# Patient Record
Sex: FEMALE | Race: WHITE | NOT HISPANIC OR LATINO | Employment: OTHER | URBAN - METROPOLITAN AREA
[De-identification: names, ages, dates, MRNs, and addresses within clinical notes are randomized per-mention and may not be internally consistent; named-entity substitution may affect disease eponyms.]

---

## 2017-03-01 ENCOUNTER — OFFICE VISIT (OUTPATIENT)
Dept: INTERNAL MEDICINE | Facility: CLINIC | Age: 82
End: 2017-03-01

## 2017-03-01 VITALS
WEIGHT: 149.6 LBS | TEMPERATURE: 98.1 F | RESPIRATION RATE: 16 BRPM | SYSTOLIC BLOOD PRESSURE: 134 MMHG | HEIGHT: 62 IN | HEART RATE: 64 BPM | OXYGEN SATURATION: 98 % | DIASTOLIC BLOOD PRESSURE: 64 MMHG | BODY MASS INDEX: 27.53 KG/M2

## 2017-03-01 DIAGNOSIS — E78.5 HYPERLIPIDEMIA, UNSPECIFIED HYPERLIPIDEMIA TYPE: ICD-10-CM

## 2017-03-01 DIAGNOSIS — Z23 NEED FOR VACCINATION: ICD-10-CM

## 2017-03-01 DIAGNOSIS — I35.0 NONRHEUMATIC AORTIC VALVE STENOSIS: ICD-10-CM

## 2017-03-01 DIAGNOSIS — I10 ESSENTIAL HYPERTENSION: Primary | ICD-10-CM

## 2017-03-01 DIAGNOSIS — E55.9 VITAMIN D DEFICIENCY: ICD-10-CM

## 2017-03-01 DIAGNOSIS — R73.02 IGT (IMPAIRED GLUCOSE TOLERANCE): ICD-10-CM

## 2017-03-01 LAB — 25(OH)D3+25(OH)D2 SERPL-MCNC: 16.7 NG/ML (ref 30–100)

## 2017-03-01 PROCEDURE — 90715 TDAP VACCINE 7 YRS/> IM: CPT | Performed by: INTERNAL MEDICINE

## 2017-03-01 PROCEDURE — 90471 IMMUNIZATION ADMIN: CPT | Performed by: INTERNAL MEDICINE

## 2017-03-01 PROCEDURE — 99214 OFFICE O/P EST MOD 30 MIN: CPT | Performed by: INTERNAL MEDICINE

## 2017-03-01 RX ORDER — ACETAMINOPHEN 160 MG
2000 TABLET,DISINTEGRATING ORAL DAILY
Qty: 30 CAPSULE | Refills: 11
Start: 2017-03-01 | End: 2018-03-01

## 2017-03-01 RX ORDER — ASPIRIN 325 MG
325 TABLET ORAL AS NEEDED
COMMUNITY
End: 2018-03-22 | Stop reason: DRUGHIGH

## 2017-03-01 RX ORDER — LANOLIN ALCOHOL/MO/W.PET/CERES
1000 CREAM (GRAM) TOPICAL AS NEEDED
COMMUNITY
End: 2017-06-14

## 2017-03-01 NOTE — PROGRESS NOTES
Subjective   Yuliana Gonzalez is a 87 y.o. female.     Chief Complaint   Patient presents with   • Hyperlipidemia     follow up AS   • Hypertension         HPI Comments: In for recheck of chronic IGT.  His been stable.  Also aortic stenosis.  It remains asymptomatic.    Hyperlipidemia   This is a chronic problem. The current episode started more than 1 year ago. The problem is controlled. Recent lipid tests were reviewed and are normal. Factors aggravating her hyperlipidemia include beta blockers. Pertinent negatives include no chest pain or shortness of breath. She is currently on no antihyperlipidemic treatment. The current treatment provides significant improvement of lipids. There are no compliance problems.  Risk factors for coronary artery disease include dyslipidemia, hypertension and post-menopausal.   Hypertension   This is a chronic problem. The current episode started more than 1 year ago. The problem is unchanged. The problem is controlled. Pertinent negatives include no chest pain, headaches, orthopnea, palpitations, peripheral edema, PND or shortness of breath. There are no associated agents to hypertension. Risk factors for coronary artery disease include dyslipidemia and post-menopausal state. Past treatments include beta blockers and angiotensin blockers. The current treatment provides significant improvement. There are no compliance problems.  There is no history of angina, kidney disease, CAD/MI, CVA or heart failure.        The following portions of the patient's history were reviewed and updated as appropriate: allergies, current medications, past social history and problem list.    Outpatient Prescriptions Marked as Taking for the 3/1/17 encounter (Office Visit) with Trent Davis MD   Medication Sig Dispense Refill   • aspirin 325 MG tablet Take 325 mg by mouth As Needed for mild pain (1-3).     • atenolol (TENORMIN) 25 MG tablet TAKE 1 TABLET TWICE A  tablet 1   • Multiple  Vitamins-Minerals (VITEYES AREDS ADVANCED PO) Take 1 capsule by mouth 2 (two) times a day.     • valsartan (DIOVAN) 40 MG tablet Take 1 tablet by mouth daily. 90 tablet 1   • vitamin B-12 (CYANOCOBALAMIN) 1000 MCG tablet Take 1,000 mcg by mouth As Needed.         Review of Systems   Constitutional: Negative for chills, fatigue and fever.   Respiratory: Negative for cough, shortness of breath and wheezing.    Cardiovascular: Negative for chest pain, palpitations, orthopnea, leg swelling and PND.   Gastrointestinal: Negative for abdominal pain, constipation, diarrhea, nausea and vomiting.   Neurological: Negative for headaches.       Objective   Vitals:    03/01/17 1044   BP: 134/64   Pulse: 64   Resp: 16   Temp: 98.1 °F (36.7 °C)   SpO2: 98%      Last Weight    03/01/17  1044   Weight: 149 lb 9.6 oz (67.9 kg)    [unfilled]  Body mass index is 27.81 kg/(m^2).      Physical Exam   Constitutional: She appears well-developed and well-nourished. No distress.   HENT:   Head: Normocephalic and atraumatic.   Neck: Carotid bruit is not present. No thyromegaly present.   Cardiovascular: Normal rate, regular rhythm and intact distal pulses.  Exam reveals no gallop.    Murmur heard.   Crescendo decrescendo systolic murmur is present with a grade of 2/6   Pulmonary/Chest: Effort normal and breath sounds normal. No respiratory distress. She has no wheezes. She has no rales.   Abdominal: Soft. Bowel sounds are normal. She exhibits no mass. There is no tenderness. There is no guarding.         Problem List Items Addressed This Visit        Cardiovascular and Mediastinum    Aortic stenosis    Hyperlipidemia    Hypertension - Primary       Digestive    Vitamin D deficiency       Endocrine    IGT (impaired glucose tolerance)        Assessment/Plan   In for recheck of hypertension, hyperlipidemia, and aortic stenosis.  Gets annual lab work 8/2016 including CBC CMP and lipids and EKG.  AS remains asymptomatic.  Last echocardiogram  was June 2016.  Moderate to severe AS.  We'll recheck now March 2017.  Due for TD AP today.  She is on vitamin D3 2000 units daily now.  The decrease in her dose.  She thinks it makes her feel bad.  Recheck a level today and see if she still needs to stay on it.  Caused headaches and nausea and constipation.         Dragon disclaimer:   Much of this encounter note is an electronic transcription/translation of spoken language to printed text. The electronic translation of spoken language may permit erroneous, or at times, nonsensical words or phrases to be inadvertently transcribed; Although I have reviewed the note for such errors, some may still exist.

## 2017-03-15 ENCOUNTER — HOSPITAL ENCOUNTER (OUTPATIENT)
Dept: CARDIOLOGY | Facility: HOSPITAL | Age: 82
Discharge: HOME | End: 2017-03-15
Attending: INTERNAL MEDICINE | Admitting: INTERNAL MEDICINE

## 2017-03-15 VITALS
SYSTOLIC BLOOD PRESSURE: 142 MMHG | HEART RATE: 66 BPM | BODY MASS INDEX: 28.13 KG/M2 | WEIGHT: 149 LBS | DIASTOLIC BLOOD PRESSURE: 80 MMHG | HEIGHT: 61 IN

## 2017-03-15 DIAGNOSIS — I35.0 NONRHEUMATIC AORTIC VALVE STENOSIS: ICD-10-CM

## 2017-03-15 LAB
ASCENDING AORTA: 2.8 CM
BH CV ECHO MEAS - ACS: 1.1 CM
BH CV ECHO MEAS - AO MAX PG (FULL): 40.3 MMHG
BH CV ECHO MEAS - AO MAX PG: 46 MMHG
BH CV ECHO MEAS - AO MEAN PG (FULL): 25.1 MMHG
BH CV ECHO MEAS - AO MEAN PG: 29.2 MMHG
BH CV ECHO MEAS - AO ROOT AREA (BSA CORRECTED): 1.6
BH CV ECHO MEAS - AO ROOT AREA: 5.5 CM^2
BH CV ECHO MEAS - AO ROOT DIAM: 2.6 CM
BH CV ECHO MEAS - AO V2 MAX: 339.1 CM/SEC
BH CV ECHO MEAS - AO V2 MEAN: 258.8 CM/SEC
BH CV ECHO MEAS - AO V2 VTI: 87.5 CM
BH CV ECHO MEAS - AVA(I,A): 0.99 CM^2
BH CV ECHO MEAS - AVA(I,D): 0.99 CM^2
BH CV ECHO MEAS - AVA(V,A): 0.94 CM^2
BH CV ECHO MEAS - AVA(V,D): 0.94 CM^2
BH CV ECHO MEAS - BSA(HAYCOCK): 1.7 M^2
BH CV ECHO MEAS - BSA: 1.7 M^2
BH CV ECHO MEAS - BZI_BMI: 28.2 KILOGRAMS/M^2
BH CV ECHO MEAS - BZI_METRIC_HEIGHT: 154.9 CM
BH CV ECHO MEAS - BZI_METRIC_WEIGHT: 67.6 KG
BH CV ECHO MEAS - CONTRAST EF (2CH): 67.4 ML/M^2
BH CV ECHO MEAS - CONTRAST EF 4CH: 70.9 ML/M^2
BH CV ECHO MEAS - EDV(CUBED): 83.3 ML
BH CV ECHO MEAS - EDV(MOD-SP2): 86 ML
BH CV ECHO MEAS - EDV(MOD-SP4): 79 ML
BH CV ECHO MEAS - EDV(TEICH): 86.1 ML
BH CV ECHO MEAS - EF(CUBED): 82.7 %
BH CV ECHO MEAS - EF(MOD-SP2): 67.4 %
BH CV ECHO MEAS - EF(MOD-SP4): 70.9 %
BH CV ECHO MEAS - EF(TEICH): 75.8 %
BH CV ECHO MEAS - ESV(CUBED): 14.4 ML
BH CV ECHO MEAS - ESV(MOD-SP2): 28 ML
BH CV ECHO MEAS - ESV(MOD-SP4): 23 ML
BH CV ECHO MEAS - ESV(TEICH): 20.9 ML
BH CV ECHO MEAS - FS: 44.3 %
BH CV ECHO MEAS - IVS/LVPW: 0.93
BH CV ECHO MEAS - IVSD: 1 CM
BH CV ECHO MEAS - LAT PEAK E' VEL: 16 CM/SEC
BH CV ECHO MEAS - LV DIASTOLIC VOL/BSA (35-75): 47.4 ML/M^2
BH CV ECHO MEAS - LV MASS(C)D: 153.8 GRAMS
BH CV ECHO MEAS - LV MASS(C)DI: 92.3 GRAMS/M^2
BH CV ECHO MEAS - LV MAX PG: 5.7 MMHG
BH CV ECHO MEAS - LV MEAN PG: 4.1 MMHG
BH CV ECHO MEAS - LV SYSTOLIC VOL/BSA (12-30): 13.8 ML/M^2
BH CV ECHO MEAS - LV V1 MAX: 119.2 CM/SEC
BH CV ECHO MEAS - LV V1 MEAN: 98.4 CM/SEC
BH CV ECHO MEAS - LV V1 VTI: 32.6 CM
BH CV ECHO MEAS - LVIDD: 4.4 CM
BH CV ECHO MEAS - LVIDS: 2.4 CM
BH CV ECHO MEAS - LVLD AP2: 6.9 CM
BH CV ECHO MEAS - LVLD AP4: 7 CM
BH CV ECHO MEAS - LVLS AP2: 6.1 CM
BH CV ECHO MEAS - LVLS AP4: 5.5 CM
BH CV ECHO MEAS - LVOT AREA (M): 2.5 CM^2
BH CV ECHO MEAS - LVOT AREA: 2.7 CM^2
BH CV ECHO MEAS - LVOT DIAM: 1.8 CM
BH CV ECHO MEAS - LVPWD: 1.1 CM
BH CV ECHO MEAS - MED PEAK E' VEL: 22 CM/SEC
BH CV ECHO MEAS - MV A DUR: 0.13 SEC
BH CV ECHO MEAS - MV A MAX VEL: 120.8 CM/SEC
BH CV ECHO MEAS - MV DEC SLOPE: 291.3 CM/SEC^2
BH CV ECHO MEAS - MV DEC TIME: 0.31 SEC
BH CV ECHO MEAS - MV E MAX VEL: 88.8 CM/SEC
BH CV ECHO MEAS - MV E/A: 0.73
BH CV ECHO MEAS - MV MAX PG: 5.8 MMHG
BH CV ECHO MEAS - MV MEAN PG: 2.3 MMHG
BH CV ECHO MEAS - MV P1/2T MAX VEL: 89.2 CM/SEC
BH CV ECHO MEAS - MV P1/2T: 89.7 MSEC
BH CV ECHO MEAS - MV V2 MAX: 120.8 CM/SEC
BH CV ECHO MEAS - MV V2 MEAN: 70.9 CM/SEC
BH CV ECHO MEAS - MV V2 VTI: 44.2 CM
BH CV ECHO MEAS - MVA P1/2T LCG: 2.5 CM^2
BH CV ECHO MEAS - MVA(P1/2T): 2.5 CM^2
BH CV ECHO MEAS - MVA(VTI): 2 CM^2
BH CV ECHO MEAS - PA ACC TIME: 0.13 SEC
BH CV ECHO MEAS - PA MAX PG (FULL): 0.61 MMHG
BH CV ECHO MEAS - PA MAX PG: 4.4 MMHG
BH CV ECHO MEAS - PA PR(ACCEL): 20.4 MMHG
BH CV ECHO MEAS - PA V2 MAX: 104.5 CM/SEC
BH CV ECHO MEAS - PULM A REVS DUR: 0.11 SEC
BH CV ECHO MEAS - PULM A REVS VEL: 19.4 CM/SEC
BH CV ECHO MEAS - PULM DIAS VEL: 24.3 CM/SEC
BH CV ECHO MEAS - PULM S/D: 1.2
BH CV ECHO MEAS - PULM SYS VEL: 28.1 CM/SEC
BH CV ECHO MEAS - PVA(V,A): 1.5 CM^2
BH CV ECHO MEAS - PVA(V,D): 1.5 CM^2
BH CV ECHO MEAS - QP/QS: 0.43
BH CV ECHO MEAS - RAP SYSTOLE: 3 MMHG
BH CV ECHO MEAS - RV MAX PG: 3.8 MMHG
BH CV ECHO MEAS - RV MEAN PG: 2.3 MMHG
BH CV ECHO MEAS - RV V1 MAX: 97 CM/SEC
BH CV ECHO MEAS - RV V1 MEAN: 72.7 CM/SEC
BH CV ECHO MEAS - RV V1 VTI: 22.8 CM
BH CV ECHO MEAS - RVOT AREA: 1.6 CM^2
BH CV ECHO MEAS - RVOT DIAM: 1.4 CM
BH CV ECHO MEAS - RVSP: 23.3 MMHG
BH CV ECHO MEAS - SI(AO): 287.8 ML/M^2
BH CV ECHO MEAS - SI(CUBED): 41.3 ML/M^2
BH CV ECHO MEAS - SI(LVOT): 52 ML/M^2
BH CV ECHO MEAS - SI(MOD-SP2): 34.8 ML/M^2
BH CV ECHO MEAS - SI(MOD-SP4): 33.6 ML/M^2
BH CV ECHO MEAS - SI(TEICH): 39.2 ML/M^2
BH CV ECHO MEAS - SUP REN AO DIAM: 1.5 CM
BH CV ECHO MEAS - SV(AO): 479.7 ML
BH CV ECHO MEAS - SV(CUBED): 68.9 ML
BH CV ECHO MEAS - SV(LVOT): 86.6 ML
BH CV ECHO MEAS - SV(MOD-SP2): 58 ML
BH CV ECHO MEAS - SV(MOD-SP4): 56 ML
BH CV ECHO MEAS - SV(RVOT): 37 ML
BH CV ECHO MEAS - SV(TEICH): 65.3 ML
BH CV ECHO MEAS - TAPSE (>1.6): 1.5 CM2
BH CV ECHO MEAS - TR MAX VEL: 225.3 CM/SEC
BH CV XLRA - RV BASE: 2.5 CM
BH CV XLRA - TDI S': 10 CM/SEC
E/E' RATIO: 19
LEFT ATRIUM VOLUME INDEX: 32 ML/M2
LV EF 2D ECHO EST: 65 %
SINUS: 2.3 CM
STJ: 2.3 CM

## 2017-03-15 PROCEDURE — 93306 TTE W/DOPPLER COMPLETE: CPT

## 2017-03-15 PROCEDURE — 93306 TTE W/DOPPLER COMPLETE: CPT | Performed by: INTERNAL MEDICINE

## 2017-04-04 RX ORDER — VALSARTAN 40 MG/1
TABLET ORAL
Qty: 90 TABLET | Refills: 1 | Status: SHIPPED | OUTPATIENT
Start: 2017-04-04 | End: 2017-09-26 | Stop reason: SDUPTHER

## 2017-06-14 ENCOUNTER — OFFICE VISIT (OUTPATIENT)
Dept: INTERNAL MEDICINE | Facility: CLINIC | Age: 82
End: 2017-06-14

## 2017-06-14 VITALS
DIASTOLIC BLOOD PRESSURE: 70 MMHG | SYSTOLIC BLOOD PRESSURE: 142 MMHG | HEIGHT: 61 IN | WEIGHT: 148 LBS | TEMPERATURE: 98.7 F | OXYGEN SATURATION: 95 % | RESPIRATION RATE: 16 BRPM | BODY MASS INDEX: 27.94 KG/M2 | HEART RATE: 66 BPM

## 2017-06-14 DIAGNOSIS — E78.5 HYPERLIPIDEMIA, UNSPECIFIED HYPERLIPIDEMIA TYPE: ICD-10-CM

## 2017-06-14 DIAGNOSIS — I35.0 NONRHEUMATIC AORTIC VALVE STENOSIS: ICD-10-CM

## 2017-06-14 DIAGNOSIS — R73.02 IGT (IMPAIRED GLUCOSE TOLERANCE): ICD-10-CM

## 2017-06-14 DIAGNOSIS — I10 ESSENTIAL HYPERTENSION: Primary | ICD-10-CM

## 2017-06-14 PROCEDURE — 99214 OFFICE O/P EST MOD 30 MIN: CPT | Performed by: INTERNAL MEDICINE

## 2017-06-14 PROCEDURE — G0439 PPPS, SUBSEQ VISIT: HCPCS | Performed by: INTERNAL MEDICINE

## 2017-06-14 NOTE — PROGRESS NOTES
Subjective   Yuliana Gonzalez is a 87 y.o. female.     Chief Complaint   Patient presents with   • Hyperlipidemia     AS   • Hypertension         HPI Comments: In for recheck of chronic IGT.  His been stable.  Also aortic stenosis.  It remains asymptomatic.    Hyperlipidemia   This is a chronic problem. The current episode started more than 1 year ago. The problem is controlled. Recent lipid tests were reviewed and are normal. Factors aggravating her hyperlipidemia include beta blockers. Pertinent negatives include no chest pain or shortness of breath. She is currently on no antihyperlipidemic treatment. The current treatment provides significant improvement of lipids. There are no compliance problems.  Risk factors for coronary artery disease include dyslipidemia, hypertension and post-menopausal.   Hypertension   This is a chronic problem. The current episode started more than 1 year ago. The problem is unchanged. The problem is controlled. Pertinent negatives include no chest pain, headaches, orthopnea, palpitations, peripheral edema, PND or shortness of breath. There are no associated agents to hypertension. Risk factors for coronary artery disease include dyslipidemia and post-menopausal state. Past treatments include beta blockers and angiotensin blockers. The current treatment provides significant improvement. There are no compliance problems.  There is no history of angina, kidney disease, CAD/MI, CVA or heart failure.        The following portions of the patient's history were reviewed and updated as appropriate: allergies, current medications, past social history and problem list.    Outpatient Prescriptions Marked as Taking for the 6/14/17 encounter (Office Visit) with Trent Davis MD   Medication Sig Dispense Refill   • aspirin 325 MG tablet Take 325 mg by mouth As Needed for mild pain (1-3).     • atenolol (TENORMIN) 25 MG tablet TAKE 1 TABLET TWICE A  tablet 1   • Cholecalciferol (VITAMIN D3)  2000 UNITS capsule Take 1 capsule by mouth Daily. 30 capsule 11   • Multiple Vitamins-Minerals (VITEYES AREDS ADVANCED PO) Take 1 capsule by mouth 2 (two) times a day.     • valsartan (DIOVAN) 40 MG tablet TAKE 1 TABLET BY MOUTH DAILY. 90 tablet 1       Review of Systems   Constitutional: Negative for chills, fatigue and fever.   Respiratory: Negative for cough, shortness of breath and wheezing.    Cardiovascular: Negative for chest pain, palpitations, orthopnea, leg swelling and PND.   Gastrointestinal: Negative for abdominal pain, constipation, diarrhea, nausea and vomiting.   Neurological: Negative for headaches.       Objective   Vitals:    06/14/17 0957   BP: 142/70   Pulse: 66   Resp: 16   Temp: 98.7 °F (37.1 °C)   SpO2: 95%      Last Weight    06/14/17 0957   Weight: 148 lb (67.1 kg)    [unfilled]  Body mass index is 27.96 kg/(m^2).      Physical Exam   Constitutional: She appears well-developed and well-nourished. No distress.   HENT:   Head: Normocephalic and atraumatic.   Neck: Carotid bruit is not present. No thyromegaly present.   Cardiovascular: Normal rate, regular rhythm and intact distal pulses.  Exam reveals no gallop.    Murmur heard.   Crescendo decrescendo systolic murmur is present with a grade of 2/6   Pulmonary/Chest: Effort normal and breath sounds normal. No respiratory distress. She has no wheezes. She has no rales.   Abdominal: Soft. Bowel sounds are normal. She exhibits no mass. There is no tenderness. There is no guarding.         Problem List Items Addressed This Visit        Cardiovascular and Mediastinum    Aortic stenosis    Hyperlipidemia    Hypertension - Primary       Endocrine    IGT (impaired glucose tolerance)        Assessment/Plan   In for recheck of hypertension, hyperlipidemia, and aortic stenosis.  Gets annual lab work 8/2016 including CBC CMP and lipids and EKG.  AS remains asymptomatic.  Last echocardiogram was June 2016.  Moderate to severe AS.  We rechecked in  March 2017.  Annual wellness exam today.         Dragon disclaimer:   Much of this encounter note is an electronic transcription/translation of spoken language to printed text. The electronic translation of spoken language may permit erroneous, or at times, nonsensical words or phrases to be inadvertently transcribed; Although I have reviewed the note for such errors, some may still exist.

## 2017-06-14 NOTE — PATIENT INSTRUCTIONS
Steps to Quit Smoking   Smoking tobacco can be harmful to your health and can affect almost every organ in your body. Smoking puts you, and those around you, at risk for developing many serious chronic diseases. Quitting smoking is difficult, but it is one of the best things that you can do for your health. It is never too late to quit.  WHAT ARE THE BENEFITS OF QUITTING SMOKING?  When you quit smoking, you lower your risk of developing serious diseases and conditions, such as:  · Lung cancer or lung disease, such as COPD.  · Heart disease.  · Stroke.  · Heart attack.  · Infertility.  · Osteoporosis and bone fractures.  Additionally, symptoms such as coughing, wheezing, and shortness of breath may get better when you quit. You may also find that you get sick less often because your body is stronger at fighting off colds and infections. If you are pregnant, quitting smoking can help to reduce your chances of having a baby of low birth weight.  HOW DO I GET READY TO QUIT?  When you decide to quit smoking, create a plan to make sure that you are successful. Before you quit:  · Pick a date to quit. Set a date within the next two weeks to give you time to prepare.  · Write down the reasons why you are quitting. Keep this list in places where you will see it often, such as on your bathroom mirror or in your car or wallet.  · Identify the people, places, things, and activities that make you want to smoke (triggers) and avoid them. Make sure to take these actions:    Throw away all cigarettes at home, at work, and in your car.    Throw away smoking accessories, such as ashtrays and lighters.    Clean your car and make sure to empty the ashtray.    Clean your home, including curtains and carpets.  · Tell your family, friends, and coworkers that you are quitting. Support from your loved ones can make quitting easier.  · Talk with your health care provider about your options for quitting smoking.  · Find out what treatment  "options are covered by your health insurance.  WHAT STRATEGIES CAN I USE TO QUIT SMOKING?   Talk with your healthcare provider about different strategies to quit smoking. Some strategies include:  · Quitting smoking altogether instead of gradually lessening how much you smoke over a period of time. Research shows that quitting \"cold turkey\" is more successful than gradually quitting.  · Attending in-person counseling to help you build problem-solving skills. You are more likely to have success in quitting if you attend several counseling sessions. Even short sessions of 10 minutes can be effective.  · Finding resources and support systems that can help you to quit smoking and remain smoke-free after you quit. These resources are most helpful when you use them often. They can include:    Online chats with a counselor.    Telephone quitlines.    Printed self-help materials.    Support groups or group counseling.    Text messaging programs.    Mobile phone applications.  · Taking medicines to help you quit smoking. (If you are pregnant or breastfeeding, talk with your health care provider first.) Some medicines contain nicotine and some do not. Both types of medicines help with cravings, but the medicines that include nicotine help to relieve withdrawal symptoms. Your health care provider may recommend:    Nicotine patches, gum, or lozenges.    Nicotine inhalers or sprays.    Non-nicotine medicine that is taken by mouth.  Talk with your health care provider about combining strategies, such as taking medicines while you are also receiving in-person counseling. Using these two strategies together makes you more likely to succeed in quitting than if you used either strategy on its own.  If you are pregnant or breastfeeding, talk with your health care provider about finding counseling or other support strategies to quit smoking. Do not take medicine to help you quit smoking unless told to do so by your health care " provider.  WHAT THINGS CAN I DO TO MAKE IT EASIER TO QUIT?  Quitting smoking might feel overwhelming at first, but there is a lot that you can do to make it easier. Take these important actions:  · Reach out to your family and friends and ask that they support and encourage you during this time. Call telephone quitlines, reach out to support groups, or work with a counselor for support.  · Ask people who smoke to avoid smoking around you.  · Avoid places that trigger you to smoke, such as bars, parties, or smoke-break areas at work.  · Spend time around people who do not smoke.  · Lessen stress in your life, because stress can be a smoking trigger for some people. To lessen stress, try:    Exercising regularly.    Deep-breathing exercises.    Yoga.    Meditating.    Performing a body scan. This involves closing your eyes, scanning your body from head to toe, and noticing which parts of your body are particularly tense. Purposefully relax the muscles in those areas.  · Download or purchase mobile phone or tablet apps (applications) that can help you stick to your quit plan by providing reminders, tips, and encouragement. There are many free apps, such as QuitGuide from the CDC (Centers for Disease Control and Prevention). You can find other support for quitting smoking (smoking cessation) through smokefree.gov and other websites.  HOW WILL I FEEL WHEN I QUIT SMOKING?  Within the first 24 hours of quitting smoking, you may start to feel some withdrawal symptoms. These symptoms are usually most noticeable 2-3 days after quitting, but they usually do not last beyond 2-3 weeks. Changes or symptoms that you might experience include:  · Mood swings.  · Restlessness, anxiety, or irritation.  · Difficulty concentrating.  · Dizziness.  · Strong cravings for sugary foods in addition to nicotine.  · Mild weight gain.  · Constipation.  · Nausea.  · Coughing or a sore throat.  · Changes in how your medicines work in your  body.  · A depressed mood.  · Difficulty sleeping (insomnia).  After the first 2-3 weeks of quitting, you may start to notice more positive results, such as:  · Improved sense of smell and taste.  · Decreased coughing and sore throat.  · Slower heart rate.  · Lower blood pressure.  · Clearer skin.  · The ability to breathe more easily.  · Fewer sick days.  Quitting smoking is very challenging for most people. Do not get discouraged if you are not successful the first time. Some people need to make many attempts to quit before they achieve long-term success. Do your best to stick to your quit plan, and talk with your health care provider if you have any questions or concerns.     This information is not intended to replace advice given to you by your health care provider. Make sure you discuss any questions you have with your health care provider.     Document Released: 12/12/2002 Document Revised: 05/03/2016 Document Reviewed: 05/03/2016  [employer] Interactive Patient Education ©2017 [employer]  Hypertension  Hypertension, commonly called high blood pressure, is when the force of blood pumping through your arteries is too strong. Your arteries are the blood vessels that carry blood from your heart throughout your body. A blood pressure reading consists of a higher number over a lower number, such as 110/72. The higher number (systolic) is the pressure inside your arteries when your heart pumps. The lower number (diastolic) is the pressure inside your arteries when your heart relaxes. Ideally you want your blood pressure below 120/80.  Hypertension forces your heart to work harder to pump blood. Your arteries may become narrow or stiff. Having untreated or uncontrolled hypertension can cause heart attack, stroke, kidney disease, and other problems.  RISK FACTORS  Some risk factors for high blood pressure are controllable. Others are not.   Risk factors you cannot control include:   · Race. You may be at higher risk  if you are .  · Age. Risk increases with age.  · Gender. Men are at higher risk than women before age 45 years. After age 65, women are at higher risk than men.  Risk factors you can control include:  · Not getting enough exercise or physical activity.  · Being overweight.  · Getting too much fat, sugar, calories, or salt in your diet.  · Drinking too much alcohol.  SIGNS AND SYMPTOMS  Hypertension does not usually cause signs or symptoms. Extremely high blood pressure (hypertensive crisis) may cause headache, anxiety, shortness of breath, and nosebleed.  DIAGNOSIS  To check if you have hypertension, your health care provider will measure your blood pressure while you are seated, with your arm held at the level of your heart. It should be measured at least twice using the same arm. Certain conditions can cause a difference in blood pressure between your right and left arms. A blood pressure reading that is higher than normal on one occasion does not mean that you need treatment. If it is not clear whether you have high blood pressure, you may be asked to return on a different day to have your blood pressure checked again. Or, you may be asked to monitor your blood pressure at home for 1 or more weeks.  TREATMENT  Treating high blood pressure includes making lifestyle changes and possibly taking medicine. Living a healthy lifestyle can help lower high blood pressure. You may need to change some of your habits.  Lifestyle changes may include:  · Following the DASH diet. This diet is high in fruits, vegetables, and whole grains. It is low in salt, red meat, and added sugars.  · Keep your sodium intake below 2,300 mg per day.  · Getting at least 30-45 minutes of aerobic exercise at least 4 times per week.  · Losing weight if necessary.  · Not smoking.  · Limiting alcoholic beverages.  · Learning ways to reduce stress.  Your health care provider may prescribe medicine if lifestyle changes are not enough  to get your blood pressure under control, and if one of the following is true:  · You are 18-59 years of age and your systolic blood pressure is above 140.  · You are 60 years of age or older, and your systolic blood pressure is above 150.  · Your diastolic blood pressure is above 90.  · You have diabetes, and your systolic blood pressure is over 140 or your diastolic blood pressure is over 90.  · You have kidney disease and your blood pressure is above 140/90.  · You have heart disease and your blood pressure is above 140/90.  Your personal target blood pressure may vary depending on your medical conditions, your age, and other factors.  HOME CARE INSTRUCTIONS  · Have your blood pressure rechecked as directed by your health care provider.    · Take medicines only as directed by your health care provider. Follow the directions carefully. Blood pressure medicines must be taken as prescribed. The medicine does not work as well when you skip doses. Skipping doses also puts you at risk for problems.  · Do not smoke.    · Monitor your blood pressure at home as directed by your health care provider.   SEEK MEDICAL CARE IF:   · You think you are having a reaction to medicines taken.  · You have recurrent headaches or feel dizzy.  · You have swelling in your ankles.  · You have trouble with your vision.  SEEK IMMEDIATE MEDICAL CARE IF:  · You develop a severe headache or confusion.  · You have unusual weakness, numbness, or feel faint.  · You have severe chest or abdominal pain.  · You vomit repeatedly.  · You have trouble breathing.  MAKE SURE YOU:   · Understand these instructions.  · Will watch your condition.  · Will get help right away if you are not doing well or get worse.     This information is not intended to replace advice given to you by your health care provider. Make sure you discuss any questions you have with your health care provider.     Document Released: 12/18/2006 Document Revised: 05/03/2016 Document  Reviewed: 10/10/2014  [employer] Interactive Patient Education ©2017 [employer]  Exercising to Stay Healthy  Exercising regularly is important. It has many health benefits, such as:  · Improving your overall fitness, flexibility, and endurance.  · Increasing your bone density.  · Helping with weight control.  · Decreasing your body fat.  · Increasing your muscle strength.  · Reducing stress and tension.  · Improving your overall health.  In order to become healthy and stay healthy, it is recommended that you do moderate-intensity and vigorous-intensity exercise. You can tell that you are exercising at a moderate intensity if you have a higher heart rate and faster breathing, but you are still able to hold a conversation. You can tell that you are exercising at a vigorous intensity if you are breathing much harder and faster and cannot hold a conversation while exercising.  HOW OFTEN SHOULD I EXERCISE?  Choose an activity that you enjoy and set realistic goals. Your health care provider can help you to make an activity plan that works for you. Exercise regularly as directed by your health care provider. This may include:   · Doing resistance training twice each week, such as:    Push-ups.    Sit-ups.    Lifting weights.    Using resistance bands.  · Doing a given intensity of exercise for a given amount of time. Choose from these options:    150 minutes of moderate-intensity exercise every week.    75 minutes of vigorous-intensity exercise every week.    A mix of moderate-intensity and vigorous-intensity exercise every week.  Children, pregnant women, people who are out of shape, people who are overweight, and older adults may need to consult a health care provider for individual recommendations. If you have any sort of medical condition, be sure to consult your health care provider before starting a new exercise program.   WHAT ARE SOME EXERCISE IDEAS?  Some moderate-intensity exercise ideas include:   · Walking at  a rate of 1 mile in 15 minutes.  · Biking.  · Hiking.  · Golfing.  · Dancing.  Some vigorous-intensity exercise ideas include:   · Walking at a rate of at least 4.5 miles per hour.  · Jogging or running at a rate of 5 miles per hour.  · Biking at a rate of at least 10 miles per hour.  · Lap swimming.  · Roller-skating or in-line skating.  · Cross-country skiing.  · Vigorous competitive sports, such as football, basketball, and soccer.  · Jumping rope.  · Aerobic dancing.  WHAT ARE SOME EVERYDAY ACTIVITIES THAT CAN HELP ME TO GET EXERCISE?  · Yard work, such as:    Pushing a .    Raking and bagging leaves.  · Washing and waxing your car.  · Pushing a stroller.  · Shoveling snow.  · Gardening.  · Washing windows or floors.  HOW CAN I BE MORE ACTIVE IN MY DAY-TO-DAY ACTIVITIES?  · Use the stairs instead of the elevator.  · Take a walk during your lunch break.  · If you drive, park your car farther away from work or school.  · If you take public transportation, get off one stop early and walk the rest of the way.  · Make all of your phone calls while standing up and walking around.  · Get up, stretch, and walk around every 30 minutes throughout the day.  WHAT GUIDELINES SHOULD I FOLLOW WHILE EXERCISING?  · Do not exercise so much that you hurt yourself, feel dizzy, or get very short of breath.  · Consult your health care provider before starting a new exercise program.  · Wear comfortable clothes and shoes with good support.  · Drink plenty of water while you exercise to prevent dehydration or heat stroke. Body water is lost during exercise and must be replaced.  · Work out until you breathe faster and your heart beats faster.     This information is not intended to replace advice given to you by your health care provider. Make sure you discuss any questions you have with your health care provider.     Document Released: 01/20/2012 Document Revised: 01/08/2016 Document Reviewed: 05/21/2015  [employer]  Interactive Patient Education ©2017 [employer]  Aspirin and Your Heart   Aspirin is a medicine that affects the way blood clots. Aspirin can be used to help reduce the risk of blood clots, heart attacks, and other heart-related problems.   SHOULD I TAKE ASPIRIN?  Your health care provider will help you determine whether it is safe and beneficial for you to take aspirin daily. Taking aspirin daily may be beneficial if you:  · Have had a heart attack or chest pain.  · Have undergone open heart surgery such as coronary artery bypass surgery (CABG).  · Have had coronary angioplasty.  · Have experienced a stroke or transient ischemic attack (TIA).  · Have peripheral vascular disease (PVD).  · Have chronic heart rhythm problems such as atrial fibrillation.  ARE THERE ANY RISKS OF TAKING ASPIRIN DAILY?  Daily use of aspirin can increase your risk of side effects. Some of these include:  · Bleeding. Bleeding problems can be minor or serious. An example of a minor problem is a cut that does not stop bleeding. An example of a more serious problem is stomach bleeding or bleeding into the brain. Your risk of bleeding is increased if you are also taking non-steroidal anti-inflammatory medicine (NSAIDs).  · Increased bruising.  · Upset stomach.  · An allergic reaction. People who have nasal polyps have an increased risk of developing an aspirin allergy.  WHAT ARE SOME GUIDELINES I SHOULD FOLLOW WHEN TAKING ASPIRIN?   · Take aspirin only as directed by your health care provider. Make sure you understand how much you should take and what form you should take. The two forms of aspirin are:    Non-enteric-coated. This type of aspirin does not have a coating and is absorbed quickly. Non-enteric-coated aspirin is usually recommended for people with chest pain. This type of aspirin also comes in a chewable form.    Enteric-coated. This type of aspirin has a special coating that releases the medicine very slowly. Enteric-coated  aspirin causes less stomach upset than non-enteric-coated aspirin. This type of aspirin should not be chewed or crushed.  · Drink alcohol in moderation. Drinking alcohol increases your risk of bleeding.  WHEN SHOULD I SEEK MEDICAL CARE?   · You have unusual bleeding or bruising.  · You have stomach pain.  · You have an allergic reaction. Symptoms of an allergic reaction include:    Hives.    Itchy skin.    Swelling of the lips, tongue, or face.  · You have ringing in your ears.  WHEN SHOULD I SEEK IMMEDIATE MEDICAL CARE?   · Your bowel movements are bloody, dark red, or black in color.  · You vomit or cough up blood.  · You have blood in your urine.  · You cough, wheeze, or feel short of breath.  If you have any of the following symptoms, this is an emergency. Do not wait to see if the pain will go away. Get medical help at once. Call your local emergency services (911 in the [place] Do not drive yourself to the hospital.  · You have severe chest pain, especially if the pain is crushing or pressure-like and spreads to the arms, back, neck, or jaw.   · You have stroke-like symptoms, such as:      Loss of vision.      Difficulty talking.      Numbness or weakness on one side of your body.      Numbness or weakness in your arm or leg.      Not thinking clearly or feeling confused.       This information is not intended to replace advice given to you by your health care provider. Make sure you discuss any questions you have with your health care provider.     Document Released: 11/30/2009 Document Revised: 01/08/2016 Document Reviewed: 03/25/2015  [employer] Interactive Patient Education ©2017 [employer]  Advance Directive  Advance directives are the legal documents that allow you to make choices about your health care and medical treatment if you cannot speak for yourself. Advance directives are a way for you to communicate your wishes to family, friends, and health care providers. The specified people can then convey  your decisions about end-of-life care to avoid confusion if you should become unable to communicate.  Ideally, the process of discussing and writing advance directives should happen over time rather than making decisions all at once. Advance directives can be modified as your situation changes, and you can change your mind at any time, even after you have signed the advance directives. Each state has its own laws regarding advance directives.  You may want to check with your health care provider, , or state representative about the law in your state. Below are some examples of advance directives.  HEALTH CARE PROXY AND DURABLE POWER OF  FOR HEALTH CARE  A health care proxy is a person (agent) appointed to make medical decisions for you if you cannot. Generally, people choose someone they know well and trust to represent their preferences when they can no longer do so. You should be sure to ask this person for agreement to act as your agent. An agent may have to exercise judgment in the event of a medical decision for which your wishes are not known.  A durable power of  for health care is a legal document that names your health care proxy. Depending on the laws in your state, after the document is written, it may also need to be:  · Signed.  · Notarized.  · Dated.  · Copied.  · Witnessed.  · Incorporated into your medical record.  You may also want to appoint someone to manage your financial affairs if you cannot. This is called a durable power of  for finances. It is a separate legal document from the durable power of  for health care. You may choose the same person or someone different from your health care proxy to act as your agent in financial matters.  LIVING WILL  A living will is a set of instructions documenting your wishes about medical care when you cannot care for yourself. It is used if you become:  · Terminally ill.  · Incapacitated.  · Unable to  communicate.  · Unable to make decisions.  Items to consider in your living will include:  · The use or non-use of life-sustaining equipment, such as dialysis machines and breathing machines (ventilators).  · A do not resuscitate (DNR) order, which is the instruction not to use cardiopulmonary resuscitation (CPR) if breathing or heartbeat stops.  · Tube feeding.  · Withholding of food and fluids.  · Comfort (palliative) care when the goal becomes comfort rather than a cure.  · Organ and tissue donation.  A living will does not give instructions about distribution of your money and property if you should pass away. It is advisable to seek the expert advice of a  in drawing up a will regarding your possessions. Decisions about taxes, beneficiaries, and asset distribution will be legally binding. This process can relieve your family and friends of any burdens surrounding disputes or questions that may come up about the allocation of your assets.  DO NOT RESUSCITATE (DNR)  A do not resuscitate (DNR) order is a request to not have CPR in the event that your heart stops beating or you stop breathing. Unless given other instructions, a health care provider will try to help any patient whose heart has stopped or who has stopped breathing.      This information is not intended to replace advice given to you by your health care provider. Make sure you discuss any questions you have with your health care provider.     Document Released: 03/26/2009 Document Revised: 04/10/2017 Document Reviewed: 05/07/2014  [employer] Interactive Patient Education ©2017 [employer]

## 2017-06-14 NOTE — PROGRESS NOTES
QUICK REFERENCE INFORMATION:  The ABCs of the Annual Wellness Visit    Subsequent Medicare Wellness Visit    HEALTH RISK ASSESSMENT    1/27/1930    Recent Hospitalizations:  No hospitalization(s) within the last year..        Current Medical Providers:  Patient Care Team:  Trent Davis MD as PCP - General (Internal Medicine)        Smoking Status:  History   Smoking Status   • Never Smoker   Smokeless Tobacco   • Not on file       Alcohol Consumption:  History   Alcohol Use No       Depression Screen:   PHQ-9 Depression Screening 6/14/2017   Little interest or pleasure in doing things 0   Feeling down, depressed, or hopeless 0   Trouble falling or staying asleep, or sleeping too much 0   Feeling tired or having little energy 1   Poor appetite or overeating 0   Feeling bad about yourself - or that you are a failure or have let yourself or your family down 0   Trouble concentrating on things, such as reading the newspaper or watching television 0   Moving or speaking so slowly that other people could have noticed. Or the opposite - being so fidgety or restless that you have been moving around a lot more than usual 0   Thoughts that you would be better off dead, or of hurting yourself in some way 0   PHQ-9 Total Score 1   If you checked off any problems, how difficult have these problems made it for you to do your work, take care of things at home, or get along with other people? Not difficult at all       Health Habits and Functional and Cognitive Screening:  Functional & Cognitive Status 6/14/2017   Do you have difficulty preparing food and eating? No   Do you have difficulty bathing yourself? No   Do you have difficulty getting dressed? No   Do you have difficulty using the toilet? No   Do you have difficulty moving around from place to place? No   In the past year have you fallen or experienced a near fall? No   Do you need help using the phone?  No   Are you deaf or do you have serious difficulty hearing?  No    Do you need help with transportation? No   Do you need help shopping? No   Do you need help preparing meals?  No   Do you need help with housework?  No   Do you need help with laundry? No   Do you need help taking your medications? No   Do you need help managing money? No   Do you have difficulty concentrating, remembering or making decisions? No       Health Habits  Current Diet: Unhealthy Diet  Dental Exam: Up to date  Eye Exam: Up to date  Exercise (times per week): 7 times per week  Current Exercise Activities Include: Walking      Does the patient have evidence of cognitive impairment? No    Aspirin use counseling: Taking ASA appropriately as indicated      Recent Lab Results:  CMP:  Lab Results   Component Value Date    GLU 95 08/17/2016    BUN 12 08/17/2016    CREATININE 0.56 (L) 08/17/2016    EGFRIFNONA 85 08/17/2016    EGFRIFAFRI 98 08/17/2016    BCR 21 08/17/2016     08/17/2016    K 4.3 08/17/2016    CO2 22 08/17/2016    CALCIUM 8.8 08/17/2016    PROTENTOTREF 6.5 08/17/2016    ALBUMIN 4.2 08/17/2016    LABGLOBREF 2.3 08/17/2016    LABIL2 1.8 08/17/2016    BILITOT 0.5 08/17/2016    ALKPHOS 73 08/17/2016    AST 20 08/17/2016    ALT 14 08/17/2016     Lipid Panel:  Lab Results   Component Value Date    TRIG 213 (H) 08/17/2016    HDL 61 08/17/2016    VLDL 43 (H) 08/17/2016    LDLHDL 2.1 08/17/2016     HbA1c:  Lab Results   Component Value Date    HGBA1C 5.7 (H) 08/17/2016       Visual Acuity:  No exam data present    Age-appropriate Screening Schedule:  Refer to the list below for future screening recommendations based on patient's age, sex and/or medical conditions. Orders for these recommended tests are listed in the plan section. The patient has been provided with a written plan.    Health Maintenance   Topic Date Due   • INFLUENZA VACCINE  08/01/2017   • LIPID PANEL  08/17/2017   • COLONOSCOPY  11/01/2017   • PNEUMOCOCCAL VACCINES (65+ LOW/MEDIUM RISK) (2 of 2 - PPSV23) 11/30/2017   • TDAP/TD  VACCINES (2 - Td) 03/01/2027   • ZOSTER VACCINE  Completed   • MAMMOGRAM  Excluded        Subjective   History of Present Illness    Yuliana Gonzalez is a 87 y.o. female who presents for an Subsequent Wellness Visit.    The following portions of the patient's history were reviewed and updated as appropriate: allergies, current medications, past family history, past medical history, past social history, past surgical history and problem list.    Outpatient Medications Prior to Visit   Medication Sig Dispense Refill   • aspirin 325 MG tablet Take 325 mg by mouth As Needed for mild pain (1-3).     • atenolol (TENORMIN) 25 MG tablet TAKE 1 TABLET TWICE A  tablet 1   • Cholecalciferol (VITAMIN D3) 2000 UNITS capsule Take 1 capsule by mouth Daily. 30 capsule 11   • Multiple Vitamins-Minerals (VITEYES AREDS ADVANCED PO) Take 1 capsule by mouth 2 (two) times a day.     • valsartan (DIOVAN) 40 MG tablet TAKE 1 TABLET BY MOUTH DAILY. 90 tablet 1   • vitamin B-12 (CYANOCOBALAMIN) 1000 MCG tablet Take 1,000 mcg by mouth As Needed.       No facility-administered medications prior to visit.        Patient Active Problem List   Diagnosis   • Aortic stenosis   • IGT (impaired glucose tolerance)   • Hyperlipidemia   • Hypertension   • Cervical arthritis   • Sciatica   • Vitamin D deficiency   • De Quervain's disease (tenosynovitis)       Advance Care Planning:  has an advance directive - a copy HAS NOT been provided. Have asked the patient to send this to us to add to record.    Identification of Risk Factors:  Risk factors include: weight  and inactivity.    Review of Systems    Compared to one year ago, the patient feels her physical health is better.  Compared to one year ago, the patient feels her mental health is the same.    Objective     Physical Exam    Vitals:    06/14/17 0957   BP: 142/70   BP Location: Left arm   Patient Position: Sitting   Cuff Size: Large Adult   Pulse: 66   Resp: 16   Temp: 98.7 °F (37.1 °C)  "  TempSrc: Oral   SpO2: 95%   Weight: 148 lb (67.1 kg)   Height: 61\" (154.9 cm)   PainSc: 0-No pain       Body mass index is 27.96 kg/(m^2).  Discussed the patient's BMI with her. The BMI is in the acceptable range.    Assessment/Plan   Patient Self-Management and Personalized Health Advice  The patient has been provided with information about: diet, exercise, fall prevention and designing advance directives and preventive services including:   · Advance directive, Exercise counseling provided, Nutrition counseling provided, Smoking cessation counseling completed.    Visit Diagnoses:    ICD-10-CM ICD-9-CM   1. Essential hypertension I10 401.9   2. Hyperlipidemia, unspecified hyperlipidemia type E78.5 272.4   3. Nonrheumatic aortic valve stenosis I35.0 424.1   4. IGT (impaired glucose tolerance) R73.02 790.22       No orders of the defined types were placed in this encounter.      Outpatient Encounter Prescriptions as of 6/14/2017   Medication Sig Dispense Refill   • aspirin 325 MG tablet Take 325 mg by mouth As Needed for mild pain (1-3).     • atenolol (TENORMIN) 25 MG tablet TAKE 1 TABLET TWICE A  tablet 1   • Cholecalciferol (VITAMIN D3) 2000 UNITS capsule Take 1 capsule by mouth Daily. 30 capsule 11   • Multiple Vitamins-Minerals (VITEYES AREDS ADVANCED PO) Take 1 capsule by mouth 2 (two) times a day.     • valsartan (DIOVAN) 40 MG tablet TAKE 1 TABLET BY MOUTH DAILY. 90 tablet 1   • [DISCONTINUED] vitamin B-12 (CYANOCOBALAMIN) 1000 MCG tablet Take 1,000 mcg by mouth As Needed.       No facility-administered encounter medications on file as of 6/14/2017.        Reviewed use of high risk medication in the elderly: yes  Reviewed for potential of harmful drug interactions in the elderly: yes    Follow Up:  Return in about 3 months (around 9/14/2017) for Recheck htn, lipids, AS.     An After Visit Summary and PPPS with all of these plans were given to the patient.        "

## 2017-07-29 ENCOUNTER — HOSPITAL ENCOUNTER (EMERGENCY)
Facility: HOSPITAL | Age: 82
Discharge: HOME | End: 2017-07-29
Attending: EMERGENCY MEDICINE | Admitting: EMERGENCY MEDICINE

## 2017-07-29 ENCOUNTER — APPOINTMENT (OUTPATIENT)
Dept: GENERAL RADIOLOGY | Facility: HOSPITAL | Age: 82
End: 2017-07-29

## 2017-07-29 VITALS
TEMPERATURE: 98.1 F | HEIGHT: 64 IN | RESPIRATION RATE: 15 BRPM | WEIGHT: 152 LBS | DIASTOLIC BLOOD PRESSURE: 74 MMHG | SYSTOLIC BLOOD PRESSURE: 160 MMHG | HEART RATE: 69 BPM | BODY MASS INDEX: 25.95 KG/M2 | OXYGEN SATURATION: 96 %

## 2017-07-29 DIAGNOSIS — S62.524A CLOSED NONDISPLACED FRACTURE OF DISTAL PHALANX OF RIGHT THUMB, INITIAL ENCOUNTER: Primary | ICD-10-CM

## 2017-07-29 DIAGNOSIS — R04.0 EPISTAXIS: ICD-10-CM

## 2017-07-29 DIAGNOSIS — T14.8XXA SKIN ABRASION: ICD-10-CM

## 2017-07-29 PROCEDURE — 73130 X-RAY EXAM OF HAND: CPT

## 2017-07-29 PROCEDURE — 73590 X-RAY EXAM OF LOWER LEG: CPT

## 2017-07-29 PROCEDURE — 99283 EMERGENCY DEPT VISIT LOW MDM: CPT

## 2017-07-29 PROCEDURE — 70160 X-RAY EXAM OF NASAL BONES: CPT

## 2017-07-29 RX ORDER — NAPROXEN SODIUM 220 MG
220 TABLET ORAL 2 TIMES DAILY PRN
COMMUNITY
End: 2018-09-07

## 2017-08-02 ENCOUNTER — TELEPHONE (OUTPATIENT)
Dept: SOCIAL WORK | Facility: HOSPITAL | Age: 82
End: 2017-08-02

## 2017-08-02 NOTE — TELEPHONE ENCOUNTER
ED f/u phone call: states that she is following d/c instructions, is feeling a bit better and has f/u manuel't w/ orthopedist on 8/4. No questions/concerns

## 2017-08-10 ENCOUNTER — OFFICE VISIT (OUTPATIENT)
Dept: INTERNAL MEDICINE | Facility: CLINIC | Age: 82
End: 2017-08-10

## 2017-08-10 VITALS
DIASTOLIC BLOOD PRESSURE: 64 MMHG | RESPIRATION RATE: 16 BRPM | OXYGEN SATURATION: 96 % | HEIGHT: 64 IN | SYSTOLIC BLOOD PRESSURE: 152 MMHG | BODY MASS INDEX: 25.03 KG/M2 | WEIGHT: 146.6 LBS | TEMPERATURE: 98.8 F | HEART RATE: 66 BPM

## 2017-08-10 DIAGNOSIS — G44.309 HEADACHES DUE TO OLD HEAD INJURY: ICD-10-CM

## 2017-08-10 DIAGNOSIS — S09.90XS HEADACHES DUE TO OLD HEAD INJURY: ICD-10-CM

## 2017-08-10 DIAGNOSIS — R42 LIGHT-HEADEDNESS: Primary | ICD-10-CM

## 2017-08-10 PROCEDURE — 99213 OFFICE O/P EST LOW 20 MIN: CPT | Performed by: INTERNAL MEDICINE

## 2017-08-10 RX ORDER — ATENOLOL 25 MG/1
TABLET ORAL
Qty: 180 TABLET | Refills: 1 | Status: SHIPPED | OUTPATIENT
Start: 2017-08-10 | End: 2018-05-30 | Stop reason: SDUPTHER

## 2017-08-10 NOTE — PROGRESS NOTES
Subjective   Yuliana Gonzalez is a 87 y.o. female.     Chief Complaint   Patient presents with   • Follow-up     Hospital f/u         Weakness - Generalized   This is a new problem. The current episode started 1 to 4 weeks ago. The problem occurs intermittently. The problem has been waxing and waning. Associated symptoms include headaches, myalgias and vertigo. Pertinent negatives include no anorexia, chest pain, chills, coughing, fever, neck pain or visual change. Nothing aggravates the symptoms. She has tried acetaminophen and NSAIDs for the symptoms.        The following portions of the patient's history were reviewed and updated as appropriate: allergies, current medications, past social history and problem list.    Outpatient Prescriptions Marked as Taking for the 8/10/17 encounter (Office Visit) with Trent Davis MD   Medication Sig Dispense Refill   • aspirin 325 MG tablet Take 325 mg by mouth As Needed for mild pain (1-3).     • atenolol (TENORMIN) 25 MG tablet TAKE 1 TABLET TWICE A  tablet 1   • Cholecalciferol (VITAMIN D3) 2000 UNITS capsule Take 1 capsule by mouth Daily. 30 capsule 11   • Multiple Vitamins-Minerals (VITEYES AREDS ADVANCED PO) Take 1 capsule by mouth 2 (two) times a day.     • naproxen sodium (ALEVE) 220 MG tablet Take 220 mg by mouth 2 (Two) Times a Day As Needed for Mild Pain (1-3) (took one this morning at 1030.).     • valsartan (DIOVAN) 40 MG tablet TAKE 1 TABLET BY MOUTH DAILY. 90 tablet 1       Review of Systems   Constitutional: Negative for chills and fever.   Respiratory: Negative for cough and wheezing.    Cardiovascular: Negative for chest pain and palpitations.   Gastrointestinal: Negative for anorexia.   Musculoskeletal: Positive for myalgias. Negative for back pain, gait problem and neck pain.   Neurological: Positive for vertigo and headaches.       Objective   Vitals:    08/10/17 1100   BP: 152/64   Pulse: 66   Resp: 16   Temp: 98.8 °F (37.1 °C)   SpO2: 96%     There were no vitals filed for this visit. [unfilled]  There is no height or weight on file to calculate BMI.      Physical Exam   Constitutional: She is oriented to person, place, and time. She appears well-developed and well-nourished.   Cardiovascular: Normal rate and regular rhythm.    Murmur heard.   Crescendo decrescendo systolic murmur is present with a grade of 3/6   Aortic area   Pulmonary/Chest: Effort normal and breath sounds normal. No respiratory distress. She has no wheezes. She has no rales.   Neurological: She is alert and oriented to person, place, and time. She has normal reflexes. She displays normal reflexes. No cranial nerve deficit. Coordination normal.         Problem List Items Addressed This Visit     None      Visit Diagnoses     Light-headedness    -  Primary    Headaches due to old head injury            Assessment/Plan   She's been complaining of left wrist pain for some time now.  She's tried a splint early modest success.  He was [facility] where they told her to take it at night rather than during the day.  They must of thought this was carpal tunnel syndrome.  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  Ears no evidence of de Quervain's disease on exam.  She stumbled and tripped and had a fall 12 days ago.  Fractured the tip of her right thumb.  Hit her forehead.  She's had some vague right chest pain since then and it is probably from rib contusion.  She's had these lightheaded spells where she feels terrible and somewhat near syncopal since then.  Some headaches.  I suspect this might be postconcussive symptoms.  She was worried it was related to Tylenol or Aleve and I suggested to her that is not the case.  For now she'll leave off the Advil and Aleve and just go with Tylenol.  We will observe carefully given the head trauma.  His symptoms continued she is going to need a CT of the brain, unenhanced to rule out any subdural.  Right now the trauma seems to have  been awfully mild.         Dragon disclaimer:   Much of this encounter note is an electronic transcription/translation of spoken language to printed text. The electronic translation of spoken language may permit erroneous, or at times, nonsensical words or phrases to be inadvertently transcribed; Although I have reviewed the note for such errors, some may still exist.

## 2017-08-17 ENCOUNTER — TELEPHONE (OUTPATIENT)
Dept: INTERNAL MEDICINE | Facility: CLINIC | Age: 82
End: 2017-08-17

## 2017-09-15 DIAGNOSIS — Z79.899 MEDICATION MANAGEMENT: Primary | ICD-10-CM

## 2017-09-15 DIAGNOSIS — R73.02 IGT (IMPAIRED GLUCOSE TOLERANCE): ICD-10-CM

## 2017-09-15 DIAGNOSIS — E78.5 HYPERLIPIDEMIA, UNSPECIFIED HYPERLIPIDEMIA TYPE: ICD-10-CM

## 2017-09-19 LAB
ALBUMIN SERPL-MCNC: 4.1 G/DL (ref 3.5–4.7)
ALBUMIN/GLOB SERPL: 1.8 {RATIO} (ref 1.2–2.2)
ALP SERPL-CCNC: 77 IU/L (ref 39–117)
ALT SERPL-CCNC: 12 IU/L (ref 0–32)
AST SERPL-CCNC: 15 IU/L (ref 0–40)
BASOPHILS # BLD AUTO: 0 X10E3/UL (ref 0–0.2)
BASOPHILS NFR BLD AUTO: 1 %
BILIRUB SERPL-MCNC: 0.5 MG/DL (ref 0–1.2)
BUN SERPL-MCNC: 14 MG/DL (ref 8–27)
BUN/CREAT SERPL: 27 (ref 12–28)
CALCIUM SERPL-MCNC: 8.9 MG/DL (ref 8.7–10.3)
CHLORIDE SERPL-SCNC: 103 MMOL/L (ref 96–106)
CHOLEST SERPL-MCNC: 237 MG/DL (ref 100–199)
CO2 SERPL-SCNC: 20 MMOL/L (ref 18–29)
CREAT SERPL-MCNC: 0.51 MG/DL (ref 0.57–1)
EOSINOPHIL # BLD AUTO: 0.2 X10E3/UL (ref 0–0.4)
EOSINOPHIL NFR BLD AUTO: 3 %
ERYTHROCYTE [DISTWIDTH] IN BLOOD BY AUTOMATED COUNT: 13.8 % (ref 12.3–15.4)
GLOBULIN SER CALC-MCNC: 2.3 G/DL (ref 1.5–4.5)
GLUCOSE SERPL-MCNC: 84 MG/DL (ref 65–99)
HBA1C MFR BLD: 5.6 % (ref 4.8–5.6)
HCT VFR BLD AUTO: 42.3 % (ref 34–46.6)
HDLC SERPL-MCNC: 59 MG/DL
HGB BLD-MCNC: 14.4 G/DL (ref 11.1–15.9)
IMM GRANULOCYTES # BLD: 0 X10E3/UL (ref 0–0.1)
IMM GRANULOCYTES NFR BLD: 0 %
LDLC SERPL CALC-MCNC: 125 MG/DL (ref 0–99)
LDLC/HDLC SERPL: 2.1 RATIO UNITS (ref 0–3.2)
LYMPHOCYTES # BLD AUTO: 3 X10E3/UL (ref 0.7–3.1)
LYMPHOCYTES NFR BLD AUTO: 38 %
MCH RBC QN AUTO: 28.5 PG (ref 26.6–33)
MCHC RBC AUTO-ENTMCNC: 34 G/DL (ref 31.5–35.7)
MCV RBC AUTO: 84 FL (ref 79–97)
MONOCYTES # BLD AUTO: 0.4 X10E3/UL (ref 0.1–0.9)
MONOCYTES NFR BLD AUTO: 5 %
NEUTROPHILS # BLD AUTO: 4.3 X10E3/UL (ref 1.4–7)
NEUTROPHILS NFR BLD AUTO: 53 %
PLATELET # BLD AUTO: 206 X10E3/UL (ref 150–379)
POTASSIUM SERPL-SCNC: 4.4 MMOL/L (ref 3.5–5.2)
PROT SERPL-MCNC: 6.4 G/DL (ref 6–8.5)
RBC # BLD AUTO: 5.05 X10E6/UL (ref 3.77–5.28)
SODIUM SERPL-SCNC: 141 MMOL/L (ref 134–144)
TRIGL SERPL-MCNC: 267 MG/DL (ref 0–149)
VLDLC SERPL CALC-MCNC: 53 MG/DL (ref 5–40)
WBC # BLD AUTO: 8 X10E3/UL (ref 3.4–10.8)

## 2017-09-20 ENCOUNTER — OFFICE VISIT (OUTPATIENT)
Dept: INTERNAL MEDICINE | Facility: CLINIC | Age: 82
End: 2017-09-20

## 2017-09-20 VITALS
HEART RATE: 60 BPM | TEMPERATURE: 98.4 F | WEIGHT: 147 LBS | HEIGHT: 64 IN | DIASTOLIC BLOOD PRESSURE: 82 MMHG | SYSTOLIC BLOOD PRESSURE: 138 MMHG | RESPIRATION RATE: 16 BRPM | BODY MASS INDEX: 25.1 KG/M2

## 2017-09-20 DIAGNOSIS — I35.0 NONRHEUMATIC AORTIC VALVE STENOSIS: ICD-10-CM

## 2017-09-20 DIAGNOSIS — E78.5 HYPERLIPIDEMIA, UNSPECIFIED HYPERLIPIDEMIA TYPE: ICD-10-CM

## 2017-09-20 DIAGNOSIS — R73.02 IGT (IMPAIRED GLUCOSE TOLERANCE): ICD-10-CM

## 2017-09-20 DIAGNOSIS — I10 ESSENTIAL HYPERTENSION: Primary | ICD-10-CM

## 2017-09-20 PROCEDURE — 99214 OFFICE O/P EST MOD 30 MIN: CPT | Performed by: INTERNAL MEDICINE

## 2017-09-20 NOTE — PROGRESS NOTES
Subjective   Yuliana Gonzalez is a 87 y.o. female.     Chief Complaint   Patient presents with   • Hyperlipidemia     AS   • Hypertension         HPI Comments: In for recheck of chronic IGT.  His been stable.  Also aortic stenosis.  It remains asymptomatic.    Hyperlipidemia   This is a chronic problem. The current episode started more than 1 year ago. The problem is controlled. Recent lipid tests were reviewed and are normal. Factors aggravating her hyperlipidemia include beta blockers. Pertinent negatives include no chest pain or shortness of breath. She is currently on no antihyperlipidemic treatment. The current treatment provides significant improvement of lipids. There are no compliance problems.  Risk factors for coronary artery disease include dyslipidemia, hypertension and post-menopausal.   Hypertension   This is a chronic problem. The current episode started more than 1 year ago. The problem is unchanged. The problem is controlled. Pertinent negatives include no chest pain, headaches, orthopnea, palpitations, peripheral edema, PND or shortness of breath. There are no associated agents to hypertension. Risk factors for coronary artery disease include dyslipidemia and post-menopausal state. Past treatments include beta blockers and angiotensin blockers. The current treatment provides significant improvement. There are no compliance problems.  There is no history of angina, kidney disease, CAD/MI, CVA or heart failure.        The following portions of the patient's history were reviewed and updated as appropriate: allergies, current medications, past social history and problem list.    Outpatient Prescriptions Marked as Taking for the 9/20/17 encounter (Office Visit) with Trent Davis MD   Medication Sig Dispense Refill   • aspirin 325 MG tablet Take 325 mg by mouth As Needed for mild pain (1-3).     • atenolol (TENORMIN) 25 MG tablet TAKE 1 TABLET TWICE A  tablet 1   • Cholecalciferol (VITAMIN D3)  2000 UNITS capsule Take 1 capsule by mouth Daily. 30 capsule 11   • Multiple Vitamins-Minerals (VITEYES AREDS ADVANCED PO) Take 1 capsule by mouth 2 (two) times a day.     • naproxen sodium (ALEVE) 220 MG tablet Take 220 mg by mouth 2 (Two) Times a Day As Needed for Mild Pain (1-3) (took one this morning at 1030.).     • valsartan (DIOVAN) 40 MG tablet TAKE 1 TABLET BY MOUTH DAILY. 90 tablet 1       Review of Systems   Constitutional: Negative for chills, fatigue and fever.   Respiratory: Negative for cough, shortness of breath and wheezing.    Cardiovascular: Negative for chest pain, palpitations, orthopnea, leg swelling and PND.   Gastrointestinal: Negative for abdominal pain, constipation, diarrhea, nausea and vomiting.   Neurological: Negative for headaches.       Objective   Vitals:    09/20/17 1030   BP: 138/82   Pulse: 60   Resp: 16   Temp: 98.4 °F (36.9 °C)      Last Weight    09/20/17  1030   Weight: 147 lb (66.7 kg)    [unfilled]  Body mass index is 25.23 kg/(m^2).      Physical Exam   Constitutional: She appears well-developed and well-nourished. No distress.   HENT:   Head: Normocephalic and atraumatic.   Neck: Carotid bruit is not present. No thyromegaly present.   Cardiovascular: Normal rate, regular rhythm and intact distal pulses.  Exam reveals no gallop.    Murmur heard.   Crescendo decrescendo systolic murmur is present with a grade of 2/6   Pulmonary/Chest: Effort normal and breath sounds normal. No respiratory distress. She has no wheezes. She has no rales.   Abdominal: Soft. Bowel sounds are normal. She exhibits no mass. There is no tenderness. There is no guarding.         Problem List Items Addressed This Visit        Cardiovascular and Mediastinum    Aortic stenosis    Hyperlipidemia    Hypertension - Primary       Endocrine    IGT (impaired glucose tolerance)        Assessment/Plan   In for recheck of hypertension, hyperlipidemia, and aortic stenosis.  Gets annual lab work 8/2016  including CBC CMP and lipids and EKG.  AS remains asymptomatic.  Last echocardiogram was March 2017.  Moderate to severe AS.  [facility]          Dragon disclaimer:   Much of this encounter note is an electronic transcription/translation of spoken language to printed text. The electronic translation of spoken language may permit erroneous, or at times, nonsensical words or phrases to be inadvertently transcribed; Although I have reviewed the note for such errors, some may still exist.

## 2017-09-27 RX ORDER — VALSARTAN 40 MG/1
TABLET ORAL
Qty: 90 TABLET | Refills: 1 | Status: SHIPPED | OUTPATIENT
Start: 2017-09-27 | End: 2018-03-22 | Stop reason: SDUPTHER

## 2017-12-15 ENCOUNTER — OFFICE VISIT (OUTPATIENT)
Dept: INTERNAL MEDICINE | Facility: CLINIC | Age: 82
End: 2017-12-15

## 2017-12-15 VITALS
WEIGHT: 146 LBS | TEMPERATURE: 98.8 F | BODY MASS INDEX: 24.92 KG/M2 | RESPIRATION RATE: 16 BRPM | DIASTOLIC BLOOD PRESSURE: 70 MMHG | SYSTOLIC BLOOD PRESSURE: 120 MMHG | HEART RATE: 58 BPM | HEIGHT: 64 IN

## 2017-12-15 DIAGNOSIS — I35.0 NONRHEUMATIC AORTIC VALVE STENOSIS: ICD-10-CM

## 2017-12-15 DIAGNOSIS — I10 ESSENTIAL HYPERTENSION: Primary | ICD-10-CM

## 2017-12-15 DIAGNOSIS — E78.5 HYPERLIPIDEMIA, UNSPECIFIED HYPERLIPIDEMIA TYPE: ICD-10-CM

## 2017-12-15 DIAGNOSIS — R73.02 IGT (IMPAIRED GLUCOSE TOLERANCE): ICD-10-CM

## 2017-12-15 PROCEDURE — 99214 OFFICE O/P EST MOD 30 MIN: CPT | Performed by: INTERNAL MEDICINE

## 2017-12-15 NOTE — PROGRESS NOTES
Subjective   Yuliana Gonzalez is a 87 y.o. female.     Chief Complaint   Patient presents with   • Hypertension   • Hyperlipidemia         HPI Comments: In for recheck of chronic IGT.  His been stable.  Also aortic stenosis.  It remains asymptomatic.    Hypertension   This is a chronic problem. The current episode started more than 1 year ago. The problem is unchanged. The problem is controlled. Pertinent negatives include no chest pain, headaches, orthopnea, palpitations, peripheral edema, PND or shortness of breath. There are no associated agents to hypertension. Risk factors for coronary artery disease include dyslipidemia and post-menopausal state. Past treatments include beta blockers and angiotensin blockers. The current treatment provides significant improvement. There are no compliance problems.  There is no history of angina, kidney disease, CAD/MI, CVA or heart failure.   Hyperlipidemia   This is a chronic problem. The current episode started more than 1 year ago. The problem is controlled. Recent lipid tests were reviewed and are normal. Factors aggravating her hyperlipidemia include beta blockers. Pertinent negatives include no chest pain or shortness of breath. She is currently on no antihyperlipidemic treatment. The current treatment provides significant improvement of lipids. There are no compliance problems.  Risk factors for coronary artery disease include dyslipidemia, hypertension and post-menopausal.        The following portions of the patient's history were reviewed and updated as appropriate: allergies, current medications, past social history and problem list.    Outpatient Prescriptions Marked as Taking for the 12/15/17 encounter (Office Visit) with Trent Davis MD   Medication Sig Dispense Refill   • aspirin 325 MG tablet Take 325 mg by mouth As Needed for mild pain (1-3).     • atenolol (TENORMIN) 25 MG tablet TAKE 1 TABLET TWICE A  tablet 1   • Cholecalciferol (VITAMIN D3) 2000  UNITS capsule Take 1 capsule by mouth Daily. 30 capsule 11   • Multiple Vitamins-Minerals (VITEYES AREDS ADVANCED PO) Take 1 capsule by mouth 2 (two) times a day.     • naproxen sodium (ALEVE) 220 MG tablet Take 220 mg by mouth 2 (Two) Times a Day As Needed for Mild Pain (1-3) (took one this morning at 1030.).     • valsartan (DIOVAN) 40 MG tablet TAKE 1 TABLET BY MOUTH DAILY. 90 tablet 1       Review of Systems   Constitutional: Negative for chills, fatigue and fever.   Respiratory: Negative for cough, shortness of breath and wheezing.    Cardiovascular: Negative for chest pain, palpitations, orthopnea, leg swelling and PND.   Gastrointestinal: Negative for abdominal pain, constipation, diarrhea, nausea and vomiting.   Neurological: Negative for headaches.       Objective   Vitals:    12/15/17 1016   BP: 120/70   Pulse: 58   Resp: 16   Temp: 98.8 °F (37.1 °C)      Last Weight    12/15/17  1016   Weight: 66.2 kg (146 lb)    [unfilled]  Body mass index is 25.06 kg/(m^2).      Physical Exam   Constitutional: She appears well-developed and well-nourished. No distress.   HENT:   Head: Normocephalic and atraumatic.   Neck: Carotid bruit is not present. No thyromegaly present.   Cardiovascular: Normal rate, regular rhythm and intact distal pulses.  Exam reveals no gallop.    Murmur heard.   Crescendo decrescendo systolic murmur is present with a grade of 3/6   Murmur is in the aortic area   Pulmonary/Chest: Effort normal and breath sounds normal. No respiratory distress. She has no wheezes. She has no rales.   Abdominal: Soft. Bowel sounds are normal. She exhibits no mass. There is no tenderness. There is no guarding.         Problem List Items Addressed This Visit        Cardiovascular and Mediastinum    Aortic stenosis    Hyperlipidemia    Hypertension - Primary       Endocrine    IGT (impaired glucose tolerance)        Assessment/Plan   In for recheck of hypertension, hyperlipidemia, and aortic stenosis.  Blood  pressure control is excellent.  Glycemic control is been good.  She is no longer treating lipids so we are not checking lipids at this point.  Got annual lab work 8/2017 including CBC CMP and lipids and EKG.  AS remains asymptomatic.  No chest pain.  No syncope.  No CHF.  Last echocardiogram was March 2017.  Moderate to severe AS.           Jono disclaimer:   Much of this encounter note is an electronic transcription/translation of spoken language to printed text. The electronic translation of spoken language may permit erroneous, or at times, nonsensical words or phrases to be inadvertently transcribed; Although I have reviewed the note for such errors, some may still exist.

## 2018-01-23 ENCOUNTER — OFFICE VISIT (OUTPATIENT)
Dept: INTERNAL MEDICINE | Facility: CLINIC | Age: 83
End: 2018-01-23

## 2018-01-23 VITALS
OXYGEN SATURATION: 91 % | BODY MASS INDEX: 25.1 KG/M2 | SYSTOLIC BLOOD PRESSURE: 146 MMHG | HEART RATE: 79 BPM | DIASTOLIC BLOOD PRESSURE: 70 MMHG | HEIGHT: 64 IN | WEIGHT: 147 LBS | TEMPERATURE: 98.4 F | RESPIRATION RATE: 16 BRPM

## 2018-01-23 DIAGNOSIS — R00.2 PALPITATIONS: Primary | ICD-10-CM

## 2018-01-23 PROCEDURE — 93000 ELECTROCARDIOGRAM COMPLETE: CPT | Performed by: INTERNAL MEDICINE

## 2018-01-23 PROCEDURE — 99213 OFFICE O/P EST LOW 20 MIN: CPT | Performed by: INTERNAL MEDICINE

## 2018-01-23 NOTE — PROGRESS NOTES
Subjective   Yuliana Gonzalez is a 87 y.o. female.     Chief Complaint   Patient presents with   • Palpitations         Palpitations    This is a recurrent problem. The current episode started today. The problem occurs intermittently. The problem has been waxing and waning. Nothing aggravates the symptoms. Associated symptoms include an irregular heartbeat. Pertinent negatives include no chest pain, dizziness, near-syncope, shortness of breath, syncope or vomiting. She has tried beta blockers for the symptoms. The treatment provided significant relief.        The following portions of the patient's history were reviewed and updated as appropriate: allergies, current medications, past social history and problem list.    Outpatient Prescriptions Marked as Taking for the 1/23/18 encounter (Office Visit) with Trent Davis MD   Medication Sig Dispense Refill   • aspirin 325 MG tablet Take 325 mg by mouth As Needed for mild pain (1-3).     • atenolol (TENORMIN) 25 MG tablet TAKE 1 TABLET TWICE A  tablet 1   • Cholecalciferol (VITAMIN D3) 2000 UNITS capsule Take 1 capsule by mouth Daily. 30 capsule 11   • Multiple Vitamins-Minerals (VITEYES AREDS ADVANCED PO) Take 1 capsule by mouth 2 (two) times a day.     • naproxen sodium (ALEVE) 220 MG tablet Take 220 mg by mouth 2 (Two) Times a Day As Needed for Mild Pain (1-3) (took one this morning at 1030.).     • valsartan (DIOVAN) 40 MG tablet TAKE 1 TABLET BY MOUTH DAILY. 90 tablet 1       Review of Systems   Constitutional: Positive for unexpected weight change.   Respiratory: Negative for shortness of breath and wheezing.    Cardiovascular: Positive for palpitations. Negative for chest pain, leg swelling, syncope and near-syncope.   Gastrointestinal: Negative for vomiting.   Neurological: Negative for dizziness.       Objective   Vitals:    01/23/18 1302   BP: 146/70   Pulse: 79   Resp: 16   Temp: 98.4 °F (36.9 °C)   SpO2: 91%      Last Weight    01/23/18  1302    Weight: 66.7 kg (147 lb)    [unfilled]  Body mass index is 25.22 kg/(m^2).      Physical Exam   Constitutional: She appears well-developed and well-nourished. No distress.   HENT:   Head: Normocephalic and atraumatic.   Neck: Carotid bruit is not present. No thyromegaly present.   Cardiovascular: Normal rate, regular rhythm and intact distal pulses.  Exam reveals no gallop.    Murmur heard.   Crescendo decrescendo systolic murmur is present with a grade of 3/6   Murmur is in the aortic area   Pulmonary/Chest: Effort normal and breath sounds normal. No respiratory distress. She has no wheezes. She has no rales.   Abdominal: Soft. Bowel sounds are normal. She exhibits no mass. There is no tenderness. There is no guarding.         Problem List Items Addressed This Visit     None      Visit Diagnoses     Palpitations    -  Primary        Assessment/Plan   In today with palpitations.  She woke from sleep about 4:15 AM felt like her heart was just not right.  Seems to be going faster than usual.  She is usually in the 60s and today she seemed to be in the 80s.  Seem to stay fast for an hour or 2 and then gradually resolved after she took an extra Tenormin and aspirin.  She's done this before.  Sometimes a few times a month she'll have a brief spell like this.  She just feels weak all over.  She is to exercise on treadmill and a fainting she felt better following exercise.  She has moderate aortic stenosis.  Last valve area was 1.8 cm.  Gradient was in the 46 mm max range and 29 average.  Hard to know what to make of these symptoms.  His only about the second significant spell that she's had.  Last one was about 2 years ago.  Since the heart rate was slow enough I wonder if this was perhaps a spell of atrial fibrillation.  She's not sure that it was irregular and she certainly noticed the slight elevation in heart rate.  Some symptoms that are quite consistent with symptomatic PVCs and that may be all were dealing  with.  We'll boost the atenolol to 25 mg twice a day.  Ekg today unchanged with RBBB.        ECG 12 Lead  Date/Time: 1/23/2018 1:34 PM  Performed by: LUIS SALEH  Authorized by: LUIS SALEH   Comparison: compared with previous ECG from 8/18/2016  Similar to previous ECG  Rhythm: sinus rhythm  Rate: normal  Conduction: right bundle branch block  ST Segments: ST segments normal  T Waves: T waves normal  QRS axis: indeterminate  Other: no other findings  Clinical impression: abnormal ECG  Comments: Right bundle branch block.  This is unchanged from 8/18/16.  Abnormal EKG.                     Dragon disclaimer:   Much of this encounter note is an electronic transcription/translation of spoken language to printed text. The electronic translation of spoken language may permit erroneous, or at times, nonsensical words or phrases to be inadvertently transcribed; Although I have reviewed the note for such errors, some may still exist.

## 2018-03-22 ENCOUNTER — OFFICE VISIT (OUTPATIENT)
Dept: INTERNAL MEDICINE | Facility: CLINIC | Age: 83
End: 2018-03-22

## 2018-03-22 VITALS
OXYGEN SATURATION: 90 % | DIASTOLIC BLOOD PRESSURE: 82 MMHG | RESPIRATION RATE: 16 BRPM | WEIGHT: 145 LBS | HEIGHT: 64 IN | HEART RATE: 66 BPM | TEMPERATURE: 98.4 F | SYSTOLIC BLOOD PRESSURE: 184 MMHG | BODY MASS INDEX: 24.75 KG/M2

## 2018-03-22 DIAGNOSIS — I35.0 NONRHEUMATIC AORTIC VALVE STENOSIS: ICD-10-CM

## 2018-03-22 DIAGNOSIS — E78.5 HYPERLIPIDEMIA, UNSPECIFIED HYPERLIPIDEMIA TYPE: ICD-10-CM

## 2018-03-22 DIAGNOSIS — I10 ESSENTIAL HYPERTENSION: Primary | ICD-10-CM

## 2018-03-22 DIAGNOSIS — J40 BRONCHITIS: ICD-10-CM

## 2018-03-22 DIAGNOSIS — R73.02 IGT (IMPAIRED GLUCOSE TOLERANCE): ICD-10-CM

## 2018-03-22 PROCEDURE — 99214 OFFICE O/P EST MOD 30 MIN: CPT | Performed by: INTERNAL MEDICINE

## 2018-03-22 RX ORDER — MELATONIN
2000 DAILY
Status: ON HOLD | COMMUNITY
End: 2022-03-17 | Stop reason: SDUPTHER

## 2018-03-22 RX ORDER — VITAMIN B COMPLEX
1 TABLET ORAL DAILY
COMMUNITY
End: 2018-06-21

## 2018-03-22 RX ORDER — VALSARTAN 80 MG/1
80 TABLET ORAL DAILY
Qty: 90 TABLET | Refills: 1 | Status: SHIPPED | OUTPATIENT
Start: 2018-03-22 | End: 2018-09-07 | Stop reason: SINTOL

## 2018-03-22 RX ORDER — BENZONATATE 200 MG/1
200 CAPSULE ORAL 3 TIMES DAILY PRN
Qty: 30 CAPSULE | Refills: 0 | Status: SHIPPED | OUTPATIENT
Start: 2018-03-22 | End: 2018-05-17

## 2018-03-22 NOTE — PROGRESS NOTES
Subjective   Yuliana Gonzalez is a 88 y.o. female.     Chief Complaint   Patient presents with   • Hypertension   • Hyperlipidemia   • Hyperglycemia   • Aortic Stenosis         In for recheck of chronic IGT.  His been stable.  Also aortic stenosis.  It remains asymptomatic.      Hypertension   This is a chronic problem. The current episode started more than 1 year ago. The problem is unchanged. The problem is controlled. Pertinent negatives include no chest pain, headaches, orthopnea, palpitations, peripheral edema, PND or shortness of breath. There are no associated agents to hypertension. Risk factors for coronary artery disease include dyslipidemia and post-menopausal state. Past treatments include beta blockers and angiotensin blockers. The current treatment provides significant improvement. There are no compliance problems.  There is no history of angina, kidney disease, CAD/MI, CVA or heart failure. Current antihypertension treatment includes beta blockers and angiotensin blockers.   Hyperlipidemia   This is a chronic problem. The current episode started more than 1 year ago. The problem is controlled. Recent lipid tests were reviewed and are normal. Factors aggravating her hyperlipidemia include beta blockers. Pertinent negatives include no chest pain or shortness of breath. She is currently on no antihyperlipidemic treatment. The current treatment provides significant improvement of lipids. There are no compliance problems.  Risk factors for coronary artery disease include dyslipidemia, hypertension and post-menopausal.   Hyperglycemia   This is a chronic problem. The current episode started more than 1 year ago. The problem occurs constantly. The problem has been unchanged. Pertinent negatives include no abdominal pain, chest pain, chills, coughing, fatigue, fever, headaches, nausea, sore throat or vomiting.   Cough   This is a new problem. The current episode started 1 to 4 weeks ago. The problem has been  unchanged. The problem occurs constantly. The cough is productive of sputum. Associated symptoms include nasal congestion and postnasal drip. Pertinent negatives include no chest pain, chills, fever, headaches, sore throat, shortness of breath or wheezing. Nothing aggravates the symptoms. She has tried OTC cough suppressant for the symptoms. The treatment provided no relief. Her past medical history is significant for bronchitis. There is no history of asthma, COPD or emphysema.        The following portions of the patient's history were reviewed and updated as appropriate: allergies, current medications, past social history and problem list.    Outpatient Prescriptions Marked as Taking for the 3/22/18 encounter (Office Visit) with Trent Davis MD   Medication Sig Dispense Refill   • aspirin 325 MG tablet Take 325 mg by mouth Daily.     • atenolol (TENORMIN) 25 MG tablet TAKE 1 TABLET TWICE A  tablet 1   • B Complex Vitamins (VITAMIN-B COMPLEX) tablet Take 1 tablet by mouth Daily.     • cholecalciferol (VITAMIN D3) 1000 units tablet Take 2,000 Units by mouth Daily.     • Multiple Vitamins-Minerals (VITEYES AREDS ADVANCED PO) Take 1 capsule by mouth 2 (two) times a day.     • valsartan (DIOVAN) 40 MG tablet TAKE 1 TABLET BY MOUTH DAILY. 90 tablet 1   • [DISCONTINUED] aspirin 325 MG tablet Take 325 mg by mouth As Needed for mild pain (1-3).         Review of Systems   Constitutional: Negative for chills, fatigue and fever.   HENT: Positive for postnasal drip. Negative for sore throat.    Respiratory: Negative for cough, shortness of breath and wheezing.    Cardiovascular: Negative for chest pain, palpitations, orthopnea, leg swelling and PND.   Gastrointestinal: Negative for abdominal pain, constipation, diarrhea, nausea and vomiting.   Neurological: Negative for headaches.       Objective   Vitals:    03/22/18 1021   BP: (!) 184/82   Pulse: 66   Resp: 16   Temp: 98.4 °F (36.9 °C)   SpO2: 90%      1     03/22/18  1021   Weight: 65.8 kg (145 lb)    [unfilled]  Body mass index is 24.88 kg/m².      Physical Exam   Constitutional: She appears well-developed and well-nourished. No distress.   HENT:   Head: Normocephalic and atraumatic.   Neck: Carotid bruit is not present. No thyromegaly present.   Cardiovascular: Normal rate, regular rhythm and intact distal pulses.  Exam reveals no gallop.    Murmur heard.   Crescendo decrescendo systolic murmur is present with a grade of 3/6   Murmur is in the aortic area   Pulmonary/Chest: Effort normal and breath sounds normal. No respiratory distress. She has no wheezes. She has no rales.   Abdominal: Soft. Bowel sounds are normal. She exhibits no mass. There is no tenderness. There is no guarding.         Problem List Items Addressed This Visit        Cardiovascular and Mediastinum    Aortic stenosis    Hyperlipidemia    Hypertension - Primary       Endocrine    IGT (impaired glucose tolerance)      Other Visit Diagnoses     Bronchitis            Assessment/Plan   In for recheck of hypertension, hyperlipidemia, and aortic stenosis.  Blood pressure control is excellent.  Glycemic control is been good.  She is no longer treating lipids so we are not checking lipids at this point.  Got annual lab work 8/2017 including CBC CMP and lipids and EKG.  AS remains asymptomatic.  No chest pain.  No syncope.  No CHF.  Last echocardiogram was March 2017.  Moderate to severe AS.  Due for another TTE.  Had a cough for 10 days.  A little bit of sputum production.  Discolored.  Runny nose and head congestion.  Blood pressure is up today.  We'll boost valsartan from 40 mg daily to 80 mg daily.         Dragon disclaimer:   Much of this encounter note is an electronic transcription/translation of spoken language to printed text. The electronic translation of spoken language may permit erroneous, or at times, nonsensical words or phrases to be inadvertently transcribed; Although I have reviewed  the note for such errors, some may still exist.

## 2018-03-26 RX ORDER — VALSARTAN 40 MG/1
TABLET ORAL
Qty: 90 TABLET | Refills: 1 | OUTPATIENT
Start: 2018-03-26

## 2018-04-02 ENCOUNTER — APPOINTMENT (OUTPATIENT)
Dept: CARDIOLOGY | Facility: HOSPITAL | Age: 83
End: 2018-04-02
Attending: INTERNAL MEDICINE

## 2018-04-12 ENCOUNTER — HOSPITAL ENCOUNTER (OUTPATIENT)
Dept: CARDIOLOGY | Facility: HOSPITAL | Age: 83
Discharge: HOME | End: 2018-04-12
Attending: INTERNAL MEDICINE | Admitting: INTERNAL MEDICINE

## 2018-04-12 VITALS
HEART RATE: 76 BPM | BODY MASS INDEX: 24.75 KG/M2 | DIASTOLIC BLOOD PRESSURE: 74 MMHG | WEIGHT: 145 LBS | HEIGHT: 64 IN | SYSTOLIC BLOOD PRESSURE: 144 MMHG

## 2018-04-12 DIAGNOSIS — I35.0 NONRHEUMATIC AORTIC VALVE STENOSIS: ICD-10-CM

## 2018-04-12 PROCEDURE — 93306 TTE W/DOPPLER COMPLETE: CPT

## 2018-04-12 PROCEDURE — 93306 TTE W/DOPPLER COMPLETE: CPT | Performed by: INTERNAL MEDICINE

## 2018-04-17 LAB
AORTIC DIMENSIONLESS INDEX: 0.4 (DI)
ASCENDING AORTA: 3.1 CM
BH CV ECHO MEAS - ACS: 1.6 CM
BH CV ECHO MEAS - AO MAX PG (FULL): 46.8 MMHG
BH CV ECHO MEAS - AO MAX PG: 53.1 MMHG
BH CV ECHO MEAS - AO MEAN PG (FULL): 22.8 MMHG
BH CV ECHO MEAS - AO MEAN PG: 26.6 MMHG
BH CV ECHO MEAS - AO ROOT AREA (BSA CORRECTED): 1.6
BH CV ECHO MEAS - AO ROOT AREA: 6 CM^2
BH CV ECHO MEAS - AO ROOT DIAM: 2.8 CM
BH CV ECHO MEAS - AO V2 MAX: 364.2 CM/SEC
BH CV ECHO MEAS - AO V2 MEAN: 236 CM/SEC
BH CV ECHO MEAS - AO V2 VTI: 85.6 CM
BH CV ECHO MEAS - ASC AORTA: 3.1 CM
BH CV ECHO MEAS - AVA(I,A): 0.87 CM^2
BH CV ECHO MEAS - AVA(I,D): 0.87 CM^2
BH CV ECHO MEAS - AVA(V,A): 0.8 CM^2
BH CV ECHO MEAS - AVA(V,D): 0.8 CM^2
BH CV ECHO MEAS - BSA(HAYCOCK): 1.7 M^2
BH CV ECHO MEAS - BSA: 1.7 M^2
BH CV ECHO MEAS - BZI_BMI: 24.9 KILOGRAMS/M^2
BH CV ECHO MEAS - BZI_METRIC_HEIGHT: 162.6 CM
BH CV ECHO MEAS - BZI_METRIC_WEIGHT: 65.8 KG
BH CV ECHO MEAS - CONTRAST EF (2CH): 71.7 ML/M^2
BH CV ECHO MEAS - CONTRAST EF 4CH: 71.4 ML/M^2
BH CV ECHO MEAS - EDV(MOD-SP2): 53 ML
BH CV ECHO MEAS - EDV(MOD-SP4): 63 ML
BH CV ECHO MEAS - EDV(TEICH): 94.7 ML
BH CV ECHO MEAS - EF(CUBED): 74.1 %
BH CV ECHO MEAS - EF(MOD-SP2): 71.7 %
BH CV ECHO MEAS - EF(MOD-SP4): 71.4 %
BH CV ECHO MEAS - EF(TEICH): 66 %
BH CV ECHO MEAS - ESV(MOD-SP2): 15 ML
BH CV ECHO MEAS - ESV(MOD-SP4): 18 ML
BH CV ECHO MEAS - ESV(TEICH): 32.2 ML
BH CV ECHO MEAS - FS: 36.2 %
BH CV ECHO MEAS - IVS/LVPW: 0.89
BH CV ECHO MEAS - IVSD: 0.86 CM
BH CV ECHO MEAS - LAT PEAK E' VEL: 6 CM/SEC
BH CV ECHO MEAS - LV DIASTOLIC VOL/BSA (35-75): 36.9 ML/M^2
BH CV ECHO MEAS - LV MASS(C)D: 137.6 GRAMS
BH CV ECHO MEAS - LV MASS(C)DI: 80.7 GRAMS/M^2
BH CV ECHO MEAS - LV MAX PG: 6.2 MMHG
BH CV ECHO MEAS - LV MEAN PG: 3.8 MMHG
BH CV ECHO MEAS - LV SYSTOLIC VOL/BSA (12-30): 10.6 ML/M^2
BH CV ECHO MEAS - LV V1 MAX: 124.9 CM/SEC
BH CV ECHO MEAS - LV V1 MEAN: 92.3 CM/SEC
BH CV ECHO MEAS - LV V1 VTI: 31.8 CM
BH CV ECHO MEAS - LVIDD: 4.5 CM
BH CV ECHO MEAS - LVIDS: 2.9 CM
BH CV ECHO MEAS - LVLD AP2: 6.7 CM
BH CV ECHO MEAS - LVLD AP4: 7 CM
BH CV ECHO MEAS - LVLS AP2: 5.9 CM
BH CV ECHO MEAS - LVLS AP4: 6.1 CM
BH CV ECHO MEAS - LVOT AREA (M): 2.3 CM^2
BH CV ECHO MEAS - LVOT AREA: 2.3 CM^2
BH CV ECHO MEAS - LVOT DIAM: 1.7 CM
BH CV ECHO MEAS - LVPWD: 0.97 CM
BH CV ECHO MEAS - MED PEAK E' VEL: 6 CM/SEC
BH CV ECHO MEAS - MV A DUR: 0.13 SEC
BH CV ECHO MEAS - MV A MAX VEL: 132.8 CM/SEC
BH CV ECHO MEAS - MV DEC SLOPE: 282.5 CM/SEC^2
BH CV ECHO MEAS - MV DEC TIME: 0.27 SEC
BH CV ECHO MEAS - MV E MAX VEL: 82 CM/SEC
BH CV ECHO MEAS - MV E/A: 0.62
BH CV ECHO MEAS - MV MAX PG: 7.4 MMHG
BH CV ECHO MEAS - MV MEAN PG: 3.4 MMHG
BH CV ECHO MEAS - MV P1/2T MAX VEL: 77.7 CM/SEC
BH CV ECHO MEAS - MV P1/2T: 80.6 MSEC
BH CV ECHO MEAS - MV V2 MAX: 135.7 CM/SEC
BH CV ECHO MEAS - MV V2 MEAN: 87.6 CM/SEC
BH CV ECHO MEAS - MV V2 VTI: 38.3 CM
BH CV ECHO MEAS - MVA P1/2T LCG: 2.8 CM^2
BH CV ECHO MEAS - MVA(P1/2T): 2.7 CM^2
BH CV ECHO MEAS - MVA(VTI): 1.9 CM^2
BH CV ECHO MEAS - PA ACC TIME: 0.13 SEC
BH CV ECHO MEAS - PA MAX PG (FULL): 0.66 MMHG
BH CV ECHO MEAS - PA MAX PG: 5.2 MMHG
BH CV ECHO MEAS - PA PR(ACCEL): 22 MMHG
BH CV ECHO MEAS - PA V2 MAX: 114 CM/SEC
BH CV ECHO MEAS - PULM A REVS DUR: 0.13 SEC
BH CV ECHO MEAS - PULM A REVS VEL: 26.8 CM/SEC
BH CV ECHO MEAS - PULM DIAS VEL: 28.9 CM/SEC
BH CV ECHO MEAS - PULM S/D: 1.3
BH CV ECHO MEAS - PULM SYS VEL: 38.8 CM/SEC
BH CV ECHO MEAS - PVA(V,A): 2.3 CM^2
BH CV ECHO MEAS - PVA(V,D): 2.3 CM^2
BH CV ECHO MEAS - QP/QS: 0.92
BH CV ECHO MEAS - RAP SYSTOLE: 3 MMHG
BH CV ECHO MEAS - RV MAX PG: 4.5 MMHG
BH CV ECHO MEAS - RV MEAN PG: 2.5 MMHG
BH CV ECHO MEAS - RV V1 MAX: 106.5 CM/SEC
BH CV ECHO MEAS - RV V1 MEAN: 72.8 CM/SEC
BH CV ECHO MEAS - RV V1 VTI: 27.7 CM
BH CV ECHO MEAS - RVOT AREA: 2.5 CM^2
BH CV ECHO MEAS - RVOT DIAM: 1.8 CM
BH CV ECHO MEAS - RVSP: 21 MMHG
BH CV ECHO MEAS - SI(AO): 299.5 ML/M^2
BH CV ECHO MEAS - SI(CUBED): 40.8 ML/M^2
BH CV ECHO MEAS - SI(LVOT): 43.6 ML/M^2
BH CV ECHO MEAS - SI(MOD-SP2): 22.3 ML/M^2
BH CV ECHO MEAS - SI(MOD-SP4): 26.4 ML/M^2
BH CV ECHO MEAS - SI(TEICH): 36.6 ML/M^2
BH CV ECHO MEAS - SUP REN AO DIAM: 1.7 CM
BH CV ECHO MEAS - SV(AO): 511 ML
BH CV ECHO MEAS - SV(CUBED): 69.6 ML
BH CV ECHO MEAS - SV(LVOT): 74.4 ML
BH CV ECHO MEAS - SV(MOD-SP2): 38 ML
BH CV ECHO MEAS - SV(MOD-SP4): 45 ML
BH CV ECHO MEAS - SV(RVOT): 68.5 ML
BH CV ECHO MEAS - SV(TEICH): 62.5 ML
BH CV ECHO MEAS - TAPSE (>1.6): 2.5 CM2
BH CV ECHO MEAS - TR MAX VEL: 210.5 CM/SEC
BH CV XLRA - RV BASE: 2.5 CM
BH CV XLRA - TDI S': 10 CM/SEC
E/E' RATIO: 13
LEFT ATRIUM VOLUME INDEX: 26 ML/M2
MAXIMAL PREDICTED HEART RATE: 132 BPM
SINUS: 3.2 CM
STJ: 2.9 CM
STRESS TARGET HR: 112 BPM

## 2018-05-30 RX ORDER — ATENOLOL 25 MG/1
TABLET ORAL
Qty: 180 TABLET | Refills: 1 | Status: SHIPPED | OUTPATIENT
Start: 2018-05-30 | End: 2018-11-26 | Stop reason: SDUPTHER

## 2018-06-21 ENCOUNTER — OFFICE VISIT (OUTPATIENT)
Dept: INTERNAL MEDICINE | Facility: CLINIC | Age: 83
End: 2018-06-21

## 2018-06-21 VITALS
DIASTOLIC BLOOD PRESSURE: 72 MMHG | HEIGHT: 61 IN | OXYGEN SATURATION: 93 % | HEART RATE: 62 BPM | SYSTOLIC BLOOD PRESSURE: 122 MMHG | TEMPERATURE: 99 F | WEIGHT: 148 LBS | RESPIRATION RATE: 16 BRPM | BODY MASS INDEX: 27.94 KG/M2

## 2018-06-21 DIAGNOSIS — I10 ESSENTIAL HYPERTENSION: Primary | ICD-10-CM

## 2018-06-21 DIAGNOSIS — I35.0 NONRHEUMATIC AORTIC VALVE STENOSIS: ICD-10-CM

## 2018-06-21 DIAGNOSIS — R73.02 IGT (IMPAIRED GLUCOSE TOLERANCE): ICD-10-CM

## 2018-06-21 DIAGNOSIS — E78.5 HYPERLIPIDEMIA, UNSPECIFIED HYPERLIPIDEMIA TYPE: ICD-10-CM

## 2018-06-21 DIAGNOSIS — Z79.899 MEDICATION MANAGEMENT: ICD-10-CM

## 2018-06-21 PROCEDURE — 99214 OFFICE O/P EST MOD 30 MIN: CPT | Performed by: INTERNAL MEDICINE

## 2018-06-21 PROCEDURE — G0439 PPPS, SUBSEQ VISIT: HCPCS | Performed by: INTERNAL MEDICINE

## 2018-06-21 NOTE — PROGRESS NOTES
Subjective   Yuliana Gonzalez is a 88 y.o. female.     Chief Complaint   Patient presents with   • Hyperlipidemia   • Hyperglycemia   • Hypertension   • Aortic Stenosis         In for recheck of chronic IGT.  His been stable.  Also aortic stenosis.  It remains asymptomatic.      Hyperlipidemia   This is a chronic problem. The current episode started more than 1 year ago. The problem is controlled. Recent lipid tests were reviewed and are normal. Factors aggravating her hyperlipidemia include beta blockers. She is currently on no antihyperlipidemic treatment. The current treatment provides significant improvement of lipids. There are no compliance problems.  Risk factors for coronary artery disease include dyslipidemia, hypertension and post-menopausal.   Hyperglycemia   This is a chronic problem. The current episode started more than 1 year ago. The problem occurs constantly. The problem has been unchanged. Pertinent negatives include no abdominal pain, fatigue, nausea or vomiting.   Hypertension   This is a chronic problem. The current episode started more than 1 year ago. The problem is unchanged. The problem is controlled. Pertinent negatives include no orthopnea, palpitations, peripheral edema or PND. There are no associated agents to hypertension. Risk factors for coronary artery disease include dyslipidemia and post-menopausal state. Current antihypertension treatment includes beta blockers and angiotensin blockers. The current treatment provides significant improvement. There are no compliance problems.  There is no history of angina, kidney disease, CAD/MI, CVA or heart failure.        The following portions of the patient's history were reviewed and updated as appropriate: allergies, current medications, past social history and problem list.    Outpatient Prescriptions Marked as Taking for the 6/21/18 encounter (Office Visit) with Trent Davis MD   Medication Sig Dispense Refill   • aspirin 325 MG tablet  Take 325 mg by mouth Daily.     • atenolol (TENORMIN) 25 MG tablet TAKE 1 TABLET TWICE A  tablet 1   • cholecalciferol (VITAMIN D3) 1000 units tablet Take 2,000 Units by mouth Daily.     • Multiple Vitamins-Minerals (VITEYES AREDS ADVANCED PO) Take 1 capsule by mouth 2 (two) times a day.     • naproxen sodium (ALEVE) 220 MG tablet Take 220 mg by mouth 2 (Two) Times a Day As Needed for Mild Pain (1-3) (took one this morning at 1030.).     • valsartan (DIOVAN) 80 MG tablet Take 1 tablet by mouth Daily. 90 tablet 1   • [DISCONTINUED] B Complex Vitamins (VITAMIN-B COMPLEX) tablet Take 1 tablet by mouth Daily.         Review of Systems   Constitutional: Negative for fatigue.   Cardiovascular: Negative for palpitations, orthopnea, leg swelling and PND.   Gastrointestinal: Negative for abdominal pain, constipation, diarrhea, nausea and vomiting.       Objective   Vitals:    06/21/18 1050   BP: 122/72   Pulse: 62   Resp: 16   Temp: 99 °F (37.2 °C)   SpO2: 93%      1    06/21/18  1050   Weight: 67.1 kg (148 lb)    [unfilled]  Body mass index is 27.98 kg/m².      Physical Exam   Constitutional: She appears well-developed and well-nourished. No distress.   HENT:   Head: Normocephalic and atraumatic.   Neck: Carotid bruit is not present. No thyromegaly present.   Cardiovascular: Normal rate, regular rhythm and intact distal pulses.  Exam reveals no gallop.    Murmur heard.   Crescendo decrescendo systolic murmur is present with a grade of 3/6   Murmur is in the aortic area   Pulmonary/Chest: Effort normal and breath sounds normal. No respiratory distress. She has no wheezes. She has no rales.   Abdominal: Soft. Bowel sounds are normal. She exhibits no mass. There is no tenderness. There is no guarding.         Problem List Items Addressed This Visit        Cardiovascular and Mediastinum    Aortic stenosis    Hyperlipidemia    Hypertension - Primary       Endocrine    IGT (impaired glucose tolerance)         Assessment/Plan   In for recheck of hypertension, hyperlipidemia, and aortic stenosis.  Blood pressure control is excellent.  Glycemic control is been good.  She is no longer treating lipids so we are not checking lipids at this point.  Got annual lab work 8/2017 including CBC CMP and lipids and EKG.  AS remains asymptomatic.  No chest pain.  No syncope.  No CHF.  Last echocardiogram was March 2017.  Moderate to severe AS.  Due for another TTE 3/2019.  AWV today.  Due shingrix.             Jono disclaimer:   Much of this encounter note is an electronic transcription/translation of spoken language to printed text. The electronic translation of spoken language may permit erroneous, or at times, nonsensical words or phrases to be inadvertently transcribed; Although I have reviewed the note for such errors, some may still exist.

## 2018-06-21 NOTE — PATIENT INSTRUCTIONS
Exercising to Lose Weight  Exercising can help you to lose weight. In order to lose weight through exercise, you need to do vigorous-intensity exercise. You can tell that you are exercising with vigorous intensity if you are breathing very hard and fast and cannot hold a conversation while exercising.  Moderate-intensity exercise helps to maintain your current weight. You can tell that you are exercising at a moderate level if you have a higher heart rate and faster breathing, but you are still able to hold a conversation.  How often should I exercise?  Choose an activity that you enjoy and set realistic goals. Your health care provider can help you to make an activity plan that works for you. Exercise regularly as directed by your health care provider. This may include:  · Doing resistance training twice each week, such as:  ? Push-ups.  ? Sit-ups.  ? Lifting weights.  ? Using resistance bands.  · Doing a given intensity of exercise for a given amount of time. Choose from these options:  ? 150 minutes of moderate-intensity exercise every week.  ? 75 minutes of vigorous-intensity exercise every week.  ? A mix of moderate-intensity and vigorous-intensity exercise every week.    Children, pregnant women, people who are out of shape, people who are overweight, and older adults may need to consult a health care provider for individual recommendations. If you have any sort of medical condition, be sure to consult your health care provider before starting a new exercise program.  What are some activities that can help me to lose weight?  · Walking at a rate of at least 4.5 miles an hour.  · Jogging or running at a rate of 5 miles per hour.  · Biking at a rate of at least 10 miles per hour.  · Lap swimming.  · Roller-skating or in-line skating.  · Cross-country skiing.  · Vigorous competitive sports, such as football, basketball, and soccer.  · Jumping rope.  · Aerobic dancing.  How can I be more active in my day-to-day  activities?  · Use the stairs instead of the elevator.  · Take a walk during your lunch break.  · If you drive, park your car farther away from work or school.  · If you take public transportation, get off one stop early and walk the rest of the way.  · Make all of your phone calls while standing up and walking around.  · Get up, stretch, and walk around every 30 minutes throughout the day.  What guidelines should I follow while exercising?  · Do not exercise so much that you hurt yourself, feel dizzy, or get very short of breath.  · Consult your health care provider prior to starting a new exercise program.  · Wear comfortable clothes and shoes with good support.  · Drink plenty of water while you exercise to prevent dehydration or heat stroke. Body water is lost during exercise and must be replaced.  · Work out until you breathe faster and your heart beats faster.  This information is not intended to replace advice given to you by your health care provider. Make sure you discuss any questions you have with your health care provider.  Document Released: 01/20/2012 Document Revised: 05/25/2017 Document Reviewed: 05/21/2015  [employer] Patient Education © 2018 [employer]  Calorie Counting for Weight Loss  Calories are units of energy. Your body needs a certain amount of calories from food to keep you going throughout the day. When you eat more calories than your body needs, your body stores the extra calories as fat. When you eat fewer calories than your body needs, your body burns fat to get the energy it needs.  Calorie counting means keeping track of how many calories you eat and drink each day. Calorie counting can be helpful if you need to lose weight. If you make sure to eat fewer calories than your body needs, you should lose weight. Ask your health care provider what a healthy weight is for you.  For calorie counting to work, you will need to eat the right number of calories in a day in order to  lose a healthy amount of weight per week. A dietitian can help you determine how many calories you need in a day and will give you suggestions on how to reach your calorie goal.  · A healthy amount of weight to lose per week is usually 1-2 lb (0.5-0.9 kg). This usually means that your daily calorie intake should be reduced by 500-750 calories.  · Eating 1,200 - 1,500 calories per day can help most women lose weight.  · Eating 1,500 - 1,800 calories per day can help most men lose weight.    What is my plan?  My goal is to have __________ calories per day.  If I have this many calories per day, I should lose around __________ pounds per week.  What do I need to know about calorie counting?  In order to meet your daily calorie goal, you will need to:  · Find out how many calories are in each food you would like to eat. Try to do this before you eat.  · Decide how much of the food you plan to eat.  · Write down what you ate and how many calories it had. Doing this is called keeping a food log.    To successfully lose weight, it is important to balance calorie counting with a healthy lifestyle that includes regular activity. Aim for 150 minutes of moderate exercise (such as walking) or 75 minutes of vigorous exercise (such as running) each week.  Where do I find calorie information?    The number of calories in a food can be found on a Nutrition Facts label. If a food does not have a Nutrition Facts label, try to look up the calories online or ask your dietitian for help.  Remember that calories are listed per serving. If you choose to have more than one serving of a food, you will have to multiply the calories per serving by the amount of servings you plan to eat. For example, the label on a package of bread might say that a serving size is 1 slice and that there are 90 calories in a serving. If you eat 1 slice, you will have eaten 90 calories. If you eat 2 slices, you will have eaten 180 calories.  How do I keep a food  "log?  Immediately after each meal, record the following information in your food log:  · What you ate. Don't forget to include toppings, sauces, and other extras on the food.  · How much you ate. This can be measured in cups, ounces, or number of items.  · How many calories each food and drink had.  · The total number of calories in the meal.    Keep your food log near you, such as in a small notebook in your pocket, or use a mobile manuel or website. Some programs will calculate calories for you and show you how many calories you have left for the day to meet your goal.  What are some calorie counting tips?  · Use your calories on foods and drinks that will fill you up and not leave you hungry:  ? Some examples of foods that fill you up are nuts and nut butters, vegetables, lean proteins, and high-fiber foods like whole grains. High-fiber foods are foods with more than 5 g fiber per serving.  ? Drinks such as sodas, specialty coffee drinks, alcohol, and juices have a lot of calories, yet do not fill you up.  · Eat nutritious foods and avoid empty calories. Empty calories are calories you get from foods or beverages that do not have many vitamins or protein, such as candy, sweets, and soda. It is better to have a nutritious high-calorie food (such as an avocado) than a food with few nutrients (such as a bag of chips).  · Know how many calories are in the foods you eat most often. This will help you calculate calorie counts faster.  · Pay attention to calories in drinks. Low-calorie drinks include water and unsweetened drinks.  · Pay attention to nutrition labels for \"low fat\" or \"fat free\" foods. These foods sometimes have the same amount of calories or more calories than the full fat versions. They also often have added sugar, starch, or salt, to make up for flavor that was removed with the fat.  · Find a way of tracking calories that works for you. Get creative. Try different apps or programs if writing down calories " does not work for you.  What are some portion control tips?  · Know how many calories are in a serving. This will help you know how many servings of a certain food you can have.  · Use a measuring cup to measure serving sizes. You could also try weighing out portions on a kitchen scale. With time, you will be able to estimate serving sizes for some foods.  · Take some time to put servings of different foods on your favorite plates, bowls, and cups so you know what a serving looks like.  · Try not to eat straight from a bag or box. Doing this can lead to overeating. Put the amount you would like to eat in a cup or on a plate to make sure you are eating the right portion.  · Use smaller plates, glasses, and bowls to prevent overeating.  · Try not to multitask (for example, watch TV or use your computer) while eating. If it is time to eat, sit down at a table and enjoy your food. This will help you to know when you are full. It will also help you to be aware of what you are eating and how much you are eating.  What are tips for following this plan?  Reading food labels  · Check the calorie count compared to the serving size. The serving size may be smaller than what you are used to eating.  · Check the source of the calories. Make sure the food you are eating is high in vitamins and protein and low in saturated and trans fats.  Shopping  · Read nutrition labels while you shop. This will help you make healthy decisions before you decide to purchase your food.  · Make a grocery list and stick to it.  Cooking  · Try to cook your favorite foods in a healthier way. For example, try baking instead of frying.  · Use low-fat dairy products.  Meal planning  · Use more fruits and vegetables. Half of your plate should be fruits and vegetables.  · Include lean proteins like poultry and fish.  How do I count calories when eating out?  · Ask for smaller portion sizes.  · Consider sharing an entree and sides instead of getting your  own entree.  · If you get your own entree, eat only half. Ask for a box at the beginning of your meal and put the rest of your entree in it so you are not tempted to eat it.  · If calories are listed on the menu, choose the lower calorie options.  · Choose dishes that include vegetables, fruits, whole grains, low-fat dairy products, and lean protein.  · Choose items that are boiled, broiled, grilled, or steamed. Stay away from items that are buttered, battered, fried, or served with cream sauce. Items labeled “crispy” are usually fried, unless stated otherwise.  · Choose water, low-fat milk, unsweetened iced tea, or other drinks without added sugar. If you want an alcoholic beverage, choose a lower calorie option such as a glass of wine or light beer.  · Ask for dressings, sauces, and syrups on the side. These are usually high in calories, so you should limit the amount you eat.  · If you want a salad, choose a garden salad and ask for grilled meats. Avoid extra toppings like gomez, cheese, or fried items. Ask for the dressing on the side, or ask for olive oil and vinegar or lemon to use as dressing.  · Estimate how many servings of a food you are given. For example, a serving of cooked rice is ½ cup or about the size of half a baseball. Knowing serving sizes will help you be aware of how much food you are eating at restaurants. The list below tells you how big or small some common portion sizes are based on everyday objects:  ? 1 oz--4 stacked dice.  ? 3 oz--1 deck of cards.  ? 1 tsp--1 die.  ? 1 Tbsp--½ a ping-pong ball.  ? 2 Tbsp--1 ping-pong ball.  ? ½ cup--½ baseball.  ? 1 cup--1 baseball.  Summary  · Calorie counting means keeping track of how many calories you eat and drink each day. If you eat fewer calories than your body needs, you should lose weight.  · A healthy amount of weight to lose per week is usually 1-2 lb (0.5-0.9 kg). This usually means reducing your daily calorie intake by 500-750  calories.  · The number of calories in a food can be found on a Nutrition Facts label. If a food does not have a Nutrition Facts label, try to look up the calories online or ask your dietitian for help.  · Use your calories on foods and drinks that will fill you up, and not on foods and drinks that will leave you hungry.  · Use smaller plates, glasses, and bowls to prevent overeating.  This information is not intended to replace advice given to you by your health care provider. Make sure you discuss any questions you have with your health care provider.  Document Released: 12/18/2006 Document Revised: 11/17/2017 Document Reviewed: 11/17/2017  [employer] Interactive Patient Education © 2018 [employer]  BMI for Adults  Body mass index (BMI) is a number that is calculated from a person's weight and height. In most adults, the number is used to find how much of an adult's weight is made up of fat. BMI is not as accurate as a direct measure of body fat.  How is BMI calculated?  BMI is calculated by dividing weight in kilograms by height in meters squared. It can also be calculated by dividing weight in pounds by height in inches squared, then multiplying the resulting number by 703. Charts are available to help you find your BMI quickly and easily without doing this calculation.  How is BMI interpreted?  Health care professionals use BMI charts to identify whether an adult is underweight, at a normal weight, or overweight based on the following guidelines:  · Underweight: BMI less than 18.5.  · Normal weight: BMI between 18.5 and 24.9.  · Overweight: BMI between 25 and 29.9.  · Obese: BMI of 30 and above.    BMI is usually interpreted the same for males and females.  Weight includes both fat and muscle, so someone with a muscular build, such as an athlete, may have a BMI that is higher than 24.9. In cases like these, BMI may not accurately depict body fat. To determine if excess body fat is the cause of a BMI of 25 or  higher, further assessments may need to be done by a health care provider.  Why is BMI a useful tool?  BMI is used to identify a possible weight problem that may be related to a medical problem or may increase the risk for medical problems. BMI can also be used to promote changes to reach a healthy weight.  This information is not intended to replace advice given to you by your health care provider. Make sure you discuss any questions you have with your health care provider.  Document Released: 08/29/2005 Document Revised: 04/27/2017 Document Reviewed: 05/15/2015  [employer] Interactive Patient Education © 2018 [employer]

## 2018-06-21 NOTE — PROGRESS NOTES
QUICK REFERENCE INFORMATION:  The ABCs of the Annual Wellness Visit    Subsequent Medicare Wellness Visit    HEALTH RISK ASSESSMENT    1/27/1930    Recent Hospitalizations:  No hospitalization(s) within the last year..        Current Medical Providers:  Patient Care Team:  Trent Davis MD as PCP - General (Internal Medicine)  Trent Davis MD as PCP - Internal Medicine (Internal Medicine)  Trent Davis MD as PCP - Claims Attributed        Smoking Status:  History   Smoking Status   • Never Smoker   Smokeless Tobacco   • Never Used       Alcohol Consumption:  History   Alcohol Use No       Depression Screen:   PHQ-2/PHQ-9 Depression Screening 6/21/2018   Little interest or pleasure in doing things 0   Feeling down, depressed, or hopeless 1   Trouble falling or staying asleep, or sleeping too much 0   Feeling tired or having little energy 2   Poor appetite or overeating 0   Feeling bad about yourself - or that you are a failure or have let yourself or your family down 0   Trouble concentrating on things, such as reading the newspaper or watching television 0   Moving or speaking so slowly that other people could have noticed. Or the opposite - being so fidgety or restless that you have been moving around a lot more than usual 0   Thoughts that you would be better off dead, or of hurting yourself in some way 0   Total Score 3   If you checked off any problems, how difficult have these problems made it for you to do your work, take care of things at home, or get along with other people? Not difficult at all       Health Habits and Functional and Cognitive Screening:  Functional & Cognitive Status 6/21/2018   Do you have difficulty preparing food and eating? No   Do you have difficulty bathing yourself, getting dressed or grooming yourself? No   Do you have difficulty using the toilet? No   Do you have difficulty moving around from place to place? No   Do you have trouble with steps or getting out of a bed or a  chair? No   In the past year have you fallen or experienced a near fall? Yes   Current Diet -   Dental Exam -   Eye Exam -   Exercise (times per week) -   Current Exercise Activities Include -   Do you need help using the phone?  No   Are you deaf or do you have serious difficulty hearing?  No   Do you need help with transportation? Yes   Do you need help shopping? No   Do you need help preparing meals?  No   Do you need help with housework?  No   Do you need help with laundry? No   Do you need help taking your medications? No   Do you need help managing money? No   Do you ever drive or ride in a car without wearing a seat belt? No   Have you felt unusual stress, anger or loneliness in the last month? No   Who do you live with? Spouse   If you need help, do you have trouble finding someone available to you? No   Have you been bothered in the last four weeks by sexual problems? No   Do you have difficulty concentrating, remembering or making decisions? No           Does the patient have evidence of cognitive impairment? No    Aspirin use counseling: Taking ASA appropriately as indicated      Recent Lab Results:  CMP:  Lab Results   Component Value Date    GLU 84 09/18/2017    BUN 14 09/18/2017    CREATININE 0.51 (L) 09/18/2017    EGFRIFNONA 87 09/18/2017    EGFRIFAFRI 100 09/18/2017    BCR 27 09/18/2017     09/18/2017    K 4.4 09/18/2017    CO2 20 09/18/2017    CALCIUM 8.9 09/18/2017    PROTENTOTREF 6.4 09/18/2017    ALBUMIN 4.1 09/18/2017    LABGLOBREF 2.3 09/18/2017    LABIL2 1.8 09/18/2017    BILITOT 0.5 09/18/2017    ALKPHOS 77 09/18/2017    AST 15 09/18/2017    ALT 12 09/18/2017     Lipid Panel:  Lab Results   Component Value Date    TRIG 267 (H) 09/18/2017    HDL 59 09/18/2017    VLDL 53 (H) 09/18/2017    LDLHDL 2.1 09/18/2017     HbA1c:  Lab Results   Component Value Date    HGBA1C 5.6 09/18/2017       Visual Acuity:  No exam data present    Age-appropriate Screening Schedule:  Refer to the list below  for future screening recommendations based on patient's age, sex and/or medical conditions. Orders for these recommended tests are listed in the plan section. The patient has been provided with a written plan.    Health Maintenance   Topic Date Due   • ZOSTER VACCINE (2 of 3) 01/26/2008   • INFLUENZA VACCINE  08/01/2018   • LIPID PANEL  09/18/2018   • TDAP/TD VACCINES (2 - Td) 03/01/2027   • PNEUMOCOCCAL VACCINES (65+ LOW/MEDIUM RISK)  Completed   • MAMMOGRAM  Excluded   • COLONOSCOPY  Excluded        Subjective   History of Present Illness    Yuliana Gonzalez is a 88 y.o. female who presents for an Subsequent Wellness Visit.    The following portions of the patient's history were reviewed and updated as appropriate: allergies, current medications, past family history, past medical history, past social history, past surgical history and problem list.    Outpatient Medications Prior to Visit   Medication Sig Dispense Refill   • aspirin 325 MG tablet Take 325 mg by mouth Daily.     • atenolol (TENORMIN) 25 MG tablet TAKE 1 TABLET TWICE A  tablet 1   • cholecalciferol (VITAMIN D3) 1000 units tablet Take 2,000 Units by mouth Daily.     • Multiple Vitamins-Minerals (VITEYES AREDS ADVANCED PO) Take 1 capsule by mouth 2 (two) times a day.     • naproxen sodium (ALEVE) 220 MG tablet Take 220 mg by mouth 2 (Two) Times a Day As Needed for Mild Pain (1-3) (took one this morning at 1030.).     • valsartan (DIOVAN) 80 MG tablet Take 1 tablet by mouth Daily. 90 tablet 1   • B Complex Vitamins (VITAMIN-B COMPLEX) tablet Take 1 tablet by mouth Daily.       No facility-administered medications prior to visit.        Patient Active Problem List   Diagnosis   • Aortic stenosis   • IGT (impaired glucose tolerance)   • Hyperlipidemia   • Hypertension   • Cervical arthritis   • Sciatica   • Vitamin D deficiency   • De Quervain's disease (tenosynovitis)       Advance Care Planning:  has an advance directive - a copy HAS NOT been  "provided. Have asked the patient to send this to us to add to record.    Identification of Risk Factors:  Risk factors include: weight , unhealthy diet and inactivity.    Review of Systems    Compared to one year ago, the patient feels her physical health is worse.  Compared to one year ago, the patient feels her mental health is the same.    Objective     Physical Exam    Vitals:    06/21/18 1050   BP: 122/72   Pulse: 62   Resp: 16   Temp: 99 °F (37.2 °C)   TempSrc: Oral   SpO2: 93%   Weight: 67.1 kg (148 lb)   Height: 154.9 cm (60.98\")   PainSc: 0-No pain       Patient's Body mass index is 27.98 kg/m². BMI is within normal parameters. No follow-up required.      Assessment/Plan   Patient Self-Management and Personalized Health Advice  The patient has been provided with information about: diet, exercise and weight management and preventive services including:   · Exercise counseling provided, Nutrition counseling provided.    Visit Diagnoses:    ICD-10-CM ICD-9-CM   1. Essential hypertension I10 401.9   2. Hyperlipidemia, unspecified hyperlipidemia type E78.5 272.4   3. Nonrheumatic aortic valve stenosis I35.0 424.1   4. IGT (impaired glucose tolerance) R73.02 790.22       No orders of the defined types were placed in this encounter.      Outpatient Encounter Prescriptions as of 6/21/2018   Medication Sig Dispense Refill   • aspirin 325 MG tablet Take 325 mg by mouth Daily.     • atenolol (TENORMIN) 25 MG tablet TAKE 1 TABLET TWICE A  tablet 1   • cholecalciferol (VITAMIN D3) 1000 units tablet Take 2,000 Units by mouth Daily.     • Multiple Vitamins-Minerals (VITEYES AREDS ADVANCED PO) Take 1 capsule by mouth 2 (two) times a day.     • naproxen sodium (ALEVE) 220 MG tablet Take 220 mg by mouth 2 (Two) Times a Day As Needed for Mild Pain (1-3) (took one this morning at 1030.).     • valsartan (DIOVAN) 80 MG tablet Take 1 tablet by mouth Daily. 90 tablet 1   • [DISCONTINUED] B Complex Vitamins (VITAMIN-B " COMPLEX) tablet Take 1 tablet by mouth Daily.       No facility-administered encounter medications on file as of 6/21/2018.        Reviewed use of high risk medication in the elderly: yes  Reviewed for potential of harmful drug interactions in the elderly: yes    Follow Up:  Return in about 3 months (around 9/21/2018) for Recheck htn, lipids, as, .     An After Visit Summary and PPPS with all of these plans were given to the patient.

## 2018-09-07 ENCOUNTER — RESULTS ENCOUNTER (OUTPATIENT)
Dept: INTERNAL MEDICINE | Facility: CLINIC | Age: 83
End: 2018-09-07

## 2018-09-07 ENCOUNTER — OFFICE VISIT (OUTPATIENT)
Dept: INTERNAL MEDICINE | Facility: CLINIC | Age: 83
End: 2018-09-07

## 2018-09-07 VITALS
RESPIRATION RATE: 16 BRPM | TEMPERATURE: 98.9 F | HEART RATE: 67 BPM | DIASTOLIC BLOOD PRESSURE: 78 MMHG | WEIGHT: 146.8 LBS | HEIGHT: 61 IN | SYSTOLIC BLOOD PRESSURE: 166 MMHG | BODY MASS INDEX: 27.72 KG/M2 | OXYGEN SATURATION: 93 %

## 2018-09-07 DIAGNOSIS — Z79.899 MEDICATION MANAGEMENT: ICD-10-CM

## 2018-09-07 DIAGNOSIS — E78.5 HYPERLIPIDEMIA, UNSPECIFIED HYPERLIPIDEMIA TYPE: ICD-10-CM

## 2018-09-07 DIAGNOSIS — R73.02 IGT (IMPAIRED GLUCOSE TOLERANCE): ICD-10-CM

## 2018-09-07 DIAGNOSIS — I35.0 NONRHEUMATIC AORTIC VALVE STENOSIS: ICD-10-CM

## 2018-09-07 DIAGNOSIS — I10 ESSENTIAL HYPERTENSION: Primary | ICD-10-CM

## 2018-09-07 LAB
ALBUMIN SERPL-MCNC: 4.3 G/DL (ref 3.5–5.2)
ALBUMIN/GLOB SERPL: 2.2 G/DL
ALP SERPL-CCNC: 84 U/L (ref 39–117)
ALT SERPL-CCNC: 12 U/L (ref 1–33)
AST SERPL-CCNC: 17 U/L (ref 1–32)
BASOPHILS # BLD AUTO: 0.04 10*3/MM3 (ref 0–0.2)
BASOPHILS NFR BLD AUTO: 0.5 % (ref 0–1.5)
BILIRUB SERPL-MCNC: 0.3 MG/DL (ref 0.1–1.2)
BUN SERPL-MCNC: 15 MG/DL (ref 8–23)
BUN/CREAT SERPL: 23.4 (ref 7–25)
CALCIUM SERPL-MCNC: 9.2 MG/DL (ref 8.6–10.5)
CHLORIDE SERPL-SCNC: 102 MMOL/L (ref 98–107)
CHOLEST SERPL-MCNC: 223 MG/DL (ref 0–200)
CO2 SERPL-SCNC: 24.9 MMOL/L (ref 22–29)
CREAT SERPL-MCNC: 0.64 MG/DL (ref 0.57–1)
EOSINOPHIL # BLD AUTO: 0.21 10*3/MM3 (ref 0–0.7)
EOSINOPHIL NFR BLD AUTO: 2.4 % (ref 0.3–6.2)
ERYTHROCYTE [DISTWIDTH] IN BLOOD BY AUTOMATED COUNT: 13.2 % (ref 11.7–13)
GLOBULIN SER CALC-MCNC: 2 GM/DL
GLUCOSE SERPL-MCNC: 129 MG/DL (ref 65–99)
HBA1C MFR BLD: 5.2 % (ref 4.8–5.6)
HCT VFR BLD AUTO: 44.4 % (ref 35.6–45.5)
HDLC SERPL-MCNC: 60 MG/DL (ref 40–60)
HGB BLD-MCNC: 13.8 G/DL (ref 11.9–15.5)
IMM GRANULOCYTES # BLD: 0 10*3/MM3 (ref 0–0.03)
IMM GRANULOCYTES NFR BLD: 0 % (ref 0–0.5)
LDLC SERPL CALC-MCNC: 90 MG/DL (ref 0–100)
LYMPHOCYTES # BLD AUTO: 2.61 10*3/MM3 (ref 0.9–4.8)
LYMPHOCYTES NFR BLD AUTO: 30.3 % (ref 19.6–45.3)
MCH RBC QN AUTO: 28.4 PG (ref 26.9–32)
MCHC RBC AUTO-ENTMCNC: 31.1 G/DL (ref 32.4–36.3)
MCV RBC AUTO: 91.4 FL (ref 80.5–98.2)
MONOCYTES # BLD AUTO: 0.52 10*3/MM3 (ref 0.2–1.2)
MONOCYTES NFR BLD AUTO: 6 % (ref 5–12)
NEUTROPHILS # BLD AUTO: 5.22 10*3/MM3 (ref 1.9–8.1)
NEUTROPHILS NFR BLD AUTO: 60.8 % (ref 42.7–76)
PLATELET # BLD AUTO: 216 10*3/MM3 (ref 140–500)
POTASSIUM SERPL-SCNC: 4.5 MMOL/L (ref 3.5–5.2)
PROT SERPL-MCNC: 6.3 G/DL (ref 6–8.5)
RBC # BLD AUTO: 4.86 10*6/MM3 (ref 3.9–5.2)
SODIUM SERPL-SCNC: 139 MMOL/L (ref 136–145)
TRIGL SERPL-MCNC: 363 MG/DL (ref 0–150)
VLDLC SERPL CALC-MCNC: 72.6 MG/DL (ref 5–40)
WBC # BLD AUTO: 8.6 10*3/MM3 (ref 4.5–10.7)

## 2018-09-07 PROCEDURE — 99214 OFFICE O/P EST MOD 30 MIN: CPT | Performed by: INTERNAL MEDICINE

## 2018-09-07 RX ORDER — HYDROCHLOROTHIAZIDE 12.5 MG/1
12.5 CAPSULE, GELATIN COATED ORAL DAILY
Qty: 90 CAPSULE | Refills: 1 | Status: SHIPPED | OUTPATIENT
Start: 2018-09-07 | End: 2019-02-23 | Stop reason: SDUPTHER

## 2018-09-07 NOTE — PROGRESS NOTES
Subjective   Yuliana Gonzalez is a 88 y.o. female.     No chief complaint on file.        In for recheck of chronic IGT.  Has been stable.  Also aortic stenosis.  It remains asymptomatic.      Hyperlipidemia   This is a chronic problem. The current episode started more than 1 year ago. The problem is controlled. Recent lipid tests were reviewed and are normal. Factors aggravating her hyperlipidemia include beta blockers. She is currently on no antihyperlipidemic treatment. The current treatment provides significant improvement of lipids. There are no compliance problems.  Risk factors for coronary artery disease include dyslipidemia, hypertension and post-menopausal.   Hyperglycemia   This is a chronic problem. The current episode started more than 1 year ago. The problem occurs constantly. The problem has been unchanged. Pertinent negatives include no abdominal pain, fatigue, nausea or vomiting.   Hypertension   This is a chronic problem. The current episode started more than 1 year ago. The problem is unchanged. The problem is controlled. Pertinent negatives include no orthopnea, palpitations, peripheral edema or PND. There are no associated agents to hypertension. Risk factors for coronary artery disease include dyslipidemia and post-menopausal state. Current antihypertension treatment includes beta blockers and angiotensin blockers. The current treatment provides significant improvement. There are no compliance problems.  There is no history of angina, kidney disease, CAD/MI, CVA or heart failure.        The following portions of the patient's history were reviewed and updated as appropriate: allergies, current medications, past social history and problem list.    Outpatient Prescriptions Marked as Taking for the 9/7/18 encounter (Office Visit) with Trent Davis MD   Medication Sig Dispense Refill   • aspirin 81 MG tablet Take 81 mg by mouth Daily.     • atenolol (TENORMIN) 25 MG tablet TAKE 1 TABLET TWICE A   tablet 1   • cholecalciferol (VITAMIN D3) 1000 units tablet Take 2,000 Units by mouth Daily.     • Multiple Vitamins-Minerals (VITEYES AREDS ADVANCED PO) Take 1 capsule by mouth 2 (two) times a day.     • [DISCONTINUED] valsartan (DIOVAN) 80 MG tablet Take 1 tablet by mouth Daily. 90 tablet 1       Review of Systems   Constitutional: Negative for fatigue.   Cardiovascular: Negative for palpitations, orthopnea, leg swelling and PND.   Gastrointestinal: Negative for abdominal pain, constipation, diarrhea, nausea and vomiting.       Objective   Vitals:    09/07/18 1329   BP: 166/78   Pulse: 67   Resp: 16   Temp: 98.9 °F (37.2 °C)   SpO2: 93%      1    09/07/18  1329   Weight: 66.6 kg (146 lb 12.8 oz)    [unfilled]  Body mass index is 27.75 kg/m².      Physical Exam   Constitutional: She appears well-developed and well-nourished. No distress.   HENT:   Head: Normocephalic and atraumatic.   Neck: Carotid bruit is not present. No thyromegaly present.   Cardiovascular: Normal rate, regular rhythm and intact distal pulses.  Exam reveals no gallop.    Murmur heard.   Crescendo decrescendo systolic murmur is present with a grade of 3/6   Murmur is in the aortic area   Pulmonary/Chest: Effort normal and breath sounds normal. No respiratory distress. She has no wheezes. She has no rales.   Abdominal: Soft. Bowel sounds are normal. She exhibits no mass. There is no tenderness. There is no guarding.         Problem List Items Addressed This Visit        Cardiovascular and Mediastinum    Aortic stenosis    Hyperlipidemia    Hypertension - Primary    Relevant Medications    hydrochlorothiazide (MICROZIDE) 12.5 MG capsule       Endocrine    IGT (impaired glucose tolerance)        Assessment/Plan   In for recheck of hypertension, hyperlipidemia, and aortic stenosis.  Blood pressure control is excellent.  Glycemic control is been good.  She is no longer treating lipids so we are not checking lipids at this point.  Got  annual lab work 8/2017 including CBC CMP and lipids.  AS remains asymptomatic.  No chest pain.  No syncope.  No CHF.  Last echocardiogram was March 2018.  Moderate to severe AS.  Due for another TTE 3/2019.  Due shingrix.  Been having trouble with dizziness and fatigue.  Runny nose.  She thinks Diovan makes her feel bad.  Had trouble with lisinopril in the past.  We'll stop the Diovan and add HCTZ 12.5 mg daily for blood pressure.  Lightheaded spells if she gets up too fast.  Fingers tingle.  This is usually nocturnal.  Due for flu shot and Shingrix.  She's had no chest pain.  Does get dyspnea on exertion.  No history of failure.  No history of syncope.  Gets lightheaded she stands up too quickly.         Jono disclaimer:   Much of this encounter note is an electronic transcription/translation of spoken language to printed text. The electronic translation of spoken language may permit erroneous, or at times, nonsensical words or phrases to be inadvertently transcribed; Although I have reviewed the note for such errors, some may still exist.

## 2018-09-25 ENCOUNTER — OFFICE VISIT (OUTPATIENT)
Dept: INTERNAL MEDICINE | Facility: CLINIC | Age: 83
End: 2018-09-25

## 2018-09-25 VITALS
HEIGHT: 61 IN | WEIGHT: 147.7 LBS | BODY MASS INDEX: 27.89 KG/M2 | RESPIRATION RATE: 16 BRPM | OXYGEN SATURATION: 98 % | SYSTOLIC BLOOD PRESSURE: 136 MMHG | HEART RATE: 73 BPM | TEMPERATURE: 98.8 F | DIASTOLIC BLOOD PRESSURE: 70 MMHG

## 2018-09-25 DIAGNOSIS — R73.02 IGT (IMPAIRED GLUCOSE TOLERANCE): ICD-10-CM

## 2018-09-25 DIAGNOSIS — I10 ESSENTIAL HYPERTENSION: Primary | ICD-10-CM

## 2018-09-25 DIAGNOSIS — I35.0 NONRHEUMATIC AORTIC VALVE STENOSIS: ICD-10-CM

## 2018-09-25 DIAGNOSIS — E55.9 VITAMIN D DEFICIENCY: ICD-10-CM

## 2018-09-25 DIAGNOSIS — E78.5 HYPERLIPIDEMIA, UNSPECIFIED HYPERLIPIDEMIA TYPE: ICD-10-CM

## 2018-09-25 PROCEDURE — 99214 OFFICE O/P EST MOD 30 MIN: CPT | Performed by: INTERNAL MEDICINE

## 2018-09-25 RX ORDER — ASPIRIN 325 MG
325 TABLET ORAL DAILY
COMMUNITY
End: 2019-04-11

## 2018-09-25 NOTE — PROGRESS NOTES
Subjective   Yuliana Gonzalez is a 88 y.o. female.     Chief Complaint   Patient presents with   • Hypertension   • Hyperlipidemia         In for recheck of chronic IGT.  Has been stable.  Also aortic stenosis.  It remains asymptomatic.      Hypertension   This is a chronic problem. The current episode started more than 1 year ago. The problem is unchanged. The problem is controlled. Pertinent negatives include no orthopnea, palpitations, peripheral edema or PND. There are no associated agents to hypertension. Risk factors for coronary artery disease include dyslipidemia and post-menopausal state. Current antihypertension treatment includes beta blockers and angiotensin blockers. The current treatment provides significant improvement. There are no compliance problems.  There is no history of angina, kidney disease, CAD/MI, CVA or heart failure.   Hyperlipidemia   This is a chronic problem. The current episode started more than 1 year ago. The problem is controlled. Recent lipid tests were reviewed and are normal. Factors aggravating her hyperlipidemia include beta blockers. She is currently on no antihyperlipidemic treatment. The current treatment provides significant improvement of lipids. There are no compliance problems.  Risk factors for coronary artery disease include dyslipidemia, hypertension and post-menopausal.   Hyperglycemia   This is a chronic problem. The current episode started more than 1 year ago. The problem occurs constantly. The problem has been unchanged. Pertinent negatives include no abdominal pain, fatigue, nausea or vomiting.        The following portions of the patient's history were reviewed and updated as appropriate: allergies, current medications, past social history and problem list.    Outpatient Prescriptions Marked as Taking for the 9/25/18 encounter (Office Visit) with Trent Davis MD   Medication Sig Dispense Refill   • aspirin 325 MG tablet Take 325 mg by mouth Daily.     •  aspirin 81 MG tablet Take 81 mg by mouth Daily.     • atenolol (TENORMIN) 25 MG tablet TAKE 1 TABLET TWICE A  tablet 1   • cholecalciferol (VITAMIN D3) 1000 units tablet Take 2,000 Units by mouth Daily.     • hydrochlorothiazide (MICROZIDE) 12.5 MG capsule Take 1 capsule by mouth Daily. 90 capsule 1   • Multiple Vitamins-Minerals (VITEYES AREDS ADVANCED PO) Take 1 capsule by mouth 2 (two) times a day.         Review of Systems   Constitutional: Negative for fatigue.   Cardiovascular: Negative for palpitations, orthopnea, leg swelling and PND.   Gastrointestinal: Negative for abdominal pain, constipation, diarrhea, nausea and vomiting.       Objective   Vitals:    09/25/18 1046   BP: 136/70   Pulse: 73   Resp: 16   Temp: 98.8 °F (37.1 °C)   SpO2: 98%      1    09/25/18  1046   Weight: 67 kg (147 lb 11.2 oz)    [unfilled]  Body mass index is 27.92 kg/m².      Physical Exam   Constitutional: She appears well-developed and well-nourished. No distress.   HENT:   Head: Normocephalic and atraumatic.   Neck: Carotid bruit is not present. No thyromegaly present.   Cardiovascular: Normal rate, regular rhythm and intact distal pulses.  Exam reveals no gallop.    Murmur heard.   Crescendo decrescendo systolic murmur is present with a grade of 3/6   Murmur is in the aortic area   Pulmonary/Chest: Effort normal and breath sounds normal. No respiratory distress. She has no wheezes. She has no rales.   Abdominal: Soft. Bowel sounds are normal. She exhibits no mass. There is no tenderness. There is no guarding.         Problem List Items Addressed This Visit        Cardiovascular and Mediastinum    Aortic stenosis    Hyperlipidemia    Hypertension - Primary       Digestive    Vitamin D deficiency       Endocrine    IGT (impaired glucose tolerance)        Assessment/Plan   In for recheck of hypertension, hyperlipidemia, and aortic stenosis.  Blood pressure control is excellent.  Glycemic control is been good.  She is no  longer treating lipids so we are not checking lipids at this point.  Got annual lab work 9/2018 including CBC CMP and lipids.  AS remains asymptomatic.  No chest pain.  No syncope.  No CHF.  Last echocardiogram was March 2018.  Moderate to severe AS.  Due for another TTE 3/2019.  Due shingrix.  Been having trouble with dizziness and fatigue.  Runny nose.  She thinks Diovan makes her feel bad.  Had trouble with lisinopril in the past.  We stopped the Diovan and added HCTZ 12.5 mg daily for blood pressure.  That solved the problem.  The lightheadedness has resolved.  Due for flu shot and Shingrix.           Dragon disclaimer:   Much of this encounter note is an electronic transcription/translation of spoken language to printed text. The electronic translation of spoken language may permit erroneous, or at times, nonsensical words or phrases to be inadvertently transcribed; Although I have reviewed the note for such errors, some may still exist.

## 2018-11-26 RX ORDER — ATENOLOL 25 MG/1
TABLET ORAL
Qty: 180 TABLET | Refills: 1 | Status: SHIPPED | OUTPATIENT
Start: 2018-11-26 | End: 2019-07-07 | Stop reason: SDUPTHER

## 2019-01-02 ENCOUNTER — TELEPHONE (OUTPATIENT)
Dept: INTERNAL MEDICINE | Facility: CLINIC | Age: 84
End: 2019-01-02

## 2019-01-02 RX ORDER — AMOXICILLIN 500 MG/1
1000 CAPSULE ORAL 2 TIMES DAILY
Qty: 14 CAPSULE | Refills: 0 | Status: SHIPPED | OUTPATIENT
Start: 2019-01-02 | End: 2019-01-10

## 2019-01-02 NOTE — TELEPHONE ENCOUNTER
The patient called c/o deep cough, yellow drainage that has been going on for several days. She had a fever of 101 yesterday, but is keeping it under control with aspirin. She has been drinking fluids and taking benadryl with no relief.  She is unable to come in to the office. Patient asked about starting an antibiotic. Is this ok? Please advise.    Okay to begin Amoxil 500 mg twice a day for 7 days.

## 2019-01-07 ENCOUNTER — TELEPHONE (OUTPATIENT)
Dept: INTERNAL MEDICINE | Facility: CLINIC | Age: 84
End: 2019-01-07

## 2019-01-07 NOTE — TELEPHONE ENCOUNTER
Patient c/o deep cough, yellow drainage and fever 1/2/19. You prescribed Amoxil 500 mg twice a day for 7 days. Patient is still having a runny nose and still not feeling well. She does have an appointment this Thursday. Patient has two different cough syrups, Robitussin DM and Robitussin w/ Acetaminophen. She wanted your opinion on which one she should take. Please advise.     Robitussin-DM.

## 2019-01-10 ENCOUNTER — HOSPITAL ENCOUNTER (OUTPATIENT)
Dept: GENERAL RADIOLOGY | Facility: HOSPITAL | Age: 84
Discharge: HOME | End: 2019-01-10
Attending: INTERNAL MEDICINE | Admitting: INTERNAL MEDICINE

## 2019-01-10 ENCOUNTER — OFFICE VISIT (OUTPATIENT)
Dept: INTERNAL MEDICINE | Facility: CLINIC | Age: 84
End: 2019-01-10

## 2019-01-10 VITALS
WEIGHT: 147.6 LBS | SYSTOLIC BLOOD PRESSURE: 122 MMHG | HEIGHT: 61 IN | DIASTOLIC BLOOD PRESSURE: 70 MMHG | HEART RATE: 66 BPM | OXYGEN SATURATION: 99 % | RESPIRATION RATE: 16 BRPM | BODY MASS INDEX: 27.87 KG/M2 | TEMPERATURE: 99.2 F

## 2019-01-10 DIAGNOSIS — R05.9 COUGH: ICD-10-CM

## 2019-01-10 DIAGNOSIS — I35.0 NONRHEUMATIC AORTIC VALVE STENOSIS: ICD-10-CM

## 2019-01-10 DIAGNOSIS — I10 ESSENTIAL HYPERTENSION: Primary | ICD-10-CM

## 2019-01-10 DIAGNOSIS — R73.02 IGT (IMPAIRED GLUCOSE TOLERANCE): ICD-10-CM

## 2019-01-10 DIAGNOSIS — E78.5 HYPERLIPIDEMIA, UNSPECIFIED HYPERLIPIDEMIA TYPE: ICD-10-CM

## 2019-01-10 LAB
BASOPHILS # BLD AUTO: 0.04 10*3/MM3 (ref 0–0.2)
BASOPHILS NFR BLD AUTO: 0.3 % (ref 0–1.5)
EOSINOPHIL # BLD AUTO: 0.09 10*3/MM3 (ref 0–0.7)
EOSINOPHIL NFR BLD AUTO: 0.6 % (ref 0.3–6.2)
ERYTHROCYTE [DISTWIDTH] IN BLOOD BY AUTOMATED COUNT: 13.1 % (ref 11.7–13)
GLUCOSE P FAST SERPL-MCNC: 101 MG/DL (ref 74–106)
HBA1C MFR BLD: 5.82 % (ref 4.8–5.6)
HCT VFR BLD AUTO: 45.3 % (ref 35.6–45.5)
HGB BLD-MCNC: 14.6 G/DL (ref 11.9–15.5)
IMM GRANULOCYTES # BLD AUTO: 0.11 10*3/MM3 (ref 0–0.03)
IMM GRANULOCYTES NFR BLD AUTO: 0.7 % (ref 0–0.5)
LYMPHOCYTES # BLD AUTO: 3.49 10*3/MM3 (ref 0.9–4.8)
LYMPHOCYTES NFR BLD AUTO: 22.9 % (ref 19.6–45.3)
MCH RBC QN AUTO: 28.7 PG (ref 26.9–32)
MCHC RBC AUTO-ENTMCNC: 32.2 G/DL (ref 32.4–36.3)
MCV RBC AUTO: 89.2 FL (ref 80.5–98.2)
MONOCYTES # BLD AUTO: 0.95 10*3/MM3 (ref 0.2–1.2)
MONOCYTES NFR BLD AUTO: 6.2 % (ref 5–12)
NEUTROPHILS # BLD AUTO: 10.65 10*3/MM3 (ref 1.9–8.1)
NEUTROPHILS NFR BLD AUTO: 70 % (ref 42.7–76)
PLATELET # BLD AUTO: 258 10*3/MM3 (ref 140–500)
RBC # BLD AUTO: 5.08 10*6/MM3 (ref 3.9–5.2)
WBC # BLD AUTO: 15.22 10*3/MM3 (ref 4.5–10.7)

## 2019-01-10 PROCEDURE — 71046 X-RAY EXAM CHEST 2 VIEWS: CPT

## 2019-01-10 PROCEDURE — 99214 OFFICE O/P EST MOD 30 MIN: CPT | Performed by: INTERNAL MEDICINE

## 2019-01-10 NOTE — PROGRESS NOTES
Subjective   Yuliana Gonzalez is a 88 y.o. female.     Chief Complaint   Patient presents with   • Hypertension   • Hyperlipidemia   • Hyperglycemia   • Aortic Stenosis         In for recheck of chronic IGT.  Has been stable.  Also aortic stenosis.  It remains asymptomatic.      Hypertension   This is a chronic problem. The current episode started more than 1 year ago. The problem is unchanged. The problem is controlled. Pertinent negatives include no orthopnea, palpitations, peripheral edema or PND. There are no associated agents to hypertension. Risk factors for coronary artery disease include dyslipidemia and post-menopausal state. Current antihypertension treatment includes beta blockers and angiotensin blockers. The current treatment provides significant improvement. There are no compliance problems.  There is no history of angina, kidney disease, CAD/MI, CVA or heart failure.   Hyperlipidemia   This is a chronic problem. The current episode started more than 1 year ago. The problem is controlled. Recent lipid tests were reviewed and are normal. Factors aggravating her hyperlipidemia include beta blockers. She is currently on no antihyperlipidemic treatment. The current treatment provides significant improvement of lipids. There are no compliance problems.  Risk factors for coronary artery disease include dyslipidemia, hypertension and post-menopausal.   Hyperglycemia   This is a chronic problem. The current episode started more than 1 year ago. The problem occurs constantly. The problem has been unchanged. Pertinent negatives include no abdominal pain, fatigue, nausea or vomiting.        The following portions of the patient's history were reviewed and updated as appropriate: allergies, current medications, past social history and problem list.    Outpatient Medications Marked as Taking for the 1/10/19 encounter (Office Visit) with Trent Davis MD   Medication Sig Dispense Refill   • aspirin 325 MG tablet  Take 325 mg by mouth Daily.     • aspirin 81 MG tablet Take 81 mg by mouth Daily.     • atenolol (TENORMIN) 25 MG tablet TAKE 1 TABLET TWICE A  tablet 1   • cholecalciferol (VITAMIN D3) 1000 units tablet Take 2,000 Units by mouth Daily.     • hydrochlorothiazide (MICROZIDE) 12.5 MG capsule Take 1 capsule by mouth Daily. 90 capsule 1   • Multiple Vitamins-Minerals (VITEYES AREDS ADVANCED PO) Take 1 capsule by mouth 2 (two) times a day.         Review of Systems   Constitutional: Negative for fatigue.   Cardiovascular: Negative for palpitations, orthopnea, leg swelling and PND.   Gastrointestinal: Negative for abdominal pain, constipation, diarrhea, nausea and vomiting.       Objective   Vitals:    01/10/19 1003   BP: 122/70   Pulse: 66   Resp: 16   Temp: 99.2 °F (37.3 °C)   SpO2: 99%          01/10/19  1003   Weight: 67 kg (147 lb 9.6 oz)    [unfilled]  Body mass index is 27.9 kg/m².      Physical Exam   Constitutional: She appears well-developed and well-nourished. No distress.   HENT:   Head: Normocephalic and atraumatic.   Neck: Carotid bruit is not present. No thyromegaly present.   Cardiovascular: Normal rate, regular rhythm and intact distal pulses. Exam reveals no gallop.   Murmur heard.   Crescendo decrescendo systolic murmur is present with a grade of 3/6.  Murmur is in the aortic area   Pulmonary/Chest: Effort normal and breath sounds normal. No respiratory distress. She has no wheezes. She has no rales.   Abdominal: Soft. Bowel sounds are normal. She exhibits no mass. There is no tenderness. There is no guarding.         Problem List Items Addressed This Visit        Cardiovascular and Mediastinum    Aortic stenosis    Hyperlipidemia    Hypertension - Primary       Endocrine    IGT (impaired glucose tolerance)        Assessment/Plan   In for recheck of hypertension, hyperlipidemia, and aortic stenosis.  Blood pressure control is excellent.  Glycemic control is been good.  She is no longer  treating lipids so we are not checking lipids at this point.  Got annual lab work 9/2018 including CBC CMP and lipids.  AS remains asymptomatic.  No chest pain.  No syncope.  No CHF.  Last echocardiogram was March 2018.  Moderate to severe AS.  Due for another TTE 3/2019.  Due shingrix.  She has an 11 day illness now.  Cough.  Night sweats.  Took a round of amoxicillin.  She is better but still having sweats.  We'll check a chest x-ray and CBC today.   has the flu and this may be influenza.  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         Dragon disclaimer:   Much of this encounter note is an electronic transcription/translation of spoken language to printed text. The electronic translation of spoken language may permit erroneous, or at times, nonsensical words or phrases to be inadvertently transcribed; Although I have reviewed the note for such errors, some may still exist.

## 2019-01-12 RX ORDER — AMOXICILLIN AND CLAVULANATE POTASSIUM 875; 125 MG/1; MG/1
1 TABLET, FILM COATED ORAL EVERY 12 HOURS SCHEDULED
Qty: 20 TABLET | Refills: 0 | Status: SHIPPED | OUTPATIENT
Start: 2019-01-12 | End: 2019-04-11

## 2019-02-25 RX ORDER — HYDROCHLOROTHIAZIDE 12.5 MG/1
CAPSULE, GELATIN COATED ORAL
Qty: 90 CAPSULE | Refills: 1 | Status: SHIPPED | OUTPATIENT
Start: 2019-02-25 | End: 2019-03-13 | Stop reason: SDUPTHER

## 2019-03-13 RX ORDER — HYDROCHLOROTHIAZIDE 12.5 MG/1
CAPSULE, GELATIN COATED ORAL
Qty: 90 CAPSULE | Refills: 1 | Status: SHIPPED | OUTPATIENT
Start: 2019-03-13 | End: 2019-09-14 | Stop reason: SDUPTHER

## 2019-04-11 ENCOUNTER — OFFICE VISIT (OUTPATIENT)
Dept: INTERNAL MEDICINE | Facility: CLINIC | Age: 84
End: 2019-04-11

## 2019-04-11 ENCOUNTER — TELEPHONE (OUTPATIENT)
Dept: INTERNAL MEDICINE | Facility: CLINIC | Age: 84
End: 2019-04-11

## 2019-04-11 VITALS
DIASTOLIC BLOOD PRESSURE: 72 MMHG | HEIGHT: 61 IN | OXYGEN SATURATION: 96 % | WEIGHT: 151 LBS | HEART RATE: 61 BPM | RESPIRATION RATE: 16 BRPM | BODY MASS INDEX: 28.51 KG/M2 | TEMPERATURE: 98.8 F | SYSTOLIC BLOOD PRESSURE: 142 MMHG

## 2019-04-11 DIAGNOSIS — R73.02 IGT (IMPAIRED GLUCOSE TOLERANCE): ICD-10-CM

## 2019-04-11 DIAGNOSIS — I35.0 NONRHEUMATIC AORTIC VALVE STENOSIS: ICD-10-CM

## 2019-04-11 DIAGNOSIS — I10 ESSENTIAL HYPERTENSION: Primary | ICD-10-CM

## 2019-04-11 DIAGNOSIS — E78.5 HYPERLIPIDEMIA, UNSPECIFIED HYPERLIPIDEMIA TYPE: ICD-10-CM

## 2019-04-11 PROCEDURE — 99214 OFFICE O/P EST MOD 30 MIN: CPT | Performed by: INTERNAL MEDICINE

## 2019-04-11 NOTE — PROGRESS NOTES
Subjective   Yuliana Gonzalez is a 89 y.o. female.     Chief Complaint   Patient presents with   • Depression   • Hyperlipidemia   • Hyperglycemia   • Aortic Stenosis   • Cough         In for recheck of chronic IGT.  Has been stable.  Also aortic stenosis.  It remains asymptomatic.      Hyperlipidemia   This is a chronic problem. The current episode started more than 1 year ago. The problem is controlled. Recent lipid tests were reviewed and are normal. Factors aggravating her hyperlipidemia include beta blockers. She is currently on no antihyperlipidemic treatment. The current treatment provides significant improvement of lipids. There are no compliance problems.  Risk factors for coronary artery disease include dyslipidemia, hypertension and post-menopausal.   Hyperglycemia   This is a chronic problem. The current episode started more than 1 year ago. The problem occurs constantly. The problem has been unchanged. Associated symptoms include coughing. Pertinent negatives include no abdominal pain, fatigue, nausea or vomiting.   Cough     Hypertension   This is a chronic problem. The current episode started more than 1 year ago. The problem is unchanged. The problem is controlled. Pertinent negatives include no orthopnea, peripheral edema or PND. There are no associated agents to hypertension. Risk factors for coronary artery disease include dyslipidemia and post-menopausal state. Current antihypertension treatment includes beta blockers and angiotensin blockers. The current treatment provides significant improvement. There are no compliance problems.  There is no history of angina, kidney disease, CAD/MI, CVA or heart failure.        The following portions of the patient's history were reviewed and updated as appropriate: allergies, current medications, past social history and problem list.    Outpatient Medications Marked as Taking for the 4/11/19 encounter (Office Visit) with Trent Davis MD   Medication Sig  Dispense Refill   • aspirin 81 MG tablet Take 81 mg by mouth Daily.     • atenolol (TENORMIN) 25 MG tablet TAKE 1 TABLET TWICE A  tablet 1   • cholecalciferol (VITAMIN D3) 1000 units tablet Take 2,000 Units by mouth Daily.     • hydrochlorothiazide (MICROZIDE) 12.5 MG capsule TAKE 1 CAPSULE BY MOUTH EVERY DAY 90 capsule 1   • Multiple Vitamins-Minerals (VITEYES AREDS ADVANCED PO) Take 1 capsule by mouth 2 (two) times a day.         Review of Systems   Constitutional: Negative for fatigue.   Respiratory: Positive for cough.    Cardiovascular: Negative for orthopnea, leg swelling and PND.   Gastrointestinal: Negative for abdominal pain, constipation, diarrhea, nausea and vomiting.       Objective   Vitals:    04/11/19 1003   BP: 142/72   Pulse: 61   Resp: 16   Temp: 98.8 °F (37.1 °C)   SpO2: 96%          04/11/19  1003   Weight: 68.5 kg (151 lb)    [unfilled]  Body mass index is 28.55 kg/m².      Physical Exam   Constitutional: She appears well-developed and well-nourished. No distress.   HENT:   Head: Normocephalic and atraumatic.   Neck: Carotid bruit is not present. No thyromegaly present.   Cardiovascular: Normal rate, regular rhythm and intact distal pulses. Exam reveals no gallop.   Murmur heard.   Crescendo decrescendo systolic murmur is present with a grade of 3/6.  Murmur is in the aortic area   Pulmonary/Chest: Effort normal and breath sounds normal. No respiratory distress. She has no wheezes. She has no rales.   Abdominal: Soft. Bowel sounds are normal. She exhibits no mass. There is no tenderness. There is no guarding.         Problem List Items Addressed This Visit        Cardiovascular and Mediastinum    Aortic stenosis    Hyperlipidemia    Hypertension - Primary       Endocrine    IGT (impaired glucose tolerance)        Assessment/Plan   In for recheck of hypertension, hyperlipidemia, and aortic stenosis.  Blood pressure control is okay.  Glycemic control is been good.  She is no longer  treating lipids so we are not checking lipids at this point.  Got annual lab work 9/2018 including CBC CMP and lipids.  AS remains asymptomatic.  No chest pain.  No syncope.  No CHF.  Last echocardiogram was March 2018.  Moderate to severe AS.  Due for another TTE 3/2019.  Due shingrix.           Dragon disclaimer:   Much of this encounter note is an electronic transcription/translation of spoken language to printed text. The electronic translation of spoken language may permit erroneous, or at times, nonsensical words or phrases to be inadvertently transcribed; Although I have reviewed the note for such errors, some may still exist.

## 2019-04-11 NOTE — TELEPHONE ENCOUNTER
NICOL: The patient called to let you know that her sister recently passed away from aortic aneurysm. Also, her mom and two aunts had aortic stenosis. She wanted to make sure you were updated on her family medical hx since she has aortic stenosis.

## 2019-04-24 ENCOUNTER — HOSPITAL ENCOUNTER (OUTPATIENT)
Dept: CARDIOLOGY | Facility: HOSPITAL | Age: 84
Discharge: HOME | End: 2019-04-24
Admitting: INTERNAL MEDICINE

## 2019-04-24 VITALS
OXYGEN SATURATION: 97 % | HEIGHT: 60 IN | HEART RATE: 68 BPM | SYSTOLIC BLOOD PRESSURE: 142 MMHG | DIASTOLIC BLOOD PRESSURE: 71 MMHG | BODY MASS INDEX: 29.64 KG/M2 | WEIGHT: 151 LBS

## 2019-04-24 DIAGNOSIS — I35.0 NONRHEUMATIC AORTIC VALVE STENOSIS: ICD-10-CM

## 2019-04-24 LAB
AORTIC DIMENSIONLESS INDEX: 0.4 (DI)
ASCENDING AORTA: 2.8 CM
BH CV ECHO MEAS - ACS: 1 CM
BH CV ECHO MEAS - AO MAX PG (FULL): 59.2 MMHG
BH CV ECHO MEAS - AO MAX PG: 64.6 MMHG
BH CV ECHO MEAS - AO MEAN PG (FULL): 39.8 MMHG
BH CV ECHO MEAS - AO MEAN PG: 43.9 MMHG
BH CV ECHO MEAS - AO ROOT AREA (BSA CORRECTED): 2
BH CV ECHO MEAS - AO ROOT AREA: 8.6 CM^2
BH CV ECHO MEAS - AO ROOT DIAM: 3.3 CM
BH CV ECHO MEAS - AO V2 MAX: 402 CM/SEC
BH CV ECHO MEAS - AO V2 MEAN: 308.7 CM/SEC
BH CV ECHO MEAS - AO V2 VTI: 92.4 CM
BH CV ECHO MEAS - ASC AORTA: 2.8 CM
BH CV ECHO MEAS - AVA(I,A): 1.1 CM^2
BH CV ECHO MEAS - AVA(I,D): 1.1 CM^2
BH CV ECHO MEAS - AVA(V,A): 0.89 CM^2
BH CV ECHO MEAS - AVA(V,D): 0.89 CM^2
BH CV ECHO MEAS - BSA(HAYCOCK): 1.7 M^2
BH CV ECHO MEAS - BSA: 1.7 M^2
BH CV ECHO MEAS - BZI_BMI: 29.5 KILOGRAMS/M^2
BH CV ECHO MEAS - BZI_METRIC_HEIGHT: 152.4 CM
BH CV ECHO MEAS - BZI_METRIC_WEIGHT: 68.5 KG
BH CV ECHO MEAS - EDV(CUBED): 120.6 ML
BH CV ECHO MEAS - EDV(MOD-SP2): 53 ML
BH CV ECHO MEAS - EDV(MOD-SP4): 59 ML
BH CV ECHO MEAS - EDV(TEICH): 115 ML
BH CV ECHO MEAS - EF(CUBED): 67.4 %
BH CV ECHO MEAS - EF(MOD-BP): 69 %
BH CV ECHO MEAS - EF(MOD-SP2): 77.4 %
BH CV ECHO MEAS - EF(MOD-SP4): 66.1 %
BH CV ECHO MEAS - EF(TEICH): 58.7 %
BH CV ECHO MEAS - ESV(CUBED): 39.3 ML
BH CV ECHO MEAS - ESV(MOD-SP2): 12 ML
BH CV ECHO MEAS - ESV(MOD-SP4): 20 ML
BH CV ECHO MEAS - ESV(TEICH): 47.5 ML
BH CV ECHO MEAS - FS: 31.2 %
BH CV ECHO MEAS - IVS/LVPW: 0.97
BH CV ECHO MEAS - IVSD: 1 CM
BH CV ECHO MEAS - LAT PEAK E' VEL: 6 CM/SEC
BH CV ECHO MEAS - LV DIASTOLIC VOL/BSA (35-75): 35.6 ML/M^2
BH CV ECHO MEAS - LV MASS(C)D: 183.3 GRAMS
BH CV ECHO MEAS - LV MASS(C)DI: 110.6 GRAMS/M^2
BH CV ECHO MEAS - LV MAX PG: 5.5 MMHG
BH CV ECHO MEAS - LV MEAN PG: 4 MMHG
BH CV ECHO MEAS - LV SYSTOLIC VOL/BSA (12-30): 12.1 ML/M^2
BH CV ECHO MEAS - LV V1 MAX: 117 CM/SEC
BH CV ECHO MEAS - LV V1 MEAN: 95 CM/SEC
BH CV ECHO MEAS - LV V1 VTI: 33.8 CM
BH CV ECHO MEAS - LVIDD: 4.9 CM
BH CV ECHO MEAS - LVIDS: 3.4 CM
BH CV ECHO MEAS - LVLD AP2: 7.3 CM
BH CV ECHO MEAS - LVLD AP4: 6.8 CM
BH CV ECHO MEAS - LVLS AP2: 5.5 CM
BH CV ECHO MEAS - LVLS AP4: 5.7 CM
BH CV ECHO MEAS - LVOT AREA (M): 3.1 CM^2
BH CV ECHO MEAS - LVOT AREA: 3.1 CM^2
BH CV ECHO MEAS - LVOT DIAM: 2 CM
BH CV ECHO MEAS - LVPWD: 1 CM
BH CV ECHO MEAS - MED PEAK E' VEL: 5 CM/SEC
BH CV ECHO MEAS - MV A DUR: 0.14 SEC
BH CV ECHO MEAS - MV A MAX VEL: 137.3 CM/SEC
BH CV ECHO MEAS - MV DEC SLOPE: 221.1 CM/SEC^2
BH CV ECHO MEAS - MV DEC TIME: 0.37 SEC
BH CV ECHO MEAS - MV E MAX VEL: 81.9 CM/SEC
BH CV ECHO MEAS - MV E/A: 0.6
BH CV ECHO MEAS - MV MAX PG: 7 MMHG
BH CV ECHO MEAS - MV MEAN PG: 2.7 MMHG
BH CV ECHO MEAS - MV P1/2T MAX VEL: 88 CM/SEC
BH CV ECHO MEAS - MV P1/2T: 116.5 MSEC
BH CV ECHO MEAS - MV V2 MAX: 132.5 CM/SEC
BH CV ECHO MEAS - MV V2 MEAN: 76.3 CM/SEC
BH CV ECHO MEAS - MV V2 VTI: 42.4 CM
BH CV ECHO MEAS - MVA P1/2T LCG: 2.5 CM^2
BH CV ECHO MEAS - MVA(P1/2T): 1.9 CM^2
BH CV ECHO MEAS - MVA(VTI): 2.4 CM^2
BH CV ECHO MEAS - PA ACC TIME: 0.09 SEC
BH CV ECHO MEAS - PA MAX PG (FULL): 4.3 MMHG
BH CV ECHO MEAS - PA MAX PG: 10.3 MMHG
BH CV ECHO MEAS - PA PR(ACCEL): 37.8 MMHG
BH CV ECHO MEAS - PA V2 MAX: 160.4 CM/SEC
BH CV ECHO MEAS - PULM A REVS DUR: 0.1 SEC
BH CV ECHO MEAS - PULM A REVS VEL: 32 CM/SEC
BH CV ECHO MEAS - PULM DIAS VEL: 25.1 CM/SEC
BH CV ECHO MEAS - PULM S/D: 1.9
BH CV ECHO MEAS - PULM SYS VEL: 47.4 CM/SEC
BH CV ECHO MEAS - PVA(V,A): 2.7 CM^2
BH CV ECHO MEAS - PVA(V,D): 2.7 CM^2
BH CV ECHO MEAS - QP/QS: 0.92
BH CV ECHO MEAS - RAP SYSTOLE: 3 MMHG
BH CV ECHO MEAS - RV MAX PG: 6 MMHG
BH CV ECHO MEAS - RV MEAN PG: 3.6 MMHG
BH CV ECHO MEAS - RV V1 MAX: 122.5 CM/SEC
BH CV ECHO MEAS - RV V1 MEAN: 87.8 CM/SEC
BH CV ECHO MEAS - RV V1 VTI: 26.6 CM
BH CV ECHO MEAS - RVOT AREA: 3.6 CM^2
BH CV ECHO MEAS - RVOT DIAM: 2.1 CM
BH CV ECHO MEAS - RVSP: 29 MMHG
BH CV ECHO MEAS - SI(AO): 477.3 ML/M^2
BH CV ECHO MEAS - SI(CUBED): 49 ML/M^2
BH CV ECHO MEAS - SI(LVOT): 62.7 ML/M^2
BH CV ECHO MEAS - SI(MOD-SP2): 24.8 ML/M^2
BH CV ECHO MEAS - SI(MOD-SP4): 23.5 ML/M^2
BH CV ECHO MEAS - SI(TEICH): 40.8 ML/M^2
BH CV ECHO MEAS - SUP REN AO DIAM: 2.3 CM
BH CV ECHO MEAS - SV(AO): 790.6 ML
BH CV ECHO MEAS - SV(CUBED): 81.2 ML
BH CV ECHO MEAS - SV(LVOT): 103.8 ML
BH CV ECHO MEAS - SV(MOD-SP2): 41 ML
BH CV ECHO MEAS - SV(MOD-SP4): 39 ML
BH CV ECHO MEAS - SV(RVOT): 95.4 ML
BH CV ECHO MEAS - SV(TEICH): 67.5 ML
BH CV ECHO MEAS - TAPSE (>1.6): 2 CM2
BH CV ECHO MEAS - TR MAX VEL: 257.2 CM/SEC
BH CV ECHO MEASUREMENTS AVERAGE E/E' RATIO: 14.89
BH CV VAS BP RIGHT ARM: NORMAL MMHG
BH CV XLRA - RV BASE: 2.56 CM
BH CV XLRA - TDI S': 10 CM/SEC
LEFT ATRIUM VOLUME INDEX: 25 ML/M2
SINUS: 2.6 CM
STJ: 2.8 CM

## 2019-04-24 PROCEDURE — 93306 TTE W/DOPPLER COMPLETE: CPT

## 2019-04-24 PROCEDURE — 93306 TTE W/DOPPLER COMPLETE: CPT | Performed by: INTERNAL MEDICINE

## 2019-07-08 RX ORDER — ATENOLOL 25 MG/1
TABLET ORAL
Qty: 180 TABLET | Refills: 1 | Status: SHIPPED | OUTPATIENT
Start: 2019-07-08 | End: 2020-01-13

## 2019-07-17 ENCOUNTER — OFFICE VISIT (OUTPATIENT)
Dept: INTERNAL MEDICINE | Facility: CLINIC | Age: 84
End: 2019-07-17

## 2019-07-17 VITALS
HEART RATE: 65 BPM | DIASTOLIC BLOOD PRESSURE: 72 MMHG | BODY MASS INDEX: 30.23 KG/M2 | OXYGEN SATURATION: 98 % | RESPIRATION RATE: 16 BRPM | SYSTOLIC BLOOD PRESSURE: 138 MMHG | WEIGHT: 154 LBS | TEMPERATURE: 98.9 F | HEIGHT: 60 IN

## 2019-07-17 DIAGNOSIS — R35.0 FREQUENCY OF URINATION: Primary | ICD-10-CM

## 2019-07-17 DIAGNOSIS — I10 ESSENTIAL HYPERTENSION: ICD-10-CM

## 2019-07-17 DIAGNOSIS — E78.5 HYPERLIPIDEMIA, UNSPECIFIED HYPERLIPIDEMIA TYPE: ICD-10-CM

## 2019-07-17 DIAGNOSIS — R73.02 IGT (IMPAIRED GLUCOSE TOLERANCE): ICD-10-CM

## 2019-07-17 DIAGNOSIS — I35.0 NONRHEUMATIC AORTIC VALVE STENOSIS: ICD-10-CM

## 2019-07-17 LAB
BILIRUB BLD-MCNC: NEGATIVE MG/DL
CLARITY, POC: CLEAR
COLOR UR: YELLOW
GLUCOSE UR STRIP-MCNC: NEGATIVE MG/DL
KETONES UR QL: NEGATIVE
LEUKOCYTE EST, POC: NEGATIVE
NITRITE UR-MCNC: NEGATIVE MG/ML
PH UR: 5.5 [PH] (ref 5–8)
PROT UR STRIP-MCNC: NEGATIVE MG/DL
RBC # UR STRIP: NEGATIVE /UL
SP GR UR: 1.02 (ref 1–1.03)
UROBILINOGEN UR QL: NORMAL

## 2019-07-17 PROCEDURE — G0439 PPPS, SUBSEQ VISIT: HCPCS | Performed by: INTERNAL MEDICINE

## 2019-07-17 PROCEDURE — 99214 OFFICE O/P EST MOD 30 MIN: CPT | Performed by: INTERNAL MEDICINE

## 2019-07-17 PROCEDURE — 81003 URINALYSIS AUTO W/O SCOPE: CPT | Performed by: INTERNAL MEDICINE

## 2019-07-17 NOTE — PROGRESS NOTES
The ABCs of the Annual Wellness Visit  Subsequent Medicare Wellness Visit    Chief Complaint   Patient presents with   • Hypertension   • Hyperlipidemia   • Hyperglycemia   • Aortic Stenosis       Subjective   History of Present Illness:  Yuliana Gonzalez is a 89 y.o. female who presents for a Subsequent Medicare Wellness Visit.    HEALTH RISK ASSESSMENT    Recent Hospitalizations:  No hospitalization(s) within the last year.    Current Medical Providers:  Patient Care Team:  Trent Davis MD as PCP - General (Internal Medicine)  Trent Davis MD as PCP - Internal Medicine (Internal Medicine)  Trent Davis MD as PCP - Claims Attributed    Smoking Status:  Social History     Tobacco Use   Smoking Status Never Smoker   Smokeless Tobacco Never Used       Alcohol Consumption:  Social History     Substance and Sexual Activity   Alcohol Use No       Depression Screen:   PHQ-2/PHQ-9 Depression Screening 7/17/2019   Little interest or pleasure in doing things 0   Feeling down, depressed, or hopeless 1   Trouble falling or staying asleep, or sleeping too much 0   Feeling tired or having little energy 0   Poor appetite or overeating 0   Feeling bad about yourself - or that you are a failure or have let yourself or your family down 0   Trouble concentrating on things, such as reading the newspaper or watching television 0   Moving or speaking so slowly that other people could have noticed. Or the opposite - being so fidgety or restless that you have been moving around a lot more than usual 0   Thoughts that you would be better off dead, or of hurting yourself in some way 0   Total Score 1   If you checked off any problems, how difficult have these problems made it for you to do your work, take care of things at home, or get along with other people? Not difficult at all       Fall Risk Screen:  STEADI Fall Risk Assessment was completed, and patient is at LOW risk for falls.Assessment completed on:7/17/2019    Health  Habits and Functional and Cognitive Screening:  Functional & Cognitive Status 7/17/2019   Do you have difficulty preparing food and eating? No   Do you have difficulty bathing yourself, getting dressed or grooming yourself? No   Do you have difficulty using the toilet? No   Do you have difficulty moving around from place to place? No   Do you have trouble with steps or getting out of a bed or a chair? No   Current Diet Unhealthy Diet   Dental Exam Up to date   Eye Exam Up to date   Exercise (times per week) 2 times per week   Current Exercise Activities Include Walking   Do you need help using the phone?  No   Are you deaf or do you have serious difficulty hearing?  Yes   Do you need help with transportation? Yes   Do you need help shopping? No   Do you need help preparing meals?  No   Do you need help with housework?  No   Do you need help with laundry? No   Do you need help taking your medications? No   Do you need help managing money? No   Do you ever drive or ride in a car without wearing a seat belt? No   Have you felt unusual stress, anger or loneliness in the last month? Yes   Who do you live with? Community   If you need help, do you have trouble finding someone available to you? No   Have you been bothered in the last four weeks by sexual problems? No   Do you have difficulty concentrating, remembering or making decisions? No         Does the patient have evidence of cognitive impairment? No    Asprin use counseling:Taking ASA appropriately as indicated    Age-appropriate Screening Schedule:  Refer to the list below for future screening recommendations based on patient's age, sex and/or medical conditions. Orders for these recommended tests are listed in the plan section. The patient has been provided with a written plan.    Health Maintenance   Topic Date Due   • ZOSTER VACCINE (2 of 3) 01/26/2008   • INFLUENZA VACCINE  08/01/2019   • LIPID PANEL  09/07/2019   • TDAP/TD VACCINES (2 - Td) 03/01/2027   •  "PNEUMOCOCCAL VACCINES (65+ LOW/MEDIUM RISK)  Completed   • MAMMOGRAM  Discontinued   • COLONOSCOPY  Discontinued          The following portions of the patient's history were reviewed and updated as appropriate: allergies, current medications, past family history, past medical history, past social history, past surgical history and problem list.    Outpatient Medications Prior to Visit   Medication Sig Dispense Refill   • aspirin 81 MG tablet Take 81 mg by mouth Daily.     • atenolol (TENORMIN) 25 MG tablet TAKE 1 TABLET TWICE A  tablet 1   • cholecalciferol (VITAMIN D3) 1000 units tablet Take 2,000 Units by mouth Daily.     • hydrochlorothiazide (MICROZIDE) 12.5 MG capsule TAKE 1 CAPSULE BY MOUTH EVERY DAY 90 capsule 1   • Multiple Vitamins-Minerals (VITEYES AREDS ADVANCED PO) Take 1 capsule by mouth 2 (two) times a day.       No facility-administered medications prior to visit.        Patient Active Problem List   Diagnosis   • Aortic stenosis   • IGT (impaired glucose tolerance)   • Hyperlipidemia   • Hypertension   • Cervical arthritis   • Sciatica   • Vitamin D deficiency   • De Quervain's disease (tenosynovitis)   • Cough       Advanced Care Planning:  Patient has an advance directive - a copy has been provided and is visible in patient header    Review of Systems    Compared to one year ago, the patient feels her physical health is the same.  Compared to one year ago, the patient feels her mental health is the same.    Reviewed chart for potential of high risk medication in the elderly: yes  Reviewed chart for potential of harmful drug interactions in the elderly:yes    Objective         Vitals:    07/17/19 1008   BP: 138/72   Pulse: 65   Resp: 16   Temp: 98.9 °F (37.2 °C)   TempSrc: Oral   SpO2: 98%   Weight: 69.9 kg (154 lb)   Height: 152.4 cm (60\")   PainSc:   2   PainLoc: Abdomen       Body mass index is 30.08 kg/m².  Discussed the patient's BMI with her. The BMI is above average; BMI management " plan is completed.    Physical Exam          Assessment/Plan   Medicare Risks and Personalized Health Plan  CMS Preventative Services Quick Reference  Fall Risk  Inactivity/Sedentary    The above risks/problems have been discussed with the patient.  Pertinent information has been shared with the patient in the After Visit Summary.  Follow up plans and orders are seen below in the Assessment/Plan Section.    There are no diagnoses linked to this encounter.  Follow Up:  No Follow-up on file.     An After Visit Summary and PPPS were given to the patient.

## 2019-07-17 NOTE — PROGRESS NOTES
Subjective   Yuliana Gonzalez is a 89 y.o. female.     Chief Complaint   Patient presents with   • Hypertension   • Hyperlipidemia   • Hyperglycemia   • Aortic Stenosis         In for recheck of chronic IGT.  Has been stable.  Also aortic stenosis.  It remains asymptomatic.      Hypertension   This is a chronic problem. The current episode started more than 1 year ago. The problem is unchanged. The problem is controlled. Pertinent negatives include no orthopnea, peripheral edema or PND. There are no associated agents to hypertension. Risk factors for coronary artery disease include dyslipidemia and post-menopausal state. Current antihypertension treatment includes beta blockers and angiotensin blockers. The current treatment provides significant improvement. There are no compliance problems.  There is no history of angina, kidney disease, CAD/MI, CVA or heart failure.   Hyperlipidemia   This is a chronic problem. The current episode started more than 1 year ago. The problem is controlled. Recent lipid tests were reviewed and are normal. Factors aggravating her hyperlipidemia include beta blockers. She is currently on no antihyperlipidemic treatment. The current treatment provides significant improvement of lipids. There are no compliance problems.  Risk factors for coronary artery disease include dyslipidemia, hypertension and post-menopausal.   Hyperglycemia   This is a chronic problem. The current episode started more than 1 year ago. The problem occurs constantly. The problem has been unchanged. Associated symptoms include abdominal pain and coughing. Pertinent negatives include no chills, fatigue, fever, nausea or vomiting.   Cough   This is a chronic problem. The current episode started more than 1 month ago. The problem has been waxing and waning. The cough is non-productive. Associated symptoms include postnasal drip. Pertinent negatives include no chills or fever.        The following portions of the  patient's history were reviewed and updated as appropriate: allergies, current medications, past social history and problem list.    Outpatient Medications Marked as Taking for the 7/17/19 encounter (Office Visit) with Trent Davis MD   Medication Sig Dispense Refill   • aspirin 81 MG tablet Take 81 mg by mouth Daily.     • atenolol (TENORMIN) 25 MG tablet TAKE 1 TABLET TWICE A  tablet 1   • cholecalciferol (VITAMIN D3) 1000 units tablet Take 2,000 Units by mouth Daily.     • hydrochlorothiazide (MICROZIDE) 12.5 MG capsule TAKE 1 CAPSULE BY MOUTH EVERY DAY 90 capsule 1   • Multiple Vitamins-Minerals (VITEYES AREDS ADVANCED PO) Take 1 capsule by mouth 2 (two) times a day.         Review of Systems   Constitutional: Negative for chills, fatigue and fever.   HENT: Positive for postnasal drip.    Respiratory: Positive for cough.    Cardiovascular: Negative for orthopnea, leg swelling and PND.   Gastrointestinal: Positive for abdominal pain. Negative for constipation, diarrhea, nausea and vomiting.       Objective   Vitals:    07/17/19 1008   BP: 138/72   Pulse: 65   Resp: 16   Temp: 98.9 °F (37.2 °C)   SpO2: 98%          07/17/19  1008   Weight: 69.9 kg (154 lb)    [unfilled]  Body mass index is 30.08 kg/m².      Physical Exam   Constitutional: She appears well-developed and well-nourished. No distress.   HENT:   Head: Normocephalic and atraumatic.   Neck: Carotid bruit is not present. No thyromegaly present.   Cardiovascular: Normal rate, regular rhythm and intact distal pulses. Exam reveals no gallop.   Murmur heard.   Crescendo decrescendo systolic murmur is present with a grade of 3/6.  Murmur is in the aortic area   Pulmonary/Chest: Effort normal and breath sounds normal. No respiratory distress. She has no wheezes. She has no rales.   Abdominal: Soft. Bowel sounds are normal. She exhibits no mass. There is no tenderness. There is no guarding.         Problem List Items Addressed This Visit         Cardiovascular and Mediastinum    Aortic stenosis    Hyperlipidemia    Hypertension       Endocrine    IGT (impaired glucose tolerance)      Other Visit Diagnoses     Frequency of urination    -  Primary    Relevant Orders    POCT urinalysis dipstick, automated (Completed)        Assessment/Plan   In for recheck of hypertension, hyperlipidemia, and aortic stenosis.  Blood pressure control is okay.  Glycemic control is been good.  She is no longer treating lipids so we are not checking lipids at this point.  Got annual lab work 9/2018 including CBC CMP and lipids.  AS remains asymptomatic.  No chest pain.  No syncope.  No CHF.  Last echocardiogram was April 2019.  Moderate to severe AS.  Due for another TTE 4/2020.  Due shingrix.  Glucose and A1c every 3 months.  She has had some left sided abdominal pain mostly when she moves and gets up and down.  Perhaps related to her cough.  Sounds muscular.  Urinalysis today is unremarkable.  Annual wellness visit today.  Cough sounds like it is related to allergies.  Begin zyrtec 10 mg daily.  She is 2 HPP today.         Dragon disclaimer:   Much of this encounter note is an electronic transcription/translation of spoken language to printed text. The electronic translation of spoken language may permit erroneous, or at times, nonsensical words or phrases to be inadvertently transcribed; Although I have reviewed the note for such errors, some may still exist.

## 2019-07-18 LAB
GLUCOSE P FAST SERPL-MCNC: 87 MG/DL (ref 74–106)
HBA1C MFR BLD: 5.7 % (ref 4.8–5.6)

## 2019-09-16 RX ORDER — HYDROCHLOROTHIAZIDE 12.5 MG/1
CAPSULE, GELATIN COATED ORAL
Qty: 90 CAPSULE | Refills: 1 | Status: SHIPPED | OUTPATIENT
Start: 2019-09-16 | End: 2020-04-20

## 2019-10-23 ENCOUNTER — OFFICE VISIT (OUTPATIENT)
Dept: INTERNAL MEDICINE | Facility: CLINIC | Age: 84
End: 2019-10-23

## 2019-10-23 VITALS
DIASTOLIC BLOOD PRESSURE: 62 MMHG | WEIGHT: 157 LBS | TEMPERATURE: 98.7 F | SYSTOLIC BLOOD PRESSURE: 148 MMHG | RESPIRATION RATE: 16 BRPM | OXYGEN SATURATION: 99 % | BODY MASS INDEX: 30.82 KG/M2 | HEIGHT: 60 IN | HEART RATE: 72 BPM

## 2019-10-23 DIAGNOSIS — I35.0 NONRHEUMATIC AORTIC VALVE STENOSIS: ICD-10-CM

## 2019-10-23 DIAGNOSIS — R73.02 IGT (IMPAIRED GLUCOSE TOLERANCE): ICD-10-CM

## 2019-10-23 DIAGNOSIS — I10 ESSENTIAL HYPERTENSION: Primary | ICD-10-CM

## 2019-10-23 DIAGNOSIS — E78.5 HYPERLIPIDEMIA, UNSPECIFIED HYPERLIPIDEMIA TYPE: ICD-10-CM

## 2019-10-23 DIAGNOSIS — Z79.899 MEDICATION MANAGEMENT: ICD-10-CM

## 2019-10-23 PROCEDURE — 99214 OFFICE O/P EST MOD 30 MIN: CPT | Performed by: INTERNAL MEDICINE

## 2019-10-23 RX ORDER — MELATONIN
1000 DAILY
COMMUNITY
End: 2020-07-29 | Stop reason: SDUPTHER

## 2019-10-23 RX ORDER — ASPIRIN 325 MG
325 TABLET ORAL AS NEEDED
COMMUNITY
End: 2020-01-22

## 2019-10-23 NOTE — PATIENT INSTRUCTIONS
Exercising to Stay Healthy  To become healthy and stay healthy, it is recommended that you do moderate-intensity and vigorous-intensity exercise. You can tell that you are exercising at a moderate intensity if your heart starts beating faster and you start breathing faster but can still hold a conversation. You can tell that you are exercising at a vigorous intensity if you are breathing much harder and faster and cannot hold a conversation while exercising.  Exercising regularly is important. It has many health benefits, such as:  · Improving overall fitness, flexibility, and endurance.  · Increasing bone density.  · Helping with weight control.  · Decreasing body fat.  · Increasing muscle strength.  · Reducing stress and tension.  · Improving overall health.  How often should I exercise?  Choose an activity that you enjoy, and set realistic goals. Your health care provider can help you make an activity plan that works for you.  Exercise regularly as told by your health care provider. This may include:  · Doing strength training two times a week, such as:  ? Lifting weights.  ? Using resistance bands.  ? Push-ups.  ? Sit-ups.  ? Yoga.  · Doing a certain intensity of exercise for a given amount of time. Choose from these options:  ? A total of 150 minutes of moderate-intensity exercise every week.  ? A total of 75 minutes of vigorous-intensity exercise every week.  ? A mix of moderate-intensity and vigorous-intensity exercise every week.  Children, pregnant women, people who have not exercised regularly, people who are overweight, and older adults may need to talk with a health care provider about what activities are safe to do. If you have a medical condition, be sure to talk with your health care provider before you start a new exercise program.  What are some exercise ideas?  Moderate-intensity exercise ideas include:  · Walking 1 mile (1.6 km) in about 15  minutes.  · Biking.  · Hiking.  · Golfing.  · Dancing.  · Water aerobics.  Vigorous-intensity exercise ideas include:  · Walking 4.5 miles (7.2 km) or more in about 1 hour.  · Jogging or running 5 miles (8 km) in about 1 hour.  · Biking 10 miles (16.1 km) or more in about 1 hour.  · Lap swimming.  · Roller-skating or in-line skating.  · Cross-country skiing.  · Vigorous competitive sports, such as football, basketball, and soccer.  · Jumping rope.  · Aerobic dancing.  What are some everyday activities that can help me to get exercise?  · Yard work, such as:  ? Pushing a .  ? Raking and bagging leaves.  · Washing your car.  · Pushing a stroller.  · Shoveling snow.  · Gardening.  · Washing windows or floors.  How can I be more active in my day-to-day activities?  · Use stairs instead of an elevator.  · Take a walk during your lunch break.  · If you drive, park your car farther away from your work or school.  · If you take public transportation, get off one stop early and walk the rest of the way.  · Stand up or walk around during all of your indoor phone calls.  · Get up, stretch, and walk around every 30 minutes throughout the day.  · Enjoy exercise with a friend. Support to continue exercising will help you keep a regular routine of activity.  What guidelines can I follow while exercising?  · Before you start a new exercise program, talk with your health care provider.  · Do not exercise so much that you hurt yourself, feel dizzy, or get very short of breath.  · Wear comfortable clothes and wear shoes with good support.  · Drink plenty of water while you exercise to prevent dehydration or heat stroke.  · Work out until your breathing and your heartbeat get faster.  Where to find more information  · [place] Department of Health and Human Services: www.hhs.gov  · Centers for Disease Control and Prevention (CDC): www.cdc.gov  Summary  · Exercising regularly is important. It will improve your overall fitness,  flexibility, and endurance.  · Regular exercise also will improve your overall health. It can help you control your weight, reduce stress, and improve your bone density.  · Do not exercise so much that you hurt yourself, feel dizzy, or get very short of breath.  · Before you start a new exercise program, talk with your health care provider.  This information is not intended to replace advice given to you by your health care provider. Make sure you discuss any questions you have with your health care provider.  Document Released: 01/20/2012 Document Revised: 11/08/2018 Document Reviewed: 11/08/2018  [employer] Interactive Patient Education © 2019 [employer]  Calorie Counting for Weight Loss  Calories are units of energy. Your body needs a certain amount of calories from food to keep you going throughout the day. When you eat more calories than your body needs, your body stores the extra calories as fat. When you eat fewer calories than your body needs, your body burns fat to get the energy it needs.  Calorie counting means keeping track of how many calories you eat and drink each day. Calorie counting can be helpful if you need to lose weight. If you make sure to eat fewer calories than your body needs, you should lose weight. Ask your health care provider what a healthy weight is for you.  For calorie counting to work, you will need to eat the right number of calories in a day in order to lose a healthy amount of weight per week. A dietitian can help you determine how many calories you need in a day and will give you suggestions on how to reach your calorie goal.  · A healthy amount of weight to lose per week is usually 1-2 lb (0.5-0.9 kg). This usually means that your daily calorie intake should be reduced by 500-750 calories.  · Eating 1,200 - 1,500 calories per day can help most women lose weight.  · Eating 1,500 - 1,800 calories per day can help most men lose weight.  What is my plan?  My goal is to have  __________ calories per day.  If I have this many calories per day, I should lose around __________ pounds per week.  What do I need to know about calorie counting?  In order to meet your daily calorie goal, you will need to:  · Find out how many calories are in each food you would like to eat. Try to do this before you eat.  · Decide how much of the food you plan to eat.  · Write down what you ate and how many calories it had. Doing this is called keeping a food log.  To successfully lose weight, it is important to balance calorie counting with a healthy lifestyle that includes regular activity. Aim for 150 minutes of moderate exercise (such as walking) or 75 minutes of vigorous exercise (such as running) each week.  Where do I find calorie information?    The number of calories in a food can be found on a Nutrition Facts label. If a food does not have a Nutrition Facts label, try to look up the calories online or ask your dietitian for help.  Remember that calories are listed per serving. If you choose to have more than one serving of a food, you will have to multiply the calories per serving by the amount of servings you plan to eat. For example, the label on a package of bread might say that a serving size is 1 slice and that there are 90 calories in a serving. If you eat 1 slice, you will have eaten 90 calories. If you eat 2 slices, you will have eaten 180 calories.  How do I keep a food log?  Immediately after each meal, record the following information in your food log:  · What you ate. Don't forget to include toppings, sauces, and other extras on the food.  · How much you ate. This can be measured in cups, ounces, or number of items.  · How many calories each food and drink had.  · The total number of calories in the meal.  Keep your food log near you, such as in a small notebook in your pocket, or use a mobile manuel or website. Some programs will calculate calories for you and show you how many calories you  "have left for the day to meet your goal.  What are some calorie counting tips?    · Use your calories on foods and drinks that will fill you up and not leave you hungry:  ? Some examples of foods that fill you up are nuts and nut butters, vegetables, lean proteins, and high-fiber foods like whole grains. High-fiber foods are foods with more than 5 g fiber per serving.  ? Drinks such as sodas, specialty coffee drinks, alcohol, and juices have a lot of calories, yet do not fill you up.  · Eat nutritious foods and avoid empty calories. Empty calories are calories you get from foods or beverages that do not have many vitamins or protein, such as candy, sweets, and soda. It is better to have a nutritious high-calorie food (such as an avocado) than a food with few nutrients (such as a bag of chips).  · Know how many calories are in the foods you eat most often. This will help you calculate calorie counts faster.  · Pay attention to calories in drinks. Low-calorie drinks include water and unsweetened drinks.  · Pay attention to nutrition labels for \"low fat\" or \"fat free\" foods. These foods sometimes have the same amount of calories or more calories than the full fat versions. They also often have added sugar, starch, or salt, to make up for flavor that was removed with the fat.  · Find a way of tracking calories that works for you. Get creative. Try different apps or programs if writing down calories does not work for you.  What are some portion control tips?  · Know how many calories are in a serving. This will help you know how many servings of a certain food you can have.  · Use a measuring cup to measure serving sizes. You could also try weighing out portions on a kitchen scale. With time, you will be able to estimate serving sizes for some foods.  · Take some time to put servings of different foods on your favorite plates, bowls, and cups so you know what a serving looks like.  · Try not to eat straight from a bag or " box. Doing this can lead to overeating. Put the amount you would like to eat in a cup or on a plate to make sure you are eating the right portion.  · Use smaller plates, glasses, and bowls to prevent overeating.  · Try not to multitask (for example, watch TV or use your computer) while eating. If it is time to eat, sit down at a table and enjoy your food. This will help you to know when you are full. It will also help you to be aware of what you are eating and how much you are eating.  What are tips for following this plan?  Reading food labels  · Check the calorie count compared to the serving size. The serving size may be smaller than what you are used to eating.  · Check the source of the calories. Make sure the food you are eating is high in vitamins and protein and low in saturated and trans fats.  Shopping  · Read nutrition labels while you shop. This will help you make healthy decisions before you decide to purchase your food.  · Make a grocery list and stick to it.  Cooking  · Try to cook your favorite foods in a healthier way. For example, try baking instead of frying.  · Use low-fat dairy products.  Meal planning  · Use more fruits and vegetables. Half of your plate should be fruits and vegetables.  · Include lean proteins like poultry and fish.  How do I count calories when eating out?  · Ask for smaller portion sizes.  · Consider sharing an entree and sides instead of getting your own entree.  · If you get your own entree, eat only half. Ask for a box at the beginning of your meal and put the rest of your entree in it so you are not tempted to eat it.  · If calories are listed on the menu, choose the lower calorie options.  · Choose dishes that include vegetables, fruits, whole grains, low-fat dairy products, and lean protein.  · Choose items that are boiled, broiled, grilled, or steamed. Stay away from items that are buttered, battered, fried, or served with cream sauce. Items labeled “crispy” are  usually fried, unless stated otherwise.  · Choose water, low-fat milk, unsweetened iced tea, or other drinks without added sugar. If you want an alcoholic beverage, choose a lower calorie option such as a glass of wine or light beer.  · Ask for dressings, sauces, and syrups on the side. These are usually high in calories, so you should limit the amount you eat.  · If you want a salad, choose a garden salad and ask for grilled meats. Avoid extra toppings like gomez, cheese, or fried items. Ask for the dressing on the side, or ask for olive oil and vinegar or lemon to use as dressing.  · Estimate how many servings of a food you are given. For example, a serving of cooked rice is ½ cup or about the size of half a baseball. Knowing serving sizes will help you be aware of how much food you are eating at restaurants. The list below tells you how big or small some common portion sizes are based on everyday objects:  ? 1 oz--4 stacked dice.  ? 3 oz--1 deck of cards.  ? 1 tsp--1 die.  ? 1 Tbsp--½ a ping-pong ball.  ? 2 Tbsp--1 ping-pong ball.  ? ½ cup--½ baseball.  ? 1 cup--1 baseball.  Summary  · Calorie counting means keeping track of how many calories you eat and drink each day. If you eat fewer calories than your body needs, you should lose weight.  · A healthy amount of weight to lose per week is usually 1-2 lb (0.5-0.9 kg). This usually means reducing your daily calorie intake by 500-750 calories.  · The number of calories in a food can be found on a Nutrition Facts label. If a food does not have a Nutrition Facts label, try to look up the calories online or ask your dietitian for help.  · Use your calories on foods and drinks that will fill you up, and not on foods and drinks that will leave you hungry.  · Use smaller plates, glasses, and bowls to prevent overeating.  This information is not intended to replace advice given to you by your health care provider. Make sure you discuss any questions you have with your  "health care provider.  Document Released: 12/18/2006 Document Revised: 11/17/2017 Document Reviewed: 11/17/2017  [employer] Interactive Patient Education © 2019 [employer]  BMI for Adults    Body mass index (BMI) is a number that is calculated from a person's weight and height. BMI may help to estimate how much of a person's weight is composed of fat. BMI can help identify those who may be at higher risk for certain medical problems.  How is BMI used with adults?  BMI is used as a screening tool to identify possible weight problems. It is used to check whether a person is obese, overweight, healthy weight, or underweight.  How is BMI calculated?  BMI measures your weight and compares it to your height. This can be done either in English ([place] or metric measurements. Note that charts are available to help you find your BMI quickly and easily without having to do these calculations yourself.  To calculate your BMI in English ([place] measurements, your health care provider will:  1. Measure your weight in pounds (lb).  2. Multiply the number of pounds by 703.  ? For example, for a person who weighs 180 lb, multiply that number by 703, which equals 126,540.  3. Measure your height in inches (in). Then multiply that number by itself to get a measurement called \"inches squared.\"  ? For example, for a person who is 70 in tall, the \"inches squared\" measurement is 70 in x 70 in, which equals 4900 inches squared.  4. Divide the total from Step 2 (number of lb x 703) by the total from Step 3 (inches squared): 126,540 ÷ 4900 = 25.8. This is your BMI.  To calculate your BMI in metric measurements, your health care provider will:  1. Measure your weight in kilograms (kg).  2. Measure your height in meters (m). Then multiply that number by itself to get a measurement called \"meters squared.\"  ? For example, for a person who is 1.75 m tall, the \"meters squared\" measurement is 1.75 m x 1.75 m, which is equal to 3.1 meters " squared.  3. Divide the number of kilograms (your weight) by the meters squared number. In this example: 70 ÷ 3.1 = 22.6. This is your BMI.  How is BMI interpreted?  To interpret your results, your health care provider will use BMI charts to identify whether you are underweight, normal weight, overweight, or obese. The following guidelines will be used:  · Underweight: BMI less than 18.5.  · Normal weight: BMI between 18.5 and 24.9.  · Overweight: BMI between 25 and 29.9.  · Obese: BMI of 30 and above.  Please note:  · Weight includes both fat and muscle, so someone with a muscular build, such as an athlete, may have a BMI that is higher than 24.9. In cases like these, BMI is not an accurate measure of body fat.  · To determine if excess body fat is the cause of a BMI of 25 or higher, further assessments may need to be done by a health care provider.  · BMI is usually interpreted in the same way for men and women.  Why is BMI a useful tool?  BMI is useful in two ways:  · Identifying a weight problem that may be related to a medical condition, or that may increase the risk for medical problems.  · Promoting lifestyle and diet changes in order to reach a healthy weight.  Summary  · Body mass index (BMI) is a number that is calculated from a person's weight and height.  · BMI may help to estimate how much of a person's weight is composed of fat. BMI can help identify those who may be at higher risk for certain medical problems.  · BMI can be measured using English measurements or metric measurements.  · To interpret your results, your health care provider will use BMI charts to identify whether you are underweight, normal weight, overweight, or obese.  This information is not intended to replace advice given to you by your health care provider. Make sure you discuss any questions you have with your health care provider.  Document Released: 08/29/2005 Document Revised: 10/31/2018 Document Reviewed: 10/31/2018  [employer]  Interactive Patient Education © 2019 [employer]

## 2019-10-23 NOTE — PROGRESS NOTES
Subjective   Yuliana Gonzalez is a 89 y.o. female.     Chief Complaint   Patient presents with   • Hypertension   • Hyperlipidemia   • Hyperglycemia   • Aortic Stenosis         In for recheck of chronic IGT.  Has been stable.  Also aortic stenosis.  It remains asymptomatic.      Hypertension   This is a chronic problem. The current episode started more than 1 year ago. The problem is unchanged. The problem is controlled. Pertinent negatives include no orthopnea, peripheral edema or PND. There are no associated agents to hypertension. Risk factors for coronary artery disease include dyslipidemia and post-menopausal state. Current antihypertension treatment includes beta blockers and angiotensin blockers. The current treatment provides significant improvement. There are no compliance problems.  There is no history of angina, kidney disease, CAD/MI, CVA or heart failure.   Hyperlipidemia   This is a chronic problem. The current episode started more than 1 year ago. The problem is controlled. Recent lipid tests were reviewed and are normal. Factors aggravating her hyperlipidemia include beta blockers. She is currently on no antihyperlipidemic treatment. The current treatment provides significant improvement of lipids. There are no compliance problems.  Risk factors for coronary artery disease include dyslipidemia, hypertension and post-menopausal.   Hyperglycemia   This is a chronic problem. The current episode started more than 1 year ago. The problem occurs constantly. The problem has been unchanged. Associated symptoms include abdominal pain. Pertinent negatives include no fatigue, nausea or vomiting.        The following portions of the patient's history were reviewed and updated as appropriate: allergies, current medications, past social history and problem list.    Outpatient Medications Marked as Taking for the 10/23/19 encounter (Office Visit) with Trent Davis MD   Medication Sig Dispense Refill   • aspirin  325 MG tablet Take 325 mg by mouth As Needed for Mild Pain .     • atenolol (TENORMIN) 25 MG tablet TAKE 1 TABLET TWICE A  tablet 1   • cholecalciferol (VITAMIN D3) 1000 units tablet Take 2,000 Units by mouth Daily.     • cholecalciferol (VITAMIN D3) 25 MCG (1000 UT) tablet Take 1,000 Units by mouth Daily.     • hydrochlorothiazide (MICROZIDE) 12.5 MG capsule TAKE 1 CAPSULE BY MOUTH EVERY DAY 90 capsule 1   • Multiple Vitamins-Minerals (VITEYES AREDS ADVANCED PO) Take 1 capsule by mouth 2 (two) times a day.         Review of Systems   Constitutional: Negative for fatigue.   Cardiovascular: Negative for orthopnea, leg swelling and PND.   Gastrointestinal: Positive for abdominal pain. Negative for constipation, diarrhea, nausea and vomiting.       Objective   Vitals:    10/23/19 1057   BP: 148/62   Pulse: 72   Resp: 16   Temp: 98.7 °F (37.1 °C)   SpO2: 99%          10/23/19  1057   Weight: 71.2 kg (157 lb)    [unfilled]  Body mass index is 30.66 kg/m².      Physical Exam   Constitutional: She appears well-developed and well-nourished. No distress.   HENT:   Head: Normocephalic and atraumatic.   Neck: Carotid bruit is not present. No thyromegaly present.   Cardiovascular: Normal rate, regular rhythm and intact distal pulses. Exam reveals no gallop.   Murmur heard.   Crescendo decrescendo systolic murmur is present with a grade of 3/6.  Murmur is in the aortic area   Pulmonary/Chest: Effort normal and breath sounds normal. No respiratory distress. She has no wheezes. She has no rales.   Abdominal: Soft. Bowel sounds are normal. She exhibits no mass. There is no tenderness. There is no guarding.         Problem List Items Addressed This Visit        Cardiovascular and Mediastinum    Aortic stenosis    Hyperlipidemia    Hypertension - Primary       Endocrine    IGT (impaired glucose tolerance)        Assessment/Plan   In for recheck of hypertension, hyperlipidemia, and aortic stenosis.  Blood pressure control  is okay.  Glycemic control is been good.  She is no longer treating lipids so we are not checking lipids at this point.  Gets annual lab work today October 2019 including CBC, CMP and A1c.  AS remains asymptomatic.  No chest pain.  No syncope.  No CHF.  Some shortness of breath with walking up an incline but that is actually improving.  No edema.  No chest pain.  Last echocardiogram was April 2019.  Moderate to severe AS.  Due for another TTE 4/2020.  Due shingrix.  Glucose and A1c every 3 months.  Cough much improved after last visit.  Probably allergies.  She does get intermittent head congestion.         Dragon disclaimer:   Much of this encounter note is an electronic transcription/translation of spoken language to printed text. The electronic translation of spoken language may permit erroneous, or at times, nonsensical words or phrases to be inadvertently transcribed; Although I have reviewed the note for such errors, some may still exist.

## 2019-10-24 LAB
ALBUMIN SERPL-MCNC: 4.5 G/DL (ref 3.5–5.2)
ALBUMIN/GLOB SERPL: 2 G/DL
ALP SERPL-CCNC: 86 U/L (ref 39–117)
ALT SERPL-CCNC: 12 U/L (ref 1–33)
AST SERPL-CCNC: 19 U/L (ref 1–32)
BASOPHILS # BLD AUTO: 0.11 10*3/MM3 (ref 0–0.2)
BASOPHILS NFR BLD AUTO: 1.1 % (ref 0–1.5)
BILIRUB SERPL-MCNC: 0.3 MG/DL (ref 0.2–1.2)
BUN SERPL-MCNC: 14 MG/DL (ref 8–23)
BUN/CREAT SERPL: 25 (ref 7–25)
CALCIUM SERPL-MCNC: 9.2 MG/DL (ref 8.6–10.5)
CHLORIDE SERPL-SCNC: 100 MMOL/L (ref 98–107)
CO2 SERPL-SCNC: 25.5 MMOL/L (ref 22–29)
CREAT SERPL-MCNC: 0.56 MG/DL (ref 0.57–1)
EOSINOPHIL # BLD AUTO: 0.21 10*3/MM3 (ref 0–0.4)
EOSINOPHIL NFR BLD AUTO: 2.2 % (ref 0.3–6.2)
ERYTHROCYTE [DISTWIDTH] IN BLOOD BY AUTOMATED COUNT: 12.7 % (ref 12.3–15.4)
GLOBULIN SER CALC-MCNC: 2.2 GM/DL
GLUCOSE SERPL-MCNC: 85 MG/DL (ref 65–99)
HBA1C MFR BLD: 5.9 % (ref 4.8–5.6)
HCT VFR BLD AUTO: 43.9 % (ref 34–46.6)
HGB BLD-MCNC: 14.4 G/DL (ref 12–15.9)
IMM GRANULOCYTES # BLD AUTO: 0.03 10*3/MM3 (ref 0–0.05)
IMM GRANULOCYTES NFR BLD AUTO: 0.3 % (ref 0–0.5)
LYMPHOCYTES # BLD AUTO: 2.86 10*3/MM3 (ref 0.7–3.1)
LYMPHOCYTES NFR BLD AUTO: 29.6 % (ref 19.6–45.3)
MCH RBC QN AUTO: 28.3 PG (ref 26.6–33)
MCHC RBC AUTO-ENTMCNC: 32.8 G/DL (ref 31.5–35.7)
MCV RBC AUTO: 86.2 FL (ref 79–97)
MONOCYTES # BLD AUTO: 0.69 10*3/MM3 (ref 0.1–0.9)
MONOCYTES NFR BLD AUTO: 7.1 % (ref 5–12)
NEUTROPHILS # BLD AUTO: 5.76 10*3/MM3 (ref 1.7–7)
NEUTROPHILS NFR BLD AUTO: 59.7 % (ref 42.7–76)
NRBC BLD AUTO-RTO: 0 /100 WBC (ref 0–0.2)
PLATELET # BLD AUTO: 222 10*3/MM3 (ref 140–450)
POTASSIUM SERPL-SCNC: 4.4 MMOL/L (ref 3.5–5.2)
PROT SERPL-MCNC: 6.7 G/DL (ref 6–8.5)
RBC # BLD AUTO: 5.09 10*6/MM3 (ref 3.77–5.28)
SODIUM SERPL-SCNC: 139 MMOL/L (ref 136–145)
WBC # BLD AUTO: 9.66 10*3/MM3 (ref 3.4–10.8)

## 2020-01-06 ENCOUNTER — TELEPHONE (OUTPATIENT)
Dept: INTERNAL MEDICINE | Facility: CLINIC | Age: 85
End: 2020-01-06

## 2020-01-06 DIAGNOSIS — L60.0 INGROWN TOENAIL: ICD-10-CM

## 2020-01-06 DIAGNOSIS — R73.02 IGT (IMPAIRED GLUCOSE TOLERANCE): Primary | ICD-10-CM

## 2020-01-06 NOTE — TELEPHONE ENCOUNTER
Patient asked for a recommendation for a podiatrist for an ingrown toenail. Please advise.    Refer to Dr. Laboy.

## 2020-01-13 RX ORDER — ATENOLOL 25 MG/1
TABLET ORAL
Qty: 180 TABLET | Refills: 1 | Status: SHIPPED | OUTPATIENT
Start: 2020-01-13 | End: 2020-07-23

## 2020-01-16 ENCOUNTER — APPOINTMENT (OUTPATIENT)
Dept: LAB | Facility: HOSPITAL | Age: 85
End: 2020-01-16

## 2020-01-16 LAB — HBA1C MFR BLD: 5.65 % (ref 4.8–5.6)

## 2020-01-16 PROCEDURE — 83036 HEMOGLOBIN GLYCOSYLATED A1C: CPT | Performed by: INTERNAL MEDICINE

## 2020-01-16 PROCEDURE — 82947 ASSAY GLUCOSE BLOOD QUANT: CPT | Performed by: INTERNAL MEDICINE

## 2020-01-16 PROCEDURE — 36415 COLL VENOUS BLD VENIPUNCTURE: CPT | Performed by: INTERNAL MEDICINE

## 2020-01-17 LAB — GLUCOSE P FAST SERPL-MCNC: 89 MG/DL (ref 65–99)

## 2020-01-22 ENCOUNTER — OFFICE VISIT (OUTPATIENT)
Dept: INTERNAL MEDICINE | Facility: CLINIC | Age: 85
End: 2020-01-22

## 2020-01-22 VITALS
RESPIRATION RATE: 16 BRPM | SYSTOLIC BLOOD PRESSURE: 152 MMHG | OXYGEN SATURATION: 94 % | TEMPERATURE: 99.2 F | HEIGHT: 60 IN | WEIGHT: 161 LBS | DIASTOLIC BLOOD PRESSURE: 70 MMHG | HEART RATE: 72 BPM | BODY MASS INDEX: 31.61 KG/M2

## 2020-01-22 DIAGNOSIS — I35.0 NONRHEUMATIC AORTIC VALVE STENOSIS: ICD-10-CM

## 2020-01-22 DIAGNOSIS — R73.02 IGT (IMPAIRED GLUCOSE TOLERANCE): ICD-10-CM

## 2020-01-22 DIAGNOSIS — E55.9 VITAMIN D DEFICIENCY: ICD-10-CM

## 2020-01-22 DIAGNOSIS — I10 ESSENTIAL HYPERTENSION: Primary | ICD-10-CM

## 2020-01-22 DIAGNOSIS — E78.5 HYPERLIPIDEMIA, UNSPECIFIED HYPERLIPIDEMIA TYPE: ICD-10-CM

## 2020-01-22 PROCEDURE — 99214 OFFICE O/P EST MOD 30 MIN: CPT | Performed by: INTERNAL MEDICINE

## 2020-01-22 RX ORDER — SULFAMETHOXAZOLE AND TRIMETHOPRIM 800; 160 MG/1; MG/1
1 TABLET ORAL 2 TIMES DAILY
COMMUNITY
Start: 2020-01-10 | End: 2020-01-22

## 2020-01-22 NOTE — PROGRESS NOTES
Subjective   Yuliana Gonzalez is a 89 y.o. female.     Chief Complaint   Patient presents with   • Hyperlipidemia   • Hypertension   • Hyperglycemia         In for recheck of chronic IGT.  Has been stable.  Also aortic stenosis.  It remains asymptomatic.    Hyperlipidemia   This is a chronic problem. The current episode started more than 1 year ago. The problem is controlled. Recent lipid tests were reviewed and are normal. Factors aggravating her hyperlipidemia include beta blockers. She is currently on no antihyperlipidemic treatment. The current treatment provides significant improvement of lipids. There are no compliance problems.  Risk factors for coronary artery disease include dyslipidemia, hypertension and post-menopausal.   Hypertension   This is a chronic problem. The current episode started more than 1 year ago. The problem is unchanged. The problem is controlled. Pertinent negatives include no orthopnea, peripheral edema or PND. There are no associated agents to hypertension. Risk factors for coronary artery disease include dyslipidemia and post-menopausal state. Current antihypertension treatment includes beta blockers and angiotensin blockers. The current treatment provides significant improvement. There are no compliance problems.  There is no history of angina, kidney disease, CAD/MI, CVA or heart failure.   Hyperglycemia   This is a chronic problem. The current episode started more than 1 year ago. The problem occurs constantly. The problem has been unchanged. Associated symptoms include abdominal pain. Pertinent negatives include no fatigue, nausea or vomiting.        The following portions of the patient's history were reviewed and updated as appropriate: allergies, current medications, past social history and problem list.    Outpatient Medications Marked as Taking for the 1/22/20 encounter (Office Visit) with Trent Davis MD   Medication Sig Dispense Refill   • aspirin 81 MG tablet Take 81 mg  by mouth Daily.     • atenolol (TENORMIN) 25 MG tablet TAKE 1 TABLET TWICE A  tablet 1   • cholecalciferol (VITAMIN D3) 1000 units tablet Take 2,000 Units by mouth Daily.     • cholecalciferol (VITAMIN D3) 25 MCG (1000 UT) tablet Take 1,000 Units by mouth Daily.     • hydrochlorothiazide (MICROZIDE) 12.5 MG capsule TAKE 1 CAPSULE BY MOUTH EVERY DAY 90 capsule 1   • Multiple Vitamins-Minerals (VITEYES AREDS ADVANCED PO) Take 1 capsule by mouth 2 (two) times a day.     • [DISCONTINUED] aspirin 325 MG tablet Take 325 mg by mouth As Needed for Mild Pain .     • [DISCONTINUED] sulfamethoxazole-trimethoprim (BACTRIM DS,SEPTRA DS) 800-160 MG per tablet Take 1 tablet by mouth 2 (Two) Times a Day.         Review of Systems   Constitutional: Negative for fatigue.   Cardiovascular: Negative for orthopnea, leg swelling and PND.   Gastrointestinal: Positive for abdominal pain. Negative for constipation, diarrhea, nausea and vomiting.       Objective   Vitals:    01/22/20 1112   BP: 152/70   Pulse: 72   Resp: 16   Temp: 99.2 °F (37.3 °C)   SpO2: 94%          01/22/20  1112   Weight: 73 kg (161 lb)    [unfilled]  Body mass index is 31.44 kg/m².      Physical Exam   Constitutional: She appears well-developed and well-nourished. No distress.   HENT:   Head: Normocephalic and atraumatic.   Neck: Carotid bruit is not present. No thyromegaly present.   Cardiovascular: Normal rate, regular rhythm and intact distal pulses. Exam reveals no gallop.   Murmur heard.   Crescendo decrescendo systolic murmur is present with a grade of 3/6.  Murmur is in the aortic area   Pulmonary/Chest: Effort normal and breath sounds normal. No respiratory distress. She has no wheezes. She has no rales.   Abdominal: Soft. Bowel sounds are normal. She exhibits no mass. There is no tenderness. There is no guarding.         Problem List Items Addressed This Visit        Cardiovascular and Mediastinum    Aortic stenosis    Hyperlipidemia     Hypertension - Primary       Digestive    Vitamin D deficiency       Endocrine    IGT (impaired glucose tolerance)        Assessment/Plan   In for recheck of hypertension, hyperlipidemia, and aortic stenosis.  Blood pressure control is okay.  Glycemic control is been good.  She is no longer treating lipids so we are not checking lipids at this point.  Gets annual lab work today October 2019 including CBC, CMP and A1c.  AS remains asymptomatic.  No chest pain.  No syncope.  No CHF.  Some shortness of breath with walking up an incline but that is actually improving.  No edema.  No chest pain.  Last echocardiogram was April 2019.  Moderate to severe AS.  Due for another TTE 4/2020.  Due shingrix.  Glucose and A1c every 3 months.           Jono disclaimer:   Much of this encounter note is an electronic transcription/translation of spoken language to printed text. The electronic translation of spoken language may permit erroneous, or at times, nonsensical words or phrases to be inadvertently transcribed; Although I have reviewed the note for such errors, some may still exist.

## 2020-04-20 RX ORDER — HYDROCHLOROTHIAZIDE 12.5 MG/1
CAPSULE, GELATIN COATED ORAL
Qty: 90 CAPSULE | Refills: 1 | Status: SHIPPED | OUTPATIENT
Start: 2020-04-20 | End: 2020-10-23

## 2020-04-29 ENCOUNTER — OFFICE VISIT (OUTPATIENT)
Dept: INTERNAL MEDICINE | Facility: CLINIC | Age: 85
End: 2020-04-29

## 2020-04-29 VITALS — DIASTOLIC BLOOD PRESSURE: 74 MMHG | WEIGHT: 154 LBS | BODY MASS INDEX: 30.08 KG/M2 | SYSTOLIC BLOOD PRESSURE: 164 MMHG

## 2020-04-29 DIAGNOSIS — I10 ESSENTIAL HYPERTENSION: Primary | ICD-10-CM

## 2020-04-29 DIAGNOSIS — I35.0 NONRHEUMATIC AORTIC VALVE STENOSIS: ICD-10-CM

## 2020-04-29 DIAGNOSIS — R73.02 IGT (IMPAIRED GLUCOSE TOLERANCE): ICD-10-CM

## 2020-04-29 DIAGNOSIS — E78.5 HYPERLIPIDEMIA, UNSPECIFIED HYPERLIPIDEMIA TYPE: ICD-10-CM

## 2020-04-29 PROBLEM — R05.9 COUGH: Status: RESOLVED | Noted: 2019-01-10 | Resolved: 2020-04-29

## 2020-04-29 PROCEDURE — 99214 OFFICE O/P EST MOD 30 MIN: CPT | Performed by: INTERNAL MEDICINE

## 2020-04-29 NOTE — PROGRESS NOTES
Subjective   Yuliana Gonzalez is a 89 y.o. female.     Chief Complaint   Patient presents with   • Hypertension   • Hyperlipidemia   • Hyperglycemia         In for recheck of chronic IGT.  Has been stable.  Also aortic stenosis.  It remains asymptomatic.    Hypertension   This is a chronic problem. The current episode started more than 1 year ago. The problem is unchanged. The problem is controlled. Pertinent negatives include no orthopnea, peripheral edema or PND. There are no associated agents to hypertension. Risk factors for coronary artery disease include dyslipidemia and post-menopausal state. Current antihypertension treatment includes beta blockers and angiotensin blockers. The current treatment provides significant improvement. There are no compliance problems.  There is no history of angina, kidney disease, CAD/MI, CVA or heart failure.   Hyperlipidemia   This is a chronic problem. The current episode started more than 1 year ago. The problem is controlled. Recent lipid tests were reviewed and are normal. Factors aggravating her hyperlipidemia include beta blockers. She is currently on no antihyperlipidemic treatment. The current treatment provides significant improvement of lipids. There are no compliance problems.  Risk factors for coronary artery disease include dyslipidemia, hypertension and post-menopausal.   Hyperglycemia   This is a chronic problem. The current episode started more than 1 year ago. The problem occurs constantly. The problem has been unchanged. Associated symptoms include abdominal pain. Pertinent negatives include no fatigue, nausea or vomiting.        The following portions of the patient's history were reviewed and updated as appropriate: allergies, current medications, past social history and problem list.    No outpatient medications have been marked as taking for the 4/29/20 encounter (Office Visit) with Trent Davis MD.       Review of Systems   Constitutional: Negative for  fatigue.   Cardiovascular: Negative for orthopnea, leg swelling and PND.   Gastrointestinal: Positive for abdominal pain. Negative for constipation, diarrhea, nausea and vomiting.       Objective   Vitals:    04/29/20 0929   BP: 164/74          04/29/20 0929   Weight: 69.9 kg (154 lb)    [unfilled]  Body mass index is 30.08 kg/m².      Physical Exam   Psychiatric: She has a normal mood and affect. Her behavior is normal. Judgment and thought content normal.         Problem List Items Addressed This Visit        Cardiovascular and Mediastinum    Aortic stenosis    Hyperlipidemia    Hypertension - Primary       Endocrine    IGT (impaired glucose tolerance)        Assessment/Plan   Audio/telephone visit today due to the COVID pandemic.  In for recheck of hypertension, hyperlipidemia, and aortic stenosis.  Blood pressure control is okay.  Glycemic control is been good.  She is no longer treating lipids so we are not checking lipids at this point.  Gets annual lab work October 2019 including CBC, CMP and A1c.  AS remains asymptomatic.  No chest pain.  No syncope.  No CHF.  Some shortness of breath with walking up an incline but that is stable.  No edema.  No chest pain.  Last echocardiogram was April 2019.  Moderate to severe AS.  Due for another TTE 4/2020.  Due shingrix.  Glucose and A1c every 3 months.  Spent 22 minutes with patient today on the visit.  We will delay the TTE given the pandemic.  Skip glucose today since she has been running so well.       Dragon disclaimer:   Much of this encounter note is an electronic transcription/translation of spoken language to printed text. The electronic translation of spoken language may permit erroneous, or at times, nonsensical words or phrases to be inadvertently transcribed; Although I have reviewed the note for such errors, some may still exist.

## 2020-04-29 NOTE — PROGRESS NOTES
You have chosen to receive care through a telephone visit. Do you consent to use a telephone visit for your medical care today? Yes    Advance Care Planning   ACP discussion was held with the patient during this visit. Patient has an advance directive (not in EMR), copy requested.

## 2020-07-21 DIAGNOSIS — R73.02 IGT (IMPAIRED GLUCOSE TOLERANCE): Primary | ICD-10-CM

## 2020-07-23 RX ORDER — ATENOLOL 25 MG/1
25 TABLET ORAL 2 TIMES DAILY
Qty: 180 TABLET | Refills: 1 | Status: SHIPPED | OUTPATIENT
Start: 2020-07-23 | End: 2020-10-29 | Stop reason: SDUPTHER

## 2020-07-23 RX ORDER — ATENOLOL 25 MG/1
TABLET ORAL
Qty: 180 TABLET | Refills: 1 | Status: SHIPPED | OUTPATIENT
Start: 2020-07-23 | End: 2020-07-23 | Stop reason: SDUPTHER

## 2020-07-24 ENCOUNTER — LAB (OUTPATIENT)
Dept: LAB | Facility: HOSPITAL | Age: 85
End: 2020-07-24

## 2020-07-24 LAB
GLUCOSE SERPL-MCNC: 100 MG/DL (ref 65–99)
HBA1C MFR BLD: 5.7 % (ref 4.8–5.6)

## 2020-07-24 PROCEDURE — 36415 COLL VENOUS BLD VENIPUNCTURE: CPT | Performed by: INTERNAL MEDICINE

## 2020-07-24 PROCEDURE — 83036 HEMOGLOBIN GLYCOSYLATED A1C: CPT | Performed by: INTERNAL MEDICINE

## 2020-07-24 PROCEDURE — 82947 ASSAY GLUCOSE BLOOD QUANT: CPT | Performed by: INTERNAL MEDICINE

## 2020-07-29 ENCOUNTER — OFFICE VISIT (OUTPATIENT)
Dept: INTERNAL MEDICINE | Facility: CLINIC | Age: 85
End: 2020-07-29

## 2020-07-29 VITALS
WEIGHT: 160.8 LBS | RESPIRATION RATE: 16 BRPM | HEART RATE: 69 BPM | OXYGEN SATURATION: 98 % | HEIGHT: 60 IN | DIASTOLIC BLOOD PRESSURE: 78 MMHG | TEMPERATURE: 97.1 F | SYSTOLIC BLOOD PRESSURE: 122 MMHG | BODY MASS INDEX: 31.57 KG/M2

## 2020-07-29 DIAGNOSIS — E78.5 HYPERLIPIDEMIA, UNSPECIFIED HYPERLIPIDEMIA TYPE: ICD-10-CM

## 2020-07-29 DIAGNOSIS — I10 ESSENTIAL HYPERTENSION: Primary | ICD-10-CM

## 2020-07-29 DIAGNOSIS — I35.0 NONRHEUMATIC AORTIC VALVE STENOSIS: ICD-10-CM

## 2020-07-29 DIAGNOSIS — Z79.899 MEDICATION MANAGEMENT: ICD-10-CM

## 2020-07-29 DIAGNOSIS — R73.02 IGT (IMPAIRED GLUCOSE TOLERANCE): ICD-10-CM

## 2020-07-29 PROCEDURE — G0439 PPPS, SUBSEQ VISIT: HCPCS | Performed by: INTERNAL MEDICINE

## 2020-07-29 PROCEDURE — 99214 OFFICE O/P EST MOD 30 MIN: CPT | Performed by: INTERNAL MEDICINE

## 2020-07-29 NOTE — PROGRESS NOTES
Subjective   Yuliana Gonzalez is a 89 y.o. female.     Chief Complaint   Patient presents with   • Hypertension   • Hyperlipidemia   • Hyperglycemia   • Aortic Stenosis         In for recheck of chronic IGT.  Has been stable.  Also aortic stenosis.  It remains asymptomatic.    Hypertension   This is a chronic problem. The current episode started more than 1 year ago. The problem is unchanged. The problem is controlled. Pertinent negatives include no orthopnea, peripheral edema or PND. There are no associated agents to hypertension. Risk factors for coronary artery disease include dyslipidemia and post-menopausal state. Current antihypertension treatment includes beta blockers and angiotensin blockers. The current treatment provides significant improvement. There are no compliance problems.  There is no history of angina, kidney disease, CAD/MI, CVA or heart failure.   Hyperlipidemia   This is a chronic problem. The current episode started more than 1 year ago. The problem is controlled. Recent lipid tests were reviewed and are normal. Factors aggravating her hyperlipidemia include beta blockers. She is currently on no antihyperlipidemic treatment. The current treatment provides significant improvement of lipids. There are no compliance problems.  Risk factors for coronary artery disease include dyslipidemia, hypertension and post-menopausal.   Hyperglycemia   This is a chronic problem. The current episode started more than 1 year ago. The problem occurs constantly. The problem has been unchanged. Associated symptoms include abdominal pain. Pertinent negatives include no fatigue, nausea or vomiting.        The following portions of the patient's history were reviewed and updated as appropriate: allergies, current medications, past social history and problem list.    Outpatient Medications Marked as Taking for the 7/29/20 encounter (Office Visit) with Trent Davis MD   Medication Sig Dispense Refill   • atenolol  (TENORMIN) 25 MG tablet Take 1 tablet by mouth 2 (Two) Times a Day. (Patient taking differently: Take 25 mg by mouth Daily.) 180 tablet 1   • cholecalciferol (VITAMIN D3) 1000 units tablet Take 2,000 Units by mouth Daily.     • hydroCHLOROthiazide (MICROZIDE) 12.5 MG capsule TAKE 1 CAPSULE BY MOUTH EVERY DAY 90 capsule 1   • Multiple Vitamins-Minerals (VITEYES AREDS ADVANCED PO) Take 1 capsule by mouth 2 (two) times a day.         Review of Systems   Constitutional: Negative for fatigue.   Cardiovascular: Negative for orthopnea, leg swelling and PND.   Gastrointestinal: Positive for abdominal pain. Negative for constipation, diarrhea, nausea and vomiting.       Objective   Vitals:    07/29/20 1004   BP: 122/78   Pulse: 69   Resp: 16   Temp: 97.1 °F (36.2 °C)   SpO2: 98%          07/29/20  1004   Weight: 72.9 kg (160 lb 12.8 oz)    [unfilled]  Body mass index is 31.4 kg/m².      Physical Exam   Constitutional: She appears well-developed and well-nourished. No distress.   HENT:   Head: Normocephalic and atraumatic.   Neck: Carotid bruit is not present. No thyromegaly present.   Cardiovascular: Normal rate, regular rhythm and intact distal pulses. Exam reveals no gallop.   Murmur heard.   Crescendo decrescendo systolic murmur is present with a grade of 3/6.  Murmur is in the aortic area   Pulmonary/Chest: Effort normal and breath sounds normal. No respiratory distress. She has no wheezes. She has no rales.   Abdominal: Soft. Bowel sounds are normal. She exhibits no mass. There is no tenderness. There is no guarding.         Problem List Items Addressed This Visit        Cardiovascular and Mediastinum    Aortic stenosis    Hyperlipidemia    Hypertension - Primary       Endocrine    IGT (impaired glucose tolerance)        Assessment/Plan   In for recheck of hypertension, hyperlipidemia, and aortic stenosis.  Blood pressure control is okay.  Glycemic control is been good.  She is no longer treating lipids so we are  not checking lipids at this point.  Gets annual lab work October 2019 including CBC, CMP and A1c.  AS remains asymptomatic.  No chest pain.  No syncope.  No CHF.  Some shortness of breath with walking up an incline but that is stable.  No edema.  No chest pain.  Last echocardiogram was April 2019.  Moderate to severe AS.  Due for another TTE 4/2020.  Due shingrix.  Glucose and A1c every 3 months.  We will delay the TTE given the pandemic.           Dragon disclaimer:   Much of this encounter note is an electronic transcription/translation of spoken language to printed text. The electronic translation of spoken language may permit erroneous, or at times, nonsensical words or phrases to be inadvertently transcribed; Although I have reviewed the note for such errors, some may still exist.

## 2020-07-29 NOTE — PATIENT INSTRUCTIONS
Fall Prevention in the Home, Adult  Falls can cause injuries and can affect people from all age groups. There are many simple things that you can do to make your home safe and to help prevent falls. Ask for help when making these changes, if needed.  What actions can I take to prevent falls?  General instructions  · Use good lighting in all rooms. Replace any light bulbs that burn out.  · Turn on lights if it is dark. Use night-lights.  · Place frequently used items in easy-to-reach places. Lower the shelves around your home if necessary.  · Set up furniture so that there are clear paths around it. Avoid moving your furniture around.  · Remove throw rugs and other tripping hazards from the floor.  · Avoid walking on wet floors.  · Fix any uneven floor surfaces.  · Add color or contrast paint or tape to grab bars and handrails in your home. Place contrasting color strips on the first and last steps of stairways.  · When you use a stepladder, make sure that it is completely opened and that the sides are firmly locked. Have someone hold the ladder while you are using it. Do not climb a closed stepladder.  · Be aware of any and all pets.  What can I do in the bathroom?         · Keep the floor dry. Immediately clean up any water that spills onto the floor.  · Remove soap buildup in the tub or shower on a regular basis.  · Use non-skid mats or decals on the floor of the tub or shower.  · Attach bath mats securely with double-sided, non-slip rug tape.  · If you need to sit down while you are in the shower, use a plastic, non-slip stool.  · Install grab bars by the toilet and in the tub and shower. Do not use towel bars as grab bars.  What can I do in the bedroom?  · Make sure that a bedside light is easy to reach.  · Do not use oversized bedding that drapes onto the floor.  · Have a firm chair that has side arms to use for getting dressed.  What can I do in the kitchen?  · Clean up any spills right away.  · If you need to  reach for something above you, use a sturdy step stool that has a grab bar.  · Keep electrical cables out of the way.  · Do not use floor polish or wax that makes floors slippery. If you must use wax, make sure that it is non-skid floor wax.  What can I do in the stairways?  · Do not leave any items on the stairs.  · Make sure that you have a light switch at the top of the stairs and the bottom of the stairs. Have them installed if you do not have them.  · Make sure that there are handrails on both sides of the stairs. Fix handrails that are broken or loose. Make sure that handrails are as long as the stairways.  · Install non-slip stair treads on all stairs in your home.  · Avoid having throw rugs at the top or bottom of stairways, or secure the rugs with carpet tape to prevent them from moving.  · Choose a carpet design that does not hide the edge of steps on the stairway.  · Check any carpeting to make sure that it is firmly attached to the stairs. Fix any carpet that is loose or worn.  What can I do on the outside of my home?  · Use bright outdoor lighting.  · Regularly repair the edges of walkways and driveways and fix any cracks.  · Remove high doorway thresholds.  · Trim any shrubbery on the main path into your home.  · Regularly check that handrails are securely fastened and in good repair. Both sides of any steps should have handrails.  · Install guardrails along the edges of any raised decks or porches.  · Clear walkways of debris and clutter, including tools and rocks.  · Have leaves, snow, and ice cleared regularly.  · Use sand or salt on walkways during winter months.  · In the garage, clean up any spills right away, including grease or oil spills.  What other actions can I take?  · Wear closed-toe shoes that fit well and support your feet. Wear shoes that have rubber soles or low heels.  · Use mobility aids as needed, such as canes, walkers, scooters, and crutches.  · Review your medicines with your  health care provider. Some medicines can cause dizziness or changes in blood pressure, which increase your risk of falling.  Talk with your health care provider about other ways that you can decrease your risk of falls. This may include working with a physical therapist or  to improve your strength, balance, and endurance.  Where to find more information  · Centers for Disease Control and Prevention, [place]: https://www.cdc.gov  · National [place] on Aging: https://ay2aeik.ellie.nih.gov  Contact a health care provider if:  · You are afraid of falling at home.  · You feel weak, drowsy, or dizzy at home.  · You fall at home.  Summary  · There are many simple things that you can do to make your home safe and to help prevent falls.  · Ways to make your home safe include removing tripping hazards and installing grab bars in the bathroom.  · Ask for help when making these changes in your home.  This information is not intended to replace advice given to you by your health care provider. Make sure you discuss any questions you have with your health care provider.  Document Released: 12/08/2003 Document Revised: 11/30/2018 Document Reviewed: 08/02/2018  [employer] Patient Education © 2020 [employer]  Exercising to Stay Healthy  To become healthy and stay healthy, it is recommended that you do moderate-intensity and vigorous-intensity exercise. You can tell that you are exercising at a moderate intensity if your heart starts beating faster and you start breathing faster but can still hold a conversation. You can tell that you are exercising at a vigorous intensity if you are breathing much harder and faster and cannot hold a conversation while exercising.  Exercising regularly is important. It has many health benefits, such as:  · Improving overall fitness, flexibility, and endurance.  · Increasing bone density.  · Helping with weight control.  · Decreasing body fat.  · Increasing muscle strength.  · Reducing stress  and tension.  · Improving overall health.  How often should I exercise?  Choose an activity that you enjoy, and set realistic goals. Your health care provider can help you make an activity plan that works for you.  Exercise regularly as told by your health care provider. This may include:  · Doing strength training two times a week, such as:  ? Lifting weights.  ? Using resistance bands.  ? Push-ups.  ? Sit-ups.  ? Yoga.  · Doing a certain intensity of exercise for a given amount of time. Choose from these options:  ? A total of 150 minutes of moderate-intensity exercise every week.  ? A total of 75 minutes of vigorous-intensity exercise every week.  ? A mix of moderate-intensity and vigorous-intensity exercise every week.  Children, pregnant women, people who have not exercised regularly, people who are overweight, and older adults may need to talk with a health care provider about what activities are safe to do. If you have a medical condition, be sure to talk with your health care provider before you start a new exercise program.  What are some exercise ideas?  Moderate-intensity exercise ideas include:  · Walking 1 mile (1.6 km) in about 15 minutes.  · Biking.  · Hiking.  · Golfing.  · Dancing.  · Water aerobics.  Vigorous-intensity exercise ideas include:  · Walking 4.5 miles (7.2 km) or more in about 1 hour.  · Jogging or running 5 miles (8 km) in about 1 hour.  · Biking 10 miles (16.1 km) or more in about 1 hour.  · Lap swimming.  · Roller-skating or in-line skating.  · Cross-country skiing.  · Vigorous competitive sports, such as football, basketball, and soccer.  · Jumping rope.  · Aerobic dancing.  What are some everyday activities that can help me to get exercise?  · Yard work, such as:  ? Pushing a .  ? Raking and bagging leaves.  · Washing your car.  · Pushing a stroller.  · Shoveling snow.  · Gardening.  · Washing windows or floors.  How can I be more active in my day-to-day  activities?  · Use stairs instead of an elevator.  · Take a walk during your lunch break.  · If you drive, park your car farther away from your work or school.  · If you take public transportation, get off one stop early and walk the rest of the way.  · Stand up or walk around during all of your indoor phone calls.  · Get up, stretch, and walk around every 30 minutes throughout the day.  · Enjoy exercise with a friend. Support to continue exercising will help you keep a regular routine of activity.  What guidelines can I follow while exercising?  · Before you start a new exercise program, talk with your health care provider.  · Do not exercise so much that you hurt yourself, feel dizzy, or get very short of breath.  · Wear comfortable clothes and wear shoes with good support.  · Drink plenty of water while you exercise to prevent dehydration or heat stroke.  · Work out until your breathing and your heartbeat get faster.  Where to find more information  · [place] Department of Health and Human Services: www.hhs.gov  · Centers for Disease Control and Prevention (CDC): www.cdc.gov  Summary  · Exercising regularly is important. It will improve your overall fitness, flexibility, and endurance.  · Regular exercise also will improve your overall health. It can help you control your weight, reduce stress, and improve your bone density.  · Do not exercise so much that you hurt yourself, feel dizzy, or get very short of breath.  · Before you start a new exercise program, talk with your health care provider.  This information is not intended to replace advice given to you by your health care provider. Make sure you discuss any questions you have with your health care provider.  Document Released: 01/20/2012 Document Revised: 11/30/2018 Document Reviewed: 11/08/2018  [employer] Patient Education © 2020 [employer]  Calorie Counting for Weight Loss  Calories are units of energy. Your body needs a certain amount of calories from  food to keep you going throughout the day. When you eat more calories than your body needs, your body stores the extra calories as fat. When you eat fewer calories than your body needs, your body burns fat to get the energy it needs.  Calorie counting means keeping track of how many calories you eat and drink each day. Calorie counting can be helpful if you need to lose weight. If you make sure to eat fewer calories than your body needs, you should lose weight. Ask your health care provider what a healthy weight is for you.  For calorie counting to work, you will need to eat the right number of calories in a day in order to lose a healthy amount of weight per week. A dietitian can help you determine how many calories you need in a day and will give you suggestions on how to reach your calorie goal.  · A healthy amount of weight to lose per week is usually 1-2 lb (0.5-0.9 kg). This usually means that your daily calorie intake should be reduced by 500-750 calories.  · Eating 1,200 - 1,500 calories per day can help most women lose weight.  · Eating 1,500 - 1,800 calories per day can help most men lose weight.  What is my plan?  My goal is to have __________ calories per day.  If I have this many calories per day, I should lose around __________ pounds per week.  What do I need to know about calorie counting?  In order to meet your daily calorie goal, you will need to:  · Find out how many calories are in each food you would like to eat. Try to do this before you eat.  · Decide how much of the food you plan to eat.  · Write down what you ate and how many calories it had. Doing this is called keeping a food log.  To successfully lose weight, it is important to balance calorie counting with a healthy lifestyle that includes regular activity. Aim for 150 minutes of moderate exercise (such as walking) or 75 minutes of vigorous exercise (such as running) each week.  Where do I find calorie information?    The number of  calories in a food can be found on a Nutrition Facts label. If a food does not have a Nutrition Facts label, try to look up the calories online or ask your dietitian for help.  Remember that calories are listed per serving. If you choose to have more than one serving of a food, you will have to multiply the calories per serving by the amount of servings you plan to eat. For example, the label on a package of bread might say that a serving size is 1 slice and that there are 90 calories in a serving. If you eat 1 slice, you will have eaten 90 calories. If you eat 2 slices, you will have eaten 180 calories.  How do I keep a food log?  Immediately after each meal, record the following information in your food log:  · What you ate. Don't forget to include toppings, sauces, and other extras on the food.  · How much you ate. This can be measured in cups, ounces, or number of items.  · How many calories each food and drink had.  · The total number of calories in the meal.  Keep your food log near you, such as in a small notebook in your pocket, or use a mobile manuel or website. Some programs will calculate calories for you and show you how many calories you have left for the day to meet your goal.  What are some calorie counting tips?    · Use your calories on foods and drinks that will fill you up and not leave you hungry:  ? Some examples of foods that fill you up are nuts and nut butters, vegetables, lean proteins, and high-fiber foods like whole grains. High-fiber foods are foods with more than 5 g fiber per serving.  ? Drinks such as sodas, specialty coffee drinks, alcohol, and juices have a lot of calories, yet do not fill you up.  · Eat nutritious foods and avoid empty calories. Empty calories are calories you get from foods or beverages that do not have many vitamins or protein, such as candy, sweets, and soda. It is better to have a nutritious high-calorie food (such as an avocado) than a food with few nutrients  "(such as a bag of chips).  · Know how many calories are in the foods you eat most often. This will help you calculate calorie counts faster.  · Pay attention to calories in drinks. Low-calorie drinks include water and unsweetened drinks.  · Pay attention to nutrition labels for \"low fat\" or \"fat free\" foods. These foods sometimes have the same amount of calories or more calories than the full fat versions. They also often have added sugar, starch, or salt, to make up for flavor that was removed with the fat.  · Find a way of tracking calories that works for you. Get creative. Try different apps or programs if writing down calories does not work for you.  What are some portion control tips?  · Know how many calories are in a serving. This will help you know how many servings of a certain food you can have.  · Use a measuring cup to measure serving sizes. You could also try weighing out portions on a kitchen scale. With time, you will be able to estimate serving sizes for some foods.  · Take some time to put servings of different foods on your favorite plates, bowls, and cups so you know what a serving looks like.  · Try not to eat straight from a bag or box. Doing this can lead to overeating. Put the amount you would like to eat in a cup or on a plate to make sure you are eating the right portion.  · Use smaller plates, glasses, and bowls to prevent overeating.  · Try not to multitask (for example, watch TV or use your computer) while eating. If it is time to eat, sit down at a table and enjoy your food. This will help you to know when you are full. It will also help you to be aware of what you are eating and how much you are eating.  What are tips for following this plan?  Reading food labels  · Check the calorie count compared to the serving size. The serving size may be smaller than what you are used to eating.  · Check the source of the calories. Make sure the food you are eating is high in vitamins and protein " "and low in saturated and trans fats.  Shopping  · Read nutrition labels while you shop. This will help you make healthy decisions before you decide to purchase your food.  · Make a grocery list and stick to it.  Cooking  · Try to cook your favorite foods in a healthier way. For example, try baking instead of frying.  · Use low-fat dairy products.  Meal planning  · Use more fruits and vegetables. Half of your plate should be fruits and vegetables.  · Include lean proteins like poultry and fish.  How do I count calories when eating out?  · Ask for smaller portion sizes.  · Consider sharing an entree and sides instead of getting your own entree.  · If you get your own entree, eat only half. Ask for a box at the beginning of your meal and put the rest of your entree in it so you are not tempted to eat it.  · If calories are listed on the menu, choose the lower calorie options.  · Choose dishes that include vegetables, fruits, whole grains, low-fat dairy products, and lean protein.  · Choose items that are boiled, broiled, grilled, or steamed. Stay away from items that are buttered, battered, fried, or served with cream sauce. Items labeled \"crispy\" are usually fried, unless stated otherwise.  · Choose water, low-fat milk, unsweetened iced tea, or other drinks without added sugar. If you want an alcoholic beverage, choose a lower calorie option such as a glass of wine or light beer.  · Ask for dressings, sauces, and syrups on the side. These are usually high in calories, so you should limit the amount you eat.  · If you want a salad, choose a garden salad and ask for grilled meats. Avoid extra toppings like gomez, cheese, or fried items. Ask for the dressing on the side, or ask for olive oil and vinegar or lemon to use as dressing.  · Estimate how many servings of a food you are given. For example, a serving of cooked rice is ½ cup or about the size of half a baseball. Knowing serving sizes will help you be aware of how " much food you are eating at restaurants. The list below tells you how big or small some common portion sizes are based on everyday objects:  ? 1 oz--4 stacked dice.  ? 3 oz--1 deck of cards.  ? 1 tsp--1 die.  ? 1 Tbsp--½ a ping-pong ball.  ? 2 Tbsp--1 ping-pong ball.  ? ½ cup--½ baseball.  ? 1 cup--1 baseball.  Summary  · Calorie counting means keeping track of how many calories you eat and drink each day. If you eat fewer calories than your body needs, you should lose weight.  · A healthy amount of weight to lose per week is usually 1-2 lb (0.5-0.9 kg). This usually means reducing your daily calorie intake by 500-750 calories.  · The number of calories in a food can be found on a Nutrition Facts label. If a food does not have a Nutrition Facts label, try to look up the calories online or ask your dietitian for help.  · Use your calories on foods and drinks that will fill you up, and not on foods and drinks that will leave you hungry.  · Use smaller plates, glasses, and bowls to prevent overeating.  This information is not intended to replace advice given to you by your health care provider. Make sure you discuss any questions you have with your health care provider.  Document Released: 12/18/2006 Document Revised: 09/06/2019 Document Reviewed: 11/17/2017  [employer] Patient Education © 2020 [employer]  BMI for Adults    Body mass index (BMI) is a number that is calculated from a person's weight and height. BMI may help to estimate how much of a person's weight is composed of fat. BMI can help identify those who may be at higher risk for certain medical problems.  How is BMI used with adults?  BMI is used as a screening tool to identify possible weight problems. It is used to check whether a person is obese, overweight, healthy weight, or underweight.  How is BMI calculated?  BMI measures your weight and compares it to your height. This can be done either in English ([place] or metric measurements. Note that charts  "are available to help you find your BMI quickly and easily without having to do these calculations yourself.  To calculate your BMI in English ([place] measurements, your health care provider will:  1. Measure your weight in pounds (lb).  2. Multiply the number of pounds by 703.  ? For example, for a person who weighs 180 lb, multiply that number by 703, which equals 126,540.  3. Measure your height in inches (in). Then multiply that number by itself to get a measurement called \"inches squared.\"  ? For example, for a person who is 70 in tall, the \"inches squared\" measurement is 70 in x 70 in, which equals 4900 inches squared.  4. Divide the total from Step 2 (number of lb x 703) by the total from Step 3 (inches squared): 126,540 ÷ 4900 = 25.8. This is your BMI.  To calculate your BMI in metric measurements, your health care provider will:  1. Measure your weight in kilograms (kg).  2. Measure your height in meters (m). Then multiply that number by itself to get a measurement called \"meters squared.\"  ? For example, for a person who is 1.75 m tall, the \"meters squared\" measurement is 1.75 m x 1.75 m, which is equal to 3.1 meters squared.  3. Divide the number of kilograms (your weight) by the meters squared number. In this example: 70 ÷ 3.1 = 22.6. This is your BMI.  How is BMI interpreted?  To interpret your results, your health care provider will use BMI charts to identify whether you are underweight, normal weight, overweight, or obese. The following guidelines will be used:  · Underweight: BMI less than 18.5.  · Normal weight: BMI between 18.5 and 24.9.  · Overweight: BMI between 25 and 29.9.  · Obese: BMI of 30 and above.  Please note:  · Weight includes both fat and muscle, so someone with a muscular build, such as an athlete, may have a BMI that is higher than 24.9. In cases like these, BMI is not an accurate measure of body fat.  · To determine if excess body fat is the cause of a BMI of 25 or higher, further " assessments may need to be done by a health care provider.  · BMI is usually interpreted in the same way for men and women.  Why is BMI a useful tool?  BMI is useful in two ways:  · Identifying a weight problem that may be related to a medical condition, or that may increase the risk for medical problems.  · Promoting lifestyle and diet changes in order to reach a healthy weight.  Summary  · Body mass index (BMI) is a number that is calculated from a person's weight and height.  · BMI may help to estimate how much of a person's weight is composed of fat. BMI can help identify those who may be at higher risk for certain medical problems.  · BMI can be measured using English measurements or metric measurements.  · To interpret your results, your health care provider will use BMI charts to identify whether you are underweight, normal weight, overweight, or obese.  This information is not intended to replace advice given to you by your health care provider. Make sure you discuss any questions you have with your health care provider.  Document Released: 08/29/2005 Document Revised: 11/30/2018 Document Reviewed: 10/31/2018  [employer] Patient Education © 2020 [employer]  Advance Directive    Advance directives are legal documents that let you make choices ahead of time about your health care and medical treatment in case you become unable to communicate for yourself. Advance directives are a way for you to communicate your wishes to family, friends, and health care providers. This can help convey your decisions about end-of-life care if you become unable to communicate.  Discussing and writing advance directives should happen over time rather than all at once. Advance directives can be changed depending on your situation and what you want, even after you have signed the advance directives.  If you do not have an advance directive, some states assign family decision makers to act on your behalf based on how closely you  are related to them. Each state has its own laws regarding advance directives. You may want to check with your health care provider, , or state representative about the laws in your state. There are different types of advance directives, such as:  · Medical power of .  · Living will.  · Do not resuscitate (DNR) or do not attempt resuscitation (DNAR) order.  Health care proxy and medical power of   A health care proxy, also called a health care agent, is a person who is appointed to make medical decisions for you in cases in which you are unable to make the decisions yourself. Generally, people choose someone they know well and trust to represent their preferences. Make sure to ask this person for an agreement to act as your proxy. A proxy may have to exercise judgment in the event of a medical decision for which your wishes are not known.  A medical power of  is a legal document that names your health care proxy. Depending on the laws in your state, after the document is written, it may also need to be:  · Signed.  · Notarized.  · Dated.  · Copied.  · Witnessed.  · Incorporated into your medical record.  You may also want to appoint someone to manage your financial affairs in a situation in which you are unable to do so. This is called a durable power of  for finances. It is a separate legal document from the durable power of  for health care. You may choose the same person or someone different from your health care proxy to act as your agent in financial matters.  If you do not appoint a proxy, or if there is a concern that the proxy is not acting in your best interests, a court-appointed guardian may be designated to act on your behalf.  Living will  A living will is a set of instructions documenting your wishes about medical care when you cannot express them yourself. Health care providers should keep a copy of your living will in your medical record. You may want  to give a copy to family members or friends. To alert caregivers in case of an emergency, you can place a card in your wallet to let them know that you have a living will and where they can find it. A living will is used if you become:  · Terminally ill.  · Incapacitated.  · Unable to communicate or make decisions.  Items to consider in your living will include:  · The use or non-use of life-sustaining equipment, such as dialysis machines and breathing machines (ventilators).  · A DNR or DNAR order, which is the instruction not to use cardiopulmonary resuscitation (CPR) if breathing or heartbeat stops.  · The use or non-use of tube feeding.  · Withholding of food and fluids.  · Comfort (palliative) care when the goal becomes comfort rather than a cure.  · Organ and tissue donation.  A living will does not give instructions for distributing your money and property if you should pass away. It is recommended that you seek the advice of a  when writing a will. Decisions about taxes, beneficiaries, and asset distribution will be legally binding. This process can relieve your family and friends of any concerns surrounding disputes or questions that may come up about the distribution of your assets.  DNR or DNAR  A DNR or DNAR order is a request not to have CPR in the event that your heart stops beating or you stop breathing. If a DNR or DNAR order has not been made and shared, a health care provider will try to help any patient whose heart has stopped or who has stopped breathing. If you plan to have surgery, talk with your health care provider about how your DNR or DNAR order will be followed if problems occur.  Summary  · Advance directives are the legal documents that allow you to make choices ahead of time about your health care and medical treatment in case you become unable to communicate for yourself.  · The process of discussing and writing advance directives should happen over time. You can change the  advance directives, even after you have signed them.  · Advance directives include DNR or DNAR orders, living bhatt, and designating an agent as your medical power of .  This information is not intended to replace advice given to you by your health care provider. Make sure you discuss any questions you have with your health care provider.  Document Released: 03/26/2009 Document Revised: 01/22/2020 Document Reviewed: 11/06/2017  [employer] Patient Education © 2020 [employer]

## 2020-07-29 NOTE — PROGRESS NOTES
The ABCs of the Annual Wellness Visit  Subsequent Medicare Wellness Visit    Chief Complaint   Patient presents with   • Hypertension   • Hyperlipidemia   • Hyperglycemia   • Aortic Stenosis       Subjective   History of Present Illness:  Yuliana Gonzalez is a 89 y.o. female who presents for a Subsequent Medicare Wellness Visit.    HEALTH RISK ASSESSMENT    Recent Hospitalizations:  No hospitalization(s) within the last year.    Current Medical Providers:  Patient Care Team:  Trent Davis MD as PCP - General (Internal Medicine)  Trent Davis MD as PCP - Internal Medicine (Internal Medicine)  Trent Davis MD as PCP - Claims Attributed    Smoking Status:  Social History     Tobacco Use   Smoking Status Never Smoker   Smokeless Tobacco Never Used       Alcohol Consumption:  Social History     Substance and Sexual Activity   Alcohol Use No       Depression Screen:   PHQ-2/PHQ-9 Depression Screening 7/29/2020   Little interest or pleasure in doing things 0   Feeling down, depressed, or hopeless 1   Trouble falling or staying asleep, or sleeping too much 0   Feeling tired or having little energy 1   Poor appetite or overeating 0   Feeling bad about yourself - or that you are a failure or have let yourself or your family down 0   Trouble concentrating on things, such as reading the newspaper or watching television 0   Moving or speaking so slowly that other people could have noticed. Or the opposite - being so fidgety or restless that you have been moving around a lot more than usual 0   Thoughts that you would be better off dead, or of hurting yourself in some way 0   Total Score 2   If you checked off any problems, how difficult have these problems made it for you to do your work, take care of things at home, or get along with other people? Not difficult at all       Fall Risk Screen:  STEADI Fall Risk Assessment was completed, and patient is at MODERATE risk for falls. Assessment completed  on:7/29/2020    Health Habits and Functional and Cognitive Screening:  Functional & Cognitive Status 7/29/2020   Do you have difficulty preparing food and eating? No   Do you have difficulty bathing yourself, getting dressed or grooming yourself? No   Do you have difficulty using the toilet? No   Do you have difficulty moving around from place to place? No   Do you have trouble with steps or getting out of a bed or a chair? No   Current Diet Well Balanced Diet   Dental Exam Up to date   Eye Exam Up to date   Exercise (times per week) 7 times per week   Current Exercise Activities Include Walking   Do you need help using the phone?  No   Are you deaf or do you have serious difficulty hearing?  No   Do you need help with transportation? Yes   Do you need help shopping? No   Do you need help preparing meals?  No   Do you need help with housework?  No   Do you need help with laundry? No   Do you need help taking your medications? No   Do you need help managing money? No   Do you ever drive or ride in a car without wearing a seat belt? No   Have you felt unusual stress, anger or loneliness in the last month? No   Who do you live with? Alone   If you need help, do you have trouble finding someone available to you? No   Have you been bothered in the last four weeks by sexual problems? No   Do you have difficulty concentrating, remembering or making decisions? No         Does the patient have evidence of cognitive impairment? No    Asprin use counseling:Does not need ASA (and currently is not on it)    Age-appropriate Screening Schedule:  Refer to the list below for future screening recommendations based on patient's age, sex and/or medical conditions. Orders for these recommended tests are listed in the plan section. The patient has been provided with a written plan.    Health Maintenance   Topic Date Due   • ZOSTER VACCINE (2 of 3) 01/26/2008   • INFLUENZA VACCINE  08/01/2020   • TDAP/TD VACCINES (2 - Td) 03/01/2027   •  "MAMMOGRAM  Discontinued   • LIPID PANEL  Discontinued   • COLONOSCOPY  Discontinued          The following portions of the patient's history were reviewed and updated as appropriate: allergies, current medications, past family history, past medical history, past social history, past surgical history and problem list.    Outpatient Medications Prior to Visit   Medication Sig Dispense Refill   • atenolol (TENORMIN) 25 MG tablet Take 1 tablet by mouth 2 (Two) Times a Day. (Patient taking differently: Take 25 mg by mouth Daily.) 180 tablet 1   • cholecalciferol (VITAMIN D3) 1000 units tablet Take 2,000 Units by mouth Daily.     • hydroCHLOROthiazide (MICROZIDE) 12.5 MG capsule TAKE 1 CAPSULE BY MOUTH EVERY DAY 90 capsule 1   • Multiple Vitamins-Minerals (VITEYES AREDS ADVANCED PO) Take 1 capsule by mouth 2 (two) times a day.     • aspirin 81 MG tablet Take 81 mg by mouth Daily.     • cholecalciferol (VITAMIN D3) 25 MCG (1000 UT) tablet Take 1,000 Units by mouth Daily.       No facility-administered medications prior to visit.        Patient Active Problem List   Diagnosis   • Aortic stenosis   • IGT (impaired glucose tolerance)   • Hyperlipidemia   • Hypertension   • Cervical arthritis   • Sciatica   • Vitamin D deficiency       Advanced Care Planning:  ACP discussion was held with the patient during this visit. Patient has an advance directive (not in EMR), copy requested.    Review of Systems    Compared to one year ago, the patient feels her physical health is better.  Compared to one year ago, the patient feels her mental health is better.    Reviewed chart for potential of high risk medication in the elderly: yes  Reviewed chart for potential of harmful drug interactions in the elderly:yes    Objective         Vitals:    07/29/20 1004   BP: 122/78   Pulse: 69   Resp: 16   Temp: 97.1 °F (36.2 °C)   TempSrc: Infrared   SpO2: 98%   Weight: 72.9 kg (160 lb 12.8 oz)   Height: 152.4 cm (60\")   PainSc: 0-No pain "       Body mass index is 31.4 kg/m².  Discussed the patient's BMI with her. The BMI is above average; BMI management plan is completed.    Physical Exam    Lab Results   Component Value Date    HGBA1C 5.70 (H) 07/24/2020        Assessment/Plan   Medicare Risks and Personalized Health Plan  CMS Preventative Services Quick Reference  Advance Directive Discussion  Fall Risk  Obesity/Overweight     The above risks/problems have been discussed with the patient.  Pertinent information has been shared with the patient in the After Visit Summary.  Follow up plans and orders are seen below in the Assessment/Plan Section.    There are no diagnoses linked to this encounter.  Follow Up:  No follow-ups on file.     An After Visit Summary and PPPS were given to the patient.

## 2020-10-23 ENCOUNTER — OFFICE VISIT (OUTPATIENT)
Dept: INTERNAL MEDICINE | Facility: CLINIC | Age: 85
End: 2020-10-23

## 2020-10-23 DIAGNOSIS — R73.02 IGT (IMPAIRED GLUCOSE TOLERANCE): Primary | ICD-10-CM

## 2020-10-23 LAB
ALBUMIN SERPL-MCNC: 4 G/DL (ref 3.5–5.2)
ALBUMIN/GLOB SERPL: 2 G/DL
ALP SERPL-CCNC: 90 U/L (ref 39–117)
ALT SERPL-CCNC: 12 U/L (ref 1–33)
AST SERPL-CCNC: 20 U/L (ref 1–32)
BASOPHILS # BLD AUTO: 0.09 10*3/MM3 (ref 0–0.2)
BASOPHILS NFR BLD AUTO: 1 % (ref 0–1.5)
BILIRUB SERPL-MCNC: 0.4 MG/DL (ref 0–1.2)
BUN SERPL-MCNC: 16 MG/DL (ref 8–23)
BUN/CREAT SERPL: 27.6 (ref 7–25)
CALCIUM SERPL-MCNC: 8.7 MG/DL (ref 8.2–9.6)
CHLORIDE SERPL-SCNC: 106 MMOL/L (ref 98–107)
CHOLEST SERPL-MCNC: 219 MG/DL (ref 0–200)
CHOLEST/HDLC SERPL: 3.32 {RATIO}
CO2 SERPL-SCNC: 24.8 MMOL/L (ref 22–29)
CREAT SERPL-MCNC: 0.58 MG/DL (ref 0.57–1)
EOSINOPHIL # BLD AUTO: 0.19 10*3/MM3 (ref 0–0.4)
EOSINOPHIL NFR BLD AUTO: 2.1 % (ref 0.3–6.2)
ERYTHROCYTE [DISTWIDTH] IN BLOOD BY AUTOMATED COUNT: 12.8 % (ref 12.3–15.4)
GLOBULIN SER CALC-MCNC: 2 GM/DL
GLUCOSE SERPL-MCNC: 87 MG/DL (ref 65–99)
HBA1C MFR BLD: 5.6 % (ref 4.8–5.6)
HCT VFR BLD AUTO: 41.3 % (ref 34–46.6)
HDLC SERPL-MCNC: 66 MG/DL (ref 40–60)
HGB BLD-MCNC: 13.7 G/DL (ref 12–15.9)
IMM GRANULOCYTES # BLD AUTO: 0.04 10*3/MM3 (ref 0–0.05)
IMM GRANULOCYTES NFR BLD AUTO: 0.4 % (ref 0–0.5)
LDLC SERPL CALC-MCNC: 122 MG/DL (ref 0–100)
LYMPHOCYTES # BLD AUTO: 3.18 10*3/MM3 (ref 0.7–3.1)
LYMPHOCYTES NFR BLD AUTO: 34.6 % (ref 19.6–45.3)
MCH RBC QN AUTO: 28.1 PG (ref 26.6–33)
MCHC RBC AUTO-ENTMCNC: 33.2 G/DL (ref 31.5–35.7)
MCV RBC AUTO: 84.8 FL (ref 79–97)
MONOCYTES # BLD AUTO: 0.7 10*3/MM3 (ref 0.1–0.9)
MONOCYTES NFR BLD AUTO: 7.6 % (ref 5–12)
NEUTROPHILS # BLD AUTO: 4.98 10*3/MM3 (ref 1.7–7)
NEUTROPHILS NFR BLD AUTO: 54.3 % (ref 42.7–76)
NRBC BLD AUTO-RTO: 0 /100 WBC (ref 0–0.2)
PLATELET # BLD AUTO: 198 10*3/MM3 (ref 140–450)
POTASSIUM SERPL-SCNC: 4.5 MMOL/L (ref 3.5–5.2)
PROT SERPL-MCNC: 6 G/DL (ref 6–8.5)
RBC # BLD AUTO: 4.87 10*6/MM3 (ref 3.77–5.28)
SODIUM SERPL-SCNC: 140 MMOL/L (ref 136–145)
TRIGL SERPL-MCNC: 180 MG/DL (ref 0–150)
VLDLC SERPL CALC-MCNC: 31 MG/DL (ref 5–40)
WBC # BLD AUTO: 9.18 10*3/MM3 (ref 3.4–10.8)

## 2020-10-23 RX ORDER — HYDROCHLOROTHIAZIDE 12.5 MG/1
CAPSULE, GELATIN COATED ORAL
Qty: 90 CAPSULE | Refills: 1 | Status: SHIPPED | OUTPATIENT
Start: 2020-10-23 | End: 2021-04-21

## 2020-10-29 ENCOUNTER — OFFICE VISIT (OUTPATIENT)
Dept: INTERNAL MEDICINE | Facility: CLINIC | Age: 85
End: 2020-10-29

## 2020-10-29 VITALS
BODY MASS INDEX: 31.8 KG/M2 | OXYGEN SATURATION: 99 % | RESPIRATION RATE: 16 BRPM | WEIGHT: 162 LBS | HEIGHT: 60 IN | HEART RATE: 97 BPM | DIASTOLIC BLOOD PRESSURE: 68 MMHG | SYSTOLIC BLOOD PRESSURE: 140 MMHG | TEMPERATURE: 96.8 F

## 2020-10-29 DIAGNOSIS — I10 ESSENTIAL HYPERTENSION: Primary | ICD-10-CM

## 2020-10-29 DIAGNOSIS — R73.02 IGT (IMPAIRED GLUCOSE TOLERANCE): ICD-10-CM

## 2020-10-29 DIAGNOSIS — E78.5 HYPERLIPIDEMIA, UNSPECIFIED HYPERLIPIDEMIA TYPE: ICD-10-CM

## 2020-10-29 DIAGNOSIS — I35.0 NONRHEUMATIC AORTIC VALVE STENOSIS: ICD-10-CM

## 2020-10-29 PROCEDURE — 99214 OFFICE O/P EST MOD 30 MIN: CPT | Performed by: INTERNAL MEDICINE

## 2020-10-29 RX ORDER — ATENOLOL 25 MG/1
25 TABLET ORAL NIGHTLY
Qty: 90 TABLET | Refills: 1 | Status: SHIPPED | OUTPATIENT
Start: 2020-10-29 | End: 2021-06-29

## 2020-10-29 NOTE — PROGRESS NOTES
Subjective   Yuliana Gonzalez is a 89 y.o. female.     Chief Complaint   Patient presents with   • Hypertension   • Hyperlipidemia   • Aortic Stenosis         In for recheck of chronic IGT.  Has been stable.  Also aortic stenosis.  It remains asymptomatic.    Hypertension  This is a chronic problem. The current episode started more than 1 year ago. The problem is unchanged. The problem is controlled. Pertinent negatives include no orthopnea, peripheral edema or PND. There are no associated agents to hypertension. Risk factors for coronary artery disease include dyslipidemia and post-menopausal state. Current antihypertension treatment includes beta blockers and angiotensin blockers. The current treatment provides significant improvement. There are no compliance problems.  There is no history of angina, kidney disease, CAD/MI, CVA or heart failure.   Hyperlipidemia  This is a chronic problem. The current episode started more than 1 year ago. The problem is controlled. Recent lipid tests were reviewed and are normal. Factors aggravating her hyperlipidemia include beta blockers. She is currently on no antihyperlipidemic treatment. The current treatment provides significant improvement of lipids. There are no compliance problems.  Risk factors for coronary artery disease include dyslipidemia, hypertension and post-menopausal.   Hyperglycemia  This is a chronic problem. The current episode started more than 1 year ago. The problem occurs constantly. The problem has been unchanged. Associated symptoms include abdominal pain. Pertinent negatives include no fatigue, nausea or vomiting.        The following portions of the patient's history were reviewed and updated as appropriate: allergies, current medications, past social history and problem list.    Outpatient Medications Marked as Taking for the 10/29/20 encounter (Office Visit) with Trent Davis MD   Medication Sig Dispense Refill   • atenolol (TENORMIN) 25 MG  tablet Take 1 tablet by mouth Every Night. 90 tablet 1   • cholecalciferol (VITAMIN D3) 1000 units tablet Take 2,000 Units by mouth Daily.     • hydroCHLOROthiazide (MICROZIDE) 12.5 MG capsule TAKE 1 CAPSULE BY MOUTH EVERY DAY 90 capsule 1   • Multiple Vitamins-Minerals (VITEYES AREDS ADVANCED PO) Take 2 capsules by mouth Daily.     • [DISCONTINUED] atenolol (TENORMIN) 25 MG tablet Take 1 tablet by mouth 2 (Two) Times a Day. (Patient taking differently: Take 25 mg by mouth Daily.) 180 tablet 1       Review of Systems   Constitutional: Negative for fatigue.   Cardiovascular: Negative for orthopnea, leg swelling and PND.   Gastrointestinal: Positive for abdominal pain. Negative for constipation, diarrhea, nausea and vomiting.       Objective   Vitals:    10/29/20 0922   BP: 140/68   Pulse: 97   Resp: 16   Temp: 96.8 °F (36 °C)   SpO2: 99%          10/29/20  0922   Weight: 73.5 kg (162 lb)    [unfilled]  Body mass index is 31.64 kg/m².      Physical Exam   Constitutional: She appears well-developed. No distress.   HENT:   Head: Normocephalic and atraumatic.   Neck: Carotid bruit is not present. No thyromegaly present.   Cardiovascular: Normal rate and regular rhythm. Exam reveals no gallop.   Murmur heard.   Crescendo decrescendo systolic murmur is present with a grade of 3/6.  Murmur is in the aortic area   Pulmonary/Chest: Effort normal and breath sounds normal. No respiratory distress. She has no wheezes. She has no rales.   Abdominal: Soft. Bowel sounds are normal. She exhibits no mass. There is no abdominal tenderness. There is no guarding.         Problems Addressed this Visit        Cardiovascular and Mediastinum    Aortic stenosis    Relevant Medications    atenolol (TENORMIN) 25 MG tablet    Hyperlipidemia    Hypertension - Primary    Relevant Medications    atenolol (TENORMIN) 25 MG tablet       Endocrine    IGT (impaired glucose tolerance)      Diagnoses       Codes Comments    Essential hypertension     -  Primary ICD-10-CM: I10  ICD-9-CM: 401.9     Hyperlipidemia, unspecified hyperlipidemia type     ICD-10-CM: E78.5  ICD-9-CM: 272.4     Nonrheumatic aortic valve stenosis     ICD-10-CM: I35.0  ICD-9-CM: 424.1     IGT (impaired glucose tolerance)     ICD-10-CM: R73.02  ICD-9-CM: 790.22         Assessment/Plan   In for recheck of hypertension, hyperlipidemia, and aortic stenosis.  Blood pressure control is okay.  Glycemic control is been good.  She is no longer treating lipids so we are not checking lipids at this point.  Gets annual lab work today October 2020 including CBC, CMP and A1c.  AS remains asymptomatic.  No chest pain.  No syncope.  No CHF.  Some shortness of breath with walking up an incline but that is stable.  No edema.  No chest pain.  Last echocardiogram was April 2019.  Moderate to severe AS.  Dyspnea on exertion walking up a hill but if anything that is improving over time with more exercise.  Due for another TTE 4/2020.  Due shingrix.  Glucose and A1c every 3 months.  Annual wellness visit July 2020.  We will delay the TTE given the pandemic.           Dragon disclaimer:   Much of this encounter note is an electronic transcription/translation of spoken language to printed text. The electronic translation of spoken language may permit erroneous, or at times, nonsensical words or phrases to be inadvertently transcribed; Although I have reviewed the note for such errors, some may still exist.

## 2021-01-28 ENCOUNTER — OFFICE VISIT (OUTPATIENT)
Dept: INTERNAL MEDICINE | Facility: CLINIC | Age: 86
End: 2021-01-28

## 2021-01-28 VITALS
TEMPERATURE: 96.9 F | OXYGEN SATURATION: 97 % | DIASTOLIC BLOOD PRESSURE: 86 MMHG | SYSTOLIC BLOOD PRESSURE: 132 MMHG | HEART RATE: 69 BPM | HEIGHT: 60 IN | BODY MASS INDEX: 31.22 KG/M2 | WEIGHT: 159 LBS | RESPIRATION RATE: 16 BRPM

## 2021-01-28 DIAGNOSIS — I10 ESSENTIAL HYPERTENSION: Primary | ICD-10-CM

## 2021-01-28 DIAGNOSIS — R73.02 IGT (IMPAIRED GLUCOSE TOLERANCE): ICD-10-CM

## 2021-01-28 DIAGNOSIS — I35.0 NONRHEUMATIC AORTIC VALVE STENOSIS: ICD-10-CM

## 2021-01-28 DIAGNOSIS — E78.5 HYPERLIPIDEMIA, UNSPECIFIED HYPERLIPIDEMIA TYPE: ICD-10-CM

## 2021-01-28 PROCEDURE — 99214 OFFICE O/P EST MOD 30 MIN: CPT | Performed by: INTERNAL MEDICINE

## 2021-01-28 RX ORDER — ASPIRIN 325 MG
325 TABLET ORAL AS NEEDED
COMMUNITY
End: 2022-01-21 | Stop reason: HOSPADM

## 2021-01-28 NOTE — PROGRESS NOTES
Subjective   Yuliana Gonzalez is a 89 y.o. female.     Chief Complaint   Patient presents with   • Hypertension   • Hyperlipidemia   • Aortic Stenosis         In for recheck of chronic IGT.  Has been stable.  Also aortic stenosis.  It remains asymptomatic.    Hypertension  This is a chronic problem. The current episode started more than 1 year ago. The problem is unchanged. The problem is controlled. Associated symptoms include shortness of breath. Pertinent negatives include no chest pain, headaches, orthopnea, palpitations, peripheral edema or PND. There are no associated agents to hypertension. Risk factors for coronary artery disease include dyslipidemia and post-menopausal state. Current antihypertension treatment includes beta blockers and angiotensin blockers. The current treatment provides significant improvement. There are no compliance problems.  There is no history of angina, kidney disease, CAD/MI, CVA or heart failure.   Hyperlipidemia  This is a chronic problem. The current episode started more than 1 year ago. The problem is controlled. Recent lipid tests were reviewed and are normal. Factors aggravating her hyperlipidemia include beta blockers. Associated symptoms include shortness of breath. Pertinent negatives include no chest pain. She is currently on no antihyperlipidemic treatment. The current treatment provides significant improvement of lipids. There are no compliance problems.  Risk factors for coronary artery disease include dyslipidemia, hypertension and post-menopausal.   Hyperglycemia  This is a chronic problem. The current episode started more than 1 year ago. The problem occurs constantly. The problem has been unchanged. Associated symptoms include abdominal pain. Pertinent negatives include no chest pain, fatigue, headaches, nausea or vomiting.        The following portions of the patient's history were reviewed and updated as appropriate: allergies, current medications, past social  history and problem list.    Outpatient Medications Marked as Taking for the 1/28/21 encounter (Office Visit) with Trent Davis MD   Medication Sig Dispense Refill   • aspirin 325 MG tablet Take 325 mg by mouth 2 (Two) Times a Day As Needed for Mild Pain .     • atenolol (TENORMIN) 25 MG tablet Take 1 tablet by mouth Every Night. 90 tablet 1   • cholecalciferol (VITAMIN D3) 1000 units tablet Take 2,000 Units by mouth Daily.     • hydroCHLOROthiazide (MICROZIDE) 12.5 MG capsule TAKE 1 CAPSULE BY MOUTH EVERY DAY 90 capsule 1   • Multiple Vitamins-Minerals (VITEYES AREDS ADVANCED PO) Take 2 capsules by mouth Daily.         Review of Systems   Constitutional: Negative for fatigue.   Respiratory: Positive for shortness of breath and wheezing.    Cardiovascular: Negative for chest pain, palpitations, orthopnea, leg swelling and PND.   Gastrointestinal: Positive for abdominal pain. Negative for constipation, diarrhea, nausea and vomiting.   Neurological: Negative for headaches.       Objective   Vitals:    01/28/21 1050   BP: 132/86   Pulse: 69   Resp: 16   Temp: 96.9 °F (36.1 °C)   SpO2: 97%          01/28/21  1050   Weight: 72.1 kg (159 lb)    [unfilled]  Body mass index is 31.05 kg/m².      Physical Exam   Constitutional: She appears well-developed. No distress.   Neck: No thyromegaly present.   Cardiovascular: Normal rate and regular rhythm. Exam reveals no gallop.   Murmur heard.   Crescendo decrescendo systolic murmur is present with a grade of 3/6.  Murmur is in the aortic area   Pulmonary/Chest: Effort normal and breath sounds normal. No respiratory distress. She has no wheezes. She has no rales.   Abdominal: Soft. Normal appearance and bowel sounds are normal. She exhibits no mass. There is no abdominal tenderness. There is no guarding.   Neurological: She is alert.     Lipid Panel With / Chol / HDL Ratio (10/23/2020 08:55)  Hemoglobin A1c (10/23/2020 08:55)  Comprehensive Metabolic Panel (10/23/2020  08:55)  CBC & Differential (10/23/2020 08:55)      Problems Addressed this Visit        Cardiac and Vasculature    Aortic stenosis    Hyperlipidemia    Hypertension - Primary       Endocrine and Metabolic    IGT (impaired glucose tolerance)      Diagnoses       Codes Comments    Essential hypertension    -  Primary ICD-10-CM: I10  ICD-9-CM: 401.9     Hyperlipidemia, unspecified hyperlipidemia type     ICD-10-CM: E78.5  ICD-9-CM: 272.4     Nonrheumatic aortic valve stenosis     ICD-10-CM: I35.0  ICD-9-CM: 424.1     IGT (impaired glucose tolerance)     ICD-10-CM: R73.02  ICD-9-CM: 790.22         Assessment/Plan   In for recheck of hypertension, hyperlipidemia, and aortic stenosis.  Blood pressure control is good.  Glycemic control is been good.  She is no longer treating lipids so we are not checking lipids at this point.  Got annual lab work October 2020 including CBC, CMP and A1c.  AS remains asymptomatic.  No chest pain.  No syncope.  No CHF.  Some shortness of breath with walking up an incline but that is stable.  No edema.  No chest pain.  Last echocardiogram was April 2019.  Moderate to severe AS.  Dyspnea on exertion walking up a hill but if anything that is improving over time with more exercise.  Due for another TTE 4/2020.  Glucose and A1c every 3 months.  Annual wellness visit July 2020.  We will delay the TTE given the pandemic.  Reviewed medications with patient and no changes were made.    PPE today includes face mask and eye shield.         Dragon disclaimer:   Much of this encounter note is an electronic transcription/translation of spoken language to printed text. The electronic translation of spoken language may permit erroneous, or at times, nonsensical words or phrases to be inadvertently transcribed; Although I have reviewed the note for such errors, some may still exist.

## 2021-04-21 RX ORDER — HYDROCHLOROTHIAZIDE 12.5 MG/1
CAPSULE, GELATIN COATED ORAL
Qty: 90 CAPSULE | Refills: 1 | Status: SHIPPED | OUTPATIENT
Start: 2021-04-21 | End: 2021-10-12

## 2021-04-30 ENCOUNTER — OFFICE VISIT (OUTPATIENT)
Dept: INTERNAL MEDICINE | Facility: CLINIC | Age: 86
End: 2021-04-30

## 2021-04-30 VITALS
WEIGHT: 159 LBS | HEART RATE: 60 BPM | HEIGHT: 61 IN | SYSTOLIC BLOOD PRESSURE: 118 MMHG | OXYGEN SATURATION: 98 % | DIASTOLIC BLOOD PRESSURE: 70 MMHG | TEMPERATURE: 97.3 F | RESPIRATION RATE: 16 BRPM | BODY MASS INDEX: 30.02 KG/M2

## 2021-04-30 DIAGNOSIS — H35.3232 EXUDATIVE AGE-RELATED MACULAR DEGENERATION, BILATERAL, WITH INACTIVE CHOROIDAL NEOVASCULARIZATION (HCC): ICD-10-CM

## 2021-04-30 DIAGNOSIS — I10 ESSENTIAL HYPERTENSION: Primary | ICD-10-CM

## 2021-04-30 DIAGNOSIS — E78.5 HYPERLIPIDEMIA, UNSPECIFIED HYPERLIPIDEMIA TYPE: ICD-10-CM

## 2021-04-30 DIAGNOSIS — R73.02 IGT (IMPAIRED GLUCOSE TOLERANCE): ICD-10-CM

## 2021-04-30 DIAGNOSIS — I35.0 NONRHEUMATIC AORTIC VALVE STENOSIS: ICD-10-CM

## 2021-04-30 LAB
GLUCOSE SERPL-MCNC: 95 MG/DL (ref 65–99)
HBA1C MFR BLD: 5.8 % (ref 4.8–5.6)

## 2021-04-30 PROCEDURE — 99214 OFFICE O/P EST MOD 30 MIN: CPT | Performed by: INTERNAL MEDICINE

## 2021-04-30 NOTE — PROGRESS NOTES
Subjective   Yuliana Gonzaelz is a 89 y.o. female.     Chief Complaint   Patient presents with   • Hypertension   • Hyperlipidemia         In for recheck of chronic IGT.  Has been stable.  Also aortic stenosis.  It remains asymptomatic.  History of macular degeneration.  That is stable.    Hypertension  This is a chronic problem. The current episode started more than 1 year ago. The problem is unchanged. The problem is controlled. Associated symptoms include shortness of breath. Pertinent negatives include no chest pain, headaches, orthopnea, palpitations, peripheral edema or PND. There are no associated agents to hypertension. Risk factors for coronary artery disease include dyslipidemia and post-menopausal state. Current antihypertension treatment includes beta blockers and angiotensin blockers. The current treatment provides significant improvement. There are no compliance problems.  There is no history of angina, kidney disease, CAD/MI, CVA or heart failure.   Hyperlipidemia  This is a chronic problem. The current episode started more than 1 year ago. The problem is controlled. Recent lipid tests were reviewed and are normal. Factors aggravating her hyperlipidemia include beta blockers. Associated symptoms include shortness of breath. Pertinent negatives include no chest pain. She is currently on no antihyperlipidemic treatment. The current treatment provides significant improvement of lipids. There are no compliance problems.  Risk factors for coronary artery disease include dyslipidemia, hypertension and post-menopausal.   Hyperglycemia  This is a chronic problem. The current episode started more than 1 year ago. The problem occurs constantly. The problem has been unchanged. Pertinent negatives include no abdominal pain, chest pain or headaches.        The following portions of the patient's history were reviewed and updated as appropriate: allergies, current medications, past social history and problem  list.    Outpatient Medications Marked as Taking for the 4/30/21 encounter (Office Visit) with Trent Davis MD   Medication Sig Dispense Refill   • aspirin 325 MG tablet Take 325 mg by mouth 2 (Two) Times a Day As Needed for Mild Pain .     • atenolol (TENORMIN) 25 MG tablet Take 1 tablet by mouth Every Night. 90 tablet 1   • cholecalciferol (VITAMIN D3) 1000 units tablet Take 2,000 Units by mouth Daily.     • hydroCHLOROthiazide (MICROZIDE) 12.5 MG capsule TAKE 1 CAPSULE BY MOUTH EVERY DAY 90 capsule 1   • Multiple Vitamins-Minerals (VITEYES AREDS ADVANCED PO) Take 2 capsules by mouth Daily.         Review of Systems   Respiratory: Positive for shortness of breath and wheezing.    Cardiovascular: Negative for chest pain, palpitations, orthopnea, leg swelling and PND.   Gastrointestinal: Negative for abdominal pain, constipation and diarrhea.   Neurological: Negative for light-headedness and headaches.       Objective   Vitals:    04/30/21 0954   BP: 118/70   Pulse: 60   Resp: 16   Temp: 97.3 °F (36.3 °C)   SpO2: 98%          04/30/21  0954   Weight: 72.1 kg (159 lb)    [unfilled]  Body mass index is 30.04 kg/m².      Physical Exam   Constitutional: She appears well-developed.   Neck: No thyromegaly present.   Cardiovascular: Normal rate and regular rhythm. Exam reveals no gallop.   Murmur heard.   Crescendo decrescendo systolic murmur is present with a grade of 3/6.  Murmur is in the aortic area   Pulmonary/Chest: Effort normal and breath sounds normal. No respiratory distress. She has no wheezes. She has no rales.   Abdominal: Soft. Normal appearance and bowel sounds are normal. She exhibits no mass. There is no abdominal tenderness. There is no guarding.   Neurological: She is alert.         Problems Addressed this Visit        Cardiac and Vasculature    Aortic stenosis (Chronic)    Hyperlipidemia (Chronic)    Hypertension - Primary (Chronic)       Endocrine and Metabolic    IGT (impaired glucose  tolerance) (Chronic)       Eye    Exudative age-related macular degeneration, bilateral, with inactive choroidal neovascularization (CMS/HCC)      Diagnoses       Codes Comments    Essential hypertension    -  Primary ICD-10-CM: I10  ICD-9-CM: 401.9     Hyperlipidemia, unspecified hyperlipidemia type     ICD-10-CM: E78.5  ICD-9-CM: 272.4     IGT (impaired glucose tolerance)     ICD-10-CM: R73.02  ICD-9-CM: 790.22     Nonrheumatic aortic valve stenosis     ICD-10-CM: I35.0  ICD-9-CM: 424.1     Exudative age-related macular degeneration, bilateral, with inactive choroidal neovascularization (CMS/HCC)     ICD-10-CM: H35.3232  ICD-9-CM: 362.52, 362.16         Assessment/Plan   In for recheck of hypertension, hyperlipidemia, and aortic stenosis.  Blood pressure control is good.  Glycemic control is been good.  She is no longer treating lipids so we are not checking lipids at this point.  Got annual lab work October 2020 including CBC, CMP and A1c.  AS remains asymptomatic.  No chest pain.  No syncope.  No CHF.  Some shortness of breath with walking up an incline but that is stable.  No edema.  No chest pain.  Last echocardiogram was April 2019.  Moderate to severe AS.  Dyspnea on exertion walking up a hill but if anything that is improving over time with more exercise.  Due for another TTE 4/2020.  Glucose and A1c every 3 months.  Annual wellness visit July 2020.  We will delay the TTE given the pandemic.  History of macular degeneration being treated with eye vitamins.  She is 3 HPP today.    The above information was reviewed again today 04/30/21.  It continues to be accurate as reflected above and is unchanged.  History, physical and review of systems all reviewed and are unchanged.  Medications were reviewed today and continue the current dosing.    PPE today includes face mask and eye shield.           Dragon disclaimer:   Much of this encounter note is an electronic transcription/translation of spoken language to  printed text. The electronic translation of spoken language may permit erroneous, or at times, nonsensical words or phrases to be inadvertently transcribed; Although I have reviewed the note for such errors, some may still exist.

## 2021-05-14 ENCOUNTER — HOSPITAL ENCOUNTER (OUTPATIENT)
Dept: CARDIOLOGY | Facility: HOSPITAL | Age: 86
Discharge: HOME | End: 2021-05-14
Admitting: INTERNAL MEDICINE

## 2021-05-14 VITALS
BODY MASS INDEX: 30.02 KG/M2 | HEIGHT: 61 IN | WEIGHT: 159 LBS | DIASTOLIC BLOOD PRESSURE: 70 MMHG | HEART RATE: 72 BPM | SYSTOLIC BLOOD PRESSURE: 118 MMHG

## 2021-05-14 DIAGNOSIS — I35.0 NONRHEUMATIC AORTIC VALVE STENOSIS: ICD-10-CM

## 2021-05-14 PROCEDURE — 93306 TTE W/DOPPLER COMPLETE: CPT | Performed by: INTERNAL MEDICINE

## 2021-05-14 PROCEDURE — 93306 TTE W/DOPPLER COMPLETE: CPT

## 2021-06-29 RX ORDER — ATENOLOL 25 MG/1
TABLET ORAL
Qty: 90 TABLET | Refills: 1 | Status: SHIPPED | OUTPATIENT
Start: 2021-06-29 | End: 2021-07-08

## 2021-07-08 RX ORDER — ATENOLOL 25 MG/1
TABLET ORAL
Qty: 180 TABLET | Refills: 1 | Status: SHIPPED | OUTPATIENT
Start: 2021-07-08 | End: 2022-02-09

## 2021-08-03 ENCOUNTER — OFFICE VISIT (OUTPATIENT)
Dept: INTERNAL MEDICINE | Facility: CLINIC | Age: 86
End: 2021-08-03

## 2021-08-03 VITALS
SYSTOLIC BLOOD PRESSURE: 110 MMHG | BODY MASS INDEX: 30.4 KG/M2 | OXYGEN SATURATION: 98 % | DIASTOLIC BLOOD PRESSURE: 70 MMHG | WEIGHT: 161 LBS | RESPIRATION RATE: 18 BRPM | TEMPERATURE: 96.6 F | HEART RATE: 61 BPM | HEIGHT: 61 IN

## 2021-08-03 DIAGNOSIS — E78.5 HYPERLIPIDEMIA, UNSPECIFIED HYPERLIPIDEMIA TYPE: Chronic | ICD-10-CM

## 2021-08-03 DIAGNOSIS — I10 ESSENTIAL HYPERTENSION: Primary | Chronic | ICD-10-CM

## 2021-08-03 DIAGNOSIS — R73.02 IGT (IMPAIRED GLUCOSE TOLERANCE): Chronic | ICD-10-CM

## 2021-08-03 DIAGNOSIS — I35.0 NONRHEUMATIC AORTIC VALVE STENOSIS: Chronic | ICD-10-CM

## 2021-08-03 PROCEDURE — G0439 PPPS, SUBSEQ VISIT: HCPCS | Performed by: INTERNAL MEDICINE

## 2021-08-03 PROCEDURE — 99214 OFFICE O/P EST MOD 30 MIN: CPT | Performed by: INTERNAL MEDICINE

## 2021-08-03 NOTE — PROGRESS NOTES
Subjective   Yuliana Gonzalez is a 89 y.o. female.     Chief Complaint   Patient presents with   • Annual Exam     AWV    • Hypertension   • Hyperlipidemia         In for recheck of chronic IGT.  Has been stable.  Also aortic stenosis.  It remains asymptomatic.  History of macular degeneration.  That is stable.    Hypertension  This is a chronic problem. The current episode started more than 1 year ago. The problem is unchanged. The problem is controlled. Pertinent negatives include no chest pain, headaches, orthopnea, palpitations, peripheral edema, PND or shortness of breath. There are no associated agents to hypertension. Risk factors for coronary artery disease include dyslipidemia and post-menopausal state. Current antihypertension treatment includes beta blockers and angiotensin blockers. The current treatment provides significant improvement. There are no compliance problems.  There is no history of angina, kidney disease, CAD/MI, CVA or heart failure.   Hyperlipidemia  This is a chronic problem. The current episode started more than 1 year ago. The problem is controlled. Recent lipid tests were reviewed and are normal. Factors aggravating her hyperlipidemia include beta blockers. Pertinent negatives include no chest pain or shortness of breath. She is currently on no antihyperlipidemic treatment. The current treatment provides significant improvement of lipids. There are no compliance problems.  Risk factors for coronary artery disease include dyslipidemia, hypertension and post-menopausal.   Hyperglycemia  This is a chronic problem. The current episode started more than 1 year ago. The problem occurs constantly. The problem has been unchanged. Pertinent negatives include no abdominal pain, chest pain or headaches.        The following portions of the patient's history were reviewed and updated as appropriate: allergies, current medications, past social history and problem list.    Outpatient Medications  Marked as Taking for the 8/3/21 encounter (Office Visit) with Trent Davis MD   Medication Sig Dispense Refill   • aspirin 325 MG tablet Take 325 mg by mouth 2 (Two) Times a Day As Needed for Mild Pain .     • atenolol (TENORMIN) 25 MG tablet TAKE 1 TABLET TWICE A  tablet 1   • cholecalciferol (VITAMIN D3) 1000 units tablet Take 2,000 Units by mouth Daily.     • hydroCHLOROthiazide (MICROZIDE) 12.5 MG capsule TAKE 1 CAPSULE BY MOUTH EVERY DAY 90 capsule 1   • Multiple Vitamins-Minerals (VITEYES AREDS ADVANCED PO) Take 2 capsules by mouth Daily.         Review of Systems   Respiratory: Negative for shortness of breath and wheezing.    Cardiovascular: Negative for chest pain, palpitations, orthopnea, leg swelling and PND.   Gastrointestinal: Negative for abdominal pain, constipation and diarrhea.   Neurological: Positive for light-headedness. Negative for headaches.       Objective   Vitals:    08/03/21 0948   BP: 110/70   Pulse: 61   Resp: 18   Temp: 96.6 °F (35.9 °C)   SpO2: 98%          08/03/21  0948   Weight: 73 kg (161 lb)    [unfilled]  Body mass index is 30.42 kg/m².      Physical Exam   Constitutional: She appears well-developed.   Neck: No thyromegaly present.   Cardiovascular: Normal rate and regular rhythm. Exam reveals no gallop.   Murmur heard.   Crescendo decrescendo systolic murmur is present with a grade of 3/6.  Murmur is in the aortic area   Pulmonary/Chest: Effort normal and breath sounds normal. No respiratory distress. She has no wheezes. She has no rales.   Abdominal: Soft. Normal appearance and bowel sounds are normal. She exhibits no mass. There is no abdominal tenderness. There is no guarding.   Neurological: She is alert.         Problems Addressed this Visit        Cardiac and Vasculature    Aortic stenosis (Chronic)    Hyperlipidemia (Chronic)    Hypertension - Primary (Chronic)       Endocrine and Metabolic    IGT (impaired glucose tolerance) (Chronic)      Diagnoses        Codes Comments    Essential hypertension    -  Primary ICD-10-CM: I10  ICD-9-CM: 401.9     Hyperlipidemia, unspecified hyperlipidemia type     ICD-10-CM: E78.5  ICD-9-CM: 272.4     Nonrheumatic aortic valve stenosis     ICD-10-CM: I35.0  ICD-9-CM: 424.1     IGT (impaired glucose tolerance)     ICD-10-CM: R73.02  ICD-9-CM: 790.22         Assessment/Plan   In for recheck of hypertension, hyperlipidemia, and aortic stenosis.  Blood pressure control is good.  Glycemic control is been good.  She is no longer treating lipids so we are not checking lipids at this point.  She has had some lightheadedness that resolved when she held her HCTZ per day.  We will try going to 12.5 mg every other day and see how she does.  Got annual lab work October 2020 including CBC, CMP and A1c.  AS remains asymptomatic.  No chest pain.  No syncope.  No CHF.  No edema.  No chest pain.  Last echocardiogram was April 2019.  Moderate to severe AS.  Dyspnea on exertion walking up a hill but if anything that is improving over time with more exercise.  Due for another TTE 5/2022.  Glucose and A1c every 3 months.  Annual wellness visit today July 2021.  History of macular degeneration being treated with eye vitamins.  She is 3 HPP today.    The above information was reviewed again today 08/03/21.  It continues to be accurate as reflected above and is unchanged.  History, physical and review of systems all reviewed and are unchanged.  Medications were reviewed today and continue the current dosing.    PPE today includes face mask and eye shield.         Dragon disclaimer:   Much of this encounter note is an electronic transcription/translation of spoken language to printed text. The electronic translation of spoken language may permit erroneous, or at times, nonsensical words or phrases to be inadvertently transcribed; Although I have reviewed the note for such errors, some may still exist.           Physical Exam  Constitutional:       Appearance:  Normal appearance. She is well-developed.   Neck:      Thyroid: No thyromegaly.   Cardiovascular:      Rate and Rhythm: Normal rate and regular rhythm.      Heart sounds: Murmur heard.   Crescendo decrescendo systolic murmur is present with a grade of 3/6.   No gallop.       Comments: Murmur is in the aortic area  Pulmonary:      Effort: Pulmonary effort is normal. No respiratory distress.      Breath sounds: Normal breath sounds. No wheezing or rales.   Abdominal:      General: Bowel sounds are normal.      Palpations: Abdomen is soft. There is no mass.      Tenderness: There is no abdominal tenderness. There is no guarding.   Neurological:      Mental Status: She is alert.

## 2021-08-03 NOTE — PROGRESS NOTES
The ABCs of the Annual Wellness Visit  Subsequent Medicare Wellness Visit    Chief Complaint   Patient presents with   • Annual Exam     AWV    • Hypertension   • Hyperlipidemia       Subjective   History of Present Illness:  Yuliana Gonzalez is a 89 y.o. female who presents for a Subsequent Medicare Wellness Visit.    HEALTH RISK ASSESSMENT    Recent Hospitalizations:  No hospitalization(s) within the last year.    Current Medical Providers:  Patient Care Team:  Trent Davis MD as PCP - General (Internal Medicine)  Trent Davis MD as PCP - Internal Medicine (Internal Medicine)  Thang Bruce MD as Consulting Physician (Ophthalmology)    Smoking Status:  Social History     Tobacco Use   Smoking Status Former Smoker   • Packs/day: 0.00   • Years: 0.00   • Pack years: 0.00   • Start date:    • Quit date:    • Years since quittin.6   Smokeless Tobacco Never Used   Tobacco Comment    1 pack every 2 weeks       Alcohol Consumption:  Social History     Substance and Sexual Activity   Alcohol Use No       Depression Screen:   PHQ-2/PHQ-9 Depression Screening 2021   Little interest or pleasure in doing things 0   Feeling down, depressed, or hopeless 0   Trouble falling or staying asleep, or sleeping too much -   Feeling tired or having little energy -   Poor appetite or overeating -   Feeling bad about yourself - or that you are a failure or have let yourself or your family down -   Trouble concentrating on things, such as reading the newspaper or watching television -   Moving or speaking so slowly that other people could have noticed. Or the opposite - being so fidgety or restless that you have been moving around a lot more than usual -   Thoughts that you would be better off dead, or of hurting yourself in some way -   Total Score 0   If you checked off any problems, how difficult have these problems made it for you to do your work, take care of things at home, or get along with other  people? -       Fall Risk Screen:  ANGELICA Fall Risk Assessment was completed, and patient is at MODERATE risk for falls. Assessment completed on:1/28/2021    Health Habits and Functional and Cognitive Screening:  Functional & Cognitive Status 7/29/2020   Do you have difficulty preparing food and eating? No   Do you have difficulty bathing yourself, getting dressed or grooming yourself? No   Do you have difficulty using the toilet? No   Do you have difficulty moving around from place to place? No   Do you have trouble with steps or getting out of a bed or a chair? No   Current Diet Well Balanced Diet   Dental Exam Up to date   Eye Exam Up to date   Exercise (times per week) 7 times per week   Current Exercise Activities Include Walking   Do you need help using the phone?  No   Are you deaf or do you have serious difficulty hearing?  No   Do you need help with transportation? Yes   Do you need help shopping? No   Do you need help preparing meals?  No   Do you need help with housework?  No   Do you need help with laundry? No   Do you need help taking your medications? No   Do you need help managing money? No   Do you ever drive or ride in a car without wearing a seat belt? No   Have you felt unusual stress, anger or loneliness in the last month? No   Who do you live with? Alone   If you need help, do you have trouble finding someone available to you? No   Have you been bothered in the last four weeks by sexual problems? No   Do you have difficulty concentrating, remembering or making decisions? No         Does the patient have evidence of cognitive impairment? No    Asprin use counseling:Taking ASA appropriately as indicated    Age-appropriate Screening Schedule:  Refer to the list below for future screening recommendations based on patient's age, sex and/or medical conditions. Orders for these recommended tests are listed in the plan section. The patient has been provided with a written plan.    Health Maintenance    Topic Date Due   • DXA SCAN  Never done   • ZOSTER VACCINE (2 of 3) 01/01/2035 (Originally 1/26/2008)   • INFLUENZA VACCINE  10/01/2021   • TDAP/TD VACCINES (3 - Td or Tdap) 03/01/2027   • MAMMOGRAM  Discontinued   • LIPID PANEL  Discontinued          The following portions of the patient's history were reviewed and updated as appropriate: allergies, current medications, past family history, past medical history, past social history, past surgical history and problem list.    Outpatient Medications Prior to Visit   Medication Sig Dispense Refill   • aspirin 325 MG tablet Take 325 mg by mouth 2 (Two) Times a Day As Needed for Mild Pain .     • atenolol (TENORMIN) 25 MG tablet TAKE 1 TABLET TWICE A  tablet 1   • cholecalciferol (VITAMIN D3) 1000 units tablet Take 2,000 Units by mouth Daily.     • hydroCHLOROthiazide (MICROZIDE) 12.5 MG capsule TAKE 1 CAPSULE BY MOUTH EVERY DAY 90 capsule 1   • Multiple Vitamins-Minerals (VITEYES AREDS ADVANCED PO) Take 2 capsules by mouth Daily.       No facility-administered medications prior to visit.       Patient Active Problem List   Diagnosis   • Aortic stenosis   • IGT (impaired glucose tolerance)   • Hyperlipidemia   • Hypertension   • Cervical arthritis   • Sciatica   • Vitamin D deficiency   • Exudative age-related macular degeneration, bilateral, with inactive choroidal neovascularization (CMS/HCC)       Advanced Care Planning:  ACP discussion was held with the patient during this visit. Patient has an advance directive (not in EMR), copy requested.    Review of Systems    Compared to one year ago, the patient feels her physical health is the same.  Compared to one year ago, the patient feels her mental health is better.    Reviewed chart for potential of high risk medication in the elderly: yes  Reviewed chart for potential of harmful drug interactions in the elderly:yes    Objective         Vitals:    08/03/21 0948   Weight: 73 kg (161 lb)   Height: 154.9 cm  "(61\")       Body mass index is 30.42 kg/m².  Discussed the patient's BMI with her. The BMI is in the acceptable range.    Physical Exam          Assessment/Plan   Medicare Risks and Personalized Health Plan  CMS Preventative Services Quick Reference  Advance Directive Discussion    The above risks/problems have been discussed with the patient.  Pertinent information has been shared with the patient in the After Visit Summary.  Follow up plans and orders are seen below in the Assessment/Plan Section.    There are no diagnoses linked to this encounter.  Follow Up:  No follow-ups on file.     An After Visit Summary and PPPS were given to the patient.               "

## 2021-08-04 LAB
GLUCOSE SERPL-MCNC: 95 MG/DL (ref 65–99)
HBA1C MFR BLD: 5.6 % (ref 4.8–5.6)

## 2021-10-12 RX ORDER — HYDROCHLOROTHIAZIDE 12.5 MG/1
CAPSULE, GELATIN COATED ORAL
Qty: 90 CAPSULE | Refills: 1 | Status: SHIPPED | OUTPATIENT
Start: 2021-10-12 | End: 2022-02-09 | Stop reason: SDUPTHER

## 2021-10-25 ENCOUNTER — TELEPHONE (OUTPATIENT)
Dept: INTERNAL MEDICINE | Facility: CLINIC | Age: 86
End: 2021-10-25

## 2021-10-25 NOTE — TELEPHONE ENCOUNTER
PATIENT STATED THAT SHE HAD AN EPISODE OF VERTIGO AND HER BLOOD PRESSURE WAS REALLY HIGH AND SHE TOOK ANOTHER ATENOLOL AND IT WENT BACK TO NORMAL. SHE IS SUPPOSED TO TAKE HER HYDROCHLOROTHIAZIDE EVERY OTHER DAY BUT WILL NOW BE TAKING IT EVERY DAY.

## 2021-11-10 ENCOUNTER — OFFICE VISIT (OUTPATIENT)
Dept: INTERNAL MEDICINE | Facility: CLINIC | Age: 86
End: 2021-11-10

## 2021-11-10 VITALS
SYSTOLIC BLOOD PRESSURE: 112 MMHG | BODY MASS INDEX: 30.21 KG/M2 | TEMPERATURE: 97.6 F | HEART RATE: 69 BPM | HEIGHT: 61 IN | OXYGEN SATURATION: 96 % | RESPIRATION RATE: 18 BRPM | WEIGHT: 160 LBS | DIASTOLIC BLOOD PRESSURE: 68 MMHG

## 2021-11-10 DIAGNOSIS — I35.0 NONRHEUMATIC AORTIC VALVE STENOSIS: Chronic | ICD-10-CM

## 2021-11-10 DIAGNOSIS — Z79.899 MEDICATION MANAGEMENT: ICD-10-CM

## 2021-11-10 DIAGNOSIS — E55.9 VITAMIN D DEFICIENCY: ICD-10-CM

## 2021-11-10 DIAGNOSIS — E78.5 HYPERLIPIDEMIA, UNSPECIFIED HYPERLIPIDEMIA TYPE: Chronic | ICD-10-CM

## 2021-11-10 DIAGNOSIS — R73.02 IGT (IMPAIRED GLUCOSE TOLERANCE): Chronic | ICD-10-CM

## 2021-11-10 DIAGNOSIS — I10 PRIMARY HYPERTENSION: Primary | Chronic | ICD-10-CM

## 2021-11-10 PROCEDURE — 99214 OFFICE O/P EST MOD 30 MIN: CPT | Performed by: INTERNAL MEDICINE

## 2021-11-10 NOTE — PROGRESS NOTES
Subjective   Yuliana Gonzalez is a 89 y.o. female.     Chief Complaint   Patient presents with   • Hyperlipidemia   • Hypertension   • IGT   • Numbness         In for recheck of chronic IGT.  Has been stable.  Also aortic stenosis.  It remains asymptomatic.  History of macular degeneration.  That is stable.    Hyperlipidemia  This is a chronic problem. The current episode started more than 1 year ago. The problem is controlled. Recent lipid tests were reviewed and are normal. Factors aggravating her hyperlipidemia include beta blockers. Associated symptoms include shortness of breath. Pertinent negatives include no chest pain. She is currently on no antihyperlipidemic treatment. The current treatment provides significant improvement of lipids. There are no compliance problems.  Risk factors for coronary artery disease include dyslipidemia, hypertension and post-menopausal.   Hypertension  This is a chronic problem. The current episode started more than 1 year ago. The problem is unchanged. The problem is controlled. Associated symptoms include shortness of breath. Pertinent negatives include no chest pain, headaches, orthopnea, palpitations, peripheral edema or PND. There are no associated agents to hypertension. Risk factors for coronary artery disease include dyslipidemia and post-menopausal state. Current antihypertension treatment includes beta blockers and angiotensin blockers. The current treatment provides significant improvement. There are no compliance problems.  There is no history of angina, kidney disease, CAD/MI, CVA or heart failure.   Hyperglycemia  This is a chronic problem. The current episode started more than 1 year ago. The problem occurs constantly. The problem has been unchanged. Pertinent negatives include no abdominal pain, chest pain or headaches.        The following portions of the patient's history were reviewed and updated as appropriate: allergies, current medications, past social  history and problem list.    Outpatient Medications Marked as Taking for the 11/10/21 encounter (Office Visit) with Trent Davis MD   Medication Sig Dispense Refill   • aspirin 325 MG tablet Take 325 mg by mouth 2 (Two) Times a Day As Needed for Mild Pain .     • atenolol (TENORMIN) 25 MG tablet TAKE 1 TABLET TWICE A  tablet 1   • cholecalciferol (VITAMIN D3) 1000 units tablet Take 2,000 Units by mouth Daily.     • hydroCHLOROthiazide (MICROZIDE) 12.5 MG capsule TAKE 1 CAPSULE BY MOUTH EVERY DAY 90 capsule 1   • Multiple Vitamins-Minerals (VITEYES AREDS ADVANCED PO) Take 2 capsules by mouth Daily.         Review of Systems   Respiratory: Positive for shortness of breath and wheezing.    Cardiovascular: Negative for chest pain, palpitations, orthopnea, leg swelling and PND.   Gastrointestinal: Negative for abdominal pain, constipation and diarrhea.   Neurological: Positive for light-headedness. Negative for headaches.       Objective   Vitals:    11/10/21 1001   BP: 112/68   Pulse: 69   Resp: 18   Temp: 97.6 °F (36.4 °C)   SpO2: 96%          11/10/21  1001   Weight: 72.6 kg (160 lb)    [unfilled]  Body mass index is 30.23 kg/m².      Physical Exam   Constitutional: She appears well-developed.   Neck: No thyromegaly present.   Cardiovascular: Normal rate and regular rhythm. Exam reveals no gallop.   Murmur heard.   Crescendo decrescendo systolic murmur is present with a grade of 3/6.  Murmur is in the aortic area   Pulmonary/Chest: Effort normal and breath sounds normal. No respiratory distress. She has no wheezes. She has no rales.   Abdominal: Soft. Normal appearance and bowel sounds are normal. She exhibits no mass. There is no abdominal tenderness. There is no guarding.   Neurological: She is alert.         Problems Addressed this Visit        Cardiac and Vasculature    Aortic stenosis (Chronic)    Hyperlipidemia (Chronic)    Hypertension - Primary (Chronic)       Endocrine and Metabolic    IGT  (impaired glucose tolerance) (Chronic)      Diagnoses       Codes Comments    Primary hypertension    -  Primary ICD-10-CM: I10  ICD-9-CM: 401.9     Hyperlipidemia, unspecified hyperlipidemia type     ICD-10-CM: E78.5  ICD-9-CM: 272.4     Nonrheumatic aortic valve stenosis     ICD-10-CM: I35.0  ICD-9-CM: 424.1     IGT (impaired glucose tolerance)     ICD-10-CM: R73.02  ICD-9-CM: 790.22         Assessment/Plan   In for recheck of hypertension, hyperlipidemia, and aortic stenosis.  Blood pressure control is good.  Glycemic control is been good.  She is no longer treating lipids so we are not checking lipids at this point.  Gets annual lab work today November 2021 including CBC, CMP, A1c and vitamin D level.  AS remains asymptomatic.  No chest pain.  No syncope.  No CHF.  Some shortness of breath with walking up an incline but that is stable.  Also pretty much gets exhausted making her bed.  No edema.  No chest pain.  Last echocardiogram was April 2021.  Severe AS.  Dyspnea on exertion walking up a hill but if anything that is improving over time with more exercise.  Due for another TTE 4/2022.  I think she is a pretty good candidate for TAVR and suggested she get a cardiology opinion.  She wants to run this by her son first who is her medical power of .  Glucose and A1c every 3 months.  Annual wellness visit due August 2022.  History of macular degeneration being treated with eye vitamins.  She is 2.5 HPP today.    The above information was reviewed again today 11/10/21.  It continues to be accurate as reflected above and is unchanged.  History, physical and review of systems all reviewed and are unchanged.  Medications were reviewed today and continue the current dosing.    PPE today includes face mask and eye shield.         Dragon disclaimer:   Much of this encounter note is an electronic transcription/translation of spoken language to printed text. The electronic translation of spoken language may permit  erroneous, or at times, nonsensical words or phrases to be inadvertently transcribed; Although I have reviewed the note for such errors, some may still exist.

## 2021-11-11 ENCOUNTER — TELEPHONE (OUTPATIENT)
Dept: INTERNAL MEDICINE | Facility: CLINIC | Age: 86
End: 2021-11-11

## 2021-11-11 DIAGNOSIS — I35.0 AORTIC VALVE STENOSIS, ETIOLOGY OF CARDIAC VALVE DISEASE UNSPECIFIED: Primary | ICD-10-CM

## 2021-11-11 LAB
25(OH)D3+25(OH)D2 SERPL-MCNC: 31.1 NG/ML (ref 30–100)
ALBUMIN SERPL-MCNC: 4.3 G/DL (ref 3.5–4.6)
ALBUMIN/GLOB SERPL: 2 {RATIO} (ref 1.2–2.2)
ALP SERPL-CCNC: 83 IU/L (ref 44–121)
ALT SERPL-CCNC: 11 IU/L (ref 0–32)
AST SERPL-CCNC: 18 IU/L (ref 0–40)
BASOPHILS # BLD AUTO: 0.1 X10E3/UL (ref 0–0.2)
BASOPHILS NFR BLD AUTO: 1 %
BILIRUB SERPL-MCNC: 0.4 MG/DL (ref 0–1.2)
BUN SERPL-MCNC: 14 MG/DL (ref 10–36)
BUN/CREAT SERPL: 22 (ref 12–28)
CALCIUM SERPL-MCNC: 9.6 MG/DL (ref 8.7–10.3)
CHLORIDE SERPL-SCNC: 101 MMOL/L (ref 96–106)
CO2 SERPL-SCNC: 24 MMOL/L (ref 20–29)
CREAT SERPL-MCNC: 0.64 MG/DL (ref 0.57–1)
EOSINOPHIL # BLD AUTO: 0.2 X10E3/UL (ref 0–0.4)
EOSINOPHIL NFR BLD AUTO: 2 %
ERYTHROCYTE [DISTWIDTH] IN BLOOD BY AUTOMATED COUNT: 12.7 % (ref 11.7–15.4)
GLOBULIN SER CALC-MCNC: 2.2 G/DL (ref 1.5–4.5)
GLUCOSE SERPL-MCNC: 89 MG/DL (ref 65–99)
HBA1C MFR BLD: 5.8 % (ref 4.8–5.6)
HCT VFR BLD AUTO: 42.7 % (ref 34–46.6)
HGB BLD-MCNC: 14.1 G/DL (ref 11.1–15.9)
IMM GRANULOCYTES # BLD AUTO: 0 X10E3/UL (ref 0–0.1)
IMM GRANULOCYTES NFR BLD AUTO: 0 %
LYMPHOCYTES # BLD AUTO: 2.7 X10E3/UL (ref 0.7–3.1)
LYMPHOCYTES NFR BLD AUTO: 32 %
MCH RBC QN AUTO: 28.8 PG (ref 26.6–33)
MCHC RBC AUTO-ENTMCNC: 33 G/DL (ref 31.5–35.7)
MCV RBC AUTO: 87 FL (ref 79–97)
MONOCYTES # BLD AUTO: 0.7 X10E3/UL (ref 0.1–0.9)
MONOCYTES NFR BLD AUTO: 8 %
NEUTROPHILS # BLD AUTO: 4.8 X10E3/UL (ref 1.4–7)
NEUTROPHILS NFR BLD AUTO: 57 %
PLATELET # BLD AUTO: 216 X10E3/UL (ref 150–450)
POTASSIUM SERPL-SCNC: 4.6 MMOL/L (ref 3.5–5.2)
PROT SERPL-MCNC: 6.5 G/DL (ref 6–8.5)
RBC # BLD AUTO: 4.9 X10E6/UL (ref 3.77–5.28)
SODIUM SERPL-SCNC: 138 MMOL/L (ref 134–144)
WBC # BLD AUTO: 8.4 X10E3/UL (ref 3.4–10.8)

## 2021-11-11 NOTE — TELEPHONE ENCOUNTER
Caller: Yuliana Gonzalez    Relationship: Self    Best call back number: [phone]     What is the best time to reach you: THIS AFTERNOON , BEFORE 4:00 PM    Who are you requesting to speak with (clinical staff, provider,  specific staff member): DR. SALEH    What was the call regarding: PATIENT WOULD LIKE TO DISCUSS THE CARDIOLOGIST REFERRAL WITH DR. SALEH.  SHE WOULD LIKE TO GO A HEAD WITH THIS REFERRAL.

## 2021-12-21 ENCOUNTER — OFFICE VISIT (OUTPATIENT)
Dept: CARDIOLOGY | Facility: CLINIC | Age: 86
End: 2021-12-21

## 2021-12-21 VITALS
WEIGHT: 158.2 LBS | HEART RATE: 84 BPM | SYSTOLIC BLOOD PRESSURE: 142 MMHG | HEIGHT: 61 IN | OXYGEN SATURATION: 97 % | BODY MASS INDEX: 29.87 KG/M2 | DIASTOLIC BLOOD PRESSURE: 82 MMHG

## 2021-12-21 DIAGNOSIS — I35.0 NONRHEUMATIC AORTIC VALVE STENOSIS: Primary | ICD-10-CM

## 2021-12-21 DIAGNOSIS — I45.10 RBBB (RIGHT BUNDLE BRANCH BLOCK): ICD-10-CM

## 2021-12-21 DIAGNOSIS — R07.89 CHEST TIGHTNESS: ICD-10-CM

## 2021-12-21 DIAGNOSIS — R06.02 SHORTNESS OF BREATH: ICD-10-CM

## 2021-12-21 PROCEDURE — 93000 ELECTROCARDIOGRAM COMPLETE: CPT | Performed by: INTERNAL MEDICINE

## 2021-12-21 PROCEDURE — 99204 OFFICE O/P NEW MOD 45 MIN: CPT | Performed by: INTERNAL MEDICINE

## 2022-01-03 ENCOUNTER — OFFICE VISIT (OUTPATIENT)
Dept: CARDIOLOGY | Facility: CLINIC | Age: 87
End: 2022-01-03

## 2022-01-03 VITALS
BODY MASS INDEX: 30.21 KG/M2 | WEIGHT: 160 LBS | HEART RATE: 63 BPM | SYSTOLIC BLOOD PRESSURE: 140 MMHG | HEIGHT: 61 IN | DIASTOLIC BLOOD PRESSURE: 80 MMHG

## 2022-01-03 DIAGNOSIS — E78.5 HYPERLIPIDEMIA, UNSPECIFIED HYPERLIPIDEMIA TYPE: Chronic | ICD-10-CM

## 2022-01-03 DIAGNOSIS — I35.0 NONRHEUMATIC AORTIC VALVE STENOSIS: Primary | Chronic | ICD-10-CM

## 2022-01-03 DIAGNOSIS — I10 PRIMARY HYPERTENSION: Chronic | ICD-10-CM

## 2022-01-03 PROCEDURE — 93000 ELECTROCARDIOGRAM COMPLETE: CPT | Performed by: INTERNAL MEDICINE

## 2022-01-03 PROCEDURE — 99204 OFFICE O/P NEW MOD 45 MIN: CPT | Performed by: INTERNAL MEDICINE

## 2022-01-03 NOTE — H&P (VIEW-ONLY)
Yuliana Gonzalez  1/27/1930  Date of Office Visit: 01/03/22  Encounter Provider: Clint Locke MD  Place of Service: [facility] CARDIOLOGY      CHIEF COMPLAINT:  Severe degenerative aortic valve stenosis  Chest pain    HISTORY OF PRESENT ILLNESS:   Tawnya,    I the pleasure of seeing your patient Yuliana Gonzalez in consultation secondary to her severe degenerative aortic valve stenosis.  She has medical history of hypertension and aortic valve stenosis that has been followed.  She does have set up with Dr. Tuan Marshall secondary to worsening of her gradients across the aortic valve.  Per her daughter she has noticed more tightness with exertion and dyspnea on exertion over the past 1 to 2 years.  She denies any orthopnea or PND.  She has not had any issues with syncope.  She underwent transthoracic echocardiogram in May 2021 which I have reviewed clearly showing severe degenerative aortic valve stenosis with mean gradient of about 40 mmHg and a peak velocity of 4.5 m/s.  She has a preserved ejection fraction.  She is here today for evaluation.    Review of Systems   Constitutional: Negative for fever and malaise/fatigue.   HENT: Negative for nosebleeds and sore throat.    Eyes: Negative for blurred vision and double vision.   Cardiovascular: Positive for chest pain and dyspnea on exertion. Negative for claudication, palpitations and syncope.   Respiratory: Negative for cough, shortness of breath and snoring.    Endocrine: Negative for cold intolerance, heat intolerance and polydipsia.   Skin: Negative for itching, poor wound healing and rash.   Musculoskeletal: Negative for joint pain, joint swelling, muscle weakness and myalgias.   Gastrointestinal: Negative for abdominal pain, melena, nausea and vomiting.   Neurological: Negative for light-headedness, loss of balance, seizures, vertigo and weakness.   Psychiatric/Behavioral: Negative for altered mental status and  "depression.          Past Medical History:   Diagnosis Date   • Aortic stenosis 07/2009    diagnosed, 6/14 moderate with 0.9cm area, 42mm max gradient. 0.91/34 6/15.   • Cervical arthritis    • Hyperlipidemia    • Hypertension    • IGT (impaired glucose tolerance)    • Medication management    • Sciatica    • Vitamin D deficiency        The following portions of the patient's history were reviewed and updated as appropriate: Social history , Family history and Surgical history     Current Outpatient Medications on File Prior to Visit   Medication Sig Dispense Refill   • aspirin 325 MG tablet Take 325 mg by mouth 2 (Two) Times a Day As Needed for Mild Pain .     • atenolol (TENORMIN) 25 MG tablet TAKE 1 TABLET TWICE A  tablet 1   • cholecalciferol (VITAMIN D3) 1000 units tablet Take 2,000 Units by mouth Daily.     • hydroCHLOROthiazide (MICROZIDE) 12.5 MG capsule TAKE 1 CAPSULE BY MOUTH EVERY DAY 90 capsule 1   • Multiple Vitamins-Minerals (VITEYES AREDS ADVANCED PO) Take 2 capsules by mouth Daily.       No current facility-administered medications on file prior to visit.       Allergies   Allergen Reactions   • Erythromycin Diarrhea   • Lisinopril Cough       Vitals:    01/03/22 1313 01/03/22 1314   BP: 140/80 140/80   BP Location: Right arm Left arm   Pulse: 63    Weight: 72.6 kg (160 lb)    Height: 154.9 cm (61\")      Body mass index is 30.23 kg/m².   Constitutional:       Appearance: Well-developed.   Eyes:      General: No scleral icterus.     Conjunctiva/sclera: Conjunctivae normal.   HENT:      Head: Normocephalic and atraumatic.   Neck:      Thyroid: No thyromegaly.      Vascular: Normal carotid pulses. No carotid bruit, hepatojugular reflux or JVD.      Trachea: No tracheal deviation.   Pulmonary:      Effort: No respiratory distress.      Breath sounds: Normal breath sounds. No decreased breath sounds. No wheezing. No rhonchi. No rales.   Chest:      Chest wall: Not tender to palpatation. "   Cardiovascular:      Normal rate. Regular rhythm.      Murmurs: There is a grade 3/6 mid to late systolic murmur.      No gallop.   Pulses:     Carotid: 2+ bilaterally.     Radial: 2+ bilaterally.     Femoral: 2+ bilaterally.     Dorsalis pedis: 2+ bilaterally.     Posterior tibial: 2+ bilaterally.  Edema:     Peripheral edema absent.   Abdominal:      General: Bowel sounds are normal. There is no distension.      Palpations: Abdomen is soft.      Tenderness: There is no abdominal tenderness.   Musculoskeletal:         General: No deformity.      Cervical back: Normal range of motion and neck supple. Skin:     Findings: No erythema or rash.   Neurological:      Mental Status: Alert and oriented to person, place, and time.      Sensory: No sensory deficit.   Psychiatric:         Behavior: Behavior normal.            Lab Results   Component Value Date    WBC 8.4 11/10/2021    HGB 14.1 11/10/2021    HCT 42.7 11/10/2021    MCV 87 11/10/2021     11/10/2021       Lab Results   Component Value Date    GLUCOSE 89 11/10/2021    BUN 14 11/10/2021    CREATININE 0.64 11/10/2021    EGFRIFNONA 78 11/10/2021    EGFRIFAFRI 90 11/10/2021    BCR 22 11/10/2021    K 4.6 11/10/2021    CO2 24 11/10/2021    CALCIUM 9.6 11/10/2021    PROTENTOTREF 6.5 11/10/2021    ALBUMIN 4.3 11/10/2021    LABIL2 2.0 11/10/2021    AST 18 11/10/2021    ALT 11 11/10/2021       Lab Results   Component Value Date    GLUCOSE 89 11/10/2021    CALCIUM 9.6 11/10/2021     11/10/2021    K 4.6 11/10/2021    CO2 24 11/10/2021     11/10/2021    BUN 14 11/10/2021    CREATININE 0.64 11/10/2021    EGFRIFAFRI 90 11/10/2021    EGFRIFNONA 78 11/10/2021    BCR 22 11/10/2021       Lab Results   Component Value Date    CHLPL 219 (H) 10/23/2020    TRIG 180 (H) 10/23/2020    HDL 66 (H) 10/23/2020     (H) 10/23/2020         ECG 12 Lead    Date/Time: 1/3/2022 1:18 PM  Performed by: Clint Locke MD  Authorized by: Clint Locke MD    Comparison: compared with previous ECG from 1/23/2018  Similar to previous ECG  Rhythm: sinus rhythm  Rate: normal  Conduction: right bundle branch block and left anterior fascicular block  QRS axis: left                 Results for orders placed during the hospital encounter of 05/14/21    Adult Transthoracic Echo Complete W/ Cont if Necessary Per Protocol    Interpretation Summary  · Estimated left ventricular EF = 64% Left ventricular systolic function is normal.  · Left ventricular diastolic function is consistent with (grade I) impaired relaxation.  · Severe aortic valve stenosis is present. Aortic valve area is 0.81 cm2.  · Peak velocity of the flow distal to the aortic valve is 453.4 cm/s. Aortic valve mean pressure gradient is 39.1 mmHg.      DISCUSSION/SUMMARY  Very pleasant 89-year-old female with a medical history of severe degenerative aortic valve stenosis, dyspnea on exertion along with chest discomfort and New York Heart Association class III symptoms who presents to me for evaluation.  She denies any syncope or heart failure admissions.  Her echocardiogram has been reviewed and she clearly has severe degenerative aortic valve stenosis.  I do think this is symptomatic.  Secondary to her advanced age have recommended that we undergo a CTA transcatheter aortic valve replacement protocol and if she has no proximal LAD or left main calcification we will avoid doing catheterization on her preoperatively.    1.  Severe degenerative aortic valve stenosis: Symptomatic.  New York Heart Association class III symptoms.  -I do think she is a high risk secondary to her advanced age and frailty and I would recommend transcatheter aortic valve replacement.  -Surgical consultation will be placed as part of our structural heart team evaluation.  -Transcatheter aortic valve replacement CTA has been ordered to evaluate access.    2.  Essential hypertension: Reasonably controlled.    3.  Right bundle branch block:  Patient and I have discussed that her pacemaker risk is increased secondary to her underlying electrical disease and she is aware that this is a potential outcome after transcatheter aortic valve replacement or surgical aortic valve replacement either 1.

## 2022-01-03 NOTE — PROGRESS NOTES
Yuliana Gonzalez  1/27/1930  Date of Office Visit: 01/03/22  Encounter Provider: Clint Locke MD  Place of Service: [facility] CARDIOLOGY      CHIEF COMPLAINT:  Severe degenerative aortic valve stenosis  Chest pain    HISTORY OF PRESENT ILLNESS:   Tawnya,    I the pleasure of seeing your patient Yuliana Gonzalez in consultation secondary to her severe degenerative aortic valve stenosis.  She has medical history of hypertension and aortic valve stenosis that has been followed.  She does have set up with Dr. Tuan Marshall secondary to worsening of her gradients across the aortic valve.  Per her daughter she has noticed more tightness with exertion and dyspnea on exertion over the past 1 to 2 years.  She denies any orthopnea or PND.  She has not had any issues with syncope.  She underwent transthoracic echocardiogram in May 2021 which I have reviewed clearly showing severe degenerative aortic valve stenosis with mean gradient of about 40 mmHg and a peak velocity of 4.5 m/s.  She has a preserved ejection fraction.  She is here today for evaluation.    Review of Systems   Constitutional: Negative for fever and malaise/fatigue.   HENT: Negative for nosebleeds and sore throat.    Eyes: Negative for blurred vision and double vision.   Cardiovascular: Positive for chest pain and dyspnea on exertion. Negative for claudication, palpitations and syncope.   Respiratory: Negative for cough, shortness of breath and snoring.    Endocrine: Negative for cold intolerance, heat intolerance and polydipsia.   Skin: Negative for itching, poor wound healing and rash.   Musculoskeletal: Negative for joint pain, joint swelling, muscle weakness and myalgias.   Gastrointestinal: Negative for abdominal pain, melena, nausea and vomiting.   Neurological: Negative for light-headedness, loss of balance, seizures, vertigo and weakness.   Psychiatric/Behavioral: Negative for altered mental status and  "depression.          Past Medical History:   Diagnosis Date   • Aortic stenosis 07/2009    diagnosed, 6/14 moderate with 0.9cm area, 42mm max gradient. 0.91/34 6/15.   • Cervical arthritis    • Hyperlipidemia    • Hypertension    • IGT (impaired glucose tolerance)    • Medication management    • Sciatica    • Vitamin D deficiency        The following portions of the patient's history were reviewed and updated as appropriate: Social history , Family history and Surgical history     Current Outpatient Medications on File Prior to Visit   Medication Sig Dispense Refill   • aspirin 325 MG tablet Take 325 mg by mouth 2 (Two) Times a Day As Needed for Mild Pain .     • atenolol (TENORMIN) 25 MG tablet TAKE 1 TABLET TWICE A  tablet 1   • cholecalciferol (VITAMIN D3) 1000 units tablet Take 2,000 Units by mouth Daily.     • hydroCHLOROthiazide (MICROZIDE) 12.5 MG capsule TAKE 1 CAPSULE BY MOUTH EVERY DAY 90 capsule 1   • Multiple Vitamins-Minerals (VITEYES AREDS ADVANCED PO) Take 2 capsules by mouth Daily.       No current facility-administered medications on file prior to visit.       Allergies   Allergen Reactions   • Erythromycin Diarrhea   • Lisinopril Cough       Vitals:    01/03/22 1313 01/03/22 1314   BP: 140/80 140/80   BP Location: Right arm Left arm   Pulse: 63    Weight: 72.6 kg (160 lb)    Height: 154.9 cm (61\")      Body mass index is 30.23 kg/m².   Constitutional:       Appearance: Well-developed.   Eyes:      General: No scleral icterus.     Conjunctiva/sclera: Conjunctivae normal.   HENT:      Head: Normocephalic and atraumatic.   Neck:      Thyroid: No thyromegaly.      Vascular: Normal carotid pulses. No carotid bruit, hepatojugular reflux or JVD.      Trachea: No tracheal deviation.   Pulmonary:      Effort: No respiratory distress.      Breath sounds: Normal breath sounds. No decreased breath sounds. No wheezing. No rhonchi. No rales.   Chest:      Chest wall: Not tender to palpatation. "   Cardiovascular:      Normal rate. Regular rhythm.      Murmurs: There is a grade 3/6 mid to late systolic murmur.      No gallop.   Pulses:     Carotid: 2+ bilaterally.     Radial: 2+ bilaterally.     Femoral: 2+ bilaterally.     Dorsalis pedis: 2+ bilaterally.     Posterior tibial: 2+ bilaterally.  Edema:     Peripheral edema absent.   Abdominal:      General: Bowel sounds are normal. There is no distension.      Palpations: Abdomen is soft.      Tenderness: There is no abdominal tenderness.   Musculoskeletal:         General: No deformity.      Cervical back: Normal range of motion and neck supple. Skin:     Findings: No erythema or rash.   Neurological:      Mental Status: Alert and oriented to person, place, and time.      Sensory: No sensory deficit.   Psychiatric:         Behavior: Behavior normal.            Lab Results   Component Value Date    WBC 8.4 11/10/2021    HGB 14.1 11/10/2021    HCT 42.7 11/10/2021    MCV 87 11/10/2021     11/10/2021       Lab Results   Component Value Date    GLUCOSE 89 11/10/2021    BUN 14 11/10/2021    CREATININE 0.64 11/10/2021    EGFRIFNONA 78 11/10/2021    EGFRIFAFRI 90 11/10/2021    BCR 22 11/10/2021    K 4.6 11/10/2021    CO2 24 11/10/2021    CALCIUM 9.6 11/10/2021    PROTENTOTREF 6.5 11/10/2021    ALBUMIN 4.3 11/10/2021    LABIL2 2.0 11/10/2021    AST 18 11/10/2021    ALT 11 11/10/2021       Lab Results   Component Value Date    GLUCOSE 89 11/10/2021    CALCIUM 9.6 11/10/2021     11/10/2021    K 4.6 11/10/2021    CO2 24 11/10/2021     11/10/2021    BUN 14 11/10/2021    CREATININE 0.64 11/10/2021    EGFRIFAFRI 90 11/10/2021    EGFRIFNONA 78 11/10/2021    BCR 22 11/10/2021       Lab Results   Component Value Date    CHLPL 219 (H) 10/23/2020    TRIG 180 (H) 10/23/2020    HDL 66 (H) 10/23/2020     (H) 10/23/2020         ECG 12 Lead    Date/Time: 1/3/2022 1:18 PM  Performed by: Clint Locke MD  Authorized by: Clint Locke MD    Comparison: compared with previous ECG from 1/23/2018  Similar to previous ECG  Rhythm: sinus rhythm  Rate: normal  Conduction: right bundle branch block and left anterior fascicular block  QRS axis: left                 Results for orders placed during the hospital encounter of 05/14/21    Adult Transthoracic Echo Complete W/ Cont if Necessary Per Protocol    Interpretation Summary  · Estimated left ventricular EF = 64% Left ventricular systolic function is normal.  · Left ventricular diastolic function is consistent with (grade I) impaired relaxation.  · Severe aortic valve stenosis is present. Aortic valve area is 0.81 cm2.  · Peak velocity of the flow distal to the aortic valve is 453.4 cm/s. Aortic valve mean pressure gradient is 39.1 mmHg.      DISCUSSION/SUMMARY  Very pleasant 89-year-old female with a medical history of severe degenerative aortic valve stenosis, dyspnea on exertion along with chest discomfort and New York Heart Association class III symptoms who presents to me for evaluation.  She denies any syncope or heart failure admissions.  Her echocardiogram has been reviewed and she clearly has severe degenerative aortic valve stenosis.  I do think this is symptomatic.  Secondary to her advanced age have recommended that we undergo a CTA transcatheter aortic valve replacement protocol and if she has no proximal LAD or left main calcification we will avoid doing catheterization on her preoperatively.    1.  Severe degenerative aortic valve stenosis: Symptomatic.  New York Heart Association class III symptoms.  -I do think she is a high risk secondary to her advanced age and frailty and I would recommend transcatheter aortic valve replacement.  -Surgical consultation will be placed as part of our structural heart team evaluation.  -Transcatheter aortic valve replacement CTA has been ordered to evaluate access.    2.  Essential hypertension: Reasonably controlled.    3.  Right bundle branch block:  Patient and I have discussed that her pacemaker risk is increased secondary to her underlying electrical disease and she is aware that this is a potential outcome after transcatheter aortic valve replacement or surgical aortic valve replacement either 1.

## 2022-01-04 ENCOUNTER — TELEPHONE (OUTPATIENT)
Dept: CARDIAC SURGERY | Facility: CLINIC | Age: 87
End: 2022-01-04

## 2022-01-04 NOTE — TELEPHONE ENCOUNTER
I S/W PT TO SCHEDULE NEW PT APPOINTMENT WITH DR COX AT THE [facility] OFFICE FOR 1/25/22. PT DECLINED APPOINTMENTS ON 1/18/22 AND 1/19/22 STATING HER DAUGHTER HAS TO BRING HER AND SHE CAN'T DO THOSE DATES.

## 2022-01-11 NOTE — PROGRESS NOTES
"Date of Office Visit: 2021  Encounter Provider: Curtis Marshall MD  Place of Service: [facility] CARDIOLOGY  Patient Name: Yuliana Gonzalez  :1930    Chief complaint: Aortic stenosis, shortness of breath, chest tightness, RBBB.    History of Present Illness:    I had the pleasure of seeing the patient in cardiology office on 2021.  She is a very pleasant 89 year-old functional female with a history of hypertension and aortic stenosis who presents for evaluation.    The patient is very functional for her age.  However, since 2019, she has had gradually worsening chest tightness and shortness of breath with exertion.  She states that activities such as stripping of bed, walking briskly, or any light to moderate exertion will cause her both symptoms.  She states that she \"wears out\" easily.  She does have a history of vertigo, although she has not had any lightheaded symptoms or true syncope.  Her mother and 2 maternal aunts had symptomatic aortic stenosis.  She had an echocardiogram on 2021 which showed an ejection fraction of 64%, grade 1 diastolic dysfunction, and severe aortic stenosis (peak gradient 82 mmHg, mean gradient 39 mmHg, aortic valve area 0.81 cm²).  Her EKG shows a right bundle branch block and first-degree AV block.  This is unchanged from her prior EKG in 2018.    Past Medical History:   Diagnosis Date   • Aortic stenosis 2009    diagnosed,  moderate with 0.9cm area, 42mm max gradient. 0.91/34 6/15.   • Cervical arthritis    • Hyperlipidemia    • Hypertension    • IGT (impaired glucose tolerance)    • Medication management    • Sciatica    • Vitamin D deficiency        Past Surgical History:   Procedure Laterality Date   • EYE SURGERY     • SKIN CANCER EXCISION         Current Outpatient Medications on File Prior to Visit   Medication Sig Dispense Refill   • atenolol (TENORMIN) 25 MG tablet TAKE 1 TABLET TWICE A  tablet 1   • " "cholecalciferol (VITAMIN D3) 1000 units tablet Take 2,000 Units by mouth Daily.     • hydroCHLOROthiazide (MICROZIDE) 12.5 MG capsule TAKE 1 CAPSULE BY MOUTH EVERY DAY 90 capsule 1   • Multiple Vitamins-Minerals (VITEYES AREDS ADVANCED PO) Take 2 capsules by mouth Daily.     • aspirin 325 MG tablet Take 325 mg by mouth 2 (Two) Times a Day As Needed for Mild Pain .       No current facility-administered medications on file prior to visit.     Allergies as of 2021 - Reviewed 2021   Allergen Reaction Noted   • Erythromycin Diarrhea 2016   • Lisinopril Cough 2016     Social History     Socioeconomic History   • Marital status:      Spouse name: Stephan   • Number of children: 4   Tobacco Use   • Smoking status: Former Smoker     Packs/day: 0.00     Years: 0.00     Pack years: 0.00     Start date:      Quit date:      Years since quittin.0   • Smokeless tobacco: Never Used   • Tobacco comment: 1 pack every 2 weeks   Substance and Sexual Activity   • Alcohol use: Yes     Comment: wine- rarely/  2 cups caffeine daily   • Drug use: No     Family History   Problem Relation Age of Onset   • Aortic stenosis Mother    • Hypertension Mother    • Heart failure Father    • Diabetes Father    • Hypertension Father    • No Known Problems Sister    • No Known Problems Daughter    • No Known Problems Son    • Aneurysm Sister    • Other Sister         Polio in her 20's       Review of Systems   Constitutional: Positive for malaise/fatigue.   Cardiovascular: Positive for chest pain and dyspnea on exertion.   Respiratory: Positive for shortness of breath.    All other systems reviewed and are negative.     Objective:     Vitals:    21 0930 21 0934   BP: 140/82 142/82   BP Location: Right arm Left arm   Patient Position: Sitting Sitting   Pulse: 84    SpO2: 97%    Weight: 71.8 kg (158 lb 3.2 oz)    Height: 154.9 cm (61\")      Body mass index is 29.89 kg/m².    Constitutional:       " Appearance: Healthy appearance. Well-developed.   Eyes:      Conjunctiva/sclera: Conjunctivae normal.   HENT:      Head: Normocephalic and atraumatic.   Pulmonary:      Effort: Pulmonary effort is normal.      Breath sounds: Normal breath sounds.   Cardiovascular:      Normal rate. Regular rhythm.      Murmurs: There is a grade 3/6 systolic murmur at the URSB and ULSB.      No gallop.   Edema:     Peripheral edema absent.   Abdominal:      Palpations: Abdomen is soft.      Tenderness: There is no abdominal tenderness.   Musculoskeletal:      Cervical back: Neck supple. Skin:     General: Skin is warm.   Neurological:      Mental Status: Alert and oriented to person, place, and time.   Psychiatric:         Behavior: Behavior normal.       Lab Review:     ECG 12 Lead    Date/Time: 12/21/2021 9:26 AM  Performed by: Curtis Marshall MD  Authorized by: Curtis Marshall MD   Comparison: compared with previous ECG from 1/23/2018  Similar to previous ECG  Rhythm: sinus rhythm  Rate: normal  BPM: 60  Conduction: right bundle branch block and 1st degree AV block    Clinical impression: abnormal EKG            Cardiac Procedures:  1.  Echocardiogram on 5/14/2021: The ejection fraction was 64%.  There was grade 1 diastolic dysfunction.  There was severe aortic stenosis with a peak gradient of 82 mmHg, mean gradient 39 mmHg, and calculated aortic valve area 0.81 cm².  There was moderate mitral annular calcification.    Assessment:       Diagnosis Plan   1. Nonrheumatic aortic valve stenosis     2. RBBB (right bundle branch block)     3. Chest tightness     4. Shortness of breath       Plan:       The patient very likely has symptomatic severe aortic stenosis.  She is 89 years old, although she is highly functional for her age.  I feel that she would be an ideal candidate for a TAVR evaluation.  I discussed this in detail with the patient and her daughter, and described the procedure in detail.  I also explained the  work-up involved.  I will refer her to the TAVR team for further evaluation and consideration.  The patient and her daughter were in agreement with the plan.

## 2022-01-13 ENCOUNTER — TRANSCRIBE ORDERS (OUTPATIENT)
Dept: CARDIOLOGY | Facility: HOSPITAL | Age: 87
End: 2022-01-13

## 2022-01-13 ENCOUNTER — HOSPITAL ENCOUNTER (OUTPATIENT)
Dept: CARDIOLOGY | Facility: HOSPITAL | Age: 87
Discharge: HOME | End: 2022-01-13

## 2022-01-13 ENCOUNTER — HOSPITAL ENCOUNTER (OUTPATIENT)
Dept: CT IMAGING | Facility: HOSPITAL | Age: 87
Discharge: HOME | End: 2022-01-13

## 2022-01-13 DIAGNOSIS — E78.5 HYPERLIPIDEMIA, UNSPECIFIED HYPERLIPIDEMIA TYPE: Chronic | ICD-10-CM

## 2022-01-13 DIAGNOSIS — I10 PRIMARY HYPERTENSION: Chronic | ICD-10-CM

## 2022-01-13 DIAGNOSIS — I35.0 NONRHEUMATIC AORTIC VALVE STENOSIS: Chronic | ICD-10-CM

## 2022-01-13 DIAGNOSIS — Z01.811 PRE-OP CHEST EXAM: Primary | ICD-10-CM

## 2022-01-13 LAB
CREAT BLDA-MCNC: 0.7 MG/DL (ref 0.6–1.3)
QT INTERVAL: 447 MS

## 2022-01-13 PROCEDURE — 82565 ASSAY OF CREATININE: CPT

## 2022-01-13 PROCEDURE — 71275 CT ANGIOGRAPHY CHEST: CPT

## 2022-01-13 PROCEDURE — 93010 ELECTROCARDIOGRAM REPORT: CPT | Performed by: INTERNAL MEDICINE

## 2022-01-13 PROCEDURE — 93005 ELECTROCARDIOGRAM TRACING: CPT

## 2022-01-13 PROCEDURE — 74174 CTA ABD&PLVS W/CONTRAST: CPT

## 2022-01-13 PROCEDURE — 0 IOPAMIDOL PER 1 ML: Performed by: INTERNAL MEDICINE

## 2022-01-13 RX ADMIN — IOPAMIDOL 85 ML: 755 INJECTION, SOLUTION INTRAVENOUS at 11:22

## 2022-01-17 DIAGNOSIS — I35.0 AORTIC STENOSIS, SEVERE: Primary | ICD-10-CM

## 2022-01-18 ENCOUNTER — TRANSCRIBE ORDERS (OUTPATIENT)
Dept: CARDIOLOGY | Facility: CLINIC | Age: 87
End: 2022-01-18

## 2022-01-18 DIAGNOSIS — Z13.6 SCREENING FOR ISCHEMIC HEART DISEASE: ICD-10-CM

## 2022-01-18 DIAGNOSIS — Z01.818 OTHER SPECIFIED PRE-OPERATIVE EXAMINATION: Primary | ICD-10-CM

## 2022-01-19 ENCOUNTER — LAB (OUTPATIENT)
Dept: LAB | Facility: HOSPITAL | Age: 87
End: 2022-01-19

## 2022-01-19 DIAGNOSIS — Z01.818 OTHER SPECIFIED PRE-OPERATIVE EXAMINATION: ICD-10-CM

## 2022-01-19 DIAGNOSIS — Z13.6 SCREENING FOR ISCHEMIC HEART DISEASE: ICD-10-CM

## 2022-01-19 LAB
ANION GAP SERPL CALCULATED.3IONS-SCNC: 10.2 MMOL/L (ref 5–15)
BASOPHILS # BLD AUTO: 0.09 10*3/MM3 (ref 0–0.2)
BASOPHILS NFR BLD AUTO: 0.9 % (ref 0–1.5)
BUN SERPL-MCNC: 15 MG/DL (ref 8–23)
BUN/CREAT SERPL: 23.1 (ref 7–25)
CALCIUM SPEC-SCNC: 9.3 MG/DL (ref 8.2–9.6)
CHLORIDE SERPL-SCNC: 101 MMOL/L (ref 98–107)
CO2 SERPL-SCNC: 26.8 MMOL/L (ref 22–29)
CREAT SERPL-MCNC: 0.65 MG/DL (ref 0.57–1)
DEPRECATED RDW RBC AUTO: 40.4 FL (ref 37–54)
EOSINOPHIL # BLD AUTO: 0.15 10*3/MM3 (ref 0–0.4)
EOSINOPHIL NFR BLD AUTO: 1.5 % (ref 0.3–6.2)
ERYTHROCYTE [DISTWIDTH] IN BLOOD BY AUTOMATED COUNT: 12.8 % (ref 12.3–15.4)
GFR SERPL CREATININE-BSD FRML MDRD: 85 ML/MIN/1.73
GLUCOSE SERPL-MCNC: 86 MG/DL (ref 65–99)
HCT VFR BLD AUTO: 44.5 % (ref 34–46.6)
HGB BLD-MCNC: 14.4 G/DL (ref 12–15.9)
IMM GRANULOCYTES # BLD AUTO: 0.05 10*3/MM3 (ref 0–0.05)
IMM GRANULOCYTES NFR BLD AUTO: 0.5 % (ref 0–0.5)
LYMPHOCYTES # BLD AUTO: 2.9 10*3/MM3 (ref 0.7–3.1)
LYMPHOCYTES NFR BLD AUTO: 28.5 % (ref 19.6–45.3)
MCH RBC QN AUTO: 28.3 PG (ref 26.6–33)
MCHC RBC AUTO-ENTMCNC: 32.4 G/DL (ref 31.5–35.7)
MCV RBC AUTO: 87.4 FL (ref 79–97)
MONOCYTES # BLD AUTO: 0.71 10*3/MM3 (ref 0.1–0.9)
MONOCYTES NFR BLD AUTO: 7 % (ref 5–12)
NEUTROPHILS NFR BLD AUTO: 6.27 10*3/MM3 (ref 1.7–7)
NEUTROPHILS NFR BLD AUTO: 61.6 % (ref 42.7–76)
NRBC BLD AUTO-RTO: 0 /100 WBC (ref 0–0.2)
PLATELET # BLD AUTO: 226 10*3/MM3 (ref 140–450)
PMV BLD AUTO: 11.3 FL (ref 6–12)
POTASSIUM SERPL-SCNC: 4.8 MMOL/L (ref 3.5–5.2)
RBC # BLD AUTO: 5.09 10*6/MM3 (ref 3.77–5.28)
SARS-COV-2 ORF1AB RESP QL NAA+PROBE: NOT DETECTED
SODIUM SERPL-SCNC: 138 MMOL/L (ref 136–145)
WBC NRBC COR # BLD: 10.17 10*3/MM3 (ref 3.4–10.8)

## 2022-01-19 PROCEDURE — 85025 COMPLETE CBC W/AUTO DIFF WBC: CPT

## 2022-01-19 PROCEDURE — U0005 INFEC AGEN DETEC AMPLI PROBE: HCPCS

## 2022-01-19 PROCEDURE — U0004 COV-19 TEST NON-CDC HGH THRU: HCPCS

## 2022-01-19 PROCEDURE — 80048 BASIC METABOLIC PNL TOTAL CA: CPT

## 2022-01-19 PROCEDURE — 36415 COLL VENOUS BLD VENIPUNCTURE: CPT

## 2022-01-19 PROCEDURE — C9803 HOPD COVID-19 SPEC COLLECT: HCPCS

## 2022-01-21 ENCOUNTER — HOSPITAL ENCOUNTER (OUTPATIENT)
Facility: HOSPITAL | Age: 87
Setting detail: HOSPITAL OUTPATIENT SURGERY
Discharge: HOME | End: 2022-01-21
Attending: INTERNAL MEDICINE | Admitting: INTERNAL MEDICINE

## 2022-01-21 VITALS
SYSTOLIC BLOOD PRESSURE: 140 MMHG | HEART RATE: 68 BPM | OXYGEN SATURATION: 96 % | HEIGHT: 62 IN | BODY MASS INDEX: 27.97 KG/M2 | WEIGHT: 152 LBS | RESPIRATION RATE: 18 BRPM | DIASTOLIC BLOOD PRESSURE: 72 MMHG | TEMPERATURE: 97.6 F

## 2022-01-21 DIAGNOSIS — I35.0 AORTIC STENOSIS, SEVERE: ICD-10-CM

## 2022-01-21 PROBLEM — I25.10 CORONARY ARTERY DISEASE INVOLVING NATIVE CORONARY ARTERY OF NATIVE HEART: Status: ACTIVE | Noted: 2022-01-21

## 2022-01-21 LAB
ACT BLD: 499 SECONDS (ref 82–152)
QT INTERVAL: 487 MS

## 2022-01-21 PROCEDURE — C1769 GUIDE WIRE: HCPCS | Performed by: INTERNAL MEDICINE

## 2022-01-21 PROCEDURE — 93454 CORONARY ARTERY ANGIO S&I: CPT | Performed by: INTERNAL MEDICINE

## 2022-01-21 PROCEDURE — 25010000002 MIDAZOLAM PER 1 MG: Performed by: INTERNAL MEDICINE

## 2022-01-21 PROCEDURE — C1894 INTRO/SHEATH, NON-LASER: HCPCS | Performed by: INTERNAL MEDICINE

## 2022-01-21 PROCEDURE — C1725 CATH, TRANSLUMIN NON-LASER: HCPCS | Performed by: INTERNAL MEDICINE

## 2022-01-21 PROCEDURE — 85347 COAGULATION TIME ACTIVATED: CPT

## 2022-01-21 PROCEDURE — 93005 ELECTROCARDIOGRAM TRACING: CPT | Performed by: INTERNAL MEDICINE

## 2022-01-21 PROCEDURE — 25010000002 FENTANYL CITRATE (PF) 50 MCG/ML SOLUTION: Performed by: INTERNAL MEDICINE

## 2022-01-21 PROCEDURE — 25010000002 HEPARIN (PORCINE) PER 1000 UNITS: Performed by: INTERNAL MEDICINE

## 2022-01-21 PROCEDURE — 0 IOPAMIDOL PER 1 ML: Performed by: INTERNAL MEDICINE

## 2022-01-21 PROCEDURE — C1874 STENT, COATED/COV W/DEL SYS: HCPCS | Performed by: INTERNAL MEDICINE

## 2022-01-21 PROCEDURE — 92928 PRQ TCAT PLMT NTRAC ST 1 LES: CPT | Performed by: INTERNAL MEDICINE

## 2022-01-21 PROCEDURE — C1887 CATHETER, GUIDING: HCPCS | Performed by: INTERNAL MEDICINE

## 2022-01-21 PROCEDURE — C9600 PERC DRUG-EL COR STENT SING: HCPCS | Performed by: INTERNAL MEDICINE

## 2022-01-21 DEVICE — XIENCE SKYPOINT™ EVEROLIMUS ELUTING CORONARY STENT SYSTEM 2.75 MM X 28 MM / RAPID-EXCHANGE
Type: IMPLANTABLE DEVICE | Status: FUNCTIONAL
Brand: XIENCE SKYPOINT™

## 2022-01-21 RX ORDER — ACETAMINOPHEN 325 MG/1
650 TABLET ORAL EVERY 4 HOURS PRN
Status: DISCONTINUED | OUTPATIENT
Start: 2022-01-21 | End: 2022-01-21 | Stop reason: HOSPADM

## 2022-01-21 RX ORDER — ONDANSETRON 4 MG/1
4 TABLET, FILM COATED ORAL EVERY 6 HOURS PRN
Status: DISCONTINUED | OUTPATIENT
Start: 2022-01-21 | End: 2022-01-21 | Stop reason: HOSPADM

## 2022-01-21 RX ORDER — FENTANYL CITRATE 50 UG/ML
INJECTION, SOLUTION INTRAMUSCULAR; INTRAVENOUS AS NEEDED
Status: DISCONTINUED | OUTPATIENT
Start: 2022-01-21 | End: 2022-01-21 | Stop reason: HOSPADM

## 2022-01-21 RX ORDER — HEPARIN SODIUM 1000 [USP'U]/ML
INJECTION, SOLUTION INTRAVENOUS; SUBCUTANEOUS AS NEEDED
Status: DISCONTINUED | OUTPATIENT
Start: 2022-01-21 | End: 2022-01-21 | Stop reason: HOSPADM

## 2022-01-21 RX ORDER — ASPIRIN 81 MG/1
81 TABLET ORAL DAILY
Qty: 30 TABLET | Refills: 6 | Status: SHIPPED | OUTPATIENT
Start: 2022-01-21

## 2022-01-21 RX ORDER — SODIUM CHLORIDE 9 MG/ML
75 INJECTION, SOLUTION INTRAVENOUS CONTINUOUS
Status: DISCONTINUED | OUTPATIENT
Start: 2022-01-21 | End: 2022-01-21 | Stop reason: HOSPADM

## 2022-01-21 RX ORDER — SODIUM CHLORIDE 0.9 % (FLUSH) 0.9 %
3 SYRINGE (ML) INJECTION EVERY 12 HOURS SCHEDULED
Status: DISCONTINUED | OUTPATIENT
Start: 2022-01-21 | End: 2022-01-21 | Stop reason: HOSPADM

## 2022-01-21 RX ORDER — ONDANSETRON 2 MG/ML
4 INJECTION INTRAMUSCULAR; INTRAVENOUS EVERY 6 HOURS PRN
Status: DISCONTINUED | OUTPATIENT
Start: 2022-01-21 | End: 2022-01-21 | Stop reason: HOSPADM

## 2022-01-21 RX ORDER — LIDOCAINE HYDROCHLORIDE 20 MG/ML
INJECTION, SOLUTION INFILTRATION; PERINEURAL AS NEEDED
Status: DISCONTINUED | OUTPATIENT
Start: 2022-01-21 | End: 2022-01-21 | Stop reason: HOSPADM

## 2022-01-21 RX ORDER — HYDROCODONE BITARTRATE AND ACETAMINOPHEN 5; 325 MG/1; MG/1
1 TABLET ORAL EVERY 4 HOURS PRN
Status: DISCONTINUED | OUTPATIENT
Start: 2022-01-21 | End: 2022-01-21 | Stop reason: HOSPADM

## 2022-01-21 RX ORDER — MIDAZOLAM HYDROCHLORIDE 1 MG/ML
INJECTION INTRAMUSCULAR; INTRAVENOUS AS NEEDED
Status: DISCONTINUED | OUTPATIENT
Start: 2022-01-21 | End: 2022-01-21 | Stop reason: HOSPADM

## 2022-01-21 RX ORDER — CLOPIDOGREL BISULFATE 75 MG/1
75 TABLET ORAL DAILY
Qty: 30 TABLET | Refills: 11 | Status: SHIPPED | OUTPATIENT
Start: 2022-01-21 | End: 2022-09-09 | Stop reason: SDUPTHER

## 2022-01-21 RX ORDER — ASPIRIN 325 MG
TABLET ORAL AS NEEDED
Status: DISCONTINUED | OUTPATIENT
Start: 2022-01-21 | End: 2022-01-21 | Stop reason: HOSPADM

## 2022-01-21 RX ORDER — SODIUM CHLORIDE 0.9 % (FLUSH) 0.9 %
10 SYRINGE (ML) INJECTION AS NEEDED
Status: DISCONTINUED | OUTPATIENT
Start: 2022-01-21 | End: 2022-01-21 | Stop reason: HOSPADM

## 2022-01-21 RX ORDER — SODIUM CHLORIDE 9 MG/ML
100 INJECTION, SOLUTION INTRAVENOUS CONTINUOUS
Status: DISCONTINUED | OUTPATIENT
Start: 2022-01-21 | End: 2022-01-21 | Stop reason: HOSPADM

## 2022-01-21 RX ORDER — CLOPIDOGREL BISULFATE 75 MG/1
TABLET ORAL AS NEEDED
Status: DISCONTINUED | OUTPATIENT
Start: 2022-01-21 | End: 2022-01-21 | Stop reason: HOSPADM

## 2022-01-21 RX ADMIN — SODIUM CHLORIDE 75 ML/HR: 9 INJECTION, SOLUTION INTRAVENOUS at 09:32

## 2022-01-21 NOTE — DISCHARGE INSTRUCTIONS
[facility]  [address] [place] [address]    Coronary Angioplasty with or without  Stent (Radial Approach) After Care    Refer to this sheet in the next few weeks. These instructions provide you with information on caring for yourself after your procedure. Your health care provider may also give you more specific instructions. Your treatment has been planned according to current medical practices, but problems sometimes occur. Call your health care provider if you have any problems or questions after your procedure.       Home Care Instructions:  · You may shower the day after the procedure. Remove the bandage (dressing) and gently wash the site with plain soap and water. Gently pat the site dry. You may apply a band aid daily for 2 days if desired.    · Do not apply powder or lotion to the site.  · Do not submerge the affected site in water for 3 to 5 days or until the site is completely healed.   · Do not flex or bend at the wrist with affected arm for 24 hours.  · Do not lift, push or pull anything over 5 pounds for 5 days after your procedure or as directed by your physician.  For a reference, a gallon of milk weighs 8 pounds.    · Do not operate machinery or power tools for 24 hours.  · Inspect the site at least twice daily. You may notice some bruising at the site and it may be tender for 1 to 2 weeks.     · Increase your fluid intake for the next 2 days.    · Keep arm elevated for 24 hours.  For the remainder of the day, keep your arm in the “Pledge of Allegiance” position when up and about.    · Limit your activity for the next 48 hours and avoid strenuous activity as directed by your physician.   · Cardiac Rehab may or may not be ordered.  Please consult with your physician  · You may drive 24 hours after the procedure unless otherwise instructed by your caregiver.  · A responsible adult should be with you for the first 24 hours after you arrive home.   · Do not make any important  legal decisions or sign legal papers for 24 hours. Do not drink alcohol for 24 hours.    · Take medicines only as directed by your health care provider.  Blood thinners may be prescribed after your procedure to improve blood flow through the stent.    · Metformin or any medications containing Metformin should not be taken for 48 hours after your procedure.    · Eat a heart-healthy diet. This should include plenty of fresh fruits and vegetables. Meat should be lean cuts. Avoid the following types of food:    ¨ Food that is high in salt.    ¨ Canned or highly processed food.    ¨ Food that is high in saturated fat or sugar.    ¨ Fried food.    · Make any other lifestyle changes recommended by your health care provider. This may include:    ¨ Not using any tobacco products including cigarettes, chewing tobacco, or electronic cigarettes.   ¨ Managing your weight.    ¨ Getting regular exercise.    ¨ Managing your blood pressure.    ¨ Limiting your alcohol intake.    ¨ Managing other health problems, such as diabetes.    · If you need an MRI after your heart stent was placed, be sure to tell the health care provider who orders the MRI that you have a heart stent.    · Keep all follow-up visits as directed by your health care provider.    ·   Call Your Doctor If:    · You have unusual pain at the radial/ulnar (wrist) site.  · You have redness, warmth, swelling, or pain at the radial/ulnar (wrist) site.  · You have drainage (other than a small amount of blood on the dressing).  · `You have chills or a fever > 101.  · Your arm becomes pale or dark, cool, tingly, or numb.  · You develop chest pain, shortness of breath, feel faint or pass out.    · You have heavy bleeding from the site.  If you do, hold pressure on the site for 20 minutes.  If the bleeding stops, apply a fresh bandage and call your cardiologist.  However, if you continue to have bleeding, maintain pressure and call 911.    · You have any symptoms of a stroke.   Remember BE FAST  · B-balance. Sudden trouble walking or loss of balance.  · E-eyes.  Sudden changes in how you see or a sudden onset of a very bad headache.   · F-face. Sudden weakness or loss of feeling of the face or facial droop on one side.   · A-arms Sudden weakness or numbness in one arm. One arm drifts down if they are both held out in front of you. This happens suddenly and usually on one side of the body.   · S-speech.  Sudden trouble speaking, slurred speech or trouble understanding what people are saying.   · T-time  Time to call emergency services.  Write down the symptoms and the time they started.

## 2022-01-21 NOTE — CONSULTS
Met with patient and daughter,discussed benefits of cardiac rehab. Patient is scheduled for a possible TAVR procedure, meeting with Dr Locke next week. Will went to start [facility] rehab after TAVR. Provided phase II information along with the contact information for cardiac rehab here at [facility].

## 2022-01-25 ENCOUNTER — OFFICE VISIT (OUTPATIENT)
Dept: CARDIAC SURGERY | Facility: CLINIC | Age: 87
End: 2022-01-25

## 2022-01-25 VITALS
OXYGEN SATURATION: 97 % | RESPIRATION RATE: 20 BRPM | HEIGHT: 62 IN | TEMPERATURE: 97.4 F | HEART RATE: 66 BPM | WEIGHT: 152 LBS | BODY MASS INDEX: 27.97 KG/M2 | DIASTOLIC BLOOD PRESSURE: 72 MMHG | SYSTOLIC BLOOD PRESSURE: 147 MMHG

## 2022-01-25 DIAGNOSIS — E78.5 HYPERLIPIDEMIA, UNSPECIFIED HYPERLIPIDEMIA TYPE: Chronic | ICD-10-CM

## 2022-01-25 DIAGNOSIS — I35.0 NONRHEUMATIC AORTIC VALVE STENOSIS: Chronic | ICD-10-CM

## 2022-01-25 DIAGNOSIS — I10 PRIMARY HYPERTENSION: Primary | Chronic | ICD-10-CM

## 2022-01-25 DIAGNOSIS — I25.118 CORONARY ARTERY DISEASE OF NATIVE ARTERY OF NATIVE HEART WITH STABLE ANGINA PECTORIS: ICD-10-CM

## 2022-01-25 DIAGNOSIS — I50.32 CHRONIC DIASTOLIC CONGESTIVE HEART FAILURE: ICD-10-CM

## 2022-01-25 PROCEDURE — 99204 OFFICE O/P NEW MOD 45 MIN: CPT | Performed by: THORACIC SURGERY (CARDIOTHORACIC VASCULAR SURGERY)

## 2022-01-28 ENCOUNTER — PATIENT ROUNDING (BHMG ONLY) (OUTPATIENT)
Dept: CARDIAC SURGERY | Facility: CLINIC | Age: 87
End: 2022-01-28

## 2022-01-28 DIAGNOSIS — I35.0 AORTIC STENOSIS, SEVERE: Primary | ICD-10-CM

## 2022-01-28 DIAGNOSIS — I50.32 CHRONIC DIASTOLIC CONGESTIVE HEART FAILURE: ICD-10-CM

## 2022-01-28 NOTE — PROGRESS NOTES
1/28/2022 1/25/2022    Chief Complaint     Dyspnea of exertion, evaluation of aortic stenosis    History of Present Illness:       Dear Colleagues,  It was nice to see Yuliana Crowleykin in consultation at your request. She is a 89 y.o. female with a history of hypertension, hyperlipidemia, macular degeneration, glucose intolerance and frailty who has developed progressive symptoms of dyspnea and fatigue and she was diagnosed with severe aortic stenosis. She denies syncope, TIA, orthopnea, PND or lower extremity edema.  She had a 2D echocardiogram showed evidence of severe aortic stenosis with an aortic valve area of 4.5m/s, mean pressure gradient of 39.1 mmHg and aortic valve area 0.81 cm².  She has no significant mitral tricuspid regurgitation and ejection fraction was 64%.  She had a cardiac cath as part of the work-up that showed significant mid LAD lesion that was successfully stented.  She had a TAVR CTA which I have reviewed in detail and shows adequate transfemoral access and good parameters for percutaneous valve implantability.  She is very frail and she has advanced age.  She has no family history of aneurysms, dissections or connective tissue disorders.  There is no bicuspid aortopathy identified.      Patient Active Problem List   Diagnosis   • Aortic stenosis   • IGT (impaired glucose tolerance)   • Hyperlipidemia   • Hypertension   • Cervical arthritis   • Sciatica   • Vitamin D deficiency   • Exudative age-related macular degeneration, bilateral, with inactive choroidal neovascularization (HCC)   • Coronary artery disease involving native coronary artery of native heart   • Chronic diastolic congestive heart failure (HCC)       Past Medical History:   Diagnosis Date   • Aortic stenosis 07/2009    diagnosed, 6/14 moderate with 0.9cm area, 42mm max gradient. 0.91/34 6/15.   • Cervical arthritis    • Colitis    • Hyperlipidemia    • Hypertension    • IGT (impaired glucose tolerance)    • Medication  management    • Sciatica    • Vitamin D deficiency        Past Surgical History:   Procedure Laterality Date   • CARDIAC CATHETERIZATION N/A 1/21/2022    Procedure: Coronary angiography;  Surgeon: Clint Locke MD;  Location:  [facility] CATH INVASIVE LOCATION;  Service: Cardiovascular;  Laterality: N/A;   • CARDIAC CATHETERIZATION N/A 1/21/2022    Procedure: Percutaneous Coronary Intervention;  Surgeon: Clint Locke MD;  Location:  [facility] CATH INVASIVE LOCATION;  Service: Cardiovascular;  Laterality: N/A;   • CARDIAC CATHETERIZATION N/A 1/21/2022    Procedure: Stent ISMAEL coronary;  Surgeon: Clint Locke MD;  Location:  [facility] CATH INVASIVE LOCATION;  Service: Cardiovascular;  Laterality: N/A;   • EYE SURGERY     • SKIN CANCER EXCISION         Allergies   Allergen Reactions   • Erythromycin Diarrhea   • Lisinopril Cough         Current Outpatient Medications:   •  Acetaminophen (TYLENOL PO), Take  by mouth As Needed (for pain or fever)., Disp: , Rfl:   •  aspirin (aspirin) 81 MG EC tablet, Take 1 tablet by mouth Daily., Disp: 30 tablet, Rfl: 6  •  atenolol (TENORMIN) 25 MG tablet, TAKE 1 TABLET TWICE A DAY, Disp: 180 tablet, Rfl: 1  •  cholecalciferol (VITAMIN D3) 1000 units tablet, Take 2,000 Units by mouth Daily., Disp: , Rfl:   •  clopidogrel (PLAVIX) 75 MG tablet, Take 1 tablet by mouth Daily., Disp: 30 tablet, Rfl: 11  •  diphenhydrAMINE HCl (BENADRYL PO), Take  by mouth As Needed (allergies)., Disp: , Rfl:   •  hydroCHLOROthiazide (MICROZIDE) 12.5 MG capsule, TAKE 1 CAPSULE BY MOUTH EVERY DAY, Disp: 90 capsule, Rfl: 1  •  Magnesium Hydroxide (DULCOLAX PO), Take  by mouth As Needed (constipation)., Disp: , Rfl:   •  metroNIDAZOLE (METROCREAM) 0.75 % cream, Apply 1 application topically to the appropriate area as directed 2 (Two) Times a Day., Disp: , Rfl:   •  Multiple Vitamins-Minerals (VITEYES AREDS ADVANCED PO), Take 2 capsules by mouth Daily., Disp: , Rfl:     Social History      Socioeconomic History   • Marital status:      Spouse name: Stephan   • Number of children: 4   Tobacco Use   • Smoking status: Former Smoker     Packs/day: 0.00     Years: 0.00     Pack years: 0.00     Start date:      Quit date:      Years since quittin.1   • Smokeless tobacco: Never Used   • Tobacco comment: 1 pack every 2 weeks   Substance and Sexual Activity   • Alcohol use: Yes     Comment: wine- rarely/  2 cups caffeine daily   • Drug use: No       Family History   Problem Relation Age of Onset   • Aortic stenosis Mother    • Hypertension Mother    • Heart failure Father    • Diabetes Father    • Hypertension Father    • No Known Problems Sister    • No Known Problems Daughter    • No Known Problems Son    • Aneurysm Sister    • Other Sister         Polio in her 20's     Review of Systems   Constitutional: Positive for fatigue.   Respiratory: Positive for shortness of breath.    Neurological: Positive for weakness.   All other systems reviewed and are negative.      Physical Exam:    Vital Signs:  Weight: 68.9 kg (152 lb)   Body mass index is 27.8 kg/m².  Temp: 97.4 °F (36.3 °C)   Heart Rate: 66   BP: 147/72     Constitutional:       Appearance: Well-developed.   Eyes:      Conjunctiva/sclera: Conjunctivae normal.      Pupils: Pupils are equal, round, and reactive to light.   HENT:      Head: Normocephalic and atraumatic.      Nose: Nose normal.   Neck:      Thyroid: No thyromegaly.      Vascular: No JVD.      Lymphadenopathy: No cervical adenopathy.   Pulmonary:      Effort: Pulmonary effort is normal.      Breath sounds: Normal breath sounds. No rales.   Cardiovascular:      PMI at left midclavicular line. Normal rate. Regular rhythm.      Murmurs: There is a harsh midsystolic murmur at the URSB, radiating to the neck.      No gallop. No click. No rub.   Pulses:     Intact distal pulses. No decreased pulses.   Edema:     Ankle: trace edema of the left ankle.     Feet: trace edema of  the right foot.  Abdominal:      General: Bowel sounds are normal. There is no distension.      Palpations: Abdomen is soft. There is no abdominal mass.      Tenderness: There is no abdominal tenderness.   Musculoskeletal: Normal range of motion.         General: No tenderness or deformity.      Cervical back: Normal range of motion and neck supple. Skin:     General: Skin is warm and dry.      Findings: No erythema or rash.   Neurological:      Mental Status: Alert and oriented to person, place, and time.      Deep Tendon Reflexes: Reflexes are normal and symmetric.   Psychiatric:         Behavior: Behavior normal.          Assessment:     Problems Addressed this Visit        Cardiac and Vasculature    Aortic stenosis (Chronic)    Hyperlipidemia (Chronic)    Hypertension - Primary (Chronic)    Coronary artery disease involving native coronary artery of native heart    Chronic diastolic congestive heart failure (HCC)      Diagnoses       Codes Comments    Primary hypertension    -  Primary ICD-10-CM: I10  ICD-9-CM: 401.9     Coronary artery disease of native artery of native heart with stable angina pectoris (HCC)     ICD-10-CM: I25.118  ICD-9-CM: 414.01, 413.9     Chronic diastolic congestive heart failure (HCC)     ICD-10-CM: I50.32  ICD-9-CM: 428.32, 428.0     Nonrheumatic aortic valve stenosis     ICD-10-CM: I35.0  ICD-9-CM: 424.1     Hyperlipidemia, unspecified hyperlipidemia type     ICD-10-CM: E78.5  ICD-9-CM: 272.4           Assessment/recommendation:     1. Severe nonrheumatic degenerative aortic valve stenosis with objective parameters by echocardiogram and progression of symptoms.  I recommend TAVR over SAVR given his advanced age, low to moderate risk and frailty.  I discussed the risks (STS calculated), benefits and terms of surgery and she wishes to proceed with the TAVR procedure.  She had a TAVR CTA that showed feasibility and good access.  Plan is to complete the preoperative work-up and hopefully  the percutaneous procedure soon.  I discussed with patient and if need be she would be a rescue despite her age.  She verbalized agreement  2. Single-vessel coronary artery disease, status post PCI.  There is no chest pain.  She is on Plavix and she will continue on that  3 days before the procedure    Thank you for allowing me to participate in her care.    Regards,    Patricio Lynne M.D.  I spent over 45 minutes in examining the patient, reviewing the records, reviewing and interpreting myself the echocardiogram, the cardiac cath and TAVR CTA myself and discussing the findings and options with the patient, discussing the risk of TAVR and the risk of surgery and the rationale of electing a percutaneous procedure, and also documenting in the electronic record

## 2022-01-31 ENCOUNTER — PREP FOR SURGERY (OUTPATIENT)
Dept: OTHER | Facility: HOSPITAL | Age: 87
End: 2022-01-31

## 2022-01-31 DIAGNOSIS — R79.1 ABNORMAL COAGULATION PROFILE: ICD-10-CM

## 2022-01-31 DIAGNOSIS — I50.32 CHRONIC DIASTOLIC (CONGESTIVE) HEART FAILURE: ICD-10-CM

## 2022-01-31 DIAGNOSIS — R79.9 ABNORMAL FINDING OF BLOOD CHEMISTRY, UNSPECIFIED: ICD-10-CM

## 2022-01-31 DIAGNOSIS — I35.0 AORTIC STENOSIS, SEVERE: Primary | ICD-10-CM

## 2022-01-31 RX ORDER — CHLORHEXIDINE GLUCONATE 0.12 MG/ML
15 RINSE ORAL EVERY 12 HOURS
Status: CANCELLED | OUTPATIENT
Start: 2022-01-31 | End: 2022-02-01

## 2022-01-31 RX ORDER — CHLORHEXIDINE GLUCONATE 0.12 MG/ML
15 RINSE ORAL ONCE
Status: CANCELLED | OUTPATIENT
Start: 2022-03-01 | End: 2022-01-31

## 2022-01-31 RX ORDER — CEFAZOLIN SODIUM 2 G/100ML
2 INJECTION, SOLUTION INTRAVENOUS
Status: CANCELLED | OUTPATIENT
Start: 2022-03-01 | End: 2022-03-02

## 2022-02-09 ENCOUNTER — OFFICE VISIT (OUTPATIENT)
Dept: INTERNAL MEDICINE | Facility: CLINIC | Age: 87
End: 2022-02-09

## 2022-02-09 VITALS
OXYGEN SATURATION: 99 % | HEIGHT: 62 IN | HEART RATE: 72 BPM | WEIGHT: 159 LBS | DIASTOLIC BLOOD PRESSURE: 74 MMHG | SYSTOLIC BLOOD PRESSURE: 118 MMHG | TEMPERATURE: 96.2 F | RESPIRATION RATE: 16 BRPM | BODY MASS INDEX: 29.26 KG/M2

## 2022-02-09 DIAGNOSIS — I10 PRIMARY HYPERTENSION: Primary | Chronic | ICD-10-CM

## 2022-02-09 DIAGNOSIS — R73.02 IGT (IMPAIRED GLUCOSE TOLERANCE): Chronic | ICD-10-CM

## 2022-02-09 DIAGNOSIS — E78.5 HYPERLIPIDEMIA, UNSPECIFIED HYPERLIPIDEMIA TYPE: Chronic | ICD-10-CM

## 2022-02-09 DIAGNOSIS — I35.0 NONRHEUMATIC AORTIC VALVE STENOSIS: Chronic | ICD-10-CM

## 2022-02-09 PROCEDURE — 99214 OFFICE O/P EST MOD 30 MIN: CPT | Performed by: INTERNAL MEDICINE

## 2022-02-09 RX ORDER — ATORVASTATIN CALCIUM 20 MG/1
20 TABLET, FILM COATED ORAL DAILY
Qty: 90 TABLET | Refills: 3 | Status: SHIPPED | OUTPATIENT
Start: 2022-02-09 | End: 2022-02-25 | Stop reason: SINTOL

## 2022-02-09 RX ORDER — ATENOLOL 25 MG/1
25 TABLET ORAL DAILY
Qty: 90 TABLET | Refills: 1 | Status: SHIPPED | OUTPATIENT
Start: 2022-02-09 | End: 2022-08-06 | Stop reason: HOSPADM

## 2022-02-09 RX ORDER — HYDROCHLOROTHIAZIDE 12.5 MG/1
12.5 CAPSULE, GELATIN COATED ORAL DAILY
Qty: 90 CAPSULE | Refills: 1 | Status: SHIPPED | OUTPATIENT
Start: 2022-02-09 | End: 2022-08-06 | Stop reason: HOSPADM

## 2022-02-09 NOTE — PROGRESS NOTES
Subjective   Yuliana Gonzalez is a 89 y.o. female.     Chief Complaint   Patient presents with   • Hyperlipidemia   • Hypertension   • igt   • Asthma   • Dizziness     Patient states that taking her meds at times causes dizziness         In for recheck of chronic IGT.  Has been stable.  Also aortic stenosis.  Has dyspnea on exertion which is significant.  Now set up for an AVR.  History of macular degeneration.  That is stable.    Hyperlipidemia  This is a chronic problem. The current episode started more than 1 year ago. The problem is controlled. Recent lipid tests were reviewed and are normal. Factors aggravating her hyperlipidemia include beta blockers. Associated symptoms include shortness of breath. Pertinent negatives include no chest pain.   Hypertension  This is a chronic problem. The current episode started more than 1 year ago. The problem is unchanged. The problem is controlled. Associated symptoms include shortness of breath. Pertinent negatives include no chest pain or palpitations.   Asthma  She complains of shortness of breath and wheezing. Pertinent negatives include no chest pain.   Hyperglycemia  This is a chronic problem. The current episode started more than 1 year ago. The problem occurs constantly. Pertinent negatives include no chest pain.        The following portions of the patient's history were reviewed and updated as appropriate: allergies, current medications, past social history and problem list.    Outpatient Medications Marked as Taking for the 2/9/22 encounter (Office Visit) with Trent Davis MD   Medication Sig Dispense Refill   • Acetaminophen (TYLENOL PO) Take  by mouth As Needed (for pain or fever).     • aspirin (aspirin) 81 MG EC tablet Take 1 tablet by mouth Daily. 30 tablet 6   • atenolol (TENORMIN) 25 MG tablet TAKE 1 TABLET TWICE A  tablet 1   • cholecalciferol (VITAMIN D3) 1000 units tablet Take 2,000 Units by mouth Daily.     • clopidogrel (PLAVIX) 75 MG tablet  Take 1 tablet by mouth Daily. 30 tablet 11   • diphenhydrAMINE HCl (BENADRYL PO) Take  by mouth As Needed (allergies).     • hydroCHLOROthiazide (MICROZIDE) 12.5 MG capsule TAKE 1 CAPSULE BY MOUTH EVERY DAY 90 capsule 1   • Magnesium Hydroxide (DULCOLAX PO) Take  by mouth As Needed (constipation).     • metroNIDAZOLE (METROCREAM) 0.75 % cream Apply 1 application topically to the appropriate area as directed 2 (Two) Times a Day.     • Multiple Vitamins-Minerals (VITEYES AREDS ADVANCED PO) Take 2 capsules by mouth Daily.         Review of Systems   Respiratory: Positive for shortness of breath and wheezing.    Cardiovascular: Negative for chest pain, palpitations and leg swelling.   Gastrointestinal: Negative for constipation and diarrhea.       Objective   Vitals:    02/09/22 0948   BP: 118/74   Pulse: 72   Resp: 16   Temp: 96.2 °F (35.7 °C)   SpO2: 99%          02/09/22 0948   Weight: 72.1 kg (159 lb)    [unfilled]  Body mass index is 29.07 kg/m².      Physical Exam   Constitutional: She appears well-developed.   Neck: No thyromegaly present.   Cardiovascular: Normal rate and regular rhythm. Exam reveals no gallop.   Murmur heard.   Crescendo decrescendo systolic murmur is present with a grade of 3/6.  Murmur is in the aortic area   Pulmonary/Chest: Effort normal and breath sounds normal. No respiratory distress. She has no wheezes. She has no rales.   Abdominal: Soft. Normal appearance and bowel sounds are normal. She exhibits no mass. There is no abdominal tenderness. There is no guarding.   Neurological: She is alert.         Problems Addressed this Visit        Cardiac and Vasculature    Aortic stenosis (Chronic)    Hyperlipidemia (Chronic)    Hypertension - Primary (Chronic)       Endocrine and Metabolic    IGT (impaired glucose tolerance) (Chronic)      Diagnoses       Codes Comments    Primary hypertension    -  Primary ICD-10-CM: I10  ICD-9-CM: 401.9     Hyperlipidemia, unspecified hyperlipidemia type      ICD-10-CM: E78.5  ICD-9-CM: 272.4     Nonrheumatic aortic valve stenosis     ICD-10-CM: I35.0  ICD-9-CM: 424.1     IGT (impaired glucose tolerance)     ICD-10-CM: R73.02  ICD-9-CM: 790.22         Assessment/Plan   In for recheck of hypertension, hyperlipidemia, and aortic stenosis.  AS is severe and symptomatic.  However her dyspnea on exertion has improved a little bit with the recent coronary stent placement.  Blood pressure control is good.  Glycemic control is been good.  Gets annual lab work November 2021 including CBC, CMP, A1c and vitamin D level.  AS remains asymptomatic.  No chest pain.  No syncope.  No CHF.  Some shortness of breath with walking up an incline but that is stable.  Also pretty much gets exhausted making her bed.  No edema.  No chest pain.  Last echocardiogram was April 2021.  Severe AS.  Dyspnea on exertion walking up a hill but if anything that is improving over time with more exercise.  She had a stent in her heart placed with a cath on 1/21/2021.  She now has an upcoming TAVR scheduled on 3/1/2022.  Needs to check with Dr. Locke regarding whether to stop her Plavix at some point prior to the TAVR or continue it right up through surgery.  They warned her that she could need a pacemaker if her conduction worsens with the procedure.  Gets glucose and A1c today and every 3 months.  Annual wellness visit due August 2022.  History of macular degeneration being treated with eye vitamins.  We will begin Lipitor 20 mg daily for cardiac protection given her new diagnosis of CAD.  She is 2.5 HPP today.    The above information was reviewed again today 02/09/22.  It continues to be accurate as reflected above and is unchanged.  History, physical and review of systems all reviewed and are unchanged.  Medications were reviewed today and continue the current dosing.    PPE today includes face mask and eye shield.         Dragon disclaimer:   Much of this encounter note is an electronic  transcription/translation of spoken language to printed text. The electronic translation of spoken language may permit erroneous, or at times, nonsensical words or phrases to be inadvertently transcribed; Although I have reviewed the note for such errors, some may still exist.

## 2022-02-10 LAB
CHOLEST SERPL-MCNC: 231 MG/DL (ref 100–199)
CHOLEST/HDLC SERPL: 3.6 RATIO (ref 0–4.4)
GLUCOSE SERPL-MCNC: 91 MG/DL (ref 65–99)
HBA1C MFR BLD: 5.7 % (ref 4.8–5.6)
HDLC SERPL-MCNC: 64 MG/DL
LDLC SERPL CALC-MCNC: 128 MG/DL (ref 0–99)
TRIGL SERPL-MCNC: 225 MG/DL (ref 0–149)
VLDLC SERPL CALC-MCNC: 39 MG/DL (ref 5–40)

## 2022-02-15 ENCOUNTER — TELEPHONE (OUTPATIENT)
Dept: INTERNAL MEDICINE | Facility: CLINIC | Age: 87
End: 2022-02-15

## 2022-02-15 NOTE — TELEPHONE ENCOUNTER
It is okay with me to hold the statin. Nonetheless she should report this to Dr. Locke as the symptoms are more likely related to her aortic valve than the statin.

## 2022-02-15 NOTE — TELEPHONE ENCOUNTER
Pt c/o rapid heart beat, weakness & insomnia within 24-36 hours of taking statin. Pt has stopped taking Lipitor after 4 doses & improved symptoms. Pt is wanting to stop this until after surgery with Dr. Locke. Please advise

## 2022-02-25 ENCOUNTER — PRE-ADMISSION TESTING (OUTPATIENT)
Dept: PREADMISSION TESTING | Facility: HOSPITAL | Age: 87
End: 2022-02-25

## 2022-02-25 ENCOUNTER — DOCUMENTATION (OUTPATIENT)
Dept: CARDIOLOGY | Facility: HOSPITAL | Age: 87
End: 2022-02-25

## 2022-02-25 ENCOUNTER — HOSPITAL ENCOUNTER (OUTPATIENT)
Dept: GENERAL RADIOLOGY | Facility: HOSPITAL | Age: 87
Discharge: HOME | End: 2022-02-25

## 2022-02-25 VITALS
HEART RATE: 77 BPM | RESPIRATION RATE: 16 BRPM | WEIGHT: 158.4 LBS | TEMPERATURE: 97.7 F | DIASTOLIC BLOOD PRESSURE: 71 MMHG | HEIGHT: 61 IN | SYSTOLIC BLOOD PRESSURE: 156 MMHG | OXYGEN SATURATION: 97 % | BODY MASS INDEX: 29.91 KG/M2

## 2022-02-25 DIAGNOSIS — R79.1 ABNORMAL COAGULATION PROFILE: ICD-10-CM

## 2022-02-25 DIAGNOSIS — I35.0 AORTIC STENOSIS, SEVERE: ICD-10-CM

## 2022-02-25 DIAGNOSIS — R79.9 ABNORMAL FINDING OF BLOOD CHEMISTRY, UNSPECIFIED: ICD-10-CM

## 2022-02-25 DIAGNOSIS — I50.32 CHRONIC DIASTOLIC (CONGESTIVE) HEART FAILURE: ICD-10-CM

## 2022-02-25 LAB
ABO GROUP BLD: NORMAL
ALBUMIN SERPL-MCNC: 4.5 G/DL (ref 3.5–5.2)
ALBUMIN/GLOB SERPL: 1.9 G/DL
ALP SERPL-CCNC: 87 U/L (ref 39–117)
ALT SERPL W P-5'-P-CCNC: 15 U/L (ref 1–33)
ANION GAP SERPL CALCULATED.3IONS-SCNC: 9 MMOL/L (ref 5–15)
APTT PPP: 29.4 SECONDS (ref 22.7–35.4)
AST SERPL-CCNC: 19 U/L (ref 1–32)
BASOPHILS # BLD AUTO: 0.08 10*3/MM3 (ref 0–0.2)
BASOPHILS NFR BLD AUTO: 1 % (ref 0–1.5)
BILIRUB SERPL-MCNC: 0.4 MG/DL (ref 0–1.2)
BILIRUB UR QL STRIP: NEGATIVE
BLD GP AB SCN SERPL QL: NEGATIVE
BUN SERPL-MCNC: 15 MG/DL (ref 8–23)
BUN/CREAT SERPL: 23.4 (ref 7–25)
CALCIUM SPEC-SCNC: 9.4 MG/DL (ref 8.2–9.6)
CHLORIDE SERPL-SCNC: 100 MMOL/L (ref 98–107)
CLARITY UR: CLEAR
CLOSE TME COLL+ADP + EPINEP PNL BLD: 52 % (ref 86–100)
CO2 SERPL-SCNC: 28 MMOL/L (ref 22–29)
COLOR UR: YELLOW
CREAT SERPL-MCNC: 0.64 MG/DL (ref 0.57–1)
DEPRECATED RDW RBC AUTO: 39.8 FL (ref 37–54)
EOSINOPHIL # BLD AUTO: 0.13 10*3/MM3 (ref 0–0.4)
EOSINOPHIL NFR BLD AUTO: 1.6 % (ref 0.3–6.2)
ERYTHROCYTE [DISTWIDTH] IN BLOOD BY AUTOMATED COUNT: 12.8 % (ref 12.3–15.4)
GFR SERPL CREATININE-BSD FRML MDRD: 87 ML/MIN/1.73
GLOBULIN UR ELPH-MCNC: 2.4 GM/DL
GLUCOSE SERPL-MCNC: 92 MG/DL (ref 65–99)
GLUCOSE UR STRIP-MCNC: NEGATIVE MG/DL
HBA1C MFR BLD: 5.3 % (ref 4.8–5.6)
HCT VFR BLD AUTO: 44.7 % (ref 34–46.6)
HGB BLD-MCNC: 15 G/DL (ref 12–15.9)
HGB UR QL STRIP.AUTO: NEGATIVE
IMM GRANULOCYTES # BLD AUTO: 0.03 10*3/MM3 (ref 0–0.05)
IMM GRANULOCYTES NFR BLD AUTO: 0.4 % (ref 0–0.5)
INR PPP: 1.02 (ref 0.9–1.1)
KETONES UR QL STRIP: NEGATIVE
LEUKOCYTE ESTERASE UR QL STRIP.AUTO: NEGATIVE
LYMPHOCYTES # BLD AUTO: 2.35 10*3/MM3 (ref 0.7–3.1)
LYMPHOCYTES NFR BLD AUTO: 29 % (ref 19.6–45.3)
MCH RBC QN AUTO: 29.2 PG (ref 26.6–33)
MCHC RBC AUTO-ENTMCNC: 33.6 G/DL (ref 31.5–35.7)
MCV RBC AUTO: 87 FL (ref 79–97)
MONOCYTES # BLD AUTO: 0.59 10*3/MM3 (ref 0.1–0.9)
MONOCYTES NFR BLD AUTO: 7.3 % (ref 5–12)
NEUTROPHILS NFR BLD AUTO: 4.91 10*3/MM3 (ref 1.7–7)
NEUTROPHILS NFR BLD AUTO: 60.7 % (ref 42.7–76)
NITRITE UR QL STRIP: NEGATIVE
NRBC BLD AUTO-RTO: 0 /100 WBC (ref 0–0.2)
NT-PROBNP SERPL-MCNC: 1402 PG/ML (ref 0–1800)
PH UR STRIP.AUTO: 7.5 [PH] (ref 5–8)
PLATELET # BLD AUTO: 202 10*3/MM3 (ref 140–450)
PMV BLD AUTO: 10.8 FL (ref 6–12)
POTASSIUM SERPL-SCNC: 4.8 MMOL/L (ref 3.5–5.2)
PROT SERPL-MCNC: 6.9 G/DL (ref 6–8.5)
PROT UR QL STRIP: NEGATIVE
PROTHROMBIN TIME: 13.3 SECONDS (ref 11.7–14.2)
RBC # BLD AUTO: 5.14 10*6/MM3 (ref 3.77–5.28)
RH BLD: NEGATIVE
SODIUM SERPL-SCNC: 137 MMOL/L (ref 136–145)
SP GR UR STRIP: 1.01 (ref 1–1.03)
T&S EXPIRATION DATE: NORMAL
UROBILINOGEN UR QL STRIP: NORMAL
WBC NRBC COR # BLD: 8.09 10*3/MM3 (ref 3.4–10.8)

## 2022-02-25 PROCEDURE — 36415 COLL VENOUS BLD VENIPUNCTURE: CPT

## 2022-02-25 PROCEDURE — 93010 ELECTROCARDIOGRAM REPORT: CPT | Performed by: INTERNAL MEDICINE

## 2022-02-25 PROCEDURE — 71046 X-RAY EXAM CHEST 2 VIEWS: CPT

## 2022-02-25 PROCEDURE — 80053 COMPREHEN METABOLIC PANEL: CPT

## 2022-02-25 PROCEDURE — 83036 HEMOGLOBIN GLYCOSYLATED A1C: CPT

## 2022-02-25 PROCEDURE — 81003 URINALYSIS AUTO W/O SCOPE: CPT

## 2022-02-25 PROCEDURE — 93005 ELECTROCARDIOGRAM TRACING: CPT

## 2022-02-25 PROCEDURE — 86850 RBC ANTIBODY SCREEN: CPT

## 2022-02-25 PROCEDURE — 83880 ASSAY OF NATRIURETIC PEPTIDE: CPT

## 2022-02-25 PROCEDURE — 85576 BLOOD PLATELET AGGREGATION: CPT

## 2022-02-25 PROCEDURE — 86901 BLOOD TYPING SEROLOGIC RH(D): CPT

## 2022-02-25 PROCEDURE — 85610 PROTHROMBIN TIME: CPT

## 2022-02-25 PROCEDURE — 86900 BLOOD TYPING SEROLOGIC ABO: CPT

## 2022-02-25 PROCEDURE — 85025 COMPLETE CBC W/AUTO DIFF WBC: CPT

## 2022-02-25 PROCEDURE — 85730 THROMBOPLASTIN TIME PARTIAL: CPT

## 2022-02-25 RX ORDER — CHLORHEXIDINE GLUCONATE 0.12 MG/ML
15 RINSE ORAL EVERY 12 HOURS
Status: DISPENSED | OUTPATIENT
Start: 2022-02-25 | End: 2022-02-26

## 2022-02-25 RX ORDER — CHLORHEXIDINE GLUCONATE 0.12 MG/ML
15 RINSE ORAL
COMMUNITY
End: 2022-03-17 | Stop reason: HOSPADM

## 2022-02-25 NOTE — NURSING NOTE
I met with patient & her daughter during PAT appt for scheduled TAVR 3/1/22. She has been provided pre-op instructions as well as nasal & oral meds for TAVR prep.   She lives at an Independent Living at [facility]. She is independent of ADLs and does not have skilled nursing care in her home. She occasionally uses a cane to assist with ambulation. She denies any supplemental oxygen. She denies any skin rashes, irritation, or infections in the groin area. No heart failure hospitalizations within the last year.   Pt's questions answered and she has contact information should any questions/concerns arise. RTRN

## 2022-02-28 ENCOUNTER — TELEPHONE (OUTPATIENT)
Dept: CARDIOLOGY | Facility: HOSPITAL | Age: 87
End: 2022-02-28

## 2022-02-28 DIAGNOSIS — I35.0 AORTIC STENOSIS, SEVERE: Primary | ICD-10-CM

## 2022-02-28 NOTE — TELEPHONE ENCOUNTER
Called and spoke with patient letting her know the surgeon on the day of her TAVR will now be Dr Chris rather than Dr Lynne She discussed this with her family and is agreeable Her TAVR date will remain 3/15/22

## 2022-03-01 LAB — QT INTERVAL: 460 MS

## 2022-03-11 ENCOUNTER — TELEPHONE (OUTPATIENT)
Dept: CARDIOLOGY | Facility: HOSPITAL | Age: 87
End: 2022-03-11

## 2022-03-11 NOTE — TELEPHONE ENCOUNTER
I spoke with Mrs Gonzalez and she will hold her Atenelol the evening before surgery She is aware her covid test has been scheduled for 3/12/22 at 10am I aslo advised her of the new two visitor policy She will call back with any further questions

## 2022-03-12 ENCOUNTER — LAB (OUTPATIENT)
Dept: LAB | Facility: HOSPITAL | Age: 87
End: 2022-03-12

## 2022-03-12 DIAGNOSIS — I35.0 AORTIC STENOSIS, SEVERE: ICD-10-CM

## 2022-03-12 LAB — SARS-COV-2 ORF1AB RESP QL NAA+PROBE: NOT DETECTED

## 2022-03-12 PROCEDURE — U0004 COV-19 TEST NON-CDC HGH THRU: HCPCS

## 2022-03-12 PROCEDURE — U0005 INFEC AGEN DETEC AMPLI PROBE: HCPCS

## 2022-03-12 PROCEDURE — C9803 HOPD COVID-19 SPEC COLLECT: HCPCS

## 2022-03-15 ENCOUNTER — ANESTHESIA EVENT (OUTPATIENT)
Dept: PERIOP | Facility: HOSPITAL | Age: 87
End: 2022-03-15

## 2022-03-15 ENCOUNTER — APPOINTMENT (OUTPATIENT)
Dept: GENERAL RADIOLOGY | Facility: HOSPITAL | Age: 87
End: 2022-03-15

## 2022-03-15 ENCOUNTER — HOSPITAL ENCOUNTER (INPATIENT)
Facility: HOSPITAL | Age: 87
LOS: 2 days | Discharge: HOME | End: 2022-03-17
Attending: THORACIC SURGERY (CARDIOTHORACIC VASCULAR SURGERY) | Admitting: THORACIC SURGERY (CARDIOTHORACIC VASCULAR SURGERY)

## 2022-03-15 ENCOUNTER — ANESTHESIA (OUTPATIENT)
Dept: PERIOP | Facility: HOSPITAL | Age: 87
End: 2022-03-15

## 2022-03-15 DIAGNOSIS — Z95.2 S/P TAVR (TRANSCATHETER AORTIC VALVE REPLACEMENT): ICD-10-CM

## 2022-03-15 DIAGNOSIS — I35.0 NONRHEUMATIC AORTIC VALVE STENOSIS: ICD-10-CM

## 2022-03-15 DIAGNOSIS — I50.32 CHRONIC DIASTOLIC CONGESTIVE HEART FAILURE: ICD-10-CM

## 2022-03-15 DIAGNOSIS — I35.0 AORTIC STENOSIS, SEVERE: Primary | ICD-10-CM

## 2022-03-15 DIAGNOSIS — I35.0 AORTIC STENOSIS, SEVERE: ICD-10-CM

## 2022-03-15 DIAGNOSIS — I44.2 AV BLOCK, COMPLETE: Primary | ICD-10-CM

## 2022-03-15 DIAGNOSIS — I25.118 CORONARY ARTERY DISEASE OF NATIVE ARTERY OF NATIVE HEART WITH STABLE ANGINA PECTORIS: ICD-10-CM

## 2022-03-15 LAB
ABO GROUP BLD: NORMAL
ACT BLD: 136 SECONDS (ref 82–152)
ACT BLD: 142 SECONDS (ref 82–152)
ACT BLD: 345 SECONDS (ref 82–152)
BLD GP AB SCN SERPL QL: NEGATIVE
RH BLD: NEGATIVE
T&S EXPIRATION DATE: NORMAL

## 2022-03-15 PROCEDURE — 0 IOPAMIDOL PER 1 ML

## 2022-03-15 PROCEDURE — 25010000002 HYDROMORPHONE 1 MG/ML SOLUTION

## 2022-03-15 PROCEDURE — 33361 REPLACE AORTIC VALVE PERQ: CPT | Performed by: THORACIC SURGERY (CARDIOTHORACIC VASCULAR SURGERY)

## 2022-03-15 PROCEDURE — 02RF38Z REPLACEMENT OF AORTIC VALVE WITH ZOOPLASTIC TISSUE, PERCUTANEOUS APPROACH: ICD-10-PCS | Performed by: THORACIC SURGERY (CARDIOTHORACIC VASCULAR SURGERY)

## 2022-03-15 PROCEDURE — C1769 GUIDE WIRE: HCPCS | Performed by: THORACIC SURGERY (CARDIOTHORACIC VASCULAR SURGERY)

## 2022-03-15 PROCEDURE — 02HK3JZ INSERTION OF PACEMAKER LEAD INTO RIGHT VENTRICLE, PERCUTANEOUS APPROACH: ICD-10-PCS | Performed by: THORACIC SURGERY (CARDIOTHORACIC VASCULAR SURGERY)

## 2022-03-15 PROCEDURE — 82947 ASSAY GLUCOSE BLOOD QUANT: CPT

## 2022-03-15 PROCEDURE — 25010000002 MIDAZOLAM PER 1 MG: Performed by: ANESTHESIOLOGY

## 2022-03-15 PROCEDURE — 25010000002 PHENYLEPHRINE 10 MG/ML SOLUTION 5 ML VIAL: Performed by: ANESTHESIOLOGY

## 2022-03-15 PROCEDURE — 03HY32Z INSERTION OF MONITORING DEVICE INTO UPPER ARTERY, PERCUTANEOUS APPROACH: ICD-10-PCS | Performed by: THORACIC SURGERY (CARDIOTHORACIC VASCULAR SURGERY)

## 2022-03-15 PROCEDURE — C1751 CATH, INF, PER/CENT/MIDLINE: HCPCS | Performed by: ANESTHESIOLOGY

## 2022-03-15 PROCEDURE — 02HV33Z INSERTION OF INFUSION DEVICE INTO SUPERIOR VENA CAVA, PERCUTANEOUS APPROACH: ICD-10-PCS | Performed by: THORACIC SURGERY (CARDIOTHORACIC VASCULAR SURGERY)

## 2022-03-15 PROCEDURE — C1894 INTRO/SHEATH, NON-LASER: HCPCS | Performed by: THORACIC SURGERY (CARDIOTHORACIC VASCULAR SURGERY)

## 2022-03-15 PROCEDURE — 86900 BLOOD TYPING SEROLOGIC ABO: CPT | Performed by: THORACIC SURGERY (CARDIOTHORACIC VASCULAR SURGERY)

## 2022-03-15 PROCEDURE — 25010000002 PROPOFOL 10 MG/ML EMULSION: Performed by: ANESTHESIOLOGY

## 2022-03-15 PROCEDURE — 0 IOPAMIDOL PER 1 ML: Performed by: THORACIC SURGERY (CARDIOTHORACIC VASCULAR SURGERY)

## 2022-03-15 PROCEDURE — 82803 BLOOD GASES ANY COMBINATION: CPT

## 2022-03-15 PROCEDURE — C1760 CLOSURE DEV, VASC: HCPCS | Performed by: THORACIC SURGERY (CARDIOTHORACIC VASCULAR SURGERY)

## 2022-03-15 PROCEDURE — 25010000002 CEFAZOLIN IN DEXTROSE 2-4 GM/100ML-% SOLUTION: Performed by: NURSE PRACTITIONER

## 2022-03-15 PROCEDURE — 5A1223Z PERFORMANCE OF CARDIAC PACING, CONTINUOUS: ICD-10-PCS | Performed by: THORACIC SURGERY (CARDIOTHORACIC VASCULAR SURGERY)

## 2022-03-15 PROCEDURE — B41D1ZZ FLUOROSCOPY OF AORTA AND BILATERAL LOWER EXTREMITY ARTERIES USING LOW OSMOLAR CONTRAST: ICD-10-PCS | Performed by: THORACIC SURGERY (CARDIOTHORACIC VASCULAR SURGERY)

## 2022-03-15 PROCEDURE — 85014 HEMATOCRIT: CPT

## 2022-03-15 PROCEDURE — 25010000002 HYDROMORPHONE PER 4 MG: Performed by: ANESTHESIOLOGY

## 2022-03-15 PROCEDURE — 25010000002 FENTANYL CITRATE (PF) 50 MCG/ML SOLUTION: Performed by: ANESTHESIOLOGY

## 2022-03-15 PROCEDURE — C1889 IMPLANT/INSERT DEVICE, NOC: HCPCS | Performed by: THORACIC SURGERY (CARDIOTHORACIC VASCULAR SURGERY)

## 2022-03-15 PROCEDURE — 25010000002 HEPARIN (PORCINE) 1000-0.9 UT/500ML-% SOLUTION

## 2022-03-15 PROCEDURE — 86850 RBC ANTIBODY SCREEN: CPT | Performed by: THORACIC SURGERY (CARDIOTHORACIC VASCULAR SURGERY)

## 2022-03-15 PROCEDURE — 85018 HEMOGLOBIN: CPT

## 2022-03-15 PROCEDURE — 85347 COAGULATION TIME ACTIVATED: CPT

## 2022-03-15 PROCEDURE — C1725 CATH, TRANSLUMIN NON-LASER: HCPCS | Performed by: THORACIC SURGERY (CARDIOTHORACIC VASCULAR SURGERY)

## 2022-03-15 PROCEDURE — 86923 COMPATIBILITY TEST ELECTRIC: CPT

## 2022-03-15 PROCEDURE — 86901 BLOOD TYPING SEROLOGIC RH(D): CPT | Performed by: THORACIC SURGERY (CARDIOTHORACIC VASCULAR SURGERY)

## 2022-03-15 PROCEDURE — 33361 REPLACE AORTIC VALVE PERQ: CPT | Performed by: INTERNAL MEDICINE

## 2022-03-15 PROCEDURE — 25010000002 PROTAMINE SULFATE PER 10 MG: Performed by: ANESTHESIOLOGY

## 2022-03-15 DEVICE — VLV EVPROPLUS-26 COMM US
Type: IMPLANTABLE DEVICE | Site: HEART | Status: FUNCTIONAL
Brand: EVOLUT™ PRO+

## 2022-03-15 RX ORDER — CLOPIDOGREL BISULFATE 75 MG/1
75 TABLET ORAL EVERY MORNING
Status: DISCONTINUED | OUTPATIENT
Start: 2022-03-15 | End: 2022-03-17 | Stop reason: HOSPADM

## 2022-03-15 RX ORDER — PROPOFOL 10 MG/ML
VIAL (ML) INTRAVENOUS AS NEEDED
Status: DISCONTINUED | OUTPATIENT
Start: 2022-03-15 | End: 2022-03-15 | Stop reason: SURG

## 2022-03-15 RX ORDER — SODIUM CHLORIDE 0.9 % (FLUSH) 0.9 %
3-10 SYRINGE (ML) INJECTION AS NEEDED
Status: DISCONTINUED | OUTPATIENT
Start: 2022-03-15 | End: 2022-03-15 | Stop reason: HOSPADM

## 2022-03-15 RX ORDER — SODIUM CHLORIDE, SODIUM LACTATE, POTASSIUM CHLORIDE, CALCIUM CHLORIDE 600; 310; 30; 20 MG/100ML; MG/100ML; MG/100ML; MG/100ML
9 INJECTION, SOLUTION INTRAVENOUS CONTINUOUS
Status: DISCONTINUED | OUTPATIENT
Start: 2022-03-15 | End: 2022-03-16

## 2022-03-15 RX ORDER — FENTANYL CITRATE 50 UG/ML
50 INJECTION, SOLUTION INTRAMUSCULAR; INTRAVENOUS
Status: DISCONTINUED | OUTPATIENT
Start: 2022-03-15 | End: 2022-03-15 | Stop reason: HOSPADM

## 2022-03-15 RX ORDER — ONDANSETRON 4 MG/1
4 TABLET, FILM COATED ORAL EVERY 6 HOURS PRN
Status: DISCONTINUED | OUTPATIENT
Start: 2022-03-15 | End: 2022-03-17 | Stop reason: HOSPADM

## 2022-03-15 RX ORDER — MIDAZOLAM HYDROCHLORIDE 1 MG/ML
INJECTION INTRAMUSCULAR; INTRAVENOUS AS NEEDED
Status: DISCONTINUED | OUTPATIENT
Start: 2022-03-15 | End: 2022-03-15 | Stop reason: SURG

## 2022-03-15 RX ORDER — CEFAZOLIN SODIUM 2 G/100ML
2 INJECTION, SOLUTION INTRAVENOUS
Status: COMPLETED | OUTPATIENT
Start: 2022-03-15 | End: 2022-03-15

## 2022-03-15 RX ORDER — CHLORHEXIDINE GLUCONATE 0.12 MG/ML
15 RINSE ORAL ONCE
Status: COMPLETED | OUTPATIENT
Start: 2022-03-15 | End: 2022-03-15

## 2022-03-15 RX ORDER — ONDANSETRON 2 MG/ML
4 INJECTION INTRAMUSCULAR; INTRAVENOUS EVERY 6 HOURS PRN
Status: DISCONTINUED | OUTPATIENT
Start: 2022-03-15 | End: 2022-03-17 | Stop reason: HOSPADM

## 2022-03-15 RX ORDER — PHENYLEPHRINE HCL IN 0.9% NACL 0.5 MG/5ML
.5-3 SYRINGE (ML) INTRAVENOUS
Status: DISCONTINUED | OUTPATIENT
Start: 2022-03-15 | End: 2022-03-17 | Stop reason: HOSPADM

## 2022-03-15 RX ORDER — LIDOCAINE HYDROCHLORIDE 10 MG/ML
0.5 INJECTION, SOLUTION EPIDURAL; INFILTRATION; INTRACAUDAL; PERINEURAL ONCE AS NEEDED
Status: DISCONTINUED | OUTPATIENT
Start: 2022-03-15 | End: 2022-03-15 | Stop reason: HOSPADM

## 2022-03-15 RX ORDER — SODIUM CHLORIDE 0.9 % (FLUSH) 0.9 %
3 SYRINGE (ML) INJECTION EVERY 12 HOURS SCHEDULED
Status: DISCONTINUED | OUTPATIENT
Start: 2022-03-15 | End: 2022-03-15 | Stop reason: HOSPADM

## 2022-03-15 RX ORDER — HYDROMORPHONE HCL 110MG/55ML
PATIENT CONTROLLED ANALGESIA SYRINGE INTRAVENOUS AS NEEDED
Status: DISCONTINUED | OUTPATIENT
Start: 2022-03-15 | End: 2022-03-15 | Stop reason: SURG

## 2022-03-15 RX ORDER — FAMOTIDINE 10 MG/ML
20 INJECTION, SOLUTION INTRAVENOUS ONCE
Status: COMPLETED | OUTPATIENT
Start: 2022-03-15 | End: 2022-03-15

## 2022-03-15 RX ORDER — PROTAMINE SULFATE 10 MG/ML
INJECTION, SOLUTION INTRAVENOUS AS NEEDED
Status: DISCONTINUED | OUTPATIENT
Start: 2022-03-15 | End: 2022-03-15 | Stop reason: SURG

## 2022-03-15 RX ORDER — MIDAZOLAM HYDROCHLORIDE 1 MG/ML
0.5 INJECTION INTRAMUSCULAR; INTRAVENOUS
Status: DISCONTINUED | OUTPATIENT
Start: 2022-03-15 | End: 2022-03-15 | Stop reason: HOSPADM

## 2022-03-15 RX ORDER — DEXMEDETOMIDINE HYDROCHLORIDE 4 UG/ML
INJECTION, SOLUTION INTRAVENOUS CONTINUOUS PRN
Status: DISCONTINUED | OUTPATIENT
Start: 2022-03-15 | End: 2022-03-15 | Stop reason: SURG

## 2022-03-15 RX ORDER — PROPOFOL 10 MG/ML
VIAL (ML) INTRAVENOUS CONTINUOUS PRN
Status: DISCONTINUED | OUTPATIENT
Start: 2022-03-15 | End: 2022-03-15 | Stop reason: SURG

## 2022-03-15 RX ORDER — ASPIRIN 81 MG/1
81 TABLET ORAL DAILY
Status: DISCONTINUED | OUTPATIENT
Start: 2022-03-15 | End: 2022-03-17 | Stop reason: HOSPADM

## 2022-03-15 RX ORDER — SODIUM CHLORIDE 9 MG/ML
9 INJECTION, SOLUTION INTRAVENOUS CONTINUOUS PRN
Status: DISCONTINUED | OUTPATIENT
Start: 2022-03-15 | End: 2022-03-16

## 2022-03-15 RX ORDER — SODIUM CHLORIDE 9 MG/ML
50 INJECTION, SOLUTION INTRAVENOUS CONTINUOUS
Status: ACTIVE | OUTPATIENT
Start: 2022-03-15 | End: 2022-03-15

## 2022-03-15 RX ORDER — MELATONIN
2000 DAILY
Status: DISCONTINUED | OUTPATIENT
Start: 2022-03-15 | End: 2022-03-17 | Stop reason: HOSPADM

## 2022-03-15 RX ORDER — LIDOCAINE HYDROCHLORIDE 20 MG/ML
INJECTION, SOLUTION INFILTRATION; PERINEURAL AS NEEDED
Status: DISCONTINUED | OUTPATIENT
Start: 2022-03-15 | End: 2022-03-15 | Stop reason: HOSPADM

## 2022-03-15 RX ORDER — ACETAMINOPHEN 325 MG/1
650 TABLET ORAL EVERY 4 HOURS PRN
Status: DISCONTINUED | OUTPATIENT
Start: 2022-03-15 | End: 2022-03-17 | Stop reason: HOSPADM

## 2022-03-15 RX ORDER — SODIUM CHLORIDE 9 MG/ML
INJECTION, SOLUTION INTRAVENOUS CONTINUOUS PRN
Status: DISCONTINUED | OUTPATIENT
Start: 2022-03-15 | End: 2022-03-15 | Stop reason: SURG

## 2022-03-15 RX ORDER — FENTANYL CITRATE 50 UG/ML
INJECTION, SOLUTION INTRAMUSCULAR; INTRAVENOUS
Status: COMPLETED | OUTPATIENT
Start: 2022-03-15 | End: 2022-03-15

## 2022-03-15 RX ADMIN — FENTANYL CITRATE 25 MCG: 0.05 INJECTION, SOLUTION INTRAMUSCULAR; INTRAVENOUS at 08:54

## 2022-03-15 RX ADMIN — CLOPIDOGREL 75 MG: 75 TABLET, FILM COATED ORAL at 17:25

## 2022-03-15 RX ADMIN — SODIUM CHLORIDE: 9 INJECTION, SOLUTION INTRAVENOUS at 10:00

## 2022-03-15 RX ADMIN — Medication 30 MG: at 10:07

## 2022-03-15 RX ADMIN — PROTAMINE SULFATE 120 MG: 10 INJECTION, SOLUTION INTRAVENOUS at 11:25

## 2022-03-15 RX ADMIN — CHLORHEXIDINE GLUCONATE 15 ML: 1.2 RINSE ORAL at 07:59

## 2022-03-15 RX ADMIN — FENTANYL CITRATE 25 MCG: 0.05 INJECTION, SOLUTION INTRAMUSCULAR; INTRAVENOUS at 08:45

## 2022-03-15 RX ADMIN — ACETAMINOPHEN 650 MG: 325 TABLET, FILM COATED ORAL at 19:49

## 2022-03-15 RX ADMIN — DEXMEDETOMIDINE HYDROCHLORIDE 1 MCG/KG/HR: 4 INJECTION, SOLUTION INTRAVENOUS at 10:00

## 2022-03-15 RX ADMIN — MIDAZOLAM 2 MG: 1 INJECTION INTRAMUSCULAR; INTRAVENOUS at 09:55

## 2022-03-15 RX ADMIN — CEFAZOLIN SODIUM 2 G: 2 INJECTION, SOLUTION INTRAVENOUS at 10:23

## 2022-03-15 RX ADMIN — Medication 2000 UNITS: at 17:24

## 2022-03-15 RX ADMIN — FAMOTIDINE 20 MG: 10 INJECTION INTRAVENOUS at 07:58

## 2022-03-15 RX ADMIN — PHENYLEPHRINE HYDROCHLORIDE 0.5 MCG/KG/MIN: 10 INJECTION INTRAVENOUS at 10:15

## 2022-03-15 RX ADMIN — ASPIRIN 81 MG: 81 TABLET, COATED ORAL at 17:25

## 2022-03-15 RX ADMIN — PROPOFOL 50 MCG/KG/MIN: 10 INJECTION, EMULSION INTRAVENOUS at 10:00

## 2022-03-15 RX ADMIN — SODIUM CHLORIDE 9 ML/HR: 9 INJECTION, SOLUTION INTRAVENOUS at 08:15

## 2022-03-15 RX ADMIN — HYDROMORPHONE HYDROCHLORIDE 0.5 MG: 2 INJECTION, SOLUTION INTRAMUSCULAR; INTRAVENOUS; SUBCUTANEOUS at 10:04

## 2022-03-15 NOTE — ANESTHESIA POSTPROCEDURE EVALUATION
"Patient: Yuliana Gonzalez    Procedure Summary     Date: 03/15/22 Room / Location: [address] CARDIOVASCULAR OPERATING ROOM    Anesthesia Start: 0949 Anesthesia Stop: 1149    Procedures:       TTE TRANSFEMORAL TRANSCATHETER AORTIC VALVE REPLACEMENT PERCUTANEOUS APPROACH (Left Chest)      Transfemoral Transcatheter Aortic Valve Replacement with intra operative TTE and possible open surgical rescue (Left ) Diagnosis:       Aortic stenosis, severe      (Aortic stenosis, severe [I35.0])    Surgeons: Vesna Chris MD; Clint Locke MD Provider: Darrell Villasenor MD    Anesthesia Type: MAC ASA Status: 4          Anesthesia Type: MAC    Vitals  Vitals Value Taken Time   BP 93/59 03/15/22 1308   Temp 35 °C (95 °F) 03/15/22 1230   Pulse 70 03/15/22 1322   Resp     SpO2 99 % 03/15/22 1322   Vitals shown include unvalidated device data.        Post Anesthesia Care and Evaluation    Patient location during evaluation: bedside  Patient participation: complete - patient participated  Level of consciousness: sleepy but conscious and responsive to physical stimuli  Pain management: adequate  Airway patency: patent  Anesthetic complications: No anesthetic complications    Cardiovascular status: acceptable  Respiratory status: acceptable  Hydration status: acceptable    Comments: /65   Pulse 69   Temp 35 °C (95 °F) (Axillary)   Resp 18   Ht 154.9 cm (61\")   Wt 71 kg (156 lb 8 oz)   SpO2 99%   BMI 29.57 kg/m²     Patient is stable postoperatively and has adequately recovered from anesthesia as described above unless otherwise noted      "

## 2022-03-15 NOTE — PERIOPERATIVE NURSING NOTE
Beta blocker not given in preop per pt and note from PAT, as well as Liliana Jerome. OK that pt too plavix this AM as well, per Liliana Jerome.

## 2022-03-15 NOTE — ANESTHESIA PROCEDURE NOTES
Central Line      Patient reassessed immediately prior to procedure    Patient location during procedure: holding area  Start time: 3/15/2022 8:54 AM  Stop Time:3/15/2022 9:12 AM  Indications: vascular access  Staff  Anesthesiologist: Amador Steiner MD  Preanesthetic Checklist  Completed: patient identified, IV checked, site marked, risks and benefits discussed, surgical consent, monitors and equipment checked, pre-op evaluation and timeout performed  Central Line Prep  Sterile Tech:cap, gloves, gown, mask and sterile barriers  Prep: chloraprep  Patient monitoring: continuous pulse oximetry, blood pressure monitoring and EKG  Central Line Procedure  Laterality:right  Location:internal jugular  Catheter Type:Cordis and single lumen  Catheter Size:6 Fr  Guidance:ultrasound guided  PROCEDURE NOTE/ULTRASOUND INTERPRETATION.  Using ultrasound guidance the potential vascular sites for insertion of the catheter were visualized to determine the patency of the vessel to be used for vascular access.  After selecting the appropriate site for insertion, the needle was visualized under ultrasound being inserted into the internal jugular vein, followed by ultrasound confirmation of wire and catheter placement. There were no abnormalities seen on ultrasound; an image was taken; and the patient tolerated the procedure with no complications. Images: still images obtained, printed/placed on chart  Assessment  Post procedure:biopatch applied, line sutured and occlusive dressing applied  Assessement:blood return through all ports, free fluid flow, placement verified by x-ray, Rick Test, no pneumothorax on x-ray and chest x-ray ordered  Complications:no  Patient Tolerance:patient tolerated the procedure well with no apparent complications  Additional Notes  Ultrasound guidance was used for needle placement.

## 2022-03-15 NOTE — OP NOTE
[facility] OPERATIVE NOTE    Date of procedure:3/15/2022    Pre-op Diagnosis: Severe aortic valve stenosis, chronic diastolic congestive heart failure New York Heart Association grade 3, right bundle branch block, left anterior fascicular block, macular degeneration, hyperlipidemia, hypertension, overall frailty.    Post-op Diagnosis: Same.    Procedure: Percutaneous access transfemoral TAVR using 26 mm evolute pro ([employer]), supravalvar aortogram, post deployment balloon dilatation and repositioning of prosthesis, temporary transvenous pacemaker lead placement, intraoperative transthoracic echocardiogram.    Co-surgeon: Vesna Chris MD    Co-surgeon: Clint Locke MD    Anesthesia: MAC    Findings: Following deployment there was incomplete expansion of the prosthesis as well as a slightly higher position than anticipated; this was corrected with balloon dilatation and pushing the prosthesis downward onto the left ventricular outflow tract/aortic annulus.  Following repositioning there was no significant paravalvular compromise and good function of the prosthesis.  Aortic valve area 1.7 cm², mean aortic valve gradient 4 mmHg.  Total fluoroscopy time 67 minutes, air kerma 412 mGy, DAP 60.8.    EBL: 10 ml    Specimen: None.    History of present illness: This is a very pleasant 89-year-old  female with multiple severe comorbidities and overall frailty.  She recently underwent stenting of her LAD.  She has severe aortic valve stenosis and chronic diastolic congestive heart failure, New York Heart Association grade 3.  Following evaluation she was felt to be a high risk for traditional aortic valve replacement surgery and therefore underwent evaluation for possible TAVR.  Imaging studies suggested she was assessed with a transfemoral route.      Details: The patient was identified in the prep and holding area.  She was taken to the OR.  Following sedation she received an IJ catheter and radial arterial line.   The right IJ catheter was used to insert a temporary transvenous pacemaker lead into the right ventricular apex.  Pacing thresholds were adequate.  She was prepped and draped.  Her bilateral groins were anesthetized with lidocaine infiltration.    Access was gained to the bilateral femoral arteries.  The left femoral artery was used insert pigtail catheter around aortic arch into the non-coronary sinus.  An angiogram was performed in the intended deployment angle; this was adequate.    The right femoral artery received 2 Perclose sutures.  This was used to guide an AL-1 cath around the aortic arch.  This in turn was used to guide a soft-tipped guidewire through the aortic valve orifice into the left ventricular cavity.  The patient was heparinized.  Using a pigtail catheter this guidewire was exchanged for for an extra-stiff guidewire.  Proper placement of this guidewire through the aortic valve into the left ventricle was confirmed by transthoracic echocardiogram.    A 26 mm prosthesis was prepared.  Preparation was checked by fluoroscopy; there were no significant falls.    The prosthesis was loaded onto the exit of guidewire and passed into the right femoral artery.  This was passed around aortic arch into the aortic valve orifice.  Ventricular pacing was employed to stabilize the delivery platform.  The prosthesis was carefully unfurled into the intended position and released.  Unfortunately, at the last moment upon release the procedure itself jumped distally for approximately 2 to 3 mm; this is more distal than we had intended to deploy the device and there was still some significant aortic valve regurgitation.    The delivery system was removed and the access site was controlled with a 20 [place] sheath.  A 20 true balloon was inserted and passed over the guidewire and positioned within the prosthesis.  Ventricular pacing was then employed.  The balloon was dilated and used to push the prosthesis proximally  into the aortic annulus and left ventricular outflow tract.  After deflation of the balloon ventricular pacing was discontinued.  There was good recovery of rhythm and hemodynamics.    Post repositioning transthoracic echocardiogram showed good function of the prosthesis without any significant paravalvular compromise or intravalvular regurgitation.  An angiogram was also performed showing the amps of any significant aortic valve regurgitation.    The right femoral sheath was removed and Perclose sutures were tightened.  The patient received a dose of protamine.  An angiogram showed the absence of any trauma to the access site.  The left femoral access was controlled with another another Perclose suture.  The patient's temporary pacemaker lead was left in place because of bradycardia and in fact the patient remained paced at 60 bpm upon transfer to the [facility].    Vesna Chris MD  3/15/2022  14:09 EDT

## 2022-03-15 NOTE — ANESTHESIA PREPROCEDURE EVALUATION
Anesthesia Evaluation     Patient summary reviewed and Nursing notes reviewed   no history of anesthetic complications:  NPO Solid Status: > 8 hours  NPO Liquid Status: > 8 hours           Airway   Mallampati: II  TM distance: >3 FB  Neck ROM: full  No difficulty expected  Dental - normal exam     Pulmonary    (-) rhonchi, decreased breath sounds, wheezes, not a smoker  Cardiovascular   Exercise tolerance: good (4-7 METS)    Rhythm: regular  Rate: normal    (+) hypertension, valvular problems/murmurs AS, CAD, CHF , hyperlipidemia,   (-) angina, TENA, murmur      Neuro/Psych  (-) CVA  GI/Hepatic/Renal/Endo    (-) liver disease, no renal disease, diabetes, no thyroid disorder    Musculoskeletal     Abdominal     Abdomen: soft.   Substance History      OB/GYN          Other   arthritis,                      Anesthesia Plan    ASA 4     MAC   total IV anesthesia(Art line   Central line )  intravenous induction     Anesthetic plan, all risks, benefits, and alternatives have been provided, discussed and informed consent has been obtained with: patient.        CODE STATUS:

## 2022-03-16 ENCOUNTER — APPOINTMENT (OUTPATIENT)
Dept: GENERAL RADIOLOGY | Facility: HOSPITAL | Age: 87
End: 2022-03-16

## 2022-03-16 ENCOUNTER — APPOINTMENT (OUTPATIENT)
Dept: CARDIOLOGY | Facility: HOSPITAL | Age: 87
End: 2022-03-16

## 2022-03-16 DIAGNOSIS — Z95.2 S/P TAVR (TRANSCATHETER AORTIC VALVE REPLACEMENT): Primary | ICD-10-CM

## 2022-03-16 PROBLEM — I44.2 AV BLOCK, COMPLETE: Status: ACTIVE | Noted: 2022-03-16

## 2022-03-16 LAB
ANION GAP SERPL CALCULATED.3IONS-SCNC: 8 MMOL/L (ref 5–15)
AORTIC DIMENSIONLESS INDEX: 0.7 (DI)
BASE EXCESS BLDA CALC-SCNC: -3 MMOL/L (ref -5–5)
BASE EXCESS BLDA CALC-SCNC: -5 MMOL/L (ref -5–5)
BASE EXCESS BLDA CALC-SCNC: -5 MMOL/L (ref -5–5)
BH CV ECHO MEAS - AO MAX PG: 17 MMHG
BH CV ECHO MEAS - AO MEAN PG: 8.9 MMHG
BH CV ECHO MEAS - AO V2 MAX: 206 CM/SEC
BH CV ECHO MEAS - AO V2 VTI: 41.2 CM
BH CV ECHO MEAS - AVA(I,D): 1.82 CM2
BH CV ECHO MEAS - CONTRAST EF 4CH: 66 CM2
BH CV ECHO MEAS - EDV(CUBED): 71.1 ML
BH CV ECHO MEAS - EDV(MOD-SP2): 103 ML
BH CV ECHO MEAS - EDV(MOD-SP4): 93 ML
BH CV ECHO MEAS - EF(MOD-BP): 66 %
BH CV ECHO MEAS - EF(MOD-SP2): 65 %
BH CV ECHO MEAS - EF(MOD-SP4): 68.8 %
BH CV ECHO MEAS - ESV(CUBED): 20.7 ML
BH CV ECHO MEAS - ESV(MOD-SP2): 36 ML
BH CV ECHO MEAS - ESV(MOD-SP4): 29 ML
BH CV ECHO MEAS - FS: 33.7 %
BH CV ECHO MEAS - IVS/LVPW: 1.74 CM
BH CV ECHO MEAS - IVSD: 1.65 CM
BH CV ECHO MEAS - LV MASS(C)D: 197.1 GRAMS
BH CV ECHO MEAS - LV MAX PG: 10.3 MMHG
BH CV ECHO MEAS - LV MEAN PG: 5.8 MMHG
BH CV ECHO MEAS - LV V1 MAX: 160.1 CM/SEC
BH CV ECHO MEAS - LV V1 VTI: 33.1 CM
BH CV ECHO MEAS - LVIDD: 4.1 CM
BH CV ECHO MEAS - LVIDS: 2.7 CM
BH CV ECHO MEAS - LVOT AREA: 2.27 CM2
BH CV ECHO MEAS - LVOT DIAM: 1.7 CM
BH CV ECHO MEAS - LVPWD: 0.95 CM
BH CV ECHO MEAS - MED PEAK E' VEL: 3.9 CM/SEC
BH CV ECHO MEAS - MV A DUR: 0.19 SEC
BH CV ECHO MEAS - MV A MAX VEL: 151.2 CM/SEC
BH CV ECHO MEAS - MV DEC SLOPE: 377.2 CM/SEC2
BH CV ECHO MEAS - MV DEC TIME: 0.25 MSEC
BH CV ECHO MEAS - MV E MAX VEL: 110 CM/SEC
BH CV ECHO MEAS - MV E/A: 0.73
BH CV ECHO MEAS - MV MAX PG: 11.8 MMHG
BH CV ECHO MEAS - MV MEAN PG: 3.5 MMHG
BH CV ECHO MEAS - MV V2 VTI: 38.9 CM
BH CV ECHO MEAS - MVA(VTI): 1.93 CM2
BH CV ECHO MEAS - PA ACC TIME: 0.07 SEC
BH CV ECHO MEAS - PA PR(ACCEL): 49.3 MMHG
BH CV ECHO MEAS - PA V2 MAX: 122.6 CM/SEC
BH CV ECHO MEAS - RAP SYSTOLE: 3 MMHG
BH CV ECHO MEAS - RV MAX PG: 5.5 MMHG
BH CV ECHO MEAS - RV V1 MAX: 117.6 CM/SEC
BH CV ECHO MEAS - RV V1 VTI: 26.6 CM
BH CV ECHO MEAS - RVSP: 30.9 MMHG
BH CV ECHO MEAS - SV(LVOT): 75.1 ML
BH CV ECHO MEAS - SV(MOD-SP2): 67 ML
BH CV ECHO MEAS - SV(MOD-SP4): 64 ML
BH CV ECHO MEAS - TAPSE (>1.6): 2 CM
BH CV ECHO MEAS - TR MAX PG: 27.9 MMHG
BH CV ECHO MEAS - TR MAX VEL: 264.2 CM/SEC
BH CV VAS BP RIGHT ARM: NORMAL MMHG
BH CV XLRA - RV BASE: 2.5 CM
BH CV XLRA - RV LENGTH: 5.2 CM
BH CV XLRA - RV MID: 1.45 CM
BH CV XLRA - TDI S': 14.7 CM/SEC
BUN SERPL-MCNC: 12 MG/DL (ref 8–23)
BUN/CREAT SERPL: 32.4 (ref 7–25)
CA-I BLDA-SCNC: ABNORMAL MMOL/L
CALCIUM SPEC-SCNC: 7.4 MG/DL (ref 8.2–9.6)
CHLORIDE SERPL-SCNC: 105 MMOL/L (ref 98–107)
CO2 BLDA-SCNC: 23 MMOL/L (ref 24–29)
CO2 BLDA-SCNC: 24 MMOL/L (ref 24–29)
CO2 BLDA-SCNC: 25 MMOL/L (ref 24–29)
CO2 SERPL-SCNC: 21 MMOL/L (ref 22–29)
CREAT SERPL-MCNC: 0.37 MG/DL (ref 0.57–1)
DEPRECATED RDW RBC AUTO: 39.3 FL (ref 37–54)
EGFRCR SERPLBLD CKD-EPI 2021: 94.8 ML/MIN/1.73
ERYTHROCYTE [DISTWIDTH] IN BLOOD BY AUTOMATED COUNT: 12.7 % (ref 12.3–15.4)
GLUCOSE BLDC GLUCOMTR-MCNC: 101 MG/DL (ref 70–130)
GLUCOSE BLDC GLUCOMTR-MCNC: 104 MG/DL (ref 70–130)
GLUCOSE BLDC GLUCOMTR-MCNC: 92 MG/DL (ref 70–130)
GLUCOSE SERPL-MCNC: 102 MG/DL (ref 65–99)
HCO3 BLDA-SCNC: 21.6 MMOL/L (ref 22–26)
HCO3 BLDA-SCNC: 22.3 MMOL/L (ref 22–26)
HCO3 BLDA-SCNC: 23.8 MMOL/L (ref 22–26)
HCT VFR BLD AUTO: 36.6 % (ref 34–46.6)
HCT VFR BLDA CALC: 27 % (ref 38–51)
HCT VFR BLDA CALC: 27 % (ref 38–51)
HCT VFR BLDA CALC: 30 % (ref 38–51)
HGB BLD-MCNC: 12.2 G/DL (ref 12–15.9)
HGB BLDA-MCNC: 10.2 G/DL (ref 12–17)
HGB BLDA-MCNC: 9.2 G/DL (ref 12–17)
HGB BLDA-MCNC: 9.2 G/DL (ref 12–17)
LEFT ATRIUM VOLUME INDEX: 33 ML/M2
LV EF 2D ECHO EST: 66 %
MAXIMAL PREDICTED HEART RATE: 128 BPM
MCH RBC QN AUTO: 28.7 PG (ref 26.6–33)
MCHC RBC AUTO-ENTMCNC: 33.3 G/DL (ref 31.5–35.7)
MCV RBC AUTO: 86.1 FL (ref 79–97)
PCO2 BLDA: 45.9 MM HG (ref 35–45)
PCO2 BLDA: 51.1 MM HG (ref 35–45)
PCO2 BLDA: 53.5 MM HG (ref 35–45)
PH BLDA: 7.26 PH UNITS (ref 7.35–7.6)
PH BLDA: 7.27 PH UNITS (ref 7.35–7.6)
PH BLDA: 7.29 PH UNITS (ref 7.35–7.6)
PLATELET # BLD AUTO: 143 10*3/MM3 (ref 140–450)
PMV BLD AUTO: 11.3 FL (ref 6–12)
PO2 BLDA: 274 MMHG (ref 80–105)
PO2 BLDA: 279 MMHG (ref 80–105)
PO2 BLDA: 292 MMHG (ref 80–105)
POTASSIUM BLDA-SCNC: 3 MMOL/L (ref 3.5–4.9)
POTASSIUM BLDA-SCNC: 3.2 MMOL/L (ref 3.5–4.9)
POTASSIUM BLDA-SCNC: 3.2 MMOL/L (ref 3.5–4.9)
POTASSIUM SERPL-SCNC: 3.6 MMOL/L (ref 3.5–5.2)
QT INTERVAL: 447 MS
RBC # BLD AUTO: 4.25 10*6/MM3 (ref 3.77–5.28)
SAO2 % BLDA: 100 % (ref 95–98)
SODIUM SERPL-SCNC: 134 MMOL/L (ref 136–145)
STRESS TARGET HR: 109 BPM
WBC NRBC COR # BLD: 8.86 10*3/MM3 (ref 3.4–10.8)

## 2022-03-16 PROCEDURE — C1785 PMKR, DUAL, RATE-RESP: HCPCS | Performed by: INTERNAL MEDICINE

## 2022-03-16 PROCEDURE — C1898 LEAD, PMKR, OTHER THAN TRANS: HCPCS | Performed by: INTERNAL MEDICINE

## 2022-03-16 PROCEDURE — 99232 SBSQ HOSP IP/OBS MODERATE 35: CPT | Performed by: INTERNAL MEDICINE

## 2022-03-16 PROCEDURE — 71045 X-RAY EXAM CHEST 1 VIEW: CPT

## 2022-03-16 PROCEDURE — 25010000002 FENTANYL CITRATE (PF) 50 MCG/ML SOLUTION: Performed by: INTERNAL MEDICINE

## 2022-03-16 PROCEDURE — 93306 TTE W/DOPPLER COMPLETE: CPT

## 2022-03-16 PROCEDURE — 99231 SBSQ HOSP IP/OBS SF/LOW 25: CPT | Performed by: PHYSICIAN ASSISTANT

## 2022-03-16 PROCEDURE — 99222 1ST HOSP IP/OBS MODERATE 55: CPT

## 2022-03-16 PROCEDURE — C1894 INTRO/SHEATH, NON-LASER: HCPCS | Performed by: INTERNAL MEDICINE

## 2022-03-16 PROCEDURE — 99152 MOD SED SAME PHYS/QHP 5/>YRS: CPT | Performed by: INTERNAL MEDICINE

## 2022-03-16 PROCEDURE — 99153 MOD SED SAME PHYS/QHP EA: CPT | Performed by: INTERNAL MEDICINE

## 2022-03-16 PROCEDURE — 85027 COMPLETE CBC AUTOMATED: CPT | Performed by: INTERNAL MEDICINE

## 2022-03-16 PROCEDURE — 0JH606Z INSERTION OF PACEMAKER, DUAL CHAMBER INTO CHEST SUBCUTANEOUS TISSUE AND FASCIA, OPEN APPROACH: ICD-10-PCS | Performed by: INTERNAL MEDICINE

## 2022-03-16 PROCEDURE — C1769 GUIDE WIRE: HCPCS | Performed by: INTERNAL MEDICINE

## 2022-03-16 PROCEDURE — 0 IOPAMIDOL PER 1 ML: Performed by: INTERNAL MEDICINE

## 2022-03-16 PROCEDURE — 25010000002 CEFAZOLIN IN DEXTROSE 2-4 GM/100ML-% SOLUTION: Performed by: INTERNAL MEDICINE

## 2022-03-16 PROCEDURE — 33208 INSRT HEART PM ATRIAL & VENT: CPT | Performed by: INTERNAL MEDICINE

## 2022-03-16 PROCEDURE — 25010000002 PERFLUTREN (DEFINITY) 8.476 MG IN SODIUM CHLORIDE (PF) 0.9 % 10 ML INJECTION: Performed by: INTERNAL MEDICINE

## 2022-03-16 PROCEDURE — 93010 ELECTROCARDIOGRAM REPORT: CPT | Performed by: INTERNAL MEDICINE

## 2022-03-16 PROCEDURE — 0 LIDOCAINE 1 % SOLUTION: Performed by: INTERNAL MEDICINE

## 2022-03-16 PROCEDURE — 93306 TTE W/DOPPLER COMPLETE: CPT | Performed by: INTERNAL MEDICINE

## 2022-03-16 PROCEDURE — 93005 ELECTROCARDIOGRAM TRACING: CPT

## 2022-03-16 PROCEDURE — 25010000002 MIDAZOLAM PER 1 MG: Performed by: INTERNAL MEDICINE

## 2022-03-16 PROCEDURE — 02H63JZ INSERTION OF PACEMAKER LEAD INTO RIGHT ATRIUM, PERCUTANEOUS APPROACH: ICD-10-PCS | Performed by: INTERNAL MEDICINE

## 2022-03-16 PROCEDURE — 80048 BASIC METABOLIC PNL TOTAL CA: CPT | Performed by: INTERNAL MEDICINE

## 2022-03-16 PROCEDURE — 02HK3JZ INSERTION OF PACEMAKER LEAD INTO RIGHT VENTRICLE, PERCUTANEOUS APPROACH: ICD-10-PCS | Performed by: INTERNAL MEDICINE

## 2022-03-16 DEVICE — PACEMAKER
Type: IMPLANTABLE DEVICE | Status: FUNCTIONAL
Brand: ACCOLADE™ MRI DR

## 2022-03-16 DEVICE — PACE/SENSE LEAD
Type: IMPLANTABLE DEVICE | Status: FUNCTIONAL
Brand: INGEVITY™+

## 2022-03-16 RX ORDER — LIDOCAINE HYDROCHLORIDE 10 MG/ML
INJECTION, SOLUTION INFILTRATION; PERINEURAL AS NEEDED
Status: DISCONTINUED | OUTPATIENT
Start: 2022-03-16 | End: 2022-03-16 | Stop reason: HOSPADM

## 2022-03-16 RX ORDER — HYDROCHLOROTHIAZIDE 12.5 MG/1
12.5 CAPSULE, GELATIN COATED ORAL DAILY
Status: DISCONTINUED | OUTPATIENT
Start: 2022-03-16 | End: 2022-03-17 | Stop reason: HOSPADM

## 2022-03-16 RX ORDER — CEFAZOLIN SODIUM 2 G/100ML
2 INJECTION, SOLUTION INTRAVENOUS
Status: DISCONTINUED | OUTPATIENT
Start: 2022-03-16 | End: 2022-03-16

## 2022-03-16 RX ORDER — MIDAZOLAM HYDROCHLORIDE 1 MG/ML
INJECTION INTRAMUSCULAR; INTRAVENOUS AS NEEDED
Status: DISCONTINUED | OUTPATIENT
Start: 2022-03-16 | End: 2022-03-16 | Stop reason: HOSPADM

## 2022-03-16 RX ORDER — CEFAZOLIN SODIUM 2 G/100ML
INJECTION, SOLUTION INTRAVENOUS CONTINUOUS PRN
Status: COMPLETED | OUTPATIENT
Start: 2022-03-16 | End: 2022-03-16

## 2022-03-16 RX ORDER — FENTANYL CITRATE 50 UG/ML
INJECTION, SOLUTION INTRAMUSCULAR; INTRAVENOUS AS NEEDED
Status: DISCONTINUED | OUTPATIENT
Start: 2022-03-16 | End: 2022-03-16 | Stop reason: HOSPADM

## 2022-03-16 RX ADMIN — ASPIRIN 81 MG: 81 TABLET, COATED ORAL at 09:41

## 2022-03-16 RX ADMIN — Medication 2000 UNITS: at 09:40

## 2022-03-16 RX ADMIN — HYDROCHLOROTHIAZIDE 12.5 MG: 12.5 CAPSULE ORAL at 09:41

## 2022-03-16 RX ADMIN — PERFLUTREN 1.5 ML: 6.52 INJECTION, SUSPENSION INTRAVENOUS at 11:23

## 2022-03-16 NOTE — PLAN OF CARE
Goal Outcome Evaluation:  Plan of Care Reviewed With: patient        Progress: improving  Outcome Evaluation: Permanent pacemaker today. Site is soft, dry and intact. No complaints.

## 2022-03-16 NOTE — PROGRESS NOTES
" LOS: 1 day   Patient Care Team:  Trent Davis MD as PCP - General (Internal Medicine)  Trent Davis MD as PCP - Internal Medicine (Internal Medicine)  Thang Bruce MD as Consulting Physician (Ophthalmology)  Curtis Marshall MD as Consulting Physician (Cardiology)    Chief Complaint:   Post-op follow-up, s/p TAVR    Subjective:  Symptoms:  Stable.  No shortness of breath, chest pain or chest pressure.    Diet:  No nausea or vomiting.    Activity level: Impaired due to weakness.    Pain:  She reports no pain.        Vital Signs  Temp:  [95 °F (35 °C)-98 °F (36.7 °C)] 98 °F (36.7 °C)  Heart Rate:  [60-89] 60  BP: ()/(47-75) 149/55  Arterial Line BP: ()/() 150/57      03/15/22  0710 03/16/22  0600   Weight: 71 kg (156 lb 8 oz) 72.5 kg (159 lb 13.3 oz)     Body mass index is 30.2 kg/m².    Intake/Output Summary (Last 24 hours) at 3/16/2022 0754  Last data filed at 3/16/2022 0600  Gross per 24 hour   Intake 1278 ml   Output 600 ml   Net 678 ml     No intake/output data recorded.    Objective:  General Appearance:  In no acute distress and comfortable.    Vital signs: (most recent): Blood pressure 149/55, pulse 60, temperature 98 °F (36.7 °C), temperature source Oral, resp. rate 18, height 154.9 cm (61\"), weight 72.5 kg (159 lb 13.3 oz), SpO2 98 %, not currently breastfeeding.  Vital signs are normal.    Output: Producing urine.    Lungs:  Normal effort and normal respiratory rate.    Heart: Normal rate.  Irregular rhythm.  No murmur. (Paced at 60)  Neurological: Patient is alert and oriented to person, place and time.    Skin:  Warm and dry.  (Bilateral groin incisions intact; Right groin incision has minimal drainage)          Results Review:        WBC WBC   Date Value Ref Range Status   03/16/2022 8.86 3.40 - 10.80 10*3/mm3 Final      HGB Hemoglobin   Date Value Ref Range Status   03/16/2022 12.2 12.0 - 15.9 g/dL Final      HCT Hematocrit   Date Value Ref Range Status   03/16/2022 " 36.6 34.0 - 46.6 % Final      Platelets Platelets   Date Value Ref Range Status   03/16/2022 143 140 - 450 10*3/mm3 Final        PT/INR:  No results found for: PROTIME/No results found for: INR    Sodium Sodium   Date Value Ref Range Status   03/16/2022 134 (L) 136 - 145 mmol/L Final      Potassium Potassium   Date Value Ref Range Status   03/16/2022 3.6 3.5 - 5.2 mmol/L Final      Chloride Chloride   Date Value Ref Range Status   03/16/2022 105 98 - 107 mmol/L Final      Bicarbonate CO2   Date Value Ref Range Status   03/16/2022 21.0 (L) 22.0 - 29.0 mmol/L Final      BUN BUN   Date Value Ref Range Status   03/16/2022 12 8 - 23 mg/dL Final      Creatinine Creatinine   Date Value Ref Range Status   03/16/2022 0.37 (L) 0.57 - 1.00 mg/dL Final      Calcium Calcium   Date Value Ref Range Status   03/16/2022 7.4 (L) 8.2 - 9.6 mg/dL Final      Magnesium No results found for: MG       aspirin, 81 mg, Oral, Daily  cholecalciferol, 2,000 Units, Oral, Daily  clopidogrel, 75 mg, Oral, QAM      lactated ringers, 9 mL/hr, Last Rate: Stopped (03/15/22 1618)  niCARdipine, 5-15 mg/hr, Last Rate: Stopped (03/15/22 2157)  phenylephrine, 0.5-3 mcg/kg/min, Last Rate: Stopped (03/15/22 1528)  sodium chloride, 9 mL/hr, Last Rate: 9 mL/hr (03/15/22 0815)            Patient Active Problem List   Diagnosis Code   • Aortic stenosis I35.0   • IGT (impaired glucose tolerance) R73.02   • Hyperlipidemia E78.5   • Hypertension I10   • Cervical arthritis M47.812   • Sciatica M54.30   • Vitamin D deficiency E55.9   • Exudative age-related macular degeneration, bilateral, with inactive choroidal neovascularization (HCC) H35.3232   • Coronary artery disease involving native coronary artery of native heart I25.10   • Chronic diastolic congestive heart failure (HCC) I50.32   • Aortic stenosis, severe I35.0       Assessment & Plan    - Severe aortic stenosis -- s/p TAVR (26mm evolute pro)  - Congestive heart failure stage III  - RBBB  - Left anterior  fascicular block   - HTN  - HLD  - Macular degeneration     Looks good this am; no complaints   Plan for pacemaker today   Hold plavix this morning for pacemaker  HCTZ started and atenolol held per cardiology    Kayla Pierre PA-C  03/16/22  07:54 EDT

## 2022-03-16 NOTE — CONSULTS
Electrophysiology Hospital Consult            Patient Name: Yuliana Gonzalez  Age/Sex: 89 y.o. female  : 1930  MRN: [id number]    Date of Admission: 3/15/2022  Date of Encounter Visit: 22  Encounter Provider: CLEMENTINE Fan  Referring Provider: Patricio Lynne MD  Place of Service: [facility] CARDIOLOGY  Patient Care Team:  Trent Davis MD as PCP - General (Internal Medicine)  Trent Davis MD as PCP - Internal Medicine (Internal Medicine)  Thang Bruce MD as Consulting Physician (Ophthalmology)  Curtis Marshall MD as Consulting Physician (Cardiology)      Subjective:   EP Consultation for: High degree AV block, complete     Chief Complaint: AV block, complete    History of Present Illness:  Yuliana Gonzalez is a 89 y.o. female who is a patient of Dr. Yeh and Dr. Locke.  She has a history of CHF, HTN, and severe aortic valve stenosis-s/p TAVR (3/15/2021).    She presented to [facility] on 3/15/2022 for scheduled TAVR with Dr. Yeh and Dr. Locke.  She tolerated the procedure well.   Postoperatively she was found to have high degree AV block, requiring temporary pacing.  She was monitored over night and required continuous pacing throughout the night.     She is currently being paced at a rate of 60.  We were asked to see her for evaluation for implantation of a permanent pacemaker.     Past Medical History:  Past Medical History:   Diagnosis Date   • Aortic stenosis 2009    diagnosed,  moderate with 0.9cm area, 42mm max gradient. 0.91/34 6/15.   • Central loss of vision     RIGHT EYE   • Cervical arthritis    • Colitis     HX   • Coronary artery disease    • History of constipation    • History of esophageal reflux    • History of skin cancer    • Hyperlipidemia    • Hypertension    • IGT (impaired glucose tolerance)    • Irregular heart beat     HX   • Macular degeneration    • Medication management    • On continuous oral anticoagulation   "  • Redness of skin     NOSE/DERMATOLOGY TREATING WITH FLAGYL CREAM/ADVISED PT TO NOTIFY CARDIOLOGY   • Sciatica     HX   • Short-term memory loss     \"MILD\"   • SOB (shortness of breath) on exertion    • Vitamin D deficiency        Past Surgical History:   Procedure Laterality Date   • AORTIC VALVE REPAIR/REPLACEMENT Left 3/15/2022    Procedure: TTE TRANSFEMORAL TRANSCATHETER AORTIC VALVE REPLACEMENT PERCUTANEOUS APPROACH;  Surgeon: Vesna Chris MD;  Location: [facility];  Service: Cardiothoracic;  Laterality: Left;   • AORTIC VALVE REPAIR/REPLACEMENT Left 3/15/2022    Procedure: Transfemoral Transcatheter Aortic Valve Replacement with intra operative TTE and possible open surgical rescue;  Surgeon: Clint Locke MD;  Location: [facility];  Service: Cardiovascular;  Laterality: Left;   • CARDIAC CATHETERIZATION N/A 1/21/2022    Procedure: Coronary angiography;  Surgeon: Clint Locke MD;  [facility];  Service: Cardiovascular;  Laterality: N/A;   • CARDIAC CATHETERIZATION N/A 1/21/2022    Procedure: Percutaneous Coronary Intervention;  Surgeon: Clint Locke MD;  [facility];  Service: Cardiovascular;  Laterality: N/A;   • CARDIAC CATHETERIZATION N/A 1/21/2022    Procedure: Stent ISMAEL coronary;  Surgeon: Clint Locke MD;  Location: [facility] CATH INVASIVE LOCATION;  Service: Cardiovascular;  Laterality: N/A;   • CATARACT EXTRACTION, BILATERAL     • COLONOSCOPY     • EYE SURGERY     • SKIN CANCER EXCISION         Home Medications:   Medications Prior to Admission   Medication Sig Dispense Refill Last Dose   • Acetaminophen (TYLENOL PO) Take 325 mg by mouth As Needed (for pain or fever).   Past Week at Unknown time   • aspirin (aspirin) 81 MG EC tablet Take 1 tablet by mouth Daily. (Patient taking differently: Take 81 mg by mouth Every Morning. PT TO DISCUSS WITH CARDIOLOGY REGARDING HOLDING PRIOR TO SURGERY) 30 tablet 6 " "3/15/2022 at 0500   • cholecalciferol (VITAMIN D3) 1000 units tablet Take 2,000 Units by mouth Daily.   3/14/2022 at 0830   • clopidogrel (PLAVIX) 75 MG tablet Take 1 tablet by mouth Daily. (Patient taking differently: Take 75 mg by mouth Every Morning. PT TO DISCUSS WITH CARDIOLOGY REGARDING HOLDING PRIOR TO SURGERY) 30 tablet 11 3/15/2022 at 0500   • diphenhydrAMINE HCl (BENADRYL PO) Take 25 mg by mouth As Needed (allergies).   Past Month at Unknown time   • hydroCHLOROthiazide (MICROZIDE) 12.5 MG capsule Take 1 capsule by mouth Daily. (Patient taking differently: Take 12.5 mg by mouth Every Morning.) 90 capsule 1 3/14/2022 at 0830   • Magnesium Hydroxide (DULCOLAX PO) Take 1 tablet by mouth As Needed (constipation).   Past Month at Unknown time   • metroNIDAZOLE (METROCREAM) 0.75 % cream Apply 1 application topically to the appropriate area as directed 2 (Two) Times a Day. \"NOSE\" FOR \"PRECANCEROUS\" AREA   3/14/2022 at 0830   • Multiple Vitamins-Minerals (VITEYES AREDS ADVANCED PO) Take 2 capsules by mouth Daily. PT HOLDING FOR SURGERY   3/14/2022 at 0830   • mupirocin (BACTROBAN) 2 % nasal ointment into the nostril(s) as directed by provider. As directed pre op   3/15/2022 at 0500   • atenolol (TENORMIN) 25 MG tablet Take 1 tablet by mouth Daily. (Patient taking differently: Take 25 mg by mouth Every Evening.) 90 tablet 1 3/13/2022   • chlorhexidine (PERIDEX) 0.12 % solution Apply 15 mL to the mouth or throat. As directed pre op          Allergies:  Allergies   Allergen Reactions   • Lipitor [Atorvastatin] Nausea Only     \"FELT AWFUL\"   • Erythromycin Diarrhea   • Lisinopril Cough       Past Social History:  Social History     Socioeconomic History   • Marital status:      Spouse name: Stephan   • Number of children: 4   Tobacco Use   • Smoking status: Former Smoker     Packs/day: 0.00     Years: 0.00     Pack years: 0.00     Start date:      Quit date:      Years since quittin.2   • Smokeless " tobacco: Never Used   • Tobacco comment: 1 pack every 2 weeks   Substance and Sexual Activity   • Alcohol use: Yes     Comment: wine- rarely/  2 cups caffeine daily   • Drug use: No       Past Family History:  Family History   Problem Relation Age of Onset   • Aortic stenosis Mother    • Hypertension Mother    • Heart failure Father    • Diabetes Father    • Hypertension Father    • No Known Problems Sister    • No Known Problems Daughter    • No Known Problems Son    • Aneurysm Sister    • Other Sister         Polio in her 20's   • Malig Hyperthermia Neg Hx        Review of Systems: All systems reviewed. Pertinent positives identified in HPI. All other systems are negative.     14 point ROS was performed and is negative except as outlined in HPI.     Objective:     Objective:  Vital Signs (last 24 hours)       03/15 0700  03/16 0659 03/16 0700  03/16 1054   Most Recent      Temp (°F) 95 -  98.4      99.4     99.4 (37.4) 03/16 0800    Heart Rate 60 -  89      60     60 03/16 1000    Resp   18      14     14 03/16 0800    BP 88/53 -  155/77    130/87 -  150/63     147/54 03/16 1000    SpO2 (%) 95 -  100    95 -  98     97 03/16 1000        Temp:  [95 °F (35 °C)-99.4 °F (37.4 °C)] 99.4 °F (37.4 °C)  Heart Rate:  [60-76] 60  Resp:  [14] 14  BP: ()/(47-87) 147/54  Arterial Line BP: ()/(29-62) 143/41  Body mass index is 30.2 kg/m².        Physical Exam:     General Appearance: No acute distress, well developed and well nourished.   Eyes: Conjunctiva and lids: No erythema, swelling, or discharge. Sclera non-icteric.   HENT: Atraumatic, normocephalic. External eyes, ears, and nose normal.   Respiratory: No signs of respiratory distress. Respiration rhythm and depth normal.   Clear to auscultation. No rales, crackles, rhonchi, or wheezing auscultated.   Cardiovascular:  Heart Rate and Rhythm: Paced, Heart Sounds: Normal S1 and S2. No S3 or S4 noted.  Gastrointestinal:  Abdomen soft, non-distended,  non-tender.  Musculoskeletal: Normal movement of extremities  Skin: Warm and dry.   Psychiatric: Patient alert and oriented to person, place, and time. Speech and behavior appropriate. Normal mood and affect.    Labs:   Lab Review:     Results from last 7 days   Lab Units 03/16/22  0335   SODIUM mmol/L 134*   POTASSIUM mmol/L 3.6   CHLORIDE mmol/L 105   CO2 mmol/L 21.0*   BUN mg/dL 12   CREATININE mg/dL 0.37*   GLUCOSE mg/dL 102*   CALCIUM mg/dL 7.4*         Results from last 7 days   Lab Units 03/16/22  0335   WBC 10*3/mm3 8.86   HEMOGLOBIN g/dL 12.2   HEMATOCRIT % 36.6   PLATELETS 10*3/mm3 143                                       Imaging:   XR Chest  IMPRESSION:  . Apparent right side central venous catheter tip overlying  the SVC near the junction with the right innominate vein. No  pneumothorax is seen.          EKG:         I personally viewed and interpreted the patient's EKG/Telemetry tracings.    Assessment:       Aortic stenosis    Chronic diastolic congestive heart failure (HCC)    Aortic stenosis, severe    AV block, complete (HCC)        Plan:   Dr. Palomo and I saw this patient.      She underwent TAVR procedure on 3/15/2022.  Post-op she was found to have complete heart block, requiring continuous temporary pacing.  We explained to her the need for a permanent pacemaker, as well as risks associated with the procedure.  She acknowledges understanding and is agreeable to proceed.      We will schedule the procedure for this afternoon.     Thank you for allowing me to participate in the care of Yuliana GR Carlos. Feel free to contact me directly with any further questions or concerns.    CLEMENTINE Fan  [facility]  03/16/22  10:54 EDT

## 2022-03-16 NOTE — PROGRESS NOTES
"Patient Care Team:  Trent Davis MD as PCP - General (Internal Medicine)  Trent Davis MD as PCP - Internal Medicine (Internal Medicine)  Thang Bruce MD as Consulting Physician (Ophthalmology)  Curtis Marshall MD as Consulting Physician (Cardiology)    Chief Complaint: Follow-up transcatheter aortic valve    Interval History: No new complaints.  Still with complete heart block      Objective   Vital Signs  Temp:  [95 °F (35 °C)-98.4 °F (36.9 °C)] 98 °F (36.7 °C)  Heart Rate:  [60-89] 60  Resp:  [18] 18  BP: ()/(47-77) 132/56  Arterial Line BP: ()/() 130/42    Intake/Output Summary (Last 24 hours) at 3/16/2022 0709  Last data filed at 3/16/2022 0419  Gross per 24 hour   Intake 1010 ml   Output 600 ml   Net 410 ml     Flowsheet Rows    Flowsheet Row First Filed Value   Admission Height 154.9 cm (61\") Documented at 03/15/2022 0710   Admission Weight 71 kg (156 lb 8 oz) Documented at 03/15/2022 0710          Temp:  [95 °F (35 °C)-98.4 °F (36.9 °C)] 98 °F (36.7 °C)  Heart Rate:  [60-89] 60  Resp:  [18] 18  BP: ()/(47-77) 132/56  Arterial Line BP: ()/() 130/42    Intake/Output Summary (Last 24 hours) at 3/16/2022 0709  Last data filed at 3/16/2022 0419  Gross per 24 hour   Intake 1010 ml   Output 600 ml   Net 410 ml     Flowsheet Rows    Flowsheet Row First Filed Value   Admission Height 154.9 cm (61\") Documented at 03/15/2022 0710   Admission Weight 71 kg (156 lb 8 oz) Documented at 03/15/2022 0710          General Appearance:    Alert, cooperative, in no acute distress   Head:    Normocephalic, without obvious abnormality, atraumatic       Neck/Lymph   No adenopathy, supple, no thyromegaly, no carotid bruit, no    JVD   Lungs:     Clear to auscultation bilaterally, no wheezes, rales, or     rhonchi    Cardiac:    Normal rate, regular rhythm, no murmur, no rub, no gallop   Chest Wall:    No abnormalities observed   GI:     Normal bowel sounds, soft, nontender, " nondistended,            no rebound tenderness   Extremities:   No cyanosis, clubbing, or edema   Circulatory/Peripheral Vascular :   Pulses palpable and equal bilaterally   Integumentary:   No bleeding or rash. Normal temperature                aspirin, 81 mg, Oral, Daily  cholecalciferol, 2,000 Units, Oral, Daily  clopidogrel, 75 mg, Oral, QAM        lactated ringers, 9 mL/hr, Last Rate: Stopped (03/15/22 1618)  niCARdipine, 5-15 mg/hr, Last Rate: Stopped (03/15/22 2157)  phenylephrine, 0.5-3 mcg/kg/min, Last Rate: Stopped (03/15/22 1528)  sodium chloride, 9 mL/hr, Last Rate: 9 mL/hr (03/15/22 0815)        Results Review:    Results from last 7 days   Lab Units 03/16/22  0335   SODIUM mmol/L 134*   POTASSIUM mmol/L 3.6   CHLORIDE mmol/L 105   CO2 mmol/L 21.0*   BUN mg/dL 12   CREATININE mg/dL 0.37*   GLUCOSE mg/dL 102*   CALCIUM mg/dL 7.4*         Results from last 7 days   Lab Units 03/16/22  0335   WBC 10*3/mm3 8.86   HEMOGLOBIN g/dL 12.2   HEMATOCRIT % 36.6   PLATELETS 10*3/mm3 143                     @LABRCNT(bnp)@  I reviewed the patient's new clinical results.  I personally viewed and interpreted the patient's EKG/Telemetry data            Assessment/Plan   1.  Severe aortic valve stenosis: Degenerative.  Status post transcatheter aortic valve replacement.  Postoperative day 1.  2.  Chronic heart failure with preserved ejection fraction: NYHA class III  3.  Complete heart block: post-procedure. Resolved.   4.  Baseline RBBB and LAFB  5.  Essential hypertension   6.  Hyperlipidemia    -Patient is doing well today.   -BP mildly elevated today. Leave off Atenolol with heart block history.   -I will restart HCTZ today, however, we will need to watch sodium level.   -She feels great.  She unfortunately still has complete heart block underneath and will need a permanent pacemaker

## 2022-03-17 ENCOUNTER — READMISSION MANAGEMENT (OUTPATIENT)
Dept: CALL CENTER | Facility: HOSPITAL | Age: 87
End: 2022-03-17

## 2022-03-17 VITALS
OXYGEN SATURATION: 95 % | SYSTOLIC BLOOD PRESSURE: 174 MMHG | BODY MASS INDEX: 29.98 KG/M2 | WEIGHT: 158.8 LBS | HEIGHT: 61 IN | RESPIRATION RATE: 18 BRPM | TEMPERATURE: 97.9 F | HEART RATE: 89 BPM | DIASTOLIC BLOOD PRESSURE: 73 MMHG

## 2022-03-17 PROCEDURE — 99232 SBSQ HOSP IP/OBS MODERATE 35: CPT | Performed by: INTERNAL MEDICINE

## 2022-03-17 PROCEDURE — 99239 HOSP IP/OBS DSCHRG MGMT >30: CPT | Performed by: REGISTERED NURSE

## 2022-03-17 PROCEDURE — S0260 H&P FOR SURGERY: HCPCS | Performed by: THORACIC SURGERY (CARDIOTHORACIC VASCULAR SURGERY)

## 2022-03-17 RX ORDER — NITROGLYCERIN 0.4 MG/1
0.4 TABLET SUBLINGUAL
Status: DISCONTINUED | OUTPATIENT
Start: 2022-03-17 | End: 2022-03-17 | Stop reason: HOSPADM

## 2022-03-17 RX ORDER — MELATONIN
2000 DAILY
Start: 2022-03-17

## 2022-03-17 RX ADMIN — ASPIRIN 81 MG: 81 TABLET, COATED ORAL at 08:38

## 2022-03-17 RX ADMIN — CLOPIDOGREL 75 MG: 75 TABLET, FILM COATED ORAL at 06:00

## 2022-03-17 RX ADMIN — Medication 2000 UNITS: at 08:38

## 2022-03-17 RX ADMIN — HYDROCHLOROTHIAZIDE 12.5 MG: 12.5 CAPSULE ORAL at 08:38

## 2022-03-17 NOTE — DISCHARGE SUMMARY
Date of Admission: 3/15/2022  Date of Discharge:  3/17/2022    Discharge Diagnosis:   - Severe aortic stenosis -- s/p TAVR (26mm evolute pro)  - Congestive heart failure stage III  - RBBB  - Left anterior fascicular block   - HTN  - HLD  - Macular degeneration     Presenting Problem/History of Present Illness:  Severe aortic stenosis  Chronic congestive heart failure       Hospital Course:  Ms. Gonzalez is a 89 year-old female who presented for previously scheduled cardiac intervention.  On 3/15/22 underwent a TAVR with Dr. Dr. Chris.   Post-op course was complicated by complete heart block. She had a pacemaker placed on 3/16. She has progressed well.  Post-op echocardiogram showed well seated TAVR valve, mean and peak gradients WDL -- no evidence of elizabeth-valvular leak.  Tolerating medications. To be continued on Plavix and Aspirin at discharge.   Mobility at baseline.  Follow-up with Cardiology APRN in one week, and Cardiologist in one month.    Procedures Performed  Procedure(s):  Pacemaker DC new [place]       Consults:   Consults     No orders found from 2/14/2022 to 3/16/2022.          Pertinent Test Results:    Lab Results   Component Value Date    WBC 8.86 03/16/2022    HGB 12.2 03/16/2022    HCT 36.6 03/16/2022    MCV 86.1 03/16/2022     03/16/2022      Lab Results   Component Value Date    GLUCOSE 102 (H) 03/16/2022    CALCIUM 7.4 (L) 03/16/2022     (L) 03/16/2022    K 3.6 03/16/2022    CO2 21.0 (L) 03/16/2022     03/16/2022    BUN 12 03/16/2022    CREATININE 0.37 (L) 03/16/2022    EGFRIFAFRI 90 11/10/2021    EGFRIFNONA 87 02/25/2022    BCR 32.4 (H) 03/16/2022    ANIONGAP 8.0 03/16/2022     Lab Results   Component Value Date    INR 1.02 02/25/2022    PROTIME 13.3 02/25/2022       Condition on Discharge:  Stable    Vital Signs  Temp:  [97.9 °F (36.6 °C)-98.9 °F (37.2 °C)] 97.9 °F (36.6 °C)  Heart Rate:  [60-90] 89  Resp:  [9-20] 18  BP: (130-180)/(53-76) 174/73      Discharge  "Disposition:  Home    Discharge Medications     Discharge Medications      Changes to Medications      Instructions Start Date   aspirin 81 MG EC tablet  What changed:   · when to take this  · additional instructions   81 mg, Oral, Daily      atenolol 25 MG tablet  Commonly known as: TENORMIN  What changed: when to take this   25 mg, Oral, Daily      clopidogrel 75 MG tablet  Commonly known as: PLAVIX  What changed:   · when to take this  · additional instructions   75 mg, Oral, Daily      hydroCHLOROthiazide 12.5 MG capsule  Commonly known as: MICROZIDE  What changed: when to take this   12.5 mg, Oral, Daily         Continue These Medications      Instructions Start Date   cholecalciferol 25 MCG (1000 UT) tablet  Commonly known as: VITAMIN D3   2,000 Units, Oral, Daily      DULCOLAX PO   1 tablet, Oral, As Needed      metroNIDAZOLE 0.75 % cream  Commonly known as: METROCREAM   1 application, Topical, 2 Times Daily, \"NOSE\" FOR \"PRECANCEROUS\" AREA      TYLENOL PO   325 mg, Oral, As Needed      VITEYES AREDS ADVANCED PO   2 capsules, Oral, Daily, PT HOLDING FOR SURGERY         Stop These Medications    BENADRYL PO     chlorhexidine 0.12 % solution  Commonly known as: PERIDEX            Discharge Diet:  Heart Healthy      Future Appointments   Date Time Provider Department Center   3/24/2022  3:00 PM Tamar Lind APRN [facility]   4/25/2022  2:00 PM [facility] ECHO/VAS RM 1 BH LCG ECHO [facility]   4/25/2022  3:00 PM Clint Locke MD [facility]   5/6/2022 10:00 AM Trent Davis MD [facility] PC [id number] [facility]   2/6/2023  1:15 PM [facility] L[facility] ECHO/VAS RM 1 BH LCG ECHO [facility]   2/6/2023  2:00 PM Clint Locke MD [facility]     Additional Instructions for the Follow-ups that You Need to Schedule     Ambulatory Referral to Cardiac Rehab   As directed      Ambulatory Referral to Cardiac Rehab   As directed              Thank you for allowing us to participate in your plan of care.      Polly Perkins, " CLEMENTINE  03/17/22  13:48 EDT   ..Electronically signed by CLEMENTINE Curry, 03/17/22, 12:45 PM EDT.

## 2022-03-17 NOTE — CONSULTS
Met with patient today to discuss cardiac rehab, had met with her previously during her last hosptial admission. Patient is very interested in attending, would like to be called after discharge.

## 2022-03-17 NOTE — PROGRESS NOTES
[facility] Progress Note       Encounter Date:22  Patient:Yuliana Gonzalez  :1930  MRN:[id number]      Chief Complaint: Follow-up TAVR      Subjective:        Patient doing well.  She did get a little dizzy and lightheaded when she got up to use restroom this morning but she did do this fairly quickly.  Otherwise she is feels great eating breakfast.  Has little pain at her pacemaker site    Review of Systems:  Review of Systems   Constitutional: Positive for malaise/fatigue.   Musculoskeletal: Positive for joint pain.   Neurological: Positive for dizziness.       Medications:  Scheduled Meds:  aspirin, 81 mg, Oral, Daily  cholecalciferol, 2,000 Units, Oral, Daily  clopidogrel, 75 mg, Oral, QAM  hydroCHLOROthiazide Oral, 12.5 mg, Oral, Daily    Continuous Infusions:  niCARdipine, 5-15 mg/hr, Last Rate: Stopped (03/15/22 2157)  phenylephrine, 0.5-3 mcg/kg/min, Last Rate: Stopped (03/15/22 152)    PRN Meds:  •  acetaminophen  •  ondansetron **OR** ondansetron         Objective:       Vitals:    22 0400 22 0500 22 0600 22 0700   BP: 146/59 138/55 180/67 160/54   BP Location:   Right arm Right arm   Patient Position:   Lying Sitting   Pulse: 68 72 72 67   Resp:   18 18   Temp:   98.9 °F (37.2 °C) 97.9 °F (36.6 °C)   TempSrc:   Oral Oral   SpO2:   95%    Weight:   72 kg (158 lb 12.8 oz)    Height:               Physical Exam:  Constitutional: Well appearing, well developed, no acute distress   HENT: Oropharynx clear and membrane moist  Eyes: Normal conjunctiva, no sclera icterus.  Neck: Supple, no carotid bruit bilaterally.  Cardiovascular: Regular rate and rhythm, Early peaking systolic murmur over the right upper sternal border, No bilateral lower extremity edema.  Pulmonary: Normal respiratory effort, normal lung sounds, no wheezing.  Abdominal: Soft, nontender, no hepatosplenomegaly, liver is non-pulsatile.  Neurological: Alert and orient x 3.   Skin: Warm,  dry, no ecchymosis, no rash.  Psych: Appropriate mood and affect. Normal judgment and insight.           Lab Review:   Results from last 7 days   Lab Units 03/16/22  0335   SODIUM mmol/L 134*   POTASSIUM mmol/L 3.6   CHLORIDE mmol/L 105   CO2 mmol/L 21.0*   BUN mg/dL 12   CREATININE mg/dL 0.37*   GLUCOSE mg/dL 102*   CALCIUM mg/dL 7.4*         Results from last 7 days   Lab Units 03/16/22  0335 03/15/22  1132 03/15/22  1058 03/15/22  1021   WBC 10*3/mm3 8.86  --   --   --    HEMOGLOBIN g/dL 12.2  --   --   --    HEMOGLOBIN, POC g/dL  --  9.2* 9.2* 10.2*   HEMATOCRIT % 36.6  --   --   --    HEMATOCRIT POC %  --  27* 27* 30*   PLATELETS 10*3/mm3 143  --   --   --                    Invalid input(s): LDLCALC                 Assessment:          Diagnosis Plan   1. AV block, complete (HCC)  Case Request EP Lab: Pacemaker DC new  [place]    Case Request EP Lab: Pacemaker DC new  [place]    EP/CRM Study    EP/CRM Study   2. Aortic stenosis, severe  Cardiac Catheterization/Vascular Study    Cardiac Catheterization/Vascular Study    Cardiac Catheterization/Vascular Study    Cardiac Catheterization/Vascular Study    chlorhexidine (PERIDEX) 0.12 % solution 15 mL    ceFAZolin in dextrose (ANCEF) IVPB solution 2 g   3. Chronic diastolic congestive heart failure (HCC)  Cardiac Catheterization/Vascular Study    Cardiac Catheterization/Vascular Study   4. Coronary artery disease of native artery of native heart with stable angina pectoris (HCC)  Ambulatory Referral to Cardiac Rehab   5. Nonrheumatic aortic valve stenosis  Ambulatory Referral to Cardiac Rehab   6. S/P TAVR (transcatheter aortic valve replacement)  Ambulatory Referral to Cardiac Rehab          Plan:       Ms. Gonzalez is a 89 y.o. woman with past medical history notable for nonrheumatic aortic stenosis who underwent successful TAVR on 3/15/2022.  Her post procedure course was complicated by complete heart block which was not surprising given her underlying conduction  disease pre-TAVR.  Overall she is doing well she had her pacemaker placed on 3/16/2022.  She was a little dizzy when she got up quickly to use the restroom but I think we can restart some of her home blood pressure medicines and see how she does walking around today.  Hopefully with getting up a little slower and letting some of the anesthesia wears off she will do well and potentially can go home later today.  Her echocardiogram 3/16/2022 looks good.    Nonrheumatic aortic stenosis:  · Status post 26 mm Medtronic evolute pro plus valve via transfemoral approach  · Continue antiplatelet therapy  · Follow-up echocardiogram in 30 days    Complete heart block:  · Status post permanent pacemaker               Robbie Gallegos MD  [facility]  03/17/22  10:03 EDT

## 2022-03-18 ENCOUNTER — TRANSITIONAL CARE MANAGEMENT TELEPHONE ENCOUNTER (OUTPATIENT)
Dept: CALL CENTER | Facility: HOSPITAL | Age: 87
End: 2022-03-18

## 2022-03-18 LAB
BH BB BLOOD EXPIRATION DATE: NORMAL
BH BB BLOOD EXPIRATION DATE: NORMAL
BH BB BLOOD TYPE BARCODE: 9500
BH BB BLOOD TYPE BARCODE: 9500
BH BB DISPENSE STATUS: NORMAL
BH BB DISPENSE STATUS: NORMAL
BH BB PRODUCT CODE: NORMAL
BH BB PRODUCT CODE: NORMAL
BH BB UNIT NUMBER: NORMAL
BH BB UNIT NUMBER: NORMAL
CROSSMATCH INTERPRETATION: NORMAL
CROSSMATCH INTERPRETATION: NORMAL
UNIT  ABO: NORMAL
UNIT  ABO: NORMAL
UNIT  RH: NORMAL
UNIT  RH: NORMAL

## 2022-03-18 NOTE — OUTREACH NOTE
Prep Survey    Flowsheet Row Responses   [facility] facility patient discharged from? [place]   Is LACE score < 7 ? No   Emergency Room discharge w/ pulse ox? No   Eligibility [facility]   Date of Admission 03/15/22   Date of Discharge 03/17/22   Discharge Disposition Home or Self Care   Discharge diagnosis TTE-aortic valve replacement   Does the patient have one of the following disease processes/diagnoses(primary or secondary)? Other   Does the patient have Home health ordered? No   Is there a DME ordered? No   Prep survey completed? Yes          JENA OTOOLE - Registered Nurse

## 2022-03-18 NOTE — OUTREACH NOTE
Call Center TCM Note    Flowsheet Row Responses   [facility] patient discharged from? [place]   Does the patient have one of the following disease processes/diagnoses(primary or secondary)? Other   TCM attempt successful? Yes   Call start time 1224   Call end time 1229   Discharge diagnosis TTE-aortic valve replacement   Person spoke with today (if not patient) and relationship Faith(Daughter)   Meds reviewed with patient/caregiver? Yes   Is the patient having any side effects they believe may be caused by any medication additions or changes? No   Does the patient have all medications ordered at discharge? N/A   Is the patient taking all medications as directed (includes completed medication regime)? Yes   Comments regarding appointments Hospital f/u appt. with PCP Dr. Davis. 3/23/22 @ 8:45am.   Does the patient have a primary care provider?  Yes   Does the patient have an appointment with their PCP within 7 days of discharge? Yes   Has the patient kept scheduled appointments due by today? N/A   Has home health visited the patient within 72 hours of discharge? N/A   Psychosocial issues? No   Did the patient receive a copy of their discharge instructions? Yes   Nursing interventions Reviewed instructions with patient   What is the patient's perception of their health status since discharge? Improving   Is the patient/caregiver able to teach back signs and symptoms related to disease process for when to call PCP? Yes   Is the patient/caregiver able to teach back signs and symptoms related to disease process for when to call 911? Yes   Is the patient/caregiver able to teach back the hierarchy of who to call/visit for symptoms/problems? PCP, Specialist, Home health nurse, Urgent Care, ED, 911 Yes   If the patient is a current smoker, are they able to teach back resources for cessation? Not a smoker   TCM call completed? Yes          Eusebia Adrian RN    3/18/2022, 12:30 EDT

## 2022-03-23 ENCOUNTER — OFFICE VISIT (OUTPATIENT)
Dept: INTERNAL MEDICINE | Facility: CLINIC | Age: 87
End: 2022-03-23

## 2022-03-23 VITALS
SYSTOLIC BLOOD PRESSURE: 118 MMHG | DIASTOLIC BLOOD PRESSURE: 70 MMHG | OXYGEN SATURATION: 97 % | BODY MASS INDEX: 30.02 KG/M2 | HEIGHT: 61 IN | WEIGHT: 159 LBS | RESPIRATION RATE: 16 BRPM | TEMPERATURE: 97.5 F | HEART RATE: 71 BPM

## 2022-03-23 DIAGNOSIS — Z95.2 HISTORY OF TRANSCATHETER AORTIC VALVE REPLACEMENT (TAVR): ICD-10-CM

## 2022-03-23 DIAGNOSIS — I35.0 NONRHEUMATIC AORTIC VALVE STENOSIS: Primary | Chronic | ICD-10-CM

## 2022-03-23 PROCEDURE — 1111F DSCHRG MED/CURRENT MED MERGE: CPT | Performed by: INTERNAL MEDICINE

## 2022-03-23 PROCEDURE — 99495 TRANSJ CARE MGMT MOD F2F 14D: CPT | Performed by: INTERNAL MEDICINE

## 2022-03-23 NOTE — PROGRESS NOTES
Transitional Care Follow Up Visit  Subjective     Yuliana Gonzalez is a 89 y.o. female who presents for a transitional care management visit.    Within 48 business hours after discharge our office contacted her via telephone to coordinate her care and needs.      I reviewed and discussed the details of that call along with the discharge summary, hospital problems, inpatient lab results, inpatient diagnostic studies, and consultation reports with Yuliana.     Current outpatient and discharge medications have been reconciled for the patient.  Reviewed by: Trent Davis MD      Date of TCM Phone Call 3/17/2022   [facility]   Date of Admission 3/15/2022   Date of Discharge 3/17/2022   Discharge Disposition Home or Self Care     Risk for Readmission (LACE) Score: 7 (3/17/2022  6:00 AM)      History of Present Illness   Course During Hospital Stay:  Ms. Gonzalez is a 89 year-old female who presented for previously scheduled cardiac intervention.  On 3/15/22 underwent a TAVR with Dr. Dr. Chris.   Post-op course was complicated by complete heart block. She had a pacemaker placed on 3/16. She has progressed well.  Post-op echocardiogram showed well seated TAVR valve, mean and peak gradients WDL -- no evidence of elizabeth-valvular leak.  Tolerating medications. To be continued on Plavix and Aspirin at discharge.   Mobility at baseline.  Follow-up with Cardiology APRN in one week, and Cardiologist in one month.     The following portions of the patient's history were reviewed and updated as appropriate: allergies, current medications, past medical history and problem list.    Review of Systems   Respiratory: Negative for shortness of breath.    Cardiovascular: Negative for chest pain, palpitations and leg swelling.       Objective   Physical Exam  Constitutional:       Appearance: Normal appearance.   Cardiovascular:      Rate and Rhythm: Normal rate and regular rhythm.      Heart sounds: Murmur ( grade 1 BENIGNO aortic area)  heard.     No gallop.   Pulmonary:      Effort: Pulmonary effort is normal. No respiratory distress.      Breath sounds: No wheezing or rales.   Neurological:      Mental Status: She is alert.         Assessment/Plan    Stenosis  Status post TAVR      In today for follow-up from recent TAVR.  Had a recent single stent placed preoperatively.  She had a permanent pacemaker placed for complete heart block following the procedure.  Since then she is done great.  She is 6 days out from the hospitalization.  Dyspnea on exertion resolved immediately with the procedure.  She is back and forth to the mailbox now with no symptoms.  Advised she can resume routine activities at the forearm.  She is previously advised to not raise her left arm over her shoulder level for 6 weeks following pacemaker implant.  She is looking into cardiac rehab now.  Due back in about 6 weeks.  She will continue with Plavix for at least 1 year post stent.  Currently on dual anticoagulation with aspirin 81 mg daily as well.    The above information was reviewed again today 03/23/22.  It continues to be accurate as reflected above and is unchanged.  History, physical and review of systems all reviewed and are unchanged.  Medications were reviewed today and continue the current dosing.    PPE today includes face mask and eye shield.

## 2022-03-24 ENCOUNTER — OFFICE VISIT (OUTPATIENT)
Dept: CARDIOLOGY | Facility: CLINIC | Age: 87
End: 2022-03-24

## 2022-03-24 ENCOUNTER — CLINICAL SUPPORT NO REQUIREMENTS (OUTPATIENT)
Dept: CARDIOLOGY | Facility: CLINIC | Age: 87
End: 2022-03-24

## 2022-03-24 VITALS
HEART RATE: 65 BPM | SYSTOLIC BLOOD PRESSURE: 120 MMHG | WEIGHT: 160.2 LBS | HEIGHT: 60 IN | BODY MASS INDEX: 31.45 KG/M2 | DIASTOLIC BLOOD PRESSURE: 78 MMHG

## 2022-03-24 DIAGNOSIS — I25.10 CORONARY ARTERY DISEASE INVOLVING NATIVE CORONARY ARTERY OF NATIVE HEART WITHOUT ANGINA PECTORIS: ICD-10-CM

## 2022-03-24 DIAGNOSIS — I35.0 NONRHEUMATIC AORTIC VALVE STENOSIS: Primary | Chronic | ICD-10-CM

## 2022-03-24 DIAGNOSIS — Z95.2 S/P TAVR (TRANSCATHETER AORTIC VALVE REPLACEMENT): ICD-10-CM

## 2022-03-24 DIAGNOSIS — I44.2 AV BLOCK, COMPLETE: Primary | ICD-10-CM

## 2022-03-24 DIAGNOSIS — I44.2 AV BLOCK, COMPLETE: ICD-10-CM

## 2022-03-24 DIAGNOSIS — I10 PRIMARY HYPERTENSION: Chronic | ICD-10-CM

## 2022-03-24 DIAGNOSIS — Z95.0 PRESENCE OF CARDIAC PACEMAKER: ICD-10-CM

## 2022-03-24 PROCEDURE — 93280 PM DEVICE PROGR EVAL DUAL: CPT | Performed by: INTERNAL MEDICINE

## 2022-03-24 PROCEDURE — 99214 OFFICE O/P EST MOD 30 MIN: CPT | Performed by: NURSE PRACTITIONER

## 2022-03-24 PROCEDURE — 93000 ELECTROCARDIOGRAM COMPLETE: CPT | Performed by: NURSE PRACTITIONER

## 2022-03-24 NOTE — PROGRESS NOTES
Date of Office Visit: 2022  Encounter Provider: CLEMENTINE Gonzalez  Place of Service: [facility] CARDIOLOGY  Patient Name: Yuliana Gonzalez  :1930    Chief Complaint   Patient presents with   • Aortic Stenosis   :     HPI: Yuliana Gonzalez is a 89 y.o. female.  She is a patient who was seen in the office by Dr. Locke in January for evaluation of severe aortic valve stenosis.  Secondary to her advanced age and frailty, a transcatheter aortic valve replacement was recommended.  On , cardiac catheterization demonstrated a 90% mid LAD lesion for which she underwent drug-eluting stent placement.  On 3/16, she underwent a TAVR utilizing a 26 mm Medtronic evolute.  Postoperative echocardiogram demonstrated normal LV function and a well-seated TAVR valve with thickening gradients within defined limits.  She did develop high degree AV block requiring temporary pacing.  Ultimately, she underwent dual-chamber pacemaker placement.  On 3/17, she was discharged.  She is here today for follow-up.   She has been feeling well.  She reports a significant improvement in her breathing.  She really denies any shortness of breath now.  She denies any chest pain, edema, dizziness, syncope, bleeding difficulties or melena.  [place] [facility] [place] [employer] [place] [employer] and is looking forward [place] [employer].    Past Medical History:   Diagnosis Date   • Aortic stenosis 2009    diagnosed,  moderate with 0.9cm area, 42mm max gradient. 0.91/34 6/15.   • Central loss of vision     RIGHT EYE   • Cervical arthritis    • Colitis     HX   • Coronary artery disease    • History of constipation    • History of esophageal reflux    • History of skin cancer    • Hyperlipidemia    • Hypertension    • IGT (impaired glucose tolerance)    • Irregular heart beat     HX   • Macular degeneration    • Medication management    • On continuous oral anticoagulation    • Redness of skin      "NOSE/DERMATOLOGY TREATING WITH FLAGYL CREAM/ADVISED PT TO NOTIFY CARDIOLOGY   • Sciatica     HX   • Short-term memory loss     \"MILD\"   • SOB (shortness of breath) on exertion    • Vitamin D deficiency        Past Surgical History:   Procedure Laterality Date   • AORTIC VALVE REPAIR/REPLACEMENT Left 3/15/2022    Procedure: TTE TRANSFEMORAL TRANSCATHETER AORTIC VALVE REPLACEMENT PERCUTANEOUS APPROACH;  Surgeon: Vesna Chris MD;  Location: [facility] CVOR;  Service: Cardiothoracic;  Laterality: Left;   • AORTIC VALVE REPAIR/REPLACEMENT Left 3/15/2022    Procedure: Transfemoral Transcatheter Aortic Valve Replacement with intra operative TTE and possible open surgical rescue;  Surgeon: Clint Locke MD;  Location: [facility] CVOR;  Service: Cardiovascular;  Laterality: Left;   • CARDIAC CATHETERIZATION N/A 1/21/2022    Procedure: Coronary angiography;  Surgeon: Clint Locke MD;  Location:  [facility] CATH INVASIVE LOCATION;  Service: Cardiovascular;  Laterality: N/A;   • CARDIAC CATHETERIZATION N/A 1/21/2022    Procedure: Percutaneous Coronary Intervention;  Surgeon: Clint Locke MD;  Location:  [facility] CATH INVASIVE LOCATION;  Service: Cardiovascular;  Laterality: N/A;   • CARDIAC CATHETERIZATION N/A 1/21/2022    Procedure: Stent ISMALE coronary;  Surgeon: Clint Locke MD;  [facility];  Service: Cardiovascular;  Laterality: N/A;   • CARDIAC ELECTROPHYSIOLOGY PROCEDURE N/A 3/16/2022    Procedure: Pacemaker DC new Maceo;  Surgeon: Yann Ceballos MD;  Location: [facility] CATH INVASIVE LOCATION;  Service: Cardiology;  Laterality: N/A;   • CATARACT EXTRACTION, BILATERAL     • COLONOSCOPY     • EYE SURGERY     • SKIN CANCER EXCISION         Social History     Socioeconomic History   • Marital status:      Spouse name: Stephan   • Number of children: 4   Tobacco Use   • Smoking status: Former Smoker     Packs/day: 0.00     Years: 0.00     Pack years: 0.00     " "Start date:      Quit date:      Years since quittin.2   • Smokeless tobacco: Never Used   • Tobacco comment: 1 pack every 2 weeks   Substance and Sexual Activity   • Alcohol use: Yes     Comment: wine- rarely/  2 cups caffeine daily   • Drug use: No       Family History   Problem Relation Age of Onset   • Aortic stenosis Mother    • Hypertension Mother    • Heart failure Father    • Diabetes Father    • Hypertension Father    • No Known Problems Sister    • No Known Problems Daughter    • No Known Problems Son    • Aneurysm Sister    • Other Sister         Polio in her 20's   • Malig Hyperthermia Neg Hx        Review of Systems   Constitutional: Negative.   Cardiovascular: Negative.  Negative for chest pain, dyspnea on exertion, leg swelling, orthopnea, paroxysmal nocturnal dyspnea and syncope.   Respiratory: Negative.    Hematologic/Lymphatic: Negative for bleeding problem.   Musculoskeletal: Negative for falls.   Gastrointestinal: Negative for melena.   Neurological: Negative for dizziness and light-headedness.       Allergies   Allergen Reactions   • Lipitor [Atorvastatin] Nausea Only     \"FELT AWFUL\"   • Erythromycin Diarrhea   • Lisinopril Cough         Current Outpatient Medications:   •  Acetaminophen (TYLENOL PO), Take 325 mg by mouth As Needed (for pain or fever)., Disp: , Rfl:   •  aspirin (aspirin) 81 MG EC tablet, Take 1 tablet by mouth Daily. (Patient taking differently: Take 81 mg by mouth Every Morning. PT TO DISCUSS WITH CARDIOLOGY REGARDING HOLDING PRIOR TO SURGERY), Disp: 30 tablet, Rfl: 6  •  atenolol (TENORMIN) 25 MG tablet, Take 1 tablet by mouth Daily. (Patient taking differently: Take 25 mg by mouth Every Evening.), Disp: 90 tablet, Rfl: 1  •  cholecalciferol (VITAMIN D3) 25 MCG (1000 UT) tablet, Take 2 tablets by mouth Daily., Disp: , Rfl:   •  clopidogrel (PLAVIX) 75 MG tablet, Take 1 tablet by mouth Daily. (Patient taking differently: Take 75 mg by mouth Every Morning. PT TO " "DISCUSS WITH CARDIOLOGY REGARDING HOLDING PRIOR TO SURGERY), Disp: 30 tablet, Rfl: 11  •  hydroCHLOROthiazide (MICROZIDE) 12.5 MG capsule, Take 1 capsule by mouth Daily. (Patient taking differently: Take 12.5 mg by mouth Every Morning.), Disp: 90 capsule, Rfl: 1  •  Magnesium Hydroxide (DULCOLAX PO), Take 1 tablet by mouth As Needed (constipation)., Disp: , Rfl:   •  metroNIDAZOLE (METROCREAM) 0.75 % cream, Apply 1 application topically to the appropriate area as directed 2 (Two) Times a Day. \"NOSE\" FOR \"PRECANCEROUS\" AREA, Disp: , Rfl:   •  Multiple Vitamins-Minerals (VITEYES AREDS ADVANCED PO), Take 2 capsules by mouth Daily. PT HOLDING FOR SURGERY, Disp: , Rfl:       Objective:     Vitals:    03/24/22 1524   BP: 120/78   Pulse: 65   Weight: 72.7 kg (160 lb 3.2 oz)   Height: 152.4 cm (60\")     Body mass index is 31.29 kg/m².    PHYSICAL EXAM:    Neck:      Vascular: No JVD.   Pulmonary:      Effort: Pulmonary effort is normal.      Breath sounds: Normal breath sounds.   Cardiovascular:      Normal rate. Regular rhythm.      Murmurs: There is a harsh midsystolic murmur at the URSB, radiating to the neck.      No gallop. No click. No rub.   Pulses:     Intact distal pulses.   Skin:               ECG 12 Lead    Date/Time: 3/24/2022 3:35 PM  Performed by: Tamar Lind APRN  Authorized by: Tamar Lind APRN   Comparison: compared with previous ECG from 3/16/2022  Similar to previous ECG  Rhythm: paced  Rate: normal  BPM: 65  Pacing: atrial sensed rhythmComments: Indication: Aortic stenosis              Assessment:       Diagnosis Plan   1. Nonrheumatic aortic valve stenosis  ECG 12 Lead   2. S/P TAVR (transcatheter aortic valve replacement)     3. Coronary artery disease involving native coronary artery of native heart without angina pectoris     4. AV block, complete (HCC)     5. Presence of cardiac pacemaker     6. Primary hypertension       Orders Placed This Encounter   Procedures   • ECG 12 Lead    "  This order was created via procedure documentation     Order Specific Question:   Release to patient     Answer:   Immediate          Plan:       1.  Aortic stenosis.  Status post TAVR.  Postoperative echocardiogram demonstrated a well-seated TAVR valve with peak and mean gradients within defined limits.      2.  Coronary artery disease.  Status post PCI of the mid LAD in January.  She is on dual antiplatelet therapy with aspirin and clopidogrel.      3.  Third-degree heart block.  Status post permanent pacemaker.  Device check today demonstrated normal function with no events.      4.  Hypertension.  Blood pressure stable.  Continue current regimen including atenolol and HCTZ.      I think she is doing well.  She denies any symptoms of angina or heart failure.  I am not making any changes today, and she will follow-up with Dr. Locke on 4/25.      As always, it has been a pleasure to participate in your patient's care.      Sincerely,         CLEMENTINE Vaughn

## 2022-03-28 ENCOUNTER — READMISSION MANAGEMENT (OUTPATIENT)
Dept: CALL CENTER | Facility: HOSPITAL | Age: 87
End: 2022-03-28

## 2022-03-28 NOTE — OUTREACH NOTE
Medical Week 2 Survey    Flowsheet Row Responses   [facility] patient discharged from? [place]   Does the patient have one of the following disease processes/diagnoses(primary or secondary)? Other   Week 2 attempt successful? Yes   Call start time 1245   Call end time 1247   Meds reviewed with patient/caregiver? Yes   Is the patient taking all medications as directed (includes completed medication regime)? N/A   Medication comments no  medication added   Has the patient kept scheduled appointments due by today? Yes   Comments  has kept appointment as scheduled   What is the patient's perception of their health status since discharge? Improving   Week 2 Call Completed? Yes   Wrap up additional comments patient doing well, pacemaker site  is well healed          CHRISTINA SPEAR - Registered Nurse

## 2022-03-31 PROCEDURE — 93294 REM INTERROG EVL PM/LDLS PM: CPT | Performed by: INTERNAL MEDICINE

## 2022-03-31 PROCEDURE — 93296 REM INTERROG EVL PM/IDS: CPT | Performed by: INTERNAL MEDICINE

## 2022-04-14 ENCOUNTER — OFFICE VISIT (OUTPATIENT)
Dept: CARDIAC REHAB | Facility: HOSPITAL | Age: 87
End: 2022-04-14

## 2022-04-14 DIAGNOSIS — Z95.0 S/P PLACEMENT OF CARDIAC PACEMAKER: ICD-10-CM

## 2022-04-14 DIAGNOSIS — Z95.5 S/P DRUG ELUTING CORONARY STENT PLACEMENT: ICD-10-CM

## 2022-04-14 DIAGNOSIS — Z95.2 S/P TAVR (TRANSCATHETER AORTIC VALVE REPLACEMENT): Primary | ICD-10-CM

## 2022-04-14 PROCEDURE — 93797 PHYS/QHP OP CAR RHAB WO ECG: CPT

## 2022-04-18 ENCOUNTER — TREATMENT (OUTPATIENT)
Dept: CARDIAC REHAB | Facility: HOSPITAL | Age: 87
End: 2022-04-18

## 2022-04-18 DIAGNOSIS — Z95.2 S/P TAVR (TRANSCATHETER AORTIC VALVE REPLACEMENT): Primary | ICD-10-CM

## 2022-04-18 PROCEDURE — 93798 PHYS/QHP OP CAR RHAB W/ECG: CPT

## 2022-04-20 ENCOUNTER — TREATMENT (OUTPATIENT)
Dept: CARDIAC REHAB | Facility: HOSPITAL | Age: 87
End: 2022-04-20

## 2022-04-20 DIAGNOSIS — Z95.2 S/P TAVR (TRANSCATHETER AORTIC VALVE REPLACEMENT): Primary | ICD-10-CM

## 2022-04-20 PROCEDURE — 93798 PHYS/QHP OP CAR RHAB W/ECG: CPT

## 2022-04-22 ENCOUNTER — TREATMENT (OUTPATIENT)
Dept: CARDIAC REHAB | Facility: HOSPITAL | Age: 87
End: 2022-04-22

## 2022-04-22 ENCOUNTER — CLINICAL SUPPORT NO REQUIREMENTS (OUTPATIENT)
Dept: CARDIOLOGY | Facility: CLINIC | Age: 87
End: 2022-04-22

## 2022-04-22 ENCOUNTER — OFFICE VISIT (OUTPATIENT)
Dept: CARDIOLOGY | Facility: CLINIC | Age: 87
End: 2022-04-22

## 2022-04-22 VITALS
HEART RATE: 69 BPM | HEIGHT: 60 IN | SYSTOLIC BLOOD PRESSURE: 128 MMHG | BODY MASS INDEX: 30.43 KG/M2 | WEIGHT: 155 LBS | DIASTOLIC BLOOD PRESSURE: 72 MMHG

## 2022-04-22 DIAGNOSIS — I10 PRIMARY HYPERTENSION: Chronic | ICD-10-CM

## 2022-04-22 DIAGNOSIS — I44.2 AV BLOCK, COMPLETE: Primary | ICD-10-CM

## 2022-04-22 DIAGNOSIS — Z95.2 S/P TAVR (TRANSCATHETER AORTIC VALVE REPLACEMENT): Primary | ICD-10-CM

## 2022-04-22 DIAGNOSIS — Z95.0 PRESENCE OF CARDIAC PACEMAKER: ICD-10-CM

## 2022-04-22 DIAGNOSIS — Z95.2 S/P TAVR (TRANSCATHETER AORTIC VALVE REPLACEMENT): ICD-10-CM

## 2022-04-22 PROCEDURE — 93280 PM DEVICE PROGR EVAL DUAL: CPT | Performed by: INTERNAL MEDICINE

## 2022-04-22 PROCEDURE — 93798 PHYS/QHP OP CAR RHAB W/ECG: CPT

## 2022-04-22 PROCEDURE — 99213 OFFICE O/P EST LOW 20 MIN: CPT

## 2022-04-22 PROCEDURE — 93000 ELECTROCARDIOGRAM COMPLETE: CPT

## 2022-04-22 NOTE — PROGRESS NOTES
Date of Office Visit: 2022  Encounter Provider: CLEMENTINE Fan  Place of Service: [facility] CARDIOLOGY  Patient Name: Yuliana Gonzalez  :1930    Chief Complaint   Patient presents with   • AV block, complete     1 month BHE f/u    • Pacemaker Check   :     HPI: Yuliana Gonzalez is a 89 y.o. female who is a patient of Dr. Locke and Dr. Ceballos.  She has a history of CAD, HTN, CHB-s/p PPM, TAVR.    On , cardiac cath revealed 90% mid LAD lesion- she underwent ISMAEL at this time.  On 3/16, she underwent TAVR procedure with Dr. Locke- post op echo showed normal LVEF.  Approx post op day two she went into high degree AV block requiring temporary pacing.  Subsequently when normal conduction did not return, patient underwent implantation of DC PPM with Dr. Ceballos on 3/16/2022.      She presents today for follow up appt. Patient is here with her daughter.     Patient states that she is doing extremely well since her procedures last month.  She denies any chest pain, dyspnea, fatigue, or syncope.      She lives at the [facility] and has started exercising regularly without any complaints.  She is able to walk at least 15 minutes almost every day on the elliptical.      She did have one episode of HTN and headache on or the day after .  Patient says that she was very emotional about going back to [employer] and her BP was noted to be 200s systolic.  She was able to calm down and take a dose of atenolol and quickly decreased to 140.      She is on DAPT for recent stent     Normal device testing and function in office today- no new events         Past Medical History:   Diagnosis Date   • Aortic stenosis 2009    diagnosed,  moderate with 0.9cm area, 42mm max gradient. 0.91/34 6/15.   • Central loss of vision     RIGHT EYE   • Cervical arthritis    • Colitis     HX   • Coronary artery disease    • History of constipation    • History of esophageal reflux    • History of skin  "cancer    • Hyperlipidemia    • Hypertension    • IGT (impaired glucose tolerance)    • Irregular heart beat     HX   • Macular degeneration    • Medication management    • On continuous oral anticoagulation    • Redness of skin     NOSE/DERMATOLOGY TREATING WITH FLAGYL CREAM/ADVISED PT TO NOTIFY CARDIOLOGY   • Sciatica     HX   • Short-term memory loss     \"MILD\"   • SOB (shortness of breath) on exertion    • Vitamin D deficiency        Past Surgical History:   Procedure Laterality Date   • AORTIC VALVE REPAIR/REPLACEMENT Left 3/15/2022    Procedure: TTE TRANSFEMORAL TRANSCATHETER AORTIC VALVE REPLACEMENT PERCUTANEOUS APPROACH;  Surgeon: Vesna Chris MD;  Location: [facility];  Service: Cardiothoracic;  Laterality: Left;   • AORTIC VALVE REPAIR/REPLACEMENT Left 3/15/2022    Procedure: Transfemoral Transcatheter Aortic Valve Replacement with intra operative TTE and possible open surgical rescue;  Surgeon: Clint Locke MD;  Location: [facility];  Service: Cardiovascular;  Laterality: Left;   • CARDIAC CATHETERIZATION N/A 1/21/2022    Procedure: Coronary angiography;  Surgeon: Clint Locke MD;  Location: [facility] CATH INVASIVE LOCATION;  Service: Cardiovascular;  Laterality: N/A;   • CARDIAC CATHETERIZATION N/A 1/21/2022    Procedure: Percutaneous Coronary Intervention;  Surgeon: Clint oLcke MD;  Location: [facility] CATH INVASIVE LOCATION;  Service: Cardiovascular;  Laterality: N/A;   • CARDIAC CATHETERIZATION N/A 1/21/2022    Procedure: Stent ISMAEL coronary;  Surgeon: Clint Locke MD;  Location: [facility] CATH INVASIVE LOCATION;  Service: Cardiovascular;  Laterality: N/A;   • CARDIAC ELECTROPHYSIOLOGY PROCEDURE N/A 3/16/2022    Procedure: Pacemaker DC new [place];  Surgeon: Yann Ceballos MD;  Location: [facility] CATH INVASIVE LOCATION;  Service: Cardiology;  Laterality: N/A;   • CATARACT EXTRACTION, BILATERAL     • COLONOSCOPY     • EYE SURGERY     • SKIN CANCER EXCISION   " "      Social History     Socioeconomic History   • Marital status:      Spouse name: Stephan   • Number of children: 4   Tobacco Use   • Smoking status: Former Smoker     Packs/day: 0.00     Years: 0.00     Pack years: 0.00     Start date:      Quit date:      Years since quittin.3   • Smokeless tobacco: Never Used   • Tobacco comment: 1 pack every 2 weeks   Vaping Use   • Vaping Use: Never used   Substance and Sexual Activity   • Alcohol use: Yes     Comment: wine- rarely/  2 cups caffeine daily   • Drug use: No       Family History   Problem Relation Age of Onset   • Aortic stenosis Mother    • Hypertension Mother    • Heart failure Father    • Diabetes Father    • Hypertension Father    • No Known Problems Sister    • No Known Problems Daughter    • No Known Problems Son    • Aneurysm Sister    • Other Sister         Polio in her 20's   • Malig Hyperthermia Neg Hx        Review of Systems   Constitutional: Negative for chills, fever and malaise/fatigue.   Cardiovascular: Negative for chest pain, dyspnea on exertion, leg swelling, near-syncope, orthopnea, palpitations, paroxysmal nocturnal dyspnea and syncope.   Respiratory: Negative for cough and shortness of breath.    Hematologic/Lymphatic: Negative.    Musculoskeletal: Negative for joint pain, joint swelling and myalgias.   Gastrointestinal: Negative for abdominal pain, diarrhea, melena, nausea and vomiting.   Genitourinary: Negative for frequency and hematuria.   Neurological: Negative for light-headedness, numbness, paresthesias and seizures.   Allergic/Immunologic: Negative.    All other systems reviewed and are negative.      Allergies   Allergen Reactions   • Lipitor [Atorvastatin] Nausea Only     \"FELT AWFUL\"   • Erythromycin Diarrhea   • Lisinopril Cough         Current Outpatient Medications:   •  Acetaminophen (TYLENOL PO), Take 325 mg by mouth As Needed (for pain or fever)., Disp: , Rfl:   •  aspirin (aspirin) 81 MG EC tablet, Take " "1 tablet by mouth Daily. (Patient taking differently: Take 81 mg by mouth Every Morning.), Disp: 30 tablet, Rfl: 6  •  atenolol (TENORMIN) 25 MG tablet, Take 1 tablet by mouth Daily. (Patient taking differently: Take 25 mg by mouth Every Evening.), Disp: 90 tablet, Rfl: 1  •  cholecalciferol (VITAMIN D3) 25 MCG (1000 UT) tablet, Take 2 tablets by mouth Daily., Disp: , Rfl:   •  clopidogrel (PLAVIX) 75 MG tablet, Take 1 tablet by mouth Daily. (Patient taking differently: Take 75 mg by mouth Every Morning.), Disp: 30 tablet, Rfl: 11  •  hydroCHLOROthiazide (MICROZIDE) 12.5 MG capsule, Take 1 capsule by mouth Daily. (Patient taking differently: Take 12.5 mg by mouth Every Morning.), Disp: 90 capsule, Rfl: 1  •  Magnesium Hydroxide (DULCOLAX PO), Take 1 tablet by mouth As Needed (constipation)., Disp: , Rfl:   •  metroNIDAZOLE (METROCREAM) 0.75 % cream, Apply 1 application topically to the appropriate area as directed 2 (Two) Times a Day. \"NOSE\" FOR \"PRECANCEROUS\" AREA, Disp: , Rfl:   •  Multiple Vitamins-Minerals (VITEYES AREDS ADVANCED PO), Take 2 capsules by mouth Daily. PT HOLDING FOR SURGERY, Disp: , Rfl:       Objective:     Vitals:    04/22/22 1055   BP: 128/72   Pulse: 69   Weight: 70.3 kg (155 lb)   Height: 152.4 cm (60\")     Body mass index is 30.27 kg/m².    PHYSICAL EXAM:    Vitals Reviewed.   General Appearance: No acute distress, well developed and well nourished.   Eyes: Conjunctiva and lids: No erythema, swelling, or discharge. Sclera non-icteric.   HENT: Atraumatic, normocephalic. External eyes, ears, and nose normal.   Respiratory: No signs of respiratory distress. Respiration rhythm and depth normal.   Clear to auscultation. No rales, crackles, rhonchi, or wheezing auscultated.   Cardiovascular:  Heart Rate and Rhythm: Paced, Heart Sounds: Normal S1 and S2. No S3 or S4 noted.  Gastrointestinal:  Abdomen soft, non-distended, non-tender.   Musculoskeletal: Normal movement of extremities  Skin: Warm and " dry.   Psychiatric: Patient alert and oriented to person, place, and time. Speech and behavior appropriate. Normal mood and affect.       ECG 12 Lead    Date/Time: 4/22/2022 11:01 AM  Performed by: Eric Flores APRN  Authorized by: Eric Flores APRN   Comparison: compared with previous ECG   Rhythm: paced  Rhythm comments: AS-  BPM: 69                Assessment:       Diagnosis Plan   1. AV block, complete (HCC)     2. Presence of cardiac pacemaker     3. S/P TAVR (transcatheter aortic valve replacement)     4. Primary hypertension            Plan:   1-2. Complete AV block-s/p DC PPM (Dr. Ceballos 3/16)- normal device testing and function in office today- no new events.  100% RV pacing.     3. TAVR- She saw RS last month for follow up and was doing well.  Echo post showed normal LVEF and well-seated TAVR    4. HTN-she had one episode of significant hypertension but was emotional and quickly decreased after medication.  Her BP is normal today.  I advised her to check it regularly and keep a log.     She is doing very well after TAVR and PPM last month.  She is progressing with activity and very happy with her progress.     She will follow up with Dr. Ceballos in 2 months or sooner if she has issues.     As always, it has been a pleasure to participate in your patient's care.      Sincerely,         CLEMENTINE Fan

## 2022-04-25 ENCOUNTER — OFFICE VISIT (OUTPATIENT)
Dept: CARDIOLOGY | Facility: CLINIC | Age: 87
End: 2022-04-25

## 2022-04-25 ENCOUNTER — TREATMENT (OUTPATIENT)
Dept: CARDIAC REHAB | Facility: HOSPITAL | Age: 87
End: 2022-04-25

## 2022-04-25 ENCOUNTER — LAB (OUTPATIENT)
Dept: LAB | Facility: HOSPITAL | Age: 87
End: 2022-04-25

## 2022-04-25 ENCOUNTER — HOSPITAL ENCOUNTER (OUTPATIENT)
Dept: CARDIOLOGY | Facility: HOSPITAL | Age: 87
Discharge: HOME | End: 2022-04-25

## 2022-04-25 VITALS
HEART RATE: 78 BPM | WEIGHT: 154.32 LBS | BODY MASS INDEX: 30.3 KG/M2 | SYSTOLIC BLOOD PRESSURE: 110 MMHG | DIASTOLIC BLOOD PRESSURE: 76 MMHG | HEIGHT: 60 IN

## 2022-04-25 VITALS
WEIGHT: 154 LBS | BODY MASS INDEX: 30.23 KG/M2 | SYSTOLIC BLOOD PRESSURE: 110 MMHG | HEIGHT: 60 IN | HEART RATE: 70 BPM | DIASTOLIC BLOOD PRESSURE: 76 MMHG

## 2022-04-25 DIAGNOSIS — I35.0 AORTIC STENOSIS, SEVERE: ICD-10-CM

## 2022-04-25 DIAGNOSIS — I35.0 AORTIC STENOSIS, SEVERE: Primary | ICD-10-CM

## 2022-04-25 DIAGNOSIS — Z95.2 S/P TAVR (TRANSCATHETER AORTIC VALVE REPLACEMENT): ICD-10-CM

## 2022-04-25 DIAGNOSIS — I44.2 AV BLOCK, COMPLETE: ICD-10-CM

## 2022-04-25 DIAGNOSIS — Z95.0 PRESENCE OF CARDIAC PACEMAKER: ICD-10-CM

## 2022-04-25 DIAGNOSIS — Z95.2 S/P TAVR (TRANSCATHETER AORTIC VALVE REPLACEMENT): Primary | ICD-10-CM

## 2022-04-25 LAB
ANION GAP SERPL CALCULATED.3IONS-SCNC: 12.3 MMOL/L (ref 5–15)
AORTIC ARCH: 2.5 CM
AORTIC DIMENSIONLESS INDEX: 0.6 (DI)
ASCENDING AORTA: 2.3 CM
BH CV ECHO AV AORTIC VALVE AT ACCEL TIME CALCULATED: NORMAL MSEC
BH CV ECHO LEFT VENTRICLE GLOBAL LONGITUDINAL STRAIN: -21.8 %
BH CV ECHO MEAS - AO MAX PG: 17.8 MMHG
BH CV ECHO MEAS - AO MEAN PG: 9.8 MMHG
BH CV ECHO MEAS - AO ROOT DIAM: 3.2 CM
BH CV ECHO MEAS - AO V2 MAX: 211 CM/SEC
BH CV ECHO MEAS - AO V2 VTI: 42 CM
BH CV ECHO MEAS - AT: 77 SEC
BH CV ECHO MEAS - AVA(I,D): 1.25 CM2
BH CV ECHO MEAS - EDV(CUBED): 83.7 ML
BH CV ECHO MEAS - EDV(MOD-SP2): 73 ML
BH CV ECHO MEAS - EDV(MOD-SP4): 70 ML
BH CV ECHO MEAS - EF(MOD-BP): 65 %
BH CV ECHO MEAS - EF(MOD-SP2): 67.1 %
BH CV ECHO MEAS - EF(MOD-SP4): 65.7 %
BH CV ECHO MEAS - ESV(CUBED): 13.5 ML
BH CV ECHO MEAS - ESV(MOD-SP2): 24 ML
BH CV ECHO MEAS - ESV(MOD-SP4): 24 ML
BH CV ECHO MEAS - FS: 45.5 %
BH CV ECHO MEAS - IVS/LVPW: 1.02 CM
BH CV ECHO MEAS - IVSD: 1.34 CM
BH CV ECHO MEAS - LAT PEAK E' VEL: 7.9 CM/SEC
BH CV ECHO MEAS - LV DIASTOLIC VOL/BSA (35-75): 41.8 CM2
BH CV ECHO MEAS - LV MASS(C)D: 218.5 GRAMS
BH CV ECHO MEAS - LV MAX PG: 7.2 MMHG
BH CV ECHO MEAS - LV MEAN PG: 4.4 MMHG
BH CV ECHO MEAS - LV SYSTOLIC VOL/BSA (12-30): 14.3 CM2
BH CV ECHO MEAS - LV V1 MAX: 134.6 CM/SEC
BH CV ECHO MEAS - LV V1 VTI: 23.4 CM
BH CV ECHO MEAS - LVIDD: 4.4 CM
BH CV ECHO MEAS - LVIDS: 2.38 CM
BH CV ECHO MEAS - LVOT AREA: 2.24 CM2
BH CV ECHO MEAS - LVOT DIAM: 1.69 CM
BH CV ECHO MEAS - LVPWD: 1.31 CM
BH CV ECHO MEAS - MED PEAK E' VEL: 4.2 CM/SEC
BH CV ECHO MEAS - MV A DUR: 0.15 SEC
BH CV ECHO MEAS - MV A MAX VEL: 143.4 CM/SEC
BH CV ECHO MEAS - MV DEC SLOPE: 204.8 CM/SEC2
BH CV ECHO MEAS - MV DEC TIME: 0.32 MSEC
BH CV ECHO MEAS - MV E MAX VEL: 66.3 CM/SEC
BH CV ECHO MEAS - MV E/A: 0.46
BH CV ECHO MEAS - MV MAX PG: 9.4 MMHG
BH CV ECHO MEAS - MV MEAN PG: 2.8 MMHG
BH CV ECHO MEAS - MV V2 VTI: 38 CM
BH CV ECHO MEAS - MVA(VTI): 1.38 CM2
BH CV ECHO MEAS - PA ACC TIME: 0.1 SEC
BH CV ECHO MEAS - PA PR(ACCEL): 32.7 MMHG
BH CV ECHO MEAS - PA V2 MAX: 133 CM/SEC
BH CV ECHO MEAS - PULM A REVS DUR: 0.15 SEC
BH CV ECHO MEAS - PULM A REVS VEL: 23.1 CM/SEC
BH CV ECHO MEAS - PULM DIAS VEL: 44.6 CM/SEC
BH CV ECHO MEAS - PULM SYS VEL: 51.9 CM/SEC
BH CV ECHO MEAS - RAP SYSTOLE: 3 MMHG
BH CV ECHO MEAS - RV MAX PG: 7.7 MMHG
BH CV ECHO MEAS - RV V1 MAX: 138.7 CM/SEC
BH CV ECHO MEAS - RV V1 VTI: 26.2 CM
BH CV ECHO MEAS - RVOT DIAM: 1.69 CM
BH CV ECHO MEAS - RVSP: 28 MMHG
BH CV ECHO MEAS - SI(MOD-SP2): 29.3 ML/M2
BH CV ECHO MEAS - SI(MOD-SP4): 27.5 ML/M2
BH CV ECHO MEAS - SUP REN AO DIAM: 1.8 CM
BH CV ECHO MEAS - SV(LVOT): 52.5 ML
BH CV ECHO MEAS - SV(MOD-SP2): 49 ML
BH CV ECHO MEAS - SV(MOD-SP4): 46 ML
BH CV ECHO MEAS - SV(RVOT): 59 ML
BH CV ECHO MEAS - TAPSE (>1.6): 2.15 CM
BH CV ECHO MEAS - TR MAX PG: 25 MMHG
BH CV ECHO MEAS - TR MAX VEL: 249.8 CM/SEC
BH CV ECHO MEASUREMENTS AVERAGE E/E' RATIO: 10.96
BH CV XLRA - RV BASE: 2.12 CM
BH CV XLRA - RV LENGTH: 6.4 CM
BH CV XLRA - RV MID: 1.67 CM
BH CV XLRA - TDI S': 11.4 CM/SEC
BUN SERPL-MCNC: 15 MG/DL (ref 8–23)
BUN/CREAT SERPL: 25 (ref 7–25)
CALCIUM SPEC-SCNC: 9.4 MG/DL (ref 8.2–9.6)
CHLORIDE SERPL-SCNC: 99 MMOL/L (ref 98–107)
CO2 SERPL-SCNC: 25.7 MMOL/L (ref 22–29)
CREAT SERPL-MCNC: 0.6 MG/DL (ref 0.57–1)
DEPRECATED RDW RBC AUTO: 40.2 FL (ref 37–54)
EGFRCR SERPLBLD CKD-EPI 2021: 84.3 ML/MIN/1.73
ERYTHROCYTE [DISTWIDTH] IN BLOOD BY AUTOMATED COUNT: 12.5 % (ref 12.3–15.4)
GLUCOSE SERPL-MCNC: 159 MG/DL (ref 65–99)
HCT VFR BLD AUTO: 42.5 % (ref 34–46.6)
HGB BLD-MCNC: 13.8 G/DL (ref 12–15.9)
MAXIMAL PREDICTED HEART RATE: 128 BPM
MCH RBC QN AUTO: 28.3 PG (ref 26.6–33)
MCHC RBC AUTO-ENTMCNC: 32.5 G/DL (ref 31.5–35.7)
MCV RBC AUTO: 87.3 FL (ref 79–97)
PLATELET # BLD AUTO: 211 10*3/MM3 (ref 140–450)
PMV BLD AUTO: 10.6 FL (ref 6–12)
POTASSIUM SERPL-SCNC: 4.3 MMOL/L (ref 3.5–5.2)
RBC # BLD AUTO: 4.87 10*6/MM3 (ref 3.77–5.28)
SINUS: 2.01 CM
SODIUM SERPL-SCNC: 137 MMOL/L (ref 136–145)
STJ: 2.12 CM
STRESS TARGET HR: 109 BPM
WBC NRBC COR # BLD: 9.63 10*3/MM3 (ref 3.4–10.8)

## 2022-04-25 PROCEDURE — 93306 TTE W/DOPPLER COMPLETE: CPT | Performed by: INTERNAL MEDICINE

## 2022-04-25 PROCEDURE — 85027 COMPLETE CBC AUTOMATED: CPT

## 2022-04-25 PROCEDURE — 80048 BASIC METABOLIC PNL TOTAL CA: CPT

## 2022-04-25 PROCEDURE — 93356 MYOCRD STRAIN IMG SPCKL TRCK: CPT

## 2022-04-25 PROCEDURE — 93798 PHYS/QHP OP CAR RHAB W/ECG: CPT

## 2022-04-25 PROCEDURE — 93356 MYOCRD STRAIN IMG SPCKL TRCK: CPT | Performed by: INTERNAL MEDICINE

## 2022-04-25 PROCEDURE — 36415 COLL VENOUS BLD VENIPUNCTURE: CPT

## 2022-04-25 PROCEDURE — 93306 TTE W/DOPPLER COMPLETE: CPT

## 2022-04-25 PROCEDURE — 99214 OFFICE O/P EST MOD 30 MIN: CPT | Performed by: INTERNAL MEDICINE

## 2022-04-25 NOTE — PROGRESS NOTES
"  Date of Office Visit: 22    Encounter Provider: Clint Locke MD  Place of Service: [facility] CARDIOLOGY  Patient Name: Yuliana Gonzalez  :1930    Chief Complaint   Patient presents with   • 30 Day Post TAVR   :     HPI:  89 y.o. female who I initially saw in 2022 for evaluation of severe aortic valve stenosis.  Secondary to her advanced age and frailty, a transcatheter aortic valve replacement was recommended.  On , cardiac catheterization demonstrated a 90% mid LAD lesion for which she underwent drug-eluting stent placement.  On 3/16, she underwent a TAVR utilizing a 26 mm Medtronic evolute Pro valve.  Postoperative echocardiogram demonstrated normal LV function and a well-seated TAVR valve with thickening gradients within defined limits.  She did develop high degree AV block requiring temporary pacing.  Ultimately, she underwent dual-chamber pacemaker placement.  On 3/17, she was discharged.  She is here today for follow-up.    She has been feeling well.  She reports a significant improvement in her breathing.  She feels like she is doing well.  She denies any chest pain or dyspnea on exertion.  Her blood pressure and heart rate are well controlled.    Past Medical History:   Diagnosis Date   • Aortic stenosis 2009    diagnosed,  moderate with 0.9cm area, 42mm max gradient. 0.91/34 6/15.   • Central loss of vision     RIGHT EYE   • Cervical arthritis    • Colitis     HX   • Coronary artery disease    • History of constipation    • History of esophageal reflux    • History of skin cancer    • Hyperlipidemia    • Hypertension    • IGT (impaired glucose tolerance)    • Irregular heart beat     HX   • Macular degeneration    • Medication management    • On continuous oral anticoagulation    • Redness of skin     NOSE/DERMATOLOGY TREATING WITH FLAGYL CREAM/ADVISED PT TO NOTIFY CARDIOLOGY   • Sciatica     HX   • Short-term memory loss     \"MILD\" "   • SOB (shortness of breath) on exertion    • Vitamin D deficiency        Past Surgical History:   Procedure Laterality Date   • AORTIC VALVE REPAIR/REPLACEMENT Left 3/15/2022    Procedure: TTE TRANSFEMORAL TRANSCATHETER AORTIC VALVE REPLACEMENT PERCUTANEOUS APPROACH;  Surgeon: Vesna Chris MD;  Location: [facility] CVOR;  Service: Cardiothoracic;  Laterality: Left;   • AORTIC VALVE REPAIR/REPLACEMENT Left 3/15/2022    Procedure: Transfemoral Transcatheter Aortic Valve Replacement with intra operative TTE and possible open surgical rescue;  Surgeon: Clint Locke MD;  Location: [facility] CVOR;  Service: Cardiovascular;  Laterality: Left;   • CARDIAC CATHETERIZATION N/A 2022    Procedure: Coronary angiography;  Surgeon: Clint Locke MD;  Location: [facility] CATH INVASIVE LOCATION;  Service: Cardiovascular;  Laterality: N/A;   • CARDIAC CATHETERIZATION N/A 2022    Procedure: Percutaneous Coronary Intervention;  Surgeon: Clint Locke MD;  Location: [facility] CATH INVASIVE LOCATION;  Service: Cardiovascular;  Laterality: N/A;   • CARDIAC CATHETERIZATION N/A 2022    Procedure: Stent ISMAEL coronary;  Surgeon: Clint Locke MD;  Location: [facility] CATH INVASIVE LOC[facility];  Service: Cardiovascular;  Laterality: N/A;   • CARDIAC ELECTROPHYSIOLOGY PROCEDURE N/A 3/16/2022    Procedure: Pacemaker DC new [place];  Surgeon: Yann Ceballos MD;  Location: [facility] CATH INVASIVE LOCATION;  Service: Cardiology;  Laterality: N/A;   • CATARACT EXTRACTION, BILATERAL     • COLONOSCOPY     • EYE SURGERY     • SKIN CANCER EXCISION         Social History     Socioeconomic History   • Marital status:      Spouse name: Stephan   • Number of children: 4   Tobacco Use   • Smoking status: Former Smoker     Packs/day: 0.00     Years: 0.00     Pack years: 0.00     Start date:      Quit date:      Years since quittin.3   • Smokeless tobacco: Never Used   • Tobacco comment: 1 pack  "every 2 weeks   Vaping Use   • Vaping Use: Never used   Substance and Sexual Activity   • Alcohol use: Yes     Comment: wine- rarely/  2 cups caffeine daily   • Drug use: No       Family History   Problem Relation Age of Onset   • Aortic stenosis Mother    • Hypertension Mother    • Heart failure Father    • Diabetes Father    • Hypertension Father    • No Known Problems Sister    • No Known Problems Daughter    • No Known Problems Son    • Aneurysm Sister    • Other Sister         Polio in her 20's   • Malig Hyperthermia Neg Hx        Review of Systems   Constitutional: Negative.   Cardiovascular: Negative.  Negative for chest pain, dyspnea on exertion, leg swelling, orthopnea, paroxysmal nocturnal dyspnea and syncope.   Respiratory: Negative.    Hematologic/Lymphatic: Negative for bleeding problem.   Musculoskeletal: Negative for falls.   Gastrointestinal: Negative for melena.   Neurological: Negative for dizziness and light-headedness.       Allergies   Allergen Reactions   • Lipitor [Atorvastatin] Nausea Only     \"FELT AWFUL\"   • Erythromycin Diarrhea   • Lisinopril Cough         Current Outpatient Medications:   •  Acetaminophen (TYLENOL PO), Take 325 mg by mouth As Needed (for pain or fever)., Disp: , Rfl:   •  aspirin (aspirin) 81 MG EC tablet, Take 1 tablet by mouth Daily. (Patient taking differently: Take 81 mg by mouth Every Morning.), Disp: 30 tablet, Rfl: 6  •  atenolol (TENORMIN) 25 MG tablet, Take 1 tablet by mouth Daily. (Patient taking differently: Take 25 mg by mouth Every Evening.), Disp: 90 tablet, Rfl: 1  •  cholecalciferol (VITAMIN D3) 25 MCG (1000 UT) tablet, Take 2 tablets by mouth Daily., Disp: , Rfl:   •  clopidogrel (PLAVIX) 75 MG tablet, Take 1 tablet by mouth Daily. (Patient taking differently: Take 75 mg by mouth Every Morning.), Disp: 30 tablet, Rfl: 11  •  hydroCHLOROthiazide (MICROZIDE) 12.5 MG capsule, Take 1 capsule by mouth Daily. (Patient taking differently: Take 12.5 mg by mouth " "Every Morning.), Disp: 90 capsule, Rfl: 1  •  Magnesium Hydroxide (DULCOLAX PO), Take 1 tablet by mouth As Needed (constipation)., Disp: , Rfl:   •  metroNIDAZOLE (METROCREAM) 0.75 % cream, Apply 1 application topically to the appropriate area as directed 2 (Two) Times a Day. \"NOSE\" FOR \"PRECANCEROUS\" AREA, Disp: , Rfl:   •  Multiple Vitamins-Minerals (VITEYES AREDS ADVANCED PO), Take 2 capsules by mouth Daily. PT HOLDING FOR SURGERY, Disp: , Rfl:       Objective:     Vitals:    04/25/22 1521   BP: 110/76   Pulse: 70   Weight: 69.9 kg (154 lb)   Height: 152.4 cm (60\")     Body mass index is 30.08 kg/m².    PHYSICAL EXAM:  Heart Rate:  [70-78] 70  BP: (110)/(76) 110/76  Flowsheet Rows    Flowsheet Row First Filed Value   Admission Height 152.4 cm (60\") Documented at 04/25/2022 1521   Admission Weight 69.9 kg (154 lb) Documented at 04/25/2022 1521      Constitutional:       Appearance: Well-developed.      Comments: Elderly.   Eyes:      General: No scleral icterus.     Conjunctiva/sclera: Conjunctivae normal.   HENT:      Head: Normocephalic and atraumatic.   Neck:      Thyroid: No thyromegaly.      Vascular: Normal carotid pulses. No carotid bruit, hepatojugular reflux or JVD.      Trachea: No tracheal deviation.   Pulmonary:      Effort: No respiratory distress.      Breath sounds: Normal breath sounds.   Chest:      Chest wall: Not tender to palpatation.   Cardiovascular:      Normal rate. Regular rhythm.      No gallop.   Pulses:     Carotid: 2+ bilaterally.     Radial: 2+ bilaterally.     Femoral: 2+ bilaterally.     Dorsalis pedis: 2+ bilaterally.     Posterior tibial: 2+ bilaterally.  Abdominal:      General: Bowel sounds are normal. There is no distension.      Palpations: Abdomen is soft.      Tenderness: There is no abdominal tenderness.   Musculoskeletal:         General: No deformity.      Cervical back: Normal range of motion and neck supple. Skin:     Findings: No erythema or rash.      Comments: " Left-sided pacemaker pocket intact.  No hematoma.   Neurological:      Mental Status: Alert and oriented to person, place, and time.      Sensory: No sensory deficit.   Psychiatric:         Behavior: Behavior normal.           Procedures      Assessment:       Diagnosis Plan   1. S/P TAVR (transcatheter aortic valve replacement)  Adult Transthoracic Echo Complete w/ Color, Spectral and Contrast if Necessary Per Protocol     No orders of the defined types were placed in this encounter.    Plan:       1.  Aortic stenosis.  Degenerative.  Status post TAVR.  Postoperative echocardiogram demonstrated a well-seated TAVR valve with peak and mean gradients within defined limits.  -Repeat echocardiogram today looks great.  Ejection fraction is preserved.  Valve has no paravalvular leak and a mean gradient 6 mmHg across the valve.  Patient states that she feels better.  New York Heart Association class I currently.    2.  Coronary artery disease.  Status post PCI of the mid LAD in January.  She is on dual antiplatelet therapy with aspirin and clopidogrel.  Recommend continuing the Plavix until July 1, at which time it can be discontinued.  - Continue aspirin lifelong.  -Not on statin secondary to advanced age    3.  Third-degree heart block.  Status post permanent pacemaker.  Device check today demonstrated normal function with no events.    4.  Hypertension.  Blood pressure stable.  Continue current regimen including atenolol and HCTZ.

## 2022-04-27 ENCOUNTER — TREATMENT (OUTPATIENT)
Dept: CARDIAC REHAB | Facility: HOSPITAL | Age: 87
End: 2022-04-27

## 2022-04-27 DIAGNOSIS — Z95.2 S/P TAVR (TRANSCATHETER AORTIC VALVE REPLACEMENT): Primary | ICD-10-CM

## 2022-04-27 PROCEDURE — 93798 PHYS/QHP OP CAR RHAB W/ECG: CPT

## 2022-04-29 ENCOUNTER — TREATMENT (OUTPATIENT)
Dept: CARDIAC REHAB | Facility: HOSPITAL | Age: 87
End: 2022-04-29

## 2022-04-29 DIAGNOSIS — Z95.2 S/P TAVR (TRANSCATHETER AORTIC VALVE REPLACEMENT): Primary | ICD-10-CM

## 2022-04-29 PROCEDURE — 93798 PHYS/QHP OP CAR RHAB W/ECG: CPT

## 2022-05-02 ENCOUNTER — TREATMENT (OUTPATIENT)
Dept: CARDIAC REHAB | Facility: HOSPITAL | Age: 87
End: 2022-05-02

## 2022-05-02 DIAGNOSIS — Z95.2 S/P TAVR (TRANSCATHETER AORTIC VALVE REPLACEMENT): Primary | ICD-10-CM

## 2022-05-02 PROCEDURE — 93798 PHYS/QHP OP CAR RHAB W/ECG: CPT

## 2022-05-04 ENCOUNTER — TREATMENT (OUTPATIENT)
Dept: CARDIAC REHAB | Facility: HOSPITAL | Age: 87
End: 2022-05-04

## 2022-05-04 DIAGNOSIS — Z95.2 S/P TAVR (TRANSCATHETER AORTIC VALVE REPLACEMENT): Primary | ICD-10-CM

## 2022-05-04 PROCEDURE — 93798 PHYS/QHP OP CAR RHAB W/ECG: CPT

## 2022-05-06 ENCOUNTER — APPOINTMENT (OUTPATIENT)
Dept: CARDIAC REHAB | Facility: HOSPITAL | Age: 87
End: 2022-05-06

## 2022-05-09 ENCOUNTER — TREATMENT (OUTPATIENT)
Dept: CARDIAC REHAB | Facility: HOSPITAL | Age: 87
End: 2022-05-09

## 2022-05-09 DIAGNOSIS — Z95.2 S/P TAVR (TRANSCATHETER AORTIC VALVE REPLACEMENT): Primary | ICD-10-CM

## 2022-05-09 PROCEDURE — 93798 PHYS/QHP OP CAR RHAB W/ECG: CPT

## 2022-05-10 ENCOUNTER — OFFICE VISIT (OUTPATIENT)
Dept: INTERNAL MEDICINE | Facility: CLINIC | Age: 87
End: 2022-05-10

## 2022-05-10 VITALS
SYSTOLIC BLOOD PRESSURE: 130 MMHG | WEIGHT: 154 LBS | DIASTOLIC BLOOD PRESSURE: 72 MMHG | TEMPERATURE: 97.3 F | BODY MASS INDEX: 30.23 KG/M2 | HEIGHT: 60 IN | OXYGEN SATURATION: 97 % | HEART RATE: 81 BPM | RESPIRATION RATE: 16 BRPM

## 2022-05-10 DIAGNOSIS — I25.10 CORONARY ARTERY DISEASE INVOLVING NATIVE CORONARY ARTERY OF NATIVE HEART WITHOUT ANGINA PECTORIS: ICD-10-CM

## 2022-05-10 DIAGNOSIS — H35.3232 EXUDATIVE AGE-RELATED MACULAR DEGENERATION, BILATERAL, WITH INACTIVE CHOROIDAL NEOVASCULARIZATION: ICD-10-CM

## 2022-05-10 DIAGNOSIS — E78.5 HYPERLIPIDEMIA, UNSPECIFIED HYPERLIPIDEMIA TYPE: Chronic | ICD-10-CM

## 2022-05-10 DIAGNOSIS — R73.02 IGT (IMPAIRED GLUCOSE TOLERANCE): Chronic | ICD-10-CM

## 2022-05-10 DIAGNOSIS — I10 PRIMARY HYPERTENSION: Primary | Chronic | ICD-10-CM

## 2022-05-10 DIAGNOSIS — I25.118 CORONARY ARTERY DISEASE OF NATIVE ARTERY OF NATIVE HEART WITH STABLE ANGINA PECTORIS: ICD-10-CM

## 2022-05-10 PROBLEM — I50.32 CHRONIC DIASTOLIC CONGESTIVE HEART FAILURE (HCC): Status: RESOLVED | Noted: 2022-01-28 | Resolved: 2022-05-10

## 2022-05-10 PROCEDURE — 99214 OFFICE O/P EST MOD 30 MIN: CPT | Performed by: INTERNAL MEDICINE

## 2022-05-10 NOTE — PROGRESS NOTES
Subjective   Yuliana Gonzalez is a 89 y.o. female.     Chief Complaint   Patient presents with   • Hyperlipidemia   • IGT         In for recheck of chronic IGT.  Has been stable.  Also aortic stenosis.  Has dyspnea on exertion which is significant.  Now set up for an AVR.  History of macular degeneration.  That is stable.    Hyperlipidemia  This is a chronic problem. The current episode started more than 1 year ago. The problem is controlled. Recent lipid tests were reviewed and are normal. Factors aggravating her hyperlipidemia include beta blockers. Pertinent negatives include no chest pain or shortness of breath.   Hypertension  This is a chronic problem. The current episode started more than 1 year ago. The problem is unchanged. The problem is controlled. Pertinent negatives include no chest pain, palpitations or shortness of breath.   Asthma  There is no shortness of breath. Pertinent negatives include no chest pain.   Hyperglycemia  This is a chronic problem. The current episode started more than 1 year ago. The problem occurs constantly. Pertinent negatives include no chest pain.        The following portions of the patient's history were reviewed and updated as appropriate: allergies, current medications, past social history and problem list.    Outpatient Medications Marked as Taking for the 5/10/22 encounter (Office Visit) with Trent Davis MD   Medication Sig Dispense Refill   • Acetaminophen (TYLENOL PO) Take 325 mg by mouth As Needed (for pain or fever).     • aspirin (aspirin) 81 MG EC tablet Take 1 tablet by mouth Daily. (Patient taking differently: Take 81 mg by mouth Every Morning.) 30 tablet 6   • atenolol (TENORMIN) 25 MG tablet Take 1 tablet by mouth Daily. (Patient taking differently: Take 25 mg by mouth Every Evening.) 90 tablet 1   • cholecalciferol (VITAMIN D3) 25 MCG (1000 UT) tablet Take 2 tablets by mouth Daily.     • clopidogrel (PLAVIX) 75 MG tablet Take 1 tablet by mouth Daily.  "(Patient taking differently: Take 75 mg by mouth Every Morning.) 30 tablet 11   • hydroCHLOROthiazide (MICROZIDE) 12.5 MG capsule Take 1 capsule by mouth Daily. (Patient taking differently: Take 12.5 mg by mouth Every Morning.) 90 capsule 1   • Magnesium Hydroxide (DULCOLAX PO) Take 1 tablet by mouth As Needed (constipation).     • metroNIDAZOLE (METROCREAM) 0.75 % cream Apply 1 application topically to the appropriate area as directed 2 (Two) Times a Day. \"NOSE\" FOR \"PRECANCEROUS\" AREA     • Multiple Vitamins-Minerals (VITEYES AREDS ADVANCED PO) Take 2 capsules by mouth Daily. PT HOLDING FOR SURGERY         Review of Systems   Respiratory: Negative for shortness of breath.    Cardiovascular: Negative for chest pain, palpitations and leg swelling.   Gastrointestinal: Negative for constipation and diarrhea.       Objective   Vitals:    05/10/22 1005   BP: 130/72   Pulse: 81   Resp: 16   Temp: 97.3 °F (36.3 °C)   SpO2: 97%          05/10/22  1005   Weight: 69.9 kg (154 lb)    [unfilled]  Body mass index is 30.08 kg/m².      Physical Exam   Constitutional: She appears well-developed.   Neck: No thyromegaly present.   Cardiovascular: Normal rate and regular rhythm. Exam reveals no gallop.   Murmur heard.   Crescendo decrescendo systolic murmur is present with a grade of 1/6.  Soft grade 1-2 systolic murmur along the left sternal border   Pulmonary/Chest: Effort normal and breath sounds normal. No respiratory distress. She has no wheezes. She has no rales.   Abdominal: Soft. Normal appearance and bowel sounds are normal. She exhibits no mass. There is no abdominal tenderness. There is no guarding.   Neurological: She is alert.         Problems Addressed this Visit        Cardiac and Vasculature    Hyperlipidemia (Chronic)    Hypertension - Primary (Chronic)    Coronary artery disease involving native coronary artery of native heart       Endocrine and Metabolic    IGT (impaired glucose tolerance) (Chronic)       Eye "    Exudative age-related macular degeneration, bilateral, with inactive choroidal neovascularization (HCC)      Diagnoses       Codes Comments    Primary hypertension    -  Primary ICD-10-CM: I10  ICD-9-CM: 401.9     Hyperlipidemia, unspecified hyperlipidemia type     ICD-10-CM: E78.5  ICD-9-CM: 272.4     Coronary artery disease involving native coronary artery of native heart without angina pectoris     ICD-10-CM: I25.10  ICD-9-CM: 414.01     IGT (impaired glucose tolerance)     ICD-10-CM: R73.02  ICD-9-CM: 790.22     Exudative age-related macular degeneration, bilateral, with inactive choroidal neovascularization (HCC)     ICD-10-CM: H35.3232  ICD-9-CM: 362.52, 362.16     Coronary artery disease of native artery of native heart with stable angina pectoris (HCC)     ICD-10-CM: I25.118  ICD-9-CM: 414.01, 413.9         Assessment/Plan   In for recheck of hypertension, hyperlipidemia and CAD.  Since last visit she has had a TAVR which was very successful.  Virtually all of her cardiac symptoms have now resolved.  Her energy is getting back to normal.  Blood pressure control is good.  Glycemic control is been good.  Gets annual lab work November 2021 including CBC, CMP, A1c and vitamin D level.  No chest pain.  No syncope.  No CHF.  Gets glucose and A1c today and every 3 months.  Annual wellness visit due August 2022.  She failed Lipitor and Dr. Locke felt that was somewhat irrelevant at her age and is good is her lipids run.  I agree.  History of macular degeneration being treated with eye vitamins.  She is 3.5 HPP today.    The above information was reviewed again today 05/10/22.  It continues to be accurate as reflected above and is unchanged.  History, physical and review of systems all reviewed and are unchanged.  Medications were reviewed today and continue the current dosing.    PPE today includes face mask and eye shield.           Dragon disclaimer:   Much of this encounter note is an electronic  transcription/translation of spoken language to printed text. The electronic translation of spoken language may permit erroneous, or at times, nonsensical words or phrases to be inadvertently transcribed; Although I have reviewed the note for such errors, some may still exist.

## 2022-05-11 ENCOUNTER — TREATMENT (OUTPATIENT)
Dept: CARDIAC REHAB | Facility: HOSPITAL | Age: 87
End: 2022-05-11

## 2022-05-11 DIAGNOSIS — Z95.2 S/P TAVR (TRANSCATHETER AORTIC VALVE REPLACEMENT): Primary | ICD-10-CM

## 2022-05-11 LAB
GLUCOSE SERPL-MCNC: 86 MG/DL (ref 65–99)
HBA1C MFR BLD: 5.7 % (ref 4.8–5.6)

## 2022-05-11 PROCEDURE — 93798 PHYS/QHP OP CAR RHAB W/ECG: CPT

## 2022-05-13 ENCOUNTER — TREATMENT (OUTPATIENT)
Dept: CARDIAC REHAB | Facility: HOSPITAL | Age: 87
End: 2022-05-13

## 2022-05-13 DIAGNOSIS — Z95.2 S/P TAVR (TRANSCATHETER AORTIC VALVE REPLACEMENT): Primary | ICD-10-CM

## 2022-05-13 PROCEDURE — 93798 PHYS/QHP OP CAR RHAB W/ECG: CPT

## 2022-05-16 ENCOUNTER — TREATMENT (OUTPATIENT)
Dept: CARDIAC REHAB | Facility: HOSPITAL | Age: 87
End: 2022-05-16

## 2022-05-16 DIAGNOSIS — Z95.2 S/P TAVR (TRANSCATHETER AORTIC VALVE REPLACEMENT): Primary | ICD-10-CM

## 2022-05-16 PROCEDURE — 93798 PHYS/QHP OP CAR RHAB W/ECG: CPT

## 2022-05-18 ENCOUNTER — TREATMENT (OUTPATIENT)
Dept: CARDIAC REHAB | Facility: HOSPITAL | Age: 87
End: 2022-05-18

## 2022-05-18 DIAGNOSIS — Z95.2 S/P TAVR (TRANSCATHETER AORTIC VALVE REPLACEMENT): Primary | ICD-10-CM

## 2022-05-18 PROCEDURE — 93798 PHYS/QHP OP CAR RHAB W/ECG: CPT

## 2022-05-20 ENCOUNTER — APPOINTMENT (OUTPATIENT)
Dept: CARDIAC REHAB | Facility: HOSPITAL | Age: 87
End: 2022-05-20

## 2022-05-23 ENCOUNTER — TREATMENT (OUTPATIENT)
Dept: CARDIAC REHAB | Facility: HOSPITAL | Age: 87
End: 2022-05-23

## 2022-05-23 DIAGNOSIS — Z95.2 S/P TAVR (TRANSCATHETER AORTIC VALVE REPLACEMENT): Primary | ICD-10-CM

## 2022-05-23 PROCEDURE — 93798 PHYS/QHP OP CAR RHAB W/ECG: CPT

## 2022-05-25 ENCOUNTER — TREATMENT (OUTPATIENT)
Dept: CARDIAC REHAB | Facility: HOSPITAL | Age: 87
End: 2022-05-25

## 2022-05-25 DIAGNOSIS — Z95.2 S/P TAVR (TRANSCATHETER AORTIC VALVE REPLACEMENT): Primary | ICD-10-CM

## 2022-05-25 PROCEDURE — 93798 PHYS/QHP OP CAR RHAB W/ECG: CPT

## 2022-05-27 ENCOUNTER — TREATMENT (OUTPATIENT)
Dept: CARDIAC REHAB | Facility: HOSPITAL | Age: 87
End: 2022-05-27

## 2022-05-27 DIAGNOSIS — Z95.2 S/P TAVR (TRANSCATHETER AORTIC VALVE REPLACEMENT): Primary | ICD-10-CM

## 2022-05-27 PROCEDURE — 93798 PHYS/QHP OP CAR RHAB W/ECG: CPT

## 2022-06-01 ENCOUNTER — TREATMENT (OUTPATIENT)
Dept: CARDIAC REHAB | Facility: HOSPITAL | Age: 87
End: 2022-06-01

## 2022-06-01 DIAGNOSIS — Z95.2 S/P TAVR (TRANSCATHETER AORTIC VALVE REPLACEMENT): Primary | ICD-10-CM

## 2022-06-01 PROCEDURE — 93798 PHYS/QHP OP CAR RHAB W/ECG: CPT

## 2022-06-03 ENCOUNTER — TREATMENT (OUTPATIENT)
Dept: CARDIAC REHAB | Facility: HOSPITAL | Age: 87
End: 2022-06-03

## 2022-06-03 DIAGNOSIS — Z95.2 S/P TAVR (TRANSCATHETER AORTIC VALVE REPLACEMENT): Primary | ICD-10-CM

## 2022-06-03 PROCEDURE — 93798 PHYS/QHP OP CAR RHAB W/ECG: CPT

## 2022-06-06 ENCOUNTER — APPOINTMENT (OUTPATIENT)
Dept: CARDIAC REHAB | Facility: HOSPITAL | Age: 87
End: 2022-06-06

## 2022-06-07 ENCOUNTER — TELEPHONE (OUTPATIENT)
Dept: CARDIOLOGY | Facility: CLINIC | Age: 87
End: 2022-06-07

## 2022-06-07 NOTE — TELEPHONE ENCOUNTER
Patient called today for dental clearance. The patient is currently taking Plavix 75mg, how long will she need to be off of it prior to procedure.

## 2022-06-08 ENCOUNTER — TREATMENT (OUTPATIENT)
Dept: CARDIAC REHAB | Facility: HOSPITAL | Age: 87
End: 2022-06-08

## 2022-06-08 DIAGNOSIS — Z95.2 S/P TAVR (TRANSCATHETER AORTIC VALVE REPLACEMENT): Primary | ICD-10-CM

## 2022-06-08 PROCEDURE — 93798 PHYS/QHP OP CAR RHAB W/ECG: CPT

## 2022-06-09 NOTE — TELEPHONE ENCOUNTER
She underwent PCI in January and had a TAVR in March.  If we could wait another few months I think it would be more appropriate.  I will also ask Dr. Locke to review when he returns.

## 2022-06-10 ENCOUNTER — TREATMENT (OUTPATIENT)
Dept: CARDIAC REHAB | Facility: HOSPITAL | Age: 87
End: 2022-06-10

## 2022-06-10 DIAGNOSIS — Z95.2 S/P TAVR (TRANSCATHETER AORTIC VALVE REPLACEMENT): Primary | ICD-10-CM

## 2022-06-10 PROCEDURE — 93798 PHYS/QHP OP CAR RHAB W/ECG: CPT

## 2022-06-13 ENCOUNTER — TREATMENT (OUTPATIENT)
Dept: CARDIAC REHAB | Facility: HOSPITAL | Age: 87
End: 2022-06-13

## 2022-06-13 DIAGNOSIS — Z95.2 S/P TAVR (TRANSCATHETER AORTIC VALVE REPLACEMENT): Primary | ICD-10-CM

## 2022-06-13 PROCEDURE — 93798 PHYS/QHP OP CAR RHAB W/ECG: CPT

## 2022-06-15 ENCOUNTER — TREATMENT (OUTPATIENT)
Dept: CARDIAC REHAB | Facility: HOSPITAL | Age: 87
End: 2022-06-15

## 2022-06-15 DIAGNOSIS — Z95.2 S/P TAVR (TRANSCATHETER AORTIC VALVE REPLACEMENT): Primary | ICD-10-CM

## 2022-06-15 PROCEDURE — 93798 PHYS/QHP OP CAR RHAB W/ECG: CPT

## 2022-06-17 ENCOUNTER — TREATMENT (OUTPATIENT)
Dept: CARDIAC REHAB | Facility: HOSPITAL | Age: 87
End: 2022-06-17

## 2022-06-17 DIAGNOSIS — Z95.2 S/P TAVR (TRANSCATHETER AORTIC VALVE REPLACEMENT): Primary | ICD-10-CM

## 2022-06-17 PROCEDURE — 93798 PHYS/QHP OP CAR RHAB W/ECG: CPT

## 2022-06-20 ENCOUNTER — APPOINTMENT (OUTPATIENT)
Dept: CARDIAC REHAB | Facility: HOSPITAL | Age: 87
End: 2022-06-20

## 2022-06-22 ENCOUNTER — APPOINTMENT (OUTPATIENT)
Dept: CARDIAC REHAB | Facility: HOSPITAL | Age: 87
End: 2022-06-22

## 2022-06-24 ENCOUNTER — APPOINTMENT (OUTPATIENT)
Dept: CARDIAC REHAB | Facility: HOSPITAL | Age: 87
End: 2022-06-24

## 2022-06-27 ENCOUNTER — APPOINTMENT (OUTPATIENT)
Dept: CARDIAC REHAB | Facility: HOSPITAL | Age: 87
End: 2022-06-27

## 2022-06-28 ENCOUNTER — CLINICAL SUPPORT NO REQUIREMENTS (OUTPATIENT)
Dept: CARDIOLOGY | Facility: CLINIC | Age: 87
End: 2022-06-28

## 2022-06-28 ENCOUNTER — OFFICE VISIT (OUTPATIENT)
Dept: CARDIOLOGY | Facility: CLINIC | Age: 87
End: 2022-06-28

## 2022-06-28 VITALS
HEART RATE: 69 BPM | DIASTOLIC BLOOD PRESSURE: 64 MMHG | BODY MASS INDEX: 30.63 KG/M2 | WEIGHT: 156 LBS | HEIGHT: 60 IN | SYSTOLIC BLOOD PRESSURE: 138 MMHG

## 2022-06-28 DIAGNOSIS — I44.2 AV BLOCK, COMPLETE: Primary | ICD-10-CM

## 2022-06-28 DIAGNOSIS — Z95.2 S/P TAVR (TRANSCATHETER AORTIC VALVE REPLACEMENT): ICD-10-CM

## 2022-06-28 PROCEDURE — 93280 PM DEVICE PROGR EVAL DUAL: CPT | Performed by: INTERNAL MEDICINE

## 2022-06-28 PROCEDURE — 99213 OFFICE O/P EST LOW 20 MIN: CPT | Performed by: INTERNAL MEDICINE

## 2022-06-28 PROCEDURE — 93000 ELECTROCARDIOGRAM COMPLETE: CPT | Performed by: INTERNAL MEDICINE

## 2022-06-28 NOTE — PROGRESS NOTES
"Date of Office Visit: 2022  Encounter Provider: Yann Ceballos MD  Place of Service: [facility] CARDIOLOGY  Patient Name: Yuliana Gonzalez  :1930    Chief Complaint   Patient presents with   • AV block, complete     3 month f/u    • severe aortic stenosis   • s/p TAVR   • Hypertension   • Pacemaker Check   :     HPI: Yuliana Gonzalez is a 89 y.o. female who presents today for follow-up of dual-chamber pacemaker, left bundle lead    She is doing well, really has no complaints.    Device testing looks great.  No arrhythmias detected.  Thresholds look good.  She has recovered some conduction, we lengthened AV delays to try to allow more intrinsic conduction    The device site is well-healed.          Past Medical History:   Diagnosis Date   • Aortic stenosis 2009    diagnosed,  moderate with 0.9cm area, 42mm max gradient. 0.91/34 6/15.   • Central loss of vision     RIGHT EYE   • Cervical arthritis    • Colitis     HX   • Coronary artery disease    • History of constipation    • History of esophageal reflux    • History of skin cancer    • Hyperlipidemia    • Hypertension    • IGT (impaired glucose tolerance)    • Irregular heart beat     HX   • Macular degeneration    • Medication management    • On continuous oral anticoagulation    • Redness of skin     NOSE/DERMATOLOGY TREATING WITH FLAGYL CREAM/ADVISED PT TO NOTIFY CARDIOLOGY   • Sciatica     HX   • Short-term memory loss     \"MILD\"   • SOB (shortness of breath) on exertion    • Vitamin D deficiency        Past Surgical History:   Procedure Laterality Date   • AORTIC VALVE REPAIR/REPLACEMENT Left 3/15/2022    Procedure: TTE TRANSFEMORAL TRANSCATHETER AORTIC VALVE REPLACEMENT PERCUTANEOUS APPROACH;  Surgeon: Vesna Chris MD;  Location: [facility];  Service: Cardiothoracic;  Laterality: Left;   • AORTIC VALVE REPAIR/REPLACEMENT Left 3/15/2022    Procedure: Transfemoral Transcatheter Aortic Valve Replacement " with intra operative TTE and possible open surgical rescue;  Surgeon: Clint Locke MD;  [facility];  Service: Cardiovascular;  Laterality: Left;   • CARDIAC CATHETERIZATION N/A 2022    Procedure: Coronary angiography;  Surgeon: Clint Locke MD;  Location:  [facility] CATH INVASIVE LOCATION;  Service: Cardiovascular;  Laterality: N/A;   • CARDIAC CATHETERIZATION N/A 2022    Procedure: Percutaneous Coronary Intervention;  Surgeon: Clint Locke MD;  Location:  [facility] CATH INVASIVE LOCATION;  Service: Cardiovascular;  Laterality: N/A;   • CARDIAC CATHETERIZATION N/A 2022    Procedure: Stent ISMAEL coronary;  Surgeon: Clint Locke MD;  Location:  [facility] CATH INVASIVE LOCATION;  Service: Cardiovascular;  Laterality: N/A;   • CARDIAC ELECTROPHYSIOLOGY PROCEDURE N/A 3/16/2022    Procedure: Pacemaker DC new [place];  Surgeon: Yann Ceballos MD;  Location:  [facility] CATH INVASIVE LOCATION;  Service: Cardiology;  Laterality: N/A;   • CATARACT EXTRACTION, BILATERAL     • COLONOSCOPY     • EYE SURGERY     • SKIN CANCER EXCISION         Social History     Socioeconomic History   • Marital status:      Spouse name: Stephan   • Number of children: 4   Tobacco Use   • Smoking status: Former Smoker     Packs/day: 0.00     Years: 0.00     Pack years: 0.00     Start date:      Quit date:      Years since quittin.5   • Smokeless tobacco: Never Used   • Tobacco comment: 1 pack every 2 weeks   Vaping Use   • Vaping Use: Never used   Substance and Sexual Activity   • Alcohol use: Yes     Comment: wine - rare / no caffeine   • Drug use: No   • Sexual activity: Defer       Family History   Problem Relation Age of Onset   • Aortic stenosis Mother    • Hypertension Mother    • Heart failure Father    • Diabetes Father    • Hypertension Father    • No Known Problems Sister    • No Known Problems Daughter    • No Known Problems Son    • Aneurysm Sister    • Other  "Sister         Polio in her 20's   • Malig Hyperthermia Neg Hx        Review of Systems   Constitutional: Negative.   Cardiovascular: Negative.    Respiratory: Negative.    Gastrointestinal: Negative.        Allergies   Allergen Reactions   • Lipitor [Atorvastatin] Nausea Only     \"FELT AWFUL\"   • Erythromycin Diarrhea   • Lisinopril Cough         Current Outpatient Medications:   •  Acetaminophen (TYLENOL PO), Take 325 mg by mouth As Needed (for pain or fever)., Disp: , Rfl:   •  aspirin (aspirin) 81 MG EC tablet, Take 1 tablet by mouth Daily. (Patient taking differently: Take 81 mg by mouth Every Morning.), Disp: 30 tablet, Rfl: 6  •  atenolol (TENORMIN) 25 MG tablet, Take 1 tablet by mouth Daily. (Patient taking differently: Take 25 mg by mouth Every Evening.), Disp: 90 tablet, Rfl: 1  •  cholecalciferol (VITAMIN D3) 25 MCG (1000 UT) tablet, Take 2 tablets by mouth Daily., Disp: , Rfl:   •  clopidogrel (PLAVIX) 75 MG tablet, Take 1 tablet by mouth Daily. (Patient taking differently: Take 75 mg by mouth Every Morning.), Disp: 30 tablet, Rfl: 11  •  hydroCHLOROthiazide (MICROZIDE) 12.5 MG capsule, Take 1 capsule by mouth Daily. (Patient taking differently: Take 12.5 mg by mouth Every Morning.), Disp: 90 capsule, Rfl: 1  •  Magnesium Hydroxide (DULCOLAX PO), Take 1 tablet by mouth As Needed (constipation)., Disp: , Rfl:   •  metroNIDAZOLE (METROCREAM) 0.75 % cream, Apply 1 application topically to the appropriate area as directed 2 (Two) Times a Day. \"NOSE\" FOR \"PRECANCEROUS\" AREA, Disp: , Rfl:   •  Multiple Vitamins-Minerals (VITEYES AREDS ADVANCED PO), Take 2 capsules by mouth Daily. PT HOLDING FOR SURGERY, Disp: , Rfl:       Objective:     Vitals:    06/28/22 0930   BP: 138/64   Pulse: 69   Weight: 70.8 kg (156 lb)   Height: 152.4 cm (60\")     Body mass index is 30.47 kg/m².    PHYSICAL EXAM:    Vitals and nursing note reviewed.   Constitutional:       Appearance: Healthy appearance.   HENT:    Mouth/Throat:      " Pharynx: Oropharynx is clear.   Cardiovascular:      Normal rate. Regular rhythm.   Edema:     Peripheral edema absent.   Abdominal:      General: Bowel sounds are normal.   Skin:     General: Skin is warm.   Neurological:      General: No focal deficit present.      Mental Status: Alert, oriented to person, place, and time and oriented to person, place and time.   Psychiatric:         Attention and Perception: Attention and perception normal.         Mood and Affect: Mood and affect normal.             ECG 12 Lead    Date/Time: 6/28/2022 10:05 AM  Performed by: Yann Ceballos MD  Authorized by: Yann Ceballos MD   Comparison: compared with previous ECG from 4/22/2022  Rhythm: sinus rhythm and paced              Assessment:       Diagnosis Plan   1. AV block, complete (HCC)     2. S/P TAVR (transcatheter aortic valve replacement)            Plan:       Her device function looks excellent.  She has very nice left bundle pacing.  No evidence of infection.  Follow-up in 6 months.    As always, it has been a pleasure to participate in your patient's care.      Sincerely,         Yann Ceballos MD

## 2022-06-29 ENCOUNTER — APPOINTMENT (OUTPATIENT)
Dept: CARDIAC REHAB | Facility: HOSPITAL | Age: 87
End: 2022-06-29

## 2022-07-01 ENCOUNTER — APPOINTMENT (OUTPATIENT)
Dept: CARDIAC REHAB | Facility: HOSPITAL | Age: 87
End: 2022-07-01

## 2022-07-06 ENCOUNTER — APPOINTMENT (OUTPATIENT)
Dept: CARDIAC REHAB | Facility: HOSPITAL | Age: 87
End: 2022-07-06

## 2022-07-08 ENCOUNTER — APPOINTMENT (OUTPATIENT)
Dept: CARDIAC REHAB | Facility: HOSPITAL | Age: 87
End: 2022-07-08

## 2022-07-11 ENCOUNTER — APPOINTMENT (OUTPATIENT)
Dept: CARDIAC REHAB | Facility: HOSPITAL | Age: 87
End: 2022-07-11

## 2022-07-13 ENCOUNTER — APPOINTMENT (OUTPATIENT)
Dept: CARDIAC REHAB | Facility: HOSPITAL | Age: 87
End: 2022-07-13

## 2022-07-15 ENCOUNTER — APPOINTMENT (OUTPATIENT)
Dept: CARDIAC REHAB | Facility: HOSPITAL | Age: 87
End: 2022-07-15

## 2022-07-26 ENCOUNTER — HOSPITAL ENCOUNTER (EMERGENCY)
Facility: HOSPITAL | Age: 87
Discharge: HOME | End: 2022-07-27
Attending: EMERGENCY MEDICINE | Admitting: EMERGENCY MEDICINE

## 2022-07-26 ENCOUNTER — APPOINTMENT (OUTPATIENT)
Dept: CT IMAGING | Facility: HOSPITAL | Age: 87
End: 2022-07-26

## 2022-07-26 DIAGNOSIS — N23 RENAL COLIC ON RIGHT SIDE: ICD-10-CM

## 2022-07-26 DIAGNOSIS — N20.0 KIDNEY STONE: Primary | ICD-10-CM

## 2022-07-26 LAB
BACTERIA UR QL AUTO: ABNORMAL /HPF
BILIRUB UR QL STRIP: NEGATIVE
CLARITY UR: ABNORMAL
COLOR UR: YELLOW
GLUCOSE UR STRIP-MCNC: NEGATIVE MG/DL
HGB UR QL STRIP.AUTO: ABNORMAL
HYALINE CASTS UR QL AUTO: ABNORMAL /LPF
KETONES UR QL STRIP: NEGATIVE
LEUKOCYTE ESTERASE UR QL STRIP.AUTO: ABNORMAL
NITRITE UR QL STRIP: NEGATIVE
PH UR STRIP.AUTO: 7 [PH] (ref 5–8)
PROT UR QL STRIP: ABNORMAL
RBC # UR STRIP: ABNORMAL /HPF
REF LAB TEST METHOD: ABNORMAL
SP GR UR STRIP: 1.02 (ref 1–1.03)
SQUAMOUS #/AREA URNS HPF: ABNORMAL /HPF
UROBILINOGEN UR QL STRIP: ABNORMAL
WBC # UR STRIP: ABNORMAL /HPF

## 2022-07-26 PROCEDURE — 96374 THER/PROPH/DIAG INJ IV PUSH: CPT

## 2022-07-26 PROCEDURE — 25010000002 ONDANSETRON PER 1 MG: Performed by: EMERGENCY MEDICINE

## 2022-07-26 PROCEDURE — 36415 COLL VENOUS BLD VENIPUNCTURE: CPT

## 2022-07-26 PROCEDURE — 96375 TX/PRO/DX INJ NEW DRUG ADDON: CPT

## 2022-07-26 PROCEDURE — 25010000002 KETOROLAC TROMETHAMINE PER 15 MG: Performed by: EMERGENCY MEDICINE

## 2022-07-26 PROCEDURE — 74176 CT ABD & PELVIS W/O CONTRAST: CPT

## 2022-07-26 PROCEDURE — 81001 URINALYSIS AUTO W/SCOPE: CPT | Performed by: EMERGENCY MEDICINE

## 2022-07-26 PROCEDURE — 99283 EMERGENCY DEPT VISIT LOW MDM: CPT

## 2022-07-26 PROCEDURE — 87086 URINE CULTURE/COLONY COUNT: CPT | Performed by: EMERGENCY MEDICINE

## 2022-07-26 RX ORDER — HYDROCODONE BITARTRATE AND ACETAMINOPHEN 7.5; 325 MG/1; MG/1
1 TABLET ORAL ONCE
Status: COMPLETED | OUTPATIENT
Start: 2022-07-26 | End: 2022-07-27

## 2022-07-26 RX ORDER — KETOROLAC TROMETHAMINE 15 MG/ML
15 INJECTION, SOLUTION INTRAMUSCULAR; INTRAVENOUS ONCE
Status: COMPLETED | OUTPATIENT
Start: 2022-07-26 | End: 2022-07-26

## 2022-07-26 RX ORDER — ONDANSETRON 2 MG/ML
4 INJECTION INTRAMUSCULAR; INTRAVENOUS ONCE
Status: COMPLETED | OUTPATIENT
Start: 2022-07-26 | End: 2022-07-26

## 2022-07-26 RX ORDER — SODIUM CHLORIDE 9 MG/ML
125 INJECTION, SOLUTION INTRAVENOUS CONTINUOUS
Status: DISCONTINUED | OUTPATIENT
Start: 2022-07-26 | End: 2022-07-27 | Stop reason: HOSPADM

## 2022-07-26 RX ADMIN — SODIUM CHLORIDE 500 ML: 9 INJECTION, SOLUTION INTRAVENOUS at 23:24

## 2022-07-26 RX ADMIN — ONDANSETRON 4 MG: 2 INJECTION INTRAMUSCULAR; INTRAVENOUS at 23:20

## 2022-07-26 RX ADMIN — KETOROLAC TROMETHAMINE 15 MG: 15 INJECTION, SOLUTION INTRAMUSCULAR; INTRAVENOUS at 23:20

## 2022-07-27 VITALS
DIASTOLIC BLOOD PRESSURE: 60 MMHG | TEMPERATURE: 97.3 F | OXYGEN SATURATION: 94 % | SYSTOLIC BLOOD PRESSURE: 138 MMHG | HEART RATE: 70 BPM | RESPIRATION RATE: 18 BRPM

## 2022-07-27 LAB
ALBUMIN SERPL-MCNC: 3.6 G/DL (ref 3.5–5.2)
ALBUMIN/GLOB SERPL: 1.7 G/DL
ALP SERPL-CCNC: 83 U/L (ref 39–117)
ALT SERPL W P-5'-P-CCNC: 12 U/L (ref 1–33)
ANION GAP SERPL CALCULATED.3IONS-SCNC: 12.2 MMOL/L (ref 5–15)
AST SERPL-CCNC: 17 U/L (ref 1–32)
BASOPHILS # BLD AUTO: 0.06 10*3/MM3 (ref 0–0.2)
BASOPHILS NFR BLD AUTO: 0.5 % (ref 0–1.5)
BILIRUB SERPL-MCNC: 0.3 MG/DL (ref 0–1.2)
BUN SERPL-MCNC: 13 MG/DL (ref 8–23)
BUN/CREAT SERPL: 26.5 (ref 7–25)
CALCIUM SPEC-SCNC: 8.5 MG/DL (ref 8.2–9.6)
CHLORIDE SERPL-SCNC: 100 MMOL/L (ref 98–107)
CO2 SERPL-SCNC: 22.8 MMOL/L (ref 22–29)
CREAT SERPL-MCNC: 0.49 MG/DL (ref 0.57–1)
DEPRECATED RDW RBC AUTO: 40.2 FL (ref 37–54)
EGFRCR SERPLBLD CKD-EPI 2021: 88.6 ML/MIN/1.73
EOSINOPHIL # BLD AUTO: 0.01 10*3/MM3 (ref 0–0.4)
EOSINOPHIL NFR BLD AUTO: 0.1 % (ref 0.3–6.2)
ERYTHROCYTE [DISTWIDTH] IN BLOOD BY AUTOMATED COUNT: 13.2 % (ref 12.3–15.4)
GLOBULIN UR ELPH-MCNC: 2.1 GM/DL
GLUCOSE SERPL-MCNC: 142 MG/DL (ref 65–99)
HCT VFR BLD AUTO: 38.6 % (ref 34–46.6)
HGB BLD-MCNC: 12.7 G/DL (ref 12–15.9)
IMM GRANULOCYTES # BLD AUTO: 0.05 10*3/MM3 (ref 0–0.05)
IMM GRANULOCYTES NFR BLD AUTO: 0.4 % (ref 0–0.5)
LYMPHOCYTES # BLD AUTO: 0.92 10*3/MM3 (ref 0.7–3.1)
LYMPHOCYTES NFR BLD AUTO: 7.9 % (ref 19.6–45.3)
MCH RBC QN AUTO: 27.6 PG (ref 26.6–33)
MCHC RBC AUTO-ENTMCNC: 32.9 G/DL (ref 31.5–35.7)
MCV RBC AUTO: 83.9 FL (ref 79–97)
MONOCYTES # BLD AUTO: 0.35 10*3/MM3 (ref 0.1–0.9)
MONOCYTES NFR BLD AUTO: 3 % (ref 5–12)
NEUTROPHILS NFR BLD AUTO: 10.29 10*3/MM3 (ref 1.7–7)
NEUTROPHILS NFR BLD AUTO: 88.1 % (ref 42.7–76)
NRBC BLD AUTO-RTO: 0 /100 WBC (ref 0–0.2)
PLATELET # BLD AUTO: 197 10*3/MM3 (ref 140–450)
PMV BLD AUTO: 11.1 FL (ref 6–12)
POTASSIUM SERPL-SCNC: 4 MMOL/L (ref 3.5–5.2)
PROT SERPL-MCNC: 5.7 G/DL (ref 6–8.5)
RBC # BLD AUTO: 4.6 10*6/MM3 (ref 3.77–5.28)
SODIUM SERPL-SCNC: 135 MMOL/L (ref 136–145)
WBC NRBC COR # BLD: 11.68 10*3/MM3 (ref 3.4–10.8)

## 2022-07-27 PROCEDURE — 96361 HYDRATE IV INFUSION ADD-ON: CPT

## 2022-07-27 PROCEDURE — 85025 COMPLETE CBC W/AUTO DIFF WBC: CPT | Performed by: EMERGENCY MEDICINE

## 2022-07-27 PROCEDURE — 80053 COMPREHEN METABOLIC PANEL: CPT | Performed by: EMERGENCY MEDICINE

## 2022-07-27 PROCEDURE — 36415 COLL VENOUS BLD VENIPUNCTURE: CPT

## 2022-07-27 RX ORDER — ONDANSETRON 8 MG/1
8 TABLET, ORALLY DISINTEGRATING ORAL EVERY 8 HOURS PRN
Qty: 20 TABLET | Refills: 0 | Status: SHIPPED | OUTPATIENT
Start: 2022-07-27 | End: 2022-08-29 | Stop reason: ALTCHOICE

## 2022-07-27 RX ORDER — HYDROCODONE BITARTRATE AND ACETAMINOPHEN 5; 325 MG/1; MG/1
1 TABLET ORAL EVERY 6 HOURS PRN
Qty: 12 TABLET | Refills: 0 | Status: SHIPPED | OUTPATIENT
Start: 2022-07-27 | End: 2022-08-06 | Stop reason: HOSPADM

## 2022-07-27 RX ADMIN — HYDROCODONE BITARTRATE AND ACETAMINOPHEN 1 TABLET: 7.5; 325 TABLET ORAL at 00:02

## 2022-07-27 RX ADMIN — SODIUM CHLORIDE 125 ML/HR: 9 INJECTION, SOLUTION INTRAVENOUS at 00:44

## 2022-07-28 LAB — BACTERIA SPEC AEROBE CULT: NORMAL

## 2022-07-31 ENCOUNTER — HOSPITAL ENCOUNTER (INPATIENT)
Facility: HOSPITAL | Age: 87
LOS: 6 days | Discharge: NURSING, REHAB OR LONG-TERM CARE | End: 2022-08-06
Attending: EMERGENCY MEDICINE

## 2022-07-31 ENCOUNTER — APPOINTMENT (OUTPATIENT)
Dept: GENERAL RADIOLOGY | Facility: HOSPITAL | Age: 87
End: 2022-07-31

## 2022-07-31 DIAGNOSIS — N20.0 KIDNEY STONE: ICD-10-CM

## 2022-07-31 DIAGNOSIS — I21.3 ST ELEVATION MYOCARDIAL INFARCTION (STEMI), UNSPECIFIED ARTERY: Primary | ICD-10-CM

## 2022-07-31 DIAGNOSIS — N23 RENAL COLIC ON RIGHT SIDE: ICD-10-CM

## 2022-07-31 LAB
ALBUMIN SERPL-MCNC: 3.4 G/DL (ref 3.5–5.2)
ALBUMIN/GLOB SERPL: 1.4 G/DL
ALP SERPL-CCNC: 160 U/L (ref 39–117)
ALT SERPL W P-5'-P-CCNC: 62 U/L (ref 1–33)
ANION GAP SERPL CALCULATED.3IONS-SCNC: 12.6 MMOL/L (ref 5–15)
APTT PPP: 27.8 SECONDS (ref 22.7–35.4)
AST SERPL-CCNC: 78 U/L (ref 1–32)
BASOPHILS # BLD AUTO: 0.05 10*3/MM3 (ref 0–0.2)
BASOPHILS NFR BLD AUTO: 0.2 % (ref 0–1.5)
BILIRUB SERPL-MCNC: 0.7 MG/DL (ref 0–1.2)
BUN SERPL-MCNC: 26 MG/DL (ref 8–23)
BUN/CREAT SERPL: 36.1 (ref 7–25)
CALCIUM SPEC-SCNC: 7.9 MG/DL (ref 8.2–9.6)
CHLORIDE SERPL-SCNC: 85 MMOL/L (ref 98–107)
CO2 SERPL-SCNC: 22.4 MMOL/L (ref 22–29)
CREAT SERPL-MCNC: 0.72 MG/DL (ref 0.57–1)
DEPRECATED RDW RBC AUTO: 39.8 FL (ref 37–54)
EGFRCR SERPLBLD CKD-EPI 2021: 78.6 ML/MIN/1.73
EOSINOPHIL # BLD AUTO: 0.02 10*3/MM3 (ref 0–0.4)
EOSINOPHIL NFR BLD AUTO: 0.1 % (ref 0.3–6.2)
ERYTHROCYTE [DISTWIDTH] IN BLOOD BY AUTOMATED COUNT: 13.4 % (ref 12.3–15.4)
GLOBULIN UR ELPH-MCNC: 2.4 GM/DL
GLUCOSE SERPL-MCNC: 189 MG/DL (ref 65–99)
HCT VFR BLD AUTO: 35.6 % (ref 34–46.6)
HGB BLD-MCNC: 12.1 G/DL (ref 12–15.9)
HOLD SPECIMEN: NORMAL
HOLD SPECIMEN: NORMAL
IMM GRANULOCYTES # BLD AUTO: 0.16 10*3/MM3 (ref 0–0.05)
IMM GRANULOCYTES NFR BLD AUTO: 0.7 % (ref 0–0.5)
INR PPP: 1.31 (ref 0.9–1.1)
LYMPHOCYTES # BLD AUTO: 2.11 10*3/MM3 (ref 0.7–3.1)
LYMPHOCYTES NFR BLD AUTO: 9.8 % (ref 19.6–45.3)
MAGNESIUM SERPL-MCNC: 2.2 MG/DL (ref 1.7–2.3)
MCH RBC QN AUTO: 27.9 PG (ref 26.6–33)
MCHC RBC AUTO-ENTMCNC: 34 G/DL (ref 31.5–35.7)
MCV RBC AUTO: 82 FL (ref 79–97)
MONOCYTES # BLD AUTO: 1.31 10*3/MM3 (ref 0.1–0.9)
MONOCYTES NFR BLD AUTO: 6.1 % (ref 5–12)
NEUTROPHILS NFR BLD AUTO: 17.97 10*3/MM3 (ref 1.7–7)
NEUTROPHILS NFR BLD AUTO: 83.1 % (ref 42.7–76)
NRBC BLD AUTO-RTO: 0 /100 WBC (ref 0–0.2)
NT-PROBNP SERPL-MCNC: 3253 PG/ML (ref 0–1800)
PLATELET # BLD AUTO: 214 10*3/MM3 (ref 140–450)
PMV BLD AUTO: 11.5 FL (ref 6–12)
POTASSIUM SERPL-SCNC: 3.6 MMOL/L (ref 3.5–5.2)
PROT SERPL-MCNC: 5.8 G/DL (ref 6–8.5)
PROTHROMBIN TIME: 16.1 SECONDS (ref 11.7–14.2)
QT INTERVAL: 463 MS
RBC # BLD AUTO: 4.34 10*6/MM3 (ref 3.77–5.28)
SARS-COV-2 RNA PNL SPEC NAA+PROBE: NOT DETECTED
SODIUM SERPL-SCNC: 120 MMOL/L (ref 136–145)
TROPONIN T SERPL-MCNC: 1.14 NG/ML (ref 0–0.03)
TROPONIN T SERPL-MCNC: 1.15 NG/ML (ref 0–0.03)
WBC NRBC COR # BLD: 21.62 10*3/MM3 (ref 3.4–10.8)
WHOLE BLOOD HOLD COAG: NORMAL
WHOLE BLOOD HOLD SPECIMEN: NORMAL

## 2022-07-31 PROCEDURE — 93458 L HRT ARTERY/VENTRICLE ANGIO: CPT | Performed by: INTERNAL MEDICINE

## 2022-07-31 PROCEDURE — 36415 COLL VENOUS BLD VENIPUNCTURE: CPT

## 2022-07-31 PROCEDURE — C1769 GUIDE WIRE: HCPCS | Performed by: INTERNAL MEDICINE

## 2022-07-31 PROCEDURE — C1894 INTRO/SHEATH, NON-LASER: HCPCS | Performed by: INTERNAL MEDICINE

## 2022-07-31 PROCEDURE — 36415 COLL VENOUS BLD VENIPUNCTURE: CPT | Performed by: EMERGENCY MEDICINE

## 2022-07-31 PROCEDURE — 0 IOPAMIDOL PER 1 ML: Performed by: INTERNAL MEDICINE

## 2022-07-31 PROCEDURE — 84484 ASSAY OF TROPONIN QUANT: CPT | Performed by: EMERGENCY MEDICINE

## 2022-07-31 PROCEDURE — 87635 SARS-COV-2 COVID-19 AMP PRB: CPT | Performed by: EMERGENCY MEDICINE

## 2022-07-31 PROCEDURE — 99153 MOD SED SAME PHYS/QHP EA: CPT | Performed by: INTERNAL MEDICINE

## 2022-07-31 PROCEDURE — B2111ZZ FLUOROSCOPY OF MULTIPLE CORONARY ARTERIES USING LOW OSMOLAR CONTRAST: ICD-10-PCS | Performed by: INTERNAL MEDICINE

## 2022-07-31 PROCEDURE — 93005 ELECTROCARDIOGRAM TRACING: CPT | Performed by: EMERGENCY MEDICINE

## 2022-07-31 PROCEDURE — 83880 ASSAY OF NATRIURETIC PEPTIDE: CPT | Performed by: EMERGENCY MEDICINE

## 2022-07-31 PROCEDURE — 99223 1ST HOSP IP/OBS HIGH 75: CPT | Performed by: INTERNAL MEDICINE

## 2022-07-31 PROCEDURE — 85610 PROTHROMBIN TIME: CPT | Performed by: EMERGENCY MEDICINE

## 2022-07-31 PROCEDURE — 85025 COMPLETE CBC W/AUTO DIFF WBC: CPT | Performed by: EMERGENCY MEDICINE

## 2022-07-31 PROCEDURE — 4A023N7 MEASUREMENT OF CARDIAC SAMPLING AND PRESSURE, LEFT HEART, PERCUTANEOUS APPROACH: ICD-10-PCS | Performed by: INTERNAL MEDICINE

## 2022-07-31 PROCEDURE — 99152 MOD SED SAME PHYS/QHP 5/>YRS: CPT | Performed by: INTERNAL MEDICINE

## 2022-07-31 PROCEDURE — 83735 ASSAY OF MAGNESIUM: CPT | Performed by: EMERGENCY MEDICINE

## 2022-07-31 PROCEDURE — 25010000002 HEPARIN (PORCINE) PER 1000 UNITS: Performed by: INTERNAL MEDICINE

## 2022-07-31 PROCEDURE — 71045 X-RAY EXAM CHEST 1 VIEW: CPT

## 2022-07-31 PROCEDURE — 99285 EMERGENCY DEPT VISIT HI MDM: CPT

## 2022-07-31 PROCEDURE — 93005 ELECTROCARDIOGRAM TRACING: CPT

## 2022-07-31 PROCEDURE — 25010000002 MIDAZOLAM PER 1 MG: Performed by: INTERNAL MEDICINE

## 2022-07-31 PROCEDURE — 80053 COMPREHEN METABOLIC PANEL: CPT | Performed by: EMERGENCY MEDICINE

## 2022-07-31 PROCEDURE — 25010000002 FENTANYL CITRATE (PF) 50 MCG/ML SOLUTION: Performed by: INTERNAL MEDICINE

## 2022-07-31 PROCEDURE — 85730 THROMBOPLASTIN TIME PARTIAL: CPT | Performed by: EMERGENCY MEDICINE

## 2022-07-31 PROCEDURE — 93010 ELECTROCARDIOGRAM REPORT: CPT | Performed by: INTERNAL MEDICINE

## 2022-07-31 PROCEDURE — B2151ZZ FLUOROSCOPY OF LEFT HEART USING LOW OSMOLAR CONTRAST: ICD-10-PCS | Performed by: INTERNAL MEDICINE

## 2022-07-31 RX ORDER — ASPIRIN 81 MG/1
81 TABLET ORAL DAILY
Status: DISCONTINUED | OUTPATIENT
Start: 2022-07-31 | End: 2022-08-06 | Stop reason: HOSPADM

## 2022-07-31 RX ORDER — LIDOCAINE HYDROCHLORIDE 20 MG/ML
INJECTION, SOLUTION INFILTRATION; PERINEURAL AS NEEDED
Status: DISCONTINUED | OUTPATIENT
Start: 2022-07-31 | End: 2022-07-31 | Stop reason: HOSPADM

## 2022-07-31 RX ORDER — SODIUM CHLORIDE 0.9 % (FLUSH) 0.9 %
10 SYRINGE (ML) INJECTION AS NEEDED
Status: DISCONTINUED | OUTPATIENT
Start: 2022-07-31 | End: 2022-08-06 | Stop reason: HOSPADM

## 2022-07-31 RX ORDER — HYDROCODONE BITARTRATE AND ACETAMINOPHEN 5; 325 MG/1; MG/1
1 TABLET ORAL EVERY 6 HOURS PRN
Status: DISCONTINUED | OUTPATIENT
Start: 2022-07-31 | End: 2022-08-01

## 2022-07-31 RX ORDER — MIDAZOLAM HYDROCHLORIDE 1 MG/ML
INJECTION INTRAMUSCULAR; INTRAVENOUS AS NEEDED
Status: DISCONTINUED | OUTPATIENT
Start: 2022-07-31 | End: 2022-07-31 | Stop reason: HOSPADM

## 2022-07-31 RX ORDER — ATENOLOL 25 MG/1
25 TABLET ORAL DAILY
Status: DISCONTINUED | OUTPATIENT
Start: 2022-07-31 | End: 2022-08-02

## 2022-07-31 RX ORDER — ACETAMINOPHEN 325 MG/1
650 TABLET ORAL EVERY 4 HOURS PRN
Status: DISCONTINUED | OUTPATIENT
Start: 2022-07-31 | End: 2022-08-06 | Stop reason: HOSPADM

## 2022-07-31 RX ORDER — CLOPIDOGREL BISULFATE 75 MG/1
600 TABLET ORAL DAILY
Status: DISCONTINUED | OUTPATIENT
Start: 2022-07-31 | End: 2022-07-31

## 2022-07-31 RX ORDER — SODIUM CHLORIDE 9 MG/ML
250 INJECTION, SOLUTION INTRAVENOUS ONCE AS NEEDED
Status: DISCONTINUED | OUTPATIENT
Start: 2022-07-31 | End: 2022-08-04

## 2022-07-31 RX ORDER — CLOPIDOGREL BISULFATE 75 MG/1
75 TABLET ORAL DAILY
Status: DISCONTINUED | OUTPATIENT
Start: 2022-07-31 | End: 2022-08-06 | Stop reason: HOSPADM

## 2022-07-31 RX ORDER — SODIUM CHLORIDE 9 MG/ML
INJECTION, SOLUTION INTRAVENOUS CONTINUOUS PRN
Status: COMPLETED | OUTPATIENT
Start: 2022-07-31 | End: 2022-07-31

## 2022-07-31 RX ORDER — CLOPIDOGREL BISULFATE 75 MG/1
600 TABLET ORAL ONCE
Status: COMPLETED | OUTPATIENT
Start: 2022-07-31 | End: 2022-07-31

## 2022-07-31 RX ORDER — ASPIRIN 81 MG/1
324 TABLET, CHEWABLE ORAL ONCE
Status: COMPLETED | OUTPATIENT
Start: 2022-07-31 | End: 2022-07-31

## 2022-07-31 RX ORDER — FENTANYL CITRATE 50 UG/ML
INJECTION, SOLUTION INTRAMUSCULAR; INTRAVENOUS AS NEEDED
Status: DISCONTINUED | OUTPATIENT
Start: 2022-07-31 | End: 2022-07-31 | Stop reason: HOSPADM

## 2022-07-31 RX ADMIN — CLOPIDOGREL 600 MG: 75 TABLET, FILM COATED ORAL at 20:53

## 2022-07-31 RX ADMIN — CLOPIDOGREL 75 MG: 75 TABLET, FILM COATED ORAL at 20:53

## 2022-07-31 RX ADMIN — ASPIRIN 243 MG: 81 TABLET, CHEWABLE ORAL at 14:12

## 2022-07-31 RX ADMIN — ATENOLOL 25 MG: 25 TABLET ORAL at 20:50

## 2022-08-01 ENCOUNTER — APPOINTMENT (OUTPATIENT)
Dept: CARDIOLOGY | Facility: HOSPITAL | Age: 87
End: 2022-08-01

## 2022-08-01 LAB
ANION GAP SERPL CALCULATED.3IONS-SCNC: 12.3 MMOL/L (ref 5–15)
ANION GAP SERPL CALCULATED.3IONS-SCNC: 17.6 MMOL/L (ref 5–15)
AORTIC DIMENSIONLESS INDEX: 0.7 (DI)
BH CV ECHO AV AORTIC VALVE AT ACCEL TIME CALCULATED: 66 MSEC
BH CV ECHO MEAS - AI P1/2T: 529.2 MSEC
BH CV ECHO MEAS - AO MAX PG: 8 MMHG
BH CV ECHO MEAS - AO MEAN PG: 3.8 MMHG
BH CV ECHO MEAS - AO V2 MAX: 141.2 CM/SEC
BH CV ECHO MEAS - AO V2 VTI: 24.3 CM
BH CV ECHO MEAS - AT: 0.07 SEC
BH CV ECHO MEAS - AVA(I,D): 1.58 CM2
BH CV ECHO MEAS - CONTRAST EF 4CH: 61 CM2
BH CV ECHO MEAS - EDV(CUBED): 55.1 ML
BH CV ECHO MEAS - EDV(MOD-SP2): 87 ML
BH CV ECHO MEAS - EDV(MOD-SP4): 84 ML
BH CV ECHO MEAS - EF(MOD-BP): 61 %
BH CV ECHO MEAS - EF(MOD-SP2): 59.8 %
BH CV ECHO MEAS - EF(MOD-SP4): 61.9 %
BH CV ECHO MEAS - ESV(CUBED): 19.3 ML
BH CV ECHO MEAS - ESV(MOD-SP2): 35 ML
BH CV ECHO MEAS - ESV(MOD-SP4): 32 ML
BH CV ECHO MEAS - FS: 29.5 %
BH CV ECHO MEAS - IVS/LVPW: 1.2 CM
BH CV ECHO MEAS - IVSD: 1.25 CM
BH CV ECHO MEAS - LAT PEAK E' VEL: 3.9 CM/SEC
BH CV ECHO MEAS - LV DIASTOLIC VOL/BSA (35-75): 50.2 CM2
BH CV ECHO MEAS - LV MASS(C)D: 144.2 GRAMS
BH CV ECHO MEAS - LV MAX PG: 4.1 MMHG
BH CV ECHO MEAS - LV MEAN PG: 1.94 MMHG
BH CV ECHO MEAS - LV SYSTOLIC VOL/BSA (12-30): 19.1 CM2
BH CV ECHO MEAS - LV V1 MAX: 100.8 CM/SEC
BH CV ECHO MEAS - LV V1 VTI: 17 CM
BH CV ECHO MEAS - LVIDD: 3.8 CM
BH CV ECHO MEAS - LVIDS: 2.7 CM
BH CV ECHO MEAS - LVOT AREA: 2.25 CM2
BH CV ECHO MEAS - LVOT DIAM: 1.69 CM
BH CV ECHO MEAS - LVPWD: 1.05 CM
BH CV ECHO MEAS - MED PEAK E' VEL: 3.5 CM/SEC
BH CV ECHO MEAS - MV A DUR: 0.12 SEC
BH CV ECHO MEAS - MV A MAX VEL: 123.1 CM/SEC
BH CV ECHO MEAS - MV DEC SLOPE: 315.2 CM/SEC2
BH CV ECHO MEAS - MV DEC TIME: 340 MSEC
BH CV ECHO MEAS - MV E MAX VEL: 60.9 CM/SEC
BH CV ECHO MEAS - MV E/A: 0.49
BH CV ECHO MEAS - MV MAX PG: 6.9 MMHG
BH CV ECHO MEAS - MV MEAN PG: 2 MMHG
BH CV ECHO MEAS - MV P1/2T: 83.2 MSEC
BH CV ECHO MEAS - MV V2 VTI: 35.6 CM
BH CV ECHO MEAS - MVA(P1/2T): 2.6 CM2
BH CV ECHO MEAS - MVA(VTI): 1.08 CM2
BH CV ECHO MEAS - RAP SYSTOLE: 8 MMHG
BH CV ECHO MEAS - RVSP: 15.7 MMHG
BH CV ECHO MEAS - SI(MOD-SP2): 31.1 ML/M2
BH CV ECHO MEAS - SI(MOD-SP4): 31.1 ML/M2
BH CV ECHO MEAS - SV(LVOT): 38.4 ML
BH CV ECHO MEAS - SV(MOD-SP2): 52 ML
BH CV ECHO MEAS - SV(MOD-SP4): 52 ML
BH CV ECHO MEAS - TAPSE (>1.6): 0.91 CM
BH CV ECHO MEAS - TR MAX PG: 7.7 MMHG
BH CV ECHO MEAS - TR MAX VEL: 138.5 CM/SEC
BH CV ECHO MEASUREMENTS AVERAGE E/E' RATIO: 16.46
BH CV XLRA - RV BASE: 3.9 CM
BH CV XLRA - RV LENGTH: 6.6 CM
BH CV XLRA - RV MID: 2.4 CM
BH CV XLRA - TDI S': 6.1 CM/SEC
BILIRUB UR QL STRIP: NEGATIVE
BUN SERPL-MCNC: 25 MG/DL (ref 8–23)
BUN SERPL-MCNC: 29 MG/DL (ref 8–23)
BUN/CREAT SERPL: 40.3 (ref 7–25)
BUN/CREAT SERPL: 41 (ref 7–25)
CALCIUM SPEC-SCNC: 8 MG/DL (ref 8.2–9.6)
CALCIUM SPEC-SCNC: 8.3 MG/DL (ref 8.2–9.6)
CHLORIDE SERPL-SCNC: 87 MMOL/L (ref 98–107)
CHLORIDE SERPL-SCNC: 87 MMOL/L (ref 98–107)
CHOLEST SERPL-MCNC: 139 MG/DL (ref 0–200)
CLARITY UR: CLEAR
CO2 SERPL-SCNC: 17.4 MMOL/L (ref 22–29)
CO2 SERPL-SCNC: 20.7 MMOL/L (ref 22–29)
COLOR UR: ABNORMAL
CREAT SERPL-MCNC: 0.61 MG/DL (ref 0.57–1)
CREAT SERPL-MCNC: 0.72 MG/DL (ref 0.57–1)
DEPRECATED RDW RBC AUTO: 40.2 FL (ref 37–54)
EGFRCR SERPLBLD CKD-EPI 2021: 78.6 ML/MIN/1.73
EGFRCR SERPLBLD CKD-EPI 2021: 84 ML/MIN/1.73
ERYTHROCYTE [DISTWIDTH] IN BLOOD BY AUTOMATED COUNT: 13.6 % (ref 12.3–15.4)
GLUCOSE SERPL-MCNC: 146 MG/DL (ref 65–99)
GLUCOSE SERPL-MCNC: 190 MG/DL (ref 65–99)
GLUCOSE UR STRIP-MCNC: NEGATIVE MG/DL
HBA1C MFR BLD: 5.8 % (ref 4.8–5.6)
HCT VFR BLD AUTO: 34.8 % (ref 34–46.6)
HDLC SERPL-MCNC: 56 MG/DL (ref 40–60)
HGB BLD-MCNC: 11.8 G/DL (ref 12–15.9)
HGB UR QL STRIP.AUTO: NEGATIVE
KETONES UR QL STRIP: NEGATIVE
LDLC SERPL CALC-MCNC: 58 MG/DL (ref 0–100)
LDLC/HDLC SERPL: 0.96 {RATIO}
LEFT ATRIUM VOLUME INDEX: 30.2 ML/M2
LEUKOCYTE ESTERASE UR QL STRIP.AUTO: NEGATIVE
LV EF 2D ECHO EST: 61 %
MAXIMAL PREDICTED HEART RATE: 128 BPM
MCH RBC QN AUTO: 27.8 PG (ref 26.6–33)
MCHC RBC AUTO-ENTMCNC: 33.9 G/DL (ref 31.5–35.7)
MCV RBC AUTO: 81.9 FL (ref 79–97)
NITRITE UR QL STRIP: NEGATIVE
OSMOLALITY SERPL: 269 MOSM/KG (ref 280–301)
OSMOLALITY UR: 559 MOSM/KG
PH UR STRIP.AUTO: 6 [PH] (ref 5–8)
PLATELET # BLD AUTO: 204 10*3/MM3 (ref 140–450)
PMV BLD AUTO: 11.2 FL (ref 6–12)
POTASSIUM SERPL-SCNC: 3.5 MMOL/L (ref 3.5–5.2)
POTASSIUM SERPL-SCNC: 3.5 MMOL/L (ref 3.5–5.2)
PROCALCITONIN SERPL-MCNC: 0.22 NG/ML (ref 0–0.25)
PROT UR QL STRIP: ABNORMAL
QT INTERVAL: 450 MS
RBC # BLD AUTO: 4.25 10*6/MM3 (ref 3.77–5.28)
SODIUM SERPL-SCNC: 120 MMOL/L (ref 136–145)
SODIUM SERPL-SCNC: 122 MMOL/L (ref 136–145)
SODIUM SERPL-SCNC: 124 MMOL/L (ref 136–145)
SP GR UR STRIP: >=1.03 (ref 1–1.03)
STRESS TARGET HR: 109 BPM
TRIGL SERPL-MCNC: 145 MG/DL (ref 0–150)
UROBILINOGEN UR QL STRIP: ABNORMAL
VLDLC SERPL-MCNC: 25 MG/DL (ref 5–40)
WBC NRBC COR # BLD: 17.25 10*3/MM3 (ref 3.4–10.8)

## 2022-08-01 PROCEDURE — 80048 BASIC METABOLIC PNL TOTAL CA: CPT | Performed by: INTERNAL MEDICINE

## 2022-08-01 PROCEDURE — 81003 URINALYSIS AUTO W/O SCOPE: CPT | Performed by: INTERNAL MEDICINE

## 2022-08-01 PROCEDURE — 93005 ELECTROCARDIOGRAM TRACING: CPT | Performed by: EMERGENCY MEDICINE

## 2022-08-01 PROCEDURE — 80061 LIPID PANEL: CPT | Performed by: INTERNAL MEDICINE

## 2022-08-01 PROCEDURE — 83036 HEMOGLOBIN GLYCOSYLATED A1C: CPT | Performed by: INTERNAL MEDICINE

## 2022-08-01 PROCEDURE — 83930 ASSAY OF BLOOD OSMOLALITY: CPT | Performed by: INTERNAL MEDICINE

## 2022-08-01 PROCEDURE — 82436 ASSAY OF URINE CHLORIDE: CPT | Performed by: INTERNAL MEDICINE

## 2022-08-01 PROCEDURE — 99232 SBSQ HOSP IP/OBS MODERATE 35: CPT | Performed by: INTERNAL MEDICINE

## 2022-08-01 PROCEDURE — 84295 ASSAY OF SERUM SODIUM: CPT | Performed by: INTERNAL MEDICINE

## 2022-08-01 PROCEDURE — 83935 ASSAY OF URINE OSMOLALITY: CPT | Performed by: INTERNAL MEDICINE

## 2022-08-01 PROCEDURE — 93306 TTE W/DOPPLER COMPLETE: CPT

## 2022-08-01 PROCEDURE — 93306 TTE W/DOPPLER COMPLETE: CPT | Performed by: INTERNAL MEDICINE

## 2022-08-01 PROCEDURE — 87040 BLOOD CULTURE FOR BACTERIA: CPT | Performed by: INTERNAL MEDICINE

## 2022-08-01 PROCEDURE — 25010000002 PERFLUTREN (DEFINITY) 8.476 MG IN SODIUM CHLORIDE (PF) 0.9 % 10 ML INJECTION: Performed by: INTERNAL MEDICINE

## 2022-08-01 PROCEDURE — 84145 PROCALCITONIN (PCT): CPT | Performed by: INTERNAL MEDICINE

## 2022-08-01 PROCEDURE — 85027 COMPLETE CBC AUTOMATED: CPT | Performed by: INTERNAL MEDICINE

## 2022-08-01 PROCEDURE — 84300 ASSAY OF URINE SODIUM: CPT | Performed by: INTERNAL MEDICINE

## 2022-08-01 PROCEDURE — 93010 ELECTROCARDIOGRAM REPORT: CPT | Performed by: INTERNAL MEDICINE

## 2022-08-01 RX ORDER — NITROGLYCERIN 0.4 MG/1
0.4 TABLET SUBLINGUAL
Status: DISCONTINUED | OUTPATIENT
Start: 2022-08-01 | End: 2022-08-06 | Stop reason: HOSPADM

## 2022-08-01 RX ORDER — TAMSULOSIN HYDROCHLORIDE 0.4 MG/1
0.4 CAPSULE ORAL DAILY
Status: DISCONTINUED | OUTPATIENT
Start: 2022-08-01 | End: 2022-08-06 | Stop reason: HOSPADM

## 2022-08-01 RX ORDER — POTASSIUM CHLORIDE 750 MG/1
60 TABLET, FILM COATED, EXTENDED RELEASE ORAL DAILY
Status: DISCONTINUED | OUTPATIENT
Start: 2022-08-01 | End: 2022-08-03

## 2022-08-01 RX ORDER — SODIUM CHLORIDE 9 MG/ML
100 INJECTION, SOLUTION INTRAVENOUS CONTINUOUS
Status: ACTIVE | OUTPATIENT
Start: 2022-08-01 | End: 2022-08-01

## 2022-08-01 RX ADMIN — SODIUM CHLORIDE 100 ML/HR: 9 INJECTION, SOLUTION INTRAVENOUS at 10:35

## 2022-08-01 RX ADMIN — TAMSULOSIN HYDROCHLORIDE 0.4 MG: 0.4 CAPSULE ORAL at 18:16

## 2022-08-01 RX ADMIN — ACETAMINOPHEN 650 MG: 325 TABLET, FILM COATED ORAL at 07:01

## 2022-08-01 RX ADMIN — ASPIRIN 81 MG: 81 TABLET, COATED ORAL at 09:32

## 2022-08-01 RX ADMIN — PERFLUTREN 2 ML: 6.52 INJECTION, SUSPENSION INTRAVENOUS at 08:02

## 2022-08-01 RX ADMIN — CLOPIDOGREL 75 MG: 75 TABLET, FILM COATED ORAL at 09:32

## 2022-08-01 RX ADMIN — ATENOLOL 25 MG: 25 TABLET ORAL at 09:32

## 2022-08-01 NOTE — CONSULTS
"  [facility] Consult Note      Patient Name: Yuliana Gonzalez  : 1930  MRN: [id number]  Primary Care Physician:  Trent Davis MD  Referring Physician: No ref. provider found  Date of admission: 2022    Subjective     Reason for Consult:   Hyponatremia    HPI:   Yuliana Gonzalez is a 89 y.o. female with a prior history of hypertension, coronary artery disease status post PCI and TAVR earlier this year.  Patient was diagnosed with nephrolithiasis about a week ago with 4 mm stone in the right UV J and large stone in the left renal pelvis.  Patient presented this time to the hospital with increasing shortness of air and increasing weakness.  Her sodium 4 days ago was 135 and a sodium at time of admission was 120.  Of note patient has been taking hydrochlorothiazide as an outpatient.  We were consulted to help with management of her hyponatremia    Review of Systems:   14 point review of systems is otherwise negative except for mentioned above on HPI    Personal History     Past Medical History:   Diagnosis Date   • Aortic stenosis 2009    diagnosed,  moderate with 0.9cm area, 42mm max gradient. 0.91/34 6/15.   • Central loss of vision     RIGHT EYE   • Cervical arthritis    • Colitis     HX   • Coronary artery disease    • History of constipation    • History of esophageal reflux    • History of skin cancer    • Hyperlipidemia    • Hypertension    • IGT (impaired glucose tolerance)    • Irregular heart beat     HX   • Macular degeneration    • Medication management    • On continuous oral anticoagulation    • Redness of skin     NOSE/DERMATOLOGY TREATING WITH FLAGYL CREAM/ADVISED PT TO NOTIFY CARDIOLOGY   • Sciatica     HX   • Short-term memory loss     \"MILD\"   • SOB (shortness of breath) on exertion    • Vitamin D deficiency        Past Surgical History:   Procedure Laterality Date   • AORTIC VALVE REPAIR/REPLACEMENT Left 3/15/2022    Procedure: TTE TRANSFEMORAL " TRANSCATHETER AORTIC VALVE REPLACEMENT PERCUTANEOUS APPROACH;  Surgeon: Vesna Chris MD;  Location: [facility] CV[facility];  Service: Cardiothoracic;  Laterality: Left;   • AORTIC VALVE REPAIR/REPLACEMENT Left 3/15/2022    Procedure: Transfemoral Transcatheter Aortic Valve Replacement with intra operative TTE and possible open surgical rescue;  Surgeon: Clint Locke MD;  Location: [facility] CVOR;  Service: Cardiovascular;  Laterality: Left;   • CARDIAC CATHETERIZATION N/A 1/21/2022    Procedure: Coronary angiography;  Surgeon: Clint Locke MD;  Location:  [facility] CATH INVASIVE LOCATION;  Service: Cardiovascular;  Laterality: N/A;   • CARDIAC CATHETERIZATION N/A 1/21/2022    Procedure: Percutaneous Coronary Intervention;  Surgeon: Clint Locke MD;  [facility];  Service: Cardiovascular;  Laterality: N/A;   • CARDIAC CATHETERIZATION N/A 1/21/2022    Procedure: Stent ISMAEL coronary;  Surgeon: Clint Locke MD;  [facility];  Service: Cardiovascular;  Laterality: N/A;   • CARDIAC CATHETERIZATION Left 7/31/2022    Procedure: CORONARY ANGIOGRAPHY;  Surgeon: Jeannie Echevarria MD;  [facility];  Service: Cardiology;  Laterality: Left;   • CARDIAC CATHETERIZATION N/A 7/31/2022    Procedure: Left ventriculography;  Surgeon: Jeannie Echevarria MD;  [facility];  Service: Cardiology;  Laterality: N/A;   • CARDIAC CATHETERIZATION N/A 7/31/2022    Procedure: Left Heart Cath;  Surgeon: Jeannie Echevarria MD;  [facility];  Service: Cardiology;  Laterality: N/A;   • CARDIAC ELECTROPHYSIOLOGY PROCEDURE N/A 3/16/2022    Procedure: Pacemaker DC new [place];  Surgeon: Yann Ceballos MD;  [facility];  Service: Cardiology;  Laterality: N/A;   • CATARACT EXTRACTION, BILATERAL     • COLONOSCOPY     • EYE SURGERY     • SKIN CANCER EXCISION    "      Family History: family history includes Aneurysm in her sister; Aortic stenosis in her mother; Diabetes in her father; Heart failure in her father; Hypertension in her father and mother; No Known Problems in her daughter, sister, and son; Other in her sister.    Social History:  reports that she quit smoking about 71 years ago. She started smoking about 75 years ago. She smoked 0.00 packs per day for 0.00 years. She has never used smokeless tobacco. She reports current alcohol use. She reports that she does not use drugs.    Home Medications:  Prior to Admission medications    Medication Sig Start Date End Date Taking? Authorizing Provider   Acetaminophen (TYLENOL PO) Take 325 mg by mouth As Needed (for pain or fever).    Provider, MD Madelin   aspirin (aspirin) 81 MG EC tablet Take 1 tablet by mouth Daily.  Patient taking differently: Take 81 mg by mouth Every Morning. 1/21/22   Clint Locke MD   atenolol (TENORMIN) 25 MG tablet Take 1 tablet by mouth Daily.  Patient taking differently: Take 25 mg by mouth Every Evening. 2/9/22   Trent Davis MD   cholecalciferol (VITAMIN D3) 25 MCG (1000 UT) tablet Take 2 tablets by mouth Daily. 3/17/22   Polly Perikns APRN   clopidogrel (PLAVIX) 75 MG tablet Take 1 tablet by mouth Daily.  Patient taking differently: Take 75 mg by mouth Every Morning. 1/21/22   Clint Locke MD   hydroCHLOROthiazide (MICROZIDE) 12.5 MG capsule Take 1 capsule by mouth Daily.  Patient taking differently: Take 12.5 mg by mouth Every Morning. 2/9/22   Trent Davis MD   HYDROcodone-acetaminophen (NORCO) 5-325 MG per tablet Take 1 tablet by mouth Every 6 (Six) Hours As Needed for Severe Pain . 7/27/22   Dino Love MD   Magnesium Hydroxide (DULCOLAX PO) Take 1 tablet by mouth As Needed (constipation).    Provider, MD Madelin   metroNIDAZOLE (METROCREAM) 0.75 % cream Apply 1 application topically to the appropriate area as directed 2 (Two) Times a Day. \"NOSE\" " "FOR \"PRECANCEROUS\" AREA    Provider, MD Madelin   Multiple Vitamins-Minerals (VITEYES AREDS ADVANCED PO) Take 2 capsules by mouth Daily. PT HOLDING FOR SURGERY 2/20/14   Eli Sin MA   ondansetron ODT (ZOFRAN-ODT) 8 MG disintegrating tablet Place 1 tablet under the tongue Every 8 (Eight) Hours As Needed for Nausea or Vomiting. 7/27/22   Dino Love MD       Allergies:  Allergies   Allergen Reactions   • Hydrocodone Shortness Of Breath     Also reports \"lethargy\"   • Lipitor [Atorvastatin] Nausea Only     \"FELT AWFUL\"   • Erythromycin Diarrhea   • Lisinopril Cough       Objective     Vitals:   Temp:  [97.4 °F (36.3 °C)-98 °F (36.7 °C)] 97.4 °F (36.3 °C)  Heart Rate:  [59-75] 60  Resp:  [9-21] 18  BP: ()/(53-74) 115/53  Flow (L/min):  [3] 3    Intake/Output Summary (Last 24 hours) at 8/1/2022 1450  Last data filed at 8/1/2022 1304  Gross per 24 hour   Intake 440 ml   Output --   Net 440 ml       Physical Exam:    General Appearance: alert, oriented x 3, no acute distress   Skin: warm and dry  HEENT: oral mucosa normal, nonicteric sclera  Neck: supple, no JVD  Lungs: CTA  Heart: RRR, normal S1 and S2  Abdomen: soft, nontender, nondistended  : no palpable bladder  Extremities: no edema, cyanosis or clubbing  Neuro: normal speech and mental status     Scheduled Meds:     aspirin, 81 mg, Oral, Daily  atenolol, 25 mg, Oral, Daily  clopidogrel, 75 mg, Oral, Daily      IV Meds:   sodium chloride, 100 mL/hr, Last Rate: 100 mL/hr (08/01/22 1035)        Results Reviewed:   I have personally reviewed the results from the time of this admission to 8/1/2022 14:50 EDT     Lab Results   Component Value Date    GLUCOSE 190 (H) 08/01/2022    CALCIUM 8.3 08/01/2022     (L) 08/01/2022    K 3.5 08/01/2022    CO2 17.4 (L) 08/01/2022    CL 87 (L) 08/01/2022    BUN 29 (H) 08/01/2022    CREATININE 0.72 08/01/2022    EGFRIFAFRI 90 11/10/2021    EGFRIFNONA 87 02/25/2022    BCR 40.3 (H) 08/01/2022    ANIONGAP " 17.6 (H) 08/01/2022      Lab Results   Component Value Date    MG 2.2 07/31/2022    ALBUMIN 3.40 (L) 07/31/2022           Assessment / Plan     ASSESSMENT:    1.  Hyponatremia etiology most likely related to hydrochlorothiazide and possibly there is a component of SIADH due to her pain.  Awaiting urine sodium urine chloride and urine osmolality.  However hyponatremia is acute.  We will continue with IV hydration and obtain sodium every 8 hours.    2.  Hypokalemia  3.  History of coronary disease status post PCI  4.  History of TAVR in the past year  5.  History of nephrolithiasis with right distal ureteral calculus and left nonobstructing renal calculus.    PLAN:    1.  Continue IV fluid and monitor sodium every 8 hours  2.  Obtain urine electrolytes  3.  Start Flomax to help with passing kidney stones  4.  Surveillance labs    Thank you for involving us in the care of Yuliana Gonzalez.  Please feel free to call with any questions.    Ashlee Hewitt MD  08/01/22  14:50 EDT    [facility]  [phone]

## 2022-08-01 NOTE — CASE MANAGEMENT/SOCIAL WORK
Discharge Planning Assessment  [facility]     Patient Name: Yuliana Gonzalez  MRN: [id number]  Today's Date: 8/1/2022    Admit Date: 7/31/2022     Discharge Needs Assessment     Row Name 08/01/22 1758       Living Environment    People in Home alone    Current Living Arrangements independent living facility  [facility]    Primary Care Provided by self    Provides Primary Care For no one    Family Caregiver if Needed child(michael), adult    Family Caregiver Names Faith Renee, eusebia    Quality of Family Relationships helpful;involved;supportive    Able to Return to Prior Arrangements yes       Resource/Environmental Concerns    Resource/Environmental Concerns none    Transportation Concerns no car       Transition Planning    Patient/Family Anticipates Transition to home    Patient/Family Anticipated Services at Transition none    Transportation Anticipated family or friend will provide       Discharge Needs Assessment    Readmission Within the Last 30 Days no previous admission in last 30 days    Equipment Currently Used at Home cane, straight;scales;grab bar    Concerns to be Addressed no discharge needs identified;denies needs/concerns at this time    Anticipated Changes Related to Illness none    Equipment Needed After Discharge none    Provided Post Acute Provider List? Refused    Refused Provider List Comment Pt declined need for list.               Discharge Plan     Row Name 08/01/22 1804       Plan    Plan Home to independent living at [facility];  Daughter to transport.    Plan Comments CCP spoke with patient at bedside to complete her discharge screening assessment.  CCP introduced self and role.  Pt confirmed information on her face sheet.  Pt reports Dr. Trent Davis is her PCP.  Pt pharmacy confirmed as, [facility] [address] [facility].  Pt reports she is IADL’s, retired and drives.  Pt lives in Independent Living facility at [facility].  Pt reports they provide her meals.  Pt  administers her own medications.  Pt uses a straight cane when ambulating outside.  Pt daughter, Faith Renee ([phone]) assist with shopping.  Pt denies past use of home health but has used private pay caregivers ([facility]) in the past.  Pt denies past sub-acute rehab.  Pt reports she plans to return [place] at d/c and denies current discharge needs.  CCP will continue to follow…….Zhanna OLIVEROS/INGE NIEVES              Continued Care and Services - Admitted Since 7/31/2022     Destination     Service Provider Request Status Selected Services Address Phone Fax Patient Preferred    [facility] [address]  Pending - Request Sent N/A [address] , [facility] [id number] [phone] --            Selected Continued Care - Episodes Includes selections from active Coordinated Care Management episodes    High Risk Care Management Episode start date: 7/27/2022 (Paused)   There are no active outsourced providers for this episode.                  Demographic Summary     Row Name 08/01/22 1756       General Information    Admission Type inpatient    Arrived From emergency department    Required Notices Provided Important Message from Medicare    Referral Source admission list    Reason for Consult discharge planning    Preferred Language English               Functional Status     Row Name 08/01/22 1756       Functional Status    Usual Activity Tolerance good    Current Activity Tolerance moderate       Functional Status, IADL    Medications independent    Meal Preparation independent    Housekeeping independent    Laundry independent    Shopping assistive person    IADL Comments Independent Living provides meals.  Pt administers her own medications.       Mental Status    General Appearance WDL WDL       Mental Status Summary    Recent Changes in Mental Status/Cognitive Functioning no changes       Employment/    Employment Status retired               Psychosocial    No documentation.                 Abuse/Neglect    No documentation.                Legal    No documentation.                Substance Abuse    No documentation.                Patient Forms    No documentation.                   Zhanna Cordero RN

## 2022-08-01 NOTE — CONSULTS
"           FIRST UROLOGY CONSULT      Patient Identification:  NAME:  Yuliana Gonzalez  Age:  89 y.o.   Sex:  female   :  1930   MRN:  [id number]       Chief complaint: Right flank pain    History of present illness: 89-year-old female no prior stone history presented with right-sided flank pain on the  of last month.  She was found of a right distal 4 mm stone right near the UVJ as well as a fairly large stone in the left renal pelvis that is nonobstructing.  She is had no left-sided flank pain.  She was sent over the emergency room.  She was due to see our practice later this week for initial follow-up.  She then comes in now with shortness of air but no chest pain.  Currently being worked up by cardiology.  She is had no further right-sided flank pain.  Fevers or chills.  Mild escalation of her white count today but was higher yesterday so it has come down.  No documented temperature.  Rate is normal.      Past medical history:  Past Medical History:   Diagnosis Date   • Aortic stenosis 2009    diagnosed,  moderate with 0.9cm area, 42mm max gradient. 0.91/34 6/15.   • Central loss of vision     RIGHT EYE   • Cervical arthritis    • Colitis     HX   • Coronary artery disease    • History of constipation    • History of esophageal reflux    • History of skin cancer    • Hyperlipidemia    • Hypertension    • IGT (impaired glucose tolerance)    • Irregular heart beat     HX   • Macular degeneration    • Medication management    • On continuous oral anticoagulation    • Redness of skin     NOSE/DERMATOLOGY TREATING WITH FLAGYL CREAM/ADVISED PT TO NOTIFY CARDIOLOGY   • Sciatica     HX   • Short-term memory loss     \"MILD\"   • SOB (shortness of breath) on exertion    • Vitamin D deficiency        Past surgical history:  Past Surgical History:   Procedure Laterality Date   • AORTIC VALVE REPAIR/REPLACEMENT Left 3/15/2022    Procedure: TTE TRANSFEMORAL TRANSCATHETER AORTIC VALVE REPLACEMENT PERCUTANEOUS " APPROACH;  Surgeon: Vesna Chris MD;  Location: [facility] CVOR;  Service: Cardiothoracic;  Laterality: Left;   • AORTIC VALVE REPAIR/REPLACEMENT Left 3/15/2022    Procedure: Transfemoral Transcatheter Aortic Valve Replacement with intra operative TTE and possible open surgical rescue;  Surgeon: Clint Locke MD;  Location: [facility] CVOR;  Service: Cardiovascular;  Laterality: Left;   • CARDIAC CATHETERIZATION N/A 1/21/2022    Procedure: Coronary angiography;  Surgeon: Clint Locke MD;  Location:  [facility] CATH INVASIVE LOCATION;  Service: Cardiovascular;  Laterality: N/A;   • CARDIAC CATHETERIZATION N/A 1/21/2022    Procedure: Percutaneous Coronary Intervention;  Surgeon: Clint Locke MD;  Location:  [facility] CATH INVASIVE LOCATION;  Service: Cardiovascular;  Laterality: N/A;   • CARDIAC CATHETERIZATION N/A 1/21/2022    Procedure: Stent ISMAEL coronary;  Surgeon: Clint Locke MD;  Location: [facility] CATH INVASIVE LOCATION;  Service: Cardiovascular;  Laterality: N/A;   • CARDIAC CATHETERIZATION Left 7/31/2022    Procedure: CORONARY ANGIOGRAPHY;  Surgeon: Jeannie Echevarria MD;  Location: [facility] CATH INVASIVE LOCATION;  Service: Cardiology;  Laterality: Left;   • CARDIAC CATHETERIZATION N/A 7/31/2022    Procedure: Left ventriculography;  Surgeon: Jeannie Echevarria MD;  Location: [facility] CATH INVASIVE LOCATION;  Service: Cardiology;  Laterality: N/A;   • CARDIAC CATHETERIZATION N/A 7/31/2022    Procedure: Left Heart Cath;  Surgeon: Jeannie Echevarria MD;  Location: [facility] CATH INVASIVE LOCATION;  Service: Cardiology;  Laterality: N/A;   • CARDIAC ELECTROPHYSIOLOGY PROCEDURE N/A 3/16/2022    Procedure: Pacemaker DC new [place];  Surgeon: Yann Ceballos MD;  Location: [facility] CATH INVASIVE LOCATION;  Service: Cardiology;  Laterality: N/A;   • CATARACT EXTRACTION, BILATERAL     • COLONOSCOPY     • EYE SURGERY     • SKIN CANCER EXCISION         Allergies:  Hydrocodone, Lipitor [atorvastatin],  "Erythromycin, and Lisinopril    Home medications:  Medications Prior to Admission   Medication Sig Dispense Refill Last Dose   • Acetaminophen (TYLENOL PO) Take 325 mg by mouth As Needed (for pain or fever).      • aspirin (aspirin) 81 MG EC tablet Take 1 tablet by mouth Daily. (Patient taking differently: Take 81 mg by mouth Every Morning.) 30 tablet 6    • atenolol (TENORMIN) 25 MG tablet Take 1 tablet by mouth Daily. (Patient taking differently: Take 25 mg by mouth Every Evening.) 90 tablet 1    • cholecalciferol (VITAMIN D3) 25 MCG (1000 UT) tablet Take 2 tablets by mouth Daily.      • clopidogrel (PLAVIX) 75 MG tablet Take 1 tablet by mouth Daily. (Patient taking differently: Take 75 mg by mouth Every Morning.) 30 tablet 11    • hydroCHLOROthiazide (MICROZIDE) 12.5 MG capsule Take 1 capsule by mouth Daily. (Patient taking differently: Take 12.5 mg by mouth Every Morning.) 90 capsule 1    • HYDROcodone-acetaminophen (NORCO) 5-325 MG per tablet Take 1 tablet by mouth Every 6 (Six) Hours As Needed for Severe Pain . 12 tablet 0    • Magnesium Hydroxide (DULCOLAX PO) Take 1 tablet by mouth As Needed (constipation).      • metroNIDAZOLE (METROCREAM) 0.75 % cream Apply 1 application topically to the appropriate area as directed 2 (Two) Times a Day. \"NOSE\" FOR \"PRECANCEROUS\" AREA      • Multiple Vitamins-Minerals (VITEYES AREDS ADVANCED PO) Take 2 capsules by mouth Daily. PT HOLDING FOR SURGERY      • ondansetron ODT (ZOFRAN-ODT) 8 MG disintegrating tablet Place 1 tablet under the tongue Every 8 (Eight) Hours As Needed for Nausea or Vomiting. 20 tablet 0        Hospital medications:  aspirin, 81 mg, Oral, Daily  atenolol, 25 mg, Oral, Daily  clopidogrel, 75 mg, Oral, Daily      sodium chloride, 100 mL/hr, Last Rate: 100 mL/hr (08/01/22 1035)      •  acetaminophen  •  atropine  •  sodium chloride  •  sodium chloride    Family history:  Family History   Problem Relation Age of Onset   • Aortic stenosis Mother    • " Hypertension Mother    • Heart failure Father    • Diabetes Father    • Hypertension Father    • No Known Problems Sister    • No Known Problems Daughter    • No Known Problems Son    • Aneurysm Sister    • Other Sister         Polio in her 20's   • Malig Hyperthermia Neg Hx        Social history:  Social History     Tobacco Use   • Smoking status: Former Smoker     Packs/day: 0.00     Years: 0.00     Pack years: 0.00     Start date:      Quit date:      Years since quittin.6   • Smokeless tobacco: Never Used   • Tobacco comment: 1 pack every 2 weeks   Vaping Use   • Vaping Use: Never used   Substance Use Topics   • Alcohol use: Yes     Comment: wine - rare / no caffeine   • Drug use: No       Review of systems:    Negative 12-system ROS except for the following: No irritable obstructive voiding symptoms.      Objective:  TMax 24 hours:   Temp (24hrs), Av.8 °F (36.6 °C), Min:97.4 °F (36.3 °C), Max:98 °F (36.7 °C)      Vitals Ranges:   Temp:  [97.4 °F (36.3 °C)-98 °F (36.7 °C)] 97.4 °F (36.3 °C)  Heart Rate:  [59-75] 60  Resp:  [9-21] 18  BP: ()/(53-86) 115/53    Intake/Output Last 3 shifts:  I/O last 3 completed shifts:  In: 200 [I.V.:200]  Out: -      Physical Exam:       General Appearance:    Alert, cooperative, in no acute distress   Head:    Normocephalic, without obvious abnormality, atraumatic   Eyes:          PERRL, conjunctivae and corneas clear   Ears:    Normal external inspection   Throat:   No oral lesions, oral mucosa moist   Neck:   Supple, no LAD, trachea midline   Back:     No CVA tenderness   Lungs:     Respirations unlabored, symmetric excursion    Heart:    RRR, intact peripheral pulses   Abdomen:    Soft benign   :   Deferred   Extremities:   No edema, no deformity   Skin:   No bleeding, bruising or rashes   Neuro/Psych:   Orientation intact, mood/affect pleasant, no focal findings       Results review:   I reviewed the patient's new clinical results.    Data  "review:  Lab Results (last 24 hours)     Procedure Component Value Units Date/Time    Procalcitonin [[id number]]  (Normal) Collected: 08/01/22 1058    Specimen: Blood Updated: 08/01/22 1211     Procalcitonin 0.22 ng/mL     Narrative:      As a Marker for Sepsis (Non-Neonates):    1. <0.5 ng/mL represents a low risk of severe sepsis and/or septic shock.  2. >2 ng/mL represents a high risk of severe sepsis and/or septic shock.    As a Marker for Lower Respiratory Tract Infections that require antibiotic therapy:    PCT on Admission    Antibiotic Therapy       6-12 Hrs later    >0.5                Strongly Recommended  >0.25 - <0.5        Recommended   0.1 - 0.25          Discouraged              Remeasure/reassess PCT  <0.1                Strongly Discouraged     Remeasure/reassess PCT    As 28 day mortality risk marker: \"Change in Procalcitonin Result\" (>80% or <=80%) if Day 0 (or Day 1) and Day 4 values are available. Refer to http://www.Gamesta[employer]-pct-calculator.com    Change in PCT <=80%  A decrease of PCT levels below or equal to 80% defines a positive change in PCT test result representing a higher risk for 28-day all-cause mortality of patients diagnosed with severe sepsis for septic shock.    Change in PCT >80%  A decrease of PCT levels of more than 80% defines a negative change in PCT result representing a lower risk for 28-day all-cause mortality of patients diagnosed with severe sepsis or septic shock.       Basic Metabolic Panel [[id number]]  (Abnormal) Collected: 08/01/22 1058    Specimen: Blood Updated: 08/01/22 1204     Glucose 190 mg/dL      BUN 29 mg/dL      Creatinine 0.72 mg/dL      Sodium 122 mmol/L      Potassium 3.5 mmol/L      Chloride 87 mmol/L      CO2 17.4 mmol/L      Calcium 8.3 mg/dL      BUN/Creatinine Ratio 40.3     Anion Gap 17.6 mmol/L      eGFR 78.6 mL/min/1.73      Comment: National Kidney Foundation and American Society of Nephrology (ASN) Task Force recommended calculation based on the " Chronic Kidney Disease Epidemiology Collaboration (CKD-EPI) equation refit without adjustment for race.       Narrative:      GFR Normal >60  Chronic Kidney Disease <60  Kidney Failure <15      Osmolality, Serum [[id number]] Collected: 08/01/22 1058    Specimen: Blood Updated: 08/01/22 1139    Blood Culture - Blood, Hand, Left [[id number]] Collected: 08/01/22 1058    Specimen: Blood from Hand, Left Updated: 08/01/22 1133    Blood Culture - Blood, Hand, Right [[id number]] Collected: 08/01/22 1103    Specimen: Blood from Hand, Right Updated: 08/01/22 1132    Basic Metabolic Panel [[id number]]  (Abnormal) Collected: 08/01/22 0325    Specimen: Blood Updated: 08/01/22 0359     Glucose 146 mg/dL      BUN 25 mg/dL      Creatinine 0.61 mg/dL      Sodium 120 mmol/L      Potassium 3.5 mmol/L      Chloride 87 mmol/L      CO2 20.7 mmol/L      Calcium 8.0 mg/dL      BUN/Creatinine Ratio 41.0     Anion Gap 12.3 mmol/L      eGFR 84.0 mL/min/1.73      Comment: National Kidney Foundation and American Society of Nephrology (ASN) Task Force recommended calculation based on the Chronic Kidney Disease Epidemiology Collaboration (CKD-EPI) equation refit without adjustment for race.       Narrative:      GFR Normal >60  Chronic Kidney Disease <60  Kidney Failure <15      Lipid Panel [[id number]] Collected: 08/01/22 0325    Specimen: Blood Updated: 08/01/22 0359     Total Cholesterol 139 mg/dL      Triglycerides 145 mg/dL      HDL Cholesterol 56 mg/dL      LDL Cholesterol  58 mg/dL      VLDL Cholesterol 25 mg/dL      LDL/HDL Ratio 0.96    Narrative:      Cholesterol Reference Ranges  (U.S. Department of Health and Human Services ATP III Classifications)    Desirable          <200 mg/dL  Borderline High    200-239 mg/dL  High Risk          >240 mg/dL      Triglyceride Reference Ranges  (U.S. Department of Health and Human Services ATP III Classifications)    Normal           <150 mg/dL  Borderline High  150-199 mg/dL  High             200-499  mg/dL  Very High        >500 mg/dL    HDL Reference Ranges  (U.S. Department of Health and Human Services ATP III Classifications)    Low     <40 mg/dl (major risk factor for CHD)  High    >60 mg/dl ('negative' risk factor for CHD)        LDL Reference Ranges  (U.S. Department of Health and Human Services ATP III Classifications)    Optimal          <100 mg/dL  Near Optimal     100-129 mg/dL  Borderline High  130-159 mg/dL  High             160-189 mg/dL  Very High        >189 mg/dL    Hemoglobin A1c [[id number]]  (Abnormal) Collected: 08/01/22 0325    Specimen: Blood Updated: 08/01/22 0355     Hemoglobin A1C 5.80 %     Narrative:      Hemoglobin A1C Ranges:    Increased Risk for Diabetes  5.7% to 6.4%  Diabetes                     >= 6.5%  Diabetic Goal                < 7.0%    CBC (No Diff) [[id number]]  (Abnormal) Collected: 08/01/22 0325    Specimen: Blood Updated: 08/01/22 0339     WBC 17.25 10*3/mm3      RBC 4.25 10*6/mm3      Hemoglobin 11.8 g/dL      Hematocrit 34.8 %      MCV 81.9 fL      MCH 27.8 pg      MCHC 33.9 g/dL      RDW 13.6 %      RDW-SD 40.2 fl      MPV 11.2 fL      Platelets 204 10*3/mm3     Troponin [[id number]]  (Abnormal) Collected: 07/31/22 1829    Specimen: Blood Updated: 07/31/22 1921     Troponin T 1.150 ng/mL     Narrative:      Troponin T Reference Range:  <= 0.03 ng/mL-   Negative for AMI  >0.03 ng/mL-     Abnormal for myocardial necrosis.  Clinicians would have to utilize clinical acumen, EKG, Troponin and serial changes to determine if it is an Acute Myocardial Infarction or myocardial injury due to an underlying chronic condition.       Results may be falsely decreased if patient taking Biotin.      COVID PRE-OP / PRE-PROCEDURE SCREENING ORDER (NO ISOLATION) - Swab, Nasopharynx [[id number]]  (Normal) Collected: 07/31/22 1419    Specimen: Swab from Nasopharynx Updated: 07/31/22 1524    Narrative:      The following orders were created for panel order COVID PRE-OP / PRE-PROCEDURE  SCREENING ORDER (NO ISOLATION) - Swab, Nasopharynx.  Procedure                               Abnormality         Status                     ---------                               -----------         ------                     COVID-19,[facility] IN-HOUSE...[[id number]]  Normal              Final result                 Please view results for these tests on the individual orders.    COVID-19,[facility] IN-HOUSE CEPHEID/TIFFANIE NP SWAB IN TRANSPORT MEDIA 8-12 HR TAT - Swab, Nasopharynx [[id number]]  (Normal) Collected: 07/31/22 1419    Specimen: Swab from Nasopharynx Updated: 07/31/22 1524     COVID19 Not Detected    Narrative:      Fact sheet for providers: https://www.fda.gov/media/938646/download    Fact sheet for patients: https://www.fda.gov/media/747408/download    Test performed by PCR.    [place] Draw [[id number]] Collected: 07/31/22 1410    Specimen: Blood Updated: 07/31/22 1517    Narrative:      The following orders were created for panel order [place] Draw.  Procedure                               Abnormality         Status                     ---------                               -----------         ------                     Green Top (Gel)[[id number]]                                  Final result               Lavender Top[[id number]]                                     Final result               Gold Top - SST[[id number]]                                   Final result               Light Blue Top[[id number]]                                   Final result                 Please view results for these tests on the individual orders.    Green Top (Gel) [[id number]] Collected: 07/31/22 1410    Specimen: Blood Updated: 07/31/22 1517     Extra Tube Hold for add-ons.     Comment: Auto resulted.       Lavender Top [[id number]] Collected: 07/31/22 1410    Specimen: Blood Updated: 07/31/22 1517     Extra Tube hold for add-on     Comment: Auto resulted       Gold Top - SST [[id number]] Collected: 07/31/22 1410    Specimen: Blood  Updated: 07/31/22 1517     Extra Tube Hold for add-ons.     Comment: Auto resulted.       Light Blue Top [[id number]] Collected: 07/31/22 1410    Specimen: Blood Updated: 07/31/22 1517     Extra Tube Hold for add-ons.     Comment: Auto resulted       Comprehensive Metabolic Panel [[id number]]  (Abnormal) Collected: 07/31/22 1410    Specimen: Blood Updated: 07/31/22 1508     Glucose 189 mg/dL      BUN 26 mg/dL      Creatinine 0.72 mg/dL      Sodium 120 mmol/L      Potassium 3.6 mmol/L      Chloride 85 mmol/L      CO2 22.4 mmol/L      Calcium 7.9 mg/dL      Total Protein 5.8 g/dL      Albumin 3.40 g/dL      ALT (SGPT) 62 U/L      AST (SGOT) 78 U/L      Alkaline Phosphatase 160 U/L      Total Bilirubin 0.7 mg/dL      Globulin 2.4 gm/dL      A/G Ratio 1.4 g/dL      BUN/Creatinine Ratio 36.1     Anion Gap 12.6 mmol/L      eGFR 78.6 mL/min/1.73      Comment: National Kidney Foundation and American Society of Nephrology (ASN) Task Force recommended calculation based on the Chronic Kidney Disease Epidemiology Collaboration (CKD-EPI) equation refit without adjustment for race.       Narrative:      GFR Normal >60  Chronic Kidney Disease <60  Kidney Failure <15      Troponin [[id number]]  (Abnormal) Collected: 07/31/22 1410    Specimen: Blood Updated: 07/31/22 1459     Troponin T 1.140 ng/mL     Narrative:      Troponin T Reference Range:  <= 0.03 ng/mL-   Negative for AMI  >0.03 ng/mL-     Abnormal for myocardial necrosis.  Clinicians would have to utilize clinical acumen, EKG, Troponin and serial changes to determine if it is an Acute Myocardial Infarction or myocardial injury due to an underlying chronic condition.       Results may be falsely decreased if patient taking Biotin.      Magnesium [[id number]]  (Normal) Collected: 07/31/22 1410    Specimen: Blood Updated: 07/31/22 1450     Magnesium 2.2 mg/dL     BNP [[id number]]  (Abnormal) Collected: 07/31/22 1410    Specimen: Blood Updated: 07/31/22 1448     proBNP 3,253.0 pg/mL      Narrative:      Among patients with dyspnea, NT-proBNP is highly sensitive for the detection of acute congestive heart failure. In addition NT-proBNP of <300 pg/ml effectively rules out acute congestive heart failure with 99% negative predictive value.    Results may be falsely decreased if patient taking Biotin.      Protime-INR [[id number]]  (Abnormal) Collected: 07/31/22 1410    Specimen: Blood Updated: 07/31/22 1439     Protime 16.1 Seconds      INR 1.31    aPTT [[id number]]  (Normal) Collected: 07/31/22 1410    Specimen: Blood Updated: 07/31/22 1439     PTT 27.8 seconds     CBC & Differential [[id number]]  (Abnormal) Collected: 07/31/22 1410    Specimen: Blood Updated: 07/31/22 1428    Narrative:      The following orders were created for panel order CBC & Differential.  Procedure                               Abnormality         Status                     ---------                               -----------         ------                     CBC Auto Differential[[id number]]        Abnormal            Final result                 Please view results for these tests on the individual orders.    CBC Auto Differential [[id number]]  (Abnormal) Collected: 07/31/22 1410    Specimen: Blood Updated: 07/31/22 1428     WBC 21.62 10*3/mm3      RBC 4.34 10*6/mm3      Hemoglobin 12.1 g/dL      Hematocrit 35.6 %      MCV 82.0 fL      MCH 27.9 pg      MCHC 34.0 g/dL      RDW 13.4 %      RDW-SD 39.8 fl      MPV 11.5 fL      Platelets 214 10*3/mm3      Neutrophil % 83.1 %      Lymphocyte % 9.8 %      Monocyte % 6.1 %      Eosinophil % 0.1 %      Basophil % 0.2 %      Immature Grans % 0.7 %      Neutrophils, Absolute 17.97 10*3/mm3      Lymphocytes, Absolute 2.11 10*3/mm3      Monocytes, Absolute 1.31 10*3/mm3      Eosinophils, Absolute 0.02 10*3/mm3      Basophils, Absolute 0.05 10*3/mm3      Immature Grans, Absolute 0.16 10*3/mm3      nRBC 0.0 /100 WBC            Imaging:  Imaging Results (Last 24 Hours)     Procedure Component  Value Units Date/Time    XR Chest 1 View [[id number]] Collected: 07/31/22 1452     Updated: 07/31/22 2129    Narrative:      PORTABLE CHEST     HISTORY: Chest pain.     COMPARISON: Chest x-ray 03/16/2022.     FINDINGS: A single portable view of the chest demonstrates the heart to  be within normal limits in size. A cardiac pacer is appreciated with  leads terminating in the right atrium and right ventricle. There is no  evidence of infiltrate, effusion or of congestive failure.     This report was finalized on 7/31/2022 9:25 PM by Dr. Rigo Soliman M.D.                Assessment:       ST elevation myocardial infarction (STEMI) (HCC)    ST elevation myocardial infarction (STEMI), unspecified artery (HCC)    Right distal ureteral calculus  Left nonobstructing renal calculus    Plan:     She has eaten so no plans to do anything today.  She would require cardiac clearance.  This is a small stone in distal with a high likelihood that should be able to pass so we will give her a chance to try to pass the stone and see how she does next 24 hours.    Clint Bangura MD  08/01/22  12:26 EDT

## 2022-08-01 NOTE — PROGRESS NOTES
"Patient Care Team:  Trent Davis MD as PCP - General (Internal Medicine)  Trent Davis MD as PCP - Internal Medicine (Internal Medicine)  Thang Bruce MD as Consulting Physician (Ophthalmology)  Curtis Marshall MD as Consulting Physician (Cardiology)  Danya Kenney, INGE as Ambulatory  ([facility])    Chief Complaint: Follow-up abnormal EKG, TAVR, leukocytosis    Interval History:   Complains of fatigue.  No chest pain.    Objective   Vital Signs  Temp:  [97.7 °F (36.5 °C)-98 °F (36.7 °C)] 97.8 °F (36.6 °C)  Heart Rate:  [59-75] 64  Resp:  [9-21] 18  BP: ()/(53-86) 130/68    Intake/Output Summary (Last 24 hours) at 8/1/2022 0949  Last data filed at 7/31/2022 1649  Gross per 24 hour   Intake 200 ml   Output --   Net 200 ml     Flowsheet Rows    Flowsheet Row First Filed Value   Admission Height 154.9 cm (61\") Documented at 07/31/2022 1423   Admission Weight 68 kg (150 lb) Documented at 07/31/2022 1423          Temp:  [97.7 °F (36.5 °C)-98 °F (36.7 °C)] 97.8 °F (36.6 °C)  Heart Rate:  [59-75] 64  Resp:  [9-21] 18  BP: ()/(53-86) 130/68    Intake/Output Summary (Last 24 hours) at 8/1/2022 0949  Last data filed at 7/31/2022 1649  Gross per 24 hour   Intake 200 ml   Output --   Net 200 ml     Flowsheet Rows    Flowsheet Row First Filed Value   Admission Height 154.9 cm (61\") Documented at 07/31/2022 1423   Admission Weight 68 kg (150 lb) Documented at 07/31/2022 1423          General Appearance:    Alert, cooperative, in no acute distress   Head:    Normocephalic, without obvious abnormality, atraumatic       Neck/Lymph   No adenopathy, supple, no thyromegaly, no carotid bruit, no    JVD   Lungs:     Clear to auscultation bilaterally, no wheezes, rales, or     rhonchi    Cardiac:    Normal rate, regular rhythm, faint diastolic heart murmur   Chest Wall:    No abnormalities observed   GI:     Normal bowel sounds, soft, nontender, nondistended,            no rebound " tenderness   Extremities:   No cyanosis, clubbing, or edema   Circulatory/Peripheral Vascular :   Pulses palpable and equal bilaterally   Integumentary:   No bleeding or rash. Normal temperature       Neurologic:   Cranial nerves 2 - 12 grossly intact, sensation intact              aspirin, 81 mg, Oral, Daily  atenolol, 25 mg, Oral, Daily  clopidogrel, 75 mg, Oral, Daily             Results Review:    Results from last 7 days   Lab Units 08/01/22  0325   SODIUM mmol/L 120*   POTASSIUM mmol/L 3.5   CHLORIDE mmol/L 87*   CO2 mmol/L 20.7*   BUN mg/dL 25*   CREATININE mg/dL 0.61   GLUCOSE mg/dL 146*   CALCIUM mg/dL 8.0*     Results from last 7 days   Lab Units 07/31/22  1829 07/31/22  1410   TROPONIN T ng/mL 1.150* 1.140*     Results from last 7 days   Lab Units 08/01/22  0325   WBC 10*3/mm3 17.25*   HEMOGLOBIN g/dL 11.8*   HEMATOCRIT % 34.8   PLATELETS 10*3/mm3 204     Results from last 7 days   Lab Units 07/31/22  1410   INR  1.31*   APTT seconds 27.8     Results from last 7 days   Lab Units 08/01/22  0325   CHOLESTEROL mg/dL 139     Results from last 7 days   Lab Units 07/31/22  1410   MAGNESIUM mg/dL 2.2     Results from last 7 days   Lab Units 08/01/22  0325   CHOLESTEROL mg/dL 139   TRIGLYCERIDES mg/dL 145   HDL CHOL mg/dL 56   LDL CHOL mg/dL 58     @LABRCNT(bnp)@  I reviewed the patient's new clinical results.  I personally viewed and interpreted the patient's EKG/Telemetry data            Assessment & Plan   1.  Abnormal EKG: inferior ST elevation in the absence of chest pain or flow-limiting CAD.  No additional work-up indicated from that standpoint.  2.  Recent kidney stone right and left side and hydroureteronephrosis on the right: Urology consultation pending  3.  Nausea and vomiting: Resolved.  Patient has not taken any good p.o. over the past week  4.  Hyponatremia: Likely combination of hydrochlorothiazide and volume depletion.  We will start out with normal saline 100 mL an hour for 10 hours and recheck  sodium levels at that time.  I will obtain urine and serum Osm and urine sodium prior to administration today.  Nephrology consulted  5.  Leukocytosis: In the setting of recent kidney stone I would recommend urine and blood cultures along with urology consultation  6.   History of transcatheter aortic valve replacement with mild to moderate paravalvular aortic valve regurgitation.  The valve does appear to be functioning well.  Hold off on transesophageal echocardiogram at this time.  May reconsider if bacteremic.

## 2022-08-01 NOTE — DISCHARGE PLACEMENT REQUEST
"Yuliana Gonzalez (89 y.o. Female)             Date of Birth   01/27/1930    Social Security Number       Address   [address] [place] [id number]    Home Phone   [phone]    MRN   [id number]       Taoism   Roman Catholic    Marital Status                               Admission Date   7/31/22    Admission Type   Emergency    Admitting Provider       Attending Provider   Clint Locke MD    Department, Room/Bed   [address] [facility], 2207/1       Discharge Date       Discharge Disposition       Discharge Destination                               Attending Provider: Clint Locke MD    Allergies: Hydrocodone, Lipitor [Atorvastatin], Erythromycin, Lisinopril    Isolation: None   Infection: None   Code Status: Not on file   Advance Care Planning Activity    Ht: 154.9 cm (61\")   Wt: 68 kg (150 lb)    Admission Cmt: None   Principal Problem: ST elevation myocardial infarction (STEMI) ([facility]) [I21.3] More...                 Active Insurance as of 7/31/2022     Primary Coverage     Payor Plan Insurance Group Employer/Plan Group    MEDICARE MEDICARE A & B      Payor Plan Address Payor Plan Phone Number Payor Plan Fax Number Effective Dates    [address] [phone]  1/1/1995 - None Entered    [facility] [id number]       Subscriber Name Subscriber Birth Date Member ID       YULIANA GONZALEZ 1/27/1930 [id number]           Secondary Coverage     Payor Plan Insurance Group Employer/Plan Group    [facility] SUPP [id number]     Payor Plan Address Payor Plan Phone Number Payor Plan Fax Number Effective Dates    [address]   2/1/2004 - None Entered    [facility] [id number]       Subscriber Name Subscriber Birth Date Member ID       YULIANA GONZALEZ 1/27/1930 [id number]                 Emergency Contacts      (Rel.) Home Phone Work Phone Mobile Phone    Slider,Faith (Daughter) [phone]    Reg Gonzalez " (Son) [phone] -- [phone]

## 2022-08-02 LAB
ALBUMIN SERPL-MCNC: 3.6 G/DL (ref 3.5–5.2)
ALBUMIN SERPL-MCNC: 3.7 G/DL (ref 3.5–5.2)
ANION GAP SERPL CALCULATED.3IONS-SCNC: 12 MMOL/L (ref 5–15)
ANION GAP SERPL CALCULATED.3IONS-SCNC: 15.2 MMOL/L (ref 5–15)
BUN SERPL-MCNC: 21 MG/DL (ref 8–23)
BUN SERPL-MCNC: 24 MG/DL (ref 8–23)
BUN/CREAT SERPL: 37.5 (ref 7–25)
BUN/CREAT SERPL: 41.4 (ref 7–25)
CALCIUM SPEC-SCNC: 8.1 MG/DL (ref 8.2–9.6)
CALCIUM SPEC-SCNC: 8.3 MG/DL (ref 8.2–9.6)
CHLORIDE SERPL-SCNC: 87 MMOL/L (ref 98–107)
CHLORIDE SERPL-SCNC: 90 MMOL/L (ref 98–107)
CO2 SERPL-SCNC: 19.8 MMOL/L (ref 22–29)
CO2 SERPL-SCNC: 20 MMOL/L (ref 22–29)
CREAT SERPL-MCNC: 0.56 MG/DL (ref 0.57–1)
CREAT SERPL-MCNC: 0.58 MG/DL (ref 0.57–1)
DEPRECATED RDW RBC AUTO: 40.2 FL (ref 37–54)
EGFRCR SERPLBLD CKD-EPI 2021: 85 ML/MIN/1.73
EGFRCR SERPLBLD CKD-EPI 2021: 85.7 ML/MIN/1.73
ERYTHROCYTE [DISTWIDTH] IN BLOOD BY AUTOMATED COUNT: 13.6 % (ref 12.3–15.4)
GLUCOSE SERPL-MCNC: 158 MG/DL (ref 65–99)
GLUCOSE SERPL-MCNC: 172 MG/DL (ref 65–99)
HCT VFR BLD AUTO: 34.2 % (ref 34–46.6)
HGB BLD-MCNC: 11.5 G/DL (ref 12–15.9)
MCH RBC QN AUTO: 27.8 PG (ref 26.6–33)
MCHC RBC AUTO-ENTMCNC: 33.6 G/DL (ref 31.5–35.7)
MCV RBC AUTO: 82.8 FL (ref 79–97)
PHOSPHATE SERPL-MCNC: 2 MG/DL (ref 2.5–4.5)
PHOSPHATE SERPL-MCNC: 2.6 MG/DL (ref 2.5–4.5)
PLATELET # BLD AUTO: 245 10*3/MM3 (ref 140–450)
PMV BLD AUTO: 11 FL (ref 6–12)
POTASSIUM SERPL-SCNC: 3.3 MMOL/L (ref 3.5–5.2)
POTASSIUM SERPL-SCNC: 4.4 MMOL/L (ref 3.5–5.2)
RBC # BLD AUTO: 4.13 10*6/MM3 (ref 3.77–5.28)
SODIUM SERPL-SCNC: 122 MMOL/L (ref 136–145)
SODIUM SERPL-SCNC: 124 MMOL/L (ref 136–145)
WBC NRBC COR # BLD: 15.61 10*3/MM3 (ref 3.4–10.8)

## 2022-08-02 PROCEDURE — 84295 ASSAY OF SERUM SODIUM: CPT | Performed by: INTERNAL MEDICINE

## 2022-08-02 PROCEDURE — 85027 COMPLETE CBC AUTOMATED: CPT | Performed by: INTERNAL MEDICINE

## 2022-08-02 PROCEDURE — 80053 COMPREHEN METABOLIC PANEL: CPT | Performed by: INTERNAL MEDICINE

## 2022-08-02 PROCEDURE — 84100 ASSAY OF PHOSPHORUS: CPT | Performed by: INTERNAL MEDICINE

## 2022-08-02 PROCEDURE — 99232 SBSQ HOSP IP/OBS MODERATE 35: CPT | Performed by: NURSE PRACTITIONER

## 2022-08-02 RX ORDER — SODIUM CHLORIDE 9 MG/ML
75 INJECTION, SOLUTION INTRAVENOUS CONTINUOUS
Status: ACTIVE | OUTPATIENT
Start: 2022-08-02 | End: 2022-08-02

## 2022-08-02 RX ADMIN — ASPIRIN 81 MG: 81 TABLET, COATED ORAL at 08:47

## 2022-08-02 RX ADMIN — CLOPIDOGREL 75 MG: 75 TABLET, FILM COATED ORAL at 08:47

## 2022-08-02 RX ADMIN — TAMSULOSIN HYDROCHLORIDE 0.4 MG: 0.4 CAPSULE ORAL at 08:48

## 2022-08-02 RX ADMIN — SODIUM CHLORIDE 75 ML/HR: 9 INJECTION, SOLUTION INTRAVENOUS at 22:57

## 2022-08-02 RX ADMIN — SODIUM PHOSPHATE, MONOBASIC, MONOHYDRATE AND SODIUM PHOSPHATE, DIBASIC, ANHYDROUS 10 MMOL: 276; 142 INJECTION, SOLUTION INTRAVENOUS at 18:39

## 2022-08-02 RX ADMIN — SODIUM CHLORIDE 75 ML/HR: 9 INJECTION, SOLUTION INTRAVENOUS at 11:46

## 2022-08-02 RX ADMIN — ATENOLOL 25 MG: 25 TABLET ORAL at 08:48

## 2022-08-02 RX ADMIN — POTASSIUM CHLORIDE 60 MEQ: 750 TABLET, EXTENDED RELEASE ORAL at 08:48

## 2022-08-02 NOTE — PROGRESS NOTES
[facility] Progress Note      Patient Name: Yuliana Gonzalez  : 1930  MRN: [id number]  Primary Care Physician:  Trent Davis MD  Date of admission: 2022    Subjective     Interval History:   Follow up hyponatremia.  Feels better. Still light headed standing.  Not quite back to baseline mental status but better.  Eating.      Bowels a little loose.  No more flank pain. No hematuria .    Review of Systems:   As noted above    Objective     Vitals:   Temp:  [97.4 °F (36.3 °C)-98.3 °F (36.8 °C)] 97.7 °F (36.5 °C)  Heart Rate:  [60-68] 64  Resp:  [16-18] 16  BP: (115-140)/(53-63) 116/56    Intake/Output Summary (Last 24 hours) at 2022 0943  Last data filed at 2022 2209  Gross per 24 hour   Intake 360 ml   Output 300 ml   Net 60 ml       Physical Exam:    General Appearance: alert, oriented x 3, no acute distress   Skin: warm and dry  HEENT: oral mucosa normal, nonicteric sclera  Neck: supple, no JVD  Lungs: Clear to auscultation   Heart: RRR, 2/6 syst m  Abdomen: soft, nontender, nondistended. + bs  : no palpable bladder  Extremities: no edema.  Neuro: normal speech and mental status     Scheduled Meds:     aspirin, 81 mg, Oral, Daily  atenolol, 25 mg, Oral, Daily  clopidogrel, 75 mg, Oral, Daily  potassium chloride, 60 mEq, Oral, Daily  tamsulosin, 0.4 mg, Oral, Daily      IV Meds:        Results Reviewed:   I have personally reviewed the results from the time of this admission to 2022 09:43 EDT     Results from last 7 days   Lab Units 22  0812 22  2354 22  1554 22  1058 22  0325 22  1410 22  0020   SODIUM mmol/L 122*  122* 124* 124* 122* 120* 120* 135*   POTASSIUM mmol/L 3.3*  --   --  3.5 3.5 3.6 4.0   CHLORIDE mmol/L 87*  --   --  87* 87* 85* 100   CO2 mmol/L 19.8*  --   --  17.4* 20.7* 22.4 22.8   BUN mg/dL 24*  --   --  29* 25* 26* 13   CREATININE mg/dL 0.58  --   --  0.72 0.61 0.72 0.49*   CALCIUM mg/dL 8.3  --    --  8.3 8.0* 7.9* 8.5   BILIRUBIN mg/dL  --   --   --   --   --  0.7 0.3   ALK PHOS U/L  --   --   --   --   --  160* 83   ALT (SGPT) U/L  --   --   --   --   --  62* 12   AST (SGOT) U/L  --   --   --   --   --  78* 17   GLUCOSE mg/dL 172*  --   --  190* 146* 189* 142*       Estimated Creatinine Clearance: 54.6 mL/min (by C-G formula based on SCr of 0.58 mg/dL).    Results from last 7 days   Lab Units 08/02/22  0812 07/31/22  1410   MAGNESIUM mg/dL  --  2.2   PHOSPHORUS mg/dL 2.6  --              Results from last 7 days   Lab Units 08/01/22  0325 07/31/22  1410 07/27/22  0013   WBC 10*3/mm3 17.25* 21.62* 11.68*   HEMOGLOBIN g/dL 11.8* 12.1 12.7   PLATELETS 10*3/mm3 204 214 197       Results from last 7 days   Lab Units 07/31/22  1410   INR  1.31*       Assessment / Plan     ASSESSMENT:  1. Hyponatremia, acute. Likely due to hypovolemia, HCTZ.  Urine sodium less than 60, high urine osm.  Sodium improved last night to 124, pending this am .  SP IVF 7/31-8/1.    2. Nephrolithiasis, right distal 4mm stone near UVJ,  Left stone in renal pelvis.  eval noted .  3. Abnormal EKG, inferior ST elevation.   4. 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   5. Leukocytosis with negative blood culture and insignif colony count urine culture so far.    PLAN:  1. Follow up pending chemistries .        Mila Rangel MD  08/02/22  09:43 EDT    [facility]  [phone]

## 2022-08-02 NOTE — PROGRESS NOTES
"  [facility] Progress Note    Chief Complaint: Dizziness    Lightheaded and dizzy when she stands up or sits upright.  She is having no flank pain.  No nausea or vomiting.  No fevers.  No chills.  Function stable.  Does not look like a white count was checked today.    Review of Systems:    The following systems were reviewed and negative;  respiratory and cardiovascular          Vital Signs  /77 (BP Location: Left arm, Patient Position: Sitting)   Pulse 74   Temp 97.9 °F (36.6 °C) (Oral)   Resp 22   Ht 154.9 cm (61\")   Wt 72.3 kg (159 lb 4.8 oz)   SpO2 98%   BMI 30.10 kg/m²     Physical Exam:     General Appearance:    Alert, cooperative, NAD   HEENT:    No trauma, pupils reactive, hearing intact   Back:     No CVA tenderness   Lungs:     Respirations unlabored, no wheezing    Heart:    RRR, intact peripheral pulses   Abdomen:     Soft, NDNT, no masses, no guarding   :       Extremities:   No edema, no deformity   Lymphatic:   No neck or groin LAD   Skin:   No bleeding, bruising or rashes   Neuro/Psych:   Orientation intact, mood/affect pleasant, no focal findings        Results Review:     I reviewed the patient's new clinical results.  Lab Results (last 24 hours)     Procedure Component Value Units Date/Time    Blood Culture - Blood, Hand, Right [[id number]]  (Normal) Collected: 08/01/22 1103    Specimen: Blood from Hand, Right Updated: 08/02/22 1147     Blood Culture No growth at 24 hours    Blood Culture - Blood, Hand, Left [[id number]]  (Normal) Collected: 08/01/22 1058    Specimen: Blood from Hand, Left Updated: 08/02/22 1147     Blood Culture No growth at 24 hours    Sodium [[id number]]  (Abnormal) Collected: 08/02/22 0812    Specimen: Blood Updated: 08/02/22 0907     Sodium 122 mmol/L     Renal Function Panel [[id number]]  (Abnormal) Collected: 08/02/22 0812    Specimen: Blood Updated: 08/02/22 0907     Glucose 172 mg/dL      BUN 24 mg/dL      Creatinine 0.58 mg/dL      Sodium 122 mmol/L      " Potassium 3.3 mmol/L      Chloride 87 mmol/L      CO2 19.8 mmol/L      Calcium 8.3 mg/dL      Albumin 3.60 g/dL      Phosphorus 2.6 mg/dL      Anion Gap 15.2 mmol/L      BUN/Creatinine Ratio 41.4     eGFR 85.0 mL/min/1.73      Comment: National Kidney Foundation and American Society of Nephrology (ASN) Task Force recommended calculation based on the Chronic Kidney Disease Epidemiology Collaboration (CKD-EPI) equation refit without adjustment for race.       Narrative:      GFR Normal >60  Chronic Kidney Disease <60  Kidney Failure <15      Sodium [[id number]]  (Abnormal) Collected: 08/01/22 2354    Specimen: Blood Updated: 08/02/22 0049     Sodium 124 mmol/L     Chloride, Urine, Random - Urine, Clean Catch [[id number]] Collected: 08/01/22 1806    Specimen: Urine, Clean Catch Updated: 08/02/22 0004     Chloride, Urine <60 mmol/L     Narrative:      Reference intervals for random urine have not been established.  Clinical usage is dependent upon physician's interpretation in combination with other laboratory tests.       Sodium, Urine, Random - Urine, Clean Catch [[id number]] Collected: 08/01/22 1806    Specimen: Urine, Clean Catch Updated: 08/01/22 2046     Sodium, Urine <60 mmol/L     Narrative:      Reference intervals for random urine have not been established.  Clinical usage is dependent upon physician's interpretation in combination with other laboratory tests.       Osmolality, Urine - Urine, Clean Catch [[id number]] Collected: 08/01/22 1806    Specimen: Urine, Clean Catch Updated: 08/01/22 1955     Osmolality, Urine 559 mOsm/kg     Narrative:      Osmo Normal Reference Ranges:    Random:  mOsm/kg H2O, depending on fluid intake.  Random: >850 mOsm/kg H20, after 12 hour fluid restriction.    24 Hour: 300-900 mOsm/kg H2O.    Sodium [[id number]]  (Abnormal) Collected: 08/01/22 1554    Specimen: Blood Updated: 08/01/22 1926     Sodium 124 mmol/L     Urinalysis With Culture If Indicated - Urine, Clean Catch  [[id number]]  (Abnormal) Collected: 08/01/22 1806    Specimen: Urine, Clean Catch Updated: 08/01/22 1847     Color, UA Dark Yellow     Appearance, UA Clear     pH, UA 6.0     Specific Gravity, UA >=1.030     Glucose, UA Negative     Ketones, UA Negative     Bilirubin, UA Negative     Blood, UA Negative     Protein, UA Trace     Leuk Esterase, UA Negative     Nitrite, UA Negative     Urobilinogen, UA 1.0 E.U./dL    Narrative:      In absence of clinical symptoms, the presence of pyuria, bacteria, and/or nitrites on the urinalysis result does not correlate with infection.  Urine microscopic not indicated.        Imaging Results (Last 24 Hours)     ** No results found for the last 24 hours. **          Medication Review:   I have personally reviewed    Current Facility-Administered Medications:   •  acetaminophen (TYLENOL) tablet 650 mg, 650 mg, Oral, Q4H PRN, Jeannie Echevarria MD, 650 mg at 08/01/22 0701  •  aspirin EC tablet 81 mg, 81 mg, Oral, Daily, Jeannie Echevarria MD, 81 mg at 08/02/22 0847  •  atropine sulfate injection 0.5 mg, 0.5 mg, Intravenous, Q5 Min PRN, Jeannie Echevarria MD  •  clopidogrel (PLAVIX) tablet 75 mg, 75 mg, Oral, Daily, Jeannie Echevarria MD, 75 mg at 08/02/22 0847  •  nitroglycerin (NITROSTAT) SL tablet 0.4 mg, 0.4 mg, Sublingual, Q5 Min PRN, Clint Locke MD  •  potassium chloride (K-DUR,KLOR-CON) ER tablet 60 mEq, 60 mEq, Oral, Daily, Ashlee Hewitt MD, 60 mEq at 08/02/22 0848  •  sodium chloride 0.9 % flush 10 mL, 10 mL, Intravenous, PRN, Jeannie Echevarria MD  •  sodium chloride 0.9 % infusion 250 mL, 250 mL, Intravenous, Once PRN, Jeannie Echevarria MD  •  sodium chloride 0.9 % infusion, 75 mL/hr, Intravenous, Continuous, Mila Rangel MD, Last Rate: 75 mL/hr at 08/02/22 1146, 75 mL/hr at 08/02/22 1146  •  tamsulosin (FLOMAX) 24 hr capsule 0.4 mg, 0.4 mg, Oral, Daily, Ashlee Hewitt MD, 0.4 mg at 08/02/22 0848    Allergies:    Hydrocodone, Lipitor [atorvastatin], Erythromycin, and  Lisinopril    Assessment:    Active Problems:  Patient Active Problem List   Diagnosis   • Aortic stenosis   • IGT (impaired glucose tolerance)   • Hyperlipidemia   • Hypertension   • Cervical arthritis   • Sciatica   • Vitamin D deficiency   • Exudative age-related macular degeneration, bilateral, with inactive choroidal neovascularization (HCC)   • Coronary artery disease involving native coronary artery of native heart   • AV block, complete (HCC)   • S/P TAVR (transcatheter aortic valve replacement)   • Presence of cardiac pacemaker   • ST elevation myocardial infarction (STEMI) ([facility])   • ST elevation myocardial infarction (STEMI), unspecified artery (HCC)       Right distal ureteral calculus with mild hydronephrosis and currently asymptomatic.  Normal renal function.  No fevers.  Contralateral left 1.6 cm renal stone nonobstructing    Plan:    Continue with trial for passage given her acute cardiac issues.  Hold on surgical intervention.  The likelihood of passage of this ureteral stone on the right that if she develops intractable pain or nausea vomiting then will have to intervene.  We will continue to follow.      Clint Bangura MD    8/2/2022  13:09 EDT

## 2022-08-02 NOTE — NURSING NOTE
Orthostatic bps measured (see vitals flowsheet), up to use restroom, significantly short of breath and dizzy, hear rate dropped into 50-60's in a paced rhythm with exertion and drop in bp while ambulating.

## 2022-08-02 NOTE — PLAN OF CARE
Goal Outcome Evaluation:      Monitoring electrolytes and ability to void, nephrolithiasis symptoms. Asymptomatic today but unable to void consistent and adequate amounts, no retention on bladder scans.

## 2022-08-02 NOTE — PLAN OF CARE
Goal Outcome Evaluation:  Plan of Care Reviewed With: patient           Outcome Evaluation: pt resting at this time , vital signs stable, resp even unlaboured pt on room air, SR on the monitor, cont to monitor

## 2022-08-02 NOTE — PROGRESS NOTES
Hospital Follow Up    LOS:  LOS: 2 days   Patient Name: Yuliana Gonzalez  Age/Sex: 89 y.o. female  : 1930  MRN: [id number]    Day of Service: 22   Length of Stay: 2  Encounter Provider: CLEMENTINE Mary  Place of Service: [facility] CARDIOLOGY  Patient Care Team:  Trent Davis MD as PCP - General (Internal Medicine)  Trent Davis MD as PCP - Internal Medicine (Internal Medicine)  Thang Bruce MD as Consulting Physician (Ophthalmology)  Curtis Marshall MD as Consulting Physician (Cardiology)  Danya Kenney RN as Ambulatory  ([facility])    Subjective:     Chief Complaint: abnormal EKG, Patent coronaries    Interval History: NO chest pain today    Objective:     Objective:  Temp:  [97.7 °F (36.5 °C)-98.3 °F (36.8 °C)] 97.9 °F (36.6 °C)  Heart Rate:  [62-74] 74  Resp:  [16] 16  BP: (109-140)/(56-75) 109/75     Intake/Output Summary (Last 24 hours) at 2022 1132  Last data filed at 2022 2209  Gross per 24 hour   Intake 360 ml   Output 300 ml   Net 60 ml     Body mass index is 28.34 kg/m².      22  1423 22  0801   Weight: 68 kg (150 lb) 68 kg (150 lb)     Weight change: 0 kg (0 lb)    Physical Exam:   General Appearance:    Awake alert and oriented in no acute distress.   Color:  Skin:  Neuro:  HEENT:    Lungs:     Pink  Warm and dry  No focal, motor or sensory deficits  Neck supple, pupils equal, round and reactive. No JVD, No Bruit  Clear to auscultation,respirations regular, even and                  unlabored    Heart:    Regular rate and rhythm, S1 and S2, no murmur, no gallop, no rub. No edema, DP/PT pulses are 2+   Chest Wall:    No abnormalities observed   Abdomen:     Normal bowel sounds, no masses, no organomegaly, soft        non-tender, non-distended, no guarding, no ascites noted   Extremities:   Moves all extremities well, no edema, no cyanosis, no redness. Right radial site stable.       Lab  "Review:   Results from last 7 days   Lab Units 08/02/22  0812 08/01/22  2354 08/01/22  1554 08/01/22  1058 08/01/22  0325 07/31/22  1410 07/27/22  0020   SODIUM mmol/L 122*  122* 124*   < > 122*   < > 120* 135*   POTASSIUM mmol/L 3.3*  --   --  3.5   < > 3.6 4.0   CHLORIDE mmol/L 87*  --   --  87*   < > 85* 100   CO2 mmol/L 19.8*  --   --  17.4*   < > 22.4 22.8   BUN mg/dL 24*  --   --  29*   < > 26* 13   CREATININE mg/dL 0.58  --   --  0.72   < > 0.72 0.49*   GLUCOSE mg/dL 172*  --   --  190*   < > 189* 142*   CALCIUM mg/dL 8.3  --   --  8.3   < > 7.9* 8.5   AST (SGOT) U/L  --   --   --   --   --  78* 17   ALT (SGPT) U/L  --   --   --   --   --  62* 12    < > = values in this interval not displayed.     Results from last 7 days   Lab Units 07/31/22  1829 07/31/22  1410   TROPONIN T ng/mL 1.150* 1.140*     Results from last 7 days   Lab Units 08/01/22  0325 07/31/22  1410   WBC 10*3/mm3 17.25* 21.62*   HEMOGLOBIN g/dL 11.8* 12.1   HEMATOCRIT % 34.8 35.6   PLATELETS 10*3/mm3 204 214     Results from last 7 days   Lab Units 07/31/22  1410   INR  1.31*   APTT seconds 27.8     Results from last 7 days   Lab Units 07/31/22  1410   MAGNESIUM mg/dL 2.2     Results from last 7 days   Lab Units 08/01/22  0325   CHOLESTEROL mg/dL 139   TRIGLYCERIDES mg/dL 145   HDL CHOL mg/dL 56     Results from last 7 days   Lab Units 07/31/22  1410   PROBNP pg/mL 3,253.0*         I reviewed the patient's new clinical results.  I personally viewed and interpreted the patient's EKG  Current Medications:   Scheduled Meds:aspirin, 81 mg, Oral, Daily  clopidogrel, 75 mg, Oral, Daily  potassium chloride, 60 mEq, Oral, Daily  tamsulosin, 0.4 mg, Oral, Daily      Continuous Infusions:sodium chloride, 75 mL/hr        Allergies:  Allergies   Allergen Reactions   • Hydrocodone Shortness Of Breath     Also reports \"lethargy\"   • Lipitor [Atorvastatin] Nausea Only     \"FELT AWFUL\"   • Erythromycin Diarrhea   • Lisinopril Cough       Assessment:       " ST elevation myocardial infarction (STEMI) (HCC)    ST elevation myocardial infarction (STEMI), unspecified artery (HCC)      1. Abnormal EKG with inferior st elevation- type 2 MI  2. No chest pain or flow limiting CAD  3. Kidney stone with hydronephrosis- urology following  4. Nausea and vomiting  5. Hyponatremia- volume depletion and HCTZ  6. Leukocytosis  7. H/O TAVR- mild to moderate paravalvular aortic regurgitation    Plan:       Potassium low. Will replace. Sodium lower today- renal following. Cath site stable. On DAPT with aspirin and plavix. No BB due to low BP.    CLEMENTINE Mary  08/02/22  11:32 EDT  Electronically signed by CLEMENTINE Mary, 08/02/22, 11:32 AM EDT.

## 2022-08-03 ENCOUNTER — APPOINTMENT (OUTPATIENT)
Dept: ULTRASOUND IMAGING | Facility: HOSPITAL | Age: 87
End: 2022-08-03

## 2022-08-03 ENCOUNTER — APPOINTMENT (OUTPATIENT)
Dept: GENERAL RADIOLOGY | Facility: HOSPITAL | Age: 87
End: 2022-08-03

## 2022-08-03 PROBLEM — E74.39 GLUCOSE INTOLERANCE: Status: ACTIVE | Noted: 2022-08-03

## 2022-08-03 PROBLEM — D72.829 LEUKOCYTOSIS: Status: ACTIVE | Noted: 2022-08-03

## 2022-08-03 PROBLEM — N20.1 RIGHT DISTAL URETERAL CALCULUS: Status: ACTIVE | Noted: 2022-08-03

## 2022-08-03 PROBLEM — E87.1 HYPONATREMIA: Status: ACTIVE | Noted: 2022-08-03

## 2022-08-03 PROBLEM — R79.89 ELEVATED LFTS: Status: ACTIVE | Noted: 2022-08-03

## 2022-08-03 PROBLEM — N13.30 HYDRONEPHROSIS OF RIGHT KIDNEY: Status: ACTIVE | Noted: 2022-08-03

## 2022-08-03 LAB
ALBUMIN SERPL-MCNC: 3.1 G/DL (ref 3.5–5.2)
ALBUMIN SERPL-MCNC: 3.1 G/DL (ref 3.5–5.2)
ALBUMIN/GLOB SERPL: 1.2 G/DL
ALP SERPL-CCNC: 247 U/L (ref 39–117)
ALT SERPL W P-5'-P-CCNC: 237 U/L (ref 1–33)
ANION GAP SERPL CALCULATED.3IONS-SCNC: 10 MMOL/L (ref 5–15)
ANION GAP SERPL CALCULATED.3IONS-SCNC: 14 MMOL/L (ref 5–15)
ANION GAP SERPL CALCULATED.3IONS-SCNC: 14.2 MMOL/L (ref 5–15)
AST SERPL-CCNC: 122 U/L (ref 1–32)
BACTERIA UR QL AUTO: ABNORMAL /HPF
BILIRUB SERPL-MCNC: 0.5 MG/DL (ref 0–1.2)
BILIRUB UR QL STRIP: NEGATIVE
BUN SERPL-MCNC: 14 MG/DL (ref 8–23)
BUN SERPL-MCNC: 16 MG/DL (ref 8–23)
BUN SERPL-MCNC: 20 MG/DL (ref 8–23)
BUN/CREAT SERPL: 25 (ref 7–25)
BUN/CREAT SERPL: 30.8 (ref 7–25)
BUN/CREAT SERPL: 39.2 (ref 7–25)
CALCIUM SPEC-SCNC: 7.6 MG/DL (ref 8.2–9.6)
CALCIUM SPEC-SCNC: 7.8 MG/DL (ref 8.2–9.6)
CALCIUM SPEC-SCNC: 8.2 MG/DL (ref 8.2–9.6)
CERULOPLASMIN SERPL-MCNC: 31 MG/DL (ref 19–39)
CHLORIDE SERPL-SCNC: 91 MMOL/L (ref 98–107)
CHLORIDE SERPL-SCNC: 92 MMOL/L (ref 98–107)
CHLORIDE SERPL-SCNC: 95 MMOL/L (ref 98–107)
CHLORIDE UR-SCNC: <20 MMOL/L
CK SERPL-CCNC: 78 U/L (ref 20–180)
CLARITY UR: CLEAR
CO2 SERPL-SCNC: 16 MMOL/L (ref 22–29)
CO2 SERPL-SCNC: 18.8 MMOL/L (ref 22–29)
CO2 SERPL-SCNC: 20 MMOL/L (ref 22–29)
COLOR UR: YELLOW
CREAT SERPL-MCNC: 0.51 MG/DL (ref 0.57–1)
CREAT SERPL-MCNC: 0.52 MG/DL (ref 0.57–1)
CREAT SERPL-MCNC: 0.56 MG/DL (ref 0.57–1)
EGFRCR SERPLBLD CKD-EPI 2021: 85.7 ML/MIN/1.73
EGFRCR SERPLBLD CKD-EPI 2021: 87.3 ML/MIN/1.73
EGFRCR SERPLBLD CKD-EPI 2021: 87.7 ML/MIN/1.73
FERRITIN SERPL-MCNC: 328 NG/ML (ref 13–150)
GLOBULIN UR ELPH-MCNC: 2.5 GM/DL
GLUCOSE SERPL-MCNC: 136 MG/DL (ref 65–99)
GLUCOSE SERPL-MCNC: 156 MG/DL (ref 65–99)
GLUCOSE SERPL-MCNC: 158 MG/DL (ref 65–99)
GLUCOSE UR STRIP-MCNC: NEGATIVE MG/DL
HGB UR QL STRIP.AUTO: ABNORMAL
HYALINE CASTS UR QL AUTO: ABNORMAL /LPF
IRON 24H UR-MRATE: 35 MCG/DL (ref 37–145)
IRON SATN MFR SERPL: 9 % (ref 20–50)
KETONES UR QL STRIP: NEGATIVE
LEUKOCYTE ESTERASE UR QL STRIP.AUTO: ABNORMAL
LIPASE SERPL-CCNC: 33 U/L (ref 13–60)
NITRITE UR QL STRIP: NEGATIVE
OSMOLALITY UR: 592 MOSM/KG
PH UR STRIP.AUTO: 5.5 [PH] (ref 5–8)
PHOSPHATE SERPL-MCNC: 1.7 MG/DL (ref 2.5–4.5)
PHOSPHATE SERPL-MCNC: 2.2 MG/DL (ref 2.5–4.5)
POTASSIUM SERPL-SCNC: 4.1 MMOL/L (ref 3.5–5.2)
POTASSIUM SERPL-SCNC: 4.2 MMOL/L (ref 3.5–5.2)
POTASSIUM SERPL-SCNC: 4.5 MMOL/L (ref 3.5–5.2)
PROCALCITONIN SERPL-MCNC: 0.09 NG/ML (ref 0–0.25)
PROT SERPL-MCNC: 5.6 G/DL (ref 6–8.5)
PROT UR QL STRIP: NEGATIVE
RBC # UR STRIP: ABNORMAL /HPF
REF LAB TEST METHOD: ABNORMAL
SODIUM SERPL-SCNC: 121 MMOL/L (ref 136–145)
SODIUM SERPL-SCNC: 121 MMOL/L (ref 136–145)
SODIUM SERPL-SCNC: 122 MMOL/L (ref 136–145)
SODIUM SERPL-SCNC: 122 MMOL/L (ref 136–145)
SODIUM SERPL-SCNC: 128 MMOL/L (ref 136–145)
SODIUM UR-SCNC: <20 MMOL/L
SODIUM UR-SCNC: <20 MMOL/L
SP GR UR STRIP: <=1.005 (ref 1–1.03)
SQUAMOUS #/AREA URNS HPF: ABNORMAL /HPF
TIBC SERPL-MCNC: 381 MCG/DL (ref 298–536)
TRANSFERRIN SERPL-MCNC: 256 MG/DL (ref 200–360)
TSH SERPL DL<=0.05 MIU/L-ACNC: 3.04 UIU/ML (ref 0.27–4.2)
UROBILINOGEN UR QL STRIP: ABNORMAL
WBC # UR STRIP: ABNORMAL /HPF

## 2022-08-03 PROCEDURE — 87086 URINE CULTURE/COLONY COUNT: CPT | Performed by: INTERNAL MEDICINE

## 2022-08-03 PROCEDURE — 84295 ASSAY OF SERUM SODIUM: CPT | Performed by: INTERNAL MEDICINE

## 2022-08-03 PROCEDURE — 84300 ASSAY OF URINE SODIUM: CPT | Performed by: INTERNAL MEDICINE

## 2022-08-03 PROCEDURE — 80069 RENAL FUNCTION PANEL: CPT | Performed by: INTERNAL MEDICINE

## 2022-08-03 PROCEDURE — 82390 ASSAY OF CERULOPLASMIN: CPT | Performed by: INTERNAL MEDICINE

## 2022-08-03 PROCEDURE — 83540 ASSAY OF IRON: CPT | Performed by: INTERNAL MEDICINE

## 2022-08-03 PROCEDURE — 80048 BASIC METABOLIC PNL TOTAL CA: CPT | Performed by: INTERNAL MEDICINE

## 2022-08-03 PROCEDURE — 76705 ECHO EXAM OF ABDOMEN: CPT

## 2022-08-03 PROCEDURE — 99232 SBSQ HOSP IP/OBS MODERATE 35: CPT | Performed by: INTERNAL MEDICINE

## 2022-08-03 PROCEDURE — 82550 ASSAY OF CK (CPK): CPT | Performed by: INTERNAL MEDICINE

## 2022-08-03 PROCEDURE — 25010000002 FUROSEMIDE PER 20 MG: Performed by: INTERNAL MEDICINE

## 2022-08-03 PROCEDURE — 84466 ASSAY OF TRANSFERRIN: CPT | Performed by: INTERNAL MEDICINE

## 2022-08-03 PROCEDURE — 87077 CULTURE AEROBIC IDENTIFY: CPT | Performed by: INTERNAL MEDICINE

## 2022-08-03 PROCEDURE — 84443 ASSAY THYROID STIM HORMONE: CPT | Performed by: INTERNAL MEDICINE

## 2022-08-03 PROCEDURE — 83935 ASSAY OF URINE OSMOLALITY: CPT | Performed by: INTERNAL MEDICINE

## 2022-08-03 PROCEDURE — 82728 ASSAY OF FERRITIN: CPT | Performed by: INTERNAL MEDICINE

## 2022-08-03 PROCEDURE — 71046 X-RAY EXAM CHEST 2 VIEWS: CPT

## 2022-08-03 PROCEDURE — 83690 ASSAY OF LIPASE: CPT | Performed by: INTERNAL MEDICINE

## 2022-08-03 PROCEDURE — 84145 PROCALCITONIN (PCT): CPT | Performed by: INTERNAL MEDICINE

## 2022-08-03 PROCEDURE — 25010000002 ENOXAPARIN PER 10 MG: Performed by: INTERNAL MEDICINE

## 2022-08-03 PROCEDURE — 87186 SC STD MICRODIL/AGAR DIL: CPT | Performed by: INTERNAL MEDICINE

## 2022-08-03 PROCEDURE — 81001 URINALYSIS AUTO W/SCOPE: CPT | Performed by: INTERNAL MEDICINE

## 2022-08-03 RX ORDER — POTASSIUM CHLORIDE 750 MG/1
40 TABLET, FILM COATED, EXTENDED RELEASE ORAL AS NEEDED
Status: DISCONTINUED | OUTPATIENT
Start: 2022-08-03 | End: 2022-08-06 | Stop reason: HOSPADM

## 2022-08-03 RX ORDER — PANTOPRAZOLE SODIUM 40 MG/10ML
40 INJECTION, POWDER, LYOPHILIZED, FOR SOLUTION INTRAVENOUS
Status: DISCONTINUED | OUTPATIENT
Start: 2022-08-04 | End: 2022-08-05

## 2022-08-03 RX ORDER — FUROSEMIDE 10 MG/ML
40 INJECTION INTRAMUSCULAR; INTRAVENOUS ONCE
Status: COMPLETED | OUTPATIENT
Start: 2022-08-03 | End: 2022-08-03

## 2022-08-03 RX ORDER — MAGNESIUM SULFATE HEPTAHYDRATE 40 MG/ML
2 INJECTION, SOLUTION INTRAVENOUS AS NEEDED
Status: DISCONTINUED | OUTPATIENT
Start: 2022-08-03 | End: 2022-08-04

## 2022-08-03 RX ORDER — MAGNESIUM SULFATE HEPTAHYDRATE 40 MG/ML
4 INJECTION, SOLUTION INTRAVENOUS AS NEEDED
Status: DISCONTINUED | OUTPATIENT
Start: 2022-08-03 | End: 2022-08-04

## 2022-08-03 RX ORDER — SODIUM CHLORIDE 9 MG/ML
75 INJECTION, SOLUTION INTRAVENOUS CONTINUOUS
Status: DISCONTINUED | OUTPATIENT
Start: 2022-08-03 | End: 2022-08-04

## 2022-08-03 RX ORDER — POTASSIUM CHLORIDE 1.5 G/1.77G
40 POWDER, FOR SOLUTION ORAL AS NEEDED
Status: DISCONTINUED | OUTPATIENT
Start: 2022-08-03 | End: 2022-08-06 | Stop reason: HOSPADM

## 2022-08-03 RX ORDER — ENOXAPARIN SODIUM 100 MG/ML
40 INJECTION SUBCUTANEOUS EVERY 24 HOURS
Status: DISCONTINUED | OUTPATIENT
Start: 2022-08-03 | End: 2022-08-06 | Stop reason: HOSPADM

## 2022-08-03 RX ADMIN — CLOPIDOGREL 75 MG: 75 TABLET, FILM COATED ORAL at 09:02

## 2022-08-03 RX ADMIN — POTASSIUM PHOSPHATE, MONOBASIC AND POTASSIUM PHOSPHATE, DIBASIC 9 MMOL: 224; 236 INJECTION, SOLUTION, CONCENTRATE INTRAVENOUS at 18:22

## 2022-08-03 RX ADMIN — ASPIRIN 81 MG: 81 TABLET, COATED ORAL at 09:02

## 2022-08-03 RX ADMIN — FUROSEMIDE 40 MG: 10 INJECTION, SOLUTION INTRAMUSCULAR; INTRAVENOUS at 16:59

## 2022-08-03 RX ADMIN — SODIUM CHLORIDE 75 ML/HR: 9 INJECTION, SOLUTION INTRAVENOUS at 21:28

## 2022-08-03 RX ADMIN — ENOXAPARIN SODIUM 40 MG: 100 INJECTION SUBCUTANEOUS at 21:29

## 2022-08-03 RX ADMIN — POTASSIUM CHLORIDE 60 MEQ: 750 TABLET, EXTENDED RELEASE ORAL at 09:02

## 2022-08-03 RX ADMIN — TAMSULOSIN HYDROCHLORIDE 0.4 MG: 0.4 CAPSULE ORAL at 09:02

## 2022-08-03 RX ADMIN — METOPROLOL TARTRATE 12.5 MG: 25 TABLET, FILM COATED ORAL at 21:29

## 2022-08-03 NOTE — PROGRESS NOTES
"Patient Care Team:  Trent Davis MD as PCP - General (Internal Medicine)  Trent Davis MD as PCP - Internal Medicine (Internal Medicine)  Thang Bruce MD as Consulting Physician (Ophthalmology)  Curtis Marshall MD as Consulting Physician (Cardiology)  Danya Kenney, INGE as Ambulatory  ([facility])    Chief Complaint: Follow-up elevated troponin    Interval History:   Labs pending this morning.  No chest pain.  Normal blood pressure.  Continues on room air.    Objective   Vital Signs  Temp:  [97.7 °F (36.5 °C)-98.3 °F (36.8 °C)] 97.7 °F (36.5 °C)  Heart Rate:  [65-74] 65  Resp:  [16-22] 18  BP: (109-169)/(51-77) 131/57    Intake/Output Summary (Last 24 hours) at 8/3/2022 0920  Last data filed at 8/3/2022 0827  Gross per 24 hour   Intake 600 ml   Output 451 ml   Net 149 ml     Flowsheet Rows    Flowsheet Row First Filed Value   Admission Height 154.9 cm (61\") Documented at 07/31/2022 1423   Admission Weight 68 kg (150 lb) Documented at 07/31/2022 1423          Temp:  [97.7 °F (36.5 °C)-98.3 °F (36.8 °C)] 97.7 °F (36.5 °C)  Heart Rate:  [65-74] 65  Resp:  [16-22] 18  BP: (109-169)/(51-77) 131/57    Intake/Output Summary (Last 24 hours) at 8/3/2022 0920  Last data filed at 8/3/2022 0827  Gross per 24 hour   Intake 600 ml   Output 451 ml   Net 149 ml     Flowsheet Rows    Flowsheet Row First Filed Value   Admission Height 154.9 cm (61\") Documented at 07/31/2022 1423   Admission Weight 68 kg (150 lb) Documented at 07/31/2022 1423          General Appearance:    Alert, cooperative, in no acute distress   Head:    Normocephalic, without obvious abnormality, atraumatic       Neck/Lymph   No adenopathy, supple, no thyromegaly, no carotid bruit, no    JVD   Lungs:     Clear to auscultation bilaterally, no wheezes, rales, or     rhonchi    Cardiac:    Normal rate, regular rhythm, no murmur, no rub, no gallop   Chest Wall:    No abnormalities observed   GI:     Normal bowel sounds, soft, " Patient Seen in: [facility] Emergency Department    History   Patient presents with:  Fall (musculoskeletal, neurologic)    Stated Complaint: fall hit head w/ vomiting    HPI    Joana Zimmer is a 3year-old who presents for evaluation of a head injury.   He auscultation bilaterally. No wheezes, rhonchi or rales. HEART: Regular rate and rhythm, S1-S2, no rubs or murmurs. ABDOMEN: Soft, nontender, nondistended with good bowel sounds. No hepatosplenomegaly and no masses.     EXTREMITIES: Peripheral pulses are nontender, nondistended,            no rebound tenderness   Extremities:   No cyanosis, clubbing, or edema   Circulatory/Peripheral Vascular :   Pulses palpable and equal bilaterally   Integumentary:   No bleeding or rash. Normal temperature                aspirin, 81 mg, Oral, Daily  clopidogrel, 75 mg, Oral, Daily  potassium chloride, 60 mEq, Oral, Daily  tamsulosin, 0.4 mg, Oral, Daily             Results Review:    Results from last 7 days   Lab Units 08/02/22  2340   SODIUM mmol/L 121*  121*   POTASSIUM mmol/L 4.1   CHLORIDE mmol/L 91*   CO2 mmol/L 20.0*   BUN mg/dL 20   CREATININE mg/dL 0.51*   GLUCOSE mg/dL 156*   CALCIUM mg/dL 7.8*     Results from last 7 days   Lab Units 07/31/22  1829 07/31/22  1410   TROPONIN T ng/mL 1.150* 1.140*     Results from last 7 days   Lab Units 08/02/22  2340   WBC 10*3/mm3 15.61*   HEMOGLOBIN g/dL 11.5*   HEMATOCRIT % 34.2   PLATELETS 10*3/mm3 245     Results from last 7 days   Lab Units 07/31/22  1410   INR  1.31*   APTT seconds 27.8     Results from last 7 days   Lab Units 08/01/22  0325   CHOLESTEROL mg/dL 139     Results from last 7 days   Lab Units 07/31/22  1410   MAGNESIUM mg/dL 2.2     Results from last 7 days   Lab Units 08/01/22  0325   CHOLESTEROL mg/dL 139   TRIGLYCERIDES mg/dL 145   HDL CHOL mg/dL 56   LDL CHOL mg/dL 58     @LABRCNT(bnp)@  I reviewed the patient's new clinical results.  I personally viewed and interpreted the patient's EKG/Telemetry data         Assessment & Plan   1. Abnormal EKG with inferior st elevation: Flow-limiting CAD.  2. Right distal ureteral calculus with hydronephrosis  3. Nausea and vomiting: Resolved  4. Hyponatremia-urine sodium less than 60 with high urine osm.  Received IV fluids.  Still low yesterday at 121.  Repeat today pending.  5. Leukocytosis  6. H/O TAVR- mild paravalvular aortic regurgitation    -Nephrology still following.  Labs pending this morning.  Awaiting sodium evaluation.  -Ultrasound right upper quadrant in the  lying.  The basal cisterns are patent. SINUSES:           No sign of acute sinusitis. MASTOIDS:          There is  near-complete opacification of the left mastoid air cells. There is fluid within the middle ear suggesting possible otomastoiditis.   The ri setting of elevated transaminases and alkaline phosphatase today  -Recommend hospitalist involvement and transferred to service if agreeable.  -Right ventricle looks a little off on transthoracic echocardiogram.  Not hypoxic or tachycardic.  Echo images reviewed and compared to prior echo and it does look unchanged.  D-dimer ordered.  Consider CTA chest if significantly elevated           also has a mild thickening of the anterior interhemispheric fissure.     Despite the skull fracture as well as a intraparenchymal bleeding he has a reassuring neurologic exam.  I spoke with Dr. Nena Hyatt who agreed that he should be admitted to the pediatric

## 2022-08-03 NOTE — PLAN OF CARE
Goal Outcome Evaluation:  Plan of Care Reviewed With: patient        Progress: improving  Outcome Evaluation: Pt transferred to floor from CVI this afternoon, no major complaints, VS stable, pt getting IV lasix and a phosphorus replacement, waiting on solution, pt's daughter at bedside, pt pleasant, AOx4, will continue to monitor

## 2022-08-03 NOTE — CONSULTS
Name: Yuliana Gonzalez ADMIT: 2022   : 1930  PCP: Trent Davis MD    MRN: [id number] LOS: 3 days   AGE/SEX: 89 y.o. female  ROOM: E4/     Subjective   Subjective   Ms. Gonzalez is a 89 yr old female with history of AS, colitis, CAD, HTN, HLD, macular degeneration, S/P TAVR and PCI earlier this year complicated by heart block requiring PPM who presented to [facility] on 22 c/o shortness of breath. She was found to have kidney stone about a week earlier causing flank pain, fatigue. She denied chest pain or pressure on presentation. She was found to have inferior STEMI, admitted by Cardiology. She underwent cardiac cath found to have widely patent mid LAD stent, otherwise nonobstructive CAD. She was started on DAPT. Echo was performed to assess TAVR valve, per Cardiology unchanged. Nephrology has been following for hyponatremia. Urine studies not consistent with SIADH. She was started on IVF's but looking somewhat fluid overloaded with plan for diuresis today. Urology has been following for rt distal ureteral calculus w/hydronephrosis and nonobstructing large lt renal stone. She is urinating without difficulty and asymptomatic. Plan at this time is to allow pt to try to pass stone. LFT's have increased since admission without complaints of abd pain. She has had mild nausea without vomiting and decreased appetite. [facility] has been consulted to assist with medical management.    Review of Systems   Constitutional: Positive for appetite change. Negative for chills and fever.   HENT: Negative for congestion.    Respiratory: Positive for shortness of breath.         TENA   Cardiovascular: Negative for chest pain.   Gastrointestinal: Positive for nausea. Negative for abdominal pain and vomiting.   Genitourinary: Negative for difficulty urinating and hematuria.   Musculoskeletal: Negative for arthralgias and myalgias.   Skin: Negative for rash.   Neurological: Negative for headaches.   Psychiatric/Behavioral:  Negative for sleep disturbance.        Objective   Objective   Vital Signs  Temp:  [97.7 °F (36.5 °C)-99 °F (37.2 °C)] 99 °F (37.2 °C)  Heart Rate:  [65-72] 72  Resp:  [18-20] 18  BP: (131-170)/(51-71) 170/71  SpO2:  [94 %-95 %] 95 %  on   ;   Device (Oxygen Therapy): room air  Body mass index is 30.82 kg/m².  Physical Exam  Vitals and nursing note reviewed.   Constitutional:       General: She is not in acute distress.  HENT:      Head: Normocephalic.      Mouth/Throat:      Mouth: Mucous membranes are moist.   Eyes:      Conjunctiva/sclera: Conjunctivae normal.   Cardiovascular:      Rate and Rhythm: Normal rate and regular rhythm.   Pulmonary:      Effort: Pulmonary effort is normal. No respiratory distress.      Breath sounds: Normal breath sounds.   Abdominal:      General: Bowel sounds are normal. There is no distension.      Palpations: Abdomen is soft.      Tenderness: There is abdominal tenderness. There is no guarding or rebound.      Comments: Mild tenderness diffusely   Musculoskeletal:      Cervical back: Neck supple.      Right lower leg: Edema present.      Left lower leg: Edema present.      Comments: Trace BLE   Skin:     General: Skin is warm and dry.      Findings: Lesion present.      Comments: Nasal lesion   Neurological:      Mental Status: She is alert and oriented to person, place, and time.   Psychiatric:         Mood and Affect: Mood normal.         Behavior: Behavior normal.         Results Review     I reviewed the patient's new clinical results.  Results from last 7 days   Lab Units 08/02/22  2340 08/01/22  0325 07/31/22  1410   WBC 10*3/mm3 15.61* 17.25* 21.62*   HEMOGLOBIN g/dL 11.5* 11.8* 12.1   PLATELETS 10*3/mm3 245 204 214     Results from last 7 days   Lab Units 08/03/22  0839 08/02/22  2340 08/02/22  1522 08/02/22  0812   SODIUM mmol/L 122*  122* 121*  121* 122* 122*  122*   POTASSIUM mmol/L 4.2 4.1 4.4 3.3*   CHLORIDE mmol/L 92* 91* 90* 87*   CO2 mmol/L 16.0* 20.0* 20.0*  19.8*   BUN mg/dL 16 20 21 24*   CREATININE mg/dL 0.52* 0.51* 0.56* 0.58   GLUCOSE mg/dL 158* 156* 158* 172*   EGFR mL/min/1.73 87.3 87.7 85.7 85.0     Results from last 7 days   Lab Units 08/03/22  0839 08/02/22  2340 08/02/22  1522 08/02/22  0812 07/31/22  1410   ALBUMIN g/dL 3.10* 3.10* 3.70 3.60 3.40*   BILIRUBIN mg/dL  --  0.5  --   --  0.7   ALK PHOS U/L  --  247*  --   --  160*   AST (SGOT) U/L  --  122*  --   --  78*   ALT (SGPT) U/L  --  237*  --   --  62*     Results from last 7 days   Lab Units 08/03/22  0839 08/02/22  2340 08/02/22  1522 08/02/22  0812 08/01/22  0325 07/31/22  1410   CALCIUM mg/dL 7.6* 7.8* 8.1* 8.3   < > 7.9*   ALBUMIN g/dL 3.10* 3.10* 3.70 3.60  --  3.40*   MAGNESIUM mg/dL  --   --   --   --   --  2.2   PHOSPHORUS mg/dL 1.7* 2.2* 2.0* 2.6  --   --     < > = values in this interval not displayed.     Results from last 7 days   Lab Units 08/01/22  1058   PROCALCITONIN ng/mL 0.22     Hemoglobin A1C   Date/Time Value Ref Range Status   08/01/2022 0325 5.80 (H) 4.80 - 5.60 % Final       No radiology results for the last day  Scheduled Medications  aspirin, 81 mg, Oral, Daily  clopidogrel, 75 mg, Oral, Daily  furosemide, 40 mg, Intravenous, Once  potassium chloride, 60 mEq, Oral, Daily  potassium phosphate, 9 mmol, Intravenous, Once  tamsulosin, 0.4 mg, Oral, Daily    Infusions   Diet  Diet Regular       Assessment/Plan     Active Hospital Problems    Diagnosis  POA   • **ST elevation myocardial infarction (STEMI) (HCC) [I21.3]  Yes   • Hyponatremia [E87.1]  Yes   • Elevated LFTs [R79.89]  Yes   • Right distal ureteral calculus [N20.1]  Yes   • Leukocytosis [D72.829]  Yes   • Presence of cardiac pacemaker [Z95.0]  Yes   • S/P TAVR (transcatheter aortic valve replacement) [Z95.2]  Not Applicable   • Coronary artery disease involving native coronary artery of native heart [I25.10]  Yes   • Hypertension [I10]  Yes      Resolved Hospital Problems   No resolved problems to display.       89 y.o.  female admitted with ST elevation myocardial infarction (STEMI) (HCC).    -STEMI/TAVR/CAD/HTN: Followed by Cardiology. Continue DAPT. BB has been held due to low BP; BP higher today  -Hyponatremia: Nephrology managing. Urine studies not consistent with SIADH. IVF's stopped, giving diuretic today. Monitor. Low phos, being repleted  -Rt distal ureteral stone: Urology managing. No intervention at this time. Asymptomatic  -Elevated LFT's: RUQ US pending. Check hepatitis panel. Monitor trends. Overall benign abd exam  -Leukocytosis: WBC 15, appears to be trending down. Afebrile. Urine culture unremarkable. BC NGTD. CXR negative 7/31. Monitor trends    · Discussed with patient, family and consulting provider.  · Anticipate discharge home timing yet to be determined.      CLEMENTINE Hicks  [facility]  08/03/22  16:29 EDT

## 2022-08-03 NOTE — PROGRESS NOTES
"  [facility] Progress Note    Chief Complaint: No flank pain    No new complaints.  No flank pain.  No right lower quadrant pain.  Voiding well.  No dysuria.  No frequency urgency.  CT scan again reviewed with the family and the patient.  Labs reviewed with the family and the patient.  Cultures reviewed.    Review of Systems:    The following systems were reviewed and negative;  respiratory, cardiovascular and gastrointestinal          Vital Signs  /71 (BP Location: Left arm, Patient Position: Lying)   Pulse 72   Temp 99 °F (37.2 °C) (Oral)   Resp 18   Ht 154.9 cm (61\")   Wt 74 kg (163 lb 1.6 oz)   SpO2 95%   BMI 30.82 kg/m²     Physical Exam:     General Appearance:    Alert, cooperative, NAD   HEENT:    No trauma, pupils reactive, hearing intact   Back:     No CVA tenderness   Lungs:     Respirations unlabored, no wheezing    Heart:    RRR, intact peripheral pulses   Abdomen:     Soft, NDNT, no masses, no guarding   :       Extremities:   No edema, no deformity   Lymphatic:   No neck or groin LAD   Skin:   No bleeding, bruising or rashes   Neuro/Psych:   Orientation intact, mood/affect pleasant, no focal findings        Results Review:     I reviewed the patient's new clinical results.  Lab Results (last 24 hours)     Procedure Component Value Units Date/Time    Osmolality, Urine - Urine, Clean Catch [[id number]] Collected: 08/03/22 1106    Specimen: Urine, Clean Catch Updated: 08/03/22 1328     Osmolality, Urine 592 mOsm/kg     Narrative:      Osmo Normal Reference Ranges:    Random:  mOsm/kg H2O, depending on fluid intake.  Random: >850 mOsm/kg H20, after 12 hour fluid restriction.    24 Hour: 300-900 mOsm/kg H2O.    Sodium, Urine, Random - Urine, Clean Catch [[id number]] Collected: 08/03/22 1106    Specimen: Urine, Clean Catch Updated: 08/03/22 1314     Sodium, Urine <60 mmol/L     Narrative:      Reference intervals for random urine have not been established.  Clinical usage is dependent " upon physician's interpretation in combination with other laboratory tests.       Blood Culture - Blood, Hand, Left [[id number]]  (Normal) Collected: 08/01/22 1058    Specimen: Blood from Hand, Left Updated: 08/03/22 1146     Blood Culture No growth at 2 days    Blood Culture - Blood, Hand, Right [[id number]]  (Normal) Collected: 08/01/22 1103    Specimen: Blood from Hand, Right Updated: 08/03/22 1145     Blood Culture No growth at 2 days    Renal Function Panel [[id number]]  (Abnormal) Collected: 08/03/22 0839    Specimen: Blood Updated: 08/03/22 1000     Glucose 158 mg/dL      BUN 16 mg/dL      Creatinine 0.52 mg/dL      Sodium 122 mmol/L      Potassium 4.2 mmol/L      Comment: Slight hemolysis detected by analyzer. Results may be affected.        Chloride 92 mmol/L      CO2 16.0 mmol/L      Calcium 7.6 mg/dL      Albumin 3.10 g/dL      Phosphorus 1.7 mg/dL      Anion Gap 14.0 mmol/L      BUN/Creatinine Ratio 30.8     eGFR 87.3 mL/min/1.73      Comment: National Kidney Foundation and American Society of Nephrology (ASN) Task Force recommended calculation based on the Chronic Kidney Disease Epidemiology Collaboration (CKD-EPI) equation refit without adjustment for race.       Narrative:      GFR Normal >60  Chronic Kidney Disease <60  Kidney Failure <15      Sodium [[id number]]  (Abnormal) Collected: 08/03/22 0839    Specimen: Blood Updated: 08/03/22 0952     Sodium 122 mmol/L     Comprehensive Metabolic Panel [[id number]]  (Abnormal) Collected: 08/02/22 2340    Specimen: Blood Updated: 08/03/22 0049     Glucose 156 mg/dL      BUN 20 mg/dL      Creatinine 0.51 mg/dL      Sodium 121 mmol/L      Potassium 4.1 mmol/L      Chloride 91 mmol/L      CO2 20.0 mmol/L      Calcium 7.8 mg/dL      Total Protein 5.6 g/dL      Albumin 3.10 g/dL      ALT (SGPT) 237 U/L      AST (SGOT) 122 U/L      Alkaline Phosphatase 247 U/L      Total Bilirubin 0.5 mg/dL      Globulin 2.5 gm/dL      A/G Ratio 1.2 g/dL      BUN/Creatinine Ratio 39.2      Anion Gap 10.0 mmol/L      eGFR 87.7 mL/min/1.73      Comment: National Kidney Foundation and American Society of Nephrology (ASN) Task Force recommended calculation based on the Chronic Kidney Disease Epidemiology Collaboration (CKD-EPI) equation refit without adjustment for race.       Narrative:      GFR Normal >60  Chronic Kidney Disease <60  Kidney Failure <15      Sodium [[id number]]  (Abnormal) Collected: 08/02/22 2340    Specimen: Blood Updated: 08/03/22 0012     Sodium 121 mmol/L     Phosphorus [[id number]]  (Abnormal) Collected: 08/02/22 2340    Specimen: Blood Updated: 08/03/22 0012     Phosphorus 2.2 mg/dL     CBC (No Diff) [[id number]]  (Abnormal) Collected: 08/02/22 2340    Specimen: Blood Updated: 08/02/22 2357     WBC 15.61 10*3/mm3      RBC 4.13 10*6/mm3      Hemoglobin 11.5 g/dL      Hematocrit 34.2 %      MCV 82.8 fL      MCH 27.8 pg      MCHC 33.6 g/dL      RDW 13.6 %      RDW-SD 40.2 fl      MPV 11.0 fL      Platelets 245 10*3/mm3     Renal Function Panel [[id number]]  (Abnormal) Collected: 08/02/22 1522    Specimen: Blood Updated: 08/02/22 1607     Glucose 158 mg/dL      BUN 21 mg/dL      Creatinine 0.56 mg/dL      Sodium 122 mmol/L      Potassium 4.4 mmol/L      Chloride 90 mmol/L      CO2 20.0 mmol/L      Calcium 8.1 mg/dL      Albumin 3.70 g/dL      Phosphorus 2.0 mg/dL      Anion Gap 12.0 mmol/L      BUN/Creatinine Ratio 37.5     eGFR 85.7 mL/min/1.73      Comment: National Kidney Foundation and American Society of Nephrology (ASN) Task Force recommended calculation based on the Chronic Kidney Disease Epidemiology Collaboration (CKD-EPI) equation refit without adjustment for race.       Narrative:      GFR Normal >60  Chronic Kidney Disease <60  Kidney Failure <15          Imaging Results (Last 24 Hours)     ** No results found for the last 24 hours. **          Medication Review:   I have personally reviewed    Current Facility-Administered Medications:   •  acetaminophen (TYLENOL) tablet  650 mg, 650 mg, Oral, Q4H PRN, Jeannie Echevarria MD, 650 mg at 08/01/22 0701  •  aspirin EC tablet 81 mg, 81 mg, Oral, Daily, Jeannie Echevarria MD, 81 mg at 08/03/22 0902  •  atropine sulfate injection 0.5 mg, 0.5 mg, Intravenous, Q5 Min PRN, Jeannie Echevarria MD  •  clopidogrel (PLAVIX) tablet 75 mg, 75 mg, Oral, Daily, Jeannie Echevarria MD, 75 mg at 08/03/22 0902  •  nitroglycerin (NITROSTAT) SL tablet 0.4 mg, 0.4 mg, Sublingual, Q5 Min PRN, Clint Locke MD  •  potassium chloride (K-DUR,KLOR-CON) ER tablet 60 mEq, 60 mEq, Oral, Daily, Ashlee Hewitt MD, 60 mEq at 08/03/22 0902  •  sodium chloride 0.9 % flush 10 mL, 10 mL, Intravenous, PRN, Jeannie Echevarria MD  •  sodium chloride 0.9 % infusion 250 mL, 250 mL, Intravenous, Once PRN, Jeannie Echevarria MD  •  tamsulosin (FLOMAX) 24 hr capsule 0.4 mg, 0.4 mg, Oral, Daily, Ashlee Hewitt MD, 0.4 mg at 08/03/22 0902    Allergies:    Hydrocodone, Lipitor [atorvastatin], Erythromycin, and Lisinopril    Assessment:    Active Problems:  Patient Active Problem List   Diagnosis   • Aortic stenosis   • IGT (impaired glucose tolerance)   • Hyperlipidemia   • Hypertension   • Cervical arthritis   • Sciatica   • Vitamin D deficiency   • Exudative age-related macular degeneration, bilateral, with inactive choroidal neovascularization (HCC)   • Coronary artery disease involving native coronary artery of native heart   • AV block, complete ([facility])   • S/P TAVR (transcatheter aortic valve replacement)   • Presence of cardiac pacemaker   • ST elevation myocardial infarction (STEMI) ([facility])   • ST elevation myocardial infarction (STEMI), unspecified artery ([facility])       Right distal ureteral calculus with hydronephrosis  Nonobstructing large left renal stone    Plan:    No plans for intervention at this point since she is asymptomatic and she has a favorable chance of passage.  She would have to be cleared by cardiology to have anesthesia if we decide to intervene at this point I am  inclined just to continue to watch her and check her daily.      Clint Bangura MD    8/3/2022  14:02 EDT

## 2022-08-04 ENCOUNTER — APPOINTMENT (OUTPATIENT)
Dept: CT IMAGING | Facility: HOSPITAL | Age: 87
End: 2022-08-04

## 2022-08-04 ENCOUNTER — APPOINTMENT (OUTPATIENT)
Dept: CARDIOLOGY | Facility: HOSPITAL | Age: 87
End: 2022-08-04

## 2022-08-04 ENCOUNTER — APPOINTMENT (OUTPATIENT)
Dept: NUCLEAR MEDICINE | Facility: HOSPITAL | Age: 87
End: 2022-08-04

## 2022-08-04 LAB
ALBUMIN SERPL-MCNC: 3.2 G/DL (ref 3.5–5.2)
ALBUMIN/GLOB SERPL: 1.5 G/DL
ALP SERPL-CCNC: 205 U/L (ref 39–117)
ALT SERPL W P-5'-P-CCNC: 167 U/L (ref 1–33)
ANION GAP SERPL CALCULATED.3IONS-SCNC: 13 MMOL/L (ref 5–15)
ANION GAP SERPL CALCULATED.3IONS-SCNC: 8 MMOL/L (ref 5–15)
ANISOCYTOSIS BLD QL: ABNORMAL
AST SERPL-CCNC: 64 U/L (ref 1–32)
BH CV LOWER VASCULAR LEFT COMMON FEMORAL AUGMENT: NORMAL
BH CV LOWER VASCULAR LEFT COMMON FEMORAL COMPETENT: NORMAL
BH CV LOWER VASCULAR LEFT COMMON FEMORAL COMPRESS: NORMAL
BH CV LOWER VASCULAR LEFT COMMON FEMORAL PHASIC: NORMAL
BH CV LOWER VASCULAR LEFT COMMON FEMORAL SPONT: NORMAL
BH CV LOWER VASCULAR LEFT DISTAL FEMORAL COMPRESS: NORMAL
BH CV LOWER VASCULAR LEFT GASTRONEMIUS COMPRESS: NORMAL
BH CV LOWER VASCULAR LEFT GREATER SAPH AK COMPRESS: NORMAL
BH CV LOWER VASCULAR LEFT GREATER SAPH BK COMPRESS: NORMAL
BH CV LOWER VASCULAR LEFT LESSER SAPH COMPRESS: NORMAL
BH CV LOWER VASCULAR LEFT MID FEMORAL AUGMENT: NORMAL
BH CV LOWER VASCULAR LEFT MID FEMORAL COMPETENT: NORMAL
BH CV LOWER VASCULAR LEFT MID FEMORAL COMPRESS: NORMAL
BH CV LOWER VASCULAR LEFT MID FEMORAL PHASIC: NORMAL
BH CV LOWER VASCULAR LEFT MID FEMORAL SPONT: NORMAL
BH CV LOWER VASCULAR LEFT PERONEAL COMPRESS: NORMAL
BH CV LOWER VASCULAR LEFT POPLITEAL AUGMENT: NORMAL
BH CV LOWER VASCULAR LEFT POPLITEAL COMPETENT: NORMAL
BH CV LOWER VASCULAR LEFT POPLITEAL COMPRESS: NORMAL
BH CV LOWER VASCULAR LEFT POPLITEAL PHASIC: NORMAL
BH CV LOWER VASCULAR LEFT POPLITEAL SPONT: NORMAL
BH CV LOWER VASCULAR LEFT POSTERIOR TIBIAL COMPRESS: NORMAL
BH CV LOWER VASCULAR LEFT PROFUNDA FEMORAL COMPRESS: NORMAL
BH CV LOWER VASCULAR LEFT PROXIMAL FEMORAL COMPRESS: NORMAL
BH CV LOWER VASCULAR LEFT SAPHENOFEMORAL JUNCTION COMPRESS: NORMAL
BH CV LOWER VASCULAR RIGHT COMMON FEMORAL AUGMENT: NORMAL
BH CV LOWER VASCULAR RIGHT COMMON FEMORAL COMPETENT: NORMAL
BH CV LOWER VASCULAR RIGHT COMMON FEMORAL COMPRESS: NORMAL
BH CV LOWER VASCULAR RIGHT COMMON FEMORAL PHASIC: NORMAL
BH CV LOWER VASCULAR RIGHT COMMON FEMORAL SPONT: NORMAL
BH CV LOWER VASCULAR RIGHT DISTAL FEMORAL COMPRESS: NORMAL
BH CV LOWER VASCULAR RIGHT GASTRONEMIUS COMPRESS: NORMAL
BH CV LOWER VASCULAR RIGHT GREATER SAPH AK COMPRESS: NORMAL
BH CV LOWER VASCULAR RIGHT GREATER SAPH BK COMPRESS: NORMAL
BH CV LOWER VASCULAR RIGHT LESSER SAPH COMPRESS: NORMAL
BH CV LOWER VASCULAR RIGHT MID FEMORAL AUGMENT: NORMAL
BH CV LOWER VASCULAR RIGHT MID FEMORAL COMPETENT: NORMAL
BH CV LOWER VASCULAR RIGHT MID FEMORAL COMPRESS: NORMAL
BH CV LOWER VASCULAR RIGHT MID FEMORAL PHASIC: NORMAL
BH CV LOWER VASCULAR RIGHT MID FEMORAL SPONT: NORMAL
BH CV LOWER VASCULAR RIGHT PERONEAL COMPRESS: NORMAL
BH CV LOWER VASCULAR RIGHT POPLITEAL AUGMENT: NORMAL
BH CV LOWER VASCULAR RIGHT POPLITEAL COMPETENT: NORMAL
BH CV LOWER VASCULAR RIGHT POPLITEAL COMPRESS: NORMAL
BH CV LOWER VASCULAR RIGHT POPLITEAL PHASIC: NORMAL
BH CV LOWER VASCULAR RIGHT POPLITEAL SPONT: NORMAL
BH CV LOWER VASCULAR RIGHT POSTERIOR TIBIAL COMPRESS: NORMAL
BH CV LOWER VASCULAR RIGHT PROFUNDA FEMORAL COMPRESS: NORMAL
BH CV LOWER VASCULAR RIGHT PROXIMAL FEMORAL COMPRESS: NORMAL
BH CV LOWER VASCULAR RIGHT SAPHENOFEMORAL JUNCTION COMPRESS: NORMAL
BH CV VAS HEPATIC PORTAL VEIN DIAMETER: 0.69 CM
BH CV VAS HEPATOPORTAL HEPATIC V LT DIRECTION: NORMAL
BH CV VAS HEPATOPORTAL HEPATIC V MID DIRECTION: NORMAL
BH CV VAS HEPATOPORTAL HEPATIC V RT DIRECTION: NORMAL
BH CV VAS HEPATOPORTAL IVC CONFLUENCE FLOW: NORMAL
BH CV VAS HEPATOPORTAL IVC CONFLUENCE SPONT: NORMAL
BH CV VAS HEPATOPORTAL IVC FLOW: NORMAL
BH CV VAS HEPATOPORTAL IVC SPONT: NORMAL
BH CV VAS HEPATOPORTAL PORTAL V EXTRAHEPATIC DIRECTION: NORMAL
BH CV VAS HEPATOPORTAL PORTAL V LT INTRA DIRECTION: NORMAL
BH CV VAS HEPATOPORTAL PORTAL V MAIN INTRA DIRECTION: NORMAL
BH CV VAS HEPATOPORTAL PORTAL V RT INTRA DIRECTION: NORMAL
BH CV VAS HEPATOPORTAL SMV DIRECTION: NORMAL
BH CV VAS HEPATOPORTAL SPLENIC DIRECTION: NORMAL
BH CV VAS HEPATOPORTAL SPLENIC V PSV: 36.9 CM/S
BH CV VAS SMA HEPATIC PSV: 132 CM/S
BH CV VAS SMA SPLENIC EDV: 18.9 CM/S
BH CV VAS SMA SPLENIC PSV: 103 CM/S
BILIRUB SERPL-MCNC: 0.4 MG/DL (ref 0–1.2)
BUN SERPL-MCNC: 15 MG/DL (ref 8–23)
BUN SERPL-MCNC: 17 MG/DL (ref 8–23)
BUN/CREAT SERPL: 27.9 (ref 7–25)
BUN/CREAT SERPL: 29.4 (ref 7–25)
CALCIUM SPEC-SCNC: 7.9 MG/DL (ref 8.2–9.6)
CALCIUM SPEC-SCNC: 8 MG/DL (ref 8.2–9.6)
CHLORIDE SERPL-SCNC: 95 MMOL/L (ref 98–107)
CHLORIDE SERPL-SCNC: 97 MMOL/L (ref 98–107)
CO2 SERPL-SCNC: 20 MMOL/L (ref 22–29)
CO2 SERPL-SCNC: 20 MMOL/L (ref 22–29)
CORTIS SERPL-MCNC: 20.8 MCG/DL
CREAT SERPL-MCNC: 0.51 MG/DL (ref 0.57–1)
CREAT SERPL-MCNC: 0.61 MG/DL (ref 0.57–1)
D DIMER PPP FEU-MCNC: 2.49 MCGFEU/ML (ref 0–0.49)
DEPRECATED RDW RBC AUTO: 41.1 FL (ref 37–54)
EGFRCR SERPLBLD CKD-EPI 2021: 84 ML/MIN/1.73
EGFRCR SERPLBLD CKD-EPI 2021: 87.7 ML/MIN/1.73
EOSINOPHIL # BLD MANUAL: 0.13 10*3/MM3 (ref 0–0.4)
EOSINOPHIL NFR BLD MANUAL: 1 % (ref 0.3–6.2)
ERYTHROCYTE [DISTWIDTH] IN BLOOD BY AUTOMATED COUNT: 13.7 % (ref 12.3–15.4)
GLOBULIN UR ELPH-MCNC: 2.1 GM/DL
GLUCOSE SERPL-MCNC: 154 MG/DL (ref 65–99)
GLUCOSE SERPL-MCNC: 91 MG/DL (ref 65–99)
HAV IGM SERPL QL IA: NORMAL
HBV CORE IGM SERPL QL IA: NORMAL
HBV SURFACE AG SERPL QL IA: NORMAL
HCT VFR BLD AUTO: 33.3 % (ref 34–46.6)
HCV AB SER DONR QL: NORMAL
HGB BLD-MCNC: 11.3 G/DL (ref 12–15.9)
LYMPHOCYTES # BLD MANUAL: 1.82 10*3/MM3 (ref 0.7–3.1)
LYMPHOCYTES NFR BLD MANUAL: 10.2 % (ref 5–12)
MAGNESIUM SERPL-MCNC: 2.2 MG/DL (ref 1.7–2.3)
MAXIMAL PREDICTED HEART RATE: 128 BPM
MAXIMAL PREDICTED HEART RATE: 128 BPM
MCH RBC QN AUTO: 28.3 PG (ref 26.6–33)
MCHC RBC AUTO-ENTMCNC: 33.9 G/DL (ref 31.5–35.7)
MCV RBC AUTO: 83.3 FL (ref 79–97)
METAMYELOCYTES NFR BLD MANUAL: 3.1 % (ref 0–0)
MICROCYTES BLD QL: ABNORMAL
MONOCYTES # BLD: 1.3 10*3/MM3 (ref 0.1–0.9)
MYELOCYTES NFR BLD MANUAL: 5.1 % (ref 0–0)
NEUTROPHILS # BLD AUTO: 8.44 10*3/MM3 (ref 1.7–7)
NEUTROPHILS NFR BLD MANUAL: 66.3 % (ref 42.7–76)
NT-PROBNP SERPL-MCNC: 2340 PG/ML (ref 0–1800)
PHOSPHATE SERPL-MCNC: 2.1 MG/DL (ref 2.5–4.5)
PLAT MORPH BLD: NORMAL
PLATELET # BLD AUTO: 241 10*3/MM3 (ref 140–450)
PMV BLD AUTO: 10.6 FL (ref 6–12)
POLYCHROMASIA BLD QL SMEAR: ABNORMAL
POTASSIUM SERPL-SCNC: 4.1 MMOL/L (ref 3.5–5.2)
POTASSIUM SERPL-SCNC: 4.3 MMOL/L (ref 3.5–5.2)
PROT SERPL-MCNC: 5.3 G/DL (ref 6–8.5)
RBC # BLD AUTO: 4 10*6/MM3 (ref 3.77–5.28)
SODIUM SERPL-SCNC: 123 MMOL/L (ref 136–145)
SODIUM SERPL-SCNC: 130 MMOL/L (ref 136–145)
STRESS TARGET HR: 109 BPM
STRESS TARGET HR: 109 BPM
URATE SERPL-MCNC: 5 MG/DL (ref 2.4–5.7)
VARIANT LYMPHS NFR BLD MANUAL: 14.3 % (ref 19.6–45.3)
WBC MORPH BLD: NORMAL
WBC NRBC COR # BLD: 12.73 10*3/MM3 (ref 3.4–10.8)

## 2022-08-04 PROCEDURE — 81332 SERPINA1 GENE: CPT | Performed by: INTERNAL MEDICINE

## 2022-08-04 PROCEDURE — 0 IOPAMIDOL PER 1 ML: Performed by: INTERNAL MEDICINE

## 2022-08-04 PROCEDURE — 86225 DNA ANTIBODY NATIVE: CPT | Performed by: INTERNAL MEDICINE

## 2022-08-04 PROCEDURE — 86235 NUCLEAR ANTIGEN ANTIBODY: CPT | Performed by: INTERNAL MEDICINE

## 2022-08-04 PROCEDURE — 25010000002 ENOXAPARIN PER 10 MG: Performed by: INTERNAL MEDICINE

## 2022-08-04 PROCEDURE — 93970 EXTREMITY STUDY: CPT

## 2022-08-04 PROCEDURE — 86381 MITOCHONDRIAL ANTIBODY EACH: CPT | Performed by: INTERNAL MEDICINE

## 2022-08-04 PROCEDURE — 97530 THERAPEUTIC ACTIVITIES: CPT

## 2022-08-04 PROCEDURE — 84550 ASSAY OF BLOOD/URIC ACID: CPT | Performed by: INTERNAL MEDICINE

## 2022-08-04 PROCEDURE — 80053 COMPREHEN METABOLIC PANEL: CPT | Performed by: NURSE PRACTITIONER

## 2022-08-04 PROCEDURE — 78226 HEPATOBILIARY SYSTEM IMAGING: CPT

## 2022-08-04 PROCEDURE — A9537 TC99M MEBROFENIN: HCPCS | Performed by: INTERNAL MEDICINE

## 2022-08-04 PROCEDURE — 25010000002 FUROSEMIDE PER 20 MG: Performed by: INTERNAL MEDICINE

## 2022-08-04 PROCEDURE — 85379 FIBRIN DEGRADATION QUANT: CPT | Performed by: NURSE PRACTITIONER

## 2022-08-04 PROCEDURE — 97162 PT EVAL MOD COMPLEX 30 MIN: CPT

## 2022-08-04 PROCEDURE — 80074 ACUTE HEPATITIS PANEL: CPT | Performed by: NURSE PRACTITIONER

## 2022-08-04 PROCEDURE — 85025 COMPLETE CBC W/AUTO DIFF WBC: CPT | Performed by: NURSE PRACTITIONER

## 2022-08-04 PROCEDURE — 82533 TOTAL CORTISOL: CPT | Performed by: INTERNAL MEDICINE

## 2022-08-04 PROCEDURE — 71275 CT ANGIOGRAPHY CHEST: CPT

## 2022-08-04 PROCEDURE — 83880 ASSAY OF NATRIURETIC PEPTIDE: CPT | Performed by: INTERNAL MEDICINE

## 2022-08-04 PROCEDURE — 82525 ASSAY OF COPPER: CPT | Performed by: INTERNAL MEDICINE

## 2022-08-04 PROCEDURE — 0 TECHNETIUM TC 99M MEBROFENIN KIT: Performed by: INTERNAL MEDICINE

## 2022-08-04 PROCEDURE — 93975 VASCULAR STUDY: CPT

## 2022-08-04 PROCEDURE — 84100 ASSAY OF PHOSPHORUS: CPT | Performed by: INTERNAL MEDICINE

## 2022-08-04 PROCEDURE — 83735 ASSAY OF MAGNESIUM: CPT | Performed by: INTERNAL MEDICINE

## 2022-08-04 PROCEDURE — 25010000002 CEFTRIAXONE PER 250 MG: Performed by: INTERNAL MEDICINE

## 2022-08-04 PROCEDURE — 85007 BL SMEAR W/DIFF WBC COUNT: CPT | Performed by: NURSE PRACTITIONER

## 2022-08-04 RX ORDER — FUROSEMIDE 10 MG/ML
40 INJECTION INTRAMUSCULAR; INTRAVENOUS EVERY 12 HOURS
Status: DISCONTINUED | OUTPATIENT
Start: 2022-08-04 | End: 2022-08-05

## 2022-08-04 RX ORDER — KIT FOR THE PREPARATION OF TECHNETIUM TC 99M MEBROFENIN 45 MG/10ML
1 INJECTION, POWDER, LYOPHILIZED, FOR SOLUTION INTRAVENOUS
Status: COMPLETED | OUTPATIENT
Start: 2022-08-04 | End: 2022-08-04

## 2022-08-04 RX ADMIN — CEFTRIAXONE SODIUM 1 G: 1 INJECTION, POWDER, FOR SOLUTION INTRAMUSCULAR; INTRAVENOUS at 15:55

## 2022-08-04 RX ADMIN — Medication 10 ML: at 20:46

## 2022-08-04 RX ADMIN — ENOXAPARIN SODIUM 40 MG: 100 INJECTION SUBCUTANEOUS at 20:45

## 2022-08-04 RX ADMIN — METOPROLOL TARTRATE 12.5 MG: 25 TABLET, FILM COATED ORAL at 20:45

## 2022-08-04 RX ADMIN — FUROSEMIDE 40 MG: 10 INJECTION, SOLUTION INTRAMUSCULAR; INTRAVENOUS at 07:00

## 2022-08-04 RX ADMIN — FUROSEMIDE 40 MG: 10 INJECTION, SOLUTION INTRAMUSCULAR; INTRAVENOUS at 20:46

## 2022-08-04 RX ADMIN — IOPAMIDOL 95 ML: 755 INJECTION, SOLUTION INTRAVENOUS at 17:34

## 2022-08-04 RX ADMIN — MEBROFENIN 1 DOSE: 45 INJECTION, POWDER, LYOPHILIZED, FOR SOLUTION INTRAVENOUS at 10:51

## 2022-08-04 RX ADMIN — ASPIRIN 81 MG: 81 TABLET, COATED ORAL at 08:18

## 2022-08-04 RX ADMIN — POTASSIUM PHOSPHATE, MONOBASIC AND POTASSIUM PHOSPHATE, DIBASIC 15 MMOL: 224; 236 INJECTION, SOLUTION, CONCENTRATE INTRAVENOUS at 14:27

## 2022-08-04 RX ADMIN — PANTOPRAZOLE SODIUM 40 MG: 40 INJECTION, POWDER, FOR SOLUTION INTRAVENOUS at 05:32

## 2022-08-04 RX ADMIN — METOPROLOL TARTRATE 12.5 MG: 25 TABLET, FILM COATED ORAL at 08:19

## 2022-08-04 RX ADMIN — TAMSULOSIN HYDROCHLORIDE 0.4 MG: 0.4 CAPSULE ORAL at 08:18

## 2022-08-04 RX ADMIN — CLOPIDOGREL 75 MG: 75 TABLET, FILM COATED ORAL at 08:18

## 2022-08-04 NOTE — PROGRESS NOTES
Name: Yuliana Gonzalez ADMIT: 2022   : 1930  PCP: Trent Davis MD    MRN: [id number] LOS: 4 days   AGE/SEX: 89 y.o. female  ROOM: [facility]/     Subjective   Subjective   Patient with exertional dyspnea.  No chest pain/no palpitations/no cough/no wheeze/no hemoptysis/no abdominal pain/no nausea or vomiting.  The still no bowel movement times several days.  No dysuria or hematuria.  Positive frequency..    Review of Systems  CNS.  No headache.  No focal neurological symptoms.  No dizziness.     Objective   Objective   Vital Signs  Temp:  [97.3 °F (36.3 °C)-98 °F (36.7 °C)] 97.3 °F (36.3 °C)  Heart Rate:  [63-76] 63  Resp:  [17-20] 17  BP: (126-142)/(61-88) 142/88  SpO2:  [96 %-97 %] 97 %  on   ;   Device (Oxygen Therapy): room air    Intake/Output Summary (Last 24 hours) at 2022 1506  Last data filed at 2022 0825  Gross per 24 hour   Intake 518.75 ml   Output 1950 ml   Net -1431.25 ml     Body mass index is 30.7 kg/m².      22  1254 22  0500 22  0548   Weight: 72.3 kg (159 lb 4.8 oz) 74 kg (163 lb 1.6 oz) 73.7 kg (162 lb 8 oz)     Physical Exam  General.  Elderly female.  She is alert and oriented x3.  No apparent pain/distress/diaphoresis.  Normal mood and affect.  Eyes.  Pupils equal round and reactive.  Intact extraocular musculature.  No pallor or jaundice.  Normal conjunctive and lids.  Status post bilateral cataract surgery.  Oral cavity.  Moist mucous membrane with no lesions.  Neck.  Supple.  No JVD.  No lymphadenopathy or thyromegaly.  Cardiovascular.  Regular rate and rhythm.  Grade 2 systolic murmur.  Chest.  Clear to auscultation bilaterally with scattered scattered rhonchi on the right lung base.  Abdomen.  Soft lax.  No tenderness.  No organomegaly.  No guarding or rebound.  Extremities.    Trace lower extremity edema.  No tenderness.  No clubbing or cyanosis.  CNS.  No acute focal neurological deficits.    Results Review:      Results from last 7 days   Lab  Units 08/04/22  0504 08/03/22  1821 08/03/22  0839 08/02/22  2340 08/02/22  1522 08/02/22  0812 08/01/22  2354 08/01/22  1554 08/01/22  1058 08/01/22  0325 07/31/22  1410   SODIUM mmol/L 123* 128* 122*  122* 121*  121* 122* 122*  122* 124*   < > 122*   < > 120*   POTASSIUM mmol/L 4.1 4.5 4.2 4.1 4.4 3.3*  --   --  3.5   < > 3.6   CHLORIDE mmol/L 95* 95* 92* 91* 90* 87*  --   --  87*   < > 85*   CO2 mmol/L 20.0* 18.8* 16.0* 20.0* 20.0* 19.8*  --   --  17.4*   < > 22.4   BUN mg/dL 15 14 16 20 21 24*  --   --  29*   < > 26*   CREATININE mg/dL 0.51* 0.56* 0.52* 0.51* 0.56* 0.58  --   --  0.72   < > 0.72   GLUCOSE mg/dL 91 136* 158* 156* 158* 172*  --   --  190*   < > 189*   CALCIUM mg/dL 7.9* 8.2 7.6* 7.8* 8.1* 8.3  --   --  8.3   < > 7.9*   AST (SGOT) U/L 64*  --   --  122*  --   --   --   --   --   --  78*   ALT (SGPT) U/L 167*  --   --  237*  --   --   --   --   --   --  62*    < > = values in this interval not displayed.     Estimated Creatinine Clearance: 64.7 mL/min (A) (by C-G formula based on SCr of 0.51 mg/dL (L)).  Results from last 7 days   Lab Units 08/01/22  0325   HEMOGLOBIN A1C % 5.80*         Results from last 7 days   Lab Units 08/03/22  1821 07/31/22  1829 07/31/22  1410   CK TOTAL U/L 78  --   --    TROPONIN T ng/mL  --  1.150* 1.140*     Results from last 7 days   Lab Units 08/04/22  0504 07/31/22  1410   PROBNP pg/mL 2,340.0* 3,253.0*     Results from last 7 days   Lab Units 08/03/22  1821   TSH uIU/mL 3.040     Results from last 7 days   Lab Units 08/04/22  0504 07/31/22  1410   MAGNESIUM mg/dL 2.2 2.2     Results from last 7 days   Lab Units 08/01/22  0325   CHOLESTEROL mg/dL 139   TRIGLYCERIDES mg/dL 145   HDL CHOL mg/dL 56     Results from last 7 days   Lab Units 08/04/22  0504 08/02/22  2340 08/01/22  0325 07/31/22  1410   WBC 10*3/mm3 12.73* 15.61* 17.25* 21.62*   HEMOGLOBIN g/dL 11.3* 11.5* 11.8* 12.1   HEMATOCRIT % 33.3* 34.2 34.8 35.6   PLATELETS 10*3/mm3 241 245 204 214   MCV fL 83.3  82.8 81.9 82.0   MCH pg 28.3 27.8 27.8 27.9   MCHC g/dL 33.9 33.6 33.9 34.0   RDW % 13.7 13.6 13.6 13.4   RDW-SD fl 41.1 40.2 40.2 39.8   MPV fL 10.6 11.0 11.2 11.5   NEUTROPHIL % %  --   --   --  83.1*   LYMPHOCYTE % %  --   --   --  9.8*   MONOCYTES % %  --   --   --  6.1   EOSINOPHIL % %  --   --   --  0.1*   BASOPHIL % %  --   --   --  0.2   IMM GRAN % %  --   --   --  0.7*   NEUTROS ABS 10*3/mm3 8.44*  --   --  17.97*   LYMPHS ABS 10*3/mm3  --   --   --  2.11   MONOS ABS 10*3/mm3  --   --   --  1.31*   EOS ABS 10*3/mm3 0.13  --   --  0.02   BASOS ABS 10*3/mm3  --   --   --  0.05   IMMATURE GRANS (ABS) 10*3/mm3  --   --   --  0.16*   NRBC /100 WBC  --   --   --  0.0     Results from last 7 days   Lab Units 07/31/22  1410   INR  1.31*   APTT seconds 27.8         Results from last 7 days   Lab Units 08/03/22  1821 08/01/22  1058   PROCALCITONIN ng/mL 0.09 0.22         Results from last 7 days   Lab Units 08/03/22  1821   LIPASE U/L 33     Results from last 7 days   Lab Units 08/01/22  1103 08/01/22  1058   BLOODCX  No growth at 3 days No growth at 3 days         Results from last 7 days   Lab Units 08/03/22  2144   NITRITE UA  Negative   WBC UA /HPF 21-30*   BACTERIA UA /HPF 1+*   SQUAM EPITHEL UA /HPF 0-2     Results from last 7 days   Lab Units 08/04/22  0504 08/03/22  1106 08/01/22  1806   SODIUM UR mmol/L  --  <20 <20   CHLORIDE UR mmol/L  --   --  <20   OSMOLALITY UR mOsm/kg  --  592 559   URIC ACID mg/dL 5.0  --   --        Imaging:  Imaging Results (Last 24 Hours)     Procedure Component Value Units Date/Time    NM HIDA SCAN WITHOUT PHARMACOLOGICAL INTERVENTION [[id number]] Resulted: 08/04/22 1059     Updated: 08/04/22 1250    XR Chest PA & Lateral [[id number]] Collected: 08/03/22 2205     Updated: 08/03/22 2209    Narrative:      TWO-VIEW CHEST     HISTORY: Leukocytosis.     FINDINGS: The lungs are well-expanded and clear. The heart is mildly  enlarged with a pacemaker in place as well as an aortic valve  stent and  the overall appearance shows no change from 07/31/2022.     This report was finalized on 8/3/2022 10:06 PM by Dr. Al Tabares M.D.        Gallbladder [[id number]] Collected: 08/03/22 1706     Updated: 08/03/22 1712    Narrative:      RIGHT UPPER QUADRANT ULTRASOUND     HISTORY: Elevated transaminases     COMPARISON: CT abdomen pelvis 07/26/2022     TECHNIQUE: Real time ultrasound imaging of the right upper quadrant was  performed. Static images reviewed.     FINDINGS: Visualized portions of the pancreas are unremarkable. The  liver is normal in echogenicity. There is a trace amount of ascites  adjacent to the liver. No gallstones. No gallbladder wall thickening.  There is a minimal amount of nonspecific pericholecystic fluid. Negative  sonographic Fleming sign. Common duct measures about 4 mm. The right  kidney measures 12.1 cm in length and contains a trace amount of fluid  adjacent to the lower pole.       Impression:      1. Trace ascites adjacent to the liver  2. No gallstones. Minimal amount of nonspecific pericholecystic fluid.  Negative sonographic Fleming sign     This report was finalized on 8/3/2022 5:08 PM by Dr. Todd Bangura M.D.                I reviewed the patient's new clinical results / labs / tests / procedures      Assessment/Plan     Active Hospital Problems    Diagnosis  POA   • **ST elevation myocardial infarction (STEMI) (HCC) [I21.3]  Yes   • Hyponatremia [E87.1]  Yes   • Elevated LFTs [R79.89]  Yes   • Right distal ureteral calculus [N20.1]  Yes   • Leukocytosis [D72.829]  Yes   • Hydronephrosis of right kidney [N13.30]  Yes   • Glucose intolerance [E74.39]  Yes   • Presence of cardiac pacemaker [Z95.0]  Yes   • S/P TAVR (transcatheter aortic valve replacement) [Z95.2]  Not Applicable   • Coronary artery disease involving native coronary artery of native heart [I25.10]  Yes   • Hypertension [I10]  Yes      Resolved Hospital Problems   No resolved problems to display.        1.  Inferior ST elevation MI in a patient with a history of coronary artery disease/AS status post TAVR/hypertension/AV block status post pacer.  Currently no evidence of angina or congestive heart failure.  Patient appears euvolemic.  Status postcardiac catheterization with patent LAD stenting and nonobstructive disease.  Echo with normal ejection fraction and mild diastolic dysfunction and no significant valvular abnormalities.  Cardiology is following.    Continue aspirin/Plavix/beta-blockers.    2.  Hypoosmolar hyponatremia/acidosis/hypophosphatemia.  Urine electrolytes suggest that the patient is dehydrated.  Patient however did not Respond to resuscitation with IV fluid with worsening sodium.  Nephrology believes that the patient is clinically a volume overloaded and IV fluid was stopped and diuretics were started.   Electrolytes improving.  Continue replacement of electrolytes.  Renal function is normal.    3.  Moderate right hydronephrosis secondary to obstructing 4 mm stone/UTI.    Repeat UA with positive UTI.  Will check urine culture and initiate Rocephin..  Chest x-ray was negative for infection. Urology is following and does not plan intervention at this time.  4.  Leukocytosis/low-grade fever.  Mostly secondary to UTI.   UA is with positive leukocytes.  Chest x-ray is negative for pneumonia. Blood cultures are negative.    Procalcitonin is negative.  White count improving.  Will initiate Rocephin and check urine cultures.    5.  Left lower extremity edema/positive D-dimer.  Lower extremity venous ultrasound revealed no DVT.  Area of the chest is pending..   6.  Elevated liver function test/nausea.  Benign GI examination.  Resolved nausea. Negative right upper quadrant ultrasound and CT scan of the abdomen pelvis for liver or gallbladder abnormality.  Negative hepatitis profile.  Awaiting HIDA scan.  Awaiting biochemical markers.  Hepatic Doppler revealed no clots and normal flow.  Lipase is  normal.  Improving liver function test.  Pulm work-up was negative this is mostly secondary to hepatic congestion.   7.  Glucose intolerance.  A1c 5.8.  8.  VTE prophylaxis.  Patient on sequential compression devices.       Discussed my findings and plan of treatment with the patient/daughter.      Supriya Belle MD  [facility]  08/04/22  15:06 EDT

## 2022-08-04 NOTE — PROGRESS NOTES
"  [facility] Progress Note    Chief Complaint: No new complaints    No flank pain no abdominal pain all in all doing well.      Review of Systems:    The following systems were reviewed and negative;  respiratory and cardiovascular          Vital Signs  /88 (BP Location: Left arm, Patient Position: Sitting)   Pulse 63   Temp 97.3 °F (36.3 °C) (Oral)   Resp 17   Ht 154.9 cm (61\")   Wt 73.7 kg (162 lb 8 oz)   SpO2 97%   BMI 30.70 kg/m²     Physical Exam:     General Appearance:    Alert, cooperative, NAD   HEENT:    No trauma, pupils reactive, hearing intact   Back:     No CVA tenderness   Lungs:     Respirations unlabored, no wheezing    Heart:    RRR, intact peripheral pulses   Abdomen:     Soft, NDNT, no masses, no guarding   :   Deferred   Extremities:   No edema, no deformity   Lymphatic:   No neck or groin LAD   Skin:   No bleeding, bruising or rashes   Neuro/Psych:   Orientation intact, mood/affect pleasant, no focal findings        Results Review:     I reviewed the patient's new clinical results.  Lab Results (last 24 hours)     Procedure Component Value Units Date/Time    Basic Metabolic Panel [[id number]]  (Abnormal) Collected: 08/04/22 1508    Specimen: Blood Updated: 08/04/22 1601     Glucose 154 mg/dL      BUN 17 mg/dL      Creatinine 0.61 mg/dL      Sodium 130 mmol/L      Potassium 4.3 mmol/L      Chloride 97 mmol/L      CO2 20.0 mmol/L      Calcium 8.0 mg/dL      BUN/Creatinine Ratio 27.9     Anion Gap 13.0 mmol/L      eGFR 84.0 mL/min/1.73      Comment: National Kidney Foundation and American Society of Nephrology (ASN) Task Force recommended calculation based on the Chronic Kidney Disease Epidemiology Collaboration (CKD-EPI) equation refit without adjustment for race.       Narrative:      GFR Normal >60  Chronic Kidney Disease <60  Kidney Failure <15      Blood Culture - Blood, Hand, Left [[id number]]  (Normal) Collected: 08/01/22 1058    Specimen: Blood from Hand, Left Updated: " 08/04/22 1147     Blood Culture No growth at 3 days    Blood Culture - Blood, Hand, Right [[id number]]  (Normal) Collected: 08/01/22 1103    Specimen: Blood from Hand, Right Updated: 08/04/22 1146     Blood Culture No growth at 3 days    BNP [[id number]]  (Abnormal) Collected: 08/04/22 0504    Specimen: Blood Updated: 08/04/22 0823     proBNP 2,340.0 pg/mL     Narrative:      Among patients with dyspnea, NT-proBNP is highly sensitive for the detection of acute congestive heart failure. In addition NT-proBNP of <300 pg/ml effectively rules out acute congestive heart failure with 99% negative predictive value.    Results may be falsely decreased if patient taking Biotin.      Uric Acid [[id number]]  (Normal) Collected: 08/04/22 0504    Specimen: Blood Updated: 08/04/22 0720     Uric Acid 5.0 mg/dL     Manual Differential [[id number]]  (Abnormal) Collected: 08/04/22 0504    Specimen: Blood Updated: 08/04/22 0624     Neutrophil % 66.3 %      Lymphocyte % 14.3 %      Monocyte % 10.2 %      Eosinophil % 1.0 %      Metamyelocyte % 3.1 %      Myelocyte % 5.1 %      Neutrophils Absolute 8.44 10*3/mm3      Lymphocytes Absolute 1.82 10*3/mm3      Monocytes Absolute 1.30 10*3/mm3      Eosinophils Absolute 0.13 10*3/mm3      Anisocytosis Slight/1+     Microcytes Slight/1+     Polychromasia Slight/1+     WBC Morphology Normal     Platelet Morphology Normal    CBC & Differential [[id number]]  (Abnormal) Collected: 08/04/22 0504    Specimen: Blood Updated: 08/04/22 0624    Narrative:      The following orders were created for panel order CBC & Differential.  Procedure                               Abnormality         Status                     ---------                               -----------         ------                     CBC Auto Differential[[id number]]        Abnormal            Final result                 Please view results for these tests on the individual orders.    CBC Auto Differential [[id number]]  (Abnormal) Collected:  08/04/22 0504    Specimen: Blood Updated: 08/04/22 0624     WBC 12.73 10*3/mm3      RBC 4.00 10*6/mm3      Hemoglobin 11.3 g/dL      Hematocrit 33.3 %      MCV 83.3 fL      MCH 28.3 pg      MCHC 33.9 g/dL      RDW 13.7 %      RDW-SD 41.1 fl      MPV 10.6 fL      Platelets 241 10*3/mm3     Cortisol [[id number]] Collected: 08/04/22 0504    Specimen: Blood Updated: 08/04/22 0617     Cortisol 20.80 mcg/dL     Narrative:      Cortisol Reference Ranges:    Cortisol 6AM - 10AM Range: 6.02-18.40 mcg/dl  Cortisol 4PM - 8PM Range: 2.68-10.50 mcg/dl      Results may be falsely increased if patient taking Biotin.      Hepatitis Panel, Acute [[id number]]  (Normal) Collected: 08/04/22 0504    Specimen: Blood Updated: 08/04/22 0616     Hepatitis B Surface Ag Non-Reactive     Hep A IgM Non-Reactive     Hep B C IgM Non-Reactive     Hepatitis C Ab Non-Reactive    Narrative:      Results may be falsely decreased if patient taking Biotin.     Comprehensive Metabolic Panel [[id number]]  (Abnormal) Collected: 08/04/22 0504    Specimen: Blood Updated: 08/04/22 0612     Glucose 91 mg/dL      BUN 15 mg/dL      Creatinine 0.51 mg/dL      Sodium 123 mmol/L      Potassium 4.1 mmol/L      Chloride 95 mmol/L      CO2 20.0 mmol/L      Calcium 7.9 mg/dL      Total Protein 5.3 g/dL      Albumin 3.20 g/dL      ALT (SGPT) 167 U/L      AST (SGOT) 64 U/L      Alkaline Phosphatase 205 U/L      Total Bilirubin 0.4 mg/dL      Globulin 2.1 gm/dL      A/G Ratio 1.5 g/dL      BUN/Creatinine Ratio 29.4     Anion Gap 8.0 mmol/L      eGFR 87.7 mL/min/1.73      Comment: National Kidney Foundation and American Society of Nephrology (ASN) Task Force recommended calculation based on the Chronic Kidney Disease Epidemiology Collaboration (CKD-EPI) equation refit without adjustment for race.       Narrative:      GFR Normal >60  Chronic Kidney Disease <60  Kidney Failure <15      Phosphorus [[id number]]  (Abnormal) Collected: 08/04/22 0504    Specimen: Blood Updated:  08/04/22 0612     Phosphorus 2.1 mg/dL     Magnesium [[id number]]  (Normal) Collected: 08/04/22 0504    Specimen: Blood Updated: 08/04/22 0612     Magnesium 2.2 mg/dL     D-dimer, Quantitative [[id number]]  (Abnormal) Collected: 08/04/22 0504    Specimen: Blood Updated: 08/04/22 0607     D-Dimer, Quantitative 2.49 MCGFEU/mL     Narrative:      The [facility] D-Dimer test used in conjunction with a clinical pretest probability (PTP) assessment model, has been approved by the FDA to rule out the presence of venous thromboembolism (VTE) in outpatients suspected of deep venous thrombosis (DVT) or pulmonary embolism (PE). The cut-off for negative predictive value is <0.50 MCGFEU/mL.    Alpha - 1 - Antitrypsin Deficiency [[id number]] Collected: 08/04/22 0504    Specimen: Blood Updated: 08/04/22 0544    Mitochondrial Antibodies, M2 [[id number]] Collected: 08/04/22 0504    Specimen: Blood Updated: 08/04/22 0541    [facility] Comprehensive Panel [[id number]] Collected: 08/04/22 0504    Specimen: Blood Updated: 08/04/22 0541    Copper, Serum [[id number]] Collected: 08/04/22 0504    Specimen: Blood Updated: 08/04/22 0541    Urinalysis, Microscopic Only - Urine, Clean Catch [[id number]]  (Abnormal) Collected: 08/03/22 2144    Specimen: Urine, Clean Catch Updated: 08/03/22 2203     RBC, UA 0-2 /HPF      WBC, UA 21-30 /HPF      Bacteria, UA 1+ /HPF      Squamous Epithelial Cells, UA 0-2 /HPF      Hyaline Casts, UA None Seen /LPF      Methodology Automated Microscopy    Urinalysis With Culture If Indicated - Urine, Clean Catch [[id number]]  (Abnormal) Collected: 08/03/22 2144    Specimen: Urine, Clean Catch Updated: 08/03/22 2203     Color, UA Yellow     Appearance, UA Clear     pH, UA 5.5     Specific Gravity, UA <=1.005     Glucose, UA Negative     Ketones, UA Negative     Bilirubin, UA Negative     Blood, UA Small (1+)     Protein, UA Negative     Leuk Esterase, UA Moderate (2+)     Nitrite, UA Negative     Urobilinogen, UA 0.2 E.U./dL     "Narrative:      In absence of clinical symptoms, the presence of pyuria, bacteria, and/or nitrites on the urinalysis result does not correlate with infection.    Urine Culture - Urine, Urine, Clean Catch [[id number]] Collected: 08/03/22 2144    Specimen: Urine, Clean Catch Updated: 08/03/22 2203    Ferritin [[id number]]  (Abnormal) Collected: 08/03/22 1821    Specimen: Blood Updated: 08/03/22 2033     Ferritin 328.00 ng/mL     Narrative:      Results may be falsely decreased if patient taking Biotin.      Procalcitonin [[id number]]  (Normal) Collected: 08/03/22 1821    Specimen: Blood Updated: 08/03/22 2033     Procalcitonin 0.09 ng/mL     Narrative:      As a Marker for Sepsis (Non-Neonates):    1. <0.5 ng/mL represents a low risk of severe sepsis and/or septic shock.  2. >2 ng/mL represents a high risk of severe sepsis and/or septic shock.    As a Marker for Lower Respiratory Tract Infections that require antibiotic therapy:    PCT on Admission    Antibiotic Therapy       6-12 Hrs later    >0.5                Strongly Recommended  >0.25 - <0.5        Recommended   0.1 - 0.25          Discouraged              Remeasure/reassess PCT  <0.1                Strongly Discouraged     Remeasure/reassess PCT    As 28 day mortality risk marker: \"Change in Procalcitonin Result\" (>80% or <=80%) if Day 0 (or Day 1) and Day 4 values are available. Refer to http://www.[employer]-pct-calculator.com    Change in PCT <=80%  A decrease of PCT levels below or equal to 80% defines a positive change in PCT test result representing a higher risk for 28-day all-cause mortality of patients diagnosed with severe sepsis for septic shock.    Change in PCT >80%  A decrease of PCT levels of more than 80% defines a negative change in PCT result representing a lower risk for 28-day all-cause mortality of patients diagnosed with severe sepsis or septic shock.       TSH [[id number]]  (Normal) Collected: 08/03/22 1821    Specimen: Blood Updated: 08/03/22 " 2033     TSH 3.040 uIU/mL     Ceruloplasmin [[id number]]  (Normal) Collected: 08/03/22 1821    Specimen: Blood Updated: 08/03/22 2028     Ceruloplasmin 31 mg/dL     Iron Profile [[id number]]  (Abnormal) Collected: 08/03/22 1821    Specimen: Blood Updated: 08/03/22 2028     Iron 35 mcg/dL      Iron Saturation 9 %      Transferrin 256 mg/dL      TIBC 381 mcg/dL     Lipase [[id number]]  (Normal) Collected: 08/03/22 1821    Specimen: Blood Updated: 08/03/22 2028     Lipase 33 U/L     CK [[id number]]  (Normal) Collected: 08/03/22 1821    Specimen: Blood Updated: 08/03/22 2028     Creatine Kinase 78 U/L     Basic Metabolic Panel [[id number]]  (Abnormal) Collected: 08/03/22 1821    Specimen: Blood Updated: 08/03/22 1930     Glucose 136 mg/dL      BUN 14 mg/dL      Creatinine 0.56 mg/dL      Sodium 128 mmol/L      Potassium 4.5 mmol/L      Chloride 95 mmol/L      CO2 18.8 mmol/L      Calcium 8.2 mg/dL      BUN/Creatinine Ratio 25.0     Anion Gap 14.2 mmol/L      eGFR 85.7 mL/min/1.73      Comment: National Kidney Foundation and American Society of Nephrology (ASN) Task Force recommended calculation based on the Chronic Kidney Disease Epidemiology Collaboration (CKD-EPI) equation refit without adjustment for race.       Narrative:      GFR Normal >60  Chronic Kidney Disease <60  Kidney Failure <15          Imaging Results (Last 24 Hours)     Procedure Component Value Units Date/Time    CT Angiogram Chest [[id number]] Resulted: 08/04/22 1733     Updated: 08/04/22 1734    NM HIDA SCAN WITHOUT PHARMACOLOGICAL INTERVENTION [[id number]] Collected: 08/04/22 1623     Updated: 08/04/22 1649    Narrative:      NUCLEAR MEDICINE HEPATOBILIARY IMAGING WITH EJECTION FRACTION     HISTORY: Nausea, elevated liver function tests.     TECHNIQUE:  45 minute anterior dynamic imaging of the abdomen was  obtained following the intravenous administration of  4.8  mCi of 99m  technetium Choletec.   8 ounces of Boost was administered by mouth to  determine  gallbladder ejection fraction.     FINDINGS:  There is normal hepatic uptake and excretion with normal  visualization of the biliary, gallbladder, and bowel activity.  Gallbladder ejection fraction was calculated to be 60%(normal considered  greater than or equal to 35%).        Impression:      No evidence for cystic duct or common duct obstruction.  Gallbladder  ejection fraction after giving 8 ounces of Boost was calculated to be  60% which is within normal limits.     This report was finalized on 8/4/2022 4:46 PM by Dr. Christian Muller M.D.       XR Chest PA & Lateral [[id number]] Collected: 08/03/22 2205     Updated: 08/03/22 2209    Narrative:      TWO-VIEW CHEST     HISTORY: Leukocytosis.     FINDINGS: The lungs are well-expanded and clear. The heart is mildly  enlarged with a pacemaker in place as well as an aortic valve stent and  the overall appearance shows no change from 07/31/2022.     This report was finalized on 8/3/2022 10:06 PM by Dr. Al Tbaares M.D.             Medication Review:   I have personally reviewed    Current Facility-Administered Medications:   •  acetaminophen (TYLENOL) tablet 650 mg, 650 mg, Oral, Q4H PRN, Jeannie Echevarria MD, 650 mg at 08/01/22 0701  •  aspirin EC tablet 81 mg, 81 mg, Oral, Daily, Jeannie Echevarria MD, 81 mg at 08/04/22 0818  •  atropine sulfate injection 0.5 mg, 0.5 mg, Intravenous, Q5 Min PRN, Jeannie Echevarria MD  •  cefTRIAXone (ROCEPHIN) 1 g in sodium chloride 0.9 % 100 mL IVPB-VTB, 1 g, Intravenous, Q24H, Supriya Belle MD, Last Rate: 200 mL/hr at 08/04/22 1555, 1 g at 08/04/22 1555  •  clopidogrel (PLAVIX) tablet 75 mg, 75 mg, Oral, Daily, Jeannie Echevarria MD, 75 mg at 08/04/22 0818  •  Enoxaparin Sodium (LOVENOX) syringe 40 mg, 40 mg, Subcutaneous, Q24H, Supriya Belle MD, 40 mg at 08/03/22 2129  •  furosemide (LASIX) injection 40 mg, 40 mg, Intravenous, Q12H, Mila Rangel MD, 40 mg at 08/04/22 0700  •  metoprolol tartrate (LOPRESSOR) tablet 12.5 mg,  12.5 mg, Oral, Q12H, Supriya Belle MD, 12.5 mg at 08/04/22 0819  •  nitroglycerin (NITROSTAT) SL tablet 0.4 mg, 0.4 mg, Sublingual, Q5 Min PRN, Clint Locke MD  •  pantoprazole (PROTONIX) injection 40 mg, 40 mg, Intravenous, Q AM, Supriya Belle MD, 40 mg at 08/04/22 0532  •  potassium & sodium phosphates (PHOS-NAK) 280-160-250 MG packet - for Phosphorus less than 1.25 mg/dL, 2 packet, Oral, Q6H PRN **OR** potassium & sodium phosphates (PHOS-NAK) 280-160-250 MG packet - for Phosphorus 1.25 - 2.5 mg/dL, 2 packet, Oral, Q6H PRN, Supriya Belle MD  •  potassium chloride (K-DUR,KLOR-CON) ER tablet 40 mEq, 40 mEq, Oral, PRN, Supriya Belle MD  •  potassium chloride (KLOR-CON) packet 40 mEq, 40 mEq, Oral, PRN, Supriya Belle MD  •  sodium chloride 0.9 % flush 10 mL, 10 mL, Intravenous, PRN, Jeannie Echevarria MD  •  tamsulosin (FLOMAX) 24 hr capsule 0.4 mg, 0.4 mg, Oral, Daily, Ashlee Hewitt MD, 0.4 mg at 08/04/22 0818    Allergies:    Hydrocodone, Lipitor [atorvastatin], Erythromycin, and Lisinopril    Assessment:    Active Problems:  Patient Active Problem List   Diagnosis   • Aortic stenosis   • IGT (impaired glucose tolerance)   • Hyperlipidemia   • Hypertension   • Cervical arthritis   • Sciatica   • Vitamin D deficiency   • Exudative age-related macular degeneration, bilateral, with inactive choroidal neovascularization (HCC)   • Coronary artery disease involving native coronary artery of native heart   • AV block, complete ([facility])   • S/P TAVR (transcatheter aortic valve replacement)   • Presence of cardiac pacemaker   • ST elevation myocardial infarction (STEMI) ([facility])   • ST elevation myocardial infarction (STEMI), unspecified artery ([facility])   • Hyponatremia   • Elevated LFTs   • Right distal ureteral calculus   • Leukocytosis   • Hydronephrosis of right kidney   • Glucose intolerance       Right distal ureteral calculus with no colic  Contralateral left nonobstructing renal  calculus    Plan:    Okay to discharge from our standpoint at any point of time.  We will follow her up in the office to assure the stone is passed.      Clint Bangura MD    8/4/2022  18:16 EDT

## 2022-08-04 NOTE — PROGRESS NOTES
Cardiology has attempted to round on this patient x 4 today, always off the unit. Will see again in AM.

## 2022-08-04 NOTE — THERAPY EVALUATION
"Patient Name: Yuliana Gonzalez  : 1930    MRN: [id number]                              Today's Date: 2022       Admit Date: 2022    Visit Dx:     ICD-10-CM ICD-9-CM   1. ST elevation myocardial infarction (STEMI), unspecified artery ([facility])  I21.3 410.90     Patient Active Problem List   Diagnosis   • Aortic stenosis   • IGT (impaired glucose tolerance)   • Hyperlipidemia   • Hypertension   • Cervical arthritis   • Sciatica   • Vitamin D deficiency   • Exudative age-related macular degeneration, bilateral, with inactive choroidal neovascularization (HCC)   • Coronary artery disease involving native coronary artery of native heart   • AV block, complete ([facility])   • S/P TAVR (transcatheter aortic valve replacement)   • Presence of cardiac pacemaker   • ST elevation myocardial infarction (STEMI) ([facility])   • ST elevation myocardial infarction (STEMI), unspecified artery ([facility])   • Hyponatremia   • Elevated LFTs   • Right distal ureteral calculus   • Leukocytosis   • Hydronephrosis of right kidney   • Glucose intolerance     Past Medical History:   Diagnosis Date   • Aortic stenosis 2009    diagnosed,  moderate with 0.9cm area, 42mm max gradient. 0.91/34 6/15.   • Central loss of vision     RIGHT EYE   • Cervical arthritis    • Colitis     HX   • Coronary artery disease    • History of constipation    • History of esophageal reflux    • History of skin cancer    • Hyperlipidemia    • Hypertension    • IGT (impaired glucose tolerance)    • Irregular heart beat     HX   • Macular degeneration    • Medication management    • On continuous oral anticoagulation    • Redness of skin     NOSE/DERMATOLOGY TREATING WITH FLAGYL CREAM/ADVISED PT TO NOTIFY CARDIOLOGY   • Sciatica     HX   • Short-term memory loss     \"MILD\"   • SOB (shortness of breath) on exertion    • Vitamin D deficiency      Past Surgical History:   Procedure Laterality Date   • AORTIC VALVE REPAIR/REPLACEMENT Left 3/15/2022    Procedure: TTE " TRANSFEMORAL TRANSCATHETER AORTIC VALVE REPLACEMENT PERCUTANEOUS APPROACH;  Surgeon: Vesna Chris MD;  Location: [facility] CVOR;  Service: Cardiothoracic;  Laterality: Left;   • AORTIC VALVE REPAIR/REPLACEMENT Left 3/15/2022    Procedure: Transfemoral Transcatheter Aortic Valve Replacement with intra operative TTE and possible open surgical rescue;  Surgeon: Clint Locke MD;  Location: [facility] CVOR;  Service: Cardiovascular;  Laterality: Left;   • CARDIAC CATHETERIZATION N/A 1/21/2022    Procedure: Coronary angiography;  Surgeon: Clint Locke MD;  Location:  [facility] CATH INVASIVE LOCATION;  Service: Cardiovascular;  Laterality: N/A;   • CARDIAC CATHETERIZATION N/A 1/21/2022    Procedure: Percutaneous Coronary Intervention;  Surgeon: Clint Locke MD;  Location:  [facility] CATH INVASIVE LOCATION;  Service: Cardiovascular;  Laterality: N/A;   • CARDIAC CATHETERIZATION N/A 1/21/2022    Procedure: Stent ISMAEL coronary;  Surgeon: Clint Locke MD;  [facility];  Service: Cardiovascular;  Laterality: N/A;   • CARDIAC CATHETERIZATION Left 7/31/2022    Procedure: CORONARY ANGIOGRAPHY;  Surgeon: Jeannie Echevarria MD;  [facility];  Service: Cardiology;  Laterality: Left;   • CARDIAC CATHETERIZATION N/A 7/31/2022    Procedure: Left ventriculography;  Surgeon: Jeannie Echevarria MD;  [facility];  Service: Cardiology;  Laterality: N/A;   • CARDIAC CATHETERIZATION N/A 7/31/2022    Procedure: Left Heart Cath;  Surgeon: Jeannie Echevarria MD;  [facility]: [facility];  Service: Cardiology;  Laterality: N/A;   • CARDIAC ELECTROPHYSIOLOGY PROCEDURE N/A 3/16/2022    Procedure: Pacemaker DC new [place];  Surgeon: Yann Ceballos MD;  [facility];  Service: Cardiology;  Laterality: N/A;   • CATARACT EXTRACTION, BILATERAL     • COLONOSCOPY     • EYE SURGERY     • SKIN CANCER EXCISION         General Information     Row Name 08/04/22 1024          Physical Therapy Time and Intention    Document Type evaluation (P)   -     Mode of Treatment physical therapy (P)   -[facility] Name 08/04/22 1024          General Information    Patient Profile Reviewed yes (P)   -GS     Prior Level of Function independent:;gait;transfer;bed mobility (P)   Uses cane, also has rollator  -     Existing Precautions/Restrictions fall (P)   -     Barriers to Rehab medically complex (P)   -GS     Row Name 08/04/22 1024          Living Environment    People in Home alone (P)   -GS     Row Name 08/04/22 1024          Home Main Entrance    Number of Stairs, Main Entrance none (P)   -GS     Row Name 08/04/22 1024          Cognition    Orientation Status (Cognition) oriented x 4 (P)   -GS     Row Name 08/04/22 1024          Safety Issues, Functional Mobility    Impairments Affecting Function (Mobility) balance;strength;endurance/activity tolerance (P)   -           User Key  (r) = Recorded By, (t) = Taken By, (c) = Cosigned By    Initials Name Provider Type     Huy Rea, PT Student PT Student               Mobility     Row Name 08/04/22 1025          Bed Mobility    Bed Mobility supine-sit (P)   -     Supine-Sit [place] (Bed Mobility) standby assist (P)   -     Comment, (Bed Mobility) Able to reposition SBA (P)   -GS     Row Name 08/04/22 1025          Bed-Chair Transfer    Bed-Chair [place] (Transfers) contact guard (P)   -     Assistive Device (Bed-Chair Transfers) walker, front-wheeled (P)   -GS     Row Name 08/04/22 1025          Sit-Stand Transfer    Sit-Stand [place] (Transfers) contact guard (P)   -     Assistive Device (Sit-Stand Transfers) walker, front-wheeled (P)   -GS     Row Name 08/04/22 1025          Gait/Stairs (Locomotion)    [place] Level (Gait) contact guard (P)   -     Assistive Device (Gait) walker, front-wheeled (P)   -     Distance in Feet (Gait) 80' (P)   -      Deviations/Abnormal Patterns (Gait) jack decreased;gait speed decreased;stride length decreased (P)   -           User Key  (r) = Recorded By, (t) = Taken By, (c) = Cosigned By    Initials Name Provider Type    Huy Malloy PT Student PT Student               Obj/Interventions     Row Name 08/04/22 1026          Range of Motion Comprehensive    General Range of Motion no range of motion deficits identified (P)   -     Comment, General Range of Motion No formal testing completed (P)   -GS     Row Name 08/04/22 1026          Strength Comprehensive (MMT)    General Manual Muscle Testing (MMT) Assessment lower extremity strength deficits identified (P)   -     Comment, General Manual Muscle Testing (MMT) Assessment Knee Ext: 4/5 BL, Hip Flexion: 3+/5 BL, DF: 5/5 BL (P)   -GS     Row Name 08/04/22 1026          Balance    Balance Assessment sitting static balance;sitting dynamic balance;standing static balance;standing dynamic balance (P)   -     Static Sitting Balance standby assist (P)   -     Dynamic Sitting Balance contact guard (P)   -GS     Position, Sitting Balance unsupported;sitting edge of bed (P)   -GS     Static Standing Balance contact guard (P)   -GS     Dynamic Standing Balance contact guard (P)   -GS     Position/Device Used, Standing Balance walker, front-wheeled (P)   -     Comment, Balance Pt normally uses cane at BL, but opted to use FWW since she hasnt moved alot in the last couple days. No LOB recorded. (P)   -GS     Row Name 08/04/22 1026          Sensory Assessment (Somatosensory)    Sensory Assessment (Somatosensory) not tested (P)   -           User Key  (r) = Recorded By, (t) = Taken By, (c) = Cosigned By    Initials Name Provider Type    Huy Malloy PT Student PT Student               Goals/Plan     Row Name 08/04/22 1034          Bed Mobility Goal 1 (PT)    Activity/Assistive Device (Bed Mobility Goal 1, PT) bed mobility activities, all (P)   -      [place] Level/Cues Needed (Bed Mobility Goal 1, PT) modified independence (P)   -GS     Time Frame (Bed Mobility Goal 1, PT) 2 weeks (P)   -GS     Row Name 08/04/22 1034          Transfer Goal 1 (PT)    Activity/Assistive Device (Transfer Goal 1, PT) sit-to-stand/stand-to-sit;bed-to-chair/chair-to-bed (P)   -GS     [place] Level/Cues Needed (Transfer Goal 1, PT) modified independence (P)   -GS     Time Frame (Transfer Goal 1, PT) 2 weeks (P)   -GS     Row Name 08/04/22 1034          Gait Training Goal 1 (PT)    Activity/Assistive Device (Gait Training Goal 1, PT) gait (walking locomotion);assistive device use (P)   -GS     [place] Level (Gait Training Goal 1, PT) modified independence (P)   -GS     Distance (Gait Training Goal 1, PT) 150' (P)   -GS     Time Frame (Gait Training Goal 1, PT) 2 weeks (P)   -     Row Name 08/04/22 1034          Therapy Assessment/Plan (PT)    Planned Therapy Interventions (PT) balance training;bed mobility training;gait training;patient/family education;strengthening;transfer training;home exercise program (P)   -GS           User Key  (r) = Recorded By, (t) = Taken By, (c) = Cosigned By    Initials Name Provider Type    Huy Malloy, PT Student PT Student               Clinical Impression     Row Name 08/04/22 1028          Pain    Pretreatment Pain Rating 0/10 - no pain (P)   -GS     Posttreatment Pain Rating 0/10 - no pain (P)   -GS     Row Name 08/04/22 1028          Plan of Care Review    Plan of Care Reviewed With patient (P)   -GS     Outcome Evaluation Pt seen this AM. Pt is a 89 y.o. F who presents with complaints of SOA, with PMH including recent history of kidney stones, pacemaker, HLD, HTN, and CAD. Pt lives alone at facility where she has no need to use steps and is ind in mobility and trfers while utilizing a cane to ambulate. Pt was able to complete all bed mobility with SBA. She completed a STS with CGA and FWW and was able to ambulate 80' with CGA  and FWW. Pt trfer to chair with CGA and FWW. Pt demo generalized weakness and endurance limitations during session. Skilled intervention is necessary to address deficits above. PT rec return home to facility with additional PT. PT will follow up. (P)   -GS     Row Name 08/04/22 1028          Therapy Assessment/Plan (PT)    Rehab Potential (PT) good, to achieve stated therapy goals (P)   -GS     Criteria for Skilled Interventions Met (PT) yes (P)   -GS     Therapy Frequency (PT) 5 times/wk (P)   -GS     Row Name 08/04/22 1028          Vital Signs    O2 Delivery Pre Treatment room air (P)   -GS     O2 Delivery Intra Treatment room air (P)   -GS     O2 Delivery Post Treatment room air (P)   -GS     Row Name 08/04/22 1028          Positioning and Restraints    Pre-Treatment Position in bed (P)   -GS     Post Treatment Position chair (P)   -GS     In Chair notified nsg;reclined;call light within reach;encouraged to call for assist;exit alarm on (P)   -GS           User Key  (r) = Recorded By, (t) = Taken By, (c) = Cosigned By    Initials Name Provider Type    GS Huy Rea, PT Student PT Student               Outcome Measures     Row Name 08/04/22 1035          How much help from another person do you currently need...    Turning from your back to your side while in flat bed without using bedrails? 3 (P)   -GS     Moving from lying on back to sitting on the side of a flat bed without bedrails? 3 (P)   -GS     Moving to and from a bed to a chair (including a wheelchair)? 3 (P)   -GS     Standing up from a chair using your arms (e.g., wheelchair, bedside chair)? 3 (P)   -GS     Climbing 3-5 steps with a railing? 3 (P)   -GS     To walk in hospital room? 3 (P)   -GS     AM-PAC 6 Clicks Score (PT) 18 (P)   -GS     Highest level of mobility 6 --> Walked 10 steps or more (P)   -GS     Row Name 08/04/22 1035          Functional Assessment    Outcome Measure Options AM-PAC 6 Clicks Basic Mobility (PT) (P)   -GS           User  Key  (r) = Recorded By, (t) = Taken By, (c) = Cosigned By    Initials Name Provider Type    Huy Malloy, PT Student PT Student                             Physical Therapy Education                 Title: PT OT SLP Therapies (Done)     Topic: Physical Therapy (Done)     Point: Mobility training (Done)     Learning Progress Summary           Patient Acceptance, E, VU,NR by  at 8/4/2022 1035                   Point: Home exercise program (Done)     Learning Progress Summary           Patient Acceptance, E, VU,NR by  at 8/4/2022 1035                   Point: Body mechanics (Done)     Learning Progress Summary           Patient Acceptance, E, VU,NR by  at 8/4/2022 1035                   Point: Precautions (Done)     Learning Progress Summary           Patient Acceptance, E, VU,NR by  at 8/4/2022 1035                               User Key     Initials Effective Dates Name Provider Type Discipline     07/01/22 -  Huy Rea, PT Student PT Student PT              PT Recommendation and Plan  Planned Therapy Interventions (PT): (P) balance training, bed mobility training, gait training, patient/family education, strengthening, transfer training, home exercise program  Plan of Care Reviewed With: (P) patient  Outcome Evaluation: (P) Pt seen this AM. Pt is a 89 y.o. F who presents with complaints of SOA, with PMH including recent history of kidney stones, pacemaker, HLD, HTN, and CAD. Pt lives alone at facility where she has no need to use steps and is ind in mobility and trfers while utilizing a cane to ambulate. Pt was able to complete all bed mobility with SBA. She completed a STS with CGA and FWW and was able to ambulate 80' with CGA and FWW. Pt trfer to chair with CGA and FWW. Pt demo generalized weakness and endurance limitations during session. Skilled intervention is necessary to address deficits above. PT rec return home to facility with additional PT. PT will follow up.     Time Calculation:    PT  Charges     Row Name 08/04/22 1035             Time Calculation    Start Time 0845 (P)   -GS      Stop Time 0903 (P)   -GS      Time Calculation (min) 18 min (P)   -GS      PT Received On 08/04/22 (P)   -GS      PT - Next Appointment 08/05/22 (P)   -GS      PT Goal Re-Cert Due Date 08/18/22 (P)   -GS              Time Calculation- PT    Total Timed Code Minutes- PT 10 minute(s) (P)   -GS              Timed Charges    06818 - PT Therapeutic Activity Minutes 10 (P)   -GS              Untimed Charges    PT Eval/Re-eval Minutes 10 (P)   -GS              Total Minutes    Timed Charges Total Minutes 10 (P)   -GS      Untimed Charges Total Minutes 10 (P)   -GS       Total Minutes 20 (P)   -GS            User Key  (r) = Recorded By, (t) = Taken By, (c) = Cosigned By    Initials Name Provider Type     Huy Rea, PT Student PT Student              Therapy Charges for Today     Code Description Service Date Service Provider Modifiers Qty    [id number] HC PT THERAPEUTIC ACT EA 15 MIN 8/4/2022 Huy Rea PT Student GP 1          PT G-Codes  Outcome Measure Options: (P) AM-PAC 6 Clicks Basic Mobility (PT)  AM-PAC 6 Clicks Score (PT): (P) 18    Huy Rea PT Student  8/4/2022

## 2022-08-04 NOTE — PROGRESS NOTES
[facility] Progress Note      Patient Name: Yuliana Gonzalez  : 1930  MRN: [id number]  Primary Care Physician:  Trent Davis MD  Date of admission: 2022    Subjective     Interval History:   Follow up hyponatremia.  Lasix last night at 5 pm, then IVF resumed per A.  Recheck sodium at 6 :30 pm improved to 128, but 123 this am.  Replacing Phos. Weight still up compared to admission. Urine output improved.   SP portal vein and lower ext dopplers, now going for HIDA scan.   Feels hungry. Less puffy.  Still soa with exertion .  Review of Systems:   As noted above    Objective     Vitals:   Temp:  [97.9 °F (36.6 °C)-99 °F (37.2 °C)] 97.9 °F (36.6 °C)  Heart Rate:  [63-76] 63  Resp:  [17-20] 17  BP: (126-170)/(61-79) 126/79    Intake/Output Summary (Last 24 hours) at 2022 1042  Last data filed at 2022 0825  Gross per 24 hour   Intake 758.75 ml   Output 2300 ml   Net -1541.25 ml       Physical Exam:    General Appearance: alert, oriented x 3, no acute distress . Examined in nuclear medicine.   Skin: warm and dry  HEENT: mask in place , nonicteric sclera  Neck: supple, no JVD  Lungs: Clear to auscultation   Heart: RRR, 2/6 syst m  Abdomen: soft, very mild RUQ tenderness. No guarding or rebound . nondistended. + bs.   : no palpable bladder  Extremities: 1+ lower and upper ext edema .  Neuro: normal speech and mental status     Scheduled Meds:     aspirin, 81 mg, Oral, Daily  clopidogrel, 75 mg, Oral, Daily  enoxaparin, 40 mg, Subcutaneous, Q24H  furosemide, 40 mg, Intravenous, Q12H  metoprolol tartrate, 12.5 mg, Oral, Q12H  pantoprazole, 40 mg, Intravenous, Q AM  potassium phosphate, 15 mmol, Intravenous, Once  tamsulosin, 0.4 mg, Oral, Daily      IV Meds:        Results Reviewed:   I have personally reviewed the results from the time of this admission to 2022 10:42 EDT     Results from last 7 days   Lab Units 22  0504 22  1821 22  0839  08/02/22  2340 08/01/22  0325 07/31/22  1410   SODIUM mmol/L 123* 128* 122*  122* 121*  121*   < > 120*   POTASSIUM mmol/L 4.1 4.5 4.2 4.1   < > 3.6   CHLORIDE mmol/L 95* 95* 92* 91*   < > 85*   CO2 mmol/L 20.0* 18.8* 16.0* 20.0*   < > 22.4   BUN mg/dL 15 14 16 20   < > 26*   CREATININE mg/dL 0.51* 0.56* 0.52* 0.51*   < > 0.72   CALCIUM mg/dL 7.9* 8.2 7.6* 7.8*   < > 7.9*   BILIRUBIN mg/dL 0.4  --   --  0.5  --  0.7   ALK PHOS U/L 205*  --   --  247*  --  160*   ALT (SGPT) U/L 167*  --   --  237*  --  62*   AST (SGOT) U/L 64*  --   --  122*  --  78*   GLUCOSE mg/dL 91 136* 158* 156*   < > 189*    < > = values in this interval not displayed.       Estimated Creatinine Clearance: 64.7 mL/min (A) (by C-G formula based on SCr of 0.51 mg/dL (L)).    Results from last 7 days   Lab Units 08/04/22  0504 08/03/22  0839 08/02/22 2340 08/02/22  0812 07/31/22  1410   MAGNESIUM mg/dL 2.2  --   --   --  2.2   PHOSPHORUS mg/dL 2.1* 1.7* 2.2*   < >  --     < > = values in this interval not displayed.       Results from last 7 days   Lab Units 08/04/22  0504   URIC ACID mg/dL 5.0       Results from last 7 days   Lab Units 08/04/22  0504 08/02/22 2340 08/01/22 0325 07/31/22  1410   WBC 10*3/mm3 12.73* 15.61* 17.25* 21.62*   HEMOGLOBIN g/dL 11.3* 11.5* 11.8* 12.1   PLATELETS 10*3/mm3 241 245 204 214       Results from last 7 days   Lab Units 07/31/22  1410   INR  1.31*       Assessment / Plan     ASSESSMENT:  1. Hyponatremia, acute. Initially likely  due to hypovolemia, HCTZ.  Urine sodium less than 20, high urine osm.  SP IVF 7/31-8/1.  Repeat urine chem unchanged.  Weight up , more edema yesterday. BNP elevated.   Diuretic this am, recheck at noon.   2. Nephrolithiasis, right distal 4mm stone near UVJ,  Left stone in renal pelvis.  eval ongoing .  3. Abnormal EKG, inferior ST elevation.   4. 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   5. Leukocytosis with negative blood culture and insignif colony count urine culture so far.   WBC improved.   6. Elevated transaminases.  Improving. Portal vein doppler this am .  Trace ascites on US RUQ. meds reviewed. Not on statin. HIDA scan today .  PLAN:  1. Follow up noon BMP today .  2. Lasix 40 mgIV q 12 hours .      Mila Rangel MD  08/04/22  10:42 EDT    [facility]  [phone]

## 2022-08-04 NOTE — PLAN OF CARE
Goal Outcome Evaluation:  Plan of Care Reviewed With: (P) patient           Outcome Evaluation: (P) Pt seen this AM. Pt is a 89 y.o. F who presents with complaints of SOA, with PMH including recent history of kidney stones, pacemaker, HLD, HTN, and CAD. Pt lives alone at facility where she has no need to use steps and is ind in mobility and trfers while utilizing a cane to ambulate. Pt was able to complete all bed mobility with SBA. She completed a STS with CGA and FWW and was able to ambulate 80' with CGA and FWW. Pt trfer to chair with CGA and FWW. Pt demo generalized weakness and endurance limitations during session. Skilled intervention is necessary to address deficits above. PT rec return home to facility with additional PT. PT will follow up.

## 2022-08-04 NOTE — PROGRESS NOTES
Discharge Planning Assessment  [facility]     Patient Name: Yuliana Gonzalez  MRN: [id number]  Today's Date: 8/4/2022    Admit Date: 7/31/2022     Discharge Needs Assessment    No documentation.                Discharge Plan     Row Name 08/04/22 1602       Plan    Plan Return home to independent living at [facility] [place], daughter to transport    Plan Comments Spoke with patient at bedside for discharge planning she stated she does not need skilled rehab and plans to return home to [facility] independent living and have private paid care givers from [facility]. Daughter at bedside Faith Slider [phone] does not think that would be safe she would like referrals to 1st choice is [place] and 2nd choice is [facility]. Referrals placed in roldan. Alison ALCAZAR              Continued Care and Services - Admitted Since 7/31/2022     Destination     Service Provider Request Status Selected Services Address Phone Fax Patient Preferred    [facility] [place]  Pending - Request Sent N/A [address] [place] [id number] [phone] --       Internal Comment last updated by Zhanna Cordero, RN 8/2/2022 1207    [facility]                       Selected Continued Care - Episodes Includes selections from active Coordinated Care Management episodes    High Risk Care Management Episode start date: 7/27/2022 (Paused)   There are no active outsourced providers for this episode.               Expected Discharge Date and Time     Expected Discharge Date Expected Discharge Time    Aug 8, 2022          Demographic Summary    No documentation.                Functional Status    No documentation.                Psychosocial    No documentation.                Abuse/Neglect    No documentation.                Legal    No documentation.                Substance Abuse    No documentation.                Patient Forms    No documentation.                   Kendra Melton, RN

## 2022-08-04 NOTE — PLAN OF CARE
Goal Outcome Evaluation:  Plan of Care Reviewed With: patient        Progress: improving  Outcome Evaluation: No major issues over shift, VS stable, pt remains AOx4, on RA, no complaints of pain, pt off floor for most of am for HIDA scan and liver U/S, pt getting her phosphorus replaced and rocephin for a UTI, pt to still undergo a CT scan to check for a PE, pt currently in no distress and on RA. will continue to monitor

## 2022-08-04 NOTE — PLAN OF CARE
Goal Outcome Evaluation:  Plan of Care Reviewed With: patient        Progress: improving  Outcome Evaluation: VSS. No acute issues tonight. No c/o pain, CP, or discomfort. IV fluids initiated. Metoprolol dose decreased. Slept well tonight. Will continue to monitor.

## 2022-08-05 LAB
ALBUMIN SERPL-MCNC: 3.1 G/DL (ref 3.5–5.2)
ALBUMIN/GLOB SERPL: 1.5 G/DL
ALP SERPL-CCNC: 207 U/L (ref 39–117)
ALT SERPL W P-5'-P-CCNC: 141 U/L (ref 1–33)
ANION GAP SERPL CALCULATED.3IONS-SCNC: 12.6 MMOL/L (ref 5–15)
AST SERPL-CCNC: 48 U/L (ref 1–32)
BILIRUB SERPL-MCNC: 0.4 MG/DL (ref 0–1.2)
BUN SERPL-MCNC: 18 MG/DL (ref 8–23)
BUN/CREAT SERPL: 30.5 (ref 7–25)
CALCIUM SPEC-SCNC: 8 MG/DL (ref 8.2–9.6)
CENTROMERE B AB SER-ACNC: <0.2 AI (ref 0–0.9)
CHLORIDE SERPL-SCNC: 100 MMOL/L (ref 98–107)
CHROMATIN AB SERPL-ACNC: <0.2 AI (ref 0–0.9)
CO2 SERPL-SCNC: 21.4 MMOL/L (ref 22–29)
CREAT SERPL-MCNC: 0.59 MG/DL (ref 0.57–1)
DEPRECATED RDW RBC AUTO: 40.2 FL (ref 37–54)
DSDNA AB SER-ACNC: <1 IU/ML (ref 0–9)
EGFRCR SERPLBLD CKD-EPI 2021: 84.7 ML/MIN/1.73
ENA JO1 AB SER-ACNC: <0.2 AI (ref 0–0.9)
ENA RNP AB SER-ACNC: <0.2 AI (ref 0–0.9)
ENA SCL70 AB SER-ACNC: <0.2 AI (ref 0–0.9)
ENA SM AB SER-ACNC: <0.2 AI (ref 0–0.9)
ENA SS-A AB SER-ACNC: <0.2 AI (ref 0–0.9)
ENA SS-B AB SER-ACNC: <0.2 AI (ref 0–0.9)
EOSINOPHIL # BLD MANUAL: 0.55 10*3/MM3 (ref 0–0.4)
EOSINOPHIL NFR BLD MANUAL: 5 % (ref 0.3–6.2)
ERYTHROCYTE [DISTWIDTH] IN BLOOD BY AUTOMATED COUNT: 13.8 % (ref 12.3–15.4)
GLOBULIN UR ELPH-MCNC: 2.1 GM/DL
GLUCOSE SERPL-MCNC: 103 MG/DL (ref 65–99)
HCT VFR BLD AUTO: 33.5 % (ref 34–46.6)
HGB BLD-MCNC: 11.3 G/DL (ref 12–15.9)
LYMPHOCYTES # BLD MANUAL: 1.75 10*3/MM3 (ref 0.7–3.1)
LYMPHOCYTES NFR BLD MANUAL: 5 % (ref 5–12)
Lab: NORMAL
MAGNESIUM SERPL-MCNC: 2 MG/DL (ref 1.7–2.3)
MCH RBC QN AUTO: 27.9 PG (ref 26.6–33)
MCHC RBC AUTO-ENTMCNC: 33.7 G/DL (ref 31.5–35.7)
MCV RBC AUTO: 82.7 FL (ref 79–97)
METAMYELOCYTES NFR BLD MANUAL: 3 % (ref 0–0)
MITOCHONDRIA M2 IGG SER-ACNC: <20 UNITS (ref 0–20)
MONOCYTES # BLD: 0.55 10*3/MM3 (ref 0.1–0.9)
MYELOCYTES NFR BLD MANUAL: 5 % (ref 0–0)
NEUTROPHILS # BLD AUTO: 7.23 10*3/MM3 (ref 1.7–7)
NEUTROPHILS NFR BLD MANUAL: 66 % (ref 42.7–76)
NRBC BLD AUTO-RTO: 0.2 /100 WBC (ref 0–0.2)
PHOSPHATE SERPL-MCNC: 3.4 MG/DL (ref 2.5–4.5)
PLAT MORPH BLD: NORMAL
PLATELET # BLD AUTO: 257 10*3/MM3 (ref 140–450)
PMV BLD AUTO: 10.5 FL (ref 6–12)
POTASSIUM SERPL-SCNC: 3.8 MMOL/L (ref 3.5–5.2)
PROT SERPL-MCNC: 5.2 G/DL (ref 6–8.5)
RBC # BLD AUTO: 4.05 10*6/MM3 (ref 3.77–5.28)
RBC MORPH BLD: NORMAL
SARS-COV-2 RNA PNL SPEC NAA+PROBE: NOT DETECTED
SODIUM SERPL-SCNC: 134 MMOL/L (ref 136–145)
VARIANT LYMPHS NFR BLD MANUAL: 16 % (ref 19.6–45.3)
WBC MORPH BLD: NORMAL
WBC NRBC COR # BLD: 10.96 10*3/MM3 (ref 3.4–10.8)

## 2022-08-05 PROCEDURE — 87635 SARS-COV-2 COVID-19 AMP PRB: CPT | Performed by: INTERNAL MEDICINE

## 2022-08-05 PROCEDURE — 84100 ASSAY OF PHOSPHORUS: CPT | Performed by: INTERNAL MEDICINE

## 2022-08-05 PROCEDURE — 83735 ASSAY OF MAGNESIUM: CPT | Performed by: INTERNAL MEDICINE

## 2022-08-05 PROCEDURE — 80053 COMPREHEN METABOLIC PANEL: CPT | Performed by: NURSE PRACTITIONER

## 2022-08-05 PROCEDURE — 25010000002 CEFTRIAXONE PER 250 MG: Performed by: INTERNAL MEDICINE

## 2022-08-05 PROCEDURE — 85007 BL SMEAR W/DIFF WBC COUNT: CPT | Performed by: NURSE PRACTITIONER

## 2022-08-05 PROCEDURE — 25010000002 FUROSEMIDE PER 20 MG: Performed by: INTERNAL MEDICINE

## 2022-08-05 PROCEDURE — 25010000002 ENOXAPARIN PER 10 MG: Performed by: INTERNAL MEDICINE

## 2022-08-05 PROCEDURE — 85025 COMPLETE CBC W/AUTO DIFF WBC: CPT | Performed by: NURSE PRACTITIONER

## 2022-08-05 PROCEDURE — 99232 SBSQ HOSP IP/OBS MODERATE 35: CPT | Performed by: NURSE PRACTITIONER

## 2022-08-05 RX ORDER — TORSEMIDE 20 MG/1
40 TABLET ORAL DAILY
Status: DISCONTINUED | OUTPATIENT
Start: 2022-08-05 | End: 2022-08-06 | Stop reason: HOSPADM

## 2022-08-05 RX ORDER — PANTOPRAZOLE SODIUM 40 MG/1
40 TABLET, DELAYED RELEASE ORAL
Status: DISCONTINUED | OUTPATIENT
Start: 2022-08-06 | End: 2022-08-06 | Stop reason: HOSPADM

## 2022-08-05 RX ADMIN — TORSEMIDE 40 MG: 20 TABLET ORAL at 12:25

## 2022-08-05 RX ADMIN — METOPROLOL TARTRATE 12.5 MG: 25 TABLET, FILM COATED ORAL at 21:41

## 2022-08-05 RX ADMIN — CEFTRIAXONE SODIUM 1 G: 1 INJECTION, POWDER, FOR SOLUTION INTRAMUSCULAR; INTRAVENOUS at 15:58

## 2022-08-05 RX ADMIN — ENOXAPARIN SODIUM 40 MG: 100 INJECTION SUBCUTANEOUS at 21:41

## 2022-08-05 RX ADMIN — TAMSULOSIN HYDROCHLORIDE 0.4 MG: 0.4 CAPSULE ORAL at 08:26

## 2022-08-05 RX ADMIN — PANTOPRAZOLE SODIUM 40 MG: 40 INJECTION, POWDER, FOR SOLUTION INTRAVENOUS at 06:32

## 2022-08-05 RX ADMIN — METOPROLOL TARTRATE 12.5 MG: 25 TABLET, FILM COATED ORAL at 08:26

## 2022-08-05 RX ADMIN — CLOPIDOGREL 75 MG: 75 TABLET, FILM COATED ORAL at 08:26

## 2022-08-05 RX ADMIN — FUROSEMIDE 40 MG: 10 INJECTION, SOLUTION INTRAMUSCULAR; INTRAVENOUS at 06:32

## 2022-08-05 RX ADMIN — ASPIRIN 81 MG: 81 TABLET, COATED ORAL at 08:26

## 2022-08-05 NOTE — PROGRESS NOTES
Discharge Planning Assessment  [facility]     Patient Name: Yuliana Gonzalez  MRN: [id number]  Today's Date: 8/5/2022    Admit Date: 7/31/2022     Discharge Needs Assessment    No documentation.                Discharge Plan     Row Name 08/05/22 1551       Plan    Plan Patient has been accepted at [facility]; Daughter is going to tour [facility] today    Plan Comments [facility]  has been accepted at [facility], daughter is going to tour and decide if she is agreeable with [facility]. Daughter will transport. Patient will need a Covid test before she can be admitted to [facility],  Packet in [facility]. Alison ALCAZAR              Continued Care and Services - Admitted Since 7/31/2022     Destination     Service Provider Request Status Selected Services Address Phone Fax Patient Preferred    [facility]  Accepted N/A [address] [facility] [id number] [phone] --    [facility]  Accepted N/A [address] [facility] [address] [phone] --    [facility] [place]  Pending - Request Sent N/A [address] [facility] [address] [phone] --       Internal Comment last updated by Zhanna Cordero, RN 8/2/2022 1207    [facility]               [facility]  Pending - Request Sent N/A [address] [facility] [address] [phone] --       Internal Comment last updated by Kendra Melton, RN 8/4/2022 1611    2nd choice                  [facility] ACUTE REHAB PROGRAM  Pending - No Request Sent N/A [address] [place] [address] [id number] [phone] -- --    [facility]  Declined  No Bed Available N/A [address] [facility] [id number] [phone] --       Internal Comment last updated by Kendra Melton, RN 8/4/2022 1610    1st choice                        Selected  Continued Care - Episodes Includes selections from active Coordinated Care Management episodes    High Risk Care Management Episode start date: 7/27/2022 (Paused)   There are no active outsourced providers for this episode.               Expected Discharge Date and Time     Expected Discharge Date Expected Discharge Time    Aug 8, 2022          Demographic Summary    No documentation.                Functional Status    No documentation.                Psychosocial    No documentation.                Abuse/Neglect    No documentation.                Legal    No documentation.                Substance Abuse    No documentation.                Patient Forms    No documentation.                   Kendra Melton RN

## 2022-08-05 NOTE — PROGRESS NOTES
Discharge Planning Assessment  [facility]     Patient Name: Yuliana Gonzalez  MRN: [id number]  Today's Date: 8/5/2022    Admit Date: 7/31/2022     Discharge Needs Assessment    No documentation.                Discharge Plan     Row Name 08/05/22 1710       Plan    Plan Patient has been accepted at [facility] will need Covid Test (has been ordered)    Plan Comments Spoke with daughter Faith Slider [phone] she is agreeable with [facility] for skilled rehab. Spoke with Gemma/Ratna Dylan will have a bed available tomorrow and will need a negative Covid test. Covid test has been ordered. Packet in [employer] family will transport. Alison ALCAZAR    Row Name 08/05/22 1555       Plan    Plan Patient has been accepted at [facility]; Daughter is going to tour [facility] today    Plan Comments [facility]  has [facility] accepted at [facility], daughter is going to tour and decide if she is agreeable with [facility]. Daughter will transport. Packet in [employer]. Alison ALCAZAR              Continued Care and Services - Admitted Since 7/31/2022     Destination     Service Provider Request Status Selected Services Address Phone Fax Patient Preferred    [facility]  Accepted N/A [address] [facility] [address] [phone] --    [facility]  Accepted N/A [address] [facility] [address] [phone] --    [facility] [place]  Pending - Request Sent N/A [address] [place] [facility] [address] [phone] --       Internal Comment last updated by Zhanna Cordero, RN 8/2/2022 1207    [facility]               [facility]  Pending - Request Sent N/A [address] [facility] [address] [phone] --       Internal Comment last updated by Kendra Melton, RN 8/4/2022 1615    [address] [place] [address] ACUTE REHAB PROGRAM   Pending - No Request Sent N/A [address], [place] [id number] [phone] -- --    [facility] [facility] [place]  Declined  No Bed Available N/A [address], [facility] [address] [phone] --       Internal Comment last updated by Kendra Melton, RN 8/4/2022 1615    1st choice                        Selected Continued Care - Episodes Includes selections from active Coordinated Care Management episodes    High Risk Care Management Episode start date: 7/27/2022 (Paused)   There are no active outsourced providers for this episode.               Expected Discharge Date and Time     Expected Discharge Date Expected Discharge Time    Aug 8, 2022          Demographic Summary    No documentation.                Functional Status    No documentation.                Psychosocial    No documentation.                Abuse/Neglect    No documentation.                Legal    No documentation.                Substance Abuse    No documentation.                Patient Forms    No documentation.                   Kendra Metlon, RN

## 2022-08-05 NOTE — PROGRESS NOTES
[facility] Progress Note      Patient Name: Yuliana Gonzalez  : 1930  MRN: [id number]  Primary Care Physician:  Trent Davis MD  Date of admission: 2022    Subjective     Interval History:   Follow up hyponatremia.   Patient is feeling better, denies any chest pain or shortness of air, no orthopnea or PND, no nausea or vomiting, no abdominal pain, no dysuria or gross hematuria.  .  Review of Systems:   As noted above    Objective     Vitals:   Temp:  [97.1 °F (36.2 °C)-98.3 °F (36.8 °C)] 98.3 °F (36.8 °C)  Heart Rate:  [66-71] 66  Resp:  [16-18] 16  BP: (107-155)/(54-88) 107/54    Intake/Output Summary (Last 24 hours) at 2022 1048  Last data filed at 2022 0915  Gross per 24 hour   Intake 210 ml   Output 700 ml   Net -490 ml       Physical Exam:    General Appearance: alert, oriented x 3, no acute distress .    Skin: warm and dry  HEENT: mask in place , nonicteric sclera  Neck: supple, no JVD  Lungs: Clear to auscultation   Heart: RRR, 2/6 syst m  Abdomen: soft, very mild RUQ tenderness. No guarding or rebound . nondistended. + bs.   : no palpable bladder, pure wick system  Extremities: No lower extremity edema  Neuro: normal speech and mental status     Scheduled Meds:     aspirin, 81 mg, Oral, Daily  cefTRIAXone, 1 g, Intravenous, Q24H  clopidogrel, 75 mg, Oral, Daily  enoxaparin, 40 mg, Subcutaneous, Q24H  furosemide, 40 mg, Intravenous, Q12H  metoprolol tartrate, 12.5 mg, Oral, Q12H  pantoprazole, 40 mg, Intravenous, Q AM  tamsulosin, 0.4 mg, Oral, Daily      IV Meds:        Results Reviewed:   I have personally reviewed the results from the time of this admission to 2022 10:48 EDT     Results from last 7 days   Lab Units 22  0454 22  1508 22  0504 22  0839 22  2340   SODIUM mmol/L 134* 130* 123*   < > 121*  121*   POTASSIUM mmol/L 3.8 4.3 4.1   < > 4.1   CHLORIDE mmol/L 100 97* 95*   < > 91*   CO2 mmol/L 21.4* 20.0* 20.0*    < > 20.0*   BUN mg/dL 18 17 15   < > 20   CREATININE mg/dL 0.59 0.61 0.51*   < > 0.51*   CALCIUM mg/dL 8.0* 8.0* 7.9*   < > 7.8*   BILIRUBIN mg/dL 0.4  --  0.4  --  0.5   ALK PHOS U/L 207*  --  205*  --  247*   ALT (SGPT) U/L 141*  --  167*  --  237*   AST (SGOT) U/L 48*  --  64*  --  122*   GLUCOSE mg/dL 103* 154* 91   < > 156*    < > = values in this interval not displayed.       Estimated Creatinine Clearance: 55.4 mL/min (by C-G formula based on SCr of 0.59 mg/dL).    Results from last 7 days   Lab Units 08/05/22  0454 08/04/22  0504 08/03/22  0839 08/02/22  0812 07/31/22  1410   MAGNESIUM mg/dL 2.0 2.2  --   --  2.2   PHOSPHORUS mg/dL 3.4 2.1* 1.7*   < >  --     < > = values in this interval not displayed.       Results from last 7 days   Lab Units 08/04/22  0504   URIC ACID mg/dL 5.0       Results from last 7 days   Lab Units 08/05/22  0454 08/04/22  0504 08/02/22  2340 08/01/22  0325 07/31/22  1410   WBC 10*3/mm3 10.96* 12.73* 15.61* 17.25* 21.62*   HEMOGLOBIN g/dL 11.3* 11.3* 11.5* 11.8* 12.1   PLATELETS 10*3/mm3 257 241 245 204 214       Results from last 7 days   Lab Units 07/31/22  1410   INR  1.31*       Assessment / Plan     ASSESSMENT:  1. Hyponatremia, acute. Initially likely  due to hypovolemia, HCTZ.  Urine sodium less than 20, high urine osm.  SP IVF 7/31-8/1.  Repeat urine chem unchanged.  Sodium up to 134, creatinine 0.59.  2. Nephrolithiasis, right distal 4mm stone near UVJ,  Left stone in renal pelvis.  eval ongoing .  3. Abnormal EKG, inferior ST elevation.   4. 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   5. Elevated transaminases.  Improved.    PLAN:  1.  Continue the same treatment  2.  Switch to oral diuretics and avoid hydrochlorothiazide and consider as an allergy.  We will start torsemide 40 mg daily  3.  Surveillance labs    I discussed the case with the patient and her daughter at the bedside.      Jose Eduardo Momin MD  08/05/22  10:48 EDT    [facility]  [phone]

## 2022-08-05 NOTE — PLAN OF CARE
Goal Outcome Evaluation:      Switched to po torsemide, iv abx continued. Will discharge tomorrow to SNF.

## 2022-08-05 NOTE — PROGRESS NOTES
[facility] Acute [facility]  Referral received. Chart work done. Spoke with pt who states she prefers to go to [place] closer to her dgt.   CCP informed.   Will sign off    Flori Mazariegos RN  Acute Rehab Admission Nurse

## 2022-08-05 NOTE — PROGRESS NOTES
Hospital Follow Up    LOS:  LOS: 5 days   Patient Name: Yuliana Gonzalez  Age/Sex: 89 y.o. female  : 1930  MRN: [id number]    Day of Service: 22   Length of Stay: 5  Encounter Provider: CLEMENTINE Mary  Place of Service: [facility] CARDIOLOGY  Patient Care Team:  Trent Davis MD as PCP - General (Internal Medicine)  Trent Davis MD as PCP - Internal Medicine (Internal Medicine)  Thang Bruce MD as Consulting Physician (Ophthalmology)  Curtis Marshall MD as Consulting Physician (Cardiology)  Danya Kenney RN as Ambulatory  (Population Health)    Subjective:     Chief Complaint: abnormal ekg     Interval History: Feels better today    Objective:     Objective:  Temp:  [97.1 °F (36.2 °C)-98.3 °F (36.8 °C)] 98.3 °F (36.8 °C)  Heart Rate:  [66-71] 66  Resp:  [16-18] 16  BP: (107-155)/(54-88) 107/54     Intake/Output Summary (Last 24 hours) at 2022 1215  Last data filed at 2022 1210  Gross per 24 hour   Intake 210 ml   Output 1000 ml   Net -790 ml     Body mass index is 30.19 kg/m².      22  0500 22  0548 22  0540   Weight: 74 kg (163 lb 1.6 oz) 73.7 kg (162 lb 8 oz) 72.5 kg (159 lb 12.8 oz)     Weight change: -1.225 kg (-2 lb 11.2 oz)    Physical Exam:   General Appearance:    Awake alert and oriented in no acute distress.   Color:  Skin:  Neuro:  HEENT:    Lungs:     Pink  Warm and dry  No focal, motor or sensory deficits  Neck supple, pupils equal, round and reactive. No JVD, No Bruit  Clear to auscultation,respirations regular, even and                  unlabored    Heart:    Regular rate and rhythm, S1 and S2, no murmur, no gallop, no rub. No edema, DP/PT pulses are 2+   Chest Wall:    No abnormalities observed   Abdomen:     Normal bowel sounds, no masses, no organomegaly, soft        non-tender, non-distended, no guarding, no ascites noted   Extremities:   Moves all extremities well, no edema, no  "cyanosis, no redness       Lab Review:   Results from last 7 days   Lab Units 08/05/22  0454 08/04/22  1508 08/04/22  0504   SODIUM mmol/L 134* 130* 123*   POTASSIUM mmol/L 3.8 4.3 4.1   CHLORIDE mmol/L 100 97* 95*   CO2 mmol/L 21.4* 20.0* 20.0*   BUN mg/dL 18 17 15   CREATININE mg/dL 0.59 0.61 0.51*   GLUCOSE mg/dL 103* 154* 91   CALCIUM mg/dL 8.0* 8.0* 7.9*   AST (SGOT) U/L 48*  --  64*   ALT (SGPT) U/L 141*  --  167*     Results from last 7 days   Lab Units 08/03/22  1821 07/31/22  1829 07/31/22  1410   CK TOTAL U/L 78  --   --    TROPONIN T ng/mL  --  1.150* 1.140*     Results from last 7 days   Lab Units 08/05/22  0454 08/04/22  0504   WBC 10*3/mm3 10.96* 12.73*   HEMOGLOBIN g/dL 11.3* 11.3*   HEMATOCRIT % 33.5* 33.3*   PLATELETS 10*3/mm3 257 241     Results from last 7 days   Lab Units 07/31/22  1410   INR  1.31*   APTT seconds 27.8     Results from last 7 days   Lab Units 08/05/22  0454 08/04/22  0504   MAGNESIUM mg/dL 2.0 2.2     Results from last 7 days   Lab Units 08/01/22  0325   CHOLESTEROL mg/dL 139   TRIGLYCERIDES mg/dL 145   HDL CHOL mg/dL 56     Results from last 7 days   Lab Units 08/04/22  0504 07/31/22  1410   PROBNP pg/mL 2,340.0* 3,253.0*     Results from last 7 days   Lab Units 08/03/22  1821   TSH uIU/mL 3.040     I reviewed the patient's new clinical results.  I personally viewed and interpreted the patient's EKG  Current Medications:   Scheduled Meds:aspirin, 81 mg, Oral, Daily  cefTRIAXone, 1 g, Intravenous, Q24H  clopidogrel, 75 mg, Oral, Daily  enoxaparin, 40 mg, Subcutaneous, Q24H  metoprolol tartrate, 12.5 mg, Oral, Q12H  pantoprazole, 40 mg, Intravenous, Q AM  tamsulosin, 0.4 mg, Oral, Daily  torsemide, 40 mg, Oral, Daily      Continuous Infusions:     Allergies:  Allergies   Allergen Reactions   • Hydrocodone Shortness Of Breath     Also reports \"lethargy\"   • Lipitor [Atorvastatin] Nausea Only     \"FELT AWFUL\"   • Erythromycin Diarrhea   • Lisinopril Cough       Assessment:       ST " elevation myocardial infarction (STEMI) (HCC)    Hypertension    Coronary artery disease involving native coronary artery of native heart    S/P TAVR (transcatheter aortic valve replacement)    Presence of cardiac pacemaker    Hyponatremia    Elevated LFTs    Right distal ureteral calculus    Leukocytosis    Hydronephrosis of right kidney    Glucose intolerance    1. Abnormal EKG with ST elevation inferiorly- no flowing limiting CAD  2. Renal stone with hydronephrosis  3. Hyponatremia  4. Elevated ddimer - CT chest neg for PE  5. H/O TAVR  6. Leukocytosis    Plan:       Doing better today. NO complaints of chest pain. Ct chest negative yesterday for PE. WBC and sodium improving.    CLEMENTINE Mary  08/05/22  12:15 EDT  Electronically signed by CLEMENTINE Mary, 08/05/22, 12:15 PM EDT.

## 2022-08-05 NOTE — PLAN OF CARE
Goal Outcome Evaluation:  Plan of Care Reviewed With: patient        Progress: improving  Outcome Evaluation: VSS. Slept well tonight. Urine continued to be strained - no stones found. Will continue to monitor.

## 2022-08-05 NOTE — PROGRESS NOTES
Discharge Planning Assessment  [facility]     Patient Name: Yuliana Gonzalez  MRN: [id number]  Today's Date: 8/5/2022    Admit Date: 7/31/2022     Discharge Needs Assessment    No documentation.                Discharge Plan     Row Name 08/05/22 1239       Plan    Plan New referral to [facility] for skilled rehab    Plan Comments Spoke with daughter to update her on skilled rehab choices [facility] does not have any beds available, [facility] has a semi private bed and patient would need to get her 2nd Covid Booster. Call placed to daughter Faith Slider [phone] she stated she is going to look on Medicare.gov for more choices for skilled rehab. She stated she would like to speak to the doctor regarding receiving the second Covid booster. Sent a message to Indira/Khadra she stated she does not have any beds available. Daughter was able to provide another choice for skilled rehab [facility] new referral placed in epic. Alison ALCAZAR              Continued Care and Services - Admitted Since 7/31/2022     Destination     Service Provider Request Status Selected Services Address Phone Fax Patient Preferred    [facility] [place]  Pending - Request Sent N/A [address] [place] [facility] [address] [phone] --       Internal Comment last updated by Zhanna Cordero, RN 8/2/2022 1207    [facility]               [facility]  Pending - Request Sent N/A [address] [facility] [address] [phone] --       Internal Comment last updated by Kendra Melton, RN 8/4/2022 1615    2nd choice                  [facility]  Pending - Request Sent N/A [address] [facility] [id number] [phone] --    [facility]  Declined  No Bed Available N/A [address] [facility] [id number] [phone] --       Internal Comment last updated by Kendra Melton, RN 8/4/2022 7551     1st choice                        Selected Continued Care - Episodes Includes selections from active Coordinated Care Management episodes    High Risk Care Management Episode start date: 7/27/2022 (Paused)   There are no active outsourced providers for this episode.               Expected Discharge Date and Time     Expected Discharge Date Expected Discharge Time    Aug 8, 2022          Demographic Summary    No documentation.                Functional Status    No documentation.                Psychosocial    No documentation.                Abuse/Neglect    No documentation.                Legal    No documentation.                Substance Abuse    No documentation.                Patient Forms    No documentation.                   Kendra Melton RN

## 2022-08-05 NOTE — PROGRESS NOTES
Name: Yuliana Gonzalez ADMIT: 2022   : 1930  PCP: Trent Davis MD    MRN: [id number] LOS: 5 days   AGE/SEX: 89 y.o. female  ROOM: [facility]/     Subjective   Subjective   Feels well today.  Resolved exertional dyspnea..  No chest pain/no palpitations/no cough/no wheeze/no hemoptysis/no abdominal pain/no nausea or vomiting.  Had 2 bowel movements yesterday without constipation/diarrhea/bleeding per rectum/melena.  No dysuria or hematuria.     Review of Systems  CNS.  No headache.  No focal neurological symptoms.  No dizziness.     Objective   Objective   Vital Signs  Temp:  [97.1 °F (36.2 °C)-98.3 °F (36.8 °C)] 97.5 °F (36.4 °C)  Heart Rate:  [66-74] 74  Resp:  [16-18] 16  BP: (107-155)/(54-63) 127/63  SpO2:  [95 %-98 %] 98 %  on   ;   Device (Oxygen Therapy): room air    Intake/Output Summary (Last 24 hours) at 2022 1613  Last data filed at 2022 1528  Gross per 24 hour   Intake 450 ml   Output 1600 ml   Net -1150 ml     Body mass index is 30.19 kg/m².      22  0500 22  0548 22  0540   Weight: 74 kg (163 lb 1.6 oz) 73.7 kg (162 lb 8 oz) 72.5 kg (159 lb 12.8 oz)     Physical Exam    General.  Elderly female.  She is alert and oriented x3.  No apparent pain/distress/diaphoresis.  Normal mood and affect.  Eyes.  Pupils equal round and reactive.  Intact extraocular musculature.  No pallor or jaundice.  Normal conjunctive and lids.  Status post bilateral cataract surgery.  Oral cavity.  Moist mucous membrane with no lesions.  Neck.  Supple.  No JVD.  No lymphadenopathy or thyromegaly.  Cardiovascular.  Regular rate and rhythm.  Grade 2 systolic murmur.  Chest.  Clear to auscultation bilaterally with no added sounds.  Abdomen.  Soft lax.  No tenderness.  No organomegaly.  No guarding or rebound.  Extremities.    Trace lower extremity edema.  No tenderness.  No clubbing or cyanosis.  CNS.  No acute focal neurological deficits.    Results Review:      Results from last 7 days   Lab  Units 08/05/22  0454 08/04/22  1508 08/04/22  0504 08/03/22  1821 08/03/22  0839 08/02/22  2340 08/02/22  1522 08/01/22  0325 07/31/22  1410   SODIUM mmol/L 134* 130* 123* 128* 122*  122* 121*  121* 122*   < > 120*   POTASSIUM mmol/L 3.8 4.3 4.1 4.5 4.2 4.1 4.4   < > 3.6   CHLORIDE mmol/L 100 97* 95* 95* 92* 91* 90*   < > 85*   CO2 mmol/L 21.4* 20.0* 20.0* 18.8* 16.0* 20.0* 20.0*   < > 22.4   BUN mg/dL 18 17 15 14 16 20 21   < > 26*   CREATININE mg/dL 0.59 0.61 0.51* 0.56* 0.52* 0.51* 0.56*   < > 0.72   GLUCOSE mg/dL 103* 154* 91 136* 158* 156* 158*   < > 189*   CALCIUM mg/dL 8.0* 8.0* 7.9* 8.2 7.6* 7.8* 8.1*   < > 7.9*   AST (SGOT) U/L 48*  --  64*  --   --  122*  --   --  78*   ALT (SGPT) U/L 141*  --  167*  --   --  237*  --   --  62*    < > = values in this interval not displayed.     Estimated Creatinine Clearance: 55.4 mL/min (by C-G formula based on SCr of 0.59 mg/dL).  Results from last 7 days   Lab Units 08/01/22  0325   HEMOGLOBIN A1C % 5.80*         Results from last 7 days   Lab Units 08/03/22  1821 07/31/22  1829 07/31/22  1410   CK TOTAL U/L 78  --   --    TROPONIN T ng/mL  --  1.150* 1.140*     Results from last 7 days   Lab Units 08/04/22  0504 07/31/22  1410   PROBNP pg/mL 2,340.0* 3,253.0*     Results from last 7 days   Lab Units 08/03/22  1821   TSH uIU/mL 3.040     Results from last 7 days   Lab Units 08/05/22  0454 08/04/22  0504 07/31/22  1410   MAGNESIUM mg/dL 2.0 2.2 2.2     Results from last 7 days   Lab Units 08/01/22  0325   CHOLESTEROL mg/dL 139   TRIGLYCERIDES mg/dL 145   HDL CHOL mg/dL 56     Results from last 7 days   Lab Units 08/05/22  0454 08/04/22  0504 08/02/22  2340 08/02/22  2340 08/01/22  0325 07/31/22  1410   WBC 10*3/mm3 10.96* 12.73*  --  15.61* 17.25* 21.62*   HEMOGLOBIN g/dL 11.3* 11.3*  --  11.5* 11.8* 12.1   HEMATOCRIT % 33.5* 33.3*  --  34.2 34.8 35.6   PLATELETS 10*3/mm3 257 241  --  245 204 214   MCV fL 82.7 83.3  --  82.8 81.9 82.0   MCH pg 27.9 28.3  --  27.8 27.8  27.9   MCHC g/dL 33.7 33.9  --  33.6 33.9 34.0   RDW % 13.8 13.7  --  13.6 13.6 13.4   RDW-SD fl 40.2 41.1  --  40.2 40.2 39.8   MPV fL 10.5 10.6  --  11.0 11.2 11.5   NEUTROPHIL % %  --   --   --   --   --  83.1*   LYMPHOCYTE % %  --   --   --   --   --  9.8*   MONOCYTES % %  --   --   --   --   --  6.1   EOSINOPHIL % %  --   --   --   --   --  0.1*   BASOPHIL % %  --   --   --   --   --  0.2   IMM GRAN % %  --   --   --   --   --  0.7*   NEUTROS ABS 10*3/mm3 7.23* 8.44*   < >  --   --  17.97*   LYMPHS ABS 10*3/mm3  --   --   --   --   --  2.11   MONOS ABS 10*3/mm3  --   --   --   --   --  1.31*   EOS ABS 10*3/mm3 0.55* 0.13   < >  --   --  0.02   BASOS ABS 10*3/mm3  --   --   --   --   --  0.05   IMMATURE GRANS (ABS) 10*3/mm3  --   --   --   --   --  0.16*   NRBC /100 WBC 0.2  --   --   --   --  0.0    < > = values in this interval not displayed.     Results from last 7 days   Lab Units 07/31/22  1410   INR  1.31*   APTT seconds 27.8         Results from last 7 days   Lab Units 08/03/22  1821 08/01/22  1058   PROCALCITONIN ng/mL 0.09 0.22         Results from last 7 days   Lab Units 08/03/22  1821   LIPASE U/L 33     Results from last 7 days   Lab Units 08/03/22  2144 08/01/22  1103 08/01/22  1058   BLOODCX   --  No growth at 4 days No growth at 4 days   URINECX  >100,000 CFU/mL Gram Negative Bacilli*  --   --          Results from last 7 days   Lab Units 08/03/22  2144   NITRITE UA  Negative   WBC UA /HPF 21-30*   BACTERIA UA /HPF 1+*   SQUAM EPITHEL UA /HPF 0-2   URINECX  >100,000 CFU/mL Gram Negative Bacilli*     Results from last 7 days   Lab Units 08/04/22  0504 08/03/22  1106 08/01/22  1806   SODIUM UR mmol/L  --  <20 <20   CHLORIDE UR mmol/L  --   --  <20   OSMOLALITY UR mOsm/kg  --  592 559   URIC ACID mg/dL 5.0  --   --        Imaging:  Imaging Results (Last 24 Hours)     Procedure Component Value Units Date/Time    CT Angiogram Chest [[id number]] Collected: 08/04/22 1831     Updated: 08/05/22 1056     Narrative:      CT ANGIOGRAM OF THE CHEST WITH CONTRAST INCLUDING RECONSTRUCTION IMAGES  08/04/2022     HISTORY: Elevated d-dimer. Shortness of breath. Possible pulmonary  embolus.     TECHNIQUE: Following the intravenous contrast injection CT angiography  was performed through the chest.     Sagittal, coronal and 3-D reconstruction images were reviewed.     FINDINGS: The pulmonary arterial system is well opacified with no  evidence of pulmonary embolus.     Proximal ascending aorta stent is seen. There is also some aortic and  coronary calcification.     Trace bilateral pleural effusions are seen. There is some very minimal  atelectasis of the right lung base.     No lung masses are seen.     There is a punctate right renal stone.       Impression:      1. No evidence of pulmonary embolus.     Radiation dose reduction techniques were utilized, including automated  exposure control and exposure modulation based on body size.     This report was finalized on 8/5/2022 10:53 AM by Dr. Elan Jung M.D.       NM HIDA SCAN WITHOUT PHARMACOLOGICAL INTERVENTION [[id number]] Collected: 08/04/22 1623     Updated: 08/04/22 1649    Narrative:      NUCLEAR MEDICINE HEPATOBILIARY IMAGING WITH EJECTION FRACTION     HISTORY: Nausea, elevated liver function tests.     TECHNIQUE:  45 minute anterior dynamic imaging of the abdomen was  obtained following the intravenous administration of  4.8  mCi of 99m  technetium Choletec.   8 ounces of Boost was administered by mouth to  determine gallbladder ejection fraction.     FINDINGS:  There is normal hepatic uptake and excretion with normal  visualization of the biliary, gallbladder, and bowel activity.  Gallbladder ejection fraction was calculated to be 60%(normal considered  greater than or equal to 35%).        Impression:      No evidence for cystic duct or common duct obstruction.  Gallbladder  ejection fraction after giving 8 ounces of Boost was calculated to be  60% which is  within normal limits.     This report was finalized on 8/4/2022 4:46 PM by Dr. Christian Muller M.D.                I reviewed the patient's new clinical results / labs / tests / procedures      Assessment/Plan     Active Hospital Problems    Diagnosis  POA   • **ST elevation myocardial infarction (STEMI) ([facility]) [I21.3]  Yes   • Hyponatremia [E87.1]  Yes   • Elevated LFTs [R79.89]  Yes   • Right distal ureteral calculus [N20.1]  Yes   • Leukocytosis [D72.829]  Yes   • Hydronephrosis of right kidney [N13.30]  Yes   • Glucose intolerance [E74.39]  Yes   • Presence of cardiac pacemaker [Z95.0]  Yes   • S/P TAVR (transcatheter aortic valve replacement) [Z95.2]  Not Applicable   • Coronary artery disease involving native coronary artery of native heart [I25.10]  Yes   • Hypertension [I10]  Yes      Resolved Hospital Problems   No resolved problems to display.       1.  Inferior ST elevation MI in a patient with a history of coronary artery disease/AS status post TAVR/hypertension/AV block status post pacer.  Currently no evidence of angina or congestive heart failure.  Patient appears euvolemic.  Status postcardiac catheterization with patent LAD stenting and nonobstructive disease.  Echo with normal ejection fraction and mild diastolic dysfunction and no significant valvular abnormalities.  Cardiology is following.    Continue aspirin/Plavix/Lopressor.  2.  Hypoosmolar hyponatremia/acidosis/hypophosphatemia.  Urine electrolytes suggest that the patient is dehydrated.  Patient however did not Respond to resuscitation with IV fluid with worsening sodium.  Nephrology believes that the patient is clinically a volume overloaded and IV fluid was stopped and diuretics were started.   Great improvement of the sodium after diuresis.  Nephrology switched to p.o. torsemide.  Electrolytes improving.  Acidosis improving.  Continue replacement of electrolytes.  Renal function is normal.    3.  Moderate right hydronephrosis secondary  to obstructing 4 mm stone/UTI.    Repeat UA with positive UTI (gram-negative bacilli).  Continue Rocephin until final culture is available.. . Urology is following and does not plan intervention at this time.  4.  Leukocytosis/low-grade fever.  Mostly secondary to UTI.   UA is with positive leukocytes.  Chest x-ray is negative for pneumonia. Blood cultures are negative.    Procalcitonin is negative.  White count improving.  Will initiate Rocephin and check urine cultures.    5.  Left lower extremity edema/positive D-dimer.  Lower extremity venous ultrasound revealed no DVT.  Area of the chest is pending..   6.  Elevated liver function test/nausea.  Benign GI examination.  Resolved nausea. Negative right upper quadrant ultrasound and CT scan of the abdomen pelvis for liver or gallbladder abnormality.  Negative hepatitis profile.  Negative HIDA.  Biochemical markers are negative for now awaitingalpha-1 antitrypsin levels.  Hepatic Doppler is negative for blood clots and has a normal flow.  Lipase is normal.  Proving and test.  This is mostly secondary to liver congestion because of volume overload.  I  7.  Glucose intolerance.  A1c 5.8.  8.  VTE prophylaxis.  Patient on sequential compression devices.        Discussed my findings and plan of treatment with the patient.  Disposition.  Patient medically stable to be discharged to SNF once bed is available.      Supriya Belle MD  [facility]  08/05/22  16:13 EDT

## 2022-08-06 VITALS
BODY MASS INDEX: 29.72 KG/M2 | HEIGHT: 61 IN | DIASTOLIC BLOOD PRESSURE: 77 MMHG | TEMPERATURE: 98.7 F | HEART RATE: 80 BPM | OXYGEN SATURATION: 96 % | SYSTOLIC BLOOD PRESSURE: 124 MMHG | WEIGHT: 157.4 LBS | RESPIRATION RATE: 18 BRPM

## 2022-08-06 PROBLEM — D72.829 LEUKOCYTOSIS: Status: RESOLVED | Noted: 2022-08-03 | Resolved: 2022-08-06

## 2022-08-06 PROBLEM — N39.0 UTI (URINARY TRACT INFECTION): Status: ACTIVE | Noted: 2022-08-06

## 2022-08-06 LAB
ALBUMIN SERPL-MCNC: 3.1 G/DL (ref 3.5–5.2)
ALBUMIN/GLOB SERPL: 1.6 G/DL
ALP SERPL-CCNC: 173 U/L (ref 39–117)
ALT SERPL W P-5'-P-CCNC: 105 U/L (ref 1–33)
ANION GAP SERPL CALCULATED.3IONS-SCNC: 14.4 MMOL/L (ref 5–15)
ANISOCYTOSIS BLD QL: ABNORMAL
AST SERPL-CCNC: 34 U/L (ref 1–32)
BACTERIA SPEC AEROBE CULT: ABNORMAL
BACTERIA SPEC AEROBE CULT: NORMAL
BACTERIA SPEC AEROBE CULT: NORMAL
BILIRUB SERPL-MCNC: 0.3 MG/DL (ref 0–1.2)
BUN SERPL-MCNC: 15 MG/DL (ref 8–23)
BUN/CREAT SERPL: 23.4 (ref 7–25)
CALCIUM SPEC-SCNC: 7.8 MG/DL (ref 8.2–9.6)
CHLORIDE SERPL-SCNC: 97 MMOL/L (ref 98–107)
CO2 SERPL-SCNC: 22.6 MMOL/L (ref 22–29)
CREAT SERPL-MCNC: 0.64 MG/DL (ref 0.57–1)
DEPRECATED RDW RBC AUTO: 41.2 FL (ref 37–54)
EGFRCR SERPLBLD CKD-EPI 2021: 83 ML/MIN/1.73
EOSINOPHIL # BLD MANUAL: 0.21 10*3/MM3 (ref 0–0.4)
EOSINOPHIL NFR BLD MANUAL: 2 % (ref 0.3–6.2)
ERYTHROCYTE [DISTWIDTH] IN BLOOD BY AUTOMATED COUNT: 13.9 % (ref 12.3–15.4)
GLOBULIN UR ELPH-MCNC: 1.9 GM/DL
GLUCOSE SERPL-MCNC: 94 MG/DL (ref 65–99)
HCT VFR BLD AUTO: 32.9 % (ref 34–46.6)
HGB BLD-MCNC: 10.9 G/DL (ref 12–15.9)
LYMPHOCYTES # BLD MANUAL: 2.38 10*3/MM3 (ref 0.7–3.1)
LYMPHOCYTES NFR BLD MANUAL: 7.1 % (ref 5–12)
MAGNESIUM SERPL-MCNC: 1.8 MG/DL (ref 1.7–2.3)
MCH RBC QN AUTO: 27.8 PG (ref 26.6–33)
MCHC RBC AUTO-ENTMCNC: 33.1 G/DL (ref 31.5–35.7)
MCV RBC AUTO: 83.9 FL (ref 79–97)
MONOCYTES # BLD: 0.76 10*3/MM3 (ref 0.1–0.9)
MYELOCYTES NFR BLD MANUAL: 4 % (ref 0–0)
NEUTROPHILS # BLD AUTO: 6.91 10*3/MM3 (ref 1.7–7)
NEUTROPHILS NFR BLD MANUAL: 64.6 % (ref 42.7–76)
NRBC BLD AUTO-RTO: 0.1 /100 WBC (ref 0–0.2)
PHOSPHATE SERPL-MCNC: 3.3 MG/DL (ref 2.5–4.5)
PLAT MORPH BLD: NORMAL
PLATELET # BLD AUTO: 249 10*3/MM3 (ref 140–450)
PMV BLD AUTO: 10.4 FL (ref 6–12)
POTASSIUM SERPL-SCNC: 3.2 MMOL/L (ref 3.5–5.2)
PROT SERPL-MCNC: 5 G/DL (ref 6–8.5)
RBC # BLD AUTO: 3.92 10*6/MM3 (ref 3.77–5.28)
SODIUM SERPL-SCNC: 134 MMOL/L (ref 136–145)
URATE SERPL-MCNC: 6.4 MG/DL (ref 2.4–5.7)
VARIANT LYMPHS NFR BLD MANUAL: 22.2 % (ref 19.6–45.3)
WBC MORPH BLD: NORMAL
WBC NRBC COR # BLD: 10.7 10*3/MM3 (ref 3.4–10.8)

## 2022-08-06 PROCEDURE — 99232 SBSQ HOSP IP/OBS MODERATE 35: CPT

## 2022-08-06 PROCEDURE — 83735 ASSAY OF MAGNESIUM: CPT | Performed by: INTERNAL MEDICINE

## 2022-08-06 PROCEDURE — 85025 COMPLETE CBC W/AUTO DIFF WBC: CPT | Performed by: NURSE PRACTITIONER

## 2022-08-06 PROCEDURE — 84100 ASSAY OF PHOSPHORUS: CPT | Performed by: INTERNAL MEDICINE

## 2022-08-06 PROCEDURE — 85007 BL SMEAR W/DIFF WBC COUNT: CPT | Performed by: NURSE PRACTITIONER

## 2022-08-06 PROCEDURE — 84550 ASSAY OF BLOOD/URIC ACID: CPT | Performed by: INTERNAL MEDICINE

## 2022-08-06 PROCEDURE — 80053 COMPREHEN METABOLIC PANEL: CPT | Performed by: NURSE PRACTITIONER

## 2022-08-06 RX ORDER — TAMSULOSIN HYDROCHLORIDE 0.4 MG/1
0.4 CAPSULE ORAL DAILY
Qty: 30 CAPSULE | Refills: 3 | Status: SHIPPED | OUTPATIENT
Start: 2022-08-07 | End: 2022-09-01

## 2022-08-06 RX ORDER — PANTOPRAZOLE SODIUM 40 MG/1
40 TABLET, DELAYED RELEASE ORAL
Qty: 30 TABLET | Refills: 3 | Status: SHIPPED | OUTPATIENT
Start: 2022-08-07 | End: 2022-09-01 | Stop reason: SDUPTHER

## 2022-08-06 RX ORDER — ATORVASTATIN CALCIUM 80 MG/1
40 TABLET, FILM COATED ORAL DAILY
Qty: 90 TABLET | Refills: 3 | Status: SHIPPED | OUTPATIENT
Start: 2022-08-06 | End: 2022-08-29 | Stop reason: ALTCHOICE

## 2022-08-06 RX ORDER — POTASSIUM CHLORIDE 750 MG/1
20 TABLET, FILM COATED, EXTENDED RELEASE ORAL DAILY
Status: DISCONTINUED | OUTPATIENT
Start: 2022-08-06 | End: 2022-08-06 | Stop reason: HOSPADM

## 2022-08-06 RX ORDER — ACETAMINOPHEN 325 MG/1
650 TABLET ORAL EVERY 4 HOURS PRN
Qty: 120 TABLET | Refills: 0 | Status: SHIPPED | OUTPATIENT
Start: 2022-08-06

## 2022-08-06 RX ORDER — SULFAMETHOXAZOLE AND TRIMETHOPRIM 800; 160 MG/1; MG/1
1 TABLET ORAL EVERY 12 HOURS SCHEDULED
Status: DISCONTINUED | OUTPATIENT
Start: 2022-08-06 | End: 2022-08-06 | Stop reason: HOSPADM

## 2022-08-06 RX ORDER — SULFAMETHOXAZOLE AND TRIMETHOPRIM 800; 160 MG/1; MG/1
1 TABLET ORAL EVERY 12 HOURS SCHEDULED
Qty: 10 TABLET | Refills: 0 | Status: SHIPPED | OUTPATIENT
Start: 2022-08-06 | End: 2022-08-11

## 2022-08-06 RX ADMIN — TORSEMIDE 40 MG: 20 TABLET ORAL at 10:12

## 2022-08-06 RX ADMIN — METOPROLOL TARTRATE 12.5 MG: 25 TABLET, FILM COATED ORAL at 10:12

## 2022-08-06 RX ADMIN — CLOPIDOGREL 75 MG: 75 TABLET, FILM COATED ORAL at 10:12

## 2022-08-06 RX ADMIN — ASPIRIN 81 MG: 81 TABLET, COATED ORAL at 10:12

## 2022-08-06 RX ADMIN — SULFAMETHOXAZOLE AND TRIMETHOPRIM 1 TABLET: 800; 160 TABLET ORAL at 14:00

## 2022-08-06 RX ADMIN — PANTOPRAZOLE SODIUM 40 MG: 40 TABLET, DELAYED RELEASE ORAL at 06:28

## 2022-08-06 RX ADMIN — POTASSIUM CHLORIDE 20 MEQ: 750 TABLET, EXTENDED RELEASE ORAL at 14:00

## 2022-08-06 RX ADMIN — TAMSULOSIN HYDROCHLORIDE 0.4 MG: 0.4 CAPSULE ORAL at 10:12

## 2022-08-06 RX ADMIN — POTASSIUM CHLORIDE 40 MEQ: 750 TABLET, EXTENDED RELEASE ORAL at 10:12

## 2022-08-06 NOTE — DISCHARGE SUMMARY
Patient Name: Yuliana Gonzalez  : 1930  MRN: [id number]    Date of Admission: 2022  Date of Discharge:  2022  Primary Care Physician: Trent Davis MD      Discharge Diagnoses     Active Hospital Problems    Diagnosis  POA   • **ST elevation myocardial infarction (STEMI) (HCC) [I21.3]  Yes   • UTI (urinary tract infection) [N39.0]  No   • Hyponatremia [E87.1]  Yes   • Elevated LFTs [R79.89]  Yes   • Right distal ureteral calculus [N20.1]  Yes   • Hydronephrosis of right kidney [N13.30]  Yes   • Glucose intolerance [E74.39]  Yes   • Presence of cardiac pacemaker [Z95.0]  Yes   • S/P TAVR (transcatheter aortic valve replacement) [Z95.2]  Not Applicable   • Coronary artery disease involving native coronary artery of native heart [I25.10]  Yes   • Hypertension [I10]  Yes      Resolved Hospital Problems    Diagnosis Date Resolved POA   • Leukocytosis [D72.829] 2022 Yes        Hospital Course     Brief admission history and physical.  Please refer to the H&P for full details.  A very pleasant 89 years old white female with a past history of coronary artery disease/TAVR/pacer/nephrolithiasis/hypertension/dyslipidemia who was admitted to the cardiology service after recently having a kidney stone and suddenly developing shortness of breath and no other significant symptomatology.  Her physical examination on admission included a blood pressure of 128/88 home a pulse of 70 respiratory rate of 16 temperature 98.  Rest of the examination is remarkable for obesity/+1 lower extremity edema/grade 2 systolic murmur.  Hospital course.  Initial evaluation included an EKG revealing ST elevation in the inferior leads with Q waves.  CBC was normal except a white count of 21.6.  proBNP elevated at 3253.  Magnesium was normal.  PTT was normal.  INR was 1.3.  Troponin was elevated at 1.24.  CMP normal except a glucose of 189, BUN 26, sodium 128, chloride 85, calcium 7.9, total bilirubin 5.8, albumin 3.4, ALT  "62, AST 78, alkaline phosphatase 160.  COVID was negative.  MDM was 56.  A1c 5.8.  Patient was admitted to cardiology service initially with a diagnosis of inferior ST elevation MI in a patient with a history of coronary artery disease/AAS status post CABG/hypertension/AV block status post pacer.  She really had no clinical evidence of angina.  She underwent cardiac catheterization that showed a patent LAD stenting and and nonobstructive disease.  Echo was done and revealed normal ejection fraction and mild diastolic dysfunction no significant valvular abnormalities.  She was treated medically with aspirin/Plavix/beta-blockade.  Cardiology has asked this to take over the care as she has other medical issues.  Her other issues included hypoosmolar hyponatremia/acidosis/hypophosphatemia.  Urine electrolytes initially suggested that the patient is volume depleted and she was challenged with some IV fluid however this has worsened the sodium and nephrology was consulted and started the patient on diuretics that resulted a great improvement of her sodium.  It was thought that the low sodium is secondary to hydrochlorothiazide and nephrology switch the patient to torsemide by the time of discharge.  Her hypophosphatemia and acidosis have resolved with substitution during this admission.  She had mildly low potassium at discharge that was substituted.  Basic metabolic profile needs to be checked in 3 days.  Her renal function remained stable.  At the time of discharge she appeared euvolemic.  Her other issue was that of a nephrology T ACEs and work-up indicated that the patient has a right hydronephrosis because of a 4 mm obstructing stone.  Initially the UA was negative and urology was consulted \"decided on conservative treatment at this time with Flomax and follow-up.  She had no significant urinary symptoms.  She was noted to have leukocytosis and a low-grade fever and repeating UA revealed a UTI urine culture was " growing gram-negative bacilli by the time of discharge but no ID yet.  She was started empirically on Rocephin and received 2 doses and switch to p.o. Bactrim for the next 5 days to complete a 7 days course.  She had a lower extremity edema and a positive D-dimer lower extremity venous ultrasound revealed no DVT and a CTA of the chest was negative for PE.  As far as her elevated liver function test she had some nausea she always had a benign GI examination right upper quadrant ultrasound was negative CT scan of the abdomen was negative for diseases of the liver and the gallbladder.  Hepatitis profile was done and was negative HIDA scan was done and was negative biochemical markers were done and they were all negative but awaiting alpha 1 antitrypsin level.  Hepatic Doppler ultrasound was negative for clots and had normal flow.  Lipase was normal.  After diuresis the patient liver function test improved indicating that this is most likely an liver congestion.  Liver function test should be checked in 3 days to assure continuation of the improvement.  She was noted to have hyperglycemia.  A1c was 5.8 indicating glucose intolerance.  During the hospital stay she remained on VTE prophylaxis with sequential compression devices.  Physical therapy evaluated the patient and she was deemed in need for SNF physical therapy and she is being discharged in a hemodynamically stable condition to skilled facility for the purpose of physical therapy.  She will be provided a follow-up with cardiology/urology.  She also will follow-up with primary MD and 3 days at that time his CBC and CMP should be checked.  Consultants     Consult Orders (all) (From admission, onward)     Start     Ordered    08/05/22 1321  Inpatient Rehab Admission Consult  Once        Provider:  (Not yet assigned)    08/05/22 1321    08/03/22 1783  Inpatient Hospitalist Consult  Once        Specialty:  Hospitalist  Provider:  Tania Koch MD    08/03/22 5354     08/01/22 0954  Inpatient Nephrology Consult  Once        Specialty:  Nephrology  Provider:  Ashlee Hewitt MD    08/01/22 0953    08/01/22 0934  Inpatient Urology Consult  Once        Specialty:  Urology  Provider:  Trena Golden MD    08/01/22 0934              Procedures     Imaging Results (All)     Procedure Component Value Units Date/Time    CT Angiogram Chest [[id number]] Collected: 08/04/22 1831     Updated: 08/05/22 1056    Narrative:      CT ANGIOGRAM OF THE CHEST WITH CONTRAST INCLUDING RECONSTRUCTION IMAGES  08/04/2022     HISTORY: Elevated d-dimer. Shortness of breath. Possible pulmonary  embolus.     TECHNIQUE: Following the intravenous contrast injection CT angiography  was performed through the chest.     Sagittal, coronal and 3-D reconstruction images were reviewed.     FINDINGS: The pulmonary arterial system is well opacified with no  evidence of pulmonary embolus.     Proximal ascending aorta stent is seen. There is also some aortic and  coronary calcification.     Trace bilateral pleural effusions are seen. There is some very minimal  atelectasis of the right lung base.     No lung masses are seen.     There is a punctate right renal stone.       Impression:      1. No evidence of pulmonary embolus.     Radiation dose reduction techniques were utilized, including automated  exposure control and exposure modulation based on body size.     This report was finalized on 8/5/2022 10:53 AM by Dr. Elan Jung M.D.       NM HIDA SCAN WITHOUT PHARMACOLOGICAL INTERVENTION [[id number]] Collected: 08/04/22 1623     Updated: 08/04/22 1649    Narrative:      NUCLEAR MEDICINE HEPATOBILIARY IMAGING WITH EJECTION FRACTION     HISTORY: Nausea, elevated liver function tests.     TECHNIQUE:  45 minute anterior dynamic imaging of the abdomen was  obtained following the intravenous administration of  4.8  mCi of 99m  technetium Choletec.   8 ounces of Boost was administered by mouth to  determine  gallbladder ejection fraction.     FINDINGS:  There is normal hepatic uptake and excretion with normal  visualization of the biliary, gallbladder, and bowel activity.  Gallbladder ejection fraction was calculated to be 60%(normal considered  greater than or equal to 35%).        Impression:      No evidence for cystic duct or common duct obstruction.  Gallbladder  ejection fraction after giving 8 ounces of Boost was calculated to be  60% which is within normal limits.     This report was finalized on 8/4/2022 4:46 PM by Dr. Christian Muller M.D.       XR Chest PA & Lateral [[id number]] Collected: 08/03/22 2205     Updated: 08/03/22 2209    Narrative:      TWO-VIEW CHEST     HISTORY: Leukocytosis.     FINDINGS: The lungs are well-expanded and clear. The heart is mildly  enlarged with a pacemaker in place as well as an aortic valve stent and  the overall appearance shows no change from 07/31/2022.     This report was finalized on 8/3/2022 10:06 PM by Dr. Al Tabares M.D.       US Gallbladder [[id number]] Collected: 08/03/22 1706     Updated: 08/03/22 1712    Narrative:      RIGHT UPPER QUADRANT ULTRASOUND     HISTORY: Elevated transaminases     COMPARISON: CT abdomen pelvis 07/26/2022     TECHNIQUE: Real time ultrasound imaging of the right upper quadrant was  performed. Static images reviewed.     FINDINGS: Visualized portions of the pancreas are unremarkable. The  liver is normal in echogenicity. There is a trace amount of ascites  adjacent to the liver. No gallstones. No gallbladder wall thickening.  There is a minimal amount of nonspecific pericholecystic fluid. Negative  sonographic Fleming sign. Common duct measures about 4 mm. The right  kidney measures 12.1 cm in length and contains a trace amount of fluid  adjacent to the lower pole.       Impression:      1. Trace ascites adjacent to the liver  2. No gallstones. Minimal amount of nonspecific pericholecystic fluid.  Negative sonographic Fleming sign     This  report was finalized on 8/3/2022 5:08 PM by Dr. Todd Bangura M.D.       XR Chest 1 View [[id number]] Collected: 07/31/22 1452     Updated: 07/31/22 2129    Narrative:      PORTABLE CHEST     HISTORY: Chest pain.     COMPARISON: Chest x-ray 03/16/2022.     FINDINGS: A single portable view of the chest demonstrates the heart to  be within normal limits in size. A cardiac pacer is appreciated with  leads terminating in the right atrium and right ventricle. There is no  evidence of infiltrate, effusion or of congestive failure.     This report was finalized on 7/31/2022 9:25 PM by Dr. Rigo Soliman M.D.             Pertinent Labs     Results from last 7 days   Lab Units 08/06/22 0421 08/05/22  0454 08/04/22  0504 08/02/22  2340   WBC 10*3/mm3 10.70 10.96* 12.73* 15.61*   HEMOGLOBIN g/dL 10.9* 11.3* 11.3* 11.5*   PLATELETS 10*3/mm3 249 257 241 245     Results from last 7 days   Lab Units 08/06/22  0421 08/05/22  0454 08/04/22  1508 08/04/22  0504   SODIUM mmol/L 134* 134* 130* 123*   POTASSIUM mmol/L 3.2* 3.8 4.3 4.1   CHLORIDE mmol/L 97* 100 97* 95*   CO2 mmol/L 22.6 21.4* 20.0* 20.0*   BUN mg/dL 15 18 17 15   CREATININE mg/dL 0.64 0.59 0.61 0.51*   GLUCOSE mg/dL 94 103* 154* 91   Estimated Creatinine Clearance: 50.6 mL/min (by C-G formula based on SCr of 0.64 mg/dL).  Results from last 7 days   Lab Units 08/06/22  0421 08/05/22  0454 08/04/22  0504 08/03/22  0839 08/02/22  2340   ALBUMIN g/dL 3.10* 3.10* 3.20* 3.10* 3.10*   BILIRUBIN mg/dL 0.3 0.4 0.4  --  0.5   ALK PHOS U/L 173* 207* 205*  --  247*   AST (SGOT) U/L 34* 48* 64*  --  122*   ALT (SGPT) U/L 105* 141* 167*  --  237*     Results from last 7 days   Lab Units 08/06/22  0421 08/05/22  0454 08/04/22  1508 08/04/22  0504 08/03/22  1821 08/03/22  0839 08/01/22  0325 07/31/22  1410   CALCIUM mg/dL 7.8* 8.0* 8.0* 7.9*   < > 7.6*   < > 7.9*   ALBUMIN g/dL 3.10* 3.10*  --  3.20*  --  3.10*   < > 3.40*   MAGNESIUM mg/dL 1.8 2.0  --  2.2  --   --   --  2.2    PHOSPHORUS mg/dL 3.3 3.4  --  2.1*  --  1.7*   < >  --     < > = values in this interval not displayed.     Results from last 7 days   Lab Units 08/03/22  1821   LIPASE U/L 33     Results from last 7 days   Lab Units 08/04/22  0504 08/03/22  1821 07/31/22  1829 07/31/22  1410   CK TOTAL U/L  --  78  --   --    TROPONIN T ng/mL  --   --  1.150* 1.140*   PROBNP pg/mL 2,340.0*  --   --  3,253.0*   D DIMER QUANT MCGFEU/mL 2.49*  --   --   --      Results from last 7 days   Lab Units 08/06/22  0421 08/04/22  0504 08/03/22  1106 08/01/22  1806   SODIUM UR mmol/L  --   --  <20 <20   CHLORIDE UR mmol/L  --   --   --  <20   OSMOLALITY UR mOsm/kg  --   --  592 559   URIC ACID mg/dL 6.4*   < >  --   --     < > = values in this interval not displayed.     Results from last 7 days   Lab Units 08/01/22  0325   CHOLESTEROL mg/dL 139   TRIGLYCERIDES mg/dL 145   HDL CHOL mg/dL 56   LDL CHOL mg/dL 58     Results from last 7 days   Lab Units 08/03/22  2144 08/01/22  1103 08/01/22  1058   BLOODCX   --  No growth at 4 days No growth at 4 days   URINECX  >100,000 CFU/mL Escherichia coli*  --   --      Imaging Results (Last 24 Hours)     Procedure Component Value Units Date/Time    CT Angiogram Chest [[id number]] Collected: 08/04/22 1831     Updated: 08/05/22 1056    Narrative:      CT ANGIOGRAM OF THE CHEST WITH CONTRAST INCLUDING RECONSTRUCTION IMAGES  08/04/2022     HISTORY: Elevated d-dimer. Shortness of breath. Possible pulmonary  embolus.     TECHNIQUE: Following the intravenous contrast injection CT angiography  was performed through the chest.     Sagittal, coronal and 3-D reconstruction images were reviewed.     FINDINGS: The pulmonary arterial system is well opacified with no  evidence of pulmonary embolus.     Proximal ascending aorta stent is seen. There is also some aortic and  coronary calcification.     Trace bilateral pleural effusions are seen. There is some very minimal  atelectasis of the right lung base.     No lung  masses are seen.     There is a punctate right renal stone.       Impression:      1. No evidence of pulmonary embolus.     Radiation dose reduction techniques were utilized, including automated  exposure control and exposure modulation based on body size.     This report was finalized on 8/5/2022 10:53 AM by Dr. Elan Jung M.D.             Test Results Pending at Discharge     Pending Labs     Order Current Status    Alpha - 1 - Antitrypsin Deficiency In process    Copper, Serum In process    Blood Culture - Blood, Hand, Left Preliminary result    Blood Culture - Blood, Hand, Right Preliminary result            Discharge Exam   Physical Exam  Vitals.  Temperature 98.7 a pulse of 80 respiratory rate of 18 and blood pressure 124/77 O2 sats of 96%  General.  Elderly female.  She is alert and oriented x3.  No apparent pain/distress/diaphoresis.  Normal mood and affect.  Eyes.  Pupils equal round and reactive.  Intact extraocular musculature.  No pallor or jaundice.  Normal conjunctive and lids.  Status post bilateral cataract surgery.  Oral cavity.  Moist mucous membrane with no lesions.  Neck.  Supple.  No JVD.  No lymphadenopathy or thyromegaly.  Cardiovascular.  Regular rate and rhythm.  Grade 2 systolic murmur.  Chest.  Clear to auscultation bilaterally with no added sounds.  Abdomen.  Soft lax.  No tenderness.  No organomegaly.  No guarding or rebound.  Extremities.    Trace lower extremity edema.  No tenderness.  No clubbing or cyanosis.  CNS.  No acute focal neurological deficits.  Discharge Details        Discharge Medications      New Medications      Instructions Start Date   atorvastatin 80 MG tablet  Commonly known as: Lipitor   40 mg, Oral, Daily      metoprolol tartrate 25 MG tablet  Commonly known as: LOPRESSOR   12.5 mg, Oral, Every 12 Hours Scheduled      pantoprazole 40 MG EC tablet  Commonly known as: PROTONIX   40 mg, Oral, Every Early Morning   Start Date: August 7, 2022    "  sulfamethoxazole-trimethoprim 800-160 MG per tablet  Commonly known as: BACTRIM DS,SEPTRA DS   1 tablet, Oral, Every 12 Hours Scheduled      tamsulosin 0.4 MG capsule 24 hr capsule  Commonly known as: FLOMAX   0.4 mg, Oral, Daily   Start Date: August 7, 2022     Torsemide 40 MG tablet   40 mg, Oral, Daily   Start Date: August 7, 2022        Changes to Medications      Instructions Start Date   acetaminophen 325 MG tablet  Commonly known as: TYLENOL  What changed:   · medication strength  · how much to take  · when to take this  · reasons to take this   650 mg, Oral, Every 4 Hours PRN      aspirin 81 MG EC tablet  What changed: when to take this   81 mg, Oral, Daily      clopidogrel 75 MG tablet  Commonly known as: PLAVIX  What changed: when to take this   75 mg, Oral, Daily         Continue These Medications      Instructions Start Date   cholecalciferol 25 MCG (1000 UT) tablet  Commonly known as: VITAMIN D3   2,000 Units, Oral, Daily      ondansetron ODT 8 MG disintegrating tablet  Commonly known as: ZOFRAN-ODT   8 mg, Sublingual, Every 8 Hours PRN      VITEYES AREDS ADVANCED PO   2 capsules, Oral, Daily, PT HOLDING FOR SURGERY         Stop These Medications    atenolol 25 MG tablet  Commonly known as: TENORMIN     DULCOLAX PO     hydroCHLOROthiazide 12.5 MG capsule  Commonly known as: MICROZIDE     HYDROcodone-acetaminophen 5-325 MG per tablet  Commonly known as: NORCO     metroNIDAZOLE 0.75 % cream  Commonly known as: METROCREAM            Allergies   Allergen Reactions   • Hydrocodone Shortness Of Breath     Also reports \"lethargy\"   • Lipitor [Atorvastatin] Nausea Only     \"FELT AWFUL\"   • Erythromycin Diarrhea   • Lisinopril Cough         Discharge Disposition:  Condition: Stable    Diet:   Diet Order   Procedures   • Diet Regular   Healthy cardiac.  No concentrated sweet    Activity:   Activity Instructions     Activity as Tolerated      Other Activity Instructions      Activity Instructions: PT/OT to " evaluate and treat    Up WIth Assist                CODE STATUS:    Code Status and Medical Interventions:   Ordered at: 08/03/22 1956     Level Of Support Discussed With:    Patient     Code Status (Patient has no pulse and is not breathing):    CPR (Attempt to Resuscitate)     Medical Interventions (Patient has pulse or is breathing):    Full Support       Future Appointments   Date Time Provider Department Center   8/9/2022 10:15 AM Trent Davis MD [id number]   10/27/2022  1:00 PM Tamar Lind APRN [facility]   1/3/2023  9:30 AM P[facility] IN OFFICE, [facility]   1/3/2023 10:15 AM Yann Ceballos MD [facility]   2/6/2023  1:15 PM [facility]/VAS RM 1 BH [facility]   2/6/2023  2:00 PM Clint Locke MD [facility]     Additional Instructions for the Follow-ups that You Need to Schedule     Call MD With Problems / Concerns   As directed      Instructions: Call MD or return to ER if fever and chills/abdominal pain/dysuria or hematuria/chest pain/shortness of breath/palpitations/fever or chills/dizziness    Order Comments: Instructions: Call MD or return to ER if fever and chills/abdominal pain/dysuria or hematuria/chest pain/shortness of breath/palpitations/fever or chills/dizziness          Discharge Follow-up with PCP   As directed       Currently Documented PCP:    Trent Davis MD    PCP Phone Number:    [phone]     Follow Up Details: Primary MD at the [facility] facility in 3 days for obstructing kidney stone with hydronephrosis and UTI/glucose intolerance/elevated liver function test/coronary artery disease/hypertension/TAVR.         Discharge Follow-up with Specified Provider: Cardiology; 2 Weeks   As directed      To: Cardiology    Follow Up: 2 Weeks    Follow Up Details: Coronary artery disease/hypertension/AV block status post pacer/status post catheter/MI         Discharge Follow-up with Specified Provider: Urology; 2 Weeks   As directed       To: Urology    Follow Up: 2 Weeks    Follow Up Details: Obstructing stone with hydronephrosis and UTI            Contact information for follow-up providers     Trent Davis MD .    Specialty: Internal Medicine  Why: Primary MD at the skilled facility in 3 days for obstructing kidney stone with hydronephrosis and UTI/glucose intolerance/elevated liver function test/coronary artery disease/hypertension/TAVR.  Contact information:  [address] [place] [address]  [phone]                   Contact information for after-discharge care     Destination     [facility] .    Service: Skilled Nursing  Contact information:  [facility] [address]  [phone]                               Time Spent on Discharge:  Greater than 30 minutes      Supriya Belle MD  [facility]  08/06/22  10:44 EDT

## 2022-08-06 NOTE — PROGRESS NOTES
[facility] Progress Note      Patient Name: Yuliana Gonzalez  : 1930  MRN: [id number]  Primary Care Physician:  Trent Davis MD  Date of admission: 2022    Subjective     Interval History:   Follow up hyponatremia.   Patient is feeling better, denies any chest pain or shortness of air, no orthopnea or PND, no nausea or vomiting, no abdominal pain, no dysuria or gross hematuria.  .  Review of Systems:   As noted above    Objective     Vitals:   Temp:  [97.5 °F (36.4 °C)-98.7 °F (37.1 °C)] 98.7 °F (37.1 °C)  Heart Rate:  [61-80] 80  Resp:  [16-18] 18  BP: (124-133)/(60-77) 124/77    Intake/Output Summary (Last 24 hours) at 2022 1004  Last data filed at 2022 0928  Gross per 24 hour   Intake 600 ml   Output 2000 ml   Net -1400 ml       Physical Exam:    General Appearance: alert, oriented x 3, no acute distress .    Skin: warm and dry  HEENT: mask in place , nonicteric sclera  Neck: supple, no JVD  Lungs: Clear to auscultation   Heart: RRR, 2/6 syst m  Abdomen: soft, very mild RUQ tenderness. No guarding or rebound . nondistended. + bs.   : no palpable bladder, pure wick system  Extremities: No lower extremity edema  Neuro: normal speech and mental status     Scheduled Meds:     aspirin, 81 mg, Oral, Daily  cefTRIAXone, 1 g, Intravenous, Q24H  clopidogrel, 75 mg, Oral, Daily  enoxaparin, 40 mg, Subcutaneous, Q24H  metoprolol tartrate, 12.5 mg, Oral, Q12H  pantoprazole, 40 mg, Oral, Q AM  tamsulosin, 0.4 mg, Oral, Daily  torsemide, 40 mg, Oral, Daily      IV Meds:        Results Reviewed:   I have personally reviewed the results from the time of this admission to 2022 10:04 EDT     Results from last 7 days   Lab Units 22  0421 22  0454 22  1508 22  0504   SODIUM mmol/L 134* 134* 130* 123*   POTASSIUM mmol/L 3.2* 3.8 4.3 4.1   CHLORIDE mmol/L 97* 100 97* 95*   CO2 mmol/L 22.6 21.4* 20.0* 20.0*   BUN mg/dL 15 18 17 15   CREATININE mg/dL  0.64 0.59 0.61 0.51*   CALCIUM mg/dL 7.8* 8.0* 8.0* 7.9*   BILIRUBIN mg/dL 0.3 0.4  --  0.4   ALK PHOS U/L 173* 207*  --  205*   ALT (SGPT) U/L 105* 141*  --  167*   AST (SGOT) U/L 34* 48*  --  64*   GLUCOSE mg/dL 94 103* 154* 91       Estimated Creatinine Clearance: 50.6 mL/min (by C-G formula based on SCr of 0.64 mg/dL).    Results from last 7 days   Lab Units 08/06/22  0421 08/05/22  0454 08/04/22  0504   MAGNESIUM mg/dL 1.8 2.0 2.2   PHOSPHORUS mg/dL 3.3 3.4 2.1*       Results from last 7 days   Lab Units 08/06/22  0421 08/04/22  0504   URIC ACID mg/dL 6.4* 5.0       Results from last 7 days   Lab Units 08/06/22  0421 08/05/22  0454 08/04/22  0504 08/02/22  2340 08/01/22  0325   WBC 10*3/mm3 10.70 10.96* 12.73* 15.61* 17.25*   HEMOGLOBIN g/dL 10.9* 11.3* 11.3* 11.5* 11.8*   PLATELETS 10*3/mm3 249 257 241 245 204       Results from last 7 days   Lab Units 07/31/22  1410   INR  1.31*       Assessment / Plan     ASSESSMENT:  1. Hyponatremia, acute. Initially likely  due to hypovolemia, HCTZ.  Urine sodium less than 20, high urine osm.  SP IVF 7/31-8/1.  Repeat urine chem unchanged.  Sodium up to 134, creatinine 0.59.  HCTZ held now on torsemide.  K+ low, will add daily replacement with this.  2. Nephrolithiasis, right distal 4mm stone near UVJ,  Left stone in renal pelvis.  eval ongoing .  3. Abnormal EKG, inferior ST elevation.   4. 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   5. Elevated transaminases.  Improved.        Leandro Johnson MD  08/06/22  10:04 EDT    [facility]  [phone]

## 2022-08-06 NOTE — PROGRESS NOTES
"Weekend coverage  Cardiology Progress Note    Patient Identification:  Name: Yuliana Gonzalez  Age: 89 y.o.  Sex: female  :  1930  MRN: [id number]                 Follow Up / Chief Complaint: follow up for abnormal ECG    Interval History: feels great, no chest pain or shortness of breath. On room air.          Objective:    Past Medical History:  Past Medical History:   Diagnosis Date   • Aortic stenosis 2009    diagnosed,  moderate with 0.9cm area, 42mm max gradient. 0.91/34 6/15.   • Central loss of vision     RIGHT EYE   • Cervical arthritis    • Colitis     HX   • Coronary artery disease    • History of constipation    • History of esophageal reflux    • History of skin cancer    • Hyperlipidemia    • Hypertension    • IGT (impaired glucose tolerance)    • Irregular heart beat     HX   • Macular degeneration    • Medication management    • On continuous oral anticoagulation    • Redness of skin     NOSE/DERMATOLOGY TREATING WITH FLAGYL CREAM/ADVISED PT TO NOTIFY CARDIOLOGY   • Sciatica     HX   • Short-term memory loss     \"MILD\"   • SOB (shortness of breath) on exertion    • Vitamin D deficiency      Past Surgical History:  Past Surgical History:   Procedure Laterality Date   • AORTIC VALVE REPAIR/REPLACEMENT Left 3/15/2022    Procedure: TTE TRANSFEMORAL TRANSCATHETER AORTIC VALVE REPLACEMENT PERCUTANEOUS APPROACH;  Surgeon: Vesna Chris MD;  Location: [facility];  Service: Cardiothoracic;  Laterality: Left;   • AORTIC VALVE REPAIR/REPLACEMENT Left 3/15/2022    Procedure: Transfemoral Transcatheter Aortic Valve Replacement with intra operative TTE and possible open surgical rescue;  Surgeon: Clint Locke MD;  Location: [facility];  Service: Cardiovascular;  Laterality: Left;   • CARDIAC CATHETERIZATION N/A 2022    Procedure: Coronary angiography;  Surgeon: Clint Locke MD;  Location: [facility] CATH INVASIVE LOCATION;  Service: Cardiovascular;  Laterality: N/A;   • " CARDIAC CATHETERIZATION N/A 2022    Procedure: Percutaneous Coronary Intervention;  Surgeon: Clint Locke MD;  Location:  [facility] CATH INVASIVE LOCATION;  Service: Cardiovascular;  Laterality: N/A;   • CARDIAC CATHETERIZATION N/A 2022    Procedure: Stent ISMAEL coronary;  Surgeon: Clint Locke MD;  Location:  [facility] CATH INVASIVE LOCATION;  Service: Cardiovascular;  Laterality: N/A;   • CARDIAC CATHETERIZATION Left 2022    Procedure: CORONARY ANGIOGRAPHY;  Surgeon: Jeannie Echevarria MD;  Location:  [facility] CATH INVASIVE LOCATION;  Service: Cardiology;  Laterality: Left;   • CARDIAC CATHETERIZATION N/A 2022    Procedure: Left ventriculography;  Surgeon: Jeannie Echevarria MD;  Location:  [facility] CATH INVASIVE LOCATION;  Service: Cardiology;  Laterality: N/A;   • CARDIAC CATHETERIZATION N/A 2022    Procedure: Left Heart Cath;  Surgeon: Jeannie Echevarria MD;  Location:  [facility] CATH INVASIVE LOCATION;  Service: Cardiology;  Laterality: N/A;   • CARDIAC ELECTROPHYSIOLOGY PROCEDURE N/A 3/16/2022    Procedure: Pacemaker DC new [place];  Surgeon: Yann Ceballos MD;  Location:  [facility] CATH INVASIVE LOCATION;  Service: Cardiology;  Laterality: N/A;   • CATARACT EXTRACTION, BILATERAL     • COLONOSCOPY     • EYE SURGERY     • SKIN CANCER EXCISION          Social History:   Social History     Tobacco Use   • Smoking status: Former Smoker     Packs/day: 0.00     Years: 0.00     Pack years: 0.00     Start date:      Quit date:      Years since quittin.6   • Smokeless tobacco: Never Used   • Tobacco comment: 1 pack every 2 weeks   Substance Use Topics   • Alcohol use: Yes     Comment: wine - rare / no caffeine      Family History:  Family History   Problem Relation Age of Onset   • Aortic stenosis Mother    • Hypertension Mother    • Heart failure Father    • Diabetes Father    • Hypertension Father    • No Known Problems Sister    • No Known Problems Daughter    • No Known Problems Son   "  • Aneurysm Sister    • Other Sister         Polio in her 20's   • Malig Hyperthermia Neg Hx           Allergies:  Allergies   Allergen Reactions   • Hydrocodone Shortness Of Breath     Also reports \"lethargy\"   • Lipitor [Atorvastatin] Nausea Only     \"FELT AWFUL\"   • Erythromycin Diarrhea   • Lisinopril Cough     Scheduled Meds:  aspirin, 81 mg, Daily  clopidogrel, 75 mg, Daily  enoxaparin, 40 mg, Q24H  metoprolol tartrate, 12.5 mg, Q12H  pantoprazole, 40 mg, Q AM  potassium chloride, 20 mEq, Daily  sulfamethoxazole-trimethoprim, 1 tablet, Q12H  tamsulosin, 0.4 mg, Daily  torsemide, 40 mg, Daily            INTAKE AND OUTPUT:    Intake/Output Summary (Last 24 hours) at 8/6/2022 1356  Last data filed at 8/6/2022 0928  Gross per 24 hour   Intake 360 ml   Output 1700 ml   Net -1340 ml       Review of Systems:   GI: no n/v or abd pain  Cardiac: no chest pain or palps  Pulmonary: no shortness of breath or cough    Constitutional:  Temp:  [98.2 °F (36.8 °C)-98.7 °F (37.1 °C)] 98.7 °F (37.1 °C)  Heart Rate:  [61-80] 80  Resp:  [18] 18  BP: (124-126)/(61-77) 124/77    Physical Exam:  General:  Appears in no acute distress  Eyes: eom normal no conjunctival drainage  HEENT:  No JVD. Thyroid not visibly enlarged. No mucosal pallor or cyanosis  Respiratory: Respirations regular and unlabored at rest. BBS with good air entry in all fields. No crackles, rubs or wheezes auscultated  Cardiovascular: S1S2 Regular rate and rhythm. No murmur, rub or gallop. No carotid bruits. DP/PT pulses    2+ . No pretibial pitting edema  Gastrointestinal: Abdomen soft, flat, non tender. Bowel sounds present.  Musculoskeletal: CAREY x4. No abnormal movements  Neuro: AAO x3 CN II-XII grossly intact  Psych: Mood and affect normal, pleasant and cooperative      I reviewed the patient's new clinical results, and personally reviewed and interpreted the patient's ECG and telemetry data from the last 24 hours      Cardiographics  Lab Review   Results from " "last 7 days   Lab Units 08/03/22  1821 07/31/22  1829 07/31/22  1410   CK TOTAL U/L 78  --   --    TROPONIN T ng/mL  --  1.150* 1.140*     Results from last 7 days   Lab Units 08/06/22  0421   MAGNESIUM mg/dL 1.8     Results from last 7 days   Lab Units 08/06/22  0421   SODIUM mmol/L 134*   POTASSIUM mmol/L 3.2*   BUN mg/dL 15   CREATININE mg/dL 0.64   CALCIUM mg/dL 7.8*     @LABRCNTIPbnp@  Results from last 7 days   Lab Units 08/06/22  0421 08/05/22  0454 08/04/22  0504   WBC 10*3/mm3 10.70 10.96* 12.73*   HEMOGLOBIN g/dL 10.9* 11.3* 11.3*   HEMATOCRIT % 32.9* 33.5* 33.3*   PLATELETS 10*3/mm3 249 257 241     Results from last 7 days   Lab Units 07/31/22  1410   INR  1.31*   APTT seconds 27.8         Assessment/Plan:  1. Abnormal ECG with ST elevation inferiorly: cath 7/31/22 normal LV function mildly elevated filling pressures, widely patent mid LAD stent, otherwise nonobstructive disease. No chest pain. Functional status limitations for cardiac rehab, if she becomes more appropriate this can be ordered in the outpatient setting at her follow up appointment.   2. Renal stone with hydronephrosis  3. Hyponatremia  4. Elevated d dimer: CTA chest negative for PE, duplex LE neg for dvt, duplex portal negative for thrombus  5. History of TAVR: echo 8/1/22 ef 61%, mild perivalvular leak, JCARLOS 1.58  6. Leukocytosis    No chest pain or shortness of breath, tele reviewed, SR no events overnight. Stable for discharge from cardiac standpoint.       )8/6/2022  CLEMENTNIE Polanco/Transcription:   \"Dictated utilizing Dragon dictation\".     "

## 2022-08-07 LAB — COPPER SERPL-MCNC: 137 UG/DL (ref 80–158)

## 2022-08-08 NOTE — PROGRESS NOTES
Discharge Planning Assessment  [facility]     Patient Name: Yuliana Gonzalez  MRN: [id number]  Today's Date: 8/8/2022    Admit Date: 7/31/2022     Discharge Needs Assessment    No documentation.                Discharge Plan     Row Name 08/08/22 1647       Plan    Plan Ratna Quach    Final Discharge Disposition Code 03 - skilled nursing facility (SNF)    Final Note Ratna Quach skilled nursing daughter will transport              Continued Care and Services - Discharged on 8/6/2022 Admission date: 7/31/2022 - Discharge disposition: Skilled Nursing Facility (DC - External)    Destination Coordination complete.    Service Provider Request Status Selected Services Address Phone Fax Patient Preferred    RATNA QUACH   Selected Skilled Nursing [address] [facility] [address] [phone] --    [facility]  Accepted N/A [address] [facility] [id number] [phone] --    [facility] [place]  Pending - Request Sent N/A [address] [facility] [id number] [phone] --       Internal Comment last updated by Zhanna Cordero, RN 8/2/2022 1207    [facility]               [facility]  Pending - Request Sent N/A [address] [facility] [address] [phone] --       Internal Comment last updated by Kendra Melton, RN 8/4/2022 1614    2nd choice                  [facility] ACUTE REHAB PROGRAM  Pending - No Request Sent N/A [address] [place] [id number] [phone] -- --    [facility]  Declined  No Bed Available N/A [address] [facility] [id number] [phone] --       Internal Comment last updated by Kendra Melton, RN 8/4/2022 1619    1st choice                        Selected Continued Care - Episodes Includes selections from active Coordinated Care Management episodes    High Risk Care Management Episode  start date: 7/27/2022 (Paused)   There are no active outsourced providers for this episode.               Expected Discharge Date and Time     Expected Discharge Date Expected Discharge Time    Aug 6, 2022          Demographic Summary    No documentation.                Functional Status    No documentation.                Psychosocial    No documentation.                Abuse/Neglect    No documentation.                Legal    No documentation.                Substance Abuse    No documentation.                Patient Forms    No documentation.                   Kendra Melton RN

## 2022-08-09 ENCOUNTER — OFFICE VISIT (OUTPATIENT)
Dept: INTERNAL MEDICINE | Facility: CLINIC | Age: 87
End: 2022-08-09

## 2022-08-10 RX ORDER — ATENOLOL 25 MG/1
TABLET ORAL
Qty: 90 TABLET | Refills: 1 | OUTPATIENT
Start: 2022-08-10

## 2022-08-16 ENCOUNTER — READMISSION MANAGEMENT (OUTPATIENT)
Dept: CALL CENTER | Facility: HOSPITAL | Age: 87
End: 2022-08-16

## 2022-08-16 ENCOUNTER — HOME HEALTH ADMISSION (OUTPATIENT)
Dept: HOME HEALTH SERVICES | Facility: HOME HEALTHCARE | Age: 87
End: 2022-08-16

## 2022-08-16 LAB
LAB DIRECTOR NAME PROVIDER: NORMAL
SERPINA1 GENE MUT ANL BLD/T: NORMAL
SERPINA1 GENE MUT TESTED BLD/T: NORMAL

## 2022-08-16 NOTE — OUTREACH NOTE
Prep Survey    Flowsheet Row Responses   [facility] facility patient discharged from? Non-BH   Is LACE score < 7 ? Non-BH Discharge   Emergency Room discharge w/ pulse ox? No   Eligibility [facility]    Date of Discharge 08/16/22   Discharge Disposition Home or Self Care   Discharge diagnosis unknown   Does the patient have one of the following disease processes/diagnoses(primary or secondary)? Other   Prep survey completed? Yes          CONSTANTINO DUONG - Registered Nurse

## 2022-08-17 ENCOUNTER — TELEMEDICINE (OUTPATIENT)
Dept: INTERNAL MEDICINE | Facility: CLINIC | Age: 87
End: 2022-08-17

## 2022-08-17 ENCOUNTER — TRANSITIONAL CARE MANAGEMENT TELEPHONE ENCOUNTER (OUTPATIENT)
Dept: CALL CENTER | Facility: HOSPITAL | Age: 87
End: 2022-08-17

## 2022-08-17 VITALS — BODY MASS INDEX: 29.64 KG/M2 | RESPIRATION RATE: 16 BRPM | WEIGHT: 157 LBS | HEIGHT: 61 IN

## 2022-08-17 DIAGNOSIS — R79.89 ELEVATED LFTS: ICD-10-CM

## 2022-08-17 DIAGNOSIS — N20.1 RIGHT DISTAL URETERAL CALCULUS: ICD-10-CM

## 2022-08-17 DIAGNOSIS — I21.3 ST ELEVATION MYOCARDIAL INFARCTION (STEMI), UNSPECIFIED ARTERY: Primary | ICD-10-CM

## 2022-08-17 PROCEDURE — 99214 OFFICE O/P EST MOD 30 MIN: CPT | Performed by: INTERNAL MEDICINE

## 2022-08-17 RX ORDER — HYDROCHLOROTHIAZIDE 12.5 MG/1
CAPSULE, GELATIN COATED ORAL
COMMUNITY
Start: 2022-08-11 | End: 2022-08-18

## 2022-08-17 RX ORDER — TORSEMIDE 20 MG/1
TABLET ORAL
COMMUNITY
Start: 2022-08-16 | End: 2022-08-17

## 2022-08-17 NOTE — PROGRESS NOTES
Mode of Visit: Video  Location of patient: home  Location of provider: [facility]  You have chosen to receive care through a telehealth visit.  Does the patient consent to use a video/audio connection for your medical care today? Yes  The visit included audio and video interaction. No technical issues occurred during this visit.

## 2022-08-17 NOTE — PROGRESS NOTES
Subjective   Yuliana Gonzalez is a 89 y.o. female.     Chief Complaint   Patient presents with   • Shortness of Breath     Patient states that she has been experincing shortness of breath off and on for a past few weeks. Patient explains that she had kidney stones and believes that having kidney stones did make her off balance. Patient states that she is feel more better today.          Video visit today due to COVID pandemic.  She was hospitalized around 8/4/22 with a complicated illness.  On about 7/26/2022 she had acute right upper quadrant abdominal pain and an obstructing right kidney stone was noted.  She was given Norco and sent home she got into trouble with nausea and vomiting and recurrent pain.  She had acute inferior ST elevation on EKG with a bump in her troponin was felt to have a an acute inferior wall MI.  Cardiac catheterization on 7/31/2022 showed a patent stent and nonobstructive coronary disease.  She returned on around 8/4/2022.  She had abdominal pain in the right upper quadrant and elevation of liver function test along with severe hyponatremia.  Norco was stopped.  HCT was stopped.  Sodium normalized.  She had an extensive evaluation of the gallbladder and biliary tree which was unremarkable.  She was discharged to rehab and she is now back at the forearm and she is back to normal.  All of her symptoms have cleared up.       The following portions of the patient's history were reviewed and updated as appropriate: allergies, current medications, past social history and problem list.    Outpatient Medications Marked as Taking for the 8/17/22 encounter (Telemedicine) with Trent Davis MD   Medication Sig Dispense Refill   • acetaminophen (TYLENOL) 325 MG tablet Take 2 tablets by mouth Every 4 (Four) Hours As Needed for Mild Pain  or Fever (temperature greater than 101F). 120 tablet 0   • aspirin (aspirin) 81 MG EC tablet Take 1 tablet by mouth Daily. (Patient taking differently: Take  81 mg by mouth Every Morning.) 30 tablet 6   • atorvastatin (Lipitor) 80 MG tablet Take 0.5 tablets by mouth Daily. 90 tablet 3   • cholecalciferol (VITAMIN D3) 25 MCG (1000 UT) tablet Take 2 tablets by mouth Daily.     • clopidogrel (PLAVIX) 75 MG tablet Take 1 tablet by mouth Daily. (Patient taking differently: Take 75 mg by mouth Every Morning.) 30 tablet 11   • metoprolol tartrate (LOPRESSOR) 25 MG tablet Take 0.5 tablets by mouth Every 12 (Twelve) Hours. 60 tablet 3   • Multiple Vitamins-Minerals (VITEYES AREDS ADVANCED PO) Take 2 capsules by mouth Daily. PT HOLDING FOR SURGERY     • ondansetron ODT (ZOFRAN-ODT) 8 MG disintegrating tablet Place 1 tablet under the tongue Every 8 (Eight) Hours As Needed for Nausea or Vomiting. 20 tablet 0   • pantoprazole (PROTONIX) 40 MG EC tablet Take 1 tablet by mouth Every Morning. 30 tablet 3   • tamsulosin (FLOMAX) 0.4 MG capsule 24 hr capsule Take 1 capsule by mouth Daily. 30 capsule 3   • torsemide 40 MG tablet Take 40 mg by mouth Daily. 30 tablet 3       Review of Systems   Constitutional: Negative for chills and fever.   Respiratory: Positive for shortness of breath.    Cardiovascular: Negative for chest pain, palpitations and leg swelling.   Gastrointestinal: Positive for abdominal pain.       Objective   Vitals:    08/17/22 0931   Resp: 16      Wt Readings from Last 3 Encounters:   08/17/22 71.2 kg (157 lb)   08/06/22 71.4 kg (157 lb 6.4 oz)   06/28/22 70.8 kg (156 lb)    Body mass index is 29.68 kg/m².      Physical Exam  Constitutional:       Appearance: Normal appearance.   Pulmonary:      Effort: Pulmonary effort is normal.   Neurological:      Mental Status: She is alert.   Psychiatric:         Mood and Affect: Mood normal.         Behavior: Behavior normal.         Thought Content: Thought content normal.         Judgment: Judgment normal.           Problems Addressed this Visit        Cardiac and Vasculature    ST elevation myocardial infarction (STEMI),  unspecified artery (HCC) - Primary       Gastrointestinal Abdominal     Elevated LFTs       Genitourinary and Reproductive     Right distal ureteral calculus    Relevant Medications    hydroCHLOROthiazide (MICROZIDE) 12.5 MG capsule      Diagnoses       Codes Comments    ST elevation myocardial infarction (STEMI), unspecified artery (HCC)    -  Primary ICD-10-CM: I21.3  ICD-9-CM: 410.90     Right distal ureteral calculus     ICD-10-CM: N20.1  ICD-9-CM: 592.1     Elevated LFTs     ICD-10-CM: R79.89  ICD-9-CM: 790.6         Assessment & Plan   Video visit today due to COVID pandemic.  She has a incredibly complicated medical history.  On about 7/26/2022 she had an acute right-sided kidney stone with hydronephrosis.  That has gradually resolved without intervention.  She has a persistent large stone in the left renal pelvis that is stable and not causing any problems.  Just cleared by her urologist yesterday for any further evaluation.  On about 7/31/2022 and acute inferior wall MI was noted with a small troponin bump in the troponin and ST elevation in inferior leads.  A cardiac catheterization ensued which showed a patent stent and nonobstructive coronary disease.  There was diffuse hypokinesis on TTE.  Retrospectively 1 most consider that this was a Takotsubo's syndrome or at least spasm related to the acute kidney stone.  She had significant hyponatremia with a sodium down to 122 which is now resolved.  She is now off of hydrochlorothiazide.  She had increased liver function test that were never explained.  She had extensive evaluation of her gallbladder including ultrasound, HIDA scan, and noninvasive duplex vascular studies.  No explanation for the abnormal liver function test has been made but it is possible she had some acute passive congestion.  Irregardless we need to recheck her liver function test to make sure they are trending back to normal.  Otherwise she is made a full recovery at this point.  We will  make no changes on any of her medications.  Recheck a CMP.  That we will check another sodium as well as her potassium as well as her liver function test all which were still abnormal on 8/6/2022.  [employer] platform used for the visit today.    The above information was reviewed again today 08/17/22.  It continues to be accurate as reflected above and is unchanged.  History, physical and review of systems all reviewed and are unchanged.  Medications were reviewed today and continue the current dosing.         Dragon disclaimer:   Much of this encounter note is an electronic transcription/translation of spoken language to printed text. The electronic translation of spoken language may permit erroneous, or at times, nonsensical words or phrases to be inadvertently transcribed; Although I have reviewed the note for such errors, some may still exist.

## 2022-08-17 NOTE — OUTREACH NOTE
Call Center TCM Note    Flowsheet Row Responses   [facility] patient discharged from? Non-   Does the patient have one of the following disease processes/diagnoses(primary or secondary)? Other   TCM attempt successful? Yes   Call start time 1320   Call Status Comments has a complete telehealth visit with PCP   Call end time 1320   Discharge diagnosis unknown          Mehnaz Keenan RN    8/17/2022, 13:21 EDT

## 2022-08-18 LAB
ALBUMIN SERPL-MCNC: 4 G/DL (ref 3.5–4.6)
ALBUMIN/GLOB SERPL: 2.2 {RATIO} (ref 1.2–2.2)
ALP SERPL-CCNC: 241 IU/L (ref 44–121)
ALT SERPL-CCNC: 50 IU/L (ref 0–32)
AST SERPL-CCNC: 38 IU/L (ref 0–40)
BILIRUB SERPL-MCNC: 0.8 MG/DL (ref 0–1.2)
BUN SERPL-MCNC: 12 MG/DL (ref 10–36)
BUN/CREAT SERPL: 15 (ref 12–28)
CALCIUM SERPL-MCNC: 8.5 MG/DL (ref 8.7–10.3)
CHLORIDE SERPL-SCNC: 93 MMOL/L (ref 96–106)
CO2 SERPL-SCNC: 26 MMOL/L (ref 20–29)
CREAT SERPL-MCNC: 0.78 MG/DL (ref 0.57–1)
EGFRCR-CYS SERPLBLD CKD-EPI 2021: 71 ML/MIN/1.73
GLOBULIN SER CALC-MCNC: 1.8 G/DL (ref 1.5–4.5)
GLUCOSE SERPL-MCNC: 121 MG/DL (ref 65–99)
POTASSIUM SERPL-SCNC: 3.1 MMOL/L (ref 3.5–5.2)
PROT SERPL-MCNC: 5.8 G/DL (ref 6–8.5)
SODIUM SERPL-SCNC: 137 MMOL/L (ref 134–144)

## 2022-08-18 RX ORDER — POTASSIUM CHLORIDE 750 MG/1
10 CAPSULE, EXTENDED RELEASE ORAL 2 TIMES DAILY
Qty: 180 CAPSULE | Refills: 1 | Status: SHIPPED | OUTPATIENT
Start: 2022-08-18 | End: 2022-09-01 | Stop reason: SDUPTHER

## 2022-08-29 ENCOUNTER — OFFICE VISIT (OUTPATIENT)
Dept: CARDIOLOGY | Facility: CLINIC | Age: 87
End: 2022-08-29

## 2022-08-29 VITALS
WEIGHT: 149 LBS | HEART RATE: 93 BPM | HEIGHT: 60 IN | SYSTOLIC BLOOD PRESSURE: 140 MMHG | BODY MASS INDEX: 29.25 KG/M2 | DIASTOLIC BLOOD PRESSURE: 58 MMHG

## 2022-08-29 DIAGNOSIS — I25.10 CORONARY ARTERY DISEASE INVOLVING NATIVE CORONARY ARTERY OF NATIVE HEART WITHOUT ANGINA PECTORIS: Primary | ICD-10-CM

## 2022-08-29 DIAGNOSIS — Z95.2 S/P TAVR (TRANSCATHETER AORTIC VALVE REPLACEMENT): ICD-10-CM

## 2022-08-29 DIAGNOSIS — I10 PRIMARY HYPERTENSION: Chronic | ICD-10-CM

## 2022-08-29 PROCEDURE — 93000 ELECTROCARDIOGRAM COMPLETE: CPT | Performed by: NURSE PRACTITIONER

## 2022-08-29 PROCEDURE — 99214 OFFICE O/P EST MOD 30 MIN: CPT | Performed by: NURSE PRACTITIONER

## 2022-08-29 NOTE — PROGRESS NOTES
Date of Office Visit: 2022  Encounter Provider: CLEMENTINE Gonzalez  Place of Service: [facility] CARDIOLOGY  Patient Name: Yuliana Gonzalez  :1930    Chief Complaint   Patient presents with   • Aortic Stenosis   :     HPI: Yuliana Gonzalez is a 89 y.o. female.  She is a patient of Dr. Locke's whom we have followed for aortic stenosis and coronary artery disease.  In January, she underwent PCI of the mid LAD.  In March, she underwent a TAVR.  Postoperatively, she developed high degree AV block requiring temporary pacing and ultimately a permanent pacemaker.     She was last seen in the office by Dr. Locke in April at which time she was feeling well.  She denied any symptoms of angina or heart failure.  No changes were made to her regimen, and she was advised to follow-up in 6 months.   On , she presented shortness of breath.  EKG demonstrated small inferior Q waves and large ST elevations in the inferior leads.  Cardiac catheterization demonstrated a widely patent mid LAD stent and otherwise nonobstructive disease.  The etiology of the ST elevation was unclear.  Due to the elevated troponin, dual antiplatelet therapy was recommended for 1 year.  Echocardiogram demonstrated normal LV function and a well-functioning TAVR valve.  Nephrology was consulted during the hospitalization for hyponatremia and hypophosphatemia.  She had elevated liver enzymes of unknown etiology.  On , she was stable for discharge.  She is here today for follow-up.   Since discharge, she has been feeling better.  She has been following closely with Dr. Davis in regards to her labs.  He has been managing her potassium.  She denies any chest pain, shortness of breath, palpitations, edema, syncope, bleeding difficulties or melena.  She has some positional lightheadedness.    Past Medical History:   Diagnosis Date   • Aortic stenosis 2009    diagnosed,  moderate with 0.9cm area, 42mm max  "gradient. 0.91/34 6/15.   • Central loss of vision     RIGHT EYE   • Cervical arthritis    • Colitis     HX   • Coronary artery disease    • History of constipation    • History of esophageal reflux    • History of skin cancer    • Hyperlipidemia    • Hypertension    • IGT (impaired glucose tolerance)    • Irregular heart beat     HX   • Macular degeneration    • Medication management    • On continuous oral anticoagulation    • Redness of skin     NOSE/DERMATOLOGY TREATING WITH FLAGYL CREAM/ADVISED PT TO NOTIFY CARDIOLOGY   • Sciatica     HX   • Short-term memory loss     \"MILD\"   • SOB (shortness of breath) on exertion    • Vitamin D deficiency        Past Surgical History:   Procedure Laterality Date   • AORTIC VALVE REPAIR/REPLACEMENT Left 3/15/2022    Procedure: TTE TRANSFEMORAL TRANSCATHETER AORTIC VALVE REPLACEMENT PERCUTANEOUS APPROACH;  Surgeon: Vesna Chris MD;  Location: [facility];  Service: Cardiothoracic;  Laterality: Left;   • AORTIC VALVE REPAIR/REPLACEMENT Left 3/15/2022    Procedure: Transfemoral Transcatheter Aortic Valve Replacement with intra operative TTE and possible open surgical rescue;  Surgeon: Clint Locke MD;  Location: [facility];  Service: Cardiovascular;  Laterality: Left;   • CARDIAC CATHETERIZATION N/A 1/21/2022    Procedure: Coronary angiography;  Surgeon: Clint Locke MD;  Location: [facility] CATH INVASIVE LOCATION;  Service: Cardiovascular;  Laterality: N/A;   • CARDIAC CATHETERIZATION N/A 1/21/2022    Procedure: Percutaneous Coronary Intervention;  Surgeon: Clint Locke MD;  Location: [facility] CATH INVASIVE LOCATION;  Service: Cardiovascular;  Laterality: N/A;   • CARDIAC CATHETERIZATION N/A 1/21/2022    Procedure: Stent ISMAEL coronary;  Surgeon: Clint Locke MD;  Location: [facility] CATH INVASIVE LOCATION;  Service: Cardiovascular;  Laterality: N/A;   • CARDIAC CATHETERIZATION Left 7/31/2022    Procedure: CORONARY ANGIOGRAPHY;  Surgeon: " Jeannie Echevarria MD;  Location:  [facility] CATH INVASIVE LOCATION;  Service: Cardiology;  Laterality: Left;   • CARDIAC CATHETERIZATION N/A 2022    Procedure: Left ventriculography;  Surgeon: Jeannie Echevarria MD;  Location:  [facility] CATH INVASIVE LOCATION;  Service: Cardiology;  Laterality: N/A;   • CARDIAC CATHETERIZATION N/A 2022    Procedure: Left Heart Cath;  Surgeon: Jeannie Echevarria MD;  Location:  [facility] CATH INVASIVE LOCATION;  Service: Cardiology;  Laterality: N/A;   • CARDIAC ELECTROPHYSIOLOGY PROCEDURE N/A 3/16/2022    Procedure: Pacemaker DC new [place];  Surgeon: Yann Ceballos MD;  Location:  [facility] CATH INVASIVE LOCATION;  Service: Cardiology;  Laterality: N/A;   • CATARACT EXTRACTION, BILATERAL     • COLONOSCOPY     • EYE SURGERY     • SKIN CANCER EXCISION         Social History     Socioeconomic History   • Marital status:      Spouse name: Stephan   • Number of children: 4   Tobacco Use   • Smoking status: Former Smoker     Packs/day: 0.00     Years: 0.00     Pack years: 0.00     Start date:      Quit date:      Years since quittin.7   • Smokeless tobacco: Never Used   • Tobacco comment: 1 pack every 2 weeks   Vaping Use   • Vaping Use: Never used   Substance and Sexual Activity   • Alcohol use: Yes     Comment: wine - rare / no caffeine   • Drug use: No   • Sexual activity: Defer       Family History   Problem Relation Age of Onset   • Aortic stenosis Mother    • Hypertension Mother    • Heart failure Father    • Diabetes Father    • Hypertension Father    • No Known Problems Sister    • No Known Problems Daughter    • No Known Problems Son    • Aneurysm Sister    • Other Sister         Polio in her 20's   • Malig Hyperthermia Neg Hx        Review of Systems   Constitutional: Negative.   Cardiovascular: Negative.  Negative for chest pain, dyspnea on exertion, leg swelling, orthopnea, paroxysmal nocturnal dyspnea and syncope.   Respiratory: Negative.    Hematologic/Lymphatic:  "Negative for bleeding problem.   Musculoskeletal: Negative for falls.   Gastrointestinal: Negative for melena.   Neurological: Positive for light-headedness. Negative for dizziness.       Allergies   Allergen Reactions   • Hydrocodone Shortness Of Breath     Also reports \"lethargy\"   • Lipitor [Atorvastatin] Nausea Only     \"FELT AWFUL\"   • Erythromycin Diarrhea   • Lisinopril Cough         Current Outpatient Medications:   •  acetaminophen (TYLENOL) 325 MG tablet, Take 2 tablets by mouth Every 4 (Four) Hours As Needed for Mild Pain  or Fever (temperature greater than 101F)., Disp: 120 tablet, Rfl: 0  •  aspirin (aspirin) 81 MG EC tablet, Take 1 tablet by mouth Daily. (Patient taking differently: Take 81 mg by mouth Every Morning.), Disp: 30 tablet, Rfl: 6  •  cholecalciferol (VITAMIN D3) 25 MCG (1000 UT) tablet, Take 2 tablets by mouth Daily., Disp: , Rfl:   •  clopidogrel (PLAVIX) 75 MG tablet, Take 1 tablet by mouth Daily. (Patient taking differently: Take 75 mg by mouth Every Morning.), Disp: 30 tablet, Rfl: 11  •  metoprolol tartrate (LOPRESSOR) 25 MG tablet, Take 0.5 tablets by mouth Every 12 (Twelve) Hours., Disp: 60 tablet, Rfl: 3  •  Multiple Vitamins-Minerals (VITEYES AREDS ADVANCED PO), Take 2 capsules by mouth Daily. PT HOLDING FOR SURGERY, Disp: , Rfl:   •  pantoprazole (PROTONIX) 40 MG EC tablet, Take 1 tablet by mouth Every Morning., Disp: 30 tablet, Rfl: 3  •  potassium chloride (MICRO-K) 10 MEQ CR capsule, Take 1 capsule by mouth 2 (Two) Times a Day., Disp: 180 capsule, Rfl: 1  •  tamsulosin (FLOMAX) 0.4 MG capsule 24 hr capsule, Take 1 capsule by mouth Daily., Disp: 30 capsule, Rfl: 3  •  torsemide 40 MG tablet, Take 40 mg by mouth Daily., Disp: 30 tablet, Rfl: 3      Objective:     Vitals:    08/29/22 1228   BP: 140/58   Pulse: 93   Weight: 67.6 kg (149 lb)   Height: 152.4 cm (60\")     Body mass index is 29.1 kg/m².    PHYSICAL EXAM:    Neck:      Vascular: No JVD.   Pulmonary:      Effort: " Pulmonary effort is normal.      Breath sounds: Normal breath sounds.   Cardiovascular:      Normal rate. Regular rhythm.      Murmurs: There is a harsh midsystolic murmur at the URSB, radiating to the neck.      No gallop. No click. No rub.   Pulses:     Intact distal pulses.           ECG 12 Lead    Date/Time: 8/29/2022 12:40 PM  Performed by: Tamar Lind APRN  Authorized by: Tamar Lind APRN   Comparison: compared with previous ECG from 8/1/2022  Similar to previous ECG  Rhythm: sinus rhythm  Rate: normal  BPM: 93  Conduction: right bundle branch block and left posterior fascicular block              Assessment:       Diagnosis Plan   1. Coronary artery disease involving native coronary artery of native heart without angina pectoris  ECG 12 Lead   2. S/P TAVR (transcatheter aortic valve replacement)     3. Primary hypertension       Orders Placed This Encounter   Procedures   • ECG 12 Lead     This order was created via procedure documentation     Order Specific Question:   Release to patient     Answer:   Routine Release          Plan:       1.  Coronary artery disease.  Status post PCI of the mid LAD in January of this year.  Repeat echocardiogram during hospitalization demonstrated a widely patent stent and otherwise nonobstructive disease.  Due to the troponin elevation, aspirin and Plavix were recommended for 1 year.       2.  Aortic stenosis.  Status post TAVR in March of this year.  Recent echocardiogram demonstrated normal LV function and well-functioning TAVR valve.      3.  Hypertension.  Her blood pressure is stable.  Continue metoprolol.      I think she is doing well.  I am not recommending any changes today.  She will follow-up with Dr. Locke in 1 month.      As always, it has been a pleasure to participate in your patient's care.      Sincerely,         CLEMENTINE Vaughn

## 2022-09-01 ENCOUNTER — CLINICAL SUPPORT (OUTPATIENT)
Dept: INTERNAL MEDICINE | Facility: CLINIC | Age: 87
End: 2022-09-01

## 2022-09-01 ENCOUNTER — LAB (OUTPATIENT)
Dept: INTERNAL MEDICINE | Facility: CLINIC | Age: 87
End: 2022-09-01

## 2022-09-01 VITALS
TEMPERATURE: 97.9 F | SYSTOLIC BLOOD PRESSURE: 114 MMHG | DIASTOLIC BLOOD PRESSURE: 64 MMHG | HEART RATE: 100 BPM | OXYGEN SATURATION: 98 % | RESPIRATION RATE: 16 BRPM | WEIGHT: 146 LBS | BODY MASS INDEX: 28.66 KG/M2 | HEIGHT: 60 IN

## 2022-09-01 DIAGNOSIS — R53.1 WEAKNESS: Primary | ICD-10-CM

## 2022-09-01 DIAGNOSIS — R73.02 IGT (IMPAIRED GLUCOSE TOLERANCE): Primary | ICD-10-CM

## 2022-09-01 PROCEDURE — 1160F RVW MEDS BY RX/DR IN RCRD: CPT | Performed by: INTERNAL MEDICINE

## 2022-09-01 PROCEDURE — 1170F FXNL STATUS ASSESSED: CPT | Performed by: INTERNAL MEDICINE

## 2022-09-01 PROCEDURE — G0439 PPPS, SUBSEQ VISIT: HCPCS | Performed by: INTERNAL MEDICINE

## 2022-09-01 RX ORDER — POTASSIUM CHLORIDE 750 MG/1
10 CAPSULE, EXTENDED RELEASE ORAL 2 TIMES DAILY
Qty: 180 CAPSULE | Refills: 3 | Status: SHIPPED | OUTPATIENT
Start: 2022-09-01

## 2022-09-01 RX ORDER — PANTOPRAZOLE SODIUM 40 MG/1
40 TABLET, DELAYED RELEASE ORAL
Qty: 90 TABLET | Refills: 3 | Status: SHIPPED | OUTPATIENT
Start: 2022-09-01

## 2022-09-01 NOTE — PROGRESS NOTES
The ABCs of the Annual Wellness Visit  Subsequent Medicare Wellness Visit    Chief Complaint   Patient presents with   • Medicare Wellness-subsequent      Subjective    History of Present Illness:  Yuliana Gonzalez is a 89 y.o. female who presents for a Subsequent Medicare Wellness Visit.    The following portions of the patient's history were reviewed and   updated as appropriate: allergies, current medications, past family history, past medical history, past social history, past surgical history and problem list.    Compared to one year ago, the patient feels her physical   health is the same.    Compared to one year ago, the patient feels her mental   health is the same.    Recent Hospitalizations:  This patient has had a [facility] admission record on file within the last 365 days.    Current Medical Providers:  Patient Care Team:  Trent Davis MD as PCP - General (Internal Medicine)  Trent Davis MD as PCP - Internal Medicine (Internal Medicine)  Thang Bruce MD as Consulting Physician (Ophthalmology)  Curtis Marshall MD as Consulting Physician (Cardiology)  Danya Kenney RN as Ambulatory  ([facility])    Outpatient Medications Prior to Visit   Medication Sig Dispense Refill   • acetaminophen (TYLENOL) 325 MG tablet Take 2 tablets by mouth Every 4 (Four) Hours As Needed for Mild Pain  or Fever (temperature greater than 101F). 120 tablet 0   • aspirin (aspirin) 81 MG EC tablet Take 1 tablet by mouth Daily. (Patient taking differently: Take 81 mg by mouth Every Morning.) 30 tablet 6   • cholecalciferol (VITAMIN D3) 25 MCG (1000 UT) tablet Take 2 tablets by mouth Daily.     • clopidogrel (PLAVIX) 75 MG tablet Take 1 tablet by mouth Daily. (Patient taking differently: Take 75 mg by mouth Every Morning.) 30 tablet 11   • metoprolol tartrate (LOPRESSOR) 25 MG tablet Take 0.5 tablets by mouth Every 12 (Twelve) Hours. 60 tablet 3   • Multiple Vitamins-Minerals (VITEYES  AREDS ADVANCED PO) Take 2 capsules by mouth Daily. PT HOLDING FOR SURGERY     • pantoprazole (PROTONIX) 40 MG EC tablet Take 1 tablet by mouth Every Morning. 30 tablet 3   • potassium chloride (MICRO-K) 10 MEQ CR capsule Take 1 capsule by mouth 2 (Two) Times a Day. 180 capsule 1   • tamsulosin (FLOMAX) 0.4 MG capsule 24 hr capsule Take 1 capsule by mouth Daily. 30 capsule 3   • torsemide 40 MG tablet Take 40 mg by mouth Daily. 30 tablet 3     No facility-administered medications prior to visit.       No opioid medication identified on active medication list. I have reviewed chart for other potential  high risk medication/s and harmful drug interactions in the elderly.          Aspirin is on active medication list. Aspirin use is indicated based on review of current medical condition/s. Pros and cons of this therapy have been discussed today. Benefits of this medication outweigh potential harm.  Patient has been encouraged to continue taking this medication.  .      Patient Active Problem List   Diagnosis   • Aortic stenosis   • IGT (impaired glucose tolerance)   • Hyperlipidemia   • Hypertension   • Cervical arthritis   • Sciatica   • Vitamin D deficiency   • Exudative age-related macular degeneration, bilateral, with inactive choroidal neovascularization (HCC)   • Coronary artery disease involving native coronary artery of native heart   • AV block, complete (HCC)   • S/P TAVR (transcatheter aortic valve replacement)   • Presence of cardiac pacemaker   • ST elevation myocardial infarction (STEMI) (HCC)   • ST elevation myocardial infarction (STEMI), unspecified artery (HCC)   • Hyponatremia   • Elevated LFTs   • Right distal ureteral calculus   • Hydronephrosis of right kidney   • Glucose intolerance   • UTI (urinary tract infection)     Advance Care Planning  Advance Directive is on file.  ACP discussion was held with the patient during this visit. Patient has an advance directive in EMR which is still valid.       "     Objective    There were no vitals filed for this visit.  Estimated body mass index is 29.1 kg/m² as calculated from the following:    Height as of 22: 152.4 cm (60\").    Weight as of 22: 67.6 kg (149 lb).    BMI is >= 25 and <30. (Overweight) The following options were offered after discussion;: nutrition counseling/recommendations      Does the patient have evidence of cognitive impairment? No    Physical Exam  Lab Results   Component Value Date    TRIG 145 2022    HDL 56 2022    LDL 58 2022    VLDL 25 2022    HGBA1C 5.80 (H) 2022            HEALTH RISK ASSESSMENT    Smoking Status:  Social History     Tobacco Use   Smoking Status Former Smoker   • Packs/day: 0.00   • Years: 0.00   • Pack years: 0.00   • Start date:    • Quit date:    • Years since quittin.7   Smokeless Tobacco Never Used   Tobacco Comment    1 pack every 2 weeks     Alcohol Consumption:  Social History     Substance and Sexual Activity   Alcohol Use Yes    Comment: wine - rare / no caffeine     Fall Risk Screen:    STEADI Fall Risk Assessment has not been completed.    Depression Screening:  PHQ-2/PHQ-9 Depression Screening 2022   Retired PHQ-9 Total Score -   Retired Total Score -   Little Interest or Pleasure in Doing Things 0-->not at all   Feeling Down, Depressed or Hopeless 0-->not at all   PHQ-9: Brief Depression Severity Measure Score 0       Health Habits and Functional and Cognitive Screening:  Functional & Cognitive Status 8/3/2021   Do you have difficulty preparing food and eating? No   Do you have difficulty bathing yourself, getting dressed or grooming yourself? No   Do you have difficulty using the toilet? No   Do you have difficulty moving around from place to place? No   Do you have trouble with steps or getting out of a bed or a chair? No   Current Diet Well Balanced Diet   Dental Exam Up to date   Eye Exam Up to date   Exercise (times per week) 3 times per week "   Current Exercises Include Walking   Current Exercise Activities Include -   Do you need help using the phone?  No   Are you deaf or do you have serious difficulty hearing?  No   Do you need help with transportation? Yes   Do you need help shopping? No   Do you need help preparing meals?  No   Do you need help with housework?  No   Do you need help with laundry? No   Do you need help taking your medications? No   Do you need help managing money? No   Do you ever drive or ride in a car without wearing a seat belt? No   Have you felt unusual stress, anger or loneliness in the last month? No   Who do you live with? Community   If you need help, do you have trouble finding someone available to you? No   Have you been bothered in the last four weeks by sexual problems? No   Do you have difficulty concentrating, remembering or making decisions? -       Age-appropriate Screening Schedule:  Refer to the list below for future screening recommendations based on patient's age, sex and/or medical conditions. Orders for these recommended tests are listed in the plan section. The patient has been provided with a written plan.    Health Maintenance   Topic Date Due   • DXA SCAN  Never done   • ZOSTER VACCINE (2 of 3) 01/01/2035 (Originally 1/26/2008)   • INFLUENZA VACCINE  10/01/2022   • TDAP/TD VACCINES (3 - Td or Tdap) 03/01/2027   • MAMMOGRAM  Discontinued   • LIPID PANEL  Discontinued              Assessment & Plan   CMS Preventative Services Quick Reference  Risk Factors Identified During Encounter  Fall Risk-High or Moderate  The above risks/problems have been discussed with the patient.  Follow up actions/plans if indicated are seen below in the Assessment/Plan Section.  Pertinent information has been shared with the patient in the After Visit Summary.    There are no diagnoses linked to this encounter.    Follow Up:   No follow-ups on file.     An After Visit Summary and PPPS were made available to the patient.          I  spent 15 minutes caring for Yuliana on this date of service. This time includes time spent by me in the following activities:preparing for the visit

## 2022-09-02 ENCOUNTER — TELEPHONE (OUTPATIENT)
Dept: INTERNAL MEDICINE | Facility: CLINIC | Age: 87
End: 2022-09-02

## 2022-09-02 LAB
HBA1C MFR BLD: 5.9 % (ref 4.8–5.6)
SPECIMEN STATUS: NORMAL
WRITTEN AUTHORIZATION: NORMAL

## 2022-09-02 NOTE — TELEPHONE ENCOUNTER
Called spoke with patient, currently staying at [facility], will contact nurse Evans to send our office a request for PT

## 2022-09-02 NOTE — TELEPHONE ENCOUNTER
Caller: Yuliana Gonzalez    Relationship: Self    Best call back number: [phone]    What specialty or service is being requested: PHYSICAL THERAPY     What is the provider, practice or medical service name: [facility] OUTPATIENT THERAP[facility]     What is the office phone number: [phone]

## 2022-09-09 RX ORDER — CLOPIDOGREL BISULFATE 75 MG/1
75 TABLET ORAL EVERY MORNING
Qty: 30 TABLET | Refills: 1 | Status: SHIPPED | OUTPATIENT
Start: 2022-09-09 | End: 2022-12-01

## 2022-09-15 ENCOUNTER — OFFICE VISIT (OUTPATIENT)
Dept: INTERNAL MEDICINE | Facility: CLINIC | Age: 87
End: 2022-09-15

## 2022-09-15 VITALS
HEIGHT: 60 IN | TEMPERATURE: 98 F | BODY MASS INDEX: 28.07 KG/M2 | OXYGEN SATURATION: 97 % | HEART RATE: 62 BPM | RESPIRATION RATE: 18 BRPM | SYSTOLIC BLOOD PRESSURE: 110 MMHG | WEIGHT: 143 LBS | DIASTOLIC BLOOD PRESSURE: 68 MMHG

## 2022-09-15 DIAGNOSIS — I10 PRIMARY HYPERTENSION: Primary | Chronic | ICD-10-CM

## 2022-09-15 DIAGNOSIS — D50.8 OTHER IRON DEFICIENCY ANEMIA: ICD-10-CM

## 2022-09-15 DIAGNOSIS — R73.02 IGT (IMPAIRED GLUCOSE TOLERANCE): Chronic | ICD-10-CM

## 2022-09-15 DIAGNOSIS — E78.5 HYPERLIPIDEMIA, UNSPECIFIED HYPERLIPIDEMIA TYPE: Chronic | ICD-10-CM

## 2022-09-15 DIAGNOSIS — R79.89 ELEVATED LFTS: ICD-10-CM

## 2022-09-15 PROCEDURE — 99214 OFFICE O/P EST MOD 30 MIN: CPT | Performed by: INTERNAL MEDICINE

## 2022-09-15 NOTE — PROGRESS NOTES
Subjective   Yuliana Gonzalez is a 89 y.o. female.     Chief Complaint   Patient presents with   • Hyperlipidemia   • Hypertension   • Coronary Artery Disease         History of Present Illness  In for recheck of chronic IGT.  Has been stable.  Also aortic stenosis.  Has dyspnea on exertion which is significant.  Now set up for an AVR.  History of macular degeneration.  That is stable.  Hyperlipidemia  This is a chronic problem. The current episode started more than 1 year ago. The problem is controlled. Recent lipid tests were reviewed and are normal. Factors aggravating her hyperlipidemia include beta blockers.   Hypertension  This is a chronic problem. The current episode started more than 1 year ago. The problem is unchanged. The problem is controlled.   Coronary Artery Disease  Presents for follow-up visit. Risk factors include hyperlipidemia.   Asthma  Her past medical history is significant for asthma.   Hyperglycemia  This is a chronic problem. The current episode started more than 1 year ago. The problem occurs constantly.        The following portions of the patient's history were reviewed and updated as appropriate: allergies, current medications, past social history and problem list.    Outpatient Medications Marked as Taking for the 9/15/22 encounter (Office Visit) with Trent Davis MD   Medication Sig Dispense Refill   • acetaminophen (TYLENOL) 325 MG tablet Take 2 tablets by mouth Every 4 (Four) Hours As Needed for Mild Pain  or Fever (temperature greater than 101F). 120 tablet 0   • aspirin (aspirin) 81 MG EC tablet Take 1 tablet by mouth Daily. (Patient taking differently: Take 81 mg by mouth Every Morning.) 30 tablet 6   • cholecalciferol (VITAMIN D3) 25 MCG (1000 UT) tablet Take 2 tablets by mouth Daily.     • clopidogrel (PLAVIX) 75 MG tablet Take 1 tablet by mouth Every Morning. 30 tablet 1   • metoprolol tartrate (LOPRESSOR) 25 MG tablet Take 0.5 tablets by mouth Every 12 (Twelve) Hours. 90  tablet 3   • Multiple Vitamins-Minerals (VITEYES AREDS ADVANCED PO) Take 2 capsules by mouth Daily. PT HOLDING FOR SURGERY     • pantoprazole (PROTONIX) 40 MG EC tablet Take 1 tablet by mouth Every Morning. 90 tablet 3   • potassium chloride (MICRO-K) 10 MEQ CR capsule Take 1 capsule by mouth 2 (Two) Times a Day. 180 capsule 3   • Torsemide 40 MG tablet Take 40 mg by mouth Daily. 90 tablet 3       Review of Systems   Gastrointestinal: Negative for constipation and diarrhea.       Objective   Vitals:    09/15/22 1104   BP: 110/68   Pulse: 62   Resp: 18   Temp: 98 °F (36.7 °C)   SpO2: 97%          09/15/22  1104   Weight: 64.9 kg (143 lb)    [unfilled]  Body mass index is 27.93 kg/m².      Physical Exam   Constitutional: She appears well-developed.   Neck: No thyromegaly present.   Cardiovascular: Normal rate and regular rhythm. Exam reveals no gallop.   Murmur heard.   Crescendo decrescendo systolic murmur is present with a grade of 1/6.  Soft grade 1-2 systolic murmur along the left sternal border   Pulmonary/Chest: Effort normal and breath sounds normal. No respiratory distress. She has no wheezes. She has no rales.   Abdominal: Soft. Normal appearance and bowel sounds are normal. She exhibits no mass. There is no abdominal tenderness. There is no guarding.   Neurological: She is alert.         Problems Addressed this Visit        Cardiac and Vasculature    Hyperlipidemia (Chronic)    Hypertension - Primary (Chronic)       Endocrine and Metabolic    IGT (impaired glucose tolerance) (Chronic)      Diagnoses       Codes Comments    Primary hypertension    -  Primary ICD-10-CM: I10  ICD-9-CM: 401.9     Hyperlipidemia, unspecified hyperlipidemia type     ICD-10-CM: E78.5  ICD-9-CM: 272.4     IGT (impaired glucose tolerance)     ICD-10-CM: R73.02  ICD-9-CM: 790.22         Assessment/Plan   In for recheck of hypertension, hyperlipidemia and CAD.  Since last visit she has had a TAVR which was very successful.   Virtually all of her cardiac symptoms have now resolved.  Her energy is getting back to normal.  Blood pressure control is good.  Glycemic control is been good.  Gets glucose and A1c today and every 3 months.  Annual lab work was done August 2022 including CBC, CMP A1c.  Due for repeat CMP today with her liver and potassium abnormalities as well as a CBC regarding her postop anemia.  Annual wellness visit due September 2023.  She failed Lipitor and Dr. Locke felt that was somewhat irrelevant at her age and is good is her lipids run.  I agree.  History of macular degeneration being treated with eye vitamins.  The abnormal liver test were likely related to passive hepatic congestion so hopefully they will be back to normal today.  The TAVR was complicated by a kidney stone and a non-Q MI.  She is fasting today.    The above information was reviewed again today 09/15/22.  It continues to be accurate as reflected above and is unchanged.  History, physical and review of systems all reviewed and are unchanged.  Medications were reviewed today and continue the current dosing.    PPE today includes face mask and eye shield.           Dragon disclaimer:   Much of this encounter note is an electronic transcription/translation of spoken language to printed text. The electronic translation of spoken language may permit erroneous, or at times, nonsensical words or phrases to be inadvertently transcribed; Although I have reviewed the note for such errors, some may still exist.

## 2022-09-16 LAB
ALBUMIN SERPL-MCNC: 4.2 G/DL (ref 3.5–5.2)
ALBUMIN/GLOB SERPL: 2 G/DL
ALP SERPL-CCNC: 97 U/L (ref 39–117)
ALT SERPL-CCNC: 13 U/L (ref 1–33)
AST SERPL-CCNC: 24 U/L (ref 1–32)
BASOPHILS # BLD AUTO: 0.06 10*3/MM3 (ref 0–0.2)
BASOPHILS NFR BLD AUTO: 0.7 % (ref 0–1.5)
BILIRUB SERPL-MCNC: 0.6 MG/DL (ref 0–1.2)
BUN SERPL-MCNC: 14 MG/DL (ref 8–23)
BUN/CREAT SERPL: 21.5 (ref 7–25)
CALCIUM SERPL-MCNC: 9.1 MG/DL (ref 8.2–9.6)
CHLORIDE SERPL-SCNC: 100 MMOL/L (ref 98–107)
CO2 SERPL-SCNC: 28.8 MMOL/L (ref 22–29)
CREAT SERPL-MCNC: 0.65 MG/DL (ref 0.57–1)
EGFRCR-CYS SERPLBLD CKD-EPI 2021: 82.7 ML/MIN/1.73
EOSINOPHIL # BLD AUTO: 0.18 10*3/MM3 (ref 0–0.4)
EOSINOPHIL NFR BLD AUTO: 2 % (ref 0.3–6.2)
ERYTHROCYTE [DISTWIDTH] IN BLOOD BY AUTOMATED COUNT: 14.5 % (ref 12.3–15.4)
GLOBULIN SER CALC-MCNC: 2.1 GM/DL
GLUCOSE SERPL-MCNC: 91 MG/DL (ref 65–99)
HCT VFR BLD AUTO: 39.3 % (ref 34–46.6)
HGB BLD-MCNC: 12.3 G/DL (ref 12–15.9)
IMM GRANULOCYTES # BLD AUTO: 0.02 10*3/MM3 (ref 0–0.05)
IMM GRANULOCYTES NFR BLD AUTO: 0.2 % (ref 0–0.5)
LYMPHOCYTES # BLD AUTO: 3.75 10*3/MM3 (ref 0.7–3.1)
LYMPHOCYTES NFR BLD AUTO: 41.3 % (ref 19.6–45.3)
MCH RBC QN AUTO: 27.5 PG (ref 26.6–33)
MCHC RBC AUTO-ENTMCNC: 31.3 G/DL (ref 31.5–35.7)
MCV RBC AUTO: 87.9 FL (ref 79–97)
MONOCYTES # BLD AUTO: 0.61 10*3/MM3 (ref 0.1–0.9)
MONOCYTES NFR BLD AUTO: 6.7 % (ref 5–12)
NEUTROPHILS # BLD AUTO: 4.47 10*3/MM3 (ref 1.7–7)
NEUTROPHILS NFR BLD AUTO: 49.1 % (ref 42.7–76)
NRBC BLD AUTO-RTO: 0 /100 WBC (ref 0–0.2)
PLATELET # BLD AUTO: 218 10*3/MM3 (ref 140–450)
POTASSIUM SERPL-SCNC: 3.6 MMOL/L (ref 3.5–5.2)
PROT SERPL-MCNC: 6.3 G/DL (ref 6–8.5)
RBC # BLD AUTO: 4.47 10*6/MM3 (ref 3.77–5.28)
SODIUM SERPL-SCNC: 140 MMOL/L (ref 136–145)
WBC # BLD AUTO: 9.09 10*3/MM3 (ref 3.4–10.8)

## 2022-10-06 ENCOUNTER — TELEPHONE (OUTPATIENT)
Dept: INTERNAL MEDICINE | Facility: CLINIC | Age: 87
End: 2022-10-06

## 2022-10-06 NOTE — TELEPHONE ENCOUNTER
caller: Yuliana Gonzalez    Relationship to patient: Self    Best call back number: [phone]    Where are you experiencing symptoms: PATIENT WAS ADVISED BY PHYSICAL THERAPIST TO REACH OUT TO PCP REGARDING  UPPER LEFT BACK PAIN    How long have you been experiencing symptoms:     When have you experienced or been treated for these symptoms before:PATIENT DOES NOT KNOW    Is it the symptoms constant or intermittent: [] Constant  [x] Intermittent    What makes it better: HOT SHOWER    If a prescription is needed, what is your preferred pharmacy:  [facility]/pharmacy #[id number] - [place], [place] - [address] AT [facility] - [phone]  - [phone]   [phone]     PATIENT WOULD ALSO LIKE TO DISCUSS MEDICATIONS THAT WERE PRESCRIBED FROM THE HOSPITAL AND HOW LONG SHE NEEDS TO TAKE THEM

## 2022-10-11 ENCOUNTER — OFFICE VISIT (OUTPATIENT)
Dept: INTERNAL MEDICINE | Facility: CLINIC | Age: 87
End: 2022-10-11

## 2022-10-11 VITALS
HEIGHT: 60 IN | BODY MASS INDEX: 28.47 KG/M2 | RESPIRATION RATE: 18 BRPM | DIASTOLIC BLOOD PRESSURE: 60 MMHG | WEIGHT: 145 LBS | HEART RATE: 83 BPM | SYSTOLIC BLOOD PRESSURE: 124 MMHG | OXYGEN SATURATION: 96 % | TEMPERATURE: 97.9 F

## 2022-10-11 DIAGNOSIS — Z23 NEED FOR VACCINATION: Primary | ICD-10-CM

## 2022-10-11 DIAGNOSIS — M47.812 CERVICAL ARTHRITIS: ICD-10-CM

## 2022-10-11 PROCEDURE — 99213 OFFICE O/P EST LOW 20 MIN: CPT | Performed by: INTERNAL MEDICINE

## 2022-10-11 NOTE — PROGRESS NOTES
Subjective   Yuliana Gonzalez is a 89 y.o. female.     Chief Complaint   Patient presents with   • Back Pain         Neck Pain   This is a chronic problem. The current episode started 1 to 4 weeks ago. The problem has been gradually improving. The pain is present in the occipital region and left side. The quality of the pain is described as aching. The symptoms are aggravated by twisting and bending. Pertinent negatives include no fever.        The following portions of the patient's history were reviewed and updated as appropriate: allergies, current medications, past social history and problem list.    Outpatient Medications Marked as Taking for the 10/11/22 encounter (Office Visit) with Trent Davis MD   Medication Sig Dispense Refill   • acetaminophen (TYLENOL) 325 MG tablet Take 2 tablets by mouth Every 4 (Four) Hours As Needed for Mild Pain  or Fever (temperature greater than 101F). 120 tablet 0   • aspirin (aspirin) 81 MG EC tablet Take 1 tablet by mouth Daily. (Patient taking differently: Take 1 tablet by mouth Every Morning.) 30 tablet 6   • cholecalciferol (VITAMIN D3) 25 MCG (1000 UT) tablet Take 2 tablets by mouth Daily.     • clopidogrel (PLAVIX) 75 MG tablet Take 1 tablet by mouth Every Morning. 30 tablet 1   • metoprolol tartrate (LOPRESSOR) 25 MG tablet Take 0.5 tablets by mouth Every 12 (Twelve) Hours. 90 tablet 3   • Multiple Vitamins-Minerals (VITEYES AREDS ADVANCED PO) Take 2 capsules by mouth Daily. PT HOLDING FOR SURGERY     • pantoprazole (PROTONIX) 40 MG EC tablet Take 1 tablet by mouth Every Morning. 90 tablet 3   • potassium chloride (MICRO-K) 10 MEQ CR capsule Take 1 capsule by mouth 2 (Two) Times a Day. 180 capsule 3   • Torsemide 40 MG tablet Take 40 mg by mouth Daily. 90 tablet 3       Review of Systems   Constitutional: Negative for chills and fever.   Musculoskeletal: Positive for arthralgias ( hands) and neck pain.       Objective   Vitals:    10/11/22 0926   BP: 124/60    Pulse: 83   Resp: 18   Temp: 97.9 °F (36.6 °C)   SpO2: 96%      Wt Readings from Last 3 Encounters:   10/11/22 65.8 kg (145 lb)   09/15/22 64.9 kg (143 lb)   09/01/22 66.2 kg (146 lb)    Body mass index is 28.32 kg/m².      Physical Exam  Constitutional:       Appearance: Normal appearance. She is well-developed.   Neurological:      General: No focal deficit present.      Mental Status: She is alert.      Motor: No weakness or abnormal muscle tone.      Deep Tendon Reflexes: Reflexes are normal and symmetric. Reflexes normal.           Problems Addressed this Visit        Neuro    Cervical arthritis   Other Visit Diagnoses     Need for vaccination    -  Primary      Diagnoses       Codes Comments    Need for vaccination    -  Primary ICD-10-CM: Z23  ICD-9-CM: V05.9     Cervical arthritis     ICD-10-CM: M47.812  ICD-9-CM: 721.0         Assessment & Plan   In today with neck ache for about 2 weeks.  Began after she worked out extra long on the elliptical apparatus.  Symptoms are actually improving over time.  Major pain is when she flexes her neck.  There is no radiation.  He has had a long history of neck arthritis.  This sounds like a simple flare of her underlying cervical osteoarthritis.  Patient reassured today.  Her exam is perfectly normal.  She is got excellent motion.  She can use Tylenol as needed.  No x-rays indicated.  She is having trouble with urinary urgency in the morning and will consider cutting her torsemide in half.  She needs to get me the dose.  Computer shows 40 mg daily but she states she is taking 2 tablets every morning.    The above information was reviewed again today 10/11/22.  It continues to be accurate as reflected above and is unchanged.  History, physical and review of systems all reviewed and are unchanged.  Medications were reviewed today and continue the current dosing.    PPE today includes face mask and eye shield.             Dragon disclaimer:   Much of this encounter note  is an electronic transcription/translation of spoken language to printed text. The electronic translation of spoken language may permit erroneous, or at times, nonsensical words or phrases to be inadvertently transcribed; Although I have reviewed the note for such errors, some may still exist.

## 2022-11-09 ENCOUNTER — OFFICE VISIT (OUTPATIENT)
Dept: CARDIOLOGY | Facility: CLINIC | Age: 87
End: 2022-11-09

## 2022-11-09 VITALS
SYSTOLIC BLOOD PRESSURE: 130 MMHG | HEART RATE: 74 BPM | DIASTOLIC BLOOD PRESSURE: 72 MMHG | BODY MASS INDEX: 29.25 KG/M2 | HEIGHT: 60 IN | WEIGHT: 149 LBS

## 2022-11-09 DIAGNOSIS — I45.10 RBBB (RIGHT BUNDLE BRANCH BLOCK): ICD-10-CM

## 2022-11-09 DIAGNOSIS — I44.2 AV BLOCK, COMPLETE: ICD-10-CM

## 2022-11-09 DIAGNOSIS — I25.10 CORONARY ARTERY DISEASE INVOLVING NATIVE CORONARY ARTERY OF NATIVE HEART WITHOUT ANGINA PECTORIS: Primary | ICD-10-CM

## 2022-11-09 DIAGNOSIS — I35.0 NONRHEUMATIC AORTIC VALVE STENOSIS: ICD-10-CM

## 2022-11-09 PROCEDURE — 99214 OFFICE O/P EST MOD 30 MIN: CPT | Performed by: INTERNAL MEDICINE

## 2022-11-09 PROCEDURE — 93000 ELECTROCARDIOGRAM COMPLETE: CPT | Performed by: INTERNAL MEDICINE

## 2022-11-09 NOTE — PROGRESS NOTES
Date of Office Visit: 22    Encounter Provider: Clint Locke MD  Place of Service: [facility] CARDIOLOGY  Patient Name: Yuliana Gonzalez  :1930      Chief complaint:  Coronary artery disease with prior PCI to the mid LAD  Severe degenerative aortic valve stenosis with history of transcatheter aortic valve replacement  Permanent pacemaker      HPI:  89 y.o. 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  She underwent transcatheter aortic valve replacement with a 26 mm Medtronic evolute pro transcatheter aortic valve that was postdilated.  She was noted to have a mild paravalvular leak in case completion.  She subsequently presented back with dyspnea along with inferior ST elevation, however this subsequently has resolved.  Catheterization at that time showed nonobstructive CAD.  She states she has been feeling great and has been walking out the form doing physical therapy.  She denies any chest pain or TENA.  No orthopnea or PND.      Past Medical History:   Diagnosis Date   • Aortic stenosis 2009    diagnosed,  moderate with 0.9cm area, 42mm max gradient. 0.91/34 6/15.   • Central loss of vision     RIGHT EYE   • Cervical arthritis    • Colitis     HX   • Coronary artery disease    • History of constipation    • History of esophageal reflux    • History of skin cancer    • Hyperlipidemia    • Hypertension    • IGT (impaired glucose tolerance)    • Irregular heart beat     HX   • Macular degeneration    • Medication management    • On continuous oral anticoagulation    • Redness of skin     NOSE/DERMATOLOGY TREATING WITH FLAGYL CREAM/ADVISED PT TO NOTIFY CARDIOLOGY   • Sciatica     HX   •  "Short-term memory loss     \"MILD\"   • SOB (shortness of breath) on exertion    • Vitamin D deficiency        Past Surgical History:   Procedure Laterality Date   • AORTIC VALVE REPAIR/REPLACEMENT Left 3/15/2022    Procedure: TTE TRANSFEMORAL TRANSCATHETER AORTIC VALVE REPLACEMENT PERCUTANEOUS APPROACH;  Surgeon: Vesna Chris MD;  Location: [facility];  Service: Cardiothoracic;  Laterality: Left;   • AORTIC VALVE REPAIR/REPLACEMENT Left 3/15/2022    Procedure: Transfemoral Transcatheter Aortic Valve Replacement with intra operative TTE and possible open surgical rescue;  Surgeon: Clint Locke MD;  Location: [facility];  Service: Cardiovascular;  Laterality: Left;   • CARDIAC CATHETERIZATION N/A 1/21/2022    Procedure: Coronary angiography;  Surgeon: Clint Locke MD;  [facility] [facility];  Service: Cardiovascular;  Laterality: N/A;   • CARDIAC CATHETERIZATION N/A 1/21/2022    Procedure: Percutaneous Coronary Intervention;  Surgeon: Clint Locke MD;  Location: [facility];  Service: Cardiovascular;  Laterality: N/A;   • CARDIAC CATHETERIZATION N/A 1/21/2022    Procedure: Stent ISMAEL coronary;  Surgeon: Clint Locke MD;  Location: [facility];  Service: Cardiovascular;  Laterality: N/A;   • CARDIAC CATHETERIZATION Left 7/31/2022    Procedure: CORONARY ANGIOGRAPHY;  Surgeon: Jeannie Echevarria MD;  Location: [facility];  Service: Cardiology;  Laterality: Left;   • CARDIAC CATHETERIZATION N/A 7/31/2022    Procedure: Left ventriculography;  Surgeon: Jeannie Echevarria MD;  Location: [facility];  Service: Cardiology;  Laterality: N/A;   • CARDIAC CATHETERIZATION N/A 7/31/2022    Procedure: Left Heart Cath;  Surgeon: Jeannie Echevarria MD;  Location: [facility];  Service: Cardiology;  Laterality: N/A;   • CARDIAC ELECTROPHYSIOLOGY PROCEDURE N/A 3/16/2022    Procedure: Pacemaker DC " [place];  Surgeon: Yann Ceballos MD;  Location: [facility] INVASIVE LOCATION;  Service: Cardiology;  Laterality: N/A;   • CATARACT EXTRACTION, BILATERAL     • COLONOSCOPY     • EYE SURGERY     • SKIN CANCER EXCISION         Social History     Socioeconomic History   • Marital status:      Spouse name: Stephan   • Number of children: 4   Tobacco Use   • Smoking status: Former     Packs/day: 0.00     Years: 0.00     Pack years: 0.00     Types: Cigarettes     Start date:      Quit date:      Years since quittin.9   • Smokeless tobacco: Never   • Tobacco comments:     1 pack every 2 weeks   Vaping Use   • Vaping Use: Never used   Substance and Sexual Activity   • Alcohol use: Yes     Comment: wine - rare / no caffeine   • Drug use: No   • Sexual activity: Defer       Family History   Problem Relation Age of Onset   • Aortic stenosis Mother    • Hypertension Mother    • Heart failure Father    • Diabetes Father    • Hypertension Father    • No Known Problems Sister    • No Known Problems Daughter    • No Known Problems Son    • Aneurysm Sister    • Other Sister         Polio in her 20's   • Malig Hyperthermia Neg Hx        Review of Systems   Constitutional: Negative. Negative for fever and malaise/fatigue.   HENT: Negative for nosebleeds and sore throat.    Eyes: Negative for blurred vision and double vision.   Cardiovascular: Negative.  Negative for chest pain, claudication, dyspnea on exertion, leg swelling, orthopnea, palpitations, paroxysmal nocturnal dyspnea and syncope.   Respiratory: Negative.  Negative for cough, shortness of breath and snoring.    Endocrine: Negative for cold intolerance, heat intolerance, polydipsia and polyphagia.   Hematologic/Lymphatic: Negative for bleeding problem.   Skin: Negative for itching, poor wound healing and rash.   Musculoskeletal: Negative for falls, joint pain, joint swelling, muscle weakness and myalgias.   Gastrointestinal: Negative for  "abdominal pain, melena, nausea and vomiting.   Neurological: Positive for light-headedness. Negative for dizziness, loss of balance, seizures, vertigo and weakness.   Psychiatric/Behavioral: Negative for altered mental status and depression.       Allergies   Allergen Reactions   • Hydrocodone Shortness Of Breath     Also reports \"lethargy\"   • Lipitor [Atorvastatin] Nausea Only     \"FELT AWFUL\"   • Erythromycin Diarrhea   • Lisinopril Cough         Current Outpatient Medications:   •  acetaminophen (TYLENOL) 325 MG tablet, Take 2 tablets by mouth Every 4 (Four) Hours As Needed for Mild Pain  or Fever (temperature greater than 101F)., Disp: 120 tablet, Rfl: 0  •  aspirin (aspirin) 81 MG EC tablet, Take 1 tablet by mouth Daily. (Patient taking differently: Take 1 tablet by mouth Every Morning.), Disp: 30 tablet, Rfl: 6  •  cholecalciferol (VITAMIN D3) 25 MCG (1000 UT) tablet, Take 2 tablets by mouth Daily., Disp: , Rfl:   •  clopidogrel (PLAVIX) 75 MG tablet, Take 1 tablet by mouth Every Morning., Disp: 30 tablet, Rfl: 1  •  metoprolol tartrate (LOPRESSOR) 25 MG tablet, Take 0.5 tablets by mouth Every 12 (Twelve) Hours., Disp: 90 tablet, Rfl: 3  •  Multiple Vitamins-Minerals (VITEYES AREDS ADVANCED PO), Take 2 capsules by mouth Daily. PT HOLDING FOR SURGERY, Disp: , Rfl:   •  pantoprazole (PROTONIX) 40 MG EC tablet, Take 1 tablet by mouth Every Morning., Disp: 90 tablet, Rfl: 3  •  potassium chloride (MICRO-K) 10 MEQ CR capsule, Take 1 capsule by mouth 2 (Two) Times a Day., Disp: 180 capsule, Rfl: 3  •  Torsemide 40 MG tablet, Take 40 mg by mouth Daily., Disp: 90 tablet, Rfl: 3      Objective:     Vitals:    11/09/22 1032   Height: 152.4 cm (60\")     Body mass index is 28.32 kg/m².    PHYSICAL EXAM:    Constitutional:       Appearance: Well-developed.   Eyes:      General: No scleral icterus.     Conjunctiva/sclera: Conjunctivae normal.   HENT:      Head: Normocephalic and atraumatic.   Neck:      Thyroid: No " thyromegaly.      Vascular: Normal carotid pulses. No carotid bruit, hepatojugular reflux or JVD.      Trachea: No tracheal deviation.   Pulmonary:      Effort: Pulmonary effort is normal. No respiratory distress.      Breath sounds: Normal breath sounds.   Chest:      Chest wall: Not tender to palpatation.   Cardiovascular:      Normal rate. Regular rhythm.      Murmurs: There is a harsh midsystolic murmur at the URSB, radiating to the neck.      No gallop. No click. No rub.   Abdominal:      General: Bowel sounds are normal. There is no distension.      Palpations: Abdomen is soft.      Tenderness: There is no abdominal tenderness.   Musculoskeletal:         General: No deformity.      Cervical back: Normal range of motion and neck supple. Skin:     Findings: No erythema or rash.   Neurological:      Mental Status: Alert and oriented to person, place, and time.      Sensory: No sensory deficit.   Psychiatric:         Behavior: Behavior normal.           ECG 12 Lead    Date/Time: 11/9/2022 10:50 AM  Performed by: Clint Locke MD  Authorized by: Clint Locke MD   Comparison: compared with previous ECG from 8/29/2022  Similar to previous ECG  Rhythm: sinus rhythm  Conduction: right bundle branch block             8/1/22  • Estimated left ventricular EF = 61% Left ventricular systolic function is normal.  • Left ventricular diastolic function is consistent with (grade Ia w/high LAP) impaired relaxation.  • The right ventricular cavity is moderately dilated.  • Moderately reduced right ventricular systolic function noted.  • There is a TAVR valve present. There is a mild perivalvular leak.  • Peak velocity of the flow distal to the aortic valve is 141.2 cm/s.  • Aortic valve area is 1.58 cm2.  • Estimated right ventricular systolic pressure from tricuspid regurgitation is normal (<35 mmHg).  • The following left ventricular wall segments are hypokinetic: apical septal, basal inferoseptal, mid  inferoseptal, mid anteroseptal and basal inferoseptal.      7/31/22  • Widely patent mid LAD stent. Otherwise nonobstructive coronary artery disease  • Normal LV function. Mildly elevated filling pressures.  • Recommendations: Dual antiplatelet therapy given elevated troponin for 1 year. Echocardiogram tomorrow to assess TAVR valve. Unclear etiology of ST elevations.        3/16/22  CONCLUSIONS:  Successful deployment of a 26mm Medtronic Evolut Pro transcatheter aortic heart valve.  Post dilation with a 20 mm true balloon      Assessment:     Plan:       1.  Coronary artery disease.  Status post PCI of the mid LAD in January of this year.  Repeat catheterization in July 2022 demonstrated a widely patent stent and otherwise nonobstructive disease.    -Recommend continuing aspirin lifelong.  Plavix for 6 months.  - Continue with Toprol tartrate 12.5 mg p.o. every 12 hours no resting bradycardia documented.    2.  Severe degenerative aortic valve stenosis.  Status post TAVR in March 2022.  Recent echocardiogram 8/20/2022 normal LV function and well-functioning TAVR valve.  Mild paravalvular regurgitation    3.  Hypertension.  Her blood pressure is stable.  Continue metoprolol at current dose.  Tolerating well.    4.  Permanent pacemaker: Continue to follow with device clinic.

## 2022-11-15 ENCOUNTER — TELEPHONE (OUTPATIENT)
Dept: CARDIOLOGY | Facility: CLINIC | Age: 87
End: 2022-11-15

## 2022-11-15 NOTE — TELEPHONE ENCOUNTER
Pt called. She has had a couple of dizzy spells recently since she saw us. The physical therapist took her BP today and it was in normal range. She said she has had an issue with it in the past when she had a food allergy. But since she just saw us and we just changed some of her medications, the physical therapist wanted her to let us know.    Paula    On 11/9/22:  Plan:       1.  Coronary artery disease.  Status post PCI of the mid LAD in January of this year.  Repeat catheterization in July 2022 demonstrated a widely patent stent and otherwise nonobstructive disease.    -Recommend continuing aspirin lifelong.  Plavix for 6 months.  - Continue with Toprol tartrate 12.5 mg p.o. every 12 hours no resting bradycardia documented.     2.  Severe degenerative aortic valve stenosis.  Status post TAVR in March 2022.  Recent echocardiogram 8/20/2022 normal LV function and well-functioning TAVR valve.  Mild paravalvular regurgitation     3.  Hypertension.  Her blood pressure is stable.  Continue metoprolol at current dose.  Tolerating well.     4.  Permanent pacemaker: Continue to follow with device clinic.

## 2022-12-01 RX ORDER — CLOPIDOGREL BISULFATE 75 MG/1
TABLET ORAL
Qty: 30 TABLET | Refills: 6 | Status: SHIPPED | OUTPATIENT
Start: 2022-12-01 | End: 2022-12-09

## 2022-12-09 ENCOUNTER — OFFICE VISIT (OUTPATIENT)
Dept: INTERNAL MEDICINE | Facility: CLINIC | Age: 87
End: 2022-12-09

## 2022-12-09 VITALS
TEMPERATURE: 96.9 F | DIASTOLIC BLOOD PRESSURE: 60 MMHG | HEART RATE: 85 BPM | SYSTOLIC BLOOD PRESSURE: 154 MMHG | RESPIRATION RATE: 16 BRPM | OXYGEN SATURATION: 98 % | BODY MASS INDEX: 29.84 KG/M2 | WEIGHT: 152 LBS | HEIGHT: 60 IN

## 2022-12-09 DIAGNOSIS — I25.10 CORONARY ARTERY DISEASE INVOLVING NATIVE CORONARY ARTERY OF NATIVE HEART WITHOUT ANGINA PECTORIS: ICD-10-CM

## 2022-12-09 DIAGNOSIS — I10 PRIMARY HYPERTENSION: Primary | Chronic | ICD-10-CM

## 2022-12-09 DIAGNOSIS — E78.5 HYPERLIPIDEMIA, UNSPECIFIED HYPERLIPIDEMIA TYPE: Chronic | ICD-10-CM

## 2022-12-09 DIAGNOSIS — R73.02 IGT (IMPAIRED GLUCOSE TOLERANCE): Chronic | ICD-10-CM

## 2022-12-09 LAB
GLUCOSE SERPL-MCNC: 119 MG/DL (ref 65–99)
HBA1C MFR BLD: 5.8 % (ref 4.8–5.6)

## 2022-12-09 PROCEDURE — 99214 OFFICE O/P EST MOD 30 MIN: CPT | Performed by: INTERNAL MEDICINE

## 2022-12-09 NOTE — PROGRESS NOTES
Subjective   Yuliana Gonzalez is a 89 y.o. female.     Chief Complaint   Patient presents with   • Hypertension   • Hyperlipidemia   • Coronary Artery Disease         History of Present Illness  In for recheck of chronic IGT.  Has been stable.  Also aortic stenosis.  Has dyspnea on exertion which is significant.  Now set up for an AVR.  History of macular degeneration.  That is stable.  Hypertension  This is a chronic problem. The current episode started more than 1 year ago. The problem is unchanged. The problem is controlled. Pertinent negatives include no chest pain, palpitations or shortness of breath.   Hyperlipidemia  This is a chronic problem. The current episode started more than 1 year ago. The problem is controlled. Recent lipid tests were reviewed and are normal. Factors aggravating her hyperlipidemia include beta blockers. Pertinent negatives include no chest pain or shortness of breath.   Coronary Artery Disease  Presents for follow-up visit. Pertinent negatives include no chest pain, leg swelling, palpitations or shortness of breath. Risk factors include hyperlipidemia.   Asthma  There is no cough, shortness of breath or wheezing. Pertinent negatives include no chest pain. Her past medical history is significant for asthma.   Hyperglycemia  This is a chronic problem. The current episode started more than 1 year ago. The problem occurs constantly. Pertinent negatives include no abdominal pain, chest pain or coughing.        The following portions of the patient's history were reviewed and updated as appropriate: allergies, current medications, past social history and problem list.    Outpatient Medications Marked as Taking for the 12/9/22 encounter (Office Visit) with Trent Davis MD   Medication Sig Dispense Refill   • acetaminophen (TYLENOL) 325 MG tablet Take 2 tablets by mouth Every 4 (Four) Hours As Needed for Mild Pain  or Fever (temperature greater than 101F). 120 tablet 0   • aspirin  (aspirin) 81 MG EC tablet Take 1 tablet by mouth Daily. (Patient taking differently: Take 81 mg by mouth Every Morning.) 30 tablet 6   • AZO-CRANBERRY PO Take 1 tablet by mouth Daily.     • cholecalciferol (VITAMIN D3) 25 MCG (1000 UT) tablet Take 2 tablets by mouth Daily.     • metoprolol tartrate (LOPRESSOR) 25 MG tablet Take 0.5 tablets by mouth Every 12 (Twelve) Hours. 90 tablet 3   • Multiple Vitamins-Minerals (VITEYES AREDS ADVANCED PO) Take 2 capsules by mouth Daily. PT HOLDING FOR SURGERY     • pantoprazole (PROTONIX) 40 MG EC tablet Take 1 tablet by mouth Every Morning. 90 tablet 3   • potassium chloride (MICRO-K) 10 MEQ CR capsule Take 1 capsule by mouth 2 (Two) Times a Day. 180 capsule 3   • Torsemide 40 MG tablet Take 40 mg by mouth Daily. 90 tablet 3       Review of Systems   Respiratory: Negative for cough, shortness of breath and wheezing.    Cardiovascular: Negative for chest pain, palpitations and leg swelling.   Gastrointestinal: Negative for abdominal pain, constipation and diarrhea.       Objective   Vitals:    12/09/22 0955   BP: 154/60   Pulse: 85   Resp: 16   Temp: 96.9 °F (36.1 °C)   SpO2: 98%          12/09/22  0955   Weight: 68.9 kg (152 lb)    [unfilled]  Body mass index is 29.69 kg/m².      Physical Exam   Constitutional: She appears well-developed.   Neck: No thyromegaly present.   Cardiovascular: Normal rate and regular rhythm. Exam reveals no gallop.   Murmur heard.   Crescendo decrescendo systolic murmur is present with a grade of 1/6.  Soft grade 1-2 systolic murmur along the left sternal border   Pulmonary/Chest: Effort normal and breath sounds normal. No respiratory distress. She has no wheezes. She has no rales.   Abdominal: Soft. Normal appearance and bowel sounds are normal. She exhibits no mass. There is no abdominal tenderness. There is no guarding.   Neurological: She is alert.         Problems Addressed this Visit        Cardiac and Vasculature    Hyperlipidemia  (Chronic)    Hypertension - Primary (Chronic)    Coronary artery disease involving native coronary artery of native heart       Endocrine and Metabolic    IGT (impaired glucose tolerance) (Chronic)   Diagnoses       Codes Comments    Primary hypertension    -  Primary ICD-10-CM: I10  ICD-9-CM: 401.9     Hyperlipidemia, unspecified hyperlipidemia type     ICD-10-CM: E78.5  ICD-9-CM: 272.4     Coronary artery disease involving native coronary artery of native heart without angina pectoris     ICD-10-CM: I25.10  ICD-9-CM: 414.01     IGT (impaired glucose tolerance)     ICD-10-CM: R73.02  ICD-9-CM: 790.22         Assessment/Plan   In for recheck of hypertension, hyperlipidemia and CAD today December 2022.  Had an acute bout of COVID about 19 days ago.  It was pretty mild and she is made a good recovery.  Since last visit she has had a TAVR which was very successful.  Virtually all of her cardiac symptoms have now resolved.  Her energy is getting back to normal.  Blood pressure control is good.  Glycemic control is been good.  Gets glucose and A1c today and every 3 months.  Annual lab work was done August 2022 including CBC, CMP A1c.  Annual wellness visit due September 2023.  She failed Lipitor and Dr. Locke felt that was somewhat irrelevant at her age and is good is her lipids run.  I agree.  History of macular degeneration being treated with eye vitamins.  The TAVR was complicated by a kidney stone and a non-Q MI.  She is 2 HPP cereal today.    The above information was reviewed again today 12/09/22.  It continues to be accurate as reflected above and is unchanged.  History, physical and review of systems all reviewed and are unchanged.  Medications were reviewed today and continue the current dosing.    PPE today includes face mask and eye shield.         Dragon disclaimer:   Much of this encounter note is an electronic transcription/translation of spoken language to printed text. The electronic translation of  spoken language may permit erroneous, or at times, nonsensical words or phrases to be inadvertently transcribed; Although I have reviewed the note for such errors, some may still exist.

## 2022-12-29 PROCEDURE — 93296 REM INTERROG EVL PM/IDS: CPT | Performed by: INTERNAL MEDICINE

## 2022-12-29 PROCEDURE — 93294 REM INTERROG EVL PM/LDLS PM: CPT | Performed by: INTERNAL MEDICINE

## 2023-01-03 ENCOUNTER — OFFICE VISIT (OUTPATIENT)
Dept: CARDIOLOGY | Facility: CLINIC | Age: 88
End: 2023-01-03
Payer: MEDICARE

## 2023-01-03 ENCOUNTER — CLINICAL SUPPORT NO REQUIREMENTS (OUTPATIENT)
Dept: CARDIOLOGY | Facility: CLINIC | Age: 88
End: 2023-01-03
Payer: MEDICARE

## 2023-01-03 VITALS
HEIGHT: 60 IN | SYSTOLIC BLOOD PRESSURE: 148 MMHG | WEIGHT: 152.4 LBS | BODY MASS INDEX: 29.92 KG/M2 | DIASTOLIC BLOOD PRESSURE: 68 MMHG | HEART RATE: 72 BPM

## 2023-01-03 DIAGNOSIS — I44.2 AV BLOCK, COMPLETE: Primary | ICD-10-CM

## 2023-01-03 DIAGNOSIS — I10 PRIMARY HYPERTENSION: Chronic | ICD-10-CM

## 2023-01-03 DIAGNOSIS — I21.3 ST ELEVATION MYOCARDIAL INFARCTION (STEMI), UNSPECIFIED ARTERY: ICD-10-CM

## 2023-01-03 DIAGNOSIS — Z95.0 PRESENCE OF CARDIAC PACEMAKER: ICD-10-CM

## 2023-01-03 PROCEDURE — 93000 ELECTROCARDIOGRAM COMPLETE: CPT

## 2023-01-03 PROCEDURE — 93280 PM DEVICE PROGR EVAL DUAL: CPT | Performed by: INTERNAL MEDICINE

## 2023-01-03 PROCEDURE — 99213 OFFICE O/P EST LOW 20 MIN: CPT

## 2023-01-03 PROCEDURE — 1160F RVW MEDS BY RX/DR IN RCRD: CPT

## 2023-01-03 PROCEDURE — 1159F MED LIST DOCD IN RCRD: CPT

## 2023-01-03 RX ORDER — AMOXICILLIN 250 MG
1 CAPSULE ORAL DAILY
COMMUNITY

## 2023-01-03 NOTE — PROGRESS NOTES
Date of Office Visit: 2023  Encounter Provider: CLEMENTINE Fan  Place of Service: [facility] CARDIOLOGY  Patient Name: Yuliana Gonzalez  :1930    Chief Complaint   Patient presents with   • AV block complete     6 month f/u    • s/p TAVR   • Hypertension          • Pacemaker Check   :     HPI: Yuliana Gonzalez is a 89 y.o. female who is a patient of Dr. Locke and Dr. Ceballos.  She has a history of CAD, HTN, CHB-s/p PPM, TAVR.     On , cardiac cath revealed 90% mid LAD lesion- she underwent ISMAEL at this time.  On 3/16, she underwent TAVR procedure with Dr. Locke- post op echo showed normal LVEF.  Approx post op day two she went into high degree AV block requiring temporary pacing.  Subsequently when normal conduction did not return, patient underwent implantation of DC PPM with Dr. Ceballos on 3/16/2022.    I saw her on 2022. She was doing well at at that time. She felt like she had more energy since her pacemaker was placed.  She saw Dr. Ceballos in  and again was doing well with no issues about her device.     In August she developed worsening shortness of breath with activity and presented to the ED, she was noted to have inferior Q wave changes. She underwent heart cath which showed non obstructive disease.     She saw Dr. Locke last month and was doing well. Her shortness of breath had improved.                           She presents today for follow up appt. She is here today with her daughter.     She says that overall she is doing well.     She had kidney stones in August and presented to ED, was found to have ST changes subsequent heart cath showed non-obstructive disease.     Denies any chest pain, dyspnea, palpitations, syncope, or fatigue.     Has been working with rehab at [place] and says that she is doing great. Now only occasionally using cane to get around.     Normal device testing and function in office today. Her AV delay was lengthened  last visit and she is now only RV pacing 20%.     Incision is well healed. No signs of infection or hematoma.        Past Medical History:   Diagnosis Date   • Aortic stenosis 07/2009    diagnosed, 6/14 moderate with 0.9cm area, 42mm max gradient. 0.91/34 6/15.   • Central loss of vision     RIGHT EYE   • Cervical arthritis    • Colitis     HX   • Coronary artery disease    • History of constipation    • History of esophageal reflux    • History of skin cancer    • Hyperlipidemia    • Hypertension    • IGT (impaired glucose tolerance)    • Irregular heart beat     HX   • Macular degeneration    • Medication management    • On continuous oral anticoagulation    • Redness of skin     NOSE/DERMATOLOGY TREATING WITH FLAGYL CREAM/ADVISED PT TO NOTIFY CARDIOLOGY   • Sciatica     HX   • Short-term memory loss     \"MILD\"   • SOB (shortness of breath) on exertion    • Vitamin D deficiency        Past Surgical History:   Procedure Laterality Date   • AORTIC VALVE REPAIR/REPLACEMENT Left 3/15/2022    Procedure: TTE TRANSFEMORAL TRANSCATHETER AORTIC VALVE REPLACEMENT PERCUTANEOUS APPROACH;  Surgeon: Vesna Chris MD;  Location: [facility];  Service: Cardiothoracic;  Laterality: Left;   • AORTIC VALVE REPAIR/REPLACEMENT Left 3/15/2022    Procedure: Transfemoral Transcatheter Aortic Valve Replacement with intra operative TTE and possible open surgical rescue;  Surgeon: Clint Locke MD;  Location: [facility];  Service: Cardiovascular;  Laterality: Left;   • CARDIAC CATHETERIZATION N/A 1/21/2022    Procedure: Coronary angiography;  Surgeon: Clint Locke MD;  Location: [facility] CATH INVASIVE LOCATION;  Service: Cardiovascular;  Laterality: N/A;   • CARDIAC CATHETERIZATION N/A 1/21/2022    Procedure: Percutaneous Coronary Intervention;  Surgeon: Clint Locke MD;  Location: [facility] CATH INVASIVE LOCATION;  Service: Cardiovascular;  Laterality: N/A;   • CARDIAC CATHETERIZATION N/A 1/21/2022     Procedure: Stent ISMAEL coronary;  Surgeon: Clint Locke MD;  Location:  [facility] CATH INVASIVE LOCATION;  Service: Cardiovascular;  Laterality: N/A;   • CARDIAC CATHETERIZATION Left 2022    Procedure: CORONARY ANGIOGRAPHY;  Surgeon: Jeannie Echevarria MD;  Location:  [facility] CATH INVASIVE LOCATION;  Service: Cardiology;  Laterality: Left;   • CARDIAC CATHETERIZATION N/A 2022    Procedure: Left ventriculography;  Surgeon: Jeannie Echevarria MD;  Location:  [facility] CATH INVASIVE LOCATION;  Service: Cardiology;  Laterality: N/A;   • CARDIAC CATHETERIZATION N/A 2022    Procedure: Left Heart Cath;  Surgeon: Jeannie Echevarria MD;  Location:  [facility] CATH INVASIVE LOCATION;  Service: Cardiology;  Laterality: N/A;   • CARDIAC ELECTROPHYSIOLOGY PROCEDURE N/A 3/16/2022    Procedure: Pacemaker DC new [place];  Surgeon: Yann Ceballos MD;  Location:  [facility] CATH INVASIVE LOCATION;  Service: Cardiology;  Laterality: N/A;   • CATARACT EXTRACTION, BILATERAL     • COLONOSCOPY     • EYE SURGERY     • SKIN CANCER EXCISION         Social History     Socioeconomic History   • Marital status:      Spouse name: Stephan   • Number of children: 4   Tobacco Use   • Smoking status: Former     Packs/day: 0.00     Years: 0.00     Pack years: 0.00     Types: Cigarettes     Start date:      Quit date:      Years since quittin.0   • Smokeless tobacco: Never   • Tobacco comments:     1 pack every 2 weeks   Vaping Use   • Vaping Use: Never used   Substance and Sexual Activity   • Alcohol use: Yes     Comment: wine - rare / no caffeine   • Drug use: No   • Sexual activity: Defer       Family History   Problem Relation Age of Onset   • Aortic stenosis Mother    • Hypertension Mother    • Heart failure Father    • Diabetes Father    • Hypertension Father    • No Known Problems Sister    • No Known Problems Daughter    • No Known Problems Son    • Aneurysm Sister    • Other Sister         Polio in her 20's   • Emiliano  Hyperthermia Neg Hx        Review of Systems   Constitutional: Negative for chills, fever and malaise/fatigue.   Cardiovascular: Negative for chest pain, dyspnea on exertion, leg swelling, near-syncope, orthopnea, palpitations, paroxysmal nocturnal dyspnea and syncope.   Respiratory: Negative for cough and shortness of breath.    Hematologic/Lymphatic: Negative.    Musculoskeletal: Negative for joint pain, joint swelling and myalgias.   Gastrointestinal: Negative for abdominal pain, diarrhea, melena, nausea and vomiting.   Genitourinary: Negative for frequency and hematuria.   Neurological: Negative for light-headedness, numbness, paresthesias and seizures.   Allergic/Immunologic: Negative.    All other systems reviewed and are negative.      Allergies   Allergen Reactions   • Hydrocodone Shortness Of Breath     Also reports \"lethargy\"   • Lipitor [Atorvastatin] Nausea Only     \"FELT AWFUL\"   • Erythromycin Diarrhea   • Lisinopril Cough         Current Outpatient Medications:   •  acetaminophen (TYLENOL) 325 MG tablet, Take 2 tablets by mouth Every 4 (Four) Hours As Needed for Mild Pain  or Fever (temperature greater than 101F)., Disp: 120 tablet, Rfl: 0  •  aspirin (aspirin) 81 MG EC tablet, Take 1 tablet by mouth Daily., Disp: 30 tablet, Rfl: 6  •  AZO-CRANBERRY PO, Take 1 tablet by mouth Daily., Disp: , Rfl:   •  cholecalciferol (VITAMIN D3) 25 MCG (1000 UT) tablet, Take 2 tablets by mouth Daily., Disp: , Rfl:   •  metoprolol tartrate (LOPRESSOR) 25 MG tablet, Take 0.5 tablets by mouth Every 12 (Twelve) Hours., Disp: 90 tablet, Rfl: 3  •  Multiple Vitamins-Minerals (VITEYES AREDS ADVANCED PO), Take 2 capsules by mouth Daily. PT HOLDING FOR SURGERY, Disp: , Rfl:   •  pantoprazole (PROTONIX) 40 MG EC tablet, Take 1 tablet by mouth Every Morning., Disp: 90 tablet, Rfl: 3  •  potassium chloride (MICRO-K) 10 MEQ CR capsule, Take 1 capsule by mouth 2 (Two) Times a Day., Disp: 180 capsule, Rfl: 3  •  sennosides-docusate  (senna-docusate sodium) 8.6-50 MG per tablet, Take 1 tablet by mouth Daily., Disp: , Rfl:   •  Torsemide 40 MG tablet, Take 40 mg by mouth Daily. (Patient taking differently: Take 20 mg by mouth Daily.), Disp: 90 tablet, Rfl: 3      Objective:     Vitals:    01/03/23 1004   BP: 148/68   Pulse: 72   Weight: 69.1 kg (152 lb 6.4 oz)   Height: 152.4 cm (60\")     Body mass index is 29.76 kg/m².    PHYSICAL EXAM:    Vitals Reviewed.   General Appearance: No acute distress, well developed and well nourished.   Eyes: Conjunctiva and lids: No erythema, swelling, or discharge. Sclera non-icteric.   HENT: Atraumatic, normocephalic. External eyes, ears, and nose normal.   Respiratory: No signs of respiratory distress. Respiration rhythm and depth normal.   Clear to auscultation. No rales, crackles, rhonchi, or wheezing auscultated.   Cardiovascular:  Heart Rate and Rhythm: Normal, Heart Sounds: Normal S1 and S2. No S3 or S4 noted.  Gastrointestinal:  Abdomen soft, non-distended, non-tender.   Musculoskeletal: Normal movement of extremities  Skin: Warm and dry.   Psychiatric: Patient alert and oriented to person, place, and time. Speech and behavior appropriate. Normal mood and affect.       ECG 12 Lead    Date/Time: 1/3/2023 11:10 AM  Performed by: Eric Flores APRN  Authorized by: Eric Flores APRN   Comparison: compared with previous ECG   Similar to previous ECG  Rhythm: paced  Rhythm comments:   BPM: 72                Assessment:       Diagnosis Plan   1. AV block, complete (HCC)        2. Presence of cardiac pacemaker        3. ST elevation myocardial infarction (STEMI), unspecified artery (HCC)        4. Primary hypertension               Plan:   1-2. AV block---s/p DC PPM (3/16/2022)--- she is doing well. No complaints or concerns about her device.  Normal testing and function in office today. Since lengthened AV delay, she has her own intrinsic and only pacing 20% in RV now. She feels much better.     3. ST MI--  had cath showing nonobstructive disease in July after kidney stones. No angina symptoms today.     4. HTN-- well controlled.         She will follow up in one year with device check.         As always, it has been a pleasure to participate in your patient's care.      Sincerely,         CLEMENTINE Fan

## 2023-01-18 DIAGNOSIS — Z95.2 S/P TAVR (TRANSCATHETER AORTIC VALVE REPLACEMENT): Primary | ICD-10-CM

## 2023-02-06 ENCOUNTER — LAB (OUTPATIENT)
Dept: LAB | Facility: HOSPITAL | Age: 88
End: 2023-02-06
Payer: MEDICARE

## 2023-02-06 ENCOUNTER — HOSPITAL ENCOUNTER (OUTPATIENT)
Dept: CARDIOLOGY | Facility: HOSPITAL | Age: 88
Discharge: HOME | End: 2023-02-06
Payer: MEDICARE

## 2023-02-06 ENCOUNTER — OFFICE VISIT (OUTPATIENT)
Dept: CARDIOLOGY | Facility: CLINIC | Age: 88
End: 2023-02-06
Payer: MEDICARE

## 2023-02-06 VITALS
HEIGHT: 60 IN | DIASTOLIC BLOOD PRESSURE: 58 MMHG | BODY MASS INDEX: 29.91 KG/M2 | WEIGHT: 152.34 LBS | HEART RATE: 70 BPM | SYSTOLIC BLOOD PRESSURE: 138 MMHG

## 2023-02-06 VITALS
HEART RATE: 76 BPM | DIASTOLIC BLOOD PRESSURE: 62 MMHG | SYSTOLIC BLOOD PRESSURE: 140 MMHG | BODY MASS INDEX: 29.64 KG/M2 | HEIGHT: 60 IN | WEIGHT: 151 LBS

## 2023-02-06 DIAGNOSIS — Z95.2 S/P TAVR (TRANSCATHETER AORTIC VALVE REPLACEMENT): Primary | ICD-10-CM

## 2023-02-06 DIAGNOSIS — I10 PRIMARY HYPERTENSION: ICD-10-CM

## 2023-02-06 DIAGNOSIS — Z95.0 PRESENCE OF CARDIAC PACEMAKER: ICD-10-CM

## 2023-02-06 DIAGNOSIS — Z95.2 S/P TAVR (TRANSCATHETER AORTIC VALVE REPLACEMENT): ICD-10-CM

## 2023-02-06 DIAGNOSIS — I44.2 AV BLOCK, COMPLETE: ICD-10-CM

## 2023-02-06 LAB
ANION GAP SERPL CALCULATED.3IONS-SCNC: 9 MMOL/L (ref 5–15)
BUN SERPL-MCNC: 16 MG/DL (ref 8–23)
BUN/CREAT SERPL: 22.5 (ref 7–25)
CALCIUM SPEC-SCNC: 8.8 MG/DL (ref 8.2–9.6)
CHLORIDE SERPL-SCNC: 102 MMOL/L (ref 98–107)
CO2 SERPL-SCNC: 28 MMOL/L (ref 22–29)
CREAT SERPL-MCNC: 0.71 MG/DL (ref 0.57–1)
DEPRECATED RDW RBC AUTO: 40.5 FL (ref 37–54)
EGFRCR SERPLBLD CKD-EPI 2021: 79.4 ML/MIN/1.73
ERYTHROCYTE [DISTWIDTH] IN BLOOD BY AUTOMATED COUNT: 13 % (ref 12.3–15.4)
GLUCOSE SERPL-MCNC: 89 MG/DL (ref 65–99)
HCT VFR BLD AUTO: 40.3 % (ref 34–46.6)
HGB BLD-MCNC: 13.1 G/DL (ref 12–15.9)
MCH RBC QN AUTO: 27.3 PG (ref 26.6–33)
MCHC RBC AUTO-ENTMCNC: 32.5 G/DL (ref 31.5–35.7)
MCV RBC AUTO: 84 FL (ref 79–97)
PLATELET # BLD AUTO: 231 10*3/MM3 (ref 140–450)
PMV BLD AUTO: 10.8 FL (ref 6–12)
POTASSIUM SERPL-SCNC: 4.5 MMOL/L (ref 3.5–5.2)
RBC # BLD AUTO: 4.8 10*6/MM3 (ref 3.77–5.28)
SODIUM SERPL-SCNC: 139 MMOL/L (ref 136–145)
WBC NRBC COR # BLD: 9.87 10*3/MM3 (ref 3.4–10.8)

## 2023-02-06 PROCEDURE — 36415 COLL VENOUS BLD VENIPUNCTURE: CPT

## 2023-02-06 PROCEDURE — 85027 COMPLETE CBC AUTOMATED: CPT

## 2023-02-06 PROCEDURE — 80048 BASIC METABOLIC PNL TOTAL CA: CPT

## 2023-02-06 PROCEDURE — 93306 TTE W/DOPPLER COMPLETE: CPT

## 2023-02-06 PROCEDURE — 93306 TTE W/DOPPLER COMPLETE: CPT | Performed by: INTERNAL MEDICINE

## 2023-02-06 PROCEDURE — 99214 OFFICE O/P EST MOD 30 MIN: CPT | Performed by: INTERNAL MEDICINE

## 2023-02-06 NOTE — PROGRESS NOTES
Date of Office Visit: 23    Encounter Provider: Clint Locke MD  Place of Service: [facility] CARDIOLOGY  Patient Name: Yuliana Gonzalez  :1930      Chief complaint:  Coronary artery disease with prior PCI to the mid LAD  Severe degenerative aortic valve stenosis with history of transcatheter aortic valve replacement  Permanent pacemaker      HPI:  89 y.o. 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  She underwent transcatheter aortic valve replacement with a 26 mm Medtronic evolute pro transcatheter aortic valve that was postdilated.  She was noted to have a mild paravalvular leak in case completion.  She subsequently presented back with dyspnea along with inferior ST elevation, however this subsequently has resolved.  Catheterization at that time showed nonobstructive CAD. She states she has been feeling great and has been walking out the form doing physical therapy.      Since her last visit, she has been doing well.  She denies any chest pain or dyspnea exertion.  She continues to follow-up with the electrophysiology team.  She is RV pacing 20% of the time.  Blood pressure and heart rate are well controlled.      Past Medical History:   Diagnosis Date   • Aortic stenosis 2009    diagnosed,  moderate with 0.9cm area, 42mm max gradient. 0.91/34 6/15.   • Central loss of vision     RIGHT EYE   • Cervical arthritis    • Colitis     HX   • Coronary artery disease    • History of constipation    • History of esophageal reflux    • History of skin cancer    • Hyperlipidemia    • Hypertension    • IGT (impaired glucose tolerance)    • Irregular heart beat     HX   • Macular degeneration    •  "Medication management    • On continuous oral anticoagulation    • Redness of skin     NOSE/DERMATOLOGY TREATING WITH FLAGYL CREAM/ADVISED PT TO NOTIFY CARDIOLOGY   • Sciatica     HX   • Short-term memory loss     \"MILD\"   • SOB (shortness of breath) on exertion    • Vitamin D deficiency        Past Surgical History:   Procedure Laterality Date   • AORTIC VALVE REPAIR/REPLACEMENT Left 3/15/2022    Procedure: TTE TRANSFEMORAL TRANSCATHETER AORTIC VALVE REPLACEMENT PERCUTANEOUS APPROACH;  Surgeon: Vesna Chris MD;  Location: [facility];  Service: Cardiothoracic;  Laterality: Left;   • AORTIC VALVE REPAIR/REPLACEMENT Left 3/15/2022    Procedure: Transfemoral Transcatheter Aortic Valve Replacement with intra operative TTE and possible open surgical rescue;  Surgeon: Clint Locke MD;  Location: [facility];  Service: Cardiovascular;  Laterality: Left;   • CARDIAC CATHETERIZATION N/A 1/21/2022    Procedure: Coronary angiography;  Surgeon: Clint Locke MD;  Location: [facility] CATH INVASIVE LOCATION;  Service: Cardiovascular;  Laterality: N/A;   • CARDIAC CATHETERIZATION N/A 1/21/2022    Procedure: Percutaneous Coronary Intervention;  Surgeon: Clint Locke MD;  Location: [facility] CATH INVASIVE LOCATION;  Service: Cardiovascular;  Laterality: N/A;   • CARDIAC CATHETERIZATION N/A 1/21/2022    Procedure: Stent ISMAEL coronary;  Surgeon: Clint Locke MD;  Location: [facility] CATH INVASIVE LOCATION;  Service: Cardiovascular;  Laterality: N/A;   • CARDIAC CATHETERIZATION Left 7/31/2022    Procedure: CORONARY ANGIOGRAPHY;  Surgeon: Jeannie Echevarria MD;  Location: [facility] CATH INVASIVE LOCATION;  Service: Cardiology;  Laterality: Left;   • CARDIAC CATHETERIZATION N/A 7/31/2022    Procedure: Left ventriculography;  Surgeon: Jeannie Echevarria MD;  Location: [facility] CATH INVASIVE LOCATION;  Service: Cardiology;  Laterality: N/A;   • CARDIAC CATHETERIZATION N/A 7/31/2022    Procedure: Left Heart Cath;  " Surgeon: Jeannie Echevarria MD;  Location:  [facility] CATH INVASIVE LOCATION;  Service: Cardiology;  Laterality: N/A;   • CARDIAC ELECTROPHYSIOLOGY PROCEDURE N/A 3/16/2022    Procedure: Pacemaker DC new [place];  Surgeon: Yann Ceballos MD;  Location:  [facility] CATH INVASIVE LOCATION;  Service: Cardiology;  Laterality: N/A;   • CATARACT EXTRACTION, BILATERAL     • COLONOSCOPY     • EYE SURGERY     • SKIN CANCER EXCISION         Social History     Socioeconomic History   • Marital status:      Spouse name: Stephan   • Number of children: 4   Tobacco Use   • Smoking status: Former     Packs/day: 0.00     Years: 0.00     Pack years: 0.00     Types: Cigarettes     Start date:      Quit date:      Years since quittin.1   • Smokeless tobacco: Never   • Tobacco comments:     1 pack every 2 weeks   Vaping Use   • Vaping Use: Never used   Substance and Sexual Activity   • Alcohol use: Yes     Comment: wine - rare / no caffeine   • Drug use: No   • Sexual activity: Defer       Family History   Problem Relation Age of Onset   • Aortic stenosis Mother    • Hypertension Mother    • Heart failure Father    • Diabetes Father    • Hypertension Father    • No Known Problems Sister    • No Known Problems Daughter    • No Known Problems Son    • Aneurysm Sister    • Other Sister         Polio in her 20's   • Malig Hyperthermia Neg Hx        Review of Systems   Constitutional: Negative. Negative for fever and malaise/fatigue.   HENT: Negative for nosebleeds and sore throat.    Eyes: Negative for blurred vision and double vision.   Cardiovascular: Negative.  Negative for chest pain, claudication, dyspnea on exertion, leg swelling, orthopnea, palpitations, paroxysmal nocturnal dyspnea and syncope.   Respiratory: Negative.  Negative for cough, shortness of breath and snoring.    Endocrine: Negative for cold intolerance, heat intolerance, polydipsia and polyphagia.   Hematologic/Lymphatic: Negative for bleeding problem.  "  Skin: Negative for itching, poor wound healing and rash.   Musculoskeletal: Negative for falls, joint pain, joint swelling, muscle weakness and myalgias.   Gastrointestinal: Negative for abdominal pain, melena, nausea and vomiting.   Neurological: Positive for light-headedness. Negative for dizziness, loss of balance, seizures, vertigo and weakness.   Psychiatric/Behavioral: Negative for altered mental status and depression.       Allergies   Allergen Reactions   • Hydrocodone Shortness Of Breath     Also reports \"lethargy\"   • Lipitor [Atorvastatin] Nausea Only     \"FELT AWFUL\"   • Erythromycin Diarrhea   • Lisinopril Cough         Current Outpatient Medications:   •  acetaminophen (TYLENOL) 325 MG tablet, Take 2 tablets by mouth Every 4 (Four) Hours As Needed for Mild Pain  or Fever (temperature greater than 101F)., Disp: 120 tablet, Rfl: 0  •  aspirin (aspirin) 81 MG EC tablet, Take 1 tablet by mouth Daily., Disp: 30 tablet, Rfl: 6  •  AZO-CRANBERRY PO, Take 1 tablet by mouth Daily., Disp: , Rfl:   •  cholecalciferol (VITAMIN D3) 25 MCG (1000 UT) tablet, Take 2 tablets by mouth Daily., Disp: , Rfl:   •  metoprolol tartrate (LOPRESSOR) 25 MG tablet, Take 0.5 tablets by mouth Every 12 (Twelve) Hours., Disp: 90 tablet, Rfl: 3  •  Multiple Vitamins-Minerals (VITEYES AREDS ADVANCED PO), Take 2 capsules by mouth Daily. PT HOLDING FOR SURGERY, Disp: , Rfl:   •  pantoprazole (PROTONIX) 40 MG EC tablet, Take 1 tablet by mouth Every Morning., Disp: 90 tablet, Rfl: 3  •  potassium chloride (MICRO-K) 10 MEQ CR capsule, Take 1 capsule by mouth 2 (Two) Times a Day., Disp: 180 capsule, Rfl: 3  •  sennosides-docusate (PERICOLACE) 8.6-50 MG per tablet, Take 1 tablet by mouth Daily., Disp: , Rfl:   •  Torsemide 40 MG tablet, Take 40 mg by mouth Daily. (Patient taking differently: Take 20 mg by mouth Daily.), Disp: 90 tablet, Rfl: 3      Objective:     Vitals:    02/06/23 1407   BP: 140/62   Pulse: 76   Weight: 68.5 kg (151 lb) " "  Height: 152.4 cm (60\")     Body mass index is 29.49 kg/m².    PHYSICAL EXAM:    Constitutional:       Appearance: Well-developed.   Eyes:      General: No scleral icterus.     Conjunctiva/sclera: Conjunctivae normal.   HENT:      Head: Normocephalic and atraumatic.   Neck:      Thyroid: No thyromegaly.      Vascular: Normal carotid pulses. No carotid bruit, hepatojugular reflux or JVD.      Trachea: No tracheal deviation.   Pulmonary:      Effort: Pulmonary effort is normal. No respiratory distress.      Breath sounds: Normal breath sounds.   Chest:      Chest wall: Not tender to palpatation.   Cardiovascular:      Normal rate. Regular rhythm.      Murmurs: There is a harsh midsystolic murmur at the URSB, radiating to the neck.      No gallop. No click. No rub.   Abdominal:      General: Bowel sounds are normal. There is no distension.      Palpations: Abdomen is soft.      Tenderness: There is no abdominal tenderness.   Musculoskeletal:         General: No deformity.      Cervical back: Normal range of motion and neck supple. Skin:     Findings: No erythema or rash.   Neurological:      Mental Status: Alert and oriented to person, place, and time.      Sensory: No sensory deficit.   Psychiatric:         Behavior: Behavior normal.         Procedures     8/1/22  • Estimated left ventricular EF = 61% Left ventricular systolic function is normal.  • Left ventricular diastolic function is consistent with (grade Ia w/high LAP) impaired relaxation.  • The right ventricular cavity is moderately dilated.  • Moderately reduced right ventricular systolic function noted.  • There is a TAVR valve present. There is a mild perivalvular leak.  • Peak velocity of the flow distal to the aortic valve is 141.2 cm/s.  • Aortic valve area is 1.58 cm2.  • Estimated right ventricular systolic pressure from tricuspid regurgitation is normal (<35 mmHg).  • The following left ventricular wall segments are hypokinetic: apical septal, basal " inferoseptal, mid inferoseptal, mid anteroseptal and basal inferoseptal.      7/31/22  • Widely patent mid LAD stent. Otherwise nonobstructive coronary artery disease  • Normal LV function. Mildly elevated filling pressures.  • Recommendations: Dual antiplatelet therapy given elevated troponin for 1 year. Echocardiogram tomorrow to assess TAVR valve. Unclear etiology of ST elevations.        3/16/22  CONCLUSIONS:  Successful deployment of a 26mm Medtronic Evolut Pro transcatheter aortic heart valve.  Post dilation with a 20 mm true balloon      Assessment:     Plan:       1.  Coronary artery disease.  Status post PCI of the mid LAD in January of this year.  Repeat catheterization in July 2022 demonstrated a widely patent stent and otherwise nonobstructive disease.    -Recommend continuing aspirin lifelong.    - Continue with Toprol tartrate 12.5 mg p.o. every 12 hours no resting bradycardia documented.    2.  Severe degenerative aortic valve stenosis.  Status post TAVR in March 2022.  Recent echocardiogram today normal LV function and well-functioning TAVR valve.  Mild paravalvular regurgitation.  No significant change from prior.  No repeat imaging indicated    3.  Hypertension.  Her blood pressure is stable.  Continue metoprolol at current dose.  Tolerating well.    4.  Permanent pacemaker: Continue to follow with device clinic.        I will see her back in 1 year or earlier with problems.

## 2023-02-07 LAB
AORTIC ARCH: 2.3 CM
ASCENDING AORTA: 1.8 CM
BH CV ECHO MEAS - AI P1/2T: 429.4 MSEC
BH CV ECHO MEAS - AO MAX PG: 11.8 MMHG
BH CV ECHO MEAS - AO MEAN PG: 6.4 MMHG
BH CV ECHO MEAS - AO V2 MAX: 171.9 CM/SEC
BH CV ECHO MEAS - AO V2 VTI: 34.8 CM
BH CV ECHO MEAS - AVA(I,D): 1.79 CM2
BH CV ECHO MEAS - EDV(CUBED): 137.9 ML
BH CV ECHO MEAS - EDV(MOD-SP2): 87 ML
BH CV ECHO MEAS - EDV(MOD-SP4): 87 ML
BH CV ECHO MEAS - EF(MOD-BP): 58.2 %
BH CV ECHO MEAS - EF(MOD-SP2): 57.5 %
BH CV ECHO MEAS - EF(MOD-SP4): 60.9 %
BH CV ECHO MEAS - ESV(CUBED): 48.8 ML
BH CV ECHO MEAS - ESV(MOD-SP2): 37 ML
BH CV ECHO MEAS - ESV(MOD-SP4): 34 ML
BH CV ECHO MEAS - FS: 29.3 %
BH CV ECHO MEAS - IVS/LVPW: 0.9 CM
BH CV ECHO MEAS - IVSD: 0.87 CM
BH CV ECHO MEAS - LAT PEAK E' VEL: 6.7 CM/SEC
BH CV ECHO MEAS - LV DIASTOLIC VOL/BSA (35-75): 52.4 CM2
BH CV ECHO MEAS - LV MASS(C)D: 170.3 GRAMS
BH CV ECHO MEAS - LV MAX PG: 6.8 MMHG
BH CV ECHO MEAS - LV MEAN PG: 4.5 MMHG
BH CV ECHO MEAS - LV SYSTOLIC VOL/BSA (12-30): 20.5 CM2
BH CV ECHO MEAS - LV V1 MAX: 130.7 CM/SEC
BH CV ECHO MEAS - LV V1 VTI: 29.1 CM
BH CV ECHO MEAS - LVIDD: 5.2 CM
BH CV ECHO MEAS - LVIDS: 3.7 CM
BH CV ECHO MEAS - LVOT AREA: 2.14 CM2
BH CV ECHO MEAS - LVOT DIAM: 1.65 CM
BH CV ECHO MEAS - LVPWD: 0.96 CM
BH CV ECHO MEAS - MED PEAK E' VEL: 3.8 CM/SEC
BH CV ECHO MEAS - MV A DUR: 0.16 SEC
BH CV ECHO MEAS - MV A MAX VEL: 132.8 CM/SEC
BH CV ECHO MEAS - MV DEC SLOPE: 359.8 CM/SEC2
BH CV ECHO MEAS - MV DEC TIME: 0.31 MSEC
BH CV ECHO MEAS - MV E MAX VEL: 85.7 CM/SEC
BH CV ECHO MEAS - MV E/A: 0.65
BH CV ECHO MEAS - MV MAX PG: 9 MMHG
BH CV ECHO MEAS - MV MEAN PG: 4.5 MMHG
BH CV ECHO MEAS - MV P1/2T: 86.6 MSEC
BH CV ECHO MEAS - MV V2 VTI: 37.3 CM
BH CV ECHO MEAS - MVA(P1/2T): 2.5 CM2
BH CV ECHO MEAS - MVA(VTI): 1.68 CM2
BH CV ECHO MEAS - PA ACC TIME: 0.12 SEC
BH CV ECHO MEAS - PA PR(ACCEL): 23.5 MMHG
BH CV ECHO MEAS - PA V2 MAX: 117.1 CM/SEC
BH CV ECHO MEAS - PULM A REVS DUR: 0.17 SEC
BH CV ECHO MEAS - PULM A REVS VEL: 21.8 CM/SEC
BH CV ECHO MEAS - PULM DIAS VEL: 30.7 CM/SEC
BH CV ECHO MEAS - PULM S/D: 1.15
BH CV ECHO MEAS - PULM SYS VEL: 35.1 CM/SEC
BH CV ECHO MEAS - QP/QS: 0.95
BH CV ECHO MEAS - RAP SYSTOLE: 8 MMHG
BH CV ECHO MEAS - RV MAX PG: 3.7 MMHG
BH CV ECHO MEAS - RV V1 MAX: 96 CM/SEC
BH CV ECHO MEAS - RV V1 VTI: 16.8 CM
BH CV ECHO MEAS - RVOT DIAM: 2.11 CM
BH CV ECHO MEAS - RVSP: 31.9 MMHG
BH CV ECHO MEAS - SI(MOD-SP2): 30.1 ML/M2
BH CV ECHO MEAS - SI(MOD-SP4): 31.9 ML/M2
BH CV ECHO MEAS - SUP REN AO DIAM: 1.5 CM
BH CV ECHO MEAS - SV(LVOT): 62.4 ML
BH CV ECHO MEAS - SV(MOD-SP2): 50 ML
BH CV ECHO MEAS - SV(MOD-SP4): 53 ML
BH CV ECHO MEAS - SV(RVOT): 59 ML
BH CV ECHO MEAS - TAPSE (>1.6): 1.56 CM
BH CV ECHO MEAS - TR MAX PG: 23.9 MMHG
BH CV ECHO MEAS - TR MAX VEL: 244.3 CM/SEC
BH CV ECHO MEASUREMENTS AVERAGE E/E' RATIO: 16.32
BH CV XLRA - RV BASE: 2.7 CM
BH CV XLRA - RV LENGTH: 7.3 CM
BH CV XLRA - RV MID: 2.9 CM
BH CV XLRA - TDI S': 9.2 CM/SEC
LEFT ATRIUM VOLUME INDEX: 37.1 ML/M2
MAXIMAL PREDICTED HEART RATE: 127 BPM
SINUS: 1.87 CM
STJ: 1.5 CM
STRESS TARGET HR: 108 BPM

## 2023-03-08 ENCOUNTER — OFFICE VISIT (OUTPATIENT)
Dept: INTERNAL MEDICINE | Facility: CLINIC | Age: 88
End: 2023-03-08
Payer: MEDICARE

## 2023-03-08 VITALS
HEIGHT: 60 IN | DIASTOLIC BLOOD PRESSURE: 82 MMHG | OXYGEN SATURATION: 96 % | WEIGHT: 159.4 LBS | SYSTOLIC BLOOD PRESSURE: 141 MMHG | HEART RATE: 82 BPM | BODY MASS INDEX: 31.29 KG/M2 | RESPIRATION RATE: 16 BRPM | TEMPERATURE: 96.3 F

## 2023-03-08 DIAGNOSIS — E74.39 GLUCOSE INTOLERANCE: ICD-10-CM

## 2023-03-08 DIAGNOSIS — E78.5 HYPERLIPIDEMIA, UNSPECIFIED HYPERLIPIDEMIA TYPE: Chronic | ICD-10-CM

## 2023-03-08 DIAGNOSIS — I10 PRIMARY HYPERTENSION: Primary | Chronic | ICD-10-CM

## 2023-03-08 DIAGNOSIS — R73.02 IGT (IMPAIRED GLUCOSE TOLERANCE): Chronic | ICD-10-CM

## 2023-03-08 PROBLEM — N13.30 HYDRONEPHROSIS OF RIGHT KIDNEY: Status: RESOLVED | Noted: 2022-08-03 | Resolved: 2023-03-08

## 2023-03-08 PROBLEM — E87.1 HYPONATREMIA: Status: RESOLVED | Noted: 2022-08-03 | Resolved: 2023-03-08

## 2023-03-08 PROBLEM — N20.1 RIGHT DISTAL URETERAL CALCULUS: Status: RESOLVED | Noted: 2022-08-03 | Resolved: 2023-03-08

## 2023-03-08 PROCEDURE — 99214 OFFICE O/P EST MOD 30 MIN: CPT | Performed by: INTERNAL MEDICINE

## 2023-03-08 RX ORDER — TORSEMIDE 20 MG/1
20 TABLET ORAL DAILY
Qty: 90 TABLET | Refills: 1 | Status: SHIPPED | OUTPATIENT
Start: 2023-03-08

## 2023-03-08 NOTE — PROGRESS NOTES
Subjective   Yuliana Gonzalez is a 89 y.o. female.     Chief Complaint   Patient presents with   • Hypertension   • Hyperlipidemia   • igt         History of Present Illness  In for recheck of chronic IGT.  Has been stable.  Also aortic stenosis.  Has dyspnea on exertion which is significant.  Now set up for an AVR.  History of macular degeneration.  That is stable.  Hypertension  This is a chronic problem. The current episode started more than 1 year ago. The problem is unchanged. The problem is controlled. Pertinent negatives include no chest pain, palpitations or shortness of breath.   Hyperlipidemia  This is a chronic problem. The current episode started more than 1 year ago. The problem is controlled. Recent lipid tests were reviewed and are normal. Factors aggravating her hyperlipidemia include beta blockers. Pertinent negatives include no chest pain or shortness of breath.   Coronary Artery Disease  Presents for follow-up visit. Pertinent negatives include no chest pain, leg swelling, palpitations or shortness of breath. Risk factors include hyperlipidemia.   Asthma  There is no cough, shortness of breath or wheezing. Pertinent negatives include no chest pain. Her past medical history is significant for asthma.   Hyperglycemia  This is a chronic problem. The current episode started more than 1 year ago. The problem occurs constantly. Pertinent negatives include no abdominal pain, chest pain or coughing.        The following portions of the patient's history were reviewed and updated as appropriate: allergies, current medications, past social history and problem list.    Outpatient Medications Marked as Taking for the 3/8/23 encounter (Office Visit) with Trent Davis MD   Medication Sig Dispense Refill   • acetaminophen (TYLENOL) 325 MG tablet Take 2 tablets by mouth Every 4 (Four) Hours As Needed for Mild Pain  or Fever (temperature greater than 101F). 120 tablet 0   • aspirin (aspirin) 81 MG EC tablet  Take 1 tablet by mouth Daily. 30 tablet 6   • AZO-CRANBERRY PO Take 1 tablet by mouth Daily.     • cholecalciferol (VITAMIN D3) 25 MCG (1000 UT) tablet Take 2 tablets by mouth Daily.     • metoprolol tartrate (LOPRESSOR) 25 MG tablet Take 0.5 tablets by mouth Every 12 (Twelve) Hours. 90 tablet 3   • Multiple Vitamins-Minerals (VITEYES AREDS ADVANCED PO) Take 2 capsules by mouth Daily. PT HOLDING FOR SURGERY     • pantoprazole (PROTONIX) 40 MG EC tablet Take 1 tablet by mouth Every Morning. 90 tablet 3   • potassium chloride (MICRO-K) 10 MEQ CR capsule Take 1 capsule by mouth 2 (Two) Times a Day. 180 capsule 3   • sennosides-docusate (PERICOLACE) 8.6-50 MG per tablet Take 1 tablet by mouth Daily.     • [DISCONTINUED] Torsemide 40 MG tablet Take 40 mg by mouth Daily. (Patient taking differently: Take 20 mg by mouth Daily.) 90 tablet 3       Review of Systems   Respiratory: Negative for cough, shortness of breath and wheezing.    Cardiovascular: Negative for chest pain, palpitations and leg swelling.   Gastrointestinal: Negative for abdominal pain, constipation and diarrhea.       Objective   Vitals:    03/08/23 0948   BP: 141/82   Pulse: 82   Resp: 16   Temp: 96.3 °F (35.7 °C)   SpO2: 96%          03/08/23  0948   Weight: 72.3 kg (159 lb 6.4 oz)    [unfilled]  Body mass index is 31.13 kg/m².      Physical Exam   Constitutional: She appears well-developed.   Neck: No thyromegaly present.   Cardiovascular: Normal rate and regular rhythm. Exam reveals no gallop.   Murmur heard.   Crescendo decrescendo systolic murmur is present with a grade of 1/6.  Soft grade 1-2 systolic murmur along the left sternal border   Pulmonary/Chest: Effort normal and breath sounds normal. No respiratory distress. She has no wheezes. She has no rales.   Abdominal: Soft. Normal appearance and bowel sounds are normal. She exhibits no mass. There is no abdominal tenderness. There is no guarding.   Neurological: She is alert.         Problems  Addressed this Visit        Cardiac and Vasculature    Hyperlipidemia (Chronic)    Hypertension - Primary (Chronic)    Relevant Medications    torsemide (DEMADEX) 20 MG tablet       Endocrine and Metabolic    IGT (impaired glucose tolerance) (Chronic)       Gastrointestinal Abdominal     Glucose intolerance (Chronic)   Diagnoses       Codes Comments    Primary hypertension    -  Primary ICD-10-CM: I10  ICD-9-CM: 401.9     Hyperlipidemia, unspecified hyperlipidemia type     ICD-10-CM: E78.5  ICD-9-CM: 272.4     IGT (impaired glucose tolerance)     ICD-10-CM: R73.02  ICD-9-CM: 790.22     Glucose intolerance     ICD-10-CM: E74.39  ICD-9-CM: 271.3         Assessment/Plan   In for recheck of hypertension, hyperlipidemia and CAD today March 2023.   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  Virtually all of her cardiac symptoms have now resolved.  Her energy is back to normal.  Blood pressure control is good.  Glycemic control is been good.  Gets glucose and A1c today and every 3 months.  Annual lab work was done August 2022 including CBC, CMP A1c.  Annual wellness visit due September 2023.  She failed Lipitor and Dr. Locke felt that was somewhat irrelevant at her age and is good is her lipids run.  I agree.  She remains on Lopressor 12.5 mg twice daily and torsemide 20 mg daily for hypertension those are reviewed today.  History of macular degeneration being treated with eye vitamins.  She is 3 HPP today.    The above information was reviewed again today 03/08/23.  It continues to be accurate as reflected above and is unchanged.  History, physical and review of systems all reviewed and are unchanged.  Medications were reviewed today and continue the current dosing.    PPE today includes face mask and eye shield.         Dragon disclaimer:   Much of this encounter note is an electronic transcription/translation of spoken language to printed text. The electronic translation of spoken language may  permit erroneous, or at times, nonsensical words or phrases to be inadvertently transcribed; Although I have reviewed the note for such errors, some may still exist.

## 2023-03-09 LAB
GLUCOSE SERPL-MCNC: 89 MG/DL (ref 65–99)
HBA1C MFR BLD: 5.5 % (ref 4.8–5.6)

## 2023-06-08 ENCOUNTER — LAB (OUTPATIENT)
Dept: LAB | Facility: HOSPITAL | Age: 88
End: 2023-06-08
Payer: MEDICARE

## 2023-06-08 ENCOUNTER — OFFICE VISIT (OUTPATIENT)
Dept: INTERNAL MEDICINE | Facility: CLINIC | Age: 88
End: 2023-06-08
Payer: MEDICARE

## 2023-06-08 VITALS
WEIGHT: 160 LBS | BODY MASS INDEX: 31.41 KG/M2 | RESPIRATION RATE: 16 BRPM | DIASTOLIC BLOOD PRESSURE: 54 MMHG | OXYGEN SATURATION: 97 % | SYSTOLIC BLOOD PRESSURE: 142 MMHG | HEIGHT: 60 IN | TEMPERATURE: 98 F | HEART RATE: 65 BPM

## 2023-06-08 DIAGNOSIS — I10 PRIMARY HYPERTENSION: Primary | Chronic | ICD-10-CM

## 2023-06-08 DIAGNOSIS — R73.02 IGT (IMPAIRED GLUCOSE TOLERANCE): Chronic | ICD-10-CM

## 2023-06-08 DIAGNOSIS — E78.5 HYPERLIPIDEMIA, UNSPECIFIED HYPERLIPIDEMIA TYPE: Chronic | ICD-10-CM

## 2023-06-08 DIAGNOSIS — I25.10 CORONARY ARTERY DISEASE INVOLVING NATIVE CORONARY ARTERY OF NATIVE HEART WITHOUT ANGINA PECTORIS: ICD-10-CM

## 2023-06-08 LAB
GLUCOSE SERPL-MCNC: 97 MG/DL (ref 65–99)
HBA1C MFR BLD: 5.8 % (ref 4.8–5.6)

## 2023-06-08 PROCEDURE — 99214 OFFICE O/P EST MOD 30 MIN: CPT | Performed by: INTERNAL MEDICINE

## 2023-06-08 PROCEDURE — 36415 COLL VENOUS BLD VENIPUNCTURE: CPT | Performed by: INTERNAL MEDICINE

## 2023-06-08 PROCEDURE — 1160F RVW MEDS BY RX/DR IN RCRD: CPT | Performed by: INTERNAL MEDICINE

## 2023-06-08 PROCEDURE — 83036 HEMOGLOBIN GLYCOSYLATED A1C: CPT | Performed by: INTERNAL MEDICINE

## 2023-06-08 PROCEDURE — 1159F MED LIST DOCD IN RCRD: CPT | Performed by: INTERNAL MEDICINE

## 2023-06-08 PROCEDURE — 82947 ASSAY GLUCOSE BLOOD QUANT: CPT | Performed by: INTERNAL MEDICINE

## 2023-06-08 RX ORDER — MULTIVIT-MIN/IRON/FOLIC ACID/K 18-600-40
2000 CAPSULE ORAL DAILY
COMMUNITY

## 2023-06-08 RX ORDER — CEPHALEXIN 500 MG/1
500 CAPSULE ORAL 2 TIMES DAILY
Qty: 14 CAPSULE | Refills: 0 | Status: SHIPPED | OUTPATIENT
Start: 2023-06-08

## 2023-06-08 RX ORDER — MUPIROCIN CALCIUM 20 MG/G
1 CREAM TOPICAL 2 TIMES DAILY
COMMUNITY

## 2023-06-08 NOTE — PROGRESS NOTES
Subjective   Yuliana Gonzalez is a 89 y.o. female.     Chief Complaint   Patient presents with    Hypertension    Hyperlipidemia    Coronary Artery Disease         History of Present Illness  In for recheck of chronic IGT.  Has been stable.  Also aortic stenosis.  Has dyspnea on exertion which is significant.  Now set up for an AVR.  History of macular degeneration.  That is stable.  Hypertension  This is a chronic problem. The current episode started more than 1 year ago. The problem is unchanged. The problem is controlled. Associated symptoms include chest pain. Pertinent negatives include no palpitations or shortness of breath.   Hyperlipidemia  This is a chronic problem. The current episode started more than 1 year ago. The problem is controlled. Recent lipid tests were reviewed and are normal. Factors aggravating her hyperlipidemia include beta blockers. Associated symptoms include chest pain. Pertinent negatives include no shortness of breath.   Coronary Artery Disease  Presents for follow-up visit. Symptoms include chest pain. Pertinent negatives include no leg swelling, palpitations or shortness of breath. Risk factors include hyperlipidemia.   Asthma  There is no cough, shortness of breath or wheezing. Associated symptoms include chest pain. Her past medical history is significant for asthma.   Hyperglycemia  This is a chronic problem. The current episode started more than 1 year ago. The problem occurs constantly. Associated symptoms include chest pain. Pertinent negatives include no abdominal pain or coughing.      The following portions of the patient's history were reviewed and updated as appropriate: allergies, current medications, past social history and problem list.    Outpatient Medications Marked as Taking for the 6/8/23 encounter (Office Visit) with Trent Davis MD   Medication Sig Dispense Refill    acetaminophen (TYLENOL) 325 MG tablet Take 2 tablets by mouth Every 4 (Four) Hours As Needed  for Mild Pain  or Fever (temperature greater than 101F). 120 tablet 0    aspirin (aspirin) 81 MG EC tablet Take 1 tablet by mouth Daily. 30 tablet 6    AZO-CRANBERRY PO Take 1 tablet by mouth Daily.      metoprolol tartrate (LOPRESSOR) 25 MG tablet Take 0.5 tablets by mouth Every 12 (Twelve) Hours. 90 tablet 3    metroNIDAZOLE (METROCREAM) 0.75 % cream Apply 1 application topically to the appropriate area as directed 2 (Two) Times a Day.      Multiple Vitamins-Minerals (VITEYES AREDS ADVANCED PO) Take 2 capsules by mouth Daily.      mupirocin (BACTROBAN) 2 % cream Apply 1 application topically to the appropriate area as directed 2 (Two) Times a Day.      pantoprazole (PROTONIX) 40 MG EC tablet Take 1 tablet by mouth Every Morning. 90 tablet 3    potassium chloride (MICRO-K) 10 MEQ CR capsule Take 1 capsule by mouth 2 (Two) Times a Day. 180 capsule 3    sennosides-docusate (PERICOLACE) 8.6-50 MG per tablet Take 1 tablet by mouth Daily.      torsemide (DEMADEX) 20 MG tablet Take 1 tablet by mouth Daily. 90 tablet 1    Vitamin D, Cholecalciferol, 50 MCG (2000 UT) capsule Take 1 capsule by mouth Daily.      [DISCONTINUED] cholecalciferol (VITAMIN D3) 25 MCG (1000 UT) tablet Take 2 tablets by mouth Daily. (Patient taking differently: Take 1 tablet by mouth Daily.)         Review of Systems   Respiratory:  Negative for cough, shortness of breath and wheezing.    Cardiovascular:  Positive for chest pain. Negative for palpitations and leg swelling.   Gastrointestinal:  Negative for abdominal pain, constipation and diarrhea.     Objective   Vitals:    06/08/23 1005   BP: 142/54   Pulse: 65   Resp: 16   Temp: 98 °F (36.7 °C)   SpO2: 97%          06/08/23  1005   Weight: 72.6 kg (160 lb)    [unfilled]  Body mass index is 31.25 kg/m².      Physical Exam   Constitutional: She appears well-developed.   Neck: No thyromegaly present.   Cardiovascular: Normal rate and regular rhythm. Exam reveals no gallop.   Murmur  heard.  Crescendo decrescendo systolic murmur is present with a grade of 1/6.  Soft grade 1-2 systolic murmur along the left sternal border   Pulmonary/Chest: Effort normal and breath sounds normal. No respiratory distress. She has no wheezes. She has no rales.   Abdominal: Soft. Normal appearance and bowel sounds are normal. She exhibits no mass. There is no abdominal tenderness. There is no guarding.   Neurological: She is alert.       Problems Addressed this Visit          Cardiac and Vasculature    Hyperlipidemia (Chronic)    Hypertension - Primary (Chronic)    Coronary artery disease involving native coronary artery of native heart       Endocrine and Metabolic    IGT (impaired glucose tolerance) (Chronic)     Diagnoses         Codes Comments    Primary hypertension    -  Primary ICD-10-CM: I10  ICD-9-CM: 401.9     Hyperlipidemia, unspecified hyperlipidemia type     ICD-10-CM: E78.5  ICD-9-CM: 272.4     Coronary artery disease involving native coronary artery of native heart without angina pectoris     ICD-10-CM: I25.10  ICD-9-CM: 414.01     IGT (impaired glucose tolerance)     ICD-10-CM: R73.02  ICD-9-CM: 790.22           Assessment/Plan   In for recheck of in for hypertension, hyperlipidemia and CAD today June 2023.  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  Virtually all of her cardiac symptoms have now resolved.  Her energy was back to normal but she does mention some chronic persistent fatigue.  Blood pressure control is good.  Glycemic control is been good.  Gets glucose and A1c today and every 3 months.  Annual lab work was done September 2022 including CBC, CMP A1c.  Annual wellness visit due September 2023.  She failed Lipitor and Dr. Locke felt that was somewhat irrelevant at her age and is good is her lipids run.  I agree.  She remains on Lopressor 12.5 mg twice daily and torsemide 20 mg daily for hypertension those are reviewed today.  History of macular degeneration being  treated with eye vitamins.  She has developed some cellulitis on the left great toe going up to the PIP and beyond.  Did have some podiatry work on her nails about 11 days ago.  We will begin Keflex 5 mg twice daily for 1 week.  She has been soaking it with Epsom salts and she will continue with this.  She is 2.5 HPP today.    The above information was reviewed again today 06/08/23.  It continues to be accurate as reflected above and is unchanged.  History, physical and review of systems all reviewed and are unchanged.  Medications were reviewed today and continue the current dosing.         Dragon disclaimer:   Much of this encounter note is an electronic transcription/translation of spoken language to printed text. The electronic translation of spoken language may permit erroneous, or at times, nonsensical words or phrases to be inadvertently transcribed; Although I have reviewed the note for such errors, some may still exist.

## 2023-06-28 ENCOUNTER — TELEPHONE (OUTPATIENT)
Dept: INTERNAL MEDICINE | Facility: CLINIC | Age: 88
End: 2023-06-28

## 2023-06-28 NOTE — TELEPHONE ENCOUNTER
I am not exactly sure why she is on that.  Stop the Protonix.  Lets switch her to Pepcid 20 mg twice daily #180x3.

## 2023-06-28 NOTE — TELEPHONE ENCOUNTER
Caller: Yuliana Gonzalez    Relationship: Self    Best call back number: [phone]     What medications are you currently taking: pantoprazole (PROTONIX) 40 MG EC tablet    What are your concerns: PATIENT CALLING WANTING TO KNOW IF SHE CAN STOP TAKING THIS MEDICATION SHE HAS NOTICED AN INCREASE IN GAS AND DIZZINESS AND MILD HEADACHE     How long have you had these concerns: FOR THE LAST MONTH

## 2023-07-21 ENCOUNTER — TELEPHONE (OUTPATIENT)
Dept: INTERNAL MEDICINE | Facility: CLINIC | Age: 88
End: 2023-07-21

## 2023-07-21 NOTE — TELEPHONE ENCOUNTER
Caller: Yuliana Gonzalez    Relationship to patient: Self    Best call back number: [phone]     Patient is needing: PATIENT HAS BEEN TAKING THE FAMOTIDINE 20 MG (AM AND PM) FOR OVER TWO WEEKS AND THE INSERT IN THE PACKAGE SAYS TO CALL PROVIDER IF TAKING OVER TWO WEEKS. PATIENT WANTS TO MAKE SURE IT IS SAFE TO TAKE. SHE IS CONCERNED.    PATIENT IS NOT SURE WHY SHE IS TAKING IT. SHE WOULD LIKE TO KNOW WHAT IT IS FOR.

## 2023-07-21 NOTE — TELEPHONE ENCOUNTER
I spoke with the patient, she states she doesn't have much of an issue with heart burn or acid reflux anymore and would like to know if she can discontinue and just take Tums as needed. Patient states her body seems to be handling things very well and at her age, she doesn't want to take any meds that aren't absolutely necessary. Please advise.

## 2023-07-21 NOTE — TELEPHONE ENCOUNTER
The chart is weird.  It looks like Keflex was temporary and needs to be discontinued.  Pepcid and Protonix are both mentioned on her medication list.  She should not be taking both.  Okay to stop them and see if she can be controlled with Tums alone.  If she starts having trouble with swallowing or heartburn or cough she should let me know and we can resume the Pepcid or the Protonix.  Delete both the Pepcid and the Protonix from her medication list.

## 2023-07-31 ENCOUNTER — HOSPITAL ENCOUNTER (INPATIENT)
Facility: HOSPITAL | Age: 88
LOS: 5 days | Discharge: HOME | DRG: 247 | End: 2023-08-05
Attending: STUDENT IN AN ORGANIZED HEALTH CARE EDUCATION/TRAINING PROGRAM | Admitting: INTERNAL MEDICINE
Payer: MEDICARE

## 2023-07-31 ENCOUNTER — APPOINTMENT (OUTPATIENT)
Dept: CT IMAGING | Facility: HOSPITAL | Age: 88
DRG: 247 | End: 2023-07-31
Payer: MEDICARE

## 2023-07-31 DIAGNOSIS — R77.8 ELEVATED TROPONIN: ICD-10-CM

## 2023-07-31 DIAGNOSIS — R07.9 CHEST PAIN, UNSPECIFIED TYPE: ICD-10-CM

## 2023-07-31 DIAGNOSIS — I21.4 NSTEMI (NON-ST ELEVATED MYOCARDIAL INFARCTION): Primary | ICD-10-CM

## 2023-07-31 PROBLEM — R73.9 HYPERGLYCEMIA: Status: ACTIVE | Noted: 2023-07-31

## 2023-07-31 LAB
ALBUMIN SERPL-MCNC: 4.4 G/DL (ref 3.5–5.2)
ALBUMIN/GLOB SERPL: 1.8 G/DL
ALP SERPL-CCNC: 117 U/L (ref 39–117)
ALT SERPL W P-5'-P-CCNC: 13 U/L (ref 1–33)
ANION GAP SERPL CALCULATED.3IONS-SCNC: 11.8 MMOL/L (ref 5–15)
AST SERPL-CCNC: 29 U/L (ref 1–32)
BASOPHILS # BLD AUTO: 0.05 10*3/MM3 (ref 0–0.2)
BASOPHILS NFR BLD AUTO: 0.5 % (ref 0–1.5)
BILIRUB SERPL-MCNC: 0.2 MG/DL (ref 0–1.2)
BILIRUB UR QL STRIP: NEGATIVE
BUN SERPL-MCNC: 15 MG/DL (ref 8–23)
BUN/CREAT SERPL: 22.1 (ref 7–25)
CALCIUM SPEC-SCNC: 9.3 MG/DL (ref 8.2–9.6)
CHLORIDE SERPL-SCNC: 101 MMOL/L (ref 98–107)
CLARITY UR: CLEAR
CO2 SERPL-SCNC: 25.2 MMOL/L (ref 22–29)
COLOR UR: YELLOW
CREAT SERPL-MCNC: 0.68 MG/DL (ref 0.57–1)
DEPRECATED RDW RBC AUTO: 41.9 FL (ref 37–54)
EGFRCR SERPLBLD CKD-EPI 2021: 81.3 ML/MIN/1.73
EOSINOPHIL # BLD AUTO: 0.1 10*3/MM3 (ref 0–0.4)
EOSINOPHIL NFR BLD AUTO: 1 % (ref 0.3–6.2)
ERYTHROCYTE [DISTWIDTH] IN BLOOD BY AUTOMATED COUNT: 13.3 % (ref 12.3–15.4)
GEN 5 2HR TROPONIN T REFLEX: 134 NG/L
GLOBULIN UR ELPH-MCNC: 2.4 GM/DL
GLUCOSE BLDC GLUCOMTR-MCNC: 100 MG/DL (ref 70–130)
GLUCOSE SERPL-MCNC: 181 MG/DL (ref 65–99)
GLUCOSE UR STRIP-MCNC: NEGATIVE MG/DL
HCT VFR BLD AUTO: 44 % (ref 34–46.6)
HGB BLD-MCNC: 14.1 G/DL (ref 12–15.9)
HGB UR QL STRIP.AUTO: NEGATIVE
IMM GRANULOCYTES # BLD AUTO: 0.02 10*3/MM3 (ref 0–0.05)
IMM GRANULOCYTES NFR BLD AUTO: 0.2 % (ref 0–0.5)
KETONES UR QL STRIP: NEGATIVE
LEUKOCYTE ESTERASE UR QL STRIP.AUTO: ABNORMAL
LIPASE SERPL-CCNC: 83 U/L (ref 13–60)
LYMPHOCYTES # BLD AUTO: 2.82 10*3/MM3 (ref 0.7–3.1)
LYMPHOCYTES NFR BLD AUTO: 28.2 % (ref 19.6–45.3)
MCH RBC QN AUTO: 27.2 PG (ref 26.6–33)
MCHC RBC AUTO-ENTMCNC: 32 G/DL (ref 31.5–35.7)
MCV RBC AUTO: 84.9 FL (ref 79–97)
MONOCYTES # BLD AUTO: 0.7 10*3/MM3 (ref 0.1–0.9)
MONOCYTES NFR BLD AUTO: 7 % (ref 5–12)
NEUTROPHILS NFR BLD AUTO: 6.32 10*3/MM3 (ref 1.7–7)
NEUTROPHILS NFR BLD AUTO: 63.1 % (ref 42.7–76)
NITRITE UR QL STRIP: NEGATIVE
NT-PROBNP SERPL-MCNC: 3575 PG/ML (ref 0–1800)
PH UR STRIP.AUTO: 6.5 [PH] (ref 5–8)
PLATELET # BLD AUTO: 210 10*3/MM3 (ref 140–450)
PMV BLD AUTO: 10.9 FL (ref 6–12)
POTASSIUM SERPL-SCNC: 3.9 MMOL/L (ref 3.5–5.2)
PROT SERPL-MCNC: 6.8 G/DL (ref 6–8.5)
PROT UR QL STRIP: NEGATIVE
QT INTERVAL: 424 MS
RBC # BLD AUTO: 5.18 10*6/MM3 (ref 3.77–5.28)
SODIUM SERPL-SCNC: 138 MMOL/L (ref 136–145)
SP GR UR STRIP: 1.01 (ref 1–1.03)
TROPONIN T DELTA: 21 NG/L
TROPONIN T SERPL HS-MCNC: 113 NG/L
TROPONIN T SERPL HS-MCNC: 165 NG/L
UROBILINOGEN UR QL STRIP: ABNORMAL
WBC NRBC COR # BLD: 10.01 10*3/MM3 (ref 3.4–10.8)

## 2023-07-31 PROCEDURE — 25010000002 ENOXAPARIN PER 10 MG: Performed by: INTERNAL MEDICINE

## 2023-07-31 PROCEDURE — 25510000001 IOPAMIDOL PER 1 ML: Performed by: STUDENT IN AN ORGANIZED HEALTH CARE EDUCATION/TRAINING PROGRAM

## 2023-07-31 PROCEDURE — 83880 ASSAY OF NATRIURETIC PEPTIDE: CPT | Performed by: STUDENT IN AN ORGANIZED HEALTH CARE EDUCATION/TRAINING PROGRAM

## 2023-07-31 PROCEDURE — 80053 COMPREHEN METABOLIC PANEL: CPT | Performed by: STUDENT IN AN ORGANIZED HEALTH CARE EDUCATION/TRAINING PROGRAM

## 2023-07-31 PROCEDURE — 99285 EMERGENCY DEPT VISIT HI MDM: CPT | Performed by: STUDENT IN AN ORGANIZED HEALTH CARE EDUCATION/TRAINING PROGRAM

## 2023-07-31 PROCEDURE — 83690 ASSAY OF LIPASE: CPT | Performed by: STUDENT IN AN ORGANIZED HEALTH CARE EDUCATION/TRAINING PROGRAM

## 2023-07-31 PROCEDURE — 74177 CT ABD & PELVIS W/CONTRAST: CPT

## 2023-07-31 PROCEDURE — 82948 REAGENT STRIP/BLOOD GLUCOSE: CPT

## 2023-07-31 PROCEDURE — 99285 EMERGENCY DEPT VISIT HI MDM: CPT

## 2023-07-31 PROCEDURE — 81003 URINALYSIS AUTO W/O SCOPE: CPT | Performed by: STUDENT IN AN ORGANIZED HEALTH CARE EDUCATION/TRAINING PROGRAM

## 2023-07-31 PROCEDURE — 85025 COMPLETE CBC W/AUTO DIFF WBC: CPT | Performed by: STUDENT IN AN ORGANIZED HEALTH CARE EDUCATION/TRAINING PROGRAM

## 2023-07-31 PROCEDURE — 36415 COLL VENOUS BLD VENIPUNCTURE: CPT

## 2023-07-31 PROCEDURE — 93005 ELECTROCARDIOGRAM TRACING: CPT | Performed by: STUDENT IN AN ORGANIZED HEALTH CARE EDUCATION/TRAINING PROGRAM

## 2023-07-31 PROCEDURE — 84484 ASSAY OF TROPONIN QUANT: CPT | Performed by: STUDENT IN AN ORGANIZED HEALTH CARE EDUCATION/TRAINING PROGRAM

## 2023-07-31 PROCEDURE — 93010 ELECTROCARDIOGRAM REPORT: CPT | Performed by: INTERNAL MEDICINE

## 2023-07-31 PROCEDURE — 71275 CT ANGIOGRAPHY CHEST: CPT

## 2023-07-31 PROCEDURE — 25010000002 ENOXAPARIN PER 10 MG: Performed by: STUDENT IN AN ORGANIZED HEALTH CARE EDUCATION/TRAINING PROGRAM

## 2023-07-31 RX ORDER — SODIUM CHLORIDE 0.9 % (FLUSH) 0.9 %
10 SYRINGE (ML) INJECTION AS NEEDED
Status: DISCONTINUED | OUTPATIENT
Start: 2023-07-31 | End: 2023-08-05 | Stop reason: HOSPADM

## 2023-07-31 RX ORDER — MELATONIN
2000 DAILY
Status: DISCONTINUED | OUTPATIENT
Start: 2023-07-31 | End: 2023-08-05 | Stop reason: HOSPADM

## 2023-07-31 RX ORDER — POTASSIUM CHLORIDE 750 MG/1
10 TABLET, FILM COATED, EXTENDED RELEASE ORAL 2 TIMES DAILY
Status: DISCONTINUED | OUTPATIENT
Start: 2023-07-31 | End: 2023-08-05 | Stop reason: HOSPADM

## 2023-07-31 RX ORDER — IBUPROFEN 600 MG/1
1 TABLET ORAL
Status: DISCONTINUED | OUTPATIENT
Start: 2023-07-31 | End: 2023-08-05 | Stop reason: HOSPADM

## 2023-07-31 RX ORDER — ACETAMINOPHEN 325 MG/1
650 TABLET ORAL EVERY 4 HOURS PRN
Status: DISCONTINUED | OUTPATIENT
Start: 2023-07-31 | End: 2023-08-05 | Stop reason: HOSPADM

## 2023-07-31 RX ORDER — ASPIRIN 81 MG/1
324 TABLET, CHEWABLE ORAL DAILY
Status: DISCONTINUED | OUTPATIENT
Start: 2023-07-31 | End: 2023-08-01

## 2023-07-31 RX ORDER — SODIUM CHLORIDE 9 MG/ML
40 INJECTION, SOLUTION INTRAVENOUS AS NEEDED
Status: DISCONTINUED | OUTPATIENT
Start: 2023-07-31 | End: 2023-08-05 | Stop reason: HOSPADM

## 2023-07-31 RX ORDER — ACETAMINOPHEN 650 MG/1
650 SUPPOSITORY RECTAL EVERY 4 HOURS PRN
Status: DISCONTINUED | OUTPATIENT
Start: 2023-07-31 | End: 2023-08-05 | Stop reason: HOSPADM

## 2023-07-31 RX ORDER — NALOXONE HCL 0.4 MG/ML
0.4 VIAL (ML) INJECTION
Status: DISCONTINUED | OUTPATIENT
Start: 2023-07-31 | End: 2023-08-05 | Stop reason: HOSPADM

## 2023-07-31 RX ORDER — DEXTROSE MONOHYDRATE 25 G/50ML
25 INJECTION, SOLUTION INTRAVENOUS
Status: DISCONTINUED | OUTPATIENT
Start: 2023-07-31 | End: 2023-08-05 | Stop reason: HOSPADM

## 2023-07-31 RX ORDER — ENOXAPARIN SODIUM 100 MG/ML
1 INJECTION SUBCUTANEOUS ONCE
Status: COMPLETED | OUTPATIENT
Start: 2023-07-31 | End: 2023-07-31

## 2023-07-31 RX ORDER — INSULIN LISPRO 100 [IU]/ML
2-7 INJECTION, SOLUTION INTRAVENOUS; SUBCUTANEOUS
Status: DISCONTINUED | OUTPATIENT
Start: 2023-07-31 | End: 2023-08-05 | Stop reason: HOSPADM

## 2023-07-31 RX ORDER — ENOXAPARIN SODIUM 100 MG/ML
1 INJECTION SUBCUTANEOUS EVERY 12 HOURS
Status: DISCONTINUED | OUTPATIENT
Start: 2023-07-31 | End: 2023-08-04

## 2023-07-31 RX ORDER — AMOXICILLIN 250 MG
1 CAPSULE ORAL DAILY
Status: DISCONTINUED | OUTPATIENT
Start: 2023-07-31 | End: 2023-08-05 | Stop reason: HOSPADM

## 2023-07-31 RX ORDER — TORSEMIDE 20 MG/1
20 TABLET ORAL DAILY
Status: DISCONTINUED | OUTPATIENT
Start: 2023-07-31 | End: 2023-08-05 | Stop reason: HOSPADM

## 2023-07-31 RX ORDER — SODIUM CHLORIDE 0.9 % (FLUSH) 0.9 %
10 SYRINGE (ML) INJECTION EVERY 12 HOURS SCHEDULED
Status: DISCONTINUED | OUTPATIENT
Start: 2023-07-31 | End: 2023-08-05 | Stop reason: HOSPADM

## 2023-07-31 RX ORDER — ACETAMINOPHEN 160 MG/5ML
650 SOLUTION ORAL EVERY 4 HOURS PRN
Status: DISCONTINUED | OUTPATIENT
Start: 2023-07-31 | End: 2023-08-05 | Stop reason: HOSPADM

## 2023-07-31 RX ORDER — MORPHINE SULFATE 2 MG/ML
1 INJECTION, SOLUTION INTRAMUSCULAR; INTRAVENOUS EVERY 4 HOURS PRN
Status: DISCONTINUED | OUTPATIENT
Start: 2023-07-31 | End: 2023-08-05 | Stop reason: HOSPADM

## 2023-07-31 RX ORDER — NICOTINE POLACRILEX 4 MG
15 LOZENGE BUCCAL
Status: DISCONTINUED | OUTPATIENT
Start: 2023-07-31 | End: 2023-08-05 | Stop reason: HOSPADM

## 2023-07-31 RX ORDER — ONDANSETRON 2 MG/ML
4 INJECTION INTRAMUSCULAR; INTRAVENOUS EVERY 6 HOURS PRN
Status: DISCONTINUED | OUTPATIENT
Start: 2023-07-31 | End: 2023-08-05 | Stop reason: HOSPADM

## 2023-07-31 RX ORDER — ASPIRIN 81 MG/1
324 TABLET, CHEWABLE ORAL ONCE
Status: COMPLETED | OUTPATIENT
Start: 2023-07-31 | End: 2023-07-31

## 2023-07-31 RX ORDER — NITROGLYCERIN 0.4 MG/1
0.4 TABLET SUBLINGUAL
Status: DISCONTINUED | OUTPATIENT
Start: 2023-07-31 | End: 2023-08-05 | Stop reason: HOSPADM

## 2023-07-31 RX ADMIN — ASPIRIN 324 MG: 81 TABLET, CHEWABLE ORAL at 21:00

## 2023-07-31 RX ADMIN — METRONIDAZOLE 1 APPLICATION: 7.5 CREAM TOPICAL at 22:09

## 2023-07-31 RX ADMIN — IOPAMIDOL 100 ML: 755 INJECTION, SOLUTION INTRAVENOUS at 09:56

## 2023-07-31 RX ADMIN — POTASSIUM CHLORIDE 10 MEQ: 750 TABLET, EXTENDED RELEASE ORAL at 22:08

## 2023-07-31 RX ADMIN — Medication 2000 UNITS: at 22:07

## 2023-07-31 RX ADMIN — SENNOSIDES AND DOCUSATE SODIUM 1 TABLET: 50; 8.6 TABLET ORAL at 22:01

## 2023-07-31 RX ADMIN — ENOXAPARIN SODIUM 70 MG: 100 INJECTION SUBCUTANEOUS at 12:45

## 2023-07-31 RX ADMIN — Medication 10 ML: at 22:09

## 2023-07-31 RX ADMIN — METOPROLOL TARTRATE 12.5 MG: 25 TABLET, FILM COATED ORAL at 22:01

## 2023-07-31 RX ADMIN — ENOXAPARIN SODIUM 70 MG: 100 INJECTION SUBCUTANEOUS at 22:06

## 2023-07-31 RX ADMIN — ASPIRIN 324 MG: 81 TABLET, CHEWABLE ORAL at 10:10

## 2023-07-31 NOTE — FSED PROVIDER NOTE
Subjective   History of Present Illness  89-year-old presents to the emergency department who with chest pain/epigastric abdominal pain.  Patient has a history of CAD status post STEMI and stents exactly 1 year ago today, hypertension, hyperlipidemia, status post TAVR.  Patient takes aspirin and Plavix.  She has not yet taken anything this morning.  She states symptoms started yesterday and have been intermittent.  She tried taking Tylenol which helped briefly.  She denies any nausea or vomiting.  No diaphoresis.  No diarrhea or blood in stool.  No other complaints at this time.    History provided by:  Patient    Review of Systems   Constitutional:  Negative for chills and fever.   HENT:  Negative for congestion and sore throat.    Eyes:  Negative for pain and redness.   Respiratory:  Negative for cough and shortness of breath.    Cardiovascular:  Positive for chest pain. Negative for palpitations.   Gastrointestinal:  Positive for abdominal pain. Negative for nausea and vomiting.   Genitourinary:  Negative for difficulty urinating and dysuria.   Musculoskeletal:  Negative for back pain and neck pain.   Skin:  Negative for rash and wound.   Neurological:  Negative for weakness, numbness and headaches.   All other systems reviewed and are negative.    Past Medical History:   Diagnosis Date    Aortic stenosis 07/2009    diagnosed, 6/14 moderate with 0.9cm area, 42mm max gradient. 0.91/34 6/15.    Central loss of vision     RIGHT EYE    Cervical arthritis     Colitis     HX    Coronary artery disease     History of constipation     History of esophageal reflux     History of skin cancer     Hyperlipidemia     Hypertension     IGT (impaired glucose tolerance)     Irregular heart beat     HX    Macular degeneration     Medication management     On continuous oral anticoagulation     Redness of skin     NOSE/DERMATOLOGY TREATING WITH FLAGYL CREAM/ADVISED PT TO NOTIFY CARDIOLOGY    Sciatica     HX    Short-term memory  "loss     \"MILD\"    SOB (shortness of breath) on exertion     Vitamin D deficiency        Allergies   Allergen Reactions    Hydrochlorothiazide Other (See Comments)     hyponatremia    Hydrocodone Shortness Of Breath     Also reports \"lethargy\"    Lipitor [Atorvastatin] Nausea Only     \"FELT AWFUL\"    Erythromycin Diarrhea    Lisinopril Cough       Past Surgical History:   Procedure Laterality Date    AORTIC VALVE REPAIR/REPLACEMENT Left 3/15/2022    Procedure: TTE TRANSFEMORAL TRANSCATHETER AORTIC VALVE REPLACEMENT PERCUTANEOUS APPROACH;  Surgeon: Vesna Chris MD;  Location: [facility];  Service: Cardiothoracic;  Laterality: Left;    AORTIC VALVE REPAIR/REPLACEMENT Left 3/15/2022    Procedure: Transfemoral Transcatheter Aortic Valve Replacement with intra operative TTE and possible open surgical rescue;  Surgeon: Clint Locke MD;  Location: [facility];  Service: Cardiovascular;  Laterality: Left;    CARDIAC CATHETERIZATION N/A 1/21/2022    Procedure: Coronary angiography;  Surgeon: Clint Locke MD;  Location: [facility] INVASIVE LOCATION;  Service: Cardiovascular;  Laterality: N/A;    CARDIAC CATHETERIZATION N/A 1/21/2022    Procedure: Percutaneous Coronary Intervention;  Surgeon: Clint Locke MD;  Location: [facility] CATH INVASIVE LOCATION;  Service: Cardiovascular;  Laterality: N/A;    CARDIAC CATHETERIZATION N/A 1/21/2022    Procedure: Stent ISMAEL coronary;  Surgeon: Clint Locke MD;  Location: [facility] CATH INVASIVE LOCATION;  Service: Cardiovascular;  Laterality: N/A;    CARDIAC CATHETERIZATION Left 7/31/2022    Procedure: CORONARY ANGIOGRAPHY;  Surgeon: Jeannie Echevarria MD;  Location: [facility] CATH INVASIVE LOCATION;  Service: Cardiology;  Laterality: Left;    CARDIAC CATHETERIZATION N/A 7/31/2022    Procedure: Left ventriculography;  Surgeon: Jeannie Echevarria MD;  Location: [facility] CATH INVASIVE LOCATION;  Service: Cardiology;  Laterality: N/A;    CARDIAC CATHETERIZATION N/A " 2022    Procedure: Left Heart Cath;  Surgeon: Jeannie Echevarria MD;  Location:  [facility] CATH INVASIVE LOCATION;  Service: Cardiology;  Laterality: N/A;    CARDIAC ELECTROPHYSIOLOGY PROCEDURE N/A 3/16/2022    Procedure: Pacemaker DC new [place];  Surgeon: Yann Ceballos MD;  Location:  [facility] CATH INVASIVE LOCATION;  Service: Cardiology;  Laterality: N/A;    CATARACT EXTRACTION, BILATERAL      COLONOSCOPY      EYE SURGERY      SKIN CANCER EXCISION         Family History   Problem Relation Age of Onset    Aortic stenosis Mother     Hypertension Mother     Heart failure Father     Diabetes Father     Hypertension Father     No Known Problems Sister     No Known Problems Daughter     No Known Problems Son     Aneurysm Sister     Other Sister         Polio in her 20's    Malig Hyperthermia Neg Hx        Social History     Socioeconomic History    Marital status:      Spouse name: Stephan    Number of children: 4   Tobacco Use    Smoking status: Former     Packs/day: 0.00     Years: 0.00     Pack years: 0.00     Types: Cigarettes     Start date:      Quit date:      Years since quittin.6    Smokeless tobacco: Never    Tobacco comments:     1 pack every 2 weeks   Vaping Use    Vaping Use: Never used   Substance and Sexual Activity    Alcohol use: Yes     Comment: wine - rare / no caffeine    Drug use: No    Sexual activity: Defer           Objective   Physical Exam  Vitals and nursing note reviewed.   Constitutional:       General: She is not in acute distress.     Appearance: Normal appearance.   HENT:      Head: Normocephalic and atraumatic.      Mouth/Throat:      Mouth: Mucous membranes are moist.   Eyes:      Extraocular Movements: Extraocular movements intact.      Conjunctiva/sclera: Conjunctivae normal.      Pupils: Pupils are equal, round, and reactive to light.   Cardiovascular:      Rate and Rhythm: Normal rate and regular rhythm.      Pulses: Normal pulses.           Radial pulses are 2+  on the right side and 2+ on the left side.      Heart sounds: Normal heart sounds.   Pulmonary:      Effort: Pulmonary effort is normal. No respiratory distress.      Breath sounds: Normal breath sounds.   Abdominal:      General: There is no distension.      Palpations: Abdomen is soft.      Tenderness: There is no abdominal tenderness.   Musculoskeletal:         General: Normal range of motion.      Cervical back: Normal range of motion and neck supple.   Skin:     General: Skin is warm.   Neurological:      General: No focal deficit present.      Mental Status: She is alert.   Psychiatric:         Mood and Affect: Mood normal.         Behavior: Behavior normal.       Procedures           ED Course  ED Course as of 07/31/23 1625   Mon Jul 31, 2023   1156 Call out to Cardiology at this time [JS]   1242 Dr Ventura, cardiology called back and agrees with lovenox, ASA and admission [JS]   1340 Discussed the patient with Dr. Lopez, hospitalist, who agrees to accept admission [JS]   1620 Patient leaving the facility at this time with EMS.  She continues to be chest pain-free at time of departure. [JS]      ED Course User Index  [JS] Micha Hough MD                                           Medical Decision Making  89-year-old female presents the emergency department chest pain and abdominal pain.  Differential diagnosis includes ACS, pneumonia, pneumothorax, electrolyte abnormality, intra-abdominal infection, CHF, PE, others.  Work-up to exclude differential diagnoses above.  Will give full dose aspirin.    Problems Addressed:  Chest pain, unspecified type: complicated acute illness or injury  Elevated troponin: complicated acute illness or injury  NSTEMI (non-ST elevated myocardial infarction): complicated acute illness or injury    Amount and/or Complexity of Data Reviewed  Labs: ordered.  Radiology: ordered.  ECG/medicine tests: ordered and independent interpretation performed.     Details: Rate 74.  Normal  sinus rhythm.  Right bundle branch block.  Nonspecific ST and T wave abnormalities.  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    Risk  OTC drugs.  Prescription drug management.  Decision regarding hospitalization.        Final diagnoses:   NSTEMI (non-ST elevated myocardial infarction)   Chest pain, unspecified type   Elevated troponin       ED Disposition  ED Disposition       ED Disposition   Decision to Admit    Condition   --    Comment   Level of Care: Telemetry [5]   Diagnosis: NSTEMI (non-ST elevated myocardial infarction) [[id number]]   Admitting Physician: ELISE HDZ [[id number]   Attending Physician: ELISE HDZ [[id number]]   Certification: I Certify That Inpatient Hospital Services Are Medically Necessary For Greater Than 2 Midnights                 No follow-up provider specified.       Medication List      No changes were made to your prescriptions during this visit.

## 2023-07-31 NOTE — ED NOTES
[facility] arrived in department for patient transport. Report given. All questions answered.

## 2023-07-31 NOTE — Clinical Note
Hemostasis started on the right radial artery. Radial compression device applied to vessel. Hemostasis achieved successfully. Closure device additional comment: Comfort band

## 2023-07-31 NOTE — Clinical Note
First balloon inflation max pressure = 16 rupal. First balloon inflation duration = 10 seconds. Second inflation of balloon - Max pressure = 20 rupal. 2nd Inflation of balloon - Duration = 5 seconds. 2nd inflation was done at 12:00 EDT. Third inflation of balloon - Max pressure = 18 rupal. 3rd Inflation of balloon - Duration = 8 seconds. 3rd inflation was done at 12:01 EDT.

## 2023-07-31 NOTE — H&P
Internal medicine history and physical  INTERNAL MEDICINE   [facility]       Patient Identification:  Name: Yuliana Gonzalez  Age: 89 y.o.  Sex: female  :  1930  MRN: [id number]                   Primary Care Physician: Trent Davis MD                               Date of admission:2023    Chief Complaint: Upper abdominal discomfort and chest discomfort off-and-on for the last couple weeks.    History of Present Illness:   Patient is a 89-year-old female with past medical history remarkable for acute MI requiring left heart catheterization and stenting about a year ago, hypertension, dyslipidemia with intolerance to statin as well as aortic stenosis status post TAVR currently on aspirin and Plavix.  In this background patient was experiencing abdominal discomfort and having issues with bloated feeling and was taking Protonix.  She was experiencing some dizziness at that time.  About a month or so ago patient did call her primary care provider and her Protonix was discontinued because patient is concerned for side effects of Protonix and Pepcid was started.  Patient was experiencing off-and-on discomfort in her upper abdominal and chest area but otherwise able to carry out routines applied until yesterday her symptoms become more pronounced and involving the upper abdominal and lower chest area.  Patient did not have any associated nausea or breaking out into sweats.  She denies any orthopnea or PND.  Her discomfort in the upper abdomen and chest is intermittent and this time not responding to Pepcid.  Patient went to [facility] emergency room and had extensive evaluation including CT scan of the chest abdomen and pelvis and was noted to have mildly elevated lipase with normal white blood cell count and essentially unremarkable CBC, unremarkable CMP except for slightly elevated blood sugar and elevated BNP of 3575.  Her initial troponin was 113 with subsequent troponin  "couple hours later was 134 with T delta of 21.  EKG did not show any acute changes.  CT scan of the chest abdomen and pelvis did not show any acute pulmonary embolism but did show bilateral nephrolithiasis without any obstruction, extensive diverticulosis without diverticulitis and thickening endometrium.  No acute thoracic or intra-abdominal process seen.  Patient was given Lovenox, nitroglycerin and aspirin and cardiology consult was requested and case was discussed with Dr. Walden.  Patient is currently feeling better and pain is resolved.      Past Medical History:  Past Medical History:   Diagnosis Date    Aortic stenosis 07/2009    diagnosed, 6/14 moderate with 0.9cm area, 42mm max gradient. 0.91/34 6/15.    Central loss of vision     RIGHT EYE    Cervical arthritis     Colitis     HX    Coronary artery disease     History of constipation     History of esophageal reflux     History of skin cancer     Hyperlipidemia     Hypertension     IGT (impaired glucose tolerance)     Irregular heart beat     HX    Macular degeneration     Medication management     On continuous oral anticoagulation     Redness of skin     NOSE/DERMATOLOGY TREATING WITH FLAGYL CREAM/ADVISED PT TO NOTIFY CARDIOLOGY    Sciatica     HX    Short-term memory loss     \"MILD\"    SOB (shortness of breath) on exertion     Vitamin D deficiency      Past Surgical History:  Past Surgical History:   Procedure Laterality Date    AORTIC VALVE REPAIR/REPLACEMENT Left 3/15/2022    Procedure: TTE TRANSFEMORAL TRANSCATHETER AORTIC VALVE REPLACEMENT PERCUTANEOUS APPROACH;  Surgeon: Vesna Chris MD;  Location: [facility];  Service: Cardiothoracic;  Laterality: Left;    AORTIC VALVE REPAIR/REPLACEMENT Left 3/15/2022    Procedure: Transfemoral Transcatheter Aortic Valve Replacement with intra operative TTE and possible open surgical rescue;  Surgeon: Clint Locke MD;  Location: [facility];  Service: Cardiovascular;  Laterality: Left;    " CARDIAC CATHETERIZATION N/A 1/21/2022    Procedure: Coronary angiography;  Surgeon: Clint Locke MD;  Location:  [facility];  Service: Cardiovascular;  Laterality: N/A;    CARDIAC CATHETERIZATION N/A 1/21/2022    Procedure: Percutaneous Coronary Intervention;  Surgeon: Clint Locke MD;  Location:  [facility];  Service: Cardiovascular;  Laterality: N/A;    CARDIAC CATHETERIZATION N/A 1/21/2022    Procedure: Stent ISMAEL coronary;  Surgeon: Clint Locke MD;  Location:  [facility];  Service: Cardiovascular;  Laterality: N/A;    CARDIAC CATHETERIZATION Left 7/31/2022    Procedure: CORONARY ANGIOGRAPHY;  Surgeon: Jeannie Echevarria MD;  Location:  [facility];  Service: Cardiology;  Laterality: Left;    CARDIAC CATHETERIZATION N/A 7/31/2022    Procedure: Left ventriculography;  Surgeon: Jeannie Echevarria MD;  Location:  [facility];  Service: Cardiology;  Laterality: N/A;    CARDIAC CATHETERIZATION N/A 7/31/2022    Procedure: Left Heart Cath;  Surgeon: Jeannie Echevarria MD;  Location:  [facility];  Service: Cardiology;  Laterality: N/A;    CARDIAC ELECTROPHYSIOLOGY PROCEDURE N/A 3/16/2022    Procedure: Pacemaker DC new [place];  Surgeon: Yann Ceballos MD;  Location:  [facility];  Service: Cardiology;  Laterality: N/A;    CATARACT EXTRACTION, BILATERAL      COLONOSCOPY      EYE SURGERY      SKIN CANCER EXCISION        Home Meds:  Medications Prior to Admission   Medication Sig Dispense Refill Last Dose    acetaminophen (TYLENOL) 325 MG tablet Take 2 tablets by mouth Every 4 (Four) Hours As Needed for Mild Pain  or Fever (temperature greater than 101F). 120 tablet 0     aspirin (aspirin) 81 MG EC tablet Take 1 tablet by mouth Daily. 30 tablet 6     AZO-CRANBERRY PO Take 1 tablet by mouth Daily.       metoprolol tartrate (LOPRESSOR) 25 MG tablet TAKE 0.5 TABLETS BY MOUTH EVERY  "12 (TWELVE) HOURS. 90 tablet 3     metroNIDAZOLE (METROCREAM) 0.75 % cream Apply 1 application  topically to the appropriate area as directed 2 (Two) Times a Day.       Multiple Vitamins-Minerals (VITEYES AREDS ADVANCED PO) Take 2 capsules by mouth Daily.       mupirocin (BACTROBAN) 2 % cream Apply 1 application  topically to the appropriate area as directed 2 (Two) Times a Day.       potassium chloride (MICRO-K) 10 MEQ CR capsule Take 1 capsule by mouth 2 (Two) Times a Day. 180 capsule 3     sennosides-docusate (PERICOLACE) 8.6-50 MG per tablet Take 1 tablet by mouth Daily.    at 2100    torsemide (DEMADEX) 20 MG tablet Take 1 tablet by mouth Daily. 90 tablet 1     Vitamin D, Cholecalciferol, 50 MCG ( UT) capsule Take 1 capsule by mouth Daily.        Current Meds:   No current facility-administered medications for this encounter.  Allergies:  Allergies   Allergen Reactions    Hydrochlorothiazide Other (See Comments)     hyponatremia    Hydrocodone Shortness Of Breath     Also reports \"lethargy\"    Lipitor [Atorvastatin] Nausea Only     \"FELT AWFUL\"    Erythromycin Diarrhea    Lisinopril Cough     Social History:   Social History     Tobacco Use    Smoking status: Former     Packs/day: 0.00     Years: 0.00     Pack years: 0.00     Types: Cigarettes     Start date:      Quit date:      Years since quittin.6    Smokeless tobacco: Never    Tobacco comments:     1 pack every 2 weeks   Substance Use Topics    Alcohol use: Yes     Comment: wine - rare / no caffeine      Family History:  Family History   Problem Relation Age of Onset    Aortic stenosis Mother     Hypertension Mother     Heart failure Father     Diabetes Father     Hypertension Father     No Known Problems Sister     No Known Problems Daughter     No Known Problems Son     Aneurysm Sister     Other Sister         Polio in her 20's    Malig Hyperthermia Neg Hx           Review of Systems  See history of present illness and past medical " "history.    Constitutional:  Negative for chills and fever.   HENT:  Negative for congestion and sore throat.    Eyes:  Negative for pain and redness.   Respiratory:  Negative for cough and shortness of breath.    Cardiovascular:  Positive for chest pain. Negative for palpitations.   Gastrointestinal:  Positive for abdominal pain. Negative for nausea and vomiting.   Genitourinary:  Negative for difficulty urinating and dysuria.   Musculoskeletal:  Negative for back pain and neck pain.   Skin:  Negative for rash and wound.   Neurological:  Negative for weakness, numbness and headaches.   All other systems reviewed and are negative.    Vitals:   /70 (BP Location: Left arm, Patient Position: Lying)   Pulse 73   Temp 98.1 øF (36.7 øC) (Oral)   Resp 18   Ht 154.9 cm (61\")   Wt 68 kg (150 lb)   SpO2 95%   BMI 28.34 kg/mý   I/O: No intake or output data in the 24 hours ending 07/31/23 1840  Exam:  Patient is examined using the personal protective equipment as per guidelines from infection control for this particular patient as enacted.  Hand washing was performed before and after patient interaction.  General Appearance:    Alert, cooperative, no distress, appears stated age   Head:    Normocephalic, without obvious abnormality, atraumatic   Eyes:    PERRL, conjunctiva/corneas clear, EOM's intact, both eyes   Ears:    Normal external ear canals, both ears   Nose: No deformity noted, no drainage noted   Throat:   Lips, tongue, gums normal; oral mucosa pink and moist   Neck: Supple and no adenopathy noted   Back:     Symmetric, no curvature, ROM normal, no CVA tenderness   Lungs:     Clear to auscultation bilaterally, respirations unlabored   Chest Wall:    No tenderness or deformity    Heart:  S1-S2 regular   Abdomen:   Soft no guarding rigidity or rebound noted mild epigastric discomfort upon palpation   Extremities:   Extremities normal, atraumatic, no cyanosis or edema   Pulses:   Pulses palpable in all " extremities; symmetric all extremities   Skin:   Skin color normal, Skin is warm and dry,  no rashes or palpable lesions   Neurologic: Alert and oriented and grossly nonfocal       Data Review:      I reviewed the patient's new clinical results.  Results from last 7 days   Lab Units 07/31/23  0913   WBC 10*3/mm3 10.01   HEMOGLOBIN g/dL 14.1   PLATELETS 10*3/mm3 210     Results from last 7 days   Lab Units 07/31/23  0913   SODIUM mmol/L 138   POTASSIUM mmol/L 3.9   CHLORIDE mmol/L 101   CO2 mmol/L 25.2   BUN mg/dL 15   CREATININE mg/dL 0.68   CALCIUM mg/dL 9.3   GLUCOSE mg/dL 181*     CT Abdomen Pelvis With Contrast    Result Date: 7/31/2023  1. There is no convincing evidence for pulmonary thromboemboli or acute abnormality within the chest. 2. Bilateral nephrolithiasis. Multiple stones within a mildly dilated left renal pelvis are stable. There is no hydronephrosis to suggest intermittent obstruction at the UPJ. There is a single nonobstructing stone within the right kidney. No ureteral stones or evidence for ureteral obstruction. 3. Extensive sigmoid diverticulosis without evidence for diverticulitis. 4. Thickened appearance of the endometrium. Follow-up with gynecology is suggested.   This report was finalized on 7/31/2023 4:40 PM by Dr. Azucena Royal M.D.      CT Angiogram Chest Pulmonary Embolism    Result Date: 7/31/2023  1. There is no convincing evidence for pulmonary thromboemboli or acute abnormality within the chest. 2. Bilateral nephrolithiasis. Multiple stones within a mildly dilated left renal pelvis are stable. There is no hydronephrosis to suggest intermittent obstruction at the UPJ. There is a single nonobstructing stone within the right kidney. No ureteral stones or evidence for ureteral obstruction. 3. Extensive sigmoid diverticulosis without evidence for diverticulitis. 4. Thickened appearance of the endometrium. Follow-up with gynecology is suggested.   This report was finalized on 7/31/2023 4:40  PM by Dr. Azucena Royal M.D.     ECG 12 Lead Chest Pain   Final Result   HEART RATE= 74  bpm   RR Interval= 811  ms   ID Interval= 186  ms   P Horizontal Axis= 0  deg   P Front Axis= 64  deg   QRSD Interval= 138  ms   QT Interval= 424  ms   QRS Axis= -108  deg   T Wave Axis= 61  deg   - ABNORMAL ECG -   Sinus rhythm   Right bundle branch block   Inferior infarct, old   When compared with ECG of 01-Aug-2022 12:52:52,   Inferior SOLIS have resolved   Electronically Signed By: Robbie Gallegos ([facility]) 31-Jul-2023 12:16:02   Date and Time of Study: 2023-07-31 09:02:54      SCANNED - TELEMETRY     Final Result      SCANNED EKG   Final Result        Brief Urine Lab Results  (Last result in the past 365 days)        Color   Clarity   Blood   Leuk Est   Nitrite   Protein   CREAT   Urine HCG        07/31/23 1128 Yellow   Clear   Negative   Trace   Negative   Negative                   Assessment:  Active Hospital Problems    Diagnosis  POA    **NSTEMI (non-ST elevated myocardial infarction) [I21.4]  Yes    Hyperglycemia [R73.9]  Unknown    Presence of cardiac pacemaker [Z95.0]  Yes    S/P TAVR (transcatheter aortic valve replacement) [Z95.2]  Not Applicable     March 2022.      Coronary artery disease involving native coronary artery of native heart [I25.10]  Yes     1/21/2022 cath 90% mid LAD with 40% RCA ostial.  Stent to LAD.      Aortic stenosis [I35.0]  Yes     Moderate 3/15/2016 on echo, mod severe 3/17 1.8 cm area, 46 mm gradient      Hypertension [I10]  Yes       Medical decision making/care plan:  Epigastric/chest discomfort associated with elevated and rising troponin in the setting of known coronary artery disease and prior history of inferior MI-plan is to admit the patient, cardiology consultation, continue with aspirin and Lovenox and as needed nitro glycerin.  Keep n.p.o. except medications.  Slightly elevated lipase-significance unclear continue with as needed management of reflux and abdominal discomfort cardiology  consult for non-ST segment elevation MI is being carried out.  Hyperglycemia with impaired glucose tolerance-provided with Accu-Cheks and sliding scale coverage check hemoglobin A1c and monitor clinical progress.  History of aortic stenosis status post TAVR on 3/2022-continue current regimen.  History of MI status post left heart catheterization and stenting LAD about a year ago-continue present regimen consult cardiology service.  Hypertension-continue antihypertensive regimen avoid hypotensive episodes.  Elevated BNP with history of normal lymphectomy ejection fraction.  Permanent pacemaker placement on due to post TAVR complete high degree AV block-continue to monitor.      Josesito Lopez MD   7/31/2023  18:40 EDT    Parts of this note may be an electronic transcription/translation of spoken language to printed text using the Dragon dictation system.

## 2023-08-01 ENCOUNTER — APPOINTMENT (OUTPATIENT)
Dept: CARDIOLOGY | Facility: HOSPITAL | Age: 88
DRG: 247 | End: 2023-08-01
Payer: MEDICARE

## 2023-08-01 PROBLEM — R07.9 CHEST PAIN: Status: ACTIVE | Noted: 2023-08-01

## 2023-08-01 PROBLEM — R73.03 PREDIABETES: Status: ACTIVE | Noted: 2023-08-01

## 2023-08-01 PROBLEM — R77.8 ELEVATED TROPONIN: Status: ACTIVE | Noted: 2023-08-01

## 2023-08-01 PROBLEM — R79.89 ELEVATED TROPONIN: Status: ACTIVE | Noted: 2023-08-01

## 2023-08-01 LAB
ALBUMIN SERPL-MCNC: 3.7 G/DL (ref 3.5–5.2)
ALBUMIN/GLOB SERPL: 1.8 G/DL
ALP SERPL-CCNC: 88 U/L (ref 39–117)
ALT SERPL W P-5'-P-CCNC: 16 U/L (ref 1–33)
ANION GAP SERPL CALCULATED.3IONS-SCNC: 11 MMOL/L (ref 5–15)
AORTIC DIMENSIONLESS INDEX: 0.7 (DI)
AST SERPL-CCNC: 43 U/L (ref 1–32)
BASOPHILS # BLD AUTO: 0.04 10*3/MM3 (ref 0–0.2)
BASOPHILS NFR BLD AUTO: 0.5 % (ref 0–1.5)
BH CV ECHO MEAS - AI P1/2T: 313.5 MSEC
BH CV ECHO MEAS - AO MAX PG: 10.9 MMHG
BH CV ECHO MEAS - AO MEAN PG: 6 MMHG
BH CV ECHO MEAS - AO V2 MAX: 165 CM/SEC
BH CV ECHO MEAS - AO V2 VTI: 34 CM
BH CV ECHO MEAS - AVA(I,D): 1.57 CM2
BH CV ECHO MEAS - EDV(CUBED): 175.6 ML
BH CV ECHO MEAS - EDV(MOD-SP2): 89 ML
BH CV ECHO MEAS - EDV(MOD-SP4): 96 ML
BH CV ECHO MEAS - EF(MOD-BP): 37 %
BH CV ECHO MEAS - EF(MOD-SP2): 25.8 %
BH CV ECHO MEAS - EF(MOD-SP4): 37.5 %
BH CV ECHO MEAS - ESV(CUBED): 73.3 ML
BH CV ECHO MEAS - ESV(MOD-SP2): 66 ML
BH CV ECHO MEAS - ESV(MOD-SP4): 60 ML
BH CV ECHO MEAS - FS: 25.3 %
BH CV ECHO MEAS - IVS/LVPW: 1.17 CM
BH CV ECHO MEAS - IVSD: 1.4 CM
BH CV ECHO MEAS - LV DIASTOLIC VOL/BSA (35-75): 57.4 CM2
BH CV ECHO MEAS - LV MASS(C)D: 313.2 GRAMS
BH CV ECHO MEAS - LV MAX PG: 5.6 MMHG
BH CV ECHO MEAS - LV MEAN PG: 3 MMHG
BH CV ECHO MEAS - LV SYSTOLIC VOL/BSA (12-30): 35.9 CM2
BH CV ECHO MEAS - LV V1 MAX: 118 CM/SEC
BH CV ECHO MEAS - LV V1 VTI: 24.8 CM
BH CV ECHO MEAS - LVIDD: 5.6 CM
BH CV ECHO MEAS - LVIDS: 4.2 CM
BH CV ECHO MEAS - LVOT AREA: 2.16 CM2
BH CV ECHO MEAS - LVOT DIAM: 1.66 CM
BH CV ECHO MEAS - LVPWD: 1.2 CM
BH CV ECHO MEAS - MV DEC SLOPE: 404.8 CM/SEC2
BH CV ECHO MEAS - MV MAX PG: 9.7 MMHG
BH CV ECHO MEAS - MV MEAN PG: 3.3 MMHG
BH CV ECHO MEAS - MV P1/2T: 87.4 MSEC
BH CV ECHO MEAS - MV V2 VTI: 44.4 CM
BH CV ECHO MEAS - MVA(P1/2T): 2.5 CM2
BH CV ECHO MEAS - MVA(VTI): 1.21 CM2
BH CV ECHO MEAS - RAP SYSTOLE: 3 MMHG
BH CV ECHO MEAS - RVSP: 29 MMHG
BH CV ECHO MEAS - SI(MOD-SP2): 13.8 ML/M2
BH CV ECHO MEAS - SI(MOD-SP4): 21.5 ML/M2
BH CV ECHO MEAS - SV(LVOT): 53.5 ML
BH CV ECHO MEAS - SV(MOD-SP2): 23 ML
BH CV ECHO MEAS - SV(MOD-SP4): 36 ML
BH CV ECHO MEAS - TR MAX PG: 26.5 MMHG
BH CV ECHO MEAS - TR MAX VEL: 257.3 CM/SEC
BILIRUB SERPL-MCNC: 0.2 MG/DL (ref 0–1.2)
BUN SERPL-MCNC: 13 MG/DL (ref 8–23)
BUN/CREAT SERPL: 25.5 (ref 7–25)
CALCIUM SPEC-SCNC: 8.7 MG/DL (ref 8.2–9.6)
CHLORIDE SERPL-SCNC: 104 MMOL/L (ref 98–107)
CO2 SERPL-SCNC: 25 MMOL/L (ref 22–29)
CREAT SERPL-MCNC: 0.51 MG/DL (ref 0.57–1)
DEPRECATED RDW RBC AUTO: 41.5 FL (ref 37–54)
EGFRCR SERPLBLD CKD-EPI 2021: 87.2 ML/MIN/1.73
EOSINOPHIL # BLD AUTO: 0.24 10*3/MM3 (ref 0–0.4)
EOSINOPHIL NFR BLD AUTO: 2.9 % (ref 0.3–6.2)
ERYTHROCYTE [DISTWIDTH] IN BLOOD BY AUTOMATED COUNT: 13.5 % (ref 12.3–15.4)
GLOBULIN UR ELPH-MCNC: 2.1 GM/DL
GLUCOSE BLDC GLUCOMTR-MCNC: 108 MG/DL (ref 70–130)
GLUCOSE BLDC GLUCOMTR-MCNC: 123 MG/DL (ref 70–130)
GLUCOSE BLDC GLUCOMTR-MCNC: 145 MG/DL (ref 70–130)
GLUCOSE BLDC GLUCOMTR-MCNC: 196 MG/DL (ref 70–130)
GLUCOSE SERPL-MCNC: 100 MG/DL (ref 65–99)
HBA1C MFR BLD: 5.9 % (ref 4.8–5.6)
HCT VFR BLD AUTO: 40.1 % (ref 34–46.6)
HGB BLD-MCNC: 13.2 G/DL (ref 12–15.9)
IMM GRANULOCYTES # BLD AUTO: 0.04 10*3/MM3 (ref 0–0.05)
IMM GRANULOCYTES NFR BLD AUTO: 0.5 % (ref 0–0.5)
LYMPHOCYTES # BLD AUTO: 2.66 10*3/MM3 (ref 0.7–3.1)
LYMPHOCYTES NFR BLD AUTO: 32.7 % (ref 19.6–45.3)
MCH RBC QN AUTO: 27.6 PG (ref 26.6–33)
MCHC RBC AUTO-ENTMCNC: 32.9 G/DL (ref 31.5–35.7)
MCV RBC AUTO: 83.7 FL (ref 79–97)
MONOCYTES # BLD AUTO: 0.62 10*3/MM3 (ref 0.1–0.9)
MONOCYTES NFR BLD AUTO: 7.6 % (ref 5–12)
NEUTROPHILS NFR BLD AUTO: 4.54 10*3/MM3 (ref 1.7–7)
NEUTROPHILS NFR BLD AUTO: 55.8 % (ref 42.7–76)
NRBC BLD AUTO-RTO: 0 /100 WBC (ref 0–0.2)
PLATELET # BLD AUTO: 175 10*3/MM3 (ref 140–450)
PMV BLD AUTO: 11.3 FL (ref 6–12)
POTASSIUM SERPL-SCNC: 3.8 MMOL/L (ref 3.5–5.2)
PROT SERPL-MCNC: 5.8 G/DL (ref 6–8.5)
QT INTERVAL: 459 MS
RBC # BLD AUTO: 4.79 10*6/MM3 (ref 3.77–5.28)
SODIUM SERPL-SCNC: 140 MMOL/L (ref 136–145)
TROPONIN T SERPL HS-MCNC: 266 NG/L
WBC NRBC COR # BLD: 8.14 10*3/MM3 (ref 3.4–10.8)

## 2023-08-01 PROCEDURE — 93325 DOPPLER ECHO COLOR FLOW MAPG: CPT

## 2023-08-01 PROCEDURE — 99222 1ST HOSP IP/OBS MODERATE 55: CPT | Performed by: INTERNAL MEDICINE

## 2023-08-01 PROCEDURE — 93321 DOPPLER ECHO F-UP/LMTD STD: CPT | Performed by: INTERNAL MEDICINE

## 2023-08-01 PROCEDURE — 84484 ASSAY OF TROPONIN QUANT: CPT | Performed by: INTERNAL MEDICINE

## 2023-08-01 PROCEDURE — 93321 DOPPLER ECHO F-UP/LMTD STD: CPT

## 2023-08-01 PROCEDURE — 82948 REAGENT STRIP/BLOOD GLUCOSE: CPT

## 2023-08-01 PROCEDURE — 97162 PT EVAL MOD COMPLEX 30 MIN: CPT

## 2023-08-01 PROCEDURE — 93308 TTE F-UP OR LMTD: CPT | Performed by: INTERNAL MEDICINE

## 2023-08-01 PROCEDURE — 25010000002 ENOXAPARIN PER 10 MG: Performed by: INTERNAL MEDICINE

## 2023-08-01 PROCEDURE — 93325 DOPPLER ECHO COLOR FLOW MAPG: CPT | Performed by: INTERNAL MEDICINE

## 2023-08-01 PROCEDURE — 97110 THERAPEUTIC EXERCISES: CPT

## 2023-08-01 PROCEDURE — 80053 COMPREHEN METABOLIC PANEL: CPT | Performed by: INTERNAL MEDICINE

## 2023-08-01 PROCEDURE — 93005 ELECTROCARDIOGRAM TRACING: CPT | Performed by: STUDENT IN AN ORGANIZED HEALTH CARE EDUCATION/TRAINING PROGRAM

## 2023-08-01 PROCEDURE — 97165 OT EVAL LOW COMPLEX 30 MIN: CPT

## 2023-08-01 PROCEDURE — 83036 HEMOGLOBIN GLYCOSYLATED A1C: CPT | Performed by: INTERNAL MEDICINE

## 2023-08-01 PROCEDURE — 25510000001 PERFLUTREN (DEFINITY) 8.476 MG IN SODIUM CHLORIDE (PF) 0.9 % 10 ML INJECTION: Performed by: INTERNAL MEDICINE

## 2023-08-01 PROCEDURE — 85025 COMPLETE CBC W/AUTO DIFF WBC: CPT | Performed by: INTERNAL MEDICINE

## 2023-08-01 PROCEDURE — 93010 ELECTROCARDIOGRAM REPORT: CPT | Performed by: INTERNAL MEDICINE

## 2023-08-01 PROCEDURE — 63710000001 INSULIN LISPRO (HUMAN) PER 5 UNITS: Performed by: INTERNAL MEDICINE

## 2023-08-01 PROCEDURE — 93308 TTE F-UP OR LMTD: CPT

## 2023-08-01 RX ORDER — PANTOPRAZOLE SODIUM 40 MG/1
40 TABLET, DELAYED RELEASE ORAL
Status: DISCONTINUED | OUTPATIENT
Start: 2023-08-01 | End: 2023-08-05 | Stop reason: HOSPADM

## 2023-08-01 RX ORDER — ISOSORBIDE MONONITRATE 30 MG/1
30 TABLET, EXTENDED RELEASE ORAL
Status: DISCONTINUED | OUTPATIENT
Start: 2023-08-01 | End: 2023-08-05 | Stop reason: HOSPADM

## 2023-08-01 RX ORDER — CLOPIDOGREL BISULFATE 75 MG/1
75 TABLET ORAL DAILY
Status: DISCONTINUED | OUTPATIENT
Start: 2023-08-01 | End: 2023-08-05 | Stop reason: HOSPADM

## 2023-08-01 RX ADMIN — METRONIDAZOLE 1 APPLICATION: 7.5 CREAM TOPICAL at 09:42

## 2023-08-01 RX ADMIN — Medication 10 ML: at 09:41

## 2023-08-01 RX ADMIN — METOPROLOL TARTRATE 12.5 MG: 25 TABLET, FILM COATED ORAL at 09:40

## 2023-08-01 RX ADMIN — CLOPIDOGREL BISULFATE 75 MG: 75 TABLET, FILM COATED ORAL at 14:46

## 2023-08-01 RX ADMIN — PANTOPRAZOLE SODIUM 40 MG: 40 TABLET, DELAYED RELEASE ORAL at 12:43

## 2023-08-01 RX ADMIN — SENNOSIDES AND DOCUSATE SODIUM 1 TABLET: 50; 8.6 TABLET ORAL at 09:41

## 2023-08-01 RX ADMIN — ENOXAPARIN SODIUM 70 MG: 100 INJECTION SUBCUTANEOUS at 20:25

## 2023-08-01 RX ADMIN — PERFLUTREN 2 ML: 6.52 INJECTION, SUSPENSION INTRAVENOUS at 11:59

## 2023-08-01 RX ADMIN — Medication 10 ML: at 20:26

## 2023-08-01 RX ADMIN — ISOSORBIDE MONONITRATE 30 MG: 30 TABLET, EXTENDED RELEASE ORAL at 12:43

## 2023-08-01 RX ADMIN — METOPROLOL TARTRATE 12.5 MG: 25 TABLET, FILM COATED ORAL at 20:25

## 2023-08-01 RX ADMIN — ASPIRIN 324 MG: 81 TABLET, CHEWABLE ORAL at 09:40

## 2023-08-01 RX ADMIN — Medication 2000 UNITS: at 09:39

## 2023-08-01 RX ADMIN — METRONIDAZOLE 1 APPLICATION: 7.5 CREAM TOPICAL at 20:26

## 2023-08-01 RX ADMIN — POTASSIUM CHLORIDE 10 MEQ: 750 TABLET, EXTENDED RELEASE ORAL at 20:25

## 2023-08-01 RX ADMIN — INSULIN LISPRO 2 UNITS: 100 INJECTION, SOLUTION INTRAVENOUS; SUBCUTANEOUS at 18:20

## 2023-08-01 RX ADMIN — POTASSIUM CHLORIDE 10 MEQ: 750 TABLET, EXTENDED RELEASE ORAL at 09:40

## 2023-08-01 NOTE — PROGRESS NOTES
Name: Yuliana Gonzalez ADMIT: 2023   : 1930  PCP: Trent Davis MD    MRN: [id number] LOS: 1 days   AGE/SEX: 89 y.o. female  ROOM: [facility]     Subjective   Subjective   Patient seen and examined this morning.  Hospital day 1.  At time of my examination, patient is awake, alert, resting in bed.  She stated that she did have an episode of midsternal chest discomfort earlier, however, none at present.  Cardiology is consulted and following.       Objective   Objective   Vital Signs  Temp:  [97.3 øF (36.3 øC)-98.3 øF (36.8 øC)] 98.1 øF (36.7 øC)  Heart Rate:  [63-79] 67  Resp:  [15-18] 18  BP: (116-149)/(43-70) 149/51  SpO2:  [91 %-98 %] 96 %  on   ;   Device (Oxygen Therapy): room air  Body mass index is 28.34 kg/mý.  Physical Exam  Vitals and nursing note reviewed.   Constitutional:       General: She is not in acute distress.  Cardiovascular:      Rate and Rhythm: Normal rate and regular rhythm.      Pulses: Normal pulses.      Heart sounds: Normal heart sounds.   Pulmonary:      Effort: Pulmonary effort is normal. No respiratory distress.      Breath sounds: Normal breath sounds. No wheezing.   Abdominal:      General: Bowel sounds are normal. There is no distension.      Palpations: Abdomen is soft.      Tenderness: There is no abdominal tenderness.   Musculoskeletal:      Right lower leg: Edema (Trace) present.      Left lower leg: Edema (Trace) present.   Skin:     General: Skin is warm and dry.      Coloration: Skin is pale.   Neurological:      Mental Status: She is alert.     Results Review     I reviewed the patient's new clinical results.  Results from last 7 days   Lab Units 23  0543 23  0913   WBC 10*3/mm3 8.14 10.01   HEMOGLOBIN g/dL 13.2 14.1   PLATELETS 10*3/mm3 175 210     Results from last 7 days   Lab Units 23  0543 23  0913   SODIUM mmol/L 140 138   POTASSIUM mmol/L 3.8 3.9   CHLORIDE mmol/L 104 101   CO2 mmol/L 25.0 25.2   BUN mg/dL 13 15   CREATININE mg/dL  0.51* 0.68   GLUCOSE mg/dL 100* 181*   EGFR mL/min/1.73 87.2 81.3     Results from last 7 days   Lab Units 08/01/23  0543 07/31/23  0913   ALBUMIN g/dL 3.7 4.4   BILIRUBIN mg/dL 0.2 0.2   ALK PHOS U/L 88 117   AST (SGOT) U/L 43* 29   ALT (SGPT) U/L 16 13     Results from last 7 days   Lab Units 08/01/23  0543 07/31/23  0913   CALCIUM mg/dL 8.7 9.3   ALBUMIN g/dL 3.7 4.4       Hemoglobin A1C   Date/Time Value Ref Range Status   08/01/2023 0543 5.90 (H) 4.80 - 5.60 % Final     Glucose   Date/Time Value Ref Range Status   08/01/2023 0610 108 70 - 130 mg/dL Final     Comment:     Meter: [id number] : [id number] Samaniego Ursula NA   07/31/2023 1925 100 70 - 130 mg/dL Final     Comment:     Meter: [id number] : [id number] Samaniego Ursula NA       CT Abdomen Pelvis With Contrast    Result Date: 7/31/2023  1. There is no convincing evidence for pulmonary thromboemboli or acute abnormality within the chest. 2. Bilateral nephrolithiasis. Multiple stones within a mildly dilated left renal pelvis are stable. There is no hydronephrosis to suggest intermittent obstruction at the UPJ. There is a single nonobstructing stone within the right kidney. No ureteral stones or evidence for ureteral obstruction. 3. Extensive sigmoid diverticulosis without evidence for diverticulitis. 4. Thickened appearance of the endometrium. Follow-up with gynecology is suggested.   This report was finalized on 7/31/2023 4:40 PM by Dr. Azucena Royal M.D.      CT Angiogram Chest Pulmonary Embolism    Result Date: 7/31/2023  1. There is no convincing evidence for pulmonary thromboemboli or acute abnormality within the chest. 2. Bilateral nephrolithiasis. Multiple stones within a mildly dilated left renal pelvis are stable. There is no hydronephrosis to suggest intermittent obstruction at the UPJ. There is a single nonobstructing stone within the right kidney. No ureteral stones or evidence for ureteral obstruction. 3. Extensive sigmoid diverticulosis without  evidence for diverticulitis. 4. Thickened appearance of the endometrium. Follow-up with gynecology is suggested.   This report was finalized on 7/31/2023 4:40 PM by Dr. Azucena Royal M.D.       I have personally reviewed all medications:  Scheduled Medications  aspirin, 324 mg, Oral, Daily  cholecalciferol, 2,000 Units, Oral, Daily  enoxaparin, 1 mg/kg, Subcutaneous, Q12H  insulin lispro, 2-7 Units, Subcutaneous, 4x Daily AC & at Bedtime  metoprolol tartrate, 12.5 mg, Oral, Q12H  metroNIDAZOLE, 1 application , Topical, BID  potassium chloride, 10 mEq, Oral, BID  sennosides-docusate, 1 tablet, Oral, Daily  sodium chloride, 10 mL, Intravenous, Q12H  torsemide, 20 mg, Oral, Daily    Infusions   Diet  NPO Diet NPO Type: Strict NPO    I have personally reviewed:  [x]  Laboratory   [x]  Microbiology   [x]  Radiology   [x]  EKG/Telemetry  [x]  Cardiology/Vascular   []  Pathology    []  Records       Assessment/Plan     Active Hospital Problems    Diagnosis  POA    **NSTEMI (non-ST elevated myocardial infarction) [I21.4]  Yes    Elevated troponin [R77.8]  Yes    Chest pain [R07.9]  Yes    Prediabetes [R73.03]  Yes    Hyperglycemia [R73.9]  Yes    Presence of cardiac pacemaker [Z95.0]  Yes    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       89 y.o. female admitted with NSTEMI (non-ST elevated myocardial infarction).    NSTEMI  Chest pain, atypical  - Patient with complaints of chest pain on admission, coupled with elevated troponin. EKG without evidence of acute ischemic changes. CT angiogram negative for PE or acute cardiopulmonary abnormalities. ProBNP elevated at 3575.  - Cardiology consulted and following. Will continue to follow their plans/recommendations. Greatly appreciate  their help. Follow up echocardiogram results.  - On Plavix and Full dose Lovenox at present. Defer to Cardiology.  - Monitor on telemetry.    Epigastric abdominal pain  Nephrolithiasis  Elevated AST  - Pain noted on arrival as well. CT AP obtained showing B/L nephrolithiasis, multiple stones in left renal pelvis that are stable, no hydronephrosis, right kidney with single non-obstructing stone, no ureteral stones or obstruction. Also noting diverticulosis in sigmoid colon and thickened appearance of endometrium. No other noted acute intra-abdominal pathology, including no mention of pancreatitis, given that lipase was elevated, however, this can also be seen in cardiac pathology, so not specific. AST mildly elevated, patient without abdominal complaints at this time.  - Continue PPI as prescribed. Discuss with Cardiology regarding if they are okay with IVF, based upon echo results, in setting of nephrolithiasis.    Hypertension  - BP acceptable acutely. Appears stable. Slightly higher, so Cardiology is adjusting medications. No indications to warrant acute changes/intervention at this time.  - Continue current medications as prescribed. Trend BP to guide ongoing management decisions.    Prediabetes with Hyperglycemia  - Glucose elevated on most recent labs. Patient without known history of T2DM.  Most recent hemoglobin A1c 5.90% obtained following admission.  - Monitor for now, continue with POC glucose checks and correctional SSI as needed.    Pharmacy to dose Lovenox for DVT prophylaxis.  Full code.  Discussed with patient, nursing staff, CCP, and care team on multidisciplinary rounds.  Anticipate discharge home tomorrow..      Kavon Light DO  [facility]  08/01/23  10:31 EDT

## 2023-08-01 NOTE — PLAN OF CARE
Goal Outcome Evaluation:  Plan of Care Reviewed With: patient        Progress: improving  Outcome Evaluation: Pt agreeable to PT eval this afternoon. Pt is a 89 yo female admitted with chest pain and abd pain. Complex cardiac pmhx including severe degenerative aortic valve stenosis s/p TAVR (March 2022), CAD, permanent pacemaker, chronic heart failure with preserved ejection fraction. Pt is A&Ox4, reports she lives alone at [facility], cane use at times, (I) with ADLs. Pt presenting at her baseline this date, moving well and no balance or safety concerns noted with functional mobility. She was independent w bed mobility and able to stand and ambulate over 150 ft w supervision using her cane. No unsteadiness noted. Pt plans to return to [facility], no further acute PT needs at this time. Will sign off.      Anticipated Discharge Disposition (PT): home

## 2023-08-01 NOTE — PLAN OF CARE
Goal Outcome Evaluation:            Pt is alert and oriented.Pt rested for most of night. Pt c/o chest pain and an EKG was taken. Pt VSS. NAD. Nursing will continue to monitor.

## 2023-08-01 NOTE — THERAPY EVALUATION
"Patient Name: Yuliana Gonzalez  : 1930    MRN: [id number]                              Today's Date: 2023       Admit Date: 2023    Visit Dx:     ICD-10-CM ICD-9-CM   1. NSTEMI (non-ST elevated myocardial infarction)  I21.4 410.70   2. Chest pain, unspecified type  R07.9 786.50   3. Elevated troponin  R77.8 790.6     Patient Active Problem List   Diagnosis    Aortic stenosis    IGT (impaired glucose tolerance)    Hyperlipidemia    Hypertension    Cervical arthritis    Sciatica    Vitamin D deficiency    Exudative age-related macular degeneration, bilateral, with inactive choroidal neovascularization    Coronary artery disease involving native coronary artery of native heart    AV block, complete    S/P TAVR (transcatheter aortic valve replacement)    Presence of cardiac pacemaker    ST elevation myocardial infarction (STEMI)    ST elevation myocardial infarction (STEMI), unspecified artery    Elevated LFTs    UTI (urinary tract infection)    NSTEMI (non-ST elevated myocardial infarction)    Hyperglycemia    Elevated troponin    Chest pain    Prediabetes     Past Medical History:   Diagnosis Date    Aortic stenosis 2009    diagnosed,  moderate with 0.9cm area, 42mm max gradient. 0.91/34 6/15.    Central loss of vision     RIGHT EYE    Cervical arthritis     Colitis     HX    Coronary artery disease     History of constipation     History of esophageal reflux     History of skin cancer     Hyperlipidemia     Hypertension     IGT (impaired glucose tolerance)     Irregular heart beat     HX    Macular degeneration     Medication management     On continuous oral anticoagulation     Redness of skin     NOSE/DERMATOLOGY TREATING WITH FLAGYL CREAM/ADVISED PT TO NOTIFY CARDIOLOGY    Sciatica     HX    Short-term memory loss     \"MILD\"    SOB (shortness of breath) on exertion     Vitamin D deficiency      Past Surgical History:   Procedure Laterality Date    AORTIC VALVE REPAIR/REPLACEMENT Left " 3/15/2022    Procedure: TTE TRANSFEMORAL TRANSCATHETER AORTIC VALVE REPLACEMENT PERCUTANEOUS APPROACH;  Surgeon: Vesna Chris MD;  Location: [facility] CVOR;  Service: Cardiothoracic;  Laterality: Left;    AORTIC VALVE REPAIR/REPLACEMENT Left 3/15/2022    Procedure: Transfemoral Transcatheter Aortic Valve Replacement with intra operative TTE and possible open surgical rescue;  Surgeon: Clint Locke MD;  Location: [facility] CVOR;  Service: Cardiovascular;  Laterality: Left;    CARDIAC CATHETERIZATION N/A 1/21/2022    Procedure: Coronary angiography;  Surgeon: Clint Locke MD;  Location:  [facility] CATH INVASIVE LOCATION;  Service: Cardiovascular;  Laterality: N/A;    CARDIAC CATHETERIZATION N/A 1/21/2022    Procedure: Percutaneous Coronary Intervention;  Surgeon: Clint Locke MD;  [facility]: [facility] C[facility] LOC[facility];  Service: Cardiovascular;  Laterality: N/A;    CARDIAC CATHETERIZATION N/A 1/21/2022    Procedure: Stent ISMAEL coronary;  Surgeon: Clint Locke MD;  [facility];  Service: Cardiovascular;  Laterality: N/A;    CARDIAC CATHETERIZATION Left 7/31/2022    Procedure: CORONARY ANGIOGRAPHY;  Surgeon: Jeannie Echevarria MD;  [facility];  Service: Cardiology;  Laterality: Left;    CARDIAC CATHETERIZATION N/A 7/31/2022    Procedure: Left ventriculography;  Surgeon: Jeannie Echevarria MD;  [facility]: [facility];  Service: Cardiology;  Laterality: N/A;    CARDIAC CATHETERIZATION N/A 7/31/2022    Procedure: Left Heart Cath;  Surgeon: Jeannie Echevarria MD;  [facility]: [facility];  Service: Cardiology;  Laterality: N/A;    CARDIAC ELECTROPHYSIOLOGY PROCEDURE N/A 3/16/2022    Procedure: Pacemaker DC new [place];  Surgeon: Yann Ceballos MD;  [facility];  Service: Cardiology;  Laterality: N/A;    CATARACT EXTRACTION, BILATERAL      COLONOSCOPY      EYE SURGERY      SKIN  CANCER EXCISION        General Information       Row Name 08/01/23 1453          OT Time and Intention    Document Type evaluation;discharge evaluation/summary  -     Mode of Treatment occupational therapy  -       Row Name 08/01/23 1453          General Information    Patient Profile Reviewed yes  -MW     Prior Level of Function independent:;ADL's;all household mobility;community mobility;transfer  cane  -     Existing Precautions/Restrictions no known precautions/restrictions  -     Barriers to Rehab none identified  -       Row Name 08/01/23 1453          Living Environment    People in Home alone  ILF  -       Row Name 08/01/23 1453          Home Main Entrance    Number of Stairs, Main Entrance none  -MW       Row Name 08/01/23 1453          Cognition    Orientation Status (Cognition) oriented x 4  -MW               User Key  (r) = Recorded By, (t) = Taken By, (c) = Cosigned By      Initials Name Provider Type    Sol Erickson, JARON Occupational Therapist                     Mobility/ADL's       Row Name 08/01/23 1454          Bed Mobility    Bed Mobility supine-sit;sit-supine  -     Supine-Sit [place] (Bed Mobility) independent  -     Sit-Supine [place] (Bed Mobility) independent  -       Row Name 08/01/23 1454          Transfers    Transfers sit-stand transfer;stand-sit transfer;toilet transfer  -       Row Name 08/01/23 1454          Sit-Stand Transfer    Sit-Stand [place] (Transfers) supervision  -     Assistive Device (Sit-Stand Transfers) cane, straight  -       Row Name 08/01/23 1454          Stand-Sit Transfer    Stand-Sit [place] (Transfers) supervision  -     Assistive Device (Stand-Sit Transfers) cane, straight  -[facility] Name 08/01/23 1454          Toilet Transfer    Comment, (Toilet Transfer) reports no concerns up to BR, no assist required from nursing staff per pt report  -       Row Name 08/01/23 1454          Functional Mobility     Functional Mobility- Ind. Level supervision required  -     Functional Mobility- Device cane, straight  -MW     Functional Mobility- Comment household/community distances with no safety or balance deficits noted  -       Row Name 08/01/23 1454          Activities of Daily Living    BADL Assessment/Intervention lower body dressing;grooming  -[facility] Name 08/01/23 1454          Lower Body Dressing Assessment/Training    [place] Level (Lower Body Dressing) lower body dressing skills;independent  -       Row Name 08/01/23 1454          Grooming Assessment/Training    Comment, (Grooming) pt completed sinkside g/h earlier this date with no concern per report  -               User Key  (r) = Recorded By, (t) = Taken By, (c) = Cosigned By      Initials Name Provider Type    MW Sol Hicks OT Occupational Therapist                   Obj/Interventions       [facility] Name 08/01/23 1455          Sensory Assessment (Somatosensory)    Sensory Assessment (Somatosensory) UE sensation intact  -[facility] Name 08/01/23 1455          Vision Assessment/Intervention    Visual Impairment/Limitations WFL  -[facility] Name 08/01/23 1455          Range of Motion Comprehensive    General Range of Motion no range of motion deficits identified  -[facility] Name 08/01/23 1455          Strength Comprehensive (MMT)    General Manual Muscle Testing (MMT) Assessment no strength deficits identified  -       Row Name 08/01/23 1455          Motor Skills    Motor Skills coordination  -     Coordination WFL  -[facility] Name 08/01/23 1455          Balance    Balance Assessment sitting static balance;sitting dynamic balance;sit to stand dynamic balance;standing dynamic balance;standing static balance  -     Static Sitting Balance independent  -     Dynamic Sitting Balance independent  -     Position, Sitting Balance sitting edge of bed  -     Sit to Stand Dynamic Balance supervision  -     Static Standing Balance  supervision  -MW     Dynamic Standing Balance supervision  -MW     Position/Device Used, Standing Balance supported;cane, straight  -MW     Balance Interventions occupation based/functional task  -MW               User Key  (r) = Recorded By, (t) = Taken By, (c) = Cosigned By      Initials Name Provider Type    Sol Erickson OT Occupational Therapist                   Goals/Plan       Row Name 08/01/23 1458          Transfer Goal 1 (OT)    Activity/Assistive Device (Transfer Goal 1, OT) sit-to-stand/stand-to-sit  -MW     [place] Level/Cues Needed (Transfer Goal 1, OT) supervision required  -MW     Time Frame (Transfer Goal 1, OT) 1 day;short term goal (STG)  -MW     Progress/Outcome (Transfer Goal 1, OT) goal met  -MW               User Key  (r) = Recorded By, (t) = Taken By, (c) = Cosigned By      Initials Name Provider Type    Sol Erickson OT Occupational Therapist                   Clinical Impression       Row Name 08/01/23 0927          Pain Assessment    Pretreatment Pain Rating 0/10 - no pain  -MW     Posttreatment Pain Rating 0/10 - no pain  -MW       Row Name 08/01/23 1457          Plan of Care Review    Plan of Care Reviewed With patient  -MW     Progress improving  -MW     Outcome Evaluation pt is a 89 yo female admitted with chest pain and abd pain. Complex cardiac pmhx including severe degenerative aortic valve stenosis s/p TAVR (March 2022), CAD, permanent pacemaker, chronic heart failure with preserved ejection fraction. Pt seen this date for OT lew, A&Ox4, reports she lives alone at [facility], cane use at times, (I) with ADLs. Pt presenting at her baseline this date, moving well and no balance or safety concerns noted with ADLs/func mob. Pt reports no concerns with ADL task completion, SPV for all upright activity with straight cane. She plans to return to [facility], no further acute OT needs. will sign off.  -       Row Name 08/01/23 9996          Therapy Assessment/Plan (OT)     Criteria for Skilled Therapeutic Interventions Met (OT) no problems identified which require skilled intervention  -MW       Row Name 08/01/23 1455          Therapy Plan Review/Discharge Plan (OT)    Anticipated Discharge Disposition (OT) --  ILF  -MW       Row Name 08/01/23 1455          Vital Signs    O2 Delivery Pre Treatment room air  -MW     Pre Patient Position Supine  -MW     Intra Patient Position Standing  -MW     Post Patient Position Supine  -MW       Row Name 08/01/23 1455          Positioning and Restraints    Pre-Treatment Position in bed  -MW     Post Treatment Position bed  -MW     In Bed notified nsg;fowlers;call light within reach;encouraged to call for assist  no alarm on when entered  -MW               User Key  (r) = Recorded By, (t) = Taken By, (c) = Cosigned By      Initials Name Provider Type    Sol Erickson OT Occupational Therapist                   Outcome Measures       Row Name 08/01/23 1459          How much help from another is currently needed...    Putting on and taking off regular lower body clothing? 4  -MW     Bathing (including washing, rinsing, and drying) 4  -MW     Toileting (which includes using toilet bed pan or urinal) 4  -MW     Putting on and taking off regular upper body clothing 4  -MW     Taking care of personal grooming (such as brushing teeth) 4  -MW     Eating meals 4  -MW     AM-PAC 6 Clicks Score (OT) 24  -MW       Row Name 08/01/23 1459          Functional Assessment    Outcome Measure Options AM-PAC 6 Clicks Daily Activity (OT)  -MW               User Key  (r) = Recorded By, (t) = Taken By, (c) = Cosigned By      Initials Name Provider Type    Sol Erickson OT Occupational Therapist                    Occupational Therapy Education       Title: PT OT SLP Therapies (In Progress)       Topic: Occupational Therapy (In Progress)       Point: ADL training (Done)       Description:   Instruct learner(s) on proper safety adaptation and remediation  techniques during self care or transfers.   Instruct in proper use of assistive devices.                  Learning Progress Summary             Patient Acceptance, E, VU by  at 8/1/2023 2492    Comment: role of OT                         Point: Home exercise program (Not Started)       Description:   Instruct learner(s) on appropriate technique for monitoring, assisting and/or progressing therapeutic exercises/activities.                  Learner Progress:  Not documented in this visit.              Point: Precautions (Done)       Description:   Instruct learner(s) on prescribed precautions during self-care and functional transfers.                  Learning Progress Summary             Patient Acceptance, E, VU by  at 8/1/2023 5695    Comment: role of OT                         Point: Body mechanics (Done)       Description:   Instruct learner(s) on proper positioning and spine alignment during self-care, functional mobility activities and/or exercises.                  Learning Progress Summary             Patient Acceptance, E, VU by  at 8/1/2023 4703    Comment: role of OT                                         User Key       Initials Effective Dates Name Provider Type Discipline     08/20/21 -  Sol Hicks OT Occupational Therapist OT                  OT Recommendation and Plan     Plan of Care Review  Plan of Care Reviewed With: patient  Progress: improving  Outcome Evaluation: pt is a 89 yo female admitted with chest pain and abd pain. Complex cardiac pmhx including severe degenerative aortic valve stenosis s/p TAVR (March 2022), CAD, permanent pacemaker, chronic heart failure with preserved ejection fraction. Pt seen this date for OT XIANG hackett&Chavez, reports she lives alone at [facility], cane use at times, (I) with ADLs. Pt presenting at her baseline this date, moving well and no balance or safety concerns noted with ADLs/func mob. Pt reports no concerns with ADL task completion, SPV for all upright  activity with straight cane. She plans to return to \[facility]", no further acute OT needs. will sign off.     Time Calculation:   Evaluation Complexity (OT)  Review Occupational Profile/Medical/Therapy History Complexity: brief/low complexity  Clinical Decision Making Complexity (OT): problem focused assessment/low complexity  Overall Complexity of Evaluation (OT): low complexity     Time Calculation- OT       Row Name 08/01/23 1459             Time Calculation- OT    OT Start Time 1408  -MW      OT Stop Time 1418  -MW      OT Time Calculation (min) 10 min  -MW      OT Received On 08/01/23  -MW         Untimed Charges    OT Eval/Re-eval Minutes 10  -MW         Total Minutes    Untimed Charges Total Minutes 10  -MW       Total Minutes 10  -MW                User Key  (r) = Recorded By, (t) = Taken By, (c) = Cosigned By      Initials Name Provider Type    Sol Erickson OT Occupational Therapist                  Therapy Charges for Today       Code Description Service Date Service Provider Modifiers Qty    60719091500 HC OT EVAL LOW COMPLEXITY 2 8/1/2023 Sol Hicks OT GO 1                 Sol Hicks OT  8/1/2023

## 2023-08-01 NOTE — CASE MANAGEMENT/SOCIAL WORK
Discharge Planning Assessment  [facility]     Patient Name: Yuliana Gonzalez  MRN: [id number]  Today's Date: 8/1/2023    Admit Date: 7/31/2023    Plan: Plan home.   RUBIA Keith RN   Discharge Needs Assessment       Row Name 08/01/23 1048       Living Environment    People in Home alone    Current Living Arrangements condominium    Potentially Unsafe Housing Conditions none    Primary Care Provided by self    Provides Primary Care For no one    Family Caregiver if Needed child(michael), adult    Family Caregiver Names Daughter  ( Faith Slider  [phone])    Quality of Family Relationships helpful;involved;supportive    Able to Return to Prior Arrangements yes    Living Arrangement Comments Pt lives alone in a single story villa at [facility].       Resource/Environmental Concerns    Resource/Environmental Concerns none    Transportation Concerns none       Transition Planning    Patient/Family Anticipates Transition to home    Patient/Family Anticipated Services at Transition none    Transportation Anticipated family or friend will provide       Discharge Needs Assessment    Readmission Within the Last 30 Days no previous admission in last 30 days    Equipment Currently Used at Home cane, straight;grab bar;rollator;shower chair;walker, rolling    Concerns to be Addressed no discharge needs identified;denies needs/concerns at this time    Anticipated Changes Related to Illness none    Equipment Needed After Discharge cane, straight;grab bar, tub/shower;rollator;shower chair;walker, rolling                   Discharge Plan       Row Name 08/01/23 1050       Plan    Plan Plan home.   RUBIA Keith RN    Patient/Family in Agreement with Plan yes    Plan Comments FACE SHEET VERIFIED/ IM LETTER SIGNED.  Spoke with pt and pt's daughter ( Faith) at bedside.  Pt's PCP is Dr. Trent Davis.  Pt lives alone in a villa at [facility].  Pt is independent with ADLs.  Pt has a cane, grab bars, rollator, shower  chair and rolling walker for home use.  Pt gets her prescriptions at [facility]  ([address]  Pt denies any issues affording medications.  Pt is not current with .  [facility]  Pt denies any discharge needs.  Pt's daughter  (Faith De Andar [phone]) will assist pt at home if needed and transport pt home. Plan home.  RUBIA Keith RN                  Continued Care and Services - Admitted Since 7/31/2023    Coordination has not been started for this encounter.       Expected Discharge Date and Time       Expected Discharge Date Expected Discharge Time    Aug 4, 2023            Demographic Summary       Row Name 08/01/23 1047       General Information    Admission Type inpatient    Arrived From emergency department    Required Notices Provided Important Message from Medicare    Referral Source admission list    Reason for Consult discharge planning    Preferred Language English                   Functional Status       Row Name 08/01/23 1047       Functional Status    Usual Activity Tolerance good    Current Activity Tolerance good       Functional Status, IADL    Medications independent    Meal Preparation independent    Housekeeping assistive person    Laundry independent    Shopping independent                   Psychosocial    No documentation.                  Abuse/Neglect    No documentation.                  Legal    No documentation.                  Substance Abuse    No documentation.                  Patient Forms    No documentation.                     Bhavani Keith, RN

## 2023-08-01 NOTE — THERAPY EVALUATION
"Patient Name: Yuliana Gonzalez  : 1930    MRN: [id number]                              Today's Date: 2023       Admit Date: 2023    Visit Dx:     ICD-10-CM ICD-9-CM   1. NSTEMI (non-ST elevated myocardial infarction)  I21.4 410.70   2. Chest pain, unspecified type  R07.9 786.50   3. Elevated troponin  R77.8 790.6     Patient Active Problem List   Diagnosis    Aortic stenosis    IGT (impaired glucose tolerance)    Hyperlipidemia    Hypertension    Cervical arthritis    Sciatica    Vitamin D deficiency    Exudative age-related macular degeneration, bilateral, with inactive choroidal neovascularization    Coronary artery disease involving native coronary artery of native heart    AV block, complete    S/P TAVR (transcatheter aortic valve replacement)    Presence of cardiac pacemaker    ST elevation myocardial infarction (STEMI)    ST elevation myocardial infarction (STEMI), unspecified artery    Elevated LFTs    UTI (urinary tract infection)    NSTEMI (non-ST elevated myocardial infarction)    Hyperglycemia    Elevated troponin    Chest pain    Prediabetes     Past Medical History:   Diagnosis Date    Aortic stenosis 2009    diagnosed,  moderate with 0.9cm area, 42mm max gradient. 0.91/34 6/15.    Central loss of vision     RIGHT EYE    Cervical arthritis     Colitis     HX    Coronary artery disease     History of constipation     History of esophageal reflux     History of skin cancer     Hyperlipidemia     Hypertension     IGT (impaired glucose tolerance)     Irregular heart beat     HX    Macular degeneration     Medication management     On continuous oral anticoagulation     Redness of skin     NOSE/DERMATOLOGY TREATING WITH FLAGYL CREAM/ADVISED PT TO NOTIFY CARDIOLOGY    Sciatica     HX    Short-term memory loss     \"MILD\"    SOB (shortness of breath) on exertion     Vitamin D deficiency      Past Surgical History:   Procedure Laterality Date    AORTIC VALVE REPAIR/REPLACEMENT Left " 3/15/2022    Procedure: TTE TRANSFEMORAL TRANSCATHETER AORTIC VALVE REPLACEMENT PERCUTANEOUS APPROACH;  Surgeon: Vesna Chris MD;  Location: [facility];  Service: Cardiothoracic;  Laterality: Left;    AORTIC VALVE REPAIR/REPLACEMENT Left 3/15/2022    Procedure: Transfemoral Transcatheter Aortic Valve Replacement with intra operative TTE and possible open surgical rescue;  Surgeon: Clint Locke MD;  Location: [facility];  Service: Cardiovascular;  Laterality: Left;    CARDIAC CATHETERIZATION N/A 1/21/2022    Procedure: Coronary angiography;  Surgeon: Clint Locke MD;  Location:  [facility] CATH INVASIVE LOCATION;  Service: Cardiovascular;  Laterality: N/A;    CARDIAC CATHETERIZATION N/A 1/21/2022    Procedure: Percutaneous Coronary Intervention;  Surgeon: Clint Locke MD;  Location: [facility] C[facility] LOC[facility];  Service: Cardiovascular;  Laterality: N/A;    CARDIAC CATHETERIZATION N/A 1/21/2022    Procedure: Stent ISMAEL coronary;  Surgeon: Clint Locke MD;  [facility];  Service: Cardiovascular;  Laterality: N/A;    CARDIAC CATHETERIZATION Left 7/31/2022    Procedure: CORONARY ANGIOGRAPHY;  Surgeon: Jeannie Echevarria MD;  [facility];  Service: Cardiology;  Laterality: Left;    CARDIAC CATHETERIZATION N/A 7/31/2022    Procedure: Left ventriculography;  Surgeon: Jeannie Echevarria MD;  [facility];  Service: Cardiology;  Laterality: N/A;    CARDIAC CATHETERIZATION N/A 7/31/2022    Procedure: Left Heart Cath;  Surgeon: Jeannie Echevarria MD;  [facility];  Service: Cardiology;  Laterality: N/A;    CARDIAC ELECTROPHYSIOLOGY PROCEDURE N/A 3/16/2022    Procedure: Pacemaker DC new [place];  Surgeon: Yann Ceballos MD;  [facility];  Service: Cardiology;  Laterality: N/A;    CATARACT EXTRACTION, BILATERAL      COLONOSCOPY      EYE SURGERY      SKIN  CANCER EXCISION        General Information       Row Name 08/01/23 1454          Physical Therapy Time and Intention    Document Type evaluation;discharge evaluation/summary  -EJ     Mode of Treatment physical therapy  -EJ       Row Name 08/01/23 1454          General Information    Patient Profile Reviewed yes  -EJ     Existing Precautions/Restrictions no known precautions/restrictions  -EJ     Barriers to Rehab none identified  -EJ       Row Name 08/01/23 1454          Living Environment    People in Home alone  -       Row Name 08/01/23 1454          Home Main Entrance    Number of Stairs, Main Entrance none  -EJ       Row Name 08/01/23 1454          Cognition    Orientation Status (Cognition) oriented x 4  -EJ               User Key  (r) = Recorded By, (t) = Taken By, (c) = Cosigned By      Initials Name Provider Type    EJ Funmi tSewart, PT Physical Therapist                   Mobility       Row Name 08/01/23 1458          Bed Mobility    Bed Mobility bed mobility (all) activities  -EJ     All Activities, [place] (Bed Mobility) independent  -[facility] Name 08/01/23 1458          Sit-Stand Transfer    Sit-Stand [place] (Transfers) supervision  -     Assistive Device (Sit-Stand Transfers) cane, straight  -EJ       Row Name 08/01/23 1458          Gait/Stairs (Locomotion)    [place] Level (Gait) supervision  -EJ     Assistive Device (Gait) cane, straight  -EJ     Distance in Feet (Gait) 150  -EJ     Comment, (Gait/Stairs) no unsteadiness noted  -EJ               User Key  (r) = Recorded By, (t) = Taken By, (c) = Cosigned By      Initials Name Provider Type    EJ Funmi Stewart, PT Physical Therapist                   Obj/Interventions       Row Name 08/01/23 1500          Range of Motion Comprehensive    General Range of Motion no range of motion deficits identified  -[facility] Name 08/01/23 1500          Strength Comprehensive (MMT)    General Manual Muscle Testing (MMT)  Assessment no strength deficits identified  -EJ               User Key  (r) = Recorded By, (t) = Taken By, (c) = Cosigned By      Initials Name Provider Type    Funmi Goss, PT Physical Therapist                   Goals/Plan    No documentation.                  Clinical Impression       Row Name 08/01/23 1500          Pain    Pretreatment Pain Rating 0/10 - no pain  -EJ     Posttreatment Pain Rating 0/10 - no pain  -EJ       Row Name 08/01/23 1500          Plan of Care Review    Plan of Care Reviewed With patient  -EJ     Progress improving  -EJ     Outcome Evaluation Pt agreeable to PT eval this afternoon. Pt is a 89 yo female admitted with chest pain and abd pain. Complex cardiac pmhx including severe degenerative aortic valve stenosis s/p TAVR (March 2022), CAD, permanent pacemaker, chronic heart failure with preserved ejection fraction. Pt is A&Ox4, reports she lives alone at [facility], cane use at times, (I) with ADLs. Pt presenting at her baseline this date, moving well and no balance or safety concerns noted with functional mobility. She was independent w bed mobility and able to stand and ambulate over 150 ft w supervision using her cane. No unsteadiness noted. Pt plans to return to [facility], no further acute PT needs at this time. Will sign off.  -EJ       Row Name 08/01/23 1500          Therapy Assessment/Plan (PT)    Criteria for Skilled Interventions Met (PT) no problems identified which require skilled intervention  -EJ     Therapy Frequency (PT) evaluation only  -EJ       Row Name 08/01/23 1500          Positioning and Restraints    Pre-Treatment Position in bed  -EJ     Post Treatment Position bed  -EJ     In Bed notified nsg;supine;call light within reach;encouraged to call for assist  -EJ               User Key  (r) = Recorded By, (t) = Taken By, (c) = Cosigned By      Initials Name Provider Type    Funmi Goss, PT Physical Therapist                   Outcome Measures       Row Name  08/01/23 1502          How much help from another person do you currently need...    Turning from your back to your side while in flat bed without using bedrails? 4  -EJ     Moving from lying on back to sitting on the side of a flat bed without bedrails? 4  -EJ     Moving to and from a bed to a chair (including a wheelchair)? 4  -EJ     Standing up from a chair using your arms (e.g., wheelchair, bedside chair)? 4  -EJ     Climbing 3-5 steps with a railing? 4  -EJ     To walk in hospital room? 4  -EJ     AM-PAC 6 Clicks Score (PT) 24  -EJ     Highest level of mobility 8 --> Walked 250 feet or more  -       Row Name 08/01/23 1502 08/01/23 1459       Functional Assessment    Outcome Measure Options AM-PAC 6 Clicks Basic Mobility (PT)  - AM-PAC 6 Clicks Daily Activity (OT)  -              User Key  (r) = Recorded By, (t) = Taken By, (c) = Cosigned By      Initials Name Provider Type    EJ Funmi Stewart, PT Physical Therapist    Sol Erickson, OT Occupational Therapist                                 Physical Therapy Education       Title: PT OT SLP Therapies (In Progress)       Topic: Physical Therapy (Not Started)       Point: Mobility training (Not Started)       Learner Progress:  Not documented in this visit.              Point: Home exercise program (Not Started)       Learner Progress:  Not documented in this visit.              Point: Body mechanics (Not Started)       Learner Progress:  Not documented in this visit.              Point: Precautions (Not Started)       Learner Progress:  Not documented in this visit.                                  PT Recommendation and Plan     Plan of Care Reviewed With: patient  Progress: improving  Outcome Evaluation: Pt agreeable to PT eval this afternoon. Pt is a 89 yo female admitted with chest pain and abd pain. Complex cardiac pmhx including severe degenerative aortic valve stenosis s/p TAVR (March 2022), CAD, permanent pacemaker, chronic heart failure  with preserved ejection fraction. Pt is A&Ox4, reports she lives alone at [facility], cane use at times, (I) with ADLs. Pt presenting at her baseline this date, moving well and no balance or safety concerns noted with functional mobility. She was independent w bed mobility and able to stand and ambulate over 150 ft w supervision using her cane. No unsteadiness noted. Pt plans to return to [facility], no further acute PT needs at this time. Will sign off.     Time Calculation:         PT Charges       Row Name 08/01/23 1504             Time Calculation    Start Time 1408  -EJ      Stop Time 1420  -EJ      Time Calculation (min) 12 min  -EJ      PT Received On 08/01/23  -EJ         Time Calculation- PT    Total Timed Code Minutes- PT 8 minute(s)  -EJ                User Key  (r) = Recorded By, (t) = Taken By, (c) = Cosigned By      Initials Name Provider Type    Funmi Goss, PT Physical Therapist                  Therapy Charges for Today       Code Description Service Date Service Provider Modifiers Qty    48326049624 HC PT EVAL MOD COMPLEXITY 3 8/1/2023 Funmi Stewart, PT GP 1    69637135570 HC PT THER PROC EA 15 MIN 8/1/2023 Funmi Stewart, PT GP 1            PT G-Codes  Outcome Measure Options: AM-PAC 6 Clicks Basic Mobility (PT)  AM-PAC 6 Clicks Score (PT): 24  AM-PAC 6 Clicks Score (OT): 24  PT Discharge Summary  Anticipated Discharge Disposition (PT): home    Funmi Stewart PT  8/1/2023

## 2023-08-01 NOTE — PLAN OF CARE
Goal Outcome Evaluation:  Plan of Care Reviewed With: patient        Progress: improving  Outcome Evaluation: pt is a 89 yo female admitted with chest pain and abd pain. Complex cardiac pmhx including severe degenerative aortic valve stenosis s/p TAVR (March 2022), CAD, permanent pacemaker, chronic heart failure with preserved ejection fraction. Pt seen this date for OT eval, A&Ox4, reports she lives alone at [facility], cane use at times, (I) with ADLs. Pt presenting at her baseline this date, moving well and no balance or safety concerns noted with ADLs/func mob. Pt reports no concerns with ADL task completion, SPV for all upright activity with straight cane. She plans to return to [facility], no further acute OT needs. will sign off.      Anticipated Discharge Disposition (OT):  (ILF)

## 2023-08-01 NOTE — CASE MANAGEMENT/SOCIAL WORK
Continued Stay Note  [facility]     Patient Name: Yuliana Gonzalez  MRN: [id number]  Today's Date: 8/1/2023    Admit Date: 7/31/2023    Plan: Plan home.   RUBIA Keith RN   Discharge Plan       Row Name 08/01/23 1050       Plan    Plan Plan home.   RUBIA Keith RN    Patient/Family in Agreement with Plan yes    Plan Comments FACE SHEET VERIFIED/ IM LETTER SIGNED.  Spoke with pt and pt's daughter ( Faith) at bedside.  Pt's PCP is Dr. Trent Davis.  Pt lives alone in a villa at [facility].  Pt is independent with ADLs.  Pt has a cane, grab bars, rollator, shower chair and rolling walker for home use.  Pt gets her prescriptions at [facility]  ([address] [facility] [address]).  Pt denies any issues affording medications.  Pt is not current with .  Pt has been in [facility] for skilled care.  Pt denies any discharge needs.  Pt's daughter  (Faith Slider [phone]) will assist pt at home if needed and transport pt home. Plan home.  RUBIA Keith RN                   Discharge Codes    No documentation.                 Expected Discharge Date and Time       Expected Discharge Date Expected Discharge Time    Aug 4, 2023               Bhavani Keith RN

## 2023-08-01 NOTE — CONSULTS
Date of Hospital Visit: 2023  Date of consult: 2023  Encounter Provider: Papo Walden MD  Place of Service: [facility] CARDIOLOGY  Patient Name: Yuliana Gonzalez  :1930  Referral Provider: Trent Davis MD    Chief complaint: Chest Pain     Reason for consult: NSTEMI    History of Present Illness: Yuliana Gonzalez is a 89 year old who follows with Dr. Locke and has a past medical history significant for severe degenerative aortic valve stenosis s/p TAVR (2022), CAD s/p PCI to the LAD (2022), permanent pacemaker secondary to high-grade AV block s/p TAVR, chronic heart failure with preserved ejection fraction, HLD, HTN, and macular degeneration.     She presented to [facility] ED with complaints of chest pain/epigastric pain. The symptoms started the day before and are intermittent. She took tylenol and symptoms did not improve. Additionally, she has had dizziness and abdominal discomfort over the last month. She saw her PCP and her Protonix was changed to Pepcid. She denies any radiation of the pain, nausea, vomiting, diaphoresis, or shortness of breath.     Workup in the ED revealed HS Troponin 113/134/165, proBNP 3575, lipase 83, CTA of the chest and abdomen showed bilateral nephrolithiasis with multiple stable stones. EKG in the ED showed SR with a RBBB.     I have been asked to see Ms. Gonzalez in consult to evaluate her for a NSTEMI.     ECHO 23    Left ventricular systolic function is normal. Calculated left ventricular EF = 58.2%    Left ventricular wall thickness is consistent with hypertrophy. Sigmoid-shaped ventricular septum is present.    Left ventricular diastolic function is consistent with (grade Ia w/high LAP) impaired relaxation.    Left atrial volume is mildly increased.    Estimated right ventricular systolic pressure from tricuspid regurgitation is normal (<35 mmHg).    There is a trivial pericardial effusion.    Mild to  "moderate aortic valve regurgitation is present. There is a 26 mm TAVR valve present. The aortic valve peak and mean gradients are within defined limits. The prosthetic aortic valve is normal. 26mm Medtronic Evolut Pro    Cardiac Cath 7/31/22  Conclusion  Widely patent mid LAD stent. Otherwise nonobstructive coronary artery disease  Normal LV function. Mildly elevated filling pressures.  Recommendations: Dual antiplatelet therapy given elevated troponin for 1 year. Echocardiogram tomorrow to assess TAVR valve. Unclear etiology of ST elevations.    Past Medical History:   Diagnosis Date    Aortic stenosis 07/2009    diagnosed, 6/14 moderate with 0.9cm area, 42mm max gradient. 0.91/34 6/15.    Central loss of vision     RIGHT EYE    Cervical arthritis     Colitis     HX    Coronary artery disease     History of constipation     History of esophageal reflux     History of skin cancer     Hyperlipidemia     Hypertension     IGT (impaired glucose tolerance)     Irregular heart beat     HX    Macular degeneration     Medication management     On continuous oral anticoagulation     Redness of skin     NOSE/DERMATOLOGY TREATING WITH FLAGYL CREAM/ADVISED PT TO NOTIFY CARDIOLOGY    Sciatica     HX    Short-term memory loss     \"MILD\"    SOB (shortness of breath) on exertion     Vitamin D deficiency        Past Surgical History:   Procedure Laterality Date    AORTIC VALVE REPAIR/REPLACEMENT Left 3/15/2022    Procedure: TTE TRANSFEMORAL TRANSCATHETER AORTIC VALVE REPLACEMENT PERCUTANEOUS APPROACH;  Surgeon: Vesna Chris MD;  Location: [facility];  Service: Cardiothoracic;  Laterality: Left;    AORTIC VALVE REPAIR/REPLACEMENT Left 3/15/2022    Procedure: Transfemoral Transcatheter Aortic Valve Replacement with intra operative TTE and possible open surgical rescue;  Surgeon: Clint Locke MD;  Location: [facility];  Service: Cardiovascular;  Laterality: Left;    CARDIAC CATHETERIZATION N/A 1/21/2022    Procedure: " Coronary angiography;  Surgeon: Clint Locke MD;  Location:  [facility] CATH INVASIVE LOCATION;  Service: Cardiovascular;  Laterality: N/A;    CARDIAC CATHETERIZATION N/A 1/21/2022    Procedure: Percutaneous Coronary Intervention;  Surgeon: Clint Locke MD;  Location:  [facility] CATH INVASIVE LOCATION;  Service: Cardiovascular;  Laterality: N/A;    CARDIAC CATHETERIZATION N/A 1/21/2022    Procedure: Stent ISMAEL coronary;  Surgeon: Clint Locke MD;  Location:  [facility] CATH INVASIVE LOC[facility];  Service: Cardiovascular;  Laterality: N/A;    CARDIAC CATHETERIZATION Left 7/31/2022    Procedure: CORONARY ANGIOGRAPHY;  Surgeon: Jeannie Echevarria MD;  Location:  [facility] CATH INVASIVE LOCATION;  Service: Cardiology;  Laterality: Left;    CARDIAC CATHETERIZATION N/A 7/31/2022    Procedure: Left ventriculography;  Surgeon: Jeannie Echevarria MD;  Location:  [facility] CATH INVASIVE LOC[facility];  Service: Cardiology;  Laterality: N/A;    CARDIAC CATHETERIZATION N/A 7/31/2022    Procedure: Left Heart Cath;  Surgeon: Jeannie Echevarria MD;  Location:  [facility] CATH INVASIVE LOC[facility];  Service: Cardiology;  Laterality: N/A;    CARDIAC ELECTROPHYSIOLOGY PROCEDURE N/A 3/16/2022    Procedure: Pacemaker DC new [place];  Surgeon: Yann Ceballos MD;  Location:  [facility] CATH INVASIVE LOCATION;  Service: Cardiology;  Laterality: N/A;    CATARACT EXTRACTION, BILATERAL      COLONOSCOPY      EYE SURGERY      SKIN CANCER EXCISION         Medications Prior to Admission   Medication Sig Dispense Refill Last Dose    acetaminophen (TYLENOL) 325 MG tablet Take 2 tablets by mouth Every 4 (Four) Hours As Needed for Mild Pain  or Fever (temperature greater than 101F). 120 tablet 0     aspirin (aspirin) 81 MG EC tablet Take 1 tablet by mouth Daily. 30 tablet 6     AZO-CRANBERRY PO Take 1 tablet by mouth Daily.       metoprolol tartrate (LOPRESSOR) 25 MG tablet TAKE 0.5 TABLETS BY MOUTH EVERY 12 (TWELVE) HOURS. 90 tablet 3     metroNIDAZOLE (METROCREAM)  0.75 % cream Apply 1 application  topically to the appropriate area as directed 2 (Two) Times a Day.       Multiple Vitamins-Minerals (VITEYES AREDS ADVANCED PO) Take 2 capsules by mouth Daily.       mupirocin (BACTROBAN) 2 % cream Apply 1 application  topically to the appropriate area as directed 2 (Two) Times a Day.       potassium chloride (MICRO-K) 10 MEQ CR capsule Take 1 capsule by mouth 2 (Two) Times a Day. 180 capsule 3     sennosides-docusate (PERICOLACE) 8.6-50 MG per tablet Take 1 tablet by mouth Daily.    at 2100    torsemide (DEMADEX) 20 MG tablet Take 1 tablet by mouth Daily. 90 tablet 1     Vitamin D, Cholecalciferol, 50 MCG (2000 UT) capsule Take 1 capsule by mouth Daily.          Current Meds  Scheduled Meds:aspirin, 324 mg, Oral, Daily  cholecalciferol, 2,000 Units, Oral, Daily  enoxaparin, 1 mg/kg, Subcutaneous, Q12H  insulin lispro, 2-7 Units, Subcutaneous, 4x Daily AC & at Bedtime  metoprolol tartrate, 12.5 mg, Oral, Q12H  metroNIDAZOLE, 1 application , Topical, BID  potassium chloride, 10 mEq, Oral, BID  sennosides-docusate, 1 tablet, Oral, Daily  sodium chloride, 10 mL, Intravenous, Q12H  torsemide, 20 mg, Oral, Daily      Continuous Infusions:   PRN Meds:.  acetaminophen **OR** acetaminophen **OR** acetaminophen    acetaminophen    Calcium Replacement - Follow Nurse / BPA Driven Protocol    dextrose    dextrose    glucagon (human recombinant)    Magnesium Standard Dose Replacement - Follow Nurse / BPA Driven Protocol    Morphine **AND** naloxone    nitroglycerin    ondansetron    Phosphorus Replacement - Follow Nurse / BPA Driven Protocol    Potassium Replacement - Follow Nurse / BPA Driven Protocol    sodium chloride    sodium chloride    Allergies as of 07/31/2023 - Reviewed 07/31/2023   Allergen Reaction Noted    Hydrochlorothiazide Other (See Comments) 03/08/2023    Hydrocodone Shortness Of Breath 07/31/2022    Lipitor [atorvastatin] Nausea Only 02/25/2022    Erythromycin Diarrhea  "2016    Lisinopril Cough 2016       Social History     Socioeconomic History    Marital status:      Spouse name: Stephan    Number of children: 4   Tobacco Use    Smoking status: Former     Packs/day: 0.00     Years: 0.00     Pack years: 0.00     Types: Cigarettes     Start date:      Quit date:      Years since quittin.6    Smokeless tobacco: Never    Tobacco comments:     1 pack every 2 weeks   Vaping Use    Vaping Use: Never used   Substance and Sexual Activity    Alcohol use: Yes     Comment: wine - rare / no caffeine    Drug use: No    Sexual activity: Defer       Family History   Problem Relation Age of Onset    Aortic stenosis Mother     Hypertension Mother     Heart failure Father     Diabetes Father     Hypertension Father     No Known Problems Sister     No Known Problems Daughter     No Known Problems Son     Aneurysm Sister     Other Sister         Polio in her 20's    Malig Hyperthermia Neg Hx        REVIEW OF SYSTEMS:   All systems reviewed and pertinent positives include in HPI otherwise negative review of systems.       Objective:   Temp:  [97.3 øF (36.3 øC)-98.3 øF (36.8 øC)] 98.1 øF (36.7 øC)  Heart Rate:  [63-79] 67  Resp:  [15-18] 18  BP: (116-157)/(43-71) 149/51  Body mass index is 28.34 kg/mý.  Flowsheet Rows      Flowsheet Row First Filed Value   Admission Height 154.9 cm (61\") Documented at 2023 0902   Admission Weight 68 kg (150 lb) Documented at 2023 0902          Vitals:    23 0651   BP: 149/51   Pulse: 67   Resp: 18   Temp: 98.1 øF (36.7 øC)   SpO2: 96%       General Appearance:    Alert, cooperative, in no acute distress   Head:    Normocephalic, without obvious abnormality, atraumatic   Eyes:            Lids and lashes normal, conjunctivae and sclerae normal, no   icterus, no pallor, corneas clear, PERRLA   Ears:    Ears appear intact with no abnormalities noted   Throat:   No oral lesions, no thrush, oral mucosa moist   Neck:   No " adenopathy, supple, trachea midline, no thyromegaly, no   carotid bruit, no JVD   Back:     No kyphosis present, no scoliosis present, no skin lesions, erythema or scars, no tenderness to percussion or palpation, range of motion normal   Lungs:     Clear to auscultation,respirations regular, even and unlabored    Heart:    Regular rhythm and normal rate, normal S1 and S2, no murmur, no gallop, no rub, no click   Chest Wall:    No abnormalities observed   Abdomen:     Normal bowel sounds, no masses, no organomegaly, soft nontender, nondistended, no guarding, no rebound  tenderness   Extremities:   Moves all extremities well, no edema, no cyanosis, no redness   Pulses:   Pulses palpable and equal bilaterally. Normal radial, carotid, femoral, dorsalis pedis and posterior tibial pulses bilaterally. Normal abdominal aorta   Skin:  Neurology:   Psychiatric:   No bleeding, bruising or rash   Normal speech and cranial nerve exam, no focal deficit   Alert and oriented x 3, normal mood and affect             Review of Data:      Results from last 7 days   Lab Units 08/01/23  0543   SODIUM mmol/L 140   POTASSIUM mmol/L 3.8   CHLORIDE mmol/L 104   CO2 mmol/L 25.0   BUN mg/dL 13   CREATININE mg/dL 0.51*   CALCIUM mg/dL 8.7   BILIRUBIN mg/dL 0.2   ALK PHOS U/L 88   ALT (SGPT) U/L 16   AST (SGOT) U/L 43*   GLUCOSE mg/dL 100*     Results from last 7 days   Lab Units 08/01/23  0543 07/31/23  1558 07/31/23  1118   HSTROP T ng/L 266* 165* 134*     @LABRCNTbnp@  Results from last 7 days   Lab Units 08/01/23  0543 07/31/23  0913   WBC 10*3/mm3 8.14 10.01   HEMOGLOBIN g/dL 13.2 14.1   HEMATOCRIT % 40.1 44.0   PLATELETS 10*3/mm3 175 210             @LABRCNTIP(chol,trig,hdl,ldl)        I personally viewed and interpreted the patient's EKG/Telemetry data  )   NSTEMI (non-ST elevated myocardial infarction)    Aortic stenosis    Hypertension    Coronary artery disease involving native coronary artery of native heart    S/P TAVR (transcatheter  aortic valve replacement)    Presence of cardiac pacemaker    Hyperglycemia        Assessment and Plan:      NSTEMI-chest pain is atypical and usually comes at rest and relieved with drinking water and localized to lower abdominal area.  Elevated troponin with delta but no ischemic EKG changes.  Patient has been chest pain-free since after admission.  Prior history of LAD stent  TAVR  Permanent pacemaker in place.    Get limited echo to evaluate left ventricular ejection fraction and regional wall motion.  Add Imdur 30 mg p.o. daily.  Switch aspirin to Plavix-she continues to report intermittent epigastric abdominal pain.  Add PPI.  PT/OT     Papo Walden MD  08/01/23  09:51 EDT.      Time spent in reviewing chart, discussion and examination:

## 2023-08-02 LAB
ALBUMIN SERPL-MCNC: 3.4 G/DL (ref 3.5–5.2)
ALBUMIN/GLOB SERPL: 1.5 G/DL
ALP SERPL-CCNC: 73 U/L (ref 39–117)
ALT SERPL W P-5'-P-CCNC: 17 U/L (ref 1–33)
ANION GAP SERPL CALCULATED.3IONS-SCNC: 9.9 MMOL/L (ref 5–15)
AST SERPL-CCNC: 49 U/L (ref 1–32)
B PARAPERT DNA SPEC QL NAA+PROBE: NOT DETECTED
B PERT DNA SPEC QL NAA+PROBE: NOT DETECTED
BASOPHILS # BLD AUTO: 0.03 10*3/MM3 (ref 0–0.2)
BASOPHILS NFR BLD AUTO: 0.3 % (ref 0–1.5)
BILIRUB SERPL-MCNC: 0.5 MG/DL (ref 0–1.2)
BUN SERPL-MCNC: 15 MG/DL (ref 8–23)
BUN/CREAT SERPL: 25.4 (ref 7–25)
C PNEUM DNA NPH QL NAA+NON-PROBE: NOT DETECTED
CALCIUM SPEC-SCNC: 8.6 MG/DL (ref 8.2–9.6)
CHLORIDE SERPL-SCNC: 106 MMOL/L (ref 98–107)
CO2 SERPL-SCNC: 25.1 MMOL/L (ref 22–29)
CREAT SERPL-MCNC: 0.59 MG/DL (ref 0.57–1)
DEPRECATED RDW RBC AUTO: 40.9 FL (ref 37–54)
EGFRCR SERPLBLD CKD-EPI 2021: 84.2 ML/MIN/1.73
EOSINOPHIL # BLD AUTO: 0.12 10*3/MM3 (ref 0–0.4)
EOSINOPHIL NFR BLD AUTO: 1.4 % (ref 0.3–6.2)
ERYTHROCYTE [DISTWIDTH] IN BLOOD BY AUTOMATED COUNT: 13.4 % (ref 12.3–15.4)
FLUAV SUBTYP SPEC NAA+PROBE: NOT DETECTED
FLUBV RNA ISLT QL NAA+PROBE: NOT DETECTED
GEN 5 2HR TROPONIN T REFLEX: 609 NG/L
GLOBULIN UR ELPH-MCNC: 2.2 GM/DL
GLUCOSE BLDC GLUCOMTR-MCNC: 101 MG/DL (ref 70–130)
GLUCOSE BLDC GLUCOMTR-MCNC: 134 MG/DL (ref 70–130)
GLUCOSE BLDC GLUCOMTR-MCNC: 151 MG/DL (ref 70–130)
GLUCOSE BLDC GLUCOMTR-MCNC: 171 MG/DL (ref 70–130)
GLUCOSE SERPL-MCNC: 108 MG/DL (ref 65–99)
HADV DNA SPEC NAA+PROBE: NOT DETECTED
HCOV 229E RNA SPEC QL NAA+PROBE: NOT DETECTED
HCOV HKU1 RNA SPEC QL NAA+PROBE: NOT DETECTED
HCOV NL63 RNA SPEC QL NAA+PROBE: NOT DETECTED
HCOV OC43 RNA SPEC QL NAA+PROBE: NOT DETECTED
HCT VFR BLD AUTO: 37 % (ref 34–46.6)
HGB BLD-MCNC: 12.4 G/DL (ref 12–15.9)
HMPV RNA NPH QL NAA+NON-PROBE: NOT DETECTED
HPIV1 RNA ISLT QL NAA+PROBE: NOT DETECTED
HPIV2 RNA SPEC QL NAA+PROBE: NOT DETECTED
HPIV3 RNA NPH QL NAA+PROBE: DETECTED
HPIV4 P GENE NPH QL NAA+PROBE: NOT DETECTED
IMM GRANULOCYTES # BLD AUTO: 0.03 10*3/MM3 (ref 0–0.05)
IMM GRANULOCYTES NFR BLD AUTO: 0.3 % (ref 0–0.5)
LYMPHOCYTES # BLD AUTO: 2.06 10*3/MM3 (ref 0.7–3.1)
LYMPHOCYTES NFR BLD AUTO: 23.6 % (ref 19.6–45.3)
M PNEUMO IGG SER IA-ACNC: NOT DETECTED
MCH RBC QN AUTO: 28.1 PG (ref 26.6–33)
MCHC RBC AUTO-ENTMCNC: 33.5 G/DL (ref 31.5–35.7)
MCV RBC AUTO: 83.7 FL (ref 79–97)
MONOCYTES # BLD AUTO: 0.83 10*3/MM3 (ref 0.1–0.9)
MONOCYTES NFR BLD AUTO: 9.5 % (ref 5–12)
NEUTROPHILS NFR BLD AUTO: 5.66 10*3/MM3 (ref 1.7–7)
NEUTROPHILS NFR BLD AUTO: 64.9 % (ref 42.7–76)
NRBC BLD AUTO-RTO: 0 /100 WBC (ref 0–0.2)
PLATELET # BLD AUTO: 164 10*3/MM3 (ref 140–450)
PMV BLD AUTO: 11.2 FL (ref 6–12)
POTASSIUM SERPL-SCNC: 4 MMOL/L (ref 3.5–5.2)
PROT SERPL-MCNC: 5.6 G/DL (ref 6–8.5)
RBC # BLD AUTO: 4.42 10*6/MM3 (ref 3.77–5.28)
RHINOVIRUS RNA SPEC NAA+PROBE: NOT DETECTED
RSV RNA NPH QL NAA+NON-PROBE: NOT DETECTED
SARS-COV-2 RNA NPH QL NAA+NON-PROBE: NOT DETECTED
SODIUM SERPL-SCNC: 141 MMOL/L (ref 136–145)
TROPONIN T DELTA: 107 NG/L
TROPONIN T SERPL HS-MCNC: 502 NG/L
WBC NRBC COR # BLD: 8.73 10*3/MM3 (ref 3.4–10.8)

## 2023-08-02 PROCEDURE — 85025 COMPLETE CBC W/AUTO DIFF WBC: CPT | Performed by: STUDENT IN AN ORGANIZED HEALTH CARE EDUCATION/TRAINING PROGRAM

## 2023-08-02 PROCEDURE — 84484 ASSAY OF TROPONIN QUANT: CPT | Performed by: INTERNAL MEDICINE

## 2023-08-02 PROCEDURE — 63710000001 INSULIN LISPRO (HUMAN) PER 5 UNITS: Performed by: INTERNAL MEDICINE

## 2023-08-02 PROCEDURE — 99232 SBSQ HOSP IP/OBS MODERATE 35: CPT | Performed by: INTERNAL MEDICINE

## 2023-08-02 PROCEDURE — 93010 ELECTROCARDIOGRAM REPORT: CPT | Performed by: INTERNAL MEDICINE

## 2023-08-02 PROCEDURE — 80053 COMPREHEN METABOLIC PANEL: CPT | Performed by: STUDENT IN AN ORGANIZED HEALTH CARE EDUCATION/TRAINING PROGRAM

## 2023-08-02 PROCEDURE — 82948 REAGENT STRIP/BLOOD GLUCOSE: CPT

## 2023-08-02 PROCEDURE — 25010000002 ENOXAPARIN PER 10 MG: Performed by: INTERNAL MEDICINE

## 2023-08-02 PROCEDURE — 0202U NFCT DS 22 TRGT SARS-COV-2: CPT | Performed by: NURSE PRACTITIONER

## 2023-08-02 PROCEDURE — 93005 ELECTROCARDIOGRAM TRACING: CPT | Performed by: STUDENT IN AN ORGANIZED HEALTH CARE EDUCATION/TRAINING PROGRAM

## 2023-08-02 RX ORDER — BENZONATATE 100 MG/1
100 CAPSULE ORAL 3 TIMES DAILY PRN
Status: DISCONTINUED | OUTPATIENT
Start: 2023-08-02 | End: 2023-08-05 | Stop reason: HOSPADM

## 2023-08-02 RX ORDER — GUAIFENESIN 200 MG/10ML
200 LIQUID ORAL EVERY 4 HOURS PRN
Status: DISCONTINUED | OUTPATIENT
Start: 2023-08-02 | End: 2023-08-05 | Stop reason: HOSPADM

## 2023-08-02 RX ORDER — FLUTICASONE PROPIONATE 50 MCG
2 SPRAY, SUSPENSION (ML) NASAL DAILY
Status: DISCONTINUED | OUTPATIENT
Start: 2023-08-02 | End: 2023-08-05 | Stop reason: HOSPADM

## 2023-08-02 RX ADMIN — FLUTICASONE PROPIONATE 2 SPRAY: 50 SPRAY, METERED NASAL at 21:25

## 2023-08-02 RX ADMIN — PANTOPRAZOLE SODIUM 40 MG: 40 TABLET, DELAYED RELEASE ORAL at 05:35

## 2023-08-02 RX ADMIN — CLOPIDOGREL BISULFATE 75 MG: 75 TABLET, FILM COATED ORAL at 09:28

## 2023-08-02 RX ADMIN — INSULIN LISPRO 2 UNITS: 100 INJECTION, SOLUTION INTRAVENOUS; SUBCUTANEOUS at 18:30

## 2023-08-02 RX ADMIN — Medication 10 ML: at 09:29

## 2023-08-02 RX ADMIN — ENOXAPARIN SODIUM 70 MG: 100 INJECTION SUBCUTANEOUS at 19:42

## 2023-08-02 RX ADMIN — ACETAMINOPHEN 650 MG: 325 TABLET, FILM COATED ORAL at 21:26

## 2023-08-02 RX ADMIN — TORSEMIDE 20 MG: 20 TABLET ORAL at 09:27

## 2023-08-02 RX ADMIN — Medication 10 ML: at 21:27

## 2023-08-02 RX ADMIN — POTASSIUM CHLORIDE 10 MEQ: 750 TABLET, EXTENDED RELEASE ORAL at 21:26

## 2023-08-02 RX ADMIN — Medication 2000 UNITS: at 09:28

## 2023-08-02 RX ADMIN — METOPROLOL TARTRATE 12.5 MG: 25 TABLET, FILM COATED ORAL at 09:28

## 2023-08-02 RX ADMIN — METOPROLOL TARTRATE 12.5 MG: 25 TABLET, FILM COATED ORAL at 21:26

## 2023-08-02 RX ADMIN — ENOXAPARIN SODIUM 70 MG: 100 INJECTION SUBCUTANEOUS at 05:35

## 2023-08-02 RX ADMIN — BENZONATATE 100 MG: 100 CAPSULE ORAL at 21:26

## 2023-08-02 RX ADMIN — ISOSORBIDE MONONITRATE 30 MG: 30 TABLET, EXTENDED RELEASE ORAL at 09:28

## 2023-08-02 RX ADMIN — METRONIDAZOLE 1 APPLICATION: 7.5 CREAM TOPICAL at 09:28

## 2023-08-02 RX ADMIN — METRONIDAZOLE 1 APPLICATION: 7.5 CREAM TOPICAL at 21:27

## 2023-08-02 RX ADMIN — POTASSIUM CHLORIDE 10 MEQ: 750 TABLET, EXTENDED RELEASE ORAL at 09:28

## 2023-08-02 NOTE — NURSING NOTE
1230 Patient told the CNA that while in the BR, she bent over to adjust her pad and had pain in bilateral upper chest. When laid down in bed, the pain subsided.  1243 Denies pain at present. Started on Imdur and Protonix.

## 2023-08-02 NOTE — PROGRESS NOTES
Name: Yuliana Gonzalez ADMIT: 2023   : 1930  PCP: Trent Davis MD    MRN: [id number] LOS: 2 days   AGE/SEX: 89 y.o. female  ROOM: [facility]     Subjective   Subjective   Patient seen and examined this morning.  Hospital day 2.  At time of my examination, patient is awake, alert, sitting up in chair next to bedside.  Family present.  She states that she feels well today, chest pain improved, denies abdominal pain, nausea, vomiting, palpitations, dyspnea, fever, chills.  Repeat echocardiogram following arrival showing new decrease in ejection fraction.     Objective   Objective   Vital Signs  Temp:  [98.1 øF (36.7 øC)-98.8 øF (37.1 øC)] 98.1 øF (36.7 øC)  Heart Rate:  [77-94] 93  Resp:  [18] 18  BP: (117-154)/(52-58) 117/58  SpO2:  [97 %-100 %] 100 %  on   ;   Device (Oxygen Therapy): room air  Body mass index is 29.81 kg/mý.  Physical Exam  Vitals and nursing note reviewed.   Constitutional:       General: She is not in acute distress.  Cardiovascular:      Rate and Rhythm: Regular rhythm. Tachycardia present.      Pulses: Normal pulses.      Heart sounds: Normal heart sounds.   Pulmonary:      Effort: Pulmonary effort is normal. No respiratory distress.      Breath sounds: Normal breath sounds.   Abdominal:      General: Bowel sounds are normal. There is no distension.      Palpations: Abdomen is soft.      Tenderness: There is no abdominal tenderness.   Musculoskeletal:      Right lower leg: Edema (Trace) present.      Left lower leg: Edema (Trace) present.   Skin:     General: Skin is warm and dry.      Coloration: Skin is pale.   Neurological:      Mental Status: She is alert.     Results Review     I reviewed the patient's new clinical results.  Results from last 7 days   Lab Units 23  0619 23  0543 23  0913   WBC 10*3/mm3 8.73 8.14 10.01   HEMOGLOBIN g/dL 12.4 13.2 14.1   PLATELETS 10*3/mm3 164 175 210       Results from last 7 days   Lab Units 23  0619 23  0543  07/31/23  0913   SODIUM mmol/L 141 140 138   POTASSIUM mmol/L 4.0 3.8 3.9   CHLORIDE mmol/L 106 104 101   CO2 mmol/L 25.1 25.0 25.2   BUN mg/dL 15 13 15   CREATININE mg/dL 0.59 0.51* 0.68   GLUCOSE mg/dL 108* 100* 181*   EGFR mL/min/1.73 84.2 87.2 81.3       Results from last 7 days   Lab Units 08/02/23  0619 08/01/23  0543 07/31/23  0913   ALBUMIN g/dL 3.4* 3.7 4.4   BILIRUBIN mg/dL 0.5 0.2 0.2   ALK PHOS U/L 73 88 117   AST (SGOT) U/L 49* 43* 29   ALT (SGPT) U/L 17 16 13       Results from last 7 days   Lab Units 08/02/23  0619 08/01/23  0543 07/31/23  0913   CALCIUM mg/dL 8.6 8.7 9.3   ALBUMIN g/dL 3.4* 3.7 4.4         Hemoglobin A1C   Date/Time Value Ref Range Status   08/01/2023 0543 5.90 (H) 4.80 - 5.60 % Final     Glucose   Date/Time Value Ref Range Status   08/02/2023 1125 134 (H) 70 - 130 mg/dL Final     Comment:     Meter: [id number] : [id number] Sahni Zoe NA   08/02/2023 0604 101 70 - 130 mg/dL Final     Comment:     Meter: [id number] : [id number] Aden Vergara NA   08/01/2023 2049 145 (H) 70 - 130 mg/dL Final     Comment:     Meter: [id number] : [id number] Samaniego Ursula NA   08/01/2023 1547 196 (H) 70 - 130 mg/dL Final     Comment:     Meter: [id number] : [id number] [facility] NA   08/01/2023 1031 123 70 - 130 mg/dL Final     Comment:     Meter: [id number] : [id number] [facility] NA   08/01/2023 0610 108 70 - 130 mg/dL Final     Comment:     Meter: [id number] : [id number] Samaniego Ursula NA   07/31/2023 1925 100 70 - 130 mg/dL Final     Comment:     Meter: [id number] : [id number] Aden ANGELES       No radiology results for the last day    I have personally reviewed all medications:  Scheduled Medications  cholecalciferol, 2,000 Units, Oral, Daily  clopidogrel, 75 mg, Oral, Daily  enoxaparin, 1 mg/kg, Subcutaneous, Q12H  insulin lispro, 2-7 Units, Subcutaneous, 4x Daily AC & at Bedtime  isosorbide mononitrate, 30 mg, Oral, Q24H  metoprolol tartrate, 12.5 mg, Oral,  Q12H  metroNIDAZOLE, 1 application , Topical, BID  pantoprazole, 40 mg, Oral, Q AM  potassium chloride, 10 mEq, Oral, BID  sennosides-docusate, 1 tablet, Oral, Daily  sodium chloride, 10 mL, Intravenous, Q12H  torsemide, 20 mg, Oral, Daily    Infusions   Diet  Diet: Cardiac Diets, Diabetic Diets; Healthy Heart (2-3 Na+); Consistent Carbohydrate; Texture: Regular Texture (IDDSI 7); Fluid Consistency: Thin (IDDSI 0)  NPO Diet NPO Type: Strict NPO    I have personally reviewed:  [x]  Laboratory   [x]  Microbiology   [x]  Radiology   [x]  EKG/Telemetry  [x]  Cardiology/Vascular   []  Pathology    []  Records       Assessment/Plan     Active Hospital Problems    Diagnosis  POA    **NSTEMI (non-ST elevated myocardial infarction) [I21.4]  Yes    Elevated troponin [R77.8]  Yes    Chest pain [R07.9]  Yes    Prediabetes [R73.03]  Yes    Hyperglycemia [R73.9]  Yes    Presence of cardiac pacemaker [Z95.0]  Yes    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       89 y.o. female admitted with NSTEMI (non-ST elevated myocardial infarction).    NSTEMI  Chest pain, atypical  Chronic HFpEF, now with newly reduced EF, consistent with HFrEF  - Patient with complaints of chest pain on admission, coupled with elevated troponin. EKG without evidence of acute ischemic changes. CT angiogram negative for PE or acute cardiopulmonary abnormalities. ProBNP elevated at 3575. 2D Echocardiogram (08/01/23) EF 37%, multiple areas of LV wall hypokinesis, limited study to assess LV function.  Most recent prior 2D echocardiogram was in February 2023, which showed EF 58.2% at that time with grade 1 diastolic dysfunction.  - Cardiology consulted and following. Planning to repeat troponin today and follow that up  to help guide ongoing management decisions. Will continue to follow their plans/recommendations. Greatly appreciate their help. Follow up echocardiogram results.  - On Plavix and Full dose Lovenox at present. Defer to Cardiology.  - Monitor on telemetry.    Epigastric abdominal pain  Nephrolithiasis  Elevated AST  - Pain noted on arrival. CT AP obtained showing B/L nephrolithiasis, multiple stones in left renal pelvis that are stable, no hydronephrosis, right kidney with single non-obstructing stone, no ureteral stones or obstruction. Also noting diverticulosis in sigmoid colon and thickened appearance of endometrium. No other noted acute intra-abdominal pathology, including no mention of pancreatitis, given that lipase was elevated, however, this can also be seen in cardiac pathology, so not specific. AST mildly elevated, patient without abdominal complaints at this time.  - Update (08/02), patient tolerating PPI well, denies abdominal symptoms at this time, symptoms noted on admission have now resolved. AST remains slightly elevated but stable from prior.  - Continue PPI as prescribed. Discuss with Cardiology regarding if they are okay with IVF, based upon echo results, in setting of nephrolithiasis.    Hypertension  - BP acceptable acutely. Appears stable. No indications to warrant acute changes/intervention at this time.  - Continue current medications as prescribed. Trend BP to guide ongoing management decisions.    Prediabetes with Hyperglycemia  - Glucose elevated on most recent labs. Patient without known history of T2DM.  Most recent hemoglobin A1c 5.90% obtained following admission.  - Monitor for now, continue with POC glucose checks and correctional SSI as needed.    Pharmacy to dose Lovenox for DVT prophylaxis.  Full code.  Discussed with patient, nursing staff, CCP, and care team on multidisciplinary rounds.  Anticipate discharge home tomorrow..      DO [facility]  08/02/23

## 2023-08-02 NOTE — PLAN OF CARE
Goal Outcome Evaluation:         Pt Aox4 resting in bed , VSS, no sign of acute distress noted. Denies CP , SOA. SR on tele RA. Nursing will continue to monitor.

## 2023-08-02 NOTE — PROGRESS NOTES
"Patient Care Team:  Trent Davis MD as PCP - General (Internal Medicine)  Trent Davis MD as PCP - Internal Medicine (Internal Medicine)  Thang Bruce MD as Consulting Physician (Ophthalmology)  Curtis Marshall MD as Consulting Physician (Cardiology)    Chief Complaint: Non-ST elevation MI    Interval History:   Denies chest pain today.  No new complaints    Objective   Vital Signs  Temp:  [98.1 øF (36.7 øC)-98.8 øF (37.1 øC)] 98.1 øF (36.7 øC)  Heart Rate:  [66-94] 77  Resp:  [18] 18  BP: (117-154)/(52-73) 124/54    Intake/Output Summary (Last 24 hours) at 8/2/2023 1103  Last data filed at 8/1/2023 2030  Gross per 24 hour   Intake 240 ml   Output --   Net 240 ml     Flowsheet Rows      Flowsheet Row First Filed Value   Admission Height 154.9 cm (61\") Documented at 07/31/2023 0902   Admission Weight 68 kg (150 lb) Documented at 07/31/2023 0902            Temp:  [98.1 øF (36.7 øC)-98.8 øF (37.1 øC)] 98.1 øF (36.7 øC)  Heart Rate:  [66-94] 77  Resp:  [18] 18  BP: (117-154)/(52-73) 124/54    Intake/Output Summary (Last 24 hours) at 8/2/2023 1103  Last data filed at 8/1/2023 2030  Gross per 24 hour   Intake 240 ml   Output --   Net 240 ml     Flowsheet Rows      Flowsheet Row First Filed Value   Admission Height 154.9 cm (61\") Documented at 07/31/2023 0902   Admission Weight 68 kg (150 lb) Documented at 07/31/2023 0902            General Appearance:    Alert, cooperative, in no acute distress   Head:    Normocephalic, without obvious abnormality, atraumatic       Neck/Lymph   No adenopathy, supple, no thyromegaly, no carotid bruit, no    JVD   Lungs:     Clear to auscultation bilaterally, no wheezes, rales, or     rhonchi    Cardiac:    Normal rate, regular rhythm, no murmur, no rub, no gallop   Chest Wall:    No abnormalities observed   GI:     Normal bowel sounds, soft, nontender, nondistended,            no rebound tenderness   Extremities:   No cyanosis, clubbing, or edema   Circulatory/Peripheral " Vascular :   Pulses palpable and equal bilaterally   Integumentary:   No bleeding or rash. Normal temperature                cholecalciferol, 2,000 Units, Oral, Daily  clopidogrel, 75 mg, Oral, Daily  enoxaparin, 1 mg/kg, Subcutaneous, Q12H  insulin lispro, 2-7 Units, Subcutaneous, 4x Daily AC & at Bedtime  isosorbide mononitrate, 30 mg, Oral, Q24H  metoprolol tartrate, 12.5 mg, Oral, Q12H  metroNIDAZOLE, 1 application , Topical, BID  pantoprazole, 40 mg, Oral, Q AM  potassium chloride, 10 mEq, Oral, BID  sennosides-docusate, 1 tablet, Oral, Daily  sodium chloride, 10 mL, Intravenous, Q12H  torsemide, 20 mg, Oral, Daily             Results Review:    Results from last 7 days   Lab Units 08/02/23  0619   SODIUM mmol/L 141   POTASSIUM mmol/L 4.0   CHLORIDE mmol/L 106   CO2 mmol/L 25.1   BUN mg/dL 15   CREATININE mg/dL 0.59   GLUCOSE mg/dL 108*   CALCIUM mg/dL 8.6     Results from last 7 days   Lab Units 08/01/23  0543 07/31/23  1558 07/31/23  1118   HSTROP T ng/L 266* 165* 134*     Results from last 7 days   Lab Units 08/02/23  0619   WBC 10*3/mm3 8.73   HEMOGLOBIN g/dL 12.4   HEMATOCRIT % 37.0   PLATELETS 10*3/mm3 164                     @LABRCNT(bnp)@  I reviewed the patient's new clinical results.  I personally viewed and interpreted the patient's EKG/Telemetry data            Assessment & Plan   1.  Non-ST elevation MI  2.  Cardiomyopathy: Very likely stress-induced, however with history of PCI to the LAD, certainly cannot rule that out at this time.  Last catheterization 2022 with no significant disease in the LAD stent  3.  History of transcatheter aortic valve replacement  4.  Permanent pacemaker    -Patient states that she feels fine today.  She was able to walk a lap around the unit without any chest pain.  -Echocardiogram reviewed with cardiomyopathy and wall motion abnormalities distal anterior wall and apex.  -Recommend continuing Toprol and Imdur.  -Agree with movements of Plavix as was done  yesterday.  -Prior issues with lower blood pressures.  Unlikely to tolerate ARB.  Prior cough with ACE inhibitor.  - Repeat high-sensitivity troponin.  If downtrending I would recommend medical management due to advanced age

## 2023-08-03 LAB
ALBUMIN SERPL-MCNC: 3.2 G/DL (ref 3.5–5.2)
ALBUMIN/GLOB SERPL: 1.2 G/DL
ALP SERPL-CCNC: 71 U/L (ref 39–117)
ALT SERPL W P-5'-P-CCNC: 16 U/L (ref 1–33)
ANION GAP SERPL CALCULATED.3IONS-SCNC: 10.4 MMOL/L (ref 5–15)
AST SERPL-CCNC: 34 U/L (ref 1–32)
BASOPHILS # BLD AUTO: 0.03 10*3/MM3 (ref 0–0.2)
BASOPHILS NFR BLD AUTO: 0.5 % (ref 0–1.5)
BILIRUB SERPL-MCNC: 0.5 MG/DL (ref 0–1.2)
BUN SERPL-MCNC: 14 MG/DL (ref 8–23)
BUN/CREAT SERPL: 21.2 (ref 7–25)
CALCIUM SPEC-SCNC: 8.6 MG/DL (ref 8.2–9.6)
CHLORIDE SERPL-SCNC: 106 MMOL/L (ref 98–107)
CO2 SERPL-SCNC: 24.6 MMOL/L (ref 22–29)
CREAT SERPL-MCNC: 0.66 MG/DL (ref 0.57–1)
DEPRECATED RDW RBC AUTO: 40.6 FL (ref 37–54)
EGFRCR SERPLBLD CKD-EPI 2021: 81.9 ML/MIN/1.73
EOSINOPHIL # BLD AUTO: 0.13 10*3/MM3 (ref 0–0.4)
EOSINOPHIL NFR BLD AUTO: 2 % (ref 0.3–6.2)
ERYTHROCYTE [DISTWIDTH] IN BLOOD BY AUTOMATED COUNT: 13.4 % (ref 12.3–15.4)
GLOBULIN UR ELPH-MCNC: 2.7 GM/DL
GLUCOSE BLDC GLUCOMTR-MCNC: 113 MG/DL (ref 70–130)
GLUCOSE BLDC GLUCOMTR-MCNC: 126 MG/DL (ref 70–130)
GLUCOSE BLDC GLUCOMTR-MCNC: 130 MG/DL (ref 70–130)
GLUCOSE BLDC GLUCOMTR-MCNC: 134 MG/DL (ref 70–130)
GLUCOSE SERPL-MCNC: 114 MG/DL (ref 65–99)
HCT VFR BLD AUTO: 35.3 % (ref 34–46.6)
HGB BLD-MCNC: 12 G/DL (ref 12–15.9)
IMM GRANULOCYTES # BLD AUTO: 0.03 10*3/MM3 (ref 0–0.05)
IMM GRANULOCYTES NFR BLD AUTO: 0.5 % (ref 0–0.5)
LYMPHOCYTES # BLD AUTO: 1.48 10*3/MM3 (ref 0.7–3.1)
LYMPHOCYTES NFR BLD AUTO: 23.3 % (ref 19.6–45.3)
MCH RBC QN AUTO: 28.4 PG (ref 26.6–33)
MCHC RBC AUTO-ENTMCNC: 34 G/DL (ref 31.5–35.7)
MCV RBC AUTO: 83.6 FL (ref 79–97)
MONOCYTES # BLD AUTO: 0.75 10*3/MM3 (ref 0.1–0.9)
MONOCYTES NFR BLD AUTO: 11.8 % (ref 5–12)
NEUTROPHILS NFR BLD AUTO: 3.93 10*3/MM3 (ref 1.7–7)
NEUTROPHILS NFR BLD AUTO: 61.9 % (ref 42.7–76)
NRBC BLD AUTO-RTO: 0 /100 WBC (ref 0–0.2)
PLATELET # BLD AUTO: 151 10*3/MM3 (ref 140–450)
PMV BLD AUTO: 11.5 FL (ref 6–12)
POTASSIUM SERPL-SCNC: 3.8 MMOL/L (ref 3.5–5.2)
PROT SERPL-MCNC: 5.9 G/DL (ref 6–8.5)
QT INTERVAL: 429 MS
RBC # BLD AUTO: 4.22 10*6/MM3 (ref 3.77–5.28)
SODIUM SERPL-SCNC: 141 MMOL/L (ref 136–145)
WBC NRBC COR # BLD: 6.35 10*3/MM3 (ref 3.4–10.8)

## 2023-08-03 PROCEDURE — 80053 COMPREHEN METABOLIC PANEL: CPT | Performed by: STUDENT IN AN ORGANIZED HEALTH CARE EDUCATION/TRAINING PROGRAM

## 2023-08-03 PROCEDURE — 25010000002 MORPHINE PER 10 MG: Performed by: INTERNAL MEDICINE

## 2023-08-03 PROCEDURE — 25010000002 ENOXAPARIN PER 10 MG: Performed by: INTERNAL MEDICINE

## 2023-08-03 PROCEDURE — 82948 REAGENT STRIP/BLOOD GLUCOSE: CPT

## 2023-08-03 PROCEDURE — 85025 COMPLETE CBC W/AUTO DIFF WBC: CPT | Performed by: STUDENT IN AN ORGANIZED HEALTH CARE EDUCATION/TRAINING PROGRAM

## 2023-08-03 PROCEDURE — 99232 SBSQ HOSP IP/OBS MODERATE 35: CPT | Performed by: INTERNAL MEDICINE

## 2023-08-03 RX ADMIN — ENOXAPARIN SODIUM 70 MG: 100 INJECTION SUBCUTANEOUS at 07:13

## 2023-08-03 RX ADMIN — NITROGLYCERIN 0.4 MG: 0.4 TABLET SUBLINGUAL at 09:11

## 2023-08-03 RX ADMIN — POTASSIUM CHLORIDE 10 MEQ: 750 TABLET, EXTENDED RELEASE ORAL at 22:11

## 2023-08-03 RX ADMIN — Medication 2000 UNITS: at 09:14

## 2023-08-03 RX ADMIN — ISOSORBIDE MONONITRATE 30 MG: 30 TABLET, EXTENDED RELEASE ORAL at 09:06

## 2023-08-03 RX ADMIN — NITROGLYCERIN 0.4 MG: 0.4 TABLET SUBLINGUAL at 18:08

## 2023-08-03 RX ADMIN — SENNOSIDES AND DOCUSATE SODIUM 1 TABLET: 50; 8.6 TABLET ORAL at 09:06

## 2023-08-03 RX ADMIN — METRONIDAZOLE 1 APPLICATION: 7.5 CREAM TOPICAL at 22:11

## 2023-08-03 RX ADMIN — METOPROLOL TARTRATE 12.5 MG: 25 TABLET, FILM COATED ORAL at 09:06

## 2023-08-03 RX ADMIN — GUAIFENESIN 200 MG: 100 SOLUTION ORAL at 19:49

## 2023-08-03 RX ADMIN — FLUTICASONE PROPIONATE 2 SPRAY: 50 SPRAY, METERED NASAL at 09:05

## 2023-08-03 RX ADMIN — CLOPIDOGREL BISULFATE 75 MG: 75 TABLET, FILM COATED ORAL at 09:06

## 2023-08-03 RX ADMIN — NITROGLYCERIN 0.4 MG: 0.4 TABLET SUBLINGUAL at 07:12

## 2023-08-03 RX ADMIN — PANTOPRAZOLE SODIUM 40 MG: 40 TABLET, DELAYED RELEASE ORAL at 06:00

## 2023-08-03 RX ADMIN — NITROGLYCERIN 0.4 MG: 0.4 TABLET SUBLINGUAL at 04:25

## 2023-08-03 RX ADMIN — Medication 10 ML: at 22:12

## 2023-08-03 RX ADMIN — POTASSIUM CHLORIDE 10 MEQ: 750 TABLET, EXTENDED RELEASE ORAL at 09:06

## 2023-08-03 RX ADMIN — NITROGLYCERIN 0.4 MG: 0.4 TABLET SUBLINGUAL at 20:23

## 2023-08-03 RX ADMIN — METOPROLOL TARTRATE 12.5 MG: 25 TABLET, FILM COATED ORAL at 22:11

## 2023-08-03 RX ADMIN — GUAIFENESIN 200 MG: 100 SOLUTION ORAL at 04:21

## 2023-08-03 RX ADMIN — METRONIDAZOLE 1 APPLICATION: 7.5 CREAM TOPICAL at 09:05

## 2023-08-03 RX ADMIN — NITROGLYCERIN 0.4 MG: 0.4 TABLET SUBLINGUAL at 21:57

## 2023-08-03 RX ADMIN — MORPHINE SULFATE 1 MG: 2 INJECTION, SOLUTION INTRAMUSCULAR; INTRAVENOUS at 00:18

## 2023-08-03 NOTE — PROGRESS NOTES
Name: Yuliana Gonzalez ADMIT: 2023   : 1930  PCP: Trent Davis MD    MRN: [id number] LOS: 3 days   AGE/SEX: 89 y.o. female  ROOM: [facility]     Subjective   Subjective   Patient seen and examined this morning.  Hospital day 3.  At time of my examination, patient is awake, alert, sitting up in bed.  Family present at bedside. Last night, notified by nursing that she was beginning to experience dry cough, congestion, so supportive medications were ordered, which have improved symptoms. However, nursing overnight obtained RVP, which was + for parainfluenza virus 3. Patient is currently on room air with O2 sat >90%. Troponin continues to rise, Cardiology following.  Discussed with nursing, patient has required doses of nitroglycerin both overnight and this morning for complaints of ongoing chest pain.     Objective   Objective   Vital Signs  Temp:  [98.1 øF (36.7 øC)-100.4 øF (38 øC)] 98.4 øF (36.9 øC)  Heart Rate:  [] 73  Resp:  [18] 18  BP: (108-139)/(47-59) 114/53  SpO2:  [96 %-100 %] 99 %  on   ;   Device (Oxygen Therapy): room air  Body mass index is 29.59 kg/mý.  Physical Exam  Vitals and nursing note reviewed.   Constitutional:       General: She is not in acute distress.     Appearance: She is ill-appearing.   Cardiovascular:      Rate and Rhythm: Normal rate and regular rhythm.      Pulses: Normal pulses.      Heart sounds: Normal heart sounds.   Pulmonary:      Effort: Pulmonary effort is normal. No respiratory distress.      Breath sounds: Normal breath sounds. No wheezing or rhonchi.   Abdominal:      General: Bowel sounds are normal. There is no distension.      Palpations: Abdomen is soft.      Tenderness: There is no abdominal tenderness.   Musculoskeletal:      Comments: Trace LE edema   Skin:     General: Skin is warm and dry.      Coloration: Skin is pale.   Neurological:      Mental Status: She is alert.     Results Review     I reviewed the patient's new clinical  results.  Results from last 7 days   Lab Units 08/03/23  0540 08/02/23  0619 08/01/23  0543 07/31/23  0913   WBC 10*3/mm3 6.35 8.73 8.14 10.01   HEMOGLOBIN g/dL 12.0 12.4 13.2 14.1   PLATELETS 10*3/mm3 151 164 175 210       Results from last 7 days   Lab Units 08/03/23  0540 08/02/23  0619 08/01/23  0543 07/31/23  0913   SODIUM mmol/L 141 141 140 138   POTASSIUM mmol/L 3.8 4.0 3.8 3.9   CHLORIDE mmol/L 106 106 104 101   CO2 mmol/L 24.6 25.1 25.0 25.2   BUN mg/dL 14 15 13 15   CREATININE mg/dL 0.66 0.59 0.51* 0.68   GLUCOSE mg/dL 114* 108* 100* 181*   EGFR mL/min/1.73 81.9 84.2 87.2 81.3       Results from last 7 days   Lab Units 08/03/23  0540 08/02/23  0619 08/01/23  0543 07/31/23  0913   ALBUMIN g/dL 3.2* 3.4* 3.7 4.4   BILIRUBIN mg/dL 0.5 0.5 0.2 0.2   ALK PHOS U/L 71 73 88 117   AST (SGOT) U/L 34* 49* 43* 29   ALT (SGPT) U/L 16 17 16 13       Results from last 7 days   Lab Units 08/03/23  0540 08/02/23  0619 08/01/23  0543 07/31/23  0913   CALCIUM mg/dL 8.6 8.6 8.7 9.3   ALBUMIN g/dL 3.2* 3.4* 3.7 4.4         Hemoglobin A1C   Date/Time Value Ref Range Status   08/01/2023 0543 5.90 (H) 4.80 - 5.60 % Final     Glucose   Date/Time Value Ref Range Status   08/03/2023 0616 113 70 - 130 mg/dL Final     Comment:     Meter: [id number] : [id number] Aden ANGELES   08/02/2023 2002 151 (H) 70 - 130 mg/dL Final     Comment:     Meter: [id number] : [id number] Aden ANGELES   08/02/2023 1534 171 (H) 70 - 130 mg/dL Final     Comment:     Meter: [id number] : [id number] Bora Barber NA   08/02/2023 1125 134 (H) 70 - 130 mg/dL Final     Comment:     Meter: [id number] : [id number] Bora Barber NA   08/02/2023 0604 101 70 - 130 mg/dL Final     Comment:     Meter: [id number] : [id number] Samaniegoashok Vergara NA   08/01/2023 2049 145 (H) 70 - 130 mg/dL Final     Comment:     Meter: [id number] : [id number] Samaniegoashok Vergara NA   08/01/2023 1547 196 (H) 70 - 130 mg/dL Final     Comment:     Meter: [id number] :  [id number] [facility]       No radiology results for the last day    I have personally reviewed all medications:  Scheduled Medications  cholecalciferol, 2,000 Units, Oral, Daily  clopidogrel, 75 mg, Oral, Daily  enoxaparin, 1 mg/kg, Subcutaneous, Q12H  fluticasone, 2 spray, Each Nare, Daily  insulin lispro, 2-7 Units, Subcutaneous, 4x Daily AC & at Bedtime  isosorbide mononitrate, 30 mg, Oral, Q24H  metoprolol tartrate, 12.5 mg, Oral, Q12H  metroNIDAZOLE, 1 application , Topical, BID  pantoprazole, 40 mg, Oral, Q AM  potassium chloride, 10 mEq, Oral, BID  sennosides-docusate, 1 tablet, Oral, Daily  sodium chloride, 10 mL, Intravenous, Q12H  torsemide, 20 mg, Oral, Daily    Infusions   Diet  NPO Diet NPO Type: Strict NPO    I have personally reviewed:  [x]  Laboratory   [x]  Microbiology   [x]  Radiology   [x]  EKG/Telemetry  [x]  Cardiology/Vascular   []  Pathology    []  Records       Assessment/Plan     Active Hospital Problems    Diagnosis  POA    **NSTEMI (non-ST elevated myocardial infarction) [I21.4]  Yes    Elevated troponin [R77.8]  Yes    Chest pain [R07.9]  Yes    Prediabetes [R73.03]  Yes    Hyperglycemia [R73.9]  Yes    Presence of cardiac pacemaker [Z95.0]  Yes    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       89 y.o. female admitted with NSTEMI (non-ST elevated myocardial infarction).    NSTEMI  Chest pain, atypical  Chronic HFpEF, now with newly reduced EF, consistent with HFrEF  - Patient with complaints of chest pain on admission, coupled with elevated troponin. EKG without evidence of acute ischemic changes. CT angiogram negative for PE or acute cardiopulmonary abnormalities. ProBNP elevated at 3575. 2D Echocardiogram (08/01/23) EF 37%, multiple areas  of LV wall hypokinesis, limited study to assess LV function.  Most recent prior 2D echocardiogram was in February 2023, which showed EF 58.2% at that time with grade 1 diastolic dysfunction.  - Cardiology consulted and following. Repeat troponin values obtained on 08/02 and are continuing to worsen. Patient also having ongoing complaints of intermittent chest pain, requiring nitroglycerin. Cardiology note reviewed, planning for Cath on 08/04. Will continue to follow their plans/recommendations. Greatly appreciate their help. Follow up echocardiogram results.  - On Plavix and Full dose Lovenox at present. Defer to Cardiology.  - Monitor on telemetry.    Parainfluenza virus 3 infection  -  Last night (08/02), notified by nursing that she was beginning to experience dry cough, congestion, so supportive medications were ordered, which have improved symptoms. However, nursing overnight obtained RVP, which was + for parainfluenza virus 3. Patient is currently on room air with O2 sat >90%.  - Continue to closely monitor respiratory status, continue with supportive medications as prescribed.    Epigastric abdominal pain  Nephrolithiasis  Elevated AST  - Pain noted on arrival. CT AP obtained showing B/L nephrolithiasis, multiple stones in left renal pelvis that are stable, no hydronephrosis, right kidney with single non-obstructing stone, no ureteral stones or obstruction. Also noting diverticulosis in sigmoid colon and thickened appearance of endometrium. No other noted acute intra-abdominal pathology, including no mention of pancreatitis, given that lipase was elevated, however, this can also be seen in cardiac pathology, so not specific. AST mildly elevated, patient without abdominal complaints at this time.  - Update (08/03), patient tolerating PPI well, denies abdominal symptoms at this time, symptoms noted on admission have now resolved. AST remains slightly elevated but continues to improve.   - Continue PPI as  prescribed.    Hypertension  - BP acceptable acutely. Appears stable. No indications to warrant acute changes/intervention at this time.  - Continue current medications as prescribed. Trend BP to guide ongoing management decisions.    Prediabetes with Hyperglycemia  - Glucose elevated on most recent labs. Patient without known history of T2DM.  Most recent hemoglobin A1c 5.90% obtained following admission.  - Monitor for now, continue with POC glucose checks and correctional SSI as needed.    Pharmacy to dose Lovenox for DVT prophylaxis.  Full code.  Discussed with patient, nursing staff, CCP, and care team on multidisciplinary rounds.  Anticipate discharge home when cleared by consultants..      Kavon Light DO  [facility]  08/03/23

## 2023-08-03 NOTE — CASE MANAGEMENT/SOCIAL WORK
Continued Stay Note  [facility]     Patient Name: Yuliana Gonzalez  MRN: [id number]  Today's Date: 8/3/2023    Admit Date: 7/31/2023    Plan: Plan home.  RUBIA Keith RN   Discharge Plan       Row Name 08/03/23 1201       Plan    Plan Plan home.  RUBIA Keith RN    Patient/Family in Agreement with Plan yes    Plan Comments Pt to have cardiac cath on 8/4.  Pt denies any discharge needs at present.   Pt's daughter ( Faith Slider [phone]) will assist pt at home if needed and transport pt home. Plan home.  RUBIA Keith RN                   Discharge Codes    No documentation.                 Expected Discharge Date and Time       Expected Discharge Date Expected Discharge Time    Aug 4, 2023               Bhavani Keith RN

## 2023-08-03 NOTE — PROGRESS NOTES
"Patient Care Team:  Trent Davis MD as PCP - General (Internal Medicine)  Trent Davis MD as PCP - Internal Medicine (Internal Medicine)  Thang Bruce MD as Consulting Physician (Ophthalmology)  Curtis Marshall MD as Consulting Physician (Cardiology)    Chief Complaint: Non-ST elevation MI    Interval History:   Chest discomfort earlier this am. None currently     Objective   Vital Signs  Temp:  [98.1 øF (36.7 øC)-100.4 øF (38 øC)] 98.4 øF (36.9 øC)  Heart Rate:  [] 73  Resp:  [18] 18  BP: (108-139)/(47-59) 114/53    Intake/Output Summary (Last 24 hours) at 8/3/2023 0911  Last data filed at 8/2/2023 2000  Gross per 24 hour   Intake 300 ml   Output --   Net 300 ml     Flowsheet Rows      Flowsheet Row First Filed Value   Admission Height 154.9 cm (61\") Documented at 07/31/2023 0902   Admission Weight 68 kg (150 lb) Documented at 07/31/2023 0902            Temp:  [98.1 øF (36.7 øC)-100.4 øF (38 øC)] 98.4 øF (36.9 øC)  Heart Rate:  [] 73  Resp:  [18] 18  BP: (108-139)/(47-59) 114/53    Intake/Output Summary (Last 24 hours) at 8/3/2023 0911  Last data filed at 8/2/2023 2000  Gross per 24 hour   Intake 300 ml   Output --   Net 300 ml     Flowsheet Rows      Flowsheet Row First Filed Value   Admission Height 154.9 cm (61\") Documented at 07/31/2023 0902   Admission Weight 68 kg (150 lb) Documented at 07/31/2023 0902            General Appearance:    Alert, cooperative, in no acute distress   Head:    Normocephalic, without obvious abnormality, atraumatic       Neck/Lymph   No adenopathy, supple, no thyromegaly, no carotid bruit, no    JVD   Lungs:     Clear to auscultation bilaterally, no wheezes, rales, or     rhonchi    Cardiac:    Normal rate, regular rhythm, no murmur, no rub, no gallop   Chest Wall:    No abnormalities observed   GI:     Normal bowel sounds, soft, nontender, nondistended,            no rebound tenderness   Extremities:   No cyanosis, clubbing, or edema "   Circulatory/Peripheral Vascular :   Pulses palpable and equal bilaterally   Integumentary:   No bleeding or rash. Normal temperature                cholecalciferol, 2,000 Units, Oral, Daily  clopidogrel, 75 mg, Oral, Daily  enoxaparin, 1 mg/kg, Subcutaneous, Q12H  fluticasone, 2 spray, Each Nare, Daily  insulin lispro, 2-7 Units, Subcutaneous, 4x Daily AC & at Bedtime  isosorbide mononitrate, 30 mg, Oral, Q24H  metoprolol tartrate, 12.5 mg, Oral, Q12H  metroNIDAZOLE, 1 application , Topical, BID  pantoprazole, 40 mg, Oral, Q AM  potassium chloride, 10 mEq, Oral, BID  sennosides-docusate, 1 tablet, Oral, Daily  sodium chloride, 10 mL, Intravenous, Q12H  torsemide, 20 mg, Oral, Daily             Results Review:    Results from last 7 days   Lab Units 08/03/23  0540   SODIUM mmol/L 141   POTASSIUM mmol/L 3.8   CHLORIDE mmol/L 106   CO2 mmol/L 24.6   BUN mg/dL 14   CREATININE mg/dL 0.66   GLUCOSE mg/dL 114*   CALCIUM mg/dL 8.6     Results from last 7 days   Lab Units 08/02/23  1317 08/02/23  1123 08/01/23  0543   HSTROP T ng/L 609* 502* 266*     Results from last 7 days   Lab Units 08/03/23  0540   WBC 10*3/mm3 6.35   HEMOGLOBIN g/dL 12.0   HEMATOCRIT % 35.3   PLATELETS 10*3/mm3 151                     @LABRCNT(bnp)@  I reviewed the patient's new clinical results.  I personally viewed and interpreted the patient's EKG/Telemetry data            Assessment & Plan   1.  Non-ST elevation MI  2.  Cardiomyopathy: Very likely stress-induced, however with history of PCI to the LAD, certainly cannot rule that out at this time.  Last catheterization 2022 with no significant disease in the LAD stent  3.  History of transcatheter aortic valve replacement  4.  Permanent pacemaker      -Echocardiogram reviewed with cardiomyopathy and wall motion abnormalities distal anterior wall and apex.  -Recommend continuing Toprol and Imdur.  -Agree with movement to Plavix   -Repeat HSTrop uptrended yesterday. Received Lovenox this am. Hold  tonight and tomorrow. Cath Friday

## 2023-08-03 NOTE — PLAN OF CARE
Goal Outcome Evaluation:  Patient resting abed and had c/o upper clavicular chest pain that radiated to bilateral shoulders. She was placed on oxygen, given IV pain medication, and took one dose of nitro sublingual that was effective, VSS.

## 2023-08-04 LAB
ALBUMIN SERPL-MCNC: 3.4 G/DL (ref 3.5–5.2)
ALBUMIN/GLOB SERPL: 1.4 G/DL
ALP SERPL-CCNC: 72 U/L (ref 39–117)
ALT SERPL W P-5'-P-CCNC: 23 U/L (ref 1–33)
ANION GAP SERPL CALCULATED.3IONS-SCNC: 9 MMOL/L (ref 5–15)
AST SERPL-CCNC: 43 U/L (ref 1–32)
BASOPHILS # BLD AUTO: 0.04 10*3/MM3 (ref 0–0.2)
BASOPHILS NFR BLD AUTO: 0.6 % (ref 0–1.5)
BILIRUB SERPL-MCNC: 0.4 MG/DL (ref 0–1.2)
BUN SERPL-MCNC: 11 MG/DL (ref 8–23)
BUN/CREAT SERPL: 18.6 (ref 7–25)
CALCIUM SPEC-SCNC: 8.5 MG/DL (ref 8.2–9.6)
CHLORIDE SERPL-SCNC: 101 MMOL/L (ref 98–107)
CO2 SERPL-SCNC: 25 MMOL/L (ref 22–29)
CREAT SERPL-MCNC: 0.59 MG/DL (ref 0.57–1)
DEPRECATED RDW RBC AUTO: 41.6 FL (ref 37–54)
EGFRCR SERPLBLD CKD-EPI 2021: 84.2 ML/MIN/1.73
EOSINOPHIL # BLD AUTO: 0.2 10*3/MM3 (ref 0–0.4)
EOSINOPHIL NFR BLD AUTO: 3.2 % (ref 0.3–6.2)
ERYTHROCYTE [DISTWIDTH] IN BLOOD BY AUTOMATED COUNT: 13.3 % (ref 12.3–15.4)
GLOBULIN UR ELPH-MCNC: 2.4 GM/DL
GLUCOSE BLDC GLUCOMTR-MCNC: 134 MG/DL (ref 70–130)
GLUCOSE BLDC GLUCOMTR-MCNC: 185 MG/DL (ref 70–130)
GLUCOSE BLDC GLUCOMTR-MCNC: 97 MG/DL (ref 70–130)
GLUCOSE SERPL-MCNC: 100 MG/DL (ref 65–99)
HCT VFR BLD AUTO: 35.8 % (ref 34–46.6)
HGB BLD-MCNC: 11.7 G/DL (ref 12–15.9)
IMM GRANULOCYTES # BLD AUTO: 0.02 10*3/MM3 (ref 0–0.05)
IMM GRANULOCYTES NFR BLD AUTO: 0.3 % (ref 0–0.5)
LYMPHOCYTES # BLD AUTO: 1.88 10*3/MM3 (ref 0.7–3.1)
LYMPHOCYTES NFR BLD AUTO: 30.5 % (ref 19.6–45.3)
MCH RBC QN AUTO: 27.9 PG (ref 26.6–33)
MCHC RBC AUTO-ENTMCNC: 32.7 G/DL (ref 31.5–35.7)
MCV RBC AUTO: 85.4 FL (ref 79–97)
MONOCYTES # BLD AUTO: 0.74 10*3/MM3 (ref 0.1–0.9)
MONOCYTES NFR BLD AUTO: 12 % (ref 5–12)
NEUTROPHILS NFR BLD AUTO: 3.29 10*3/MM3 (ref 1.7–7)
NEUTROPHILS NFR BLD AUTO: 53.4 % (ref 42.7–76)
NRBC BLD AUTO-RTO: 0 /100 WBC (ref 0–0.2)
PLATELET # BLD AUTO: 145 10*3/MM3 (ref 140–450)
PMV BLD AUTO: 11.7 FL (ref 6–12)
POTASSIUM SERPL-SCNC: 4 MMOL/L (ref 3.5–5.2)
PROT SERPL-MCNC: 5.8 G/DL (ref 6–8.5)
RBC # BLD AUTO: 4.19 10*6/MM3 (ref 3.77–5.28)
SODIUM SERPL-SCNC: 135 MMOL/L (ref 136–145)
WBC NRBC COR # BLD: 6.17 10*3/MM3 (ref 3.4–10.8)

## 2023-08-04 PROCEDURE — C1887 CATHETER, GUIDING: HCPCS | Performed by: INTERNAL MEDICINE

## 2023-08-04 PROCEDURE — 80053 COMPREHEN METABOLIC PANEL: CPT | Performed by: STUDENT IN AN ORGANIZED HEALTH CARE EDUCATION/TRAINING PROGRAM

## 2023-08-04 PROCEDURE — B2111ZZ FLUOROSCOPY OF MULTIPLE CORONARY ARTERIES USING LOW OSMOLAR CONTRAST: ICD-10-PCS | Performed by: INTERNAL MEDICINE

## 2023-08-04 PROCEDURE — C9600 PERC DRUG-EL COR STENT SING: HCPCS | Performed by: INTERNAL MEDICINE

## 2023-08-04 PROCEDURE — 82948 REAGENT STRIP/BLOOD GLUCOSE: CPT

## 2023-08-04 PROCEDURE — C1725 CATH, TRANSLUMIN NON-LASER: HCPCS | Performed by: INTERNAL MEDICINE

## 2023-08-04 PROCEDURE — 4A023N7 MEASUREMENT OF CARDIAC SAMPLING AND PRESSURE, LEFT HEART, PERCUTANEOUS APPROACH: ICD-10-PCS | Performed by: INTERNAL MEDICINE

## 2023-08-04 PROCEDURE — 027034Z DILATION OF CORONARY ARTERY, ONE ARTERY WITH DRUG-ELUTING INTRALUMINAL DEVICE, PERCUTANEOUS APPROACH: ICD-10-PCS | Performed by: INTERNAL MEDICINE

## 2023-08-04 PROCEDURE — 94664 DEMO&/EVAL PT USE INHALER: CPT

## 2023-08-04 PROCEDURE — 25010000002 FENTANYL CITRATE (PF) 50 MCG/ML SOLUTION: Performed by: INTERNAL MEDICINE

## 2023-08-04 PROCEDURE — 93454 CORONARY ARTERY ANGIO S&I: CPT | Performed by: INTERNAL MEDICINE

## 2023-08-04 PROCEDURE — 25510000001 IOPAMIDOL PER 1 ML: Performed by: INTERNAL MEDICINE

## 2023-08-04 PROCEDURE — 92928 PRQ TCAT PLMT NTRAC ST 1 LES: CPT | Performed by: INTERNAL MEDICINE

## 2023-08-04 PROCEDURE — 85025 COMPLETE CBC W/AUTO DIFF WBC: CPT | Performed by: STUDENT IN AN ORGANIZED HEALTH CARE EDUCATION/TRAINING PROGRAM

## 2023-08-04 PROCEDURE — 94799 UNLISTED PULMONARY SVC/PX: CPT

## 2023-08-04 PROCEDURE — 25010000002 MIDAZOLAM PER 1 MG: Performed by: INTERNAL MEDICINE

## 2023-08-04 PROCEDURE — 99232 SBSQ HOSP IP/OBS MODERATE 35: CPT | Performed by: INTERNAL MEDICINE

## 2023-08-04 PROCEDURE — C1769 GUIDE WIRE: HCPCS | Performed by: INTERNAL MEDICINE

## 2023-08-04 PROCEDURE — 85347 COAGULATION TIME ACTIVATED: CPT

## 2023-08-04 PROCEDURE — 94761 N-INVAS EAR/PLS OXIMETRY MLT: CPT

## 2023-08-04 PROCEDURE — 94640 AIRWAY INHALATION TREATMENT: CPT

## 2023-08-04 PROCEDURE — C1874 STENT, COATED/COV W/DEL SYS: HCPCS | Performed by: INTERNAL MEDICINE

## 2023-08-04 PROCEDURE — 25010000002 HEPARIN (PORCINE) PER 1000 UNITS: Performed by: INTERNAL MEDICINE

## 2023-08-04 PROCEDURE — C1894 INTRO/SHEATH, NON-LASER: HCPCS | Performed by: INTERNAL MEDICINE

## 2023-08-04 PROCEDURE — B2151ZZ FLUOROSCOPY OF LEFT HEART USING LOW OSMOLAR CONTRAST: ICD-10-PCS | Performed by: INTERNAL MEDICINE

## 2023-08-04 DEVICE — XIENCE SKYPOINT™ EVEROLIMUS ELUTING CORONARY STENT SYSTEM 2.50 MM X 38 MM / RAPID-EXCHANGE
Type: IMPLANTABLE DEVICE | Site: HEART | Status: FUNCTIONAL
Brand: XIENCE SKYPOINT™

## 2023-08-04 RX ORDER — MIDAZOLAM HYDROCHLORIDE 1 MG/ML
INJECTION INTRAMUSCULAR; INTRAVENOUS
Status: DISCONTINUED | OUTPATIENT
Start: 2023-08-04 | End: 2023-08-04 | Stop reason: HOSPADM

## 2023-08-04 RX ORDER — ASPIRIN 325 MG
TABLET ORAL
Status: DISCONTINUED | OUTPATIENT
Start: 2023-08-04 | End: 2023-08-04 | Stop reason: HOSPADM

## 2023-08-04 RX ORDER — ACETAMINOPHEN 325 MG/1
650 TABLET ORAL EVERY 4 HOURS PRN
Status: DISCONTINUED | OUTPATIENT
Start: 2023-08-04 | End: 2023-08-05 | Stop reason: HOSPADM

## 2023-08-04 RX ORDER — HEPARIN SODIUM 1000 [USP'U]/ML
INJECTION, SOLUTION INTRAVENOUS; SUBCUTANEOUS
Status: DISCONTINUED | OUTPATIENT
Start: 2023-08-04 | End: 2023-08-04 | Stop reason: HOSPADM

## 2023-08-04 RX ORDER — ONDANSETRON 4 MG/1
4 TABLET, FILM COATED ORAL EVERY 6 HOURS PRN
Status: DISCONTINUED | OUTPATIENT
Start: 2023-08-04 | End: 2023-08-05 | Stop reason: HOSPADM

## 2023-08-04 RX ORDER — ASPIRIN 81 MG/1
81 TABLET ORAL DAILY
Status: DISCONTINUED | OUTPATIENT
Start: 2023-08-04 | End: 2023-08-05 | Stop reason: HOSPADM

## 2023-08-04 RX ORDER — ALBUTEROL SULFATE 2.5 MG/3ML
2.5 SOLUTION RESPIRATORY (INHALATION) EVERY 4 HOURS PRN
Status: DISCONTINUED | OUTPATIENT
Start: 2023-08-04 | End: 2023-08-05 | Stop reason: HOSPADM

## 2023-08-04 RX ORDER — IPRATROPIUM BROMIDE AND ALBUTEROL SULFATE 2.5; .5 MG/3ML; MG/3ML
3 SOLUTION RESPIRATORY (INHALATION)
Status: DISCONTINUED | OUTPATIENT
Start: 2023-08-04 | End: 2023-08-05 | Stop reason: HOSPADM

## 2023-08-04 RX ORDER — SODIUM CHLORIDE 9 MG/ML
50 INJECTION, SOLUTION INTRAVENOUS CONTINUOUS
Status: DISCONTINUED | OUTPATIENT
Start: 2023-08-04 | End: 2023-08-05 | Stop reason: HOSPADM

## 2023-08-04 RX ORDER — CLOPIDOGREL BISULFATE 75 MG/1
TABLET ORAL
Status: DISCONTINUED | OUTPATIENT
Start: 2023-08-04 | End: 2023-08-04 | Stop reason: HOSPADM

## 2023-08-04 RX ORDER — ONDANSETRON 2 MG/ML
4 INJECTION INTRAMUSCULAR; INTRAVENOUS EVERY 6 HOURS PRN
Status: DISCONTINUED | OUTPATIENT
Start: 2023-08-04 | End: 2023-08-05 | Stop reason: HOSPADM

## 2023-08-04 RX ORDER — FENTANYL CITRATE 50 UG/ML
INJECTION, SOLUTION INTRAMUSCULAR; INTRAVENOUS
Status: DISCONTINUED | OUTPATIENT
Start: 2023-08-04 | End: 2023-08-04 | Stop reason: HOSPADM

## 2023-08-04 RX ORDER — LIDOCAINE HYDROCHLORIDE 20 MG/ML
INJECTION, SOLUTION INFILTRATION; PERINEURAL
Status: DISCONTINUED | OUTPATIENT
Start: 2023-08-04 | End: 2023-08-04 | Stop reason: HOSPADM

## 2023-08-04 RX ORDER — VERAPAMIL HYDROCHLORIDE 2.5 MG/ML
INJECTION, SOLUTION INTRAVENOUS
Status: DISCONTINUED | OUTPATIENT
Start: 2023-08-04 | End: 2023-08-04 | Stop reason: HOSPADM

## 2023-08-04 RX ADMIN — GUAIFENESIN 200 MG: 100 SOLUTION ORAL at 00:51

## 2023-08-04 RX ADMIN — POTASSIUM CHLORIDE 10 MEQ: 750 TABLET, EXTENDED RELEASE ORAL at 21:12

## 2023-08-04 RX ADMIN — METRONIDAZOLE 1 APPLICATION: 7.5 CREAM TOPICAL at 09:03

## 2023-08-04 RX ADMIN — METOPROLOL TARTRATE 12.5 MG: 25 TABLET, FILM COATED ORAL at 21:12

## 2023-08-04 RX ADMIN — IPRATROPIUM BROMIDE AND ALBUTEROL SULFATE 3 ML: 2.5; .5 SOLUTION RESPIRATORY (INHALATION) at 12:51

## 2023-08-04 RX ADMIN — METRONIDAZOLE 1 APPLICATION: 7.5 CREAM TOPICAL at 21:12

## 2023-08-04 RX ADMIN — IPRATROPIUM BROMIDE AND ALBUTEROL SULFATE 3 ML: 2.5; .5 SOLUTION RESPIRATORY (INHALATION) at 19:58

## 2023-08-04 RX ADMIN — POTASSIUM CHLORIDE 10 MEQ: 750 TABLET, EXTENDED RELEASE ORAL at 09:01

## 2023-08-04 RX ADMIN — Medication 10 ML: at 09:03

## 2023-08-04 RX ADMIN — SODIUM CHLORIDE 50 ML/HR: 9 INJECTION, SOLUTION INTRAVENOUS at 14:11

## 2023-08-04 RX ADMIN — ISOSORBIDE MONONITRATE 30 MG: 30 TABLET, EXTENDED RELEASE ORAL at 09:01

## 2023-08-04 RX ADMIN — METOPROLOL TARTRATE 12.5 MG: 25 TABLET, FILM COATED ORAL at 09:02

## 2023-08-04 RX ADMIN — Medication 10 ML: at 21:13

## 2023-08-04 RX ADMIN — ACETAMINOPHEN 650 MG: 325 TABLET, FILM COATED ORAL at 21:11

## 2023-08-04 RX ADMIN — PANTOPRAZOLE SODIUM 40 MG: 40 TABLET, DELAYED RELEASE ORAL at 09:01

## 2023-08-04 RX ADMIN — Medication 2000 UNITS: at 09:01

## 2023-08-04 RX ADMIN — NITROGLYCERIN 0.4 MG: 0.4 TABLET SUBLINGUAL at 07:08

## 2023-08-04 RX ADMIN — GUAIFENESIN 200 MG: 100 SOLUTION ORAL at 21:12

## 2023-08-04 RX ADMIN — IPRATROPIUM BROMIDE AND ALBUTEROL SULFATE 3 ML: 2.5; .5 SOLUTION RESPIRATORY (INHALATION) at 15:00

## 2023-08-04 RX ADMIN — FLUTICASONE PROPIONATE 2 SPRAY: 50 SPRAY, METERED NASAL at 09:03

## 2023-08-04 NOTE — CONSULTS
Met with patient and family to discuss the benefits of cardiac rehab. Pt has attended our program previously. She will call if interested in attending again.Provided phase II information along with the contact information for cardiac rehab here at [facility].

## 2023-08-04 NOTE — PROGRESS NOTES
Name: Yuliana Gonzalez ADMIT: 2023   : 1930  PCP: Trent Davis MD    MRN: [id number] LOS: 4 days   AGE/SEX: 89 y.o. female  ROOM: [facility]     Subjective   Subjective   Patient seen and examined this morning.  Hospital day 4.  At time of my examination, patient is awake, alert, sitting up in bed.  Family present at bedside. Patient continues to have intermittent episodes of chest pain requiring nitroglycerin. Tentative plan is for cardiac cath later today.     Objective   Objective   Vital Signs  Temp:  [98.2 øF (36.8 øC)-99.5 øF (37.5 øC)] 98.6 øF (37 øC)  Heart Rate:  [81-97] 84  Resp:  [14-20] 16  BP: (113-163)/(55-68) 113/55  SpO2:  [97 %-99 %] 98 %  on  Flow (L/min):  [2-3] 3;   Device (Oxygen Therapy): nasal cannula with ETCO2  Body mass index is 29.85 kg/mý.  Physical Exam  Vitals and nursing note reviewed.   Constitutional:       General: She is not in acute distress.     Appearance: She is ill-appearing.   Cardiovascular:      Rate and Rhythm: Normal rate and regular rhythm.      Pulses: Normal pulses.      Heart sounds: Normal heart sounds.   Pulmonary:      Effort: Pulmonary effort is normal. No respiratory distress.      Breath sounds: Wheezing present. No rhonchi.   Abdominal:      General: Bowel sounds are normal. There is no distension.      Palpations: Abdomen is soft.      Tenderness: There is no abdominal tenderness.   Musculoskeletal:      Comments: Trace LE edema   Skin:     General: Skin is warm and dry.      Coloration: Skin is pale.   Neurological:      Mental Status: She is alert.     Results Review     I reviewed the patient's new clinical results.  Results from last 7 days   Lab Units 23  0535 23  0540 23  0619 23  0543   WBC 10*3/mm3 6.17 6.35 8.73 8.14   HEMOGLOBIN g/dL 11.7* 12.0 12.4 13.2   PLATELETS 10*3/mm3 145 151 164 175       Results from last 7 days   Lab Units 23  0535 23  0540 23  0619 23  0579   SODIUM mmol/L  135* 141 141 140   POTASSIUM mmol/L 4.0 3.8 4.0 3.8   CHLORIDE mmol/L 101 106 106 104   CO2 mmol/L 25.0 24.6 25.1 25.0   BUN mg/dL 11 14 15 13   CREATININE mg/dL 0.59 0.66 0.59 0.51*   GLUCOSE mg/dL 100* 114* 108* 100*   EGFR mL/min/1.73 84.2 81.9 84.2 87.2       Results from last 7 days   Lab Units 08/04/23  0535 08/03/23  0540 08/02/23  0619 08/01/23  0543   ALBUMIN g/dL 3.4* 3.2* 3.4* 3.7   BILIRUBIN mg/dL 0.4 0.5 0.5 0.2   ALK PHOS U/L 72 71 73 88   AST (SGOT) U/L 43* 34* 49* 43*   ALT (SGPT) U/L 23 16 17 16       Results from last 7 days   Lab Units 08/04/23  0535 08/03/23  0540 08/02/23  0619 08/01/23  0543   CALCIUM mg/dL 8.5 8.6 8.6 8.7   ALBUMIN g/dL 3.4* 3.2* 3.4* 3.7         Glucose   Date/Time Value Ref Range Status   08/04/2023 0606 97 70 - 130 mg/dL Final     Comment:     Meter: [id number] : [id number] Jelly Davis S NA   08/03/2023 2103 130 70 - 130 mg/dL Final     Comment:     Meter: [id number] : [id number] Jelly Davis S NA   08/03/2023 1537 126 70 - 130 mg/dL Final     Comment:     Meter: [id number] : [id number] [facility]   08/03/2023 1033 134 (H) 70 - 130 mg/dL Final     Comment:     Meter: [id number] : [id number] [facility] NA   08/03/2023 0616 113 70 - 130 mg/dL Final     Comment:     Meter: [id number] : [id number] Aden Vergara NA   08/02/2023 2002 151 (H) 70 - 130 mg/dL Final     Comment:     Meter: [id number] : [id number] Samaniegoashok Vergara KARTHIK   08/02/2023 1534 171 (H) 70 - 130 mg/dL Final     Comment:     Meter: [id number] : [id number] Bora ANGELES       No radiology results for the last day    I have personally reviewed all medications:  Scheduled Medications  aspirin, 81 mg, Oral, Daily  cholecalciferol, 2,000 Units, Oral, Daily  clopidogrel, 75 mg, Oral, Daily  fluticasone, 2 spray, Each Nare, Daily  insulin lispro, 2-7 Units, Subcutaneous, 4x Daily AC & at Bedtime  ipratropium-albuterol, 3 mL, Nebulization, 4x Daily - RT  isosorbide  mononitrate, 30 mg, Oral, Q24H  metoprolol tartrate, 12.5 mg, Oral, Q12H  metroNIDAZOLE, 1 application , Topical, BID  pantoprazole, 40 mg, Oral, Q AM  potassium chloride, 10 mEq, Oral, BID  sennosides-docusate, 1 tablet, Oral, Daily  sodium chloride, 10 mL, Intravenous, Q12H  torsemide, 20 mg, Oral, Daily    Infusions  sodium chloride, 50 mL/hr    Diet  Diet: Cardiac Diets; Healthy Heart (2-3 Na+); Texture: Regular Texture (IDDSI 7); Fluid Consistency: Thin (IDDSI 0)    I have personally reviewed:  [x]  Laboratory   [x]  Microbiology   [x]  Radiology   [x]  EKG/Telemetry  [x]  Cardiology/Vascular   []  Pathology    []  Records       Assessment/Plan     Active Hospital Problems    Diagnosis  POA    **NSTEMI (non-ST elevated myocardial infarction) [I21.4]  Yes    Elevated troponin [R77.8]  Yes    Chest pain [R07.9]  Yes    Prediabetes [R73.03]  Yes    Hyperglycemia [R73.9]  Yes    Presence of cardiac pacemaker [Z95.0]  Yes    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       89 y.o. female admitted with NSTEMI (non-ST elevated myocardial infarction).    NSTEMI  Chest pain, atypical  Chronic HFpEF, now with newly reduced EF, consistent with HFrEF  - Patient with complaints of chest pain on admission, coupled with elevated troponin. EKG without evidence of acute ischemic changes. CT angiogram negative for PE or acute cardiopulmonary abnormalities. ProBNP elevated at 3575. 2D Echocardiogram (08/01/23) EF 37%, multiple areas of LV wall hypokinesis, limited study to assess LV function.  Most recent prior 2D echocardiogram was in February 2023, which showed EF 58.2% at that time with grade 1 diastolic dysfunction.  - Cardiology consulted and following. Troponin values have  continued to trend upward and she has continued to have intermittent episodes of chest pain requiring nitroglycerin. Plan is for cardiac catheterization later today (08/04). Will continue to follow their plans/recommendations. Greatly appreciate their help.   - Plavix, Lovenox being held, resume per Cardiology when appropriate.  - Monitor on telemetry.    Parainfluenza virus 3 infection  -  Interval history: (08/02), notified by nursing that she was beginning to experience dry cough, congestion, so supportive medications were ordered. However, nursing obtained RVP, which was + for parainfluenza virus 3. She is being treated with supportive medications and supplemental oxygen PRN, she feels like she is able to produce more sputum today, however, is having slightly more wheezing on physical exam.  - Will order nebulizers at this time. Continue other supportive medications PRN for symptom control. Continue supplemental O2 PRN. Closely monitor symptoms, if no improvement noted after cardiac cath, can repeat imaging and pursue further workup/adjust treatment regimen as indicated.  - Continue to closely monitor respiratory status, continue with supportive medications as prescribed.    Epigastric abdominal pain  Nephrolithiasis  Elevated AST  - Pain noted on arrival. CT AP obtained showing B/L nephrolithiasis, multiple stones in left renal pelvis that are stable, no hydronephrosis, right kidney with single non-obstructing stone, no ureteral stones or obstruction. Also noting diverticulosis in sigmoid colon and thickened appearance of endometrium. No other noted acute intra-abdominal pathology, including no mention of pancreatitis, given that lipase was elevated, however, this can also be seen in cardiac pathology, so not specific. AST mildly elevated, patient without abdominal complaints at this time.  - Update (08/04), patient tolerating PPI well, denies abdominal symptoms at this time, symptoms noted on admission have  now resolved.  - Continue PPI as prescribed.    Hypertension  - BP acceptable acutely. Appears stable. No indications to warrant acute changes/intervention at this time.  - Continue current medications as prescribed. Trend BP to guide ongoing management decisions.    Prediabetes with Hyperglycemia  - Glucose elevated on most recent labs. Patient without known history of T2DM.  Most recent hemoglobin A1c 5.90% obtained following admission.  - Monitor for now, continue with POC glucose checks and correctional SSI as needed.    Anemia  - Hemoglobin low on most recent labs, however, stable from prior. No evidence of overt blood loss. No indications for acute intervention at this time.    - Order repeat CBC in AM for reassessment. Continue to monitor, transfuse for hemoglobin <7.    SCDs for DVT prophylaxis.  Full code.  Discussed with patient, nursing staff, CCP, and care team on multidisciplinary rounds.  Anticipate discharge home when cleared by consultants..      Kavon Light DO  [facility]  08/04/23

## 2023-08-04 NOTE — PLAN OF CARE
Goal Outcome Evaluation:  Plan of Care Reviewed With: patient        Progress: improving  Outcome Evaluation: Right radial site soft, dry, intact. Radial band removed. Denies any chest pain. Positivie for parainfluenza. Standard precautions used. Wheezing at times. On room air. Ambulates well. Possible d/c tomorrow.

## 2023-08-04 NOTE — PROGRESS NOTES
"Patient Care Team:  Trent Davis MD as PCP - General (Internal Medicine)  Trent Davis MD as PCP - Internal Medicine (Internal Medicine)  Thang Bruce MD as Consulting Physician (Ophthalmology)  Curtis Marshall MD as Consulting Physician (Cardiology)    Chief Complaint: Non-ST elevation MI    Interval History:   Chest discomfort when walking to bathroom    Objective   Vital Signs  Temp:  [98.2 øF (36.8 øC)-99.5 øF (37.5 øC)] 98.6 øF (37 øC)  Heart Rate:  [81-97] 84  Resp:  [16-18] 16  BP: (113-163)/(55-68) 113/55    Intake/Output Summary (Last 24 hours) at 8/4/2023 0900  Last data filed at 8/4/2023 0351  Gross per 24 hour   Intake --   Output 100 ml   Net -100 ml     Flowsheet Rows      Flowsheet Row First Filed Value   Admission Height 154.9 cm (61\") Documented at 07/31/2023 0902   Admission Weight 68 kg (150 lb) Documented at 07/31/2023 0902            Temp:  [98.2 øF (36.8 øC)-99.5 øF (37.5 øC)] 98.6 øF (37 øC)  Heart Rate:  [81-97] 84  Resp:  [16-18] 16  BP: (113-163)/(55-68) 113/55    Intake/Output Summary (Last 24 hours) at 8/4/2023 0900  Last data filed at 8/4/2023 0351  Gross per 24 hour   Intake --   Output 100 ml   Net -100 ml     Flowsheet Rows      Flowsheet Row First Filed Value   Admission Height 154.9 cm (61\") Documented at 07/31/2023 0902   Admission Weight 68 kg (150 lb) Documented at 07/31/2023 0902            General Appearance:    Alert, cooperative, in no acute distress   Head:    Normocephalic, without obvious abnormality, atraumatic       Neck/Lymph   No adenopathy, supple, no thyromegaly, no carotid bruit, no    JVD   Lungs:     Clear to auscultation bilaterally, no wheezes, rales, or     rhonchi    Cardiac:    Normal rate, regular rhythm, no murmur, no rub, no gallop   Chest Wall:    No abnormalities observed   GI:     Normal bowel sounds, soft, nontender, nondistended,            no rebound tenderness   Extremities:   No cyanosis, clubbing, or edema "   Circulatory/Peripheral Vascular :   Pulses palpable and equal bilaterally   Integumentary:   No bleeding or rash. Normal temperature                cholecalciferol, 2,000 Units, Oral, Daily  clopidogrel, 75 mg, Oral, Daily  fluticasone, 2 spray, Each Nare, Daily  insulin lispro, 2-7 Units, Subcutaneous, 4x Daily AC & at Bedtime  isosorbide mononitrate, 30 mg, Oral, Q24H  metoprolol tartrate, 12.5 mg, Oral, Q12H  metroNIDAZOLE, 1 application , Topical, BID  pantoprazole, 40 mg, Oral, Q AM  potassium chloride, 10 mEq, Oral, BID  sennosides-docusate, 1 tablet, Oral, Daily  sodium chloride, 10 mL, Intravenous, Q12H  torsemide, 20 mg, Oral, Daily             Results Review:    Results from last 7 days   Lab Units 08/04/23  0535   SODIUM mmol/L 135*   POTASSIUM mmol/L 4.0   CHLORIDE mmol/L 101   CO2 mmol/L 25.0   BUN mg/dL 11   CREATININE mg/dL 0.59   GLUCOSE mg/dL 100*   CALCIUM mg/dL 8.5     Results from last 7 days   Lab Units 08/02/23  1317 08/02/23  1123 08/01/23  0543   HSTROP T ng/L 609* 502* 266*     Results from last 7 days   Lab Units 08/04/23  0535   WBC 10*3/mm3 6.17   HEMOGLOBIN g/dL 11.7*   HEMATOCRIT % 35.8   PLATELETS 10*3/mm3 145                     @LABRCNT(bnp)@  I reviewed the patient's new clinical results.  I personally viewed and interpreted the patient's EKG/Telemetry data            Assessment & Plan   1.  Non-ST elevation MI  2.  Cardiomyopathy: Very likely stress-induced, however with history of PCI to the LAD, certainly cannot rule that out at this time.  Last catheterization 2022 with no significant disease in the LAD stent  3.  History of transcatheter aortic valve replacement  4.  Permanent pacemaker      -Echocardiogram reviewed with cardiomyopathy and wall motion abnormalities distal anterior wall and apex.  -Recommend continuing Toprol and Imdur.  -Agree with movement to Plavix. Consider additional of ASA depending on cath  -Repeat HSTrop uptrended yesterday. Cath today                 Home

## 2023-08-04 NOTE — PLAN OF CARE
Goal Outcome Evaluation:  Plan of Care Reviewed With: patient        Progress: no change  Outcome Evaluation: C/O chest pain when walking to bathroom x 2, relieved with nitro, encouraged bedrest, purwick placed, tolerated, VSS,  temp max,99.5, NSR on monitor, POD, med x 2 for c/o cough with relief stated,Eyes closed at long interval, NPO for heart cath this am

## 2023-08-05 ENCOUNTER — READMISSION MANAGEMENT (OUTPATIENT)
Dept: CALL CENTER | Facility: HOSPITAL | Age: 88
End: 2023-08-05
Payer: MEDICARE

## 2023-08-05 VITALS
WEIGHT: 154.3 LBS | OXYGEN SATURATION: 97 % | SYSTOLIC BLOOD PRESSURE: 134 MMHG | HEIGHT: 61 IN | RESPIRATION RATE: 18 BRPM | HEART RATE: 73 BPM | TEMPERATURE: 98.5 F | DIASTOLIC BLOOD PRESSURE: 52 MMHG | BODY MASS INDEX: 29.13 KG/M2

## 2023-08-05 PROBLEM — R79.89 ELEVATED TROPONIN: Status: RESOLVED | Noted: 2023-08-01 | Resolved: 2023-08-05

## 2023-08-05 PROBLEM — R77.8 ELEVATED TROPONIN: Status: RESOLVED | Noted: 2023-08-01 | Resolved: 2023-08-05

## 2023-08-05 PROBLEM — R07.9 CHEST PAIN: Status: RESOLVED | Noted: 2023-08-01 | Resolved: 2023-08-05

## 2023-08-05 LAB
ALBUMIN SERPL-MCNC: 3.1 G/DL (ref 3.5–5.2)
ALBUMIN/GLOB SERPL: 1.3 G/DL
ALP SERPL-CCNC: 71 U/L (ref 39–117)
ALT SERPL W P-5'-P-CCNC: 27 U/L (ref 1–33)
ANION GAP SERPL CALCULATED.3IONS-SCNC: 10 MMOL/L (ref 5–15)
AST SERPL-CCNC: 52 U/L (ref 1–32)
BASOPHILS # BLD AUTO: 0.02 10*3/MM3 (ref 0–0.2)
BASOPHILS NFR BLD AUTO: 0.4 % (ref 0–1.5)
BILIRUB SERPL-MCNC: 0.2 MG/DL (ref 0–1.2)
BUN SERPL-MCNC: 10 MG/DL (ref 8–23)
BUN/CREAT SERPL: 18.9 (ref 7–25)
CALCIUM SPEC-SCNC: 8.2 MG/DL (ref 8.2–9.6)
CHLORIDE SERPL-SCNC: 105 MMOL/L (ref 98–107)
CO2 SERPL-SCNC: 22 MMOL/L (ref 22–29)
CREAT SERPL-MCNC: 0.53 MG/DL (ref 0.57–1)
DEPRECATED RDW RBC AUTO: 43 FL (ref 37–54)
EGFRCR SERPLBLD CKD-EPI 2021: 86.4 ML/MIN/1.73
EOSINOPHIL # BLD AUTO: 0.23 10*3/MM3 (ref 0–0.4)
EOSINOPHIL NFR BLD AUTO: 4.1 % (ref 0.3–6.2)
ERYTHROCYTE [DISTWIDTH] IN BLOOD BY AUTOMATED COUNT: 13.5 % (ref 12.3–15.4)
GLOBULIN UR ELPH-MCNC: 2.4 GM/DL
GLUCOSE BLDC GLUCOMTR-MCNC: 116 MG/DL (ref 70–130)
GLUCOSE BLDC GLUCOMTR-MCNC: 149 MG/DL (ref 70–130)
GLUCOSE SERPL-MCNC: 108 MG/DL (ref 65–99)
HCT VFR BLD AUTO: 34.7 % (ref 34–46.6)
HGB BLD-MCNC: 11.4 G/DL (ref 12–15.9)
IMM GRANULOCYTES # BLD AUTO: 0.02 10*3/MM3 (ref 0–0.05)
IMM GRANULOCYTES NFR BLD AUTO: 0.4 % (ref 0–0.5)
LYMPHOCYTES # BLD AUTO: 1.48 10*3/MM3 (ref 0.7–3.1)
LYMPHOCYTES NFR BLD AUTO: 26.1 % (ref 19.6–45.3)
MCH RBC QN AUTO: 28.1 PG (ref 26.6–33)
MCHC RBC AUTO-ENTMCNC: 32.9 G/DL (ref 31.5–35.7)
MCV RBC AUTO: 85.7 FL (ref 79–97)
MONOCYTES # BLD AUTO: 0.66 10*3/MM3 (ref 0.1–0.9)
MONOCYTES NFR BLD AUTO: 11.6 % (ref 5–12)
NEUTROPHILS NFR BLD AUTO: 3.26 10*3/MM3 (ref 1.7–7)
NEUTROPHILS NFR BLD AUTO: 57.4 % (ref 42.7–76)
NRBC BLD AUTO-RTO: 0 /100 WBC (ref 0–0.2)
PLATELET # BLD AUTO: 146 10*3/MM3 (ref 140–450)
PMV BLD AUTO: 11.9 FL (ref 6–12)
POTASSIUM SERPL-SCNC: 4.1 MMOL/L (ref 3.5–5.2)
PROT SERPL-MCNC: 5.5 G/DL (ref 6–8.5)
RBC # BLD AUTO: 4.05 10*6/MM3 (ref 3.77–5.28)
SODIUM SERPL-SCNC: 137 MMOL/L (ref 136–145)
WBC NRBC COR # BLD: 5.67 10*3/MM3 (ref 3.4–10.8)

## 2023-08-05 PROCEDURE — 94664 DEMO&/EVAL PT USE INHALER: CPT

## 2023-08-05 PROCEDURE — 94799 UNLISTED PULMONARY SVC/PX: CPT

## 2023-08-05 PROCEDURE — 94761 N-INVAS EAR/PLS OXIMETRY MLT: CPT

## 2023-08-05 PROCEDURE — 85025 COMPLETE CBC W/AUTO DIFF WBC: CPT | Performed by: INTERNAL MEDICINE

## 2023-08-05 PROCEDURE — 80053 COMPREHEN METABOLIC PANEL: CPT | Performed by: INTERNAL MEDICINE

## 2023-08-05 PROCEDURE — 82948 REAGENT STRIP/BLOOD GLUCOSE: CPT

## 2023-08-05 PROCEDURE — 99232 SBSQ HOSP IP/OBS MODERATE 35: CPT

## 2023-08-05 RX ORDER — ROSUVASTATIN CALCIUM 20 MG/1
20 TABLET, COATED ORAL DAILY
Qty: 90 TABLET | Refills: 0 | Status: SHIPPED | OUTPATIENT
Start: 2023-08-05

## 2023-08-05 RX ORDER — CLOPIDOGREL BISULFATE 75 MG/1
75 TABLET ORAL DAILY
Qty: 30 TABLET | Refills: 0 | Status: SHIPPED | OUTPATIENT
Start: 2023-08-05

## 2023-08-05 RX ORDER — METOPROLOL SUCCINATE 25 MG/1
25 TABLET, EXTENDED RELEASE ORAL DAILY
Qty: 30 TABLET | Refills: 0 | Status: SHIPPED | OUTPATIENT
Start: 2023-08-05 | End: 2023-09-04

## 2023-08-05 RX ORDER — BENZONATATE 100 MG/1
200 CAPSULE ORAL 3 TIMES DAILY PRN
Qty: 42 CAPSULE | Refills: 0 | Status: SHIPPED | OUTPATIENT
Start: 2023-08-05 | End: 2023-08-19

## 2023-08-05 RX ORDER — ALBUTEROL SULFATE 90 UG/1
2 AEROSOL, METERED RESPIRATORY (INHALATION) EVERY 4 HOURS PRN
Qty: 8.5 G | Refills: 0 | Status: SHIPPED | OUTPATIENT
Start: 2023-08-05

## 2023-08-05 RX ADMIN — PANTOPRAZOLE SODIUM 40 MG: 40 TABLET, DELAYED RELEASE ORAL at 06:04

## 2023-08-05 RX ADMIN — ASPIRIN 81 MG: 81 TABLET, COATED ORAL at 08:58

## 2023-08-05 RX ADMIN — IPRATROPIUM BROMIDE AND ALBUTEROL SULFATE 3 ML: 2.5; .5 SOLUTION RESPIRATORY (INHALATION) at 10:56

## 2023-08-05 RX ADMIN — BENZONATATE 100 MG: 100 CAPSULE ORAL at 06:04

## 2023-08-05 RX ADMIN — CLOPIDOGREL BISULFATE 75 MG: 75 TABLET, FILM COATED ORAL at 08:58

## 2023-08-05 RX ADMIN — FLUTICASONE PROPIONATE 2 SPRAY: 50 SPRAY, METERED NASAL at 08:59

## 2023-08-05 RX ADMIN — METRONIDAZOLE 1 APPLICATION: 7.5 CREAM TOPICAL at 08:59

## 2023-08-05 RX ADMIN — IPRATROPIUM BROMIDE AND ALBUTEROL SULFATE 3 ML: 2.5; .5 SOLUTION RESPIRATORY (INHALATION) at 07:05

## 2023-08-05 RX ADMIN — Medication 10 ML: at 09:06

## 2023-08-05 RX ADMIN — METOPROLOL TARTRATE 12.5 MG: 25 TABLET, FILM COATED ORAL at 08:58

## 2023-08-05 RX ADMIN — SENNOSIDES AND DOCUSATE SODIUM 1 TABLET: 50; 8.6 TABLET ORAL at 09:00

## 2023-08-05 RX ADMIN — GUAIFENESIN 200 MG: 100 SOLUTION ORAL at 10:29

## 2023-08-05 RX ADMIN — ISOSORBIDE MONONITRATE 30 MG: 30 TABLET, EXTENDED RELEASE ORAL at 08:58

## 2023-08-05 RX ADMIN — TORSEMIDE 20 MG: 20 TABLET ORAL at 08:58

## 2023-08-05 RX ADMIN — Medication 2000 UNITS: at 08:58

## 2023-08-05 RX ADMIN — POTASSIUM CHLORIDE 10 MEQ: 750 TABLET, EXTENDED RELEASE ORAL at 08:58

## 2023-08-05 NOTE — PLAN OF CARE
Goal Outcome Evaluation:                   Patient denies chest pain nor discomfort, vital signs stable, right wrist puncture soft dry and intact, up with assist to bathroom , educate to call for assist  [possible home today cont to monitor.

## 2023-08-05 NOTE — DISCHARGE SUMMARY
Patient Name: Yuliana Gonzalez  : 1930  MRN: [id number]    Date of Admission: 2023  Date of Discharge:  2023  Primary Care Physician: Trent Davis MD      Chief Complaint:   Abdominal Pain      Discharge Diagnoses     Active Hospital Problems    Diagnosis  POA    **NSTEMI (non-ST elevated myocardial infarction) [I21.4]  Yes    Prediabetes [R73.03]  Yes    Hyperglycemia [R73.9]  Yes    Presence of cardiac pacemaker [Z95.0]  Yes    S/P TAVR (transcatheter aortic valve replacement) [Z95.2]  Not Applicable    Coronary artery disease involving native coronary artery of native heart [I25.10]  Yes    Aortic stenosis [I35.0]  Yes    Hypertension [I10]  Yes      Resolved Hospital Problems    Diagnosis Date Resolved POA    Elevated troponin [R77.8] 2023 Yes    Chest pain [R07.9] 2023 Yes        Hospital Course     Ms. Gonzalez is a 89 y.o. female with a history of chronic diastolic heart failure, coronary artery disease, hypertension, aortic stenosis s/p TAVR, pacemaker who presented to [facility] initially complaining of chest pain, cough, shortness of breath.  Please see the admitting history and physical for further details.  She was found to have an NSTEMI and a parainfluenza virus URI and was admitted to the hospital for further evaluation and treatment.      Her URI was treated with supportive/symptomatic therapy. She was seen in consultation by cardiology and underwent echocardiogram which showed a newly depressed EF at 37%. She then underwent cardiac catheterization which showed 99% mid vessel stenosis in the LAD. A ISMAEL was placed. She did well post procedurally and has been cleared for discharge. She will need to remain on DAPT for approximately a year. She has not been able to tolerate atorvastatin in the past due to nausea. We will try rosuvastatin. Her blood pressure cannot currently tolerate additional GDMT. She will need to follow up with cardiology in the clinic  in 1-2 weeks.    Day of Discharge     Subjective:  No events overnight. She received a ISMAEL to the mid LAD yesterday during her catheterization. She complains of some mild URI symptoms.     Physical Exam:  Temp:  [97.8 øF (36.6 øC)-98.6 øF (37 øC)] 98.5 øF (36.9 øC)  Heart Rate:  [72-89] 73  Resp:  [16-18] 18  BP: (102-134)/(48-79) 134/52  Body mass index is 29.51 kg/mý.  Physical Exam  Constitutional:       General: She is not in acute distress.     Appearance: She is not toxic-appearing.   Cardiovascular:      Rate and Rhythm: Normal rate and regular rhythm.      Heart sounds: Normal heart sounds.   Pulmonary:      Effort: Pulmonary effort is normal. No respiratory distress.      Breath sounds: Normal breath sounds. No wheezing, rhonchi or rales.   Abdominal:      General: Bowel sounds are normal. There is no distension.      Palpations: Abdomen is soft.      Tenderness: There is no abdominal tenderness. There is no guarding or rebound.   Musculoskeletal:      Right lower leg: No edema.      Left lower leg: No edema.   Neurological:      Mental Status: She is alert.   Psychiatric:         Mood and Affect: Mood normal.         Behavior: Behavior normal.       Consultants     Consult Orders (all) (From admission, onward)       Start     Ordered    07/31/23 1248  IP General Consult (Use specialty-specific consult if known)  Once        Provider:  Trent Davis MD    07/31/23 1248    07/31/23 1150  Cardiology (on-call MD unless specified)  Once        Specialty:  Cardiology  Provider:  Papo Walden MD    07/31/23 1156                  Procedures     Imaging Results (All)       Procedure Component Value Units Date/Time    CT Abdomen Pelvis With Contrast [[id number]] Collected: 07/31/23 1112     Updated: 07/31/23 1643    Narrative:      CT ANGIOGRAM OF THE CHEST. MULTIPLE CORONAL, SAGITTAL, AND 3-D  RECONSTRUCTIONS. CT ABDOMEN AND PELVIS WITH IV CONTRAST     HISTORY: 89-year-old female with abdominal pain and  chest pressure. TAVR  in the past.     TECHNIQUE: Radiation dose reduction techniques were utilized, including  automated exposure control and exposure modulation based on body size.  CT angiogram of the chest was performed. Multiple coronal, sagittal, and  3-D reconstruction images were obtained. Subsequently, 3 mm images were  obtained through the abdomen and pelvis after the administration of IV  contrast. Compared with chest CTA 08/04/2022 and CT abdomen and pelvis  07/26/2022.     FINDINGS:  CHEST CTA: There is adequate opacification of the pulmonary arteries and  there is no convincing evidence for pulmonary thromboemboli. There is  some crowding of lung markings from underexpanded lungs. There are no  acute-appearing airspace opacities and there are no pleural or  pericardial effusions. There is no lymphadenopathy within the chest.  Aberrant origin of the right subclavian artery is noted. There is no  hiatal hernia.     ABDOMEN/PELVIS: The liver, gallbladder, spleen, pancreas, and adrenals  appear unremarkable. There are multiple stones within the dependent  aspect of the left renal pelvis which were present previously as well.  The left renal pelvis is mildly dilated which is also stable. There is a  single 4 mm nonobstructing stone within the right kidney. There are no  ureteral stones. There are no stones within the urinary bladder and the  bladder is nearly collapsed. The gastric antrum appears thickened, but  is partially collapsed. No acute bowel abnormality is seen. There is  extensive sigmoid diverticulosis and there are diverticula scattered  throughout the colon without evidence for diverticulitis. Appendix  appears within normal limits. The endometrium appears thickened. Adnexa  appear unremarkable. There is no free fluid or lymphadenopathy. There  are extensive abdominal aortic atherosclerotic changes without  aneurysmal dilatation.       Impression:      1. There is no convincing evidence for  pulmonary thromboemboli or acute  abnormality within the chest.  2. Bilateral nephrolithiasis. Multiple stones within a mildly dilated  left renal pelvis are stable. There is no hydronephrosis to suggest  intermittent obstruction at the UPJ. There is a single nonobstructing  stone within the right kidney. No ureteral stones or evidence for  ureteral obstruction.  3. Extensive sigmoid diverticulosis without evidence for diverticulitis.  4. Thickened appearance of the endometrium. Follow-up with gynecology is  suggested.         This report was finalized on 7/31/2023 4:40 PM by Dr. Azucena Royal M.D.       CT Angiogram Chest Pulmonary Embolism [[id number]] Collected: 07/31/23 1112     Updated: 07/31/23 1643    Narrative:      CT ANGIOGRAM OF THE CHEST. MULTIPLE CORONAL, SAGITTAL, AND 3-D  RECONSTRUCTIONS. CT ABDOMEN AND PELVIS WITH IV CONTRAST     HISTORY: 89-year-old female with abdominal pain and chest pressure. TAVR  in the past.     TECHNIQUE: Radiation dose reduction techniques were utilized, including  automated exposure control and exposure modulation based on body size.  CT angiogram of the chest was performed. Multiple coronal, sagittal, and  3-D reconstruction images were obtained. Subsequently, 3 mm images were  obtained through the abdomen and pelvis after the administration of IV  contrast. Compared with chest CTA 08/04/2022 and CT abdomen and pelvis  07/26/2022.     FINDINGS:  CHEST CTA: There is adequate opacification of the pulmonary arteries and  there is no convincing evidence for pulmonary thromboemboli. There is  some crowding of lung markings from underexpanded lungs. There are no  acute-appearing airspace opacities and there are no pleural or  pericardial effusions. There is no lymphadenopathy within the chest.  Aberrant origin of the right subclavian artery is noted. There is no  hiatal hernia.     ABDOMEN/PELVIS: The liver, gallbladder, spleen, pancreas, and adrenals  appear unremarkable. There  are multiple stones within the dependent  aspect of the left renal pelvis which were present previously as well.  The left renal pelvis is mildly dilated which is also stable. There is a  single 4 mm nonobstructing stone within the right kidney. There are no  ureteral stones. There are no stones within the urinary bladder and the  bladder is nearly collapsed. The gastric antrum appears thickened, but  is partially collapsed. No acute bowel abnormality is seen. There is  extensive sigmoid diverticulosis and there are diverticula scattered  throughout the colon without evidence for diverticulitis. Appendix  appears within normal limits. The endometrium appears thickened. Adnexa  appear unremarkable. There is no free fluid or lymphadenopathy. There  are extensive abdominal aortic atherosclerotic changes without  aneurysmal dilatation.       Impression:      1. There is no convincing evidence for pulmonary thromboemboli or acute  abnormality within the chest.  2. Bilateral nephrolithiasis. Multiple stones within a mildly dilated  left renal pelvis are stable. There is no hydronephrosis to suggest  intermittent obstruction at the UPJ. There is a single nonobstructing  stone within the right kidney. No ureteral stones or evidence for  ureteral obstruction.  3. Extensive sigmoid diverticulosis without evidence for diverticulitis.  4. Thickened appearance of the endometrium. Follow-up with gynecology is  suggested.         This report was finalized on 7/31/2023 4:40 PM by Dr. Azucena Royal M.D.               Pertinent Labs     Results from last 7 days   Lab Units 08/05/23 0312 08/04/23 0535 08/03/23  0540 08/02/23  0619   WBC 10*3/mm3 5.67 6.17 6.35 8.73   HEMOGLOBIN g/dL 11.4* 11.7* 12.0 12.4   PLATELETS 10*3/mm3 146 145 151 164     Results from last 7 days   Lab Units 08/05/23 0312 08/04/23  0535 08/03/23  0540 08/02/23  0619   SODIUM mmol/L 137 135* 141 141   POTASSIUM mmol/L 4.1 4.0 3.8 4.0   CHLORIDE mmol/L 105 101  106 106   CO2 mmol/L 22.0 25.0 24.6 25.1   BUN mg/dL 10 11 14 15   CREATININE mg/dL 0.53* 0.59 0.66 0.59   GLUCOSE mg/dL 108* 100* 114* 108*   Estimated Creatinine Clearance: 58.7 mL/min (A) (by C-G formula based on SCr of 0.53 mg/dL (L)).  Results from last 7 days   Lab Units 08/05/23 0312 08/04/23  0535 08/03/23  0540 08/02/23  0619   ALBUMIN g/dL 3.1* 3.4* 3.2* 3.4*   BILIRUBIN mg/dL 0.2 0.4 0.5 0.5   ALK PHOS U/L 71 72 71 73   AST (SGOT) U/L 52* 43* 34* 49*   ALT (SGPT) U/L 27 23 16 17     Results from last 7 days   Lab Units 08/05/23 0312 08/04/23  0535 08/03/23  0540 08/02/23  0619   CALCIUM mg/dL 8.2 8.5 8.6 8.6   ALBUMIN g/dL 3.1* 3.4* 3.2* 3.4*     Results from last 7 days   Lab Units 07/31/23  0913   LIPASE U/L 83*     Results from last 7 days   Lab Units 08/02/23  1317 08/02/23  1123 08/01/23  0543 07/31/23  1558 07/31/23  1118 07/31/23  0913   HSTROP T ng/L 609* 502* 266* 165*   < > 113*   PROBNP pg/mL  --   --   --   --   --  3,575.0*    < > = values in this interval not displayed.           Invalid input(s): LDLCALC        Test Results Pending at Discharge       Discharge Details        Discharge Medications        New Medications        Instructions Start Date   albuterol sulfate  (90 Base) MCG/ACT inhaler  Commonly known as: PROVENTIL HFA;VENTOLIN HFA;PROAIR HFA   Inhale 2 puffs Every 4 (Four) Hours As Needed for Wheezing or Shortness of Air.      benzonatate 100 MG capsule  Commonly known as: TESSALON   200 mg, Oral, 3 Times Daily PRN      clopidogrel 75 MG tablet  Commonly known as: PLAVIX   75 mg, Oral, Daily      metoprolol succinate XL 25 MG 24 hr tablet  Commonly known as: Toprol XL   25 mg, Oral, Daily      rosuvastatin 20 MG tablet  Commonly known as: Crestor   20 mg, Oral, Daily             Continue These Medications        Instructions Start Date   acetaminophen 325 MG tablet  Commonly known as: TYLENOL   650 mg, Oral, Every 4 Hours PRN      aspirin 81 MG EC tablet   81 mg, Oral,  "Daily      AZO-CRANBERRY PO   1 tablet, Oral, Daily      metroNIDAZOLE 0.75 % cream  Commonly known as: METROCREAM   1 application , Topical, 2 Times Daily      mupirocin 2 % cream  Commonly known as: BACTROBAN   1 application , Topical, 2 Times Daily      potassium chloride 10 MEQ CR capsule  Commonly known as: MICRO-K   10 mEq, Oral, 2 Times Daily      sennosides-docusate 8.6-50 MG per tablet  Commonly known as: PERICOLACE   1 tablet, Oral, Daily      torsemide 20 MG tablet  Commonly known as: DEMADEX   20 mg, Oral, Daily      Vitamin D (Cholecalciferol) 50 MCG (2000 UT) capsule   2,000 Units, Oral, Daily      VITEYES AREDS ADVANCED PO   2 capsules, Oral, Daily             Stop These Medications      metoprolol tartrate 25 MG tablet  Commonly known as: LOPRESSOR              Allergies   Allergen Reactions    Hydrochlorothiazide Other (See Comments)     hyponatremia    Hydrocodone Shortness Of Breath     Also reports \"lethargy\"    Lipitor [Atorvastatin] Nausea Only     \"FELT AWFUL\"    Erythromycin Diarrhea    Lisinopril Cough         Discharge Disposition:  Home or Self Care    Discharge Diet:  Diet Order   Procedures    Diet: Cardiac Diets; Healthy Heart (2-3 Na+); Texture: Regular Texture (IDDSI 7); Fluid Consistency: Thin (IDDSI 0)       Discharge Activity:   Activity Instructions       Activity as Tolerated              CODE STATUS:    Code Status and Medical Interventions:   Ordered at: 07/31/23 1843     Code Status (Patient has no pulse and is not breathing):    CPR (Attempt to Resuscitate)     Medical Interventions (Patient has pulse or is breathing):    Full Support     Release to patient:    Routine Release       Future Appointments   Date Time Provider Department Center   9/28/2023 10:00 AM Trent Davis MD [facility]   1/4/2024 10:15 AM Yann Ceballos MD [facility]   1/4/2024 10:30 AM [facility] [place] DEVICE CHECK [facility]   2/6/2024  2:30 PM Lizeth Higginbotham APRN [facility] " [facility]     Additional Instructions for the Follow-ups that You Need to Schedule       Ambulatory Referral to Cardiac Rehab   As directed      Call MD With Problems / Concerns   As directed      Instructions: return to the hospital if you experience chest pain, shortness of breath, abdominal pain, nausea, vomiting, fevers, sweats, chills, or worsening of your symptoms    Order Comments: Instructions: return to the hospital if you experience chest pain, shortness of breath, abdominal pain, nausea, vomiting, fevers, sweats, chills, or worsening of your symptoms         Discharge Follow-up with Specialty: cardiology, 2-4 weeks or as otherwise directed   As directed      Specialty: cardiology, 2-4 weeks or as otherwise directed               Follow-up Information       [facility] .    Specialty: Cardiac Rehabilitation  Contact information:  [address]  [facility] [address] [phone]             Trent Davis MD .    Specialty: Internal Medicine  Contact information:  [address]  [place] [id number] [phone]                             Additional Instructions for the Follow-ups that You Need to Schedule       Ambulatory Referral to Cardiac Rehab   As directed      Call MD With Problems / Concerns   As directed      Instructions: return to the hospital if you experience chest pain, shortness of breath, abdominal pain, nausea, vomiting, fevers, sweats, chills, or worsening of your symptoms    Order Comments: Instructions: return to the hospital if you experience chest pain, shortness of breath, abdominal pain, nausea, vomiting, fevers, sweats, chills, or worsening of your symptoms         Discharge Follow-up with Specialty: cardiology, 2-4 weeks or as otherwise directed   As directed      Specialty: cardiology, 2-4 weeks or as otherwise directed            Time Spent on Discharge:  Greater than 30 minutes      Kirk Lind MD  [facility]  08/05/23  12:30 EDT

## 2023-08-05 NOTE — PLAN OF CARE
Goal Outcome Evaluation:            VSS, Aox4. Awaiting discharge clearance from cardiology. WCTM until clearance has been received.

## 2023-08-05 NOTE — PROGRESS NOTES
Electrophysiology Follow-Up Note      Patient Name: Yuliana Gonzalez  Age/Sex: 89 y.o. female  : 1930  MRN: [id number]      Day of Service: 23       Chief Complaint/Follow-up: cardiomyopathy, CAD--s/p PCI      S: doing well no complaints. Cath site looks great. Eager for discharge        Temp:  [97.8 øF (36.6 øC)-98.6 øF (37 øC)] 98.5 øF (36.9 øC)  Heart Rate:  [72-89] 73  Resp:  [16-18] 18  BP: (102-134)/(48-79) 134/52     PHYSICAL EXAM:    General Appearance: No acute distress, well developed and well nourished.   Eyes: Conjunctiva and lids: No erythema, swelling, or discharge. Sclera non-icteric.   HENT: Atraumatic, normocephalic. External eyes, ears, and nose normal.   Respiratory: No signs of respiratory distress. Respiration rhythm and depth normal.   Clear to auscultation. No rales, crackles, rhonchi, or wheezing auscultated.   Cardiovascular:  Heart Rate and Rhythm: Normal, Heart Sounds: Normal S1 and S2. No S3 or S4 noted.  Gastrointestinal:  Abdomen soft, non-distended, non-tender.  Musculoskeletal: Normal movement of extremities  Skin: Warm and dry.   Psychiatric: Patient alert and oriented to person, place, and time. Speech and behavior appropriate. Normal mood and affect.      Results from last 7 days   Lab Units 23  0535 23  0540   SODIUM mmol/L 137 135* 141   POTASSIUM mmol/L 4.1 4.0 3.8   CHLORIDE mmol/L 105 101 106   CO2 mmol/L 22.0 25.0 24.6   BUN mg/dL 10 11 14   CREATININE mg/dL 0.53* 0.59 0.66   GLUCOSE mg/dL 108* 100* 114*   CALCIUM mg/dL 8.2 8.5 8.6     Results from last 7 days   Lab Units 23  0535 23  0540   WBC 10*3/mm3 5.67 6.17 6.35   HEMOGLOBIN g/dL 11.4* 11.7* 12.0   HEMATOCRIT % 34.7 35.8 35.3   PLATELETS 10*3/mm3 146 145 151         Results from last 7 days   Lab Units 23  1317 23  1123 23  0543 23  1558 23  1118 23  0913   HSTROP T ng/L 609* 502* 266* 165* 134* 113*                Current Medications:   Scheduled Meds:aspirin, 81 mg, Oral, Daily  cholecalciferol, 2,000 Units, Oral, Daily  clopidogrel, 75 mg, Oral, Daily  fluticasone, 2 spray, Each Nare, Daily  insulin lispro, 2-7 Units, Subcutaneous, 4x Daily AC & at Bedtime  ipratropium-albuterol, 3 mL, Nebulization, 4x Daily - RT  isosorbide mononitrate, 30 mg, Oral, Q24H  metoprolol tartrate, 12.5 mg, Oral, Q12H  metroNIDAZOLE, 1 application , Topical, BID  pantoprazole, 40 mg, Oral, Q AM  potassium chloride, 10 mEq, Oral, BID  sennosides-docusate, 1 tablet, Oral, Daily  sodium chloride, 10 mL, Intravenous, Q12H  torsemide, 20 mg, Oral, Daily            NSTEMI (non-ST elevated myocardial infarction)    Aortic stenosis    Hypertension    Coronary artery disease involving native coronary artery of native heart    S/P TAVR (transcatheter aortic valve replacement)    Presence of cardiac pacemaker    Hyperglycemia    Prediabetes       Plan:     NSTEMI--s/p PCI to mid LAD (8/4/23)---aspirin and plavix at discharge. Cath site looks great. No current complaints. Continue imdur.     Cardiomyopathy (EF~37%)--- continue metoprolol and torsemide.         She is stable from cardiac standpoint for discharge. Follow up in 1-2 weeks in the office.     CLEMENTINE Fan  08/05/23  11:51 EDT

## 2023-08-07 ENCOUNTER — TRANSITIONAL CARE MANAGEMENT TELEPHONE ENCOUNTER (OUTPATIENT)
Dept: CALL CENTER | Facility: HOSPITAL | Age: 88
End: 2023-08-07
Payer: MEDICARE

## 2023-08-07 LAB — ACT BLD: 311 SECONDS (ref 82–152)

## 2023-08-07 NOTE — PROGRESS NOTES
[facility] Clinical Pharmacy Services: National Cardiology Data Registry (NCDR) Medication Review    Yuliana Gonzalez is s/p PCI with drug-eluting stent placement for NSTEMI . Pharmacy to review discharge medications to make sure appropriate medications have been prescribed.    Patient has been discharged on the following:  Aspirin EC 81 mg daily  High Intensity Statin: Rosuvastatin 20 mg daily  Beta-blocker: Metoprolol XL 25 mg daily  P2Y12 Inhibitor: Clopidogrel 75 mg daily  LVEF=37%: ACE/ARB not tolerated    Provider did not add an ACE/ARB to the patient's discharge medications as she was having soft blood pressures in the hospital. May revisit in the outpatient setting if blood pressure increases.     These medications meet the requirements for NCDR discharge medication for chest pain and MI.    Zoraida Cuello, Pharm.D., [facility]   Clinical Pharmacist   Phone Extension #[phone]

## 2023-08-09 ENCOUNTER — OFFICE VISIT (OUTPATIENT)
Dept: INTERNAL MEDICINE | Facility: CLINIC | Age: 88
End: 2023-08-09
Payer: MEDICARE

## 2023-08-09 VITALS
BODY MASS INDEX: 29.45 KG/M2 | TEMPERATURE: 96.3 F | HEART RATE: 94 BPM | SYSTOLIC BLOOD PRESSURE: 137 MMHG | RESPIRATION RATE: 16 BRPM | OXYGEN SATURATION: 98 % | DIASTOLIC BLOOD PRESSURE: 80 MMHG | HEIGHT: 61 IN | WEIGHT: 156 LBS

## 2023-08-09 DIAGNOSIS — I21.4 NSTEMI (NON-ST ELEVATED MYOCARDIAL INFARCTION): Primary | ICD-10-CM

## 2023-08-09 DIAGNOSIS — J06.9 VIRAL UPPER RESPIRATORY TRACT INFECTION: ICD-10-CM

## 2023-08-09 RX ORDER — IMIPRAMINE HYDROCHLORIDE 25 MG/1
1 TABLET ORAL AS NEEDED
Qty: 1 EACH | Refills: 0 | Status: SHIPPED | OUTPATIENT
Start: 2023-08-09

## 2023-08-09 NOTE — PROGRESS NOTES
Transitional Care Follow Up Visit  Subjective     Yuliana Gonzalez is a 89 y.o. female who presents for a transitional care management visit.    Within 48 business hours after discharge our office contacted her via telephone to coordinate her care and needs.      I reviewed and discussed the details of that call along with the discharge summary, hospital problems, inpatient lab results, inpatient diagnostic studies, and consultation reports with Yuliana.     Current outpatient and discharge medications have been reconciled for the patient.  Reviewed by: Trent Davis MD          8/5/2023     2:54 PM   Date of TCM Phone Call   [facility]   Date of Admission 7/31/2023   Date of Discharge 8/5/2023   Discharge Disposition Home or Self Care     Risk for Readmission (LACE) Score: 8 (8/5/2023  6:01 AM)      History of Present Illness  Admitted to the hospital 10 days ago with parainfluenza virus bronchitis.  She had a bump in her troponin and getting a cardiac cath which showed a 99% LAD stenosis below the level of her stent and that was stented.  Dyspnea and chest pain seem to have resolved.  She got some nebulizers on top of this.   Course During Hospital Stay:  Ms. Gonzalez is a 89 y.o. female with a history of chronic diastolic heart failure, coronary artery disease, hypertension, aortic stenosis s/p TAVR, pacemaker who presented to [facility] initially complaining of chest pain, cough, shortness of breath.  Please see the admitting history and physical for further details.  She was found to have an NSTEMI and a parainfluenza virus URI and was admitted to the hospital for further evaluation and treatment.       Her URI was treated with supportive/symptomatic therapy. She was seen in consultation by cardiology and underwent echocardiogram which showed a newly depressed EF at 37%. She then underwent cardiac catheterization which showed 99% mid vessel stenosis in the LAD. A ISMAEL was placed. She did  well post procedurally and has been cleared for discharge. She will need to remain on DAPT for approximately a year. She has not been able to tolerate atorvastatin in the past due to nausea. We will try rosuvastatin. Her blood pressure cannot currently tolerate additional GDMT. She will need to follow up with cardiology in the clinic in 1-2 weeks.     The following portions of the patient's history were reviewed and updated as appropriate: allergies, current medications, past medical history, and problem list.    Review of Systems   Respiratory:  Positive for cough and wheezing. Negative for shortness of breath.    Cardiovascular:  Negative for chest pain, palpitations and leg swelling.     Objective   Physical Exam  Constitutional:       Appearance: Normal appearance. She is well-developed.   Neck:      Thyroid: No thyromegaly.   Cardiovascular:      Rate and Rhythm: Normal rate and regular rhythm.      Heart sounds: Normal heart sounds. No murmur heard.    No gallop.   Pulmonary:      Effort: Pulmonary effort is normal. No respiratory distress.      Breath sounds: Normal breath sounds. No wheezing or rales.   Neurological:      Mental Status: She is alert.       Assessment & Plan   Diagnoses and all orders for this visit:    1. NSTEMI (non-ST elevated myocardial infarction) (Primary)    2. Viral upper respiratory tract infection    In for follow-up of a hospitalization 10 days ago for parainfluenza bronchitis.  Due to persistent symptoms and a bump in her troponin she ended up getting a cardiac catheterization which showed a 99% mid vessel stenosis with STEPHANIA II flow distally.  That was distal to a prior stent.  The new area was stented successfully.  She had a non-STEMI last week.  She has been home for about 5 days.  She is doing better at this point.  He has a lingering cough but no persistent dyspnea or chest pain.  It is really hard to even retrospectively do not how much of the chest pain and dyspnea were  related to her parainfluenza infection versus her coronary disease.  Certainly the troponin pump was from her coronary disease.  The cath results are reviewed today.  Lab work including CBC, CMP and troponin are reviewed today.  Respiratory panel reviewed.  EKG is reviewed by me independently today and shows some T wave inversions anteriorly which were negative and a paced rhythm.  She will add some Robitussin for her cough.  She does not seem to respond very well to Tessalon Perles.  Warm tea with honey.  She is now on Plavix 75 mg daily again.  Metoprolol was switched to the XL 25 mg daily.  She remains on Crestor 20 mg daily.  She is also getting albuterol inhalers now which helps the cough.    The above information was reviewed again today 08/09/23.  It continues to be accurate as reflected above and is unchanged.  History, physical and review of systems all reviewed and are unchanged.  Medications were reviewed today and continue the current dosing.

## 2023-08-29 ENCOUNTER — OFFICE VISIT (OUTPATIENT)
Dept: CARDIOLOGY | Facility: CLINIC | Age: 88
End: 2023-08-29
Payer: MEDICARE

## 2023-08-29 VITALS
DIASTOLIC BLOOD PRESSURE: 60 MMHG | HEART RATE: 74 BPM | BODY MASS INDEX: 29.3 KG/M2 | SYSTOLIC BLOOD PRESSURE: 148 MMHG | WEIGHT: 155.2 LBS | HEIGHT: 61 IN

## 2023-08-29 DIAGNOSIS — E78.5 HYPERLIPIDEMIA, UNSPECIFIED HYPERLIPIDEMIA TYPE: Chronic | ICD-10-CM

## 2023-08-29 DIAGNOSIS — I25.10 CORONARY ARTERY DISEASE INVOLVING NATIVE CORONARY ARTERY OF NATIVE HEART WITHOUT ANGINA PECTORIS: Primary | ICD-10-CM

## 2023-08-29 DIAGNOSIS — Z95.2 S/P TAVR (TRANSCATHETER AORTIC VALVE REPLACEMENT): ICD-10-CM

## 2023-08-29 DIAGNOSIS — I10 PRIMARY HYPERTENSION: Chronic | ICD-10-CM

## 2023-08-29 RX ORDER — ROSUVASTATIN CALCIUM 20 MG/1
20 TABLET, COATED ORAL DAILY
Qty: 90 TABLET | Refills: 3 | Status: SHIPPED | OUTPATIENT
Start: 2023-08-29

## 2023-08-29 RX ORDER — NITROGLYCERIN 0.4 MG/1
0.4 TABLET SUBLINGUAL
COMMUNITY
End: 2023-08-29 | Stop reason: SDUPTHER

## 2023-08-29 RX ORDER — PANTOPRAZOLE SODIUM 40 MG/1
40 TABLET, DELAYED RELEASE ORAL DAILY
COMMUNITY

## 2023-08-29 RX ORDER — METOPROLOL SUCCINATE 25 MG/1
25 TABLET, EXTENDED RELEASE ORAL DAILY
Qty: 90 TABLET | Refills: 3 | Status: SHIPPED | OUTPATIENT
Start: 2023-08-29 | End: 2024-08-23

## 2023-08-29 RX ORDER — CLOPIDOGREL BISULFATE 75 MG/1
75 TABLET ORAL DAILY
Qty: 90 TABLET | Refills: 3 | Status: SHIPPED | OUTPATIENT
Start: 2023-08-29

## 2023-08-29 RX ORDER — NITROGLYCERIN 0.4 MG/1
0.4 TABLET SUBLINGUAL
Qty: 30 TABLET | Refills: 3 | Status: SHIPPED | OUTPATIENT
Start: 2023-08-29

## 2023-08-29 NOTE — PROGRESS NOTES
[facility] Follow Up Office Note     Encounter Date:23  Patient:Yuliana Gonzalez  :1930  MRN:[id number]      Chief Complaint:   Chief Complaint   Patient presents with    2 week hospital f/u         History of Presenting Illness:        Yuliana Gonzalez is a 89 y.o. female who is here for follow-up.  She is a patient of Dr Locke.    Patient has past medical history that is significant for severe degenerative aortic valve stenosis status post TAVR with 26 mm Medtronic evolute, coronary artery disease with prior PCI of the mid LAD, permanent pacemaker secondary to high-grade AV block in setting of TAVR.    In 2022 patient underwent 26 mm Evolut TAVR.  Postprocedure she developed high degree AV block and required dual-chamber permanent pacemaker.    Patient was recently admitted with chest pain and shortness of breath.  She ruled in for an NSTEMI.  Additionally, echocardiogram demonstrated newly depressed ejection fraction of 37%.  She underwent left heart catheterization demonstrating 99% mid LAD stenosis and is status post ISMAEL.  She is on DAPT with aspirin and clopidogrel.  She was started on guideline directed therapies for cardiomyopathy. Additionally, she tested positive for parainfluenza and was treated with supportive therapy.      Today patient is accompanied by her daughter.  She states that now she has recovered from coughing related to parainfluenza she feels a lot better.  She is not having chest pain, initially she was and she thinks this was related to coughing.  She denies dyspnea with exertion, lower extremity edema, PND orthopnea.    Review of Systems:  Review of Systems   Cardiovascular:  Negative for chest pain, dyspnea on exertion, leg swelling, orthopnea and palpitations.   Respiratory:  Negative for shortness of breath.      Current Outpatient Medications on File Prior to Visit   Medication Sig Dispense Refill    acetaminophen (TYLENOL) 325 MG tablet Take 2 tablets  by mouth Every 4 (Four) Hours As Needed for Mild Pain  or Fever (temperature greater than 101F). 120 tablet 0    albuterol sulfate  (90 Base) MCG/ACT inhaler Inhale 2 puffs Every 4 (Four) Hours As Needed for Wheezing or Shortness of Air. 8.5 g 0    aspirin (aspirin) 81 MG EC tablet Take 1 tablet by mouth Daily. 30 tablet 6    AZO-CRANBERRY PO Take 1 tablet by mouth Daily.      metroNIDAZOLE (METROCREAM) 0.75 % cream Apply 1 application  topically to the appropriate area as directed 2 (Two) Times a Day.      Multiple Vitamins-Minerals (VITEYES AREDS ADVANCED PO) Take 2 capsules by mouth Daily.      mupirocin (BACTROBAN) 2 % cream Apply 1 application  topically to the appropriate area as directed 2 (Two) Times a Day.      mupirocin (BACTROBAN) 2 % ointment APPLY TWO TIMES PER DAY TO THE INFECTION/BIOPSY SITES.      pantoprazole (PROTONIX) 40 MG EC tablet Take 1 tablet by mouth Daily.      potassium chloride (MICRO-K) 10 MEQ CR capsule Take 1 capsule by mouth 2 (Two) Times a Day. 180 capsule 3    sennosides-docusate (PERICOLACE) 8.6-50 MG per tablet Take 1 tablet by mouth Daily.      Spacer/Aero-Holding Chambers (AeroChamber Plus Benja-Vu) misc 1 Device As Needed (Use as needed with inhaler device). 1 each 0    torsemide (DEMADEX) 20 MG tablet Take 1 tablet by mouth Daily. 90 tablet 1    Vitamin D, Cholecalciferol, 50 MCG (2000 UT) capsule Take 1 capsule by mouth Daily.      [DISCONTINUED] clopidogrel (PLAVIX) 75 MG tablet Take 1 tablet by mouth Daily. 30 tablet 0    [DISCONTINUED] metoprolol succinate XL (Toprol XL) 25 MG 24 hr tablet Take 1 tablet by mouth Daily for 30 days. 30 tablet 0    [DISCONTINUED] nitroglycerin (NITROSTAT) 0.4 MG SL tablet Place 1 tablet under the tongue Every 5 (Five) Minutes As Needed for Chest Pain. Take no more than 3 doses in 15 minutes.      [DISCONTINUED] rosuvastatin (Crestor) 20 MG tablet Take 1 tablet by mouth Daily. 90 tablet 0     No current facility-administered medications  "on file prior to visit.       Allergies   Allergen Reactions    Hydrochlorothiazide Other (See Comments)     hyponatremia    Hydrocodone Shortness Of Breath     Also reports \"lethargy\"    Lipitor [Atorvastatin] Nausea Only     \"FELT AWFUL\"    Erythromycin Diarrhea    Lisinopril Cough       Past Medical History:   Diagnosis Date    Aortic stenosis 07/2009    diagnosed, 6/14 moderate with 0.9cm area, 42mm max gradient. 0.91/34 6/15.    Central loss of vision     RIGHT EYE    Cervical arthritis     Colitis     HX    Coronary artery disease     History of constipation     History of esophageal reflux     History of skin cancer     Hyperlipidemia     Hypertension     IGT (impaired glucose tolerance)     Irregular heart beat     HX    Macular degeneration     Medication management     On continuous oral anticoagulation     Redness of skin     NOSE/DERMATOLOGY TREATING WITH FLAGYL CREAM/ADVISED PT TO NOTIFY CARDIOLOGY    Sciatica     HX    Short-term memory loss     \"MILD\"    SOB (shortness of breath) on exertion     Vitamin D deficiency        Past Surgical History:   Procedure Laterality Date    AORTIC VALVE REPAIR/REPLACEMENT Left 3/15/2022    Procedure: TTE TRANSFEMORAL TRANSCATHETER AORTIC VALVE REPLACEMENT PERCUTANEOUS APPROACH;  Surgeon: Vesna Chris MD;  Location: [facility];  Service: Cardiothoracic;  Laterality: Left;    AORTIC VALVE REPAIR/REPLACEMENT Left 3/15/2022    Procedure: Transfemoral Transcatheter Aortic Valve Replacement with intra operative TTE and possible open surgical rescue;  Surgeon: Clint Locke MD;  Location: [facility];  Service: Cardiovascular;  Laterality: Left;    CARDIAC CATHETERIZATION N/A 1/21/2022    Procedure: Coronary angiography;  Surgeon: Clint Locke MD;  Location: [facility] INVASIVE LOCATION;  Service: Cardiovascular;  Laterality: N/A;    CARDIAC CATHETERIZATION N/A 1/21/2022    Procedure: Percutaneous Coronary Intervention;  Surgeon: Chucky, " Clint OTOOLE MD;  Location:  [facility] CATH INVASIVE LOCATION;  Service: Cardiovascular;  Laterality: N/A;    CARDIAC CATHETERIZATION N/A 1/21/2022    Procedure: Stent ISMAEL coronary;  Surgeon: Clint Locke MD;  Location:  [facility] CATH INVASIVE LOCATION;  Service: Cardiovascular;  Laterality: N/A;    CARDIAC CATHETERIZATION Left 7/31/2022    Procedure: CORONARY ANGIOGRAPHY;  Surgeon: Jeannie Echevarria MD;  Location:  [facility] CATH INVASIVE LOCATION;  Service: Cardiology;  Laterality: Left;    CARDIAC CATHETERIZATION N/A 7/31/2022    Procedure: Left ventriculography;  Surgeon: Jeannie Echevarria MD;  Location:  [facility] CATH INVASIVE LOCATION;  Service: Cardiology;  Laterality: N/A;    CARDIAC CATHETERIZATION N/A 7/31/2022    Procedure: Left Heart Cath;  Surgeon: Jeannie Echevarria MD;  Location:  [facility] CATH INVASIVE LOCATION;  Service: Cardiology;  Laterality: N/A;    CARDIAC CATHETERIZATION N/A 8/4/2023    Procedure: Left Heart Cath;  Surgeon: Clint Locke MD;  [facility] CATH [facility] LOC[facility];  Service: Cardiovascular;  Laterality: N/A;    CARDIAC CATHETERIZATION N/A 8/4/2023    Procedure: Percutaneous Coronary Intervention;  Surgeon: Clint Locke MD;  Location:  [facility] CATH INVASIVE LOCATION;  Service: Cardiovascular;  Laterality: N/A;    CARDIAC CATHETERIZATION N/A 8/4/2023    Procedure: Stent ISMAEL coronary;  Surgeon: Clint Locke MD;  [facility] C[facility];  Service: Cardiovascular;  Laterality: N/A;    CARDIAC CATHETERIZATION N/A 8/4/2023    Procedure: Coronary angiography;  Surgeon: Clint Locke MD;  [facility];  Service: Cardiovascular;  Laterality: N/A;    CARDIAC ELECTROPHYSIOLOGY PROCEDURE N/A 3/16/2022    Procedure: Pacemaker DC new [place];  Surgeon: Yann Ceballos MD;  [facility];  Service: Cardiology;  Laterality: N/A;    CATARACT EXTRACTION, BILATERAL      COLONOSCOPY      EYE SURGERY    "   SKIN CANCER EXCISION         Social History     Socioeconomic History    Marital status:      Spouse name: Stephan    Number of children: 4   Tobacco Use    Smoking status: Former     Packs/day: 0.00     Years: 0.00     Pack years: 0.00     Types: Cigarettes     Start date:      Quit date:      Years since quittin.7    Smokeless tobacco: Never    Tobacco comments:     1 pack every 2 weeks   Vaping Use    Vaping Use: Never used   Substance and Sexual Activity    Alcohol use: Yes     Comment: wine - rare / no caffeine    Drug use: No    Sexual activity: Defer       Family History   Problem Relation Age of Onset    Aortic stenosis Mother     Hypertension Mother     Heart failure Father     Diabetes Father     Hypertension Father     No Known Problems Sister     No Known Problems Daughter     No Known Problems Son     Aneurysm Sister     Other Sister         Polio in her 20's    Malig Hyperthermia Neg Hx        The following portions of the patient's history were reviewed and updated as appropriate: allergies, current medications, past family history, past medical history, past social history, past surgical history and problem list.       Objective:       Vitals:    23 0928   BP: 148/60   BP Location: Left arm   Patient Position: Sitting   Pulse: 74   Weight: 70.4 kg (155 lb 3.2 oz)   Height: 154 cm (60.63\")         Physical Exam:  Constitutional: Well appearing, well developed, no acute distress   HENT: Oropharynx clear and membrane moist  Eyes: Normal conjunctiva, no sclera icterus  Neck: Supple, no carotid bruit bilaterally  Cardiovascular: Regular rate and rhythm, No Murmur, No bilateral lower extremity edema  Pulmonary: Normal respiratory effort, normal lung sounds, no wheezing  Neurological: Alert and orient x 3  Skin: Warm, dry, no ecchymosis, no rash  Psych: Appropriate mood and affect. Normal judgment and insight         Lab Results   Component Value Date     2023    NA " 135 (L) 08/04/2023    K 4.1 08/05/2023    K 4.0 08/04/2023     08/05/2023     08/04/2023    CO2 22.0 08/05/2023    CO2 25.0 08/04/2023    BUN 10 08/05/2023    BUN 11 08/04/2023    CREATININE 0.53 (L) 08/05/2023    CREATININE 0.59 08/04/2023    EGFRIFNONA 87 02/25/2022    EGFRIFNONA 85 01/19/2022    EGFRIFAFRI 90 11/10/2021    EGFRIFAFRI 118 10/23/2020    GLUCOSE 108 (H) 08/05/2023    GLUCOSE 100 (H) 08/04/2023    CALCIUM 8.2 08/05/2023    CALCIUM 8.5 08/04/2023    PROTENTOTREF 6.3 09/15/2022    PROTENTOTREF 5.8 (L) 08/17/2022    ALBUMIN 3.1 (L) 08/05/2023    ALBUMIN 3.4 (L) 08/04/2023    BILITOT 0.2 08/05/2023    BILITOT 0.4 08/04/2023    AST 52 (H) 08/05/2023    AST 43 (H) 08/04/2023    ALT 27 08/05/2023    ALT 23 08/04/2023     Lab Results   Component Value Date    WBC 5.67 08/05/2023    WBC 6.17 08/04/2023    HGB 11.4 (L) 08/05/2023    HGB 11.7 (L) 08/04/2023    HCT 34.7 08/05/2023    HCT 35.8 08/04/2023    MCV 85.7 08/05/2023    MCV 85.4 08/04/2023     08/05/2023     08/04/2023     Lab Results   Component Value Date    CHOL 139 08/01/2022    TRIG 145 08/01/2022    TRIG 225 (H) 02/09/2022    HDL 56 08/01/2022    HDL 64 02/09/2022    LDL 58 08/01/2022     (H) 02/09/2022     Lab Results   Component Value Date    PROBNP 3,575.0 (H) 07/31/2023    PROBNP 2,340.0 (H) 08/04/2022     Lab Results   Component Value Date    CKTOTAL 78 08/03/2022    TROPONINT 609 (C) 08/02/2023     Lab Results   Component Value Date    TSH 3.040 08/03/2022           ECG 12 Lead    Date/Time: 8/29/2023 9:42 AM  Performed by: Lizeth Higginbotham APRN  Authorized by: Lizeth Higginbotham APRN   Comparison: compared with previous ECG from 8/2/2023  Similar to previous ECG  Rhythm: sinus rhythm  Ectopy: unifocal PVCs  Rate: normal  Conduction: right bundle branch block  ST Segments: ST segments normal           Assessment:          Diagnosis Plan   1. Coronary artery disease involving native coronary artery of  native heart without angina pectoris  ECG 12 Lead    Adult Transthoracic Echo Complete w/ Color, Spectral and Contrast if Necessary Per Protocol      2. S/P TAVR (transcatheter aortic valve replacement)        3. Primary hypertension        4. Hyperlipidemia, unspecified hyperlipidemia type               Plan:       Coronary artery disease - recent NSTEMI with ISMAEL of mid LAD, also with prior PCI of LAD.  Continue aspirin and clopidogrel, also on beta-blocker and high potency statin.  She is not having chest pain, dyspnea on exertion  Ischemic cardiomyopathy /chronic systolic heart failure -new reduction in LVEF to 37% in setting of NSTEMI.  Guideline directed therapy with metoprolol succinate.  I think her blood pressure is appropriate for age and I did not push further medications.  She is on torsemide and volume appears appropriate.  We will repeat echo in 2 months  Degenerative aortic stenosis -history of TAVR 3/2022.  Most recent echocardiogram with well-seated valve and gradients within defined limits  Mixed hyperlipidemia -previously intolerant of atorvastatin due to nausea.  She has been started on rosuvastatin 20 mg and will need repeat labs in about 3 months    Patient is seen today for follow-up.  I think she is stable from a cardiac perspective.  She will need a repeat echocardiogram in 2 months with follow-up at this time.  Recommended she start cardiac rehab      Orders Placed This Encounter   Procedures    ECG 12 Lead     This order was created via procedure documentation     Order Specific Question:   Release to patient     Answer:   Routine Release [[id number]]    Adult Transthoracic Echo Complete w/ Color, Spectral and Contrast if Necessary Per Protocol     Standing Status:   Future     Standing Expiration Date:   8/29/2024     Order Specific Question:   Reason for exam?     Answer:   Heart Failure, Cardiomyopathy, or Sytemic or Pulmonary Hypertension     Order Specific Question:   Release to  patient     Answer:   Routine Release [[id number]]            CLEMENTINE Montano  [facility]  08/29/23  10:48 EDT

## 2023-09-08 ENCOUNTER — OFFICE VISIT (OUTPATIENT)
Dept: CARDIAC REHAB | Facility: HOSPITAL | Age: 88
End: 2023-09-08
Payer: MEDICARE

## 2023-09-08 DIAGNOSIS — I21.4 NSTEMI, INITIAL EPISODE OF CARE: Primary | ICD-10-CM

## 2023-09-08 PROCEDURE — 93798 PHYS/QHP OP CAR RHAB W/ECG: CPT

## 2023-09-13 ENCOUNTER — TREATMENT (OUTPATIENT)
Dept: CARDIAC REHAB | Facility: HOSPITAL | Age: 88
End: 2023-09-13
Payer: MEDICARE

## 2023-09-13 DIAGNOSIS — Z95.5 S/P DRUG ELUTING CORONARY STENT PLACEMENT: Primary | ICD-10-CM

## 2023-09-13 PROCEDURE — 93798 PHYS/QHP OP CAR RHAB W/ECG: CPT

## 2023-09-15 ENCOUNTER — TREATMENT (OUTPATIENT)
Dept: CARDIAC REHAB | Facility: HOSPITAL | Age: 88
End: 2023-09-15
Payer: MEDICARE

## 2023-09-15 DIAGNOSIS — Z95.5 S/P DRUG ELUTING CORONARY STENT PLACEMENT: Primary | ICD-10-CM

## 2023-09-15 PROCEDURE — 93798 PHYS/QHP OP CAR RHAB W/ECG: CPT

## 2023-09-18 ENCOUNTER — TREATMENT (OUTPATIENT)
Dept: CARDIAC REHAB | Facility: HOSPITAL | Age: 88
End: 2023-09-18
Payer: MEDICARE

## 2023-09-18 ENCOUNTER — HOSPITAL ENCOUNTER (OUTPATIENT)
Dept: CARDIOLOGY | Facility: HOSPITAL | Age: 88
Discharge: HOME | End: 2023-09-18
Admitting: NURSE PRACTITIONER
Payer: MEDICARE

## 2023-09-18 VITALS — HEIGHT: 61 IN | HEART RATE: 78 BPM | WEIGHT: 155 LBS | BODY MASS INDEX: 29.27 KG/M2

## 2023-09-18 DIAGNOSIS — Z95.5 S/P DRUG ELUTING CORONARY STENT PLACEMENT: Primary | ICD-10-CM

## 2023-09-18 DIAGNOSIS — I25.10 CORONARY ARTERY DISEASE INVOLVING NATIVE CORONARY ARTERY OF NATIVE HEART WITHOUT ANGINA PECTORIS: ICD-10-CM

## 2023-09-18 LAB
AORTIC ARCH: 1.9 CM
AORTIC DIMENSIONLESS INDEX: 0.9 (DI)
ASCENDING AORTA: 3.4 CM
BH CV ECHO MEAS - ACS: 1.52 CM
BH CV ECHO MEAS - AO MAX PG: 9.2 MMHG
BH CV ECHO MEAS - AO MEAN PG: 5 MMHG
BH CV ECHO MEAS - AO ROOT DIAM: 2.31 CM
BH CV ECHO MEAS - AO V2 MAX: 152 CM/SEC
BH CV ECHO MEAS - AO V2 VTI: 30.4 CM
BH CV ECHO MEAS - AVA(I,D): 2.25 CM2
BH CV ECHO MEAS - EDV(CUBED): 79.5 ML
BH CV ECHO MEAS - EDV(MOD-SP2): 68 ML
BH CV ECHO MEAS - EDV(MOD-SP4): 53 ML
BH CV ECHO MEAS - EF(MOD-BP): 56 %
BH CV ECHO MEAS - EF(MOD-SP2): 47.1 %
BH CV ECHO MEAS - EF(MOD-SP4): 56.6 %
BH CV ECHO MEAS - ESV(CUBED): 17.8 ML
BH CV ECHO MEAS - ESV(MOD-SP2): 36 ML
BH CV ECHO MEAS - ESV(MOD-SP4): 23 ML
BH CV ECHO MEAS - FS: 39.3 %
BH CV ECHO MEAS - IVS/LVPW: 1.36 CM
BH CV ECHO MEAS - IVSD: 1.5 CM
BH CV ECHO MEAS - LAT PEAK E' VEL: 5.1 CM/SEC
BH CV ECHO MEAS - LV DIASTOLIC VOL/BSA (35-75): 31.3 CM2
BH CV ECHO MEAS - LV MASS(C)D: 207.8 GRAMS
BH CV ECHO MEAS - LV MAX PG: 6.8 MMHG
BH CV ECHO MEAS - LV MEAN PG: 3 MMHG
BH CV ECHO MEAS - LV SYSTOLIC VOL/BSA (12-30): 13.6 CM2
BH CV ECHO MEAS - LV V1 MAX: 130 CM/SEC
BH CV ECHO MEAS - LV V1 VTI: 28 CM
BH CV ECHO MEAS - LVIDD: 4.3 CM
BH CV ECHO MEAS - LVIDS: 2.6 CM
BH CV ECHO MEAS - LVOT AREA: 2.44 CM2
BH CV ECHO MEAS - LVOT DIAM: 1.76 CM
BH CV ECHO MEAS - LVPWD: 1.1 CM
BH CV ECHO MEAS - MED PEAK E' VEL: 4.9 CM/SEC
BH CV ECHO MEAS - MR MAX PG: 61.1 MMHG
BH CV ECHO MEAS - MR MAX VEL: 390.9 CM/SEC
BH CV ECHO MEAS - MV A DUR: 24 SEC
BH CV ECHO MEAS - MV A MAX VEL: 137 CM/SEC
BH CV ECHO MEAS - MV DEC SLOPE: 131 CM/SEC2
BH CV ECHO MEAS - MV DEC TIME: 0.25 MSEC
BH CV ECHO MEAS - MV E MAX VEL: 59.6 CM/SEC
BH CV ECHO MEAS - MV E/A: 0.44
BH CV ECHO MEAS - MV MAX PG: 10.1 MMHG
BH CV ECHO MEAS - MV MEAN PG: 3.2 MMHG
BH CV ECHO MEAS - MV P1/2T: 104.4 MSEC
BH CV ECHO MEAS - MV V2 VTI: 40.1 CM
BH CV ECHO MEAS - MVA(P1/2T): 2.11 CM2
BH CV ECHO MEAS - MVA(VTI): 1.7 CM2
BH CV ECHO MEAS - PA ACC TIME: 0.11 SEC
BH CV ECHO MEAS - PA V2 MAX: 121.8 CM/SEC
BH CV ECHO MEAS - PULM A REVS DUR: 0.12 SEC
BH CV ECHO MEAS - PULM A REVS VEL: 24.1 CM/SEC
BH CV ECHO MEAS - PULM DIAS VEL: 29.5 CM/SEC
BH CV ECHO MEAS - PULM S/D: 1.46
BH CV ECHO MEAS - PULM SYS VEL: 43.2 CM/SEC
BH CV ECHO MEAS - QP/QS: 0.55
BH CV ECHO MEAS - RAP SYSTOLE: 3 MMHG
BH CV ECHO MEAS - RV MAX PG: 3.6 MMHG
BH CV ECHO MEAS - RV V1 MAX: 94.6 CM/SEC
BH CV ECHO MEAS - RV V1 VTI: 13.9 CM
BH CV ECHO MEAS - RVOT DIAM: 1.85 CM
BH CV ECHO MEAS - RVSP: 27 MMHG
BH CV ECHO MEAS - SI(MOD-SP2): 18.9 ML/M2
BH CV ECHO MEAS - SI(MOD-SP4): 17.7 ML/M2
BH CV ECHO MEAS - SUP REN AO DIAM: 1.5 CM
BH CV ECHO MEAS - SV(LVOT): 68.3 ML
BH CV ECHO MEAS - SV(MOD-SP2): 32 ML
BH CV ECHO MEAS - SV(MOD-SP4): 30 ML
BH CV ECHO MEAS - SV(RVOT): 37.5 ML
BH CV ECHO MEAS - TAPSE (>1.6): 1.69 CM
BH CV ECHO MEAS - TR MAX PG: 23.7 MMHG
BH CV ECHO MEAS - TR MAX VEL: 243.2 CM/SEC
BH CV ECHO MEASUREMENTS AVERAGE E/E' RATIO: 11.92
BH CV XLRA - RV BASE: 3.2 CM
BH CV XLRA - RV LENGTH: 6.5 CM
BH CV XLRA - RV MID: 2.7 CM
BH CV XLRA - TDI S': 10.2 CM/SEC
LEFT ATRIUM VOLUME INDEX: 23.9 ML/M2
SINUS: 2.5 CM

## 2023-09-18 PROCEDURE — 93798 PHYS/QHP OP CAR RHAB W/ECG: CPT

## 2023-09-18 PROCEDURE — 93306 TTE W/DOPPLER COMPLETE: CPT

## 2023-09-19 NOTE — PROGRESS NOTES
Please let her know good news, her ejection fraction has normalized.    Her TAVR valve also continues to look good.

## 2023-09-20 ENCOUNTER — TREATMENT (OUTPATIENT)
Dept: CARDIAC REHAB | Facility: HOSPITAL | Age: 88
End: 2023-09-20
Payer: MEDICARE

## 2023-09-20 DIAGNOSIS — Z95.5 S/P DRUG ELUTING CORONARY STENT PLACEMENT: Primary | ICD-10-CM

## 2023-09-20 PROCEDURE — 93798 PHYS/QHP OP CAR RHAB W/ECG: CPT

## 2023-09-22 ENCOUNTER — TREATMENT (OUTPATIENT)
Dept: CARDIAC REHAB | Facility: HOSPITAL | Age: 88
End: 2023-09-22
Payer: MEDICARE

## 2023-09-22 DIAGNOSIS — Z95.5 S/P DRUG ELUTING CORONARY STENT PLACEMENT: Primary | ICD-10-CM

## 2023-09-22 PROCEDURE — 93798 PHYS/QHP OP CAR RHAB W/ECG: CPT

## 2023-09-25 ENCOUNTER — TREATMENT (OUTPATIENT)
Dept: CARDIAC REHAB | Facility: HOSPITAL | Age: 88
End: 2023-09-25

## 2023-09-25 DIAGNOSIS — Z95.5 S/P DRUG ELUTING CORONARY STENT PLACEMENT: Primary | ICD-10-CM

## 2023-09-25 PROCEDURE — 93798 PHYS/QHP OP CAR RHAB W/ECG: CPT

## 2023-09-27 ENCOUNTER — TREATMENT (OUTPATIENT)
Dept: CARDIAC REHAB | Facility: HOSPITAL | Age: 88
End: 2023-09-27
Payer: MEDICARE

## 2023-09-27 DIAGNOSIS — Z95.5 S/P DRUG ELUTING CORONARY STENT PLACEMENT: Primary | ICD-10-CM

## 2023-09-27 PROCEDURE — 93798 PHYS/QHP OP CAR RHAB W/ECG: CPT

## 2023-09-28 ENCOUNTER — OFFICE VISIT (OUTPATIENT)
Dept: INTERNAL MEDICINE | Facility: CLINIC | Age: 88
End: 2023-09-28
Payer: MEDICARE

## 2023-09-28 VITALS
OXYGEN SATURATION: 98 % | SYSTOLIC BLOOD PRESSURE: 128 MMHG | HEART RATE: 71 BPM | WEIGHT: 157 LBS | BODY MASS INDEX: 29.64 KG/M2 | RESPIRATION RATE: 18 BRPM | TEMPERATURE: 97.9 F | HEIGHT: 61 IN | DIASTOLIC BLOOD PRESSURE: 62 MMHG

## 2023-09-28 DIAGNOSIS — I10 PRIMARY HYPERTENSION: Chronic | ICD-10-CM

## 2023-09-28 DIAGNOSIS — R73.02 IGT (IMPAIRED GLUCOSE TOLERANCE): Chronic | ICD-10-CM

## 2023-09-28 DIAGNOSIS — E78.5 HYPERLIPIDEMIA, UNSPECIFIED HYPERLIPIDEMIA TYPE: Chronic | ICD-10-CM

## 2023-09-28 DIAGNOSIS — I25.10 CORONARY ARTERY DISEASE INVOLVING NATIVE CORONARY ARTERY OF NATIVE HEART WITHOUT ANGINA PECTORIS: ICD-10-CM

## 2023-09-28 DIAGNOSIS — Z00.00 ENCOUNTER FOR ANNUAL WELLNESS EXAM IN MEDICARE PATIENT: Primary | ICD-10-CM

## 2023-09-28 PROCEDURE — 93296 REM INTERROG EVL PM/IDS: CPT | Performed by: INTERNAL MEDICINE

## 2023-09-28 PROCEDURE — 93294 REM INTERROG EVL PM/LDLS PM: CPT | Performed by: INTERNAL MEDICINE

## 2023-09-28 NOTE — PROGRESS NOTES
Subjective   Yuliana Gonzalez is a 89 y.o. female.     Chief Complaint   Patient presents with    Medicare Wellness-subsequent    Hyperlipidemia    Hypertension         History of Present Illness  In for recheck of chronic IGT.  Has been stable.  Also aortic stenosis.  Has dyspnea on exertion which has resolved.  Recent TTE showed an ejection fraction of 51% which was improved.  History of macular degeneration.  That is stable.  Hyperlipidemia  This is a chronic problem. The current episode started more than 1 year ago. The problem is controlled. Recent lipid tests were reviewed and are normal. Factors aggravating her hyperlipidemia include beta blockers. Pertinent negatives include no chest pain or shortness of breath.   Hypertension  This is a chronic problem. The current episode started more than 1 year ago. The problem is unchanged. The problem is controlled. Pertinent negatives include no chest pain, headaches, palpitations or shortness of breath.   Coronary Artery Disease  Presents for follow-up visit. Pertinent negatives include no chest pain, leg swelling, palpitations or shortness of breath. Risk factors include hyperlipidemia.   Asthma  There is no cough, shortness of breath or wheezing. Pertinent negatives include no chest pain or headaches. Her past medical history is significant for asthma.   Hyperglycemia  This is a chronic problem. The current episode started more than 1 year ago. The problem occurs constantly. Pertinent negatives include no abdominal pain, chest pain, coughing or headaches.      The following portions of the patient's history were reviewed and updated as appropriate: allergies, current medications, past social history and problem list.    Outpatient Medications Marked as Taking for the 9/28/23 encounter (Office Visit) with Trent Davis MD   Medication Sig Dispense Refill    acetaminophen (TYLENOL) 325 MG tablet Take 2 tablets by mouth Every 4 (Four) Hours As Needed for Mild Pain   or Fever (temperature greater than 101F). 120 tablet 0    albuterol sulfate  (90 Base) MCG/ACT inhaler Inhale 2 puffs Every 4 (Four) Hours As Needed for Wheezing or Shortness of Air. 8.5 g 0    aspirin (aspirin) 81 MG EC tablet Take 1 tablet by mouth Daily. 30 tablet 6    AZO-CRANBERRY PO Take 1 tablet by mouth Daily.      clopidogrel (PLAVIX) 75 MG tablet Take 1 tablet by mouth Daily. 90 tablet 3    metoprolol succinate XL (Toprol XL) 25 MG 24 hr tablet Take 1 tablet by mouth Daily for 360 days. 90 tablet 3    metroNIDAZOLE (METROCREAM) 0.75 % cream Apply 1 application  topically to the appropriate area as directed 2 (Two) Times a Day.      Multiple Vitamins-Minerals (VITEYES AREDS ADVANCED PO) Take 2 capsules by mouth Daily.      mupirocin (BACTROBAN) 2 % cream Apply 1 application  topically to the appropriate area as directed 2 (Two) Times a Day.      mupirocin (BACTROBAN) 2 % ointment APPLY TWO TIMES PER DAY TO THE INFECTION/BIOPSY SITES.      nitroglycerin (NITROSTAT) 0.4 MG SL tablet Place 1 tablet under the tongue Every 5 (Five) Minutes As Needed for Chest Pain. Take no more than 3 doses in 15 minutes. 30 tablet 3    pantoprazole (PROTONIX) 40 MG EC tablet Take 1 tablet by mouth Daily.      potassium chloride (MICRO-K) 10 MEQ CR capsule Take 1 capsule by mouth 2 (Two) Times a Day. 180 capsule 3    rosuvastatin (Crestor) 20 MG tablet Take 1 tablet by mouth Daily. 90 tablet 3    sennosides-docusate (PERICOLACE) 8.6-50 MG per tablet Take 1 tablet by mouth Daily.      Spacer/Aero-Holding Chambers (AeroChamber Plus Benja-Vu) misc 1 Device As Needed (Use as needed with inhaler device). 1 each 0    torsemide (DEMADEX) 20 MG tablet Take 1 tablet by mouth Daily. 90 tablet 1    Vitamin D, Cholecalciferol, 50 MCG (2000 UT) capsule Take 1 capsule by mouth Daily.         Review of Systems   Respiratory:  Negative for cough, shortness of breath and wheezing.    Cardiovascular:  Negative for chest pain, palpitations  and leg swelling.   Gastrointestinal:  Negative for abdominal pain, constipation (controlled on senna) and diarrhea.   Neurological:  Negative for headaches.     Objective   Vitals:    09/28/23 0950   BP: 128/62   Pulse: 71   Resp: 18   Temp: 97.9 °F (36.6 °C)   SpO2: 98%          09/28/23  0950   Weight: 71.2 kg (157 lb)    [unfilled]  Body mass index is 29.66 kg/m².      Physical Exam   Constitutional: She appears well-developed.   Neck: No thyromegaly present.   Cardiovascular: Normal rate and regular rhythm. Exam reveals no gallop.   Murmur heard.  Crescendo decrescendo systolic murmur is present with a grade of 1/6.  Soft grade 1-2 systolic murmur along the left sternal border   Pulmonary/Chest: Effort normal and breath sounds normal. No respiratory distress. She has no wheezes. She has no rales.   Abdominal: Soft. Normal appearance and bowel sounds are normal. She exhibits no mass. There is no abdominal tenderness. There is no guarding.   Neurological: She is alert.       Problems Addressed this Visit    None  Visit Diagnoses       Encounter for annual wellness exam in Medicare patient    -  Primary          Diagnoses         Codes Comments    Encounter for annual wellness exam in Medicare patient    -  Primary ICD-10-CM: Z00.00  ICD-9-CM: V70.0           Assessment/Plan   In for recheck of hypertension, hyperlipidemia and CAD today September 2023.  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  Virtually all of her cardiac symptoms have now resolved.  Her energy was back to normal.  Blood pressure control is good.  Glycemic control is been good.  Gets glucose and A1c today and every 3 months.  Annual lab work was done August 2023 in the hospital including CBC, CMP.  Annual wellness visit due today September 2023.  She failed Lipitor and Dr. Locke felt that was somewhat irrelevant at her age and is good is her lipids run.  I agree.  She remains on Lopressor 12.5 mg twice daily and  torsemide 20 mg daily for hypertension those are reviewed today.  History of macular degeneration being treated with eye vitamins.  Flu shot is up-to-date and she is due to get COVID shot soon.  She should also take the RSV shot.  She is 2.5 HPP today.    The above information was reviewed again today 09/28/23.  It continues to be accurate as reflected above and is unchanged.  History, physical and review of systems all reviewed and are unchanged.  Medications were reviewed today and continue the current dosing.           Jono disclaimer:   Much of this encounter note is an electronic transcription/translation of spoken language to printed text. The electronic translation of spoken language may permit erroneous, or at times, nonsensical words or phrases to be inadvertently transcribed; Although I have reviewed the note for such errors, some may still exist.

## 2023-09-28 NOTE — PROGRESS NOTES
The ABCs of the Annual Wellness Visit  Subsequent Medicare Wellness Visit    Subjective      Yuliana Gonzlaez is a 89 y.o. female who presents for a Subsequent Medicare Wellness Visit.    The following portions of the patient's history were reviewed and   updated as appropriate: allergies, current medications, past family history, past medical history, past social history, past surgical history, and problem list.    Compared to one year ago, the patient feels her physical   health is the same.    Compared to one year ago, the patient feels her mental   health is the same.    Recent Hospitalizations:  This patient has had a [facility] admission record on file within the last 365 days.    Current Medical Providers:  Patient Care Team:  Trent Davis MD as PCP - General (Internal Medicine)  Trent Davis MD as PCP - Internal Medicine (Internal Medicine)  Thang Bruce MD as Consulting Physician (Ophthalmology)  Curtis Marshall MD as Consulting Physician (Cardiology)    Outpatient Medications Prior to Visit   Medication Sig Dispense Refill    acetaminophen (TYLENOL) 325 MG tablet Take 2 tablets by mouth Every 4 (Four) Hours As Needed for Mild Pain  or Fever (temperature greater than 101F). 120 tablet 0    albuterol sulfate  (90 Base) MCG/ACT inhaler Inhale 2 puffs Every 4 (Four) Hours As Needed for Wheezing or Shortness of Air. 8.5 g 0    aspirin (aspirin) 81 MG EC tablet Take 1 tablet by mouth Daily. 30 tablet 6    AZO-CRANBERRY PO Take 1 tablet by mouth Daily.      clopidogrel (PLAVIX) 75 MG tablet Take 1 tablet by mouth Daily. 90 tablet 3    metoprolol succinate XL (Toprol XL) 25 MG 24 hr tablet Take 1 tablet by mouth Daily for 360 days. 90 tablet 3    metroNIDAZOLE (METROCREAM) 0.75 % cream Apply 1 application  topically to the appropriate area as directed 2 (Two) Times a Day.      Multiple Vitamins-Minerals (VITEYES AREDS ADVANCED PO) Take 2 capsules by mouth Daily.      mupirocin  (BACTROBAN) 2 % cream Apply 1 application  topically to the appropriate area as directed 2 (Two) Times a Day.      mupirocin (BACTROBAN) 2 % ointment APPLY TWO TIMES PER DAY TO THE INFECTION/BIOPSY SITES.      nitroglycerin (NITROSTAT) 0.4 MG SL tablet Place 1 tablet under the tongue Every 5 (Five) Minutes As Needed for Chest Pain. Take no more than 3 doses in 15 minutes. 30 tablet 3    pantoprazole (PROTONIX) 40 MG EC tablet Take 1 tablet by mouth Daily.      potassium chloride (MICRO-K) 10 MEQ CR capsule Take 1 capsule by mouth 2 (Two) Times a Day. 180 capsule 3    rosuvastatin (Crestor) 20 MG tablet Take 1 tablet by mouth Daily. 90 tablet 3    sennosides-docusate (PERICOLACE) 8.6-50 MG per tablet Take 1 tablet by mouth Daily.      Spacer/Aero-Holding Chambers (AeroChamber Plus Benja-Vu) misc 1 Device As Needed (Use as needed with inhaler device). 1 each 0    torsemide (DEMADEX) 20 MG tablet Take 1 tablet by mouth Daily. 90 tablet 1    Vitamin D, Cholecalciferol, 50 MCG (2000 UT) capsule Take 1 capsule by mouth Daily.       No facility-administered medications prior to visit.       No opioid medication identified on active medication list. I have reviewed chart for other potential  high risk medication/s and harmful drug interactions in the elderly.        Aspirin is on active medication list. Aspirin use is not indicated based on review of current medical condition/s. Risk of harm outweighs potential benefits. Patient instructed to discontinue this medication.  .      Patient Active Problem List   Diagnosis    Aortic stenosis    IGT (impaired glucose tolerance)    Hyperlipidemia    Hypertension    Cervical arthritis    Sciatica    Vitamin D deficiency    Exudative age-related macular degeneration, bilateral, with inactive choroidal neovascularization    Coronary artery disease involving native coronary artery of native heart    AV block, complete    S/P TAVR (transcatheter aortic valve replacement)    Presence of  "cardiac pacemaker    ST elevation myocardial infarction (STEMI)    ST elevation myocardial infarction (STEMI), unspecified artery    Elevated LFTs    UTI (urinary tract infection)    NSTEMI (non-ST elevated myocardial infarction)    Hyperglycemia    Prediabetes     Advance Care Planning   Advance Care Planning     Advance Directive is on file.  ACP discussion was held with the patient during this visit. Patient has an advance directive in EMR which is still valid.      Objective    Vitals:    23 0950   BP: 128/62   Pulse: 71   Resp: 18   Temp: 97.9 °F (36.6 °C)   TempSrc: Temporal   SpO2: 98%   Weight: 71.2 kg (157 lb)   Height: 154.9 cm (61\")     Estimated body mass index is 29.66 kg/m² as calculated from the following:    Height as of this encounter: 154.9 cm (61\").    Weight as of this encounter: 71.2 kg (157 lb).           Does the patient have evidence of cognitive impairment?   No    Lab Results   Component Value Date    HGBA1C 5.90 (H) 2023          HEALTH RISK ASSESSMENT    Smoking Status:  Social History     Tobacco Use   Smoking Status Former    Packs/day: 0.00    Years: 0.00    Pack years: 0.00    Types: Cigarettes    Start date:     Quit date:     Years since quittin.7   Smokeless Tobacco Never   Tobacco Comments    1 pack every 2 weeks     Alcohol Consumption:  Social History     Substance and Sexual Activity   Alcohol Use Yes    Comment: wine - rare / no caffeine     Fall Risk Screen:    ANGELICA Fall Risk Assessment was completed, and patient is at LOW risk for falls.Assessment completed on:2023    Depression Screenin/28/2023     9:50 AM   PHQ-2/PHQ-9 Depression Screening   Little Interest or Pleasure in Doing Things 0-->not at all   Feeling Down, Depressed or Hopeless 0-->not at all   PHQ-9: Brief Depression Severity Measure Score 0       Health Habits and Functional and Cognitive Screenin/1/2022     9:00 AM   Functional & Cognitive Status   Do you have " difficulty preparing food and eating? Yes   Do you have difficulty bathing yourself, getting dressed or grooming yourself? Yes   Do you have difficulty using the toilet? No   Do you have difficulty moving around from place to place? Yes   Do you have trouble with steps or getting out of a bed or a chair? Yes   Current Diet Well Balanced Diet   Dental Exam Up to date   Eye Exam Up to date   Exercise (times per week) 7 times per week   Current Exercises Include Walking   Do you need help using the phone?  No   Are you deaf or do you have serious difficulty hearing?  Yes   Do you need help to go to places out of walking distance? Yes   Do you need help shopping? Yes   Do you need help preparing meals?  Yes   Do you need help with housework?  No   Do you need help with laundry? No   Do you need help taking your medications? Yes   Do you need help managing money? Yes   Do you ever drive or ride in a car without wearing a seat belt? No   Have you felt unusual stress, anger or loneliness in the last month? No   Who do you live with? Community   If you need help, do you have trouble finding someone available to you? No   Have you been bothered in the last four weeks by sexual problems? No       Age-appropriate Screening Schedule:  Refer to the list below for future screening recommendations based on patient's age, sex and/or medical conditions. Orders for these recommended tests are listed in the plan section. The patient has been provided with a written plan.    Health Maintenance   Topic Date Due    DXA SCAN  Never done    COVID-19 Vaccine (4 - Pfizer series) 12/28/2021    BMI FOLLOWUP  09/01/2023    ZOSTER VACCINE (2 of 3) 01/01/2035 (Originally 1/26/2008)    INFLUENZA VACCINE  10/01/2023    ANNUAL WELLNESS VISIT  09/28/2024    TDAP/TD VACCINES (3 - Td or Tdap) 03/01/2027    Pneumococcal Vaccine 65+  Completed    LIPID PANEL  Discontinued    COLORECTAL CANCER SCREENING  Discontinued                  CMS Preventative  Services Quick Reference  Risk Factors Identified During Encounter:    Fall Risk-High or Moderate: Information on Fall Prevention Shared in After Visit Summary    The above risks/problems have been discussed with the patient.  Pertinent information has been shared with the patient in the After Visit Summary.    Diagnoses and all orders for this visit:    1. Encounter for annual wellness exam in Medicare patient (Primary)        Follow Up:   Next Medicare Wellness visit to be scheduled in 1 year.      An After Visit Summary and PPPS were made available to the patient.

## 2023-09-29 ENCOUNTER — TREATMENT (OUTPATIENT)
Dept: CARDIAC REHAB | Facility: HOSPITAL | Age: 88
End: 2023-09-29
Payer: MEDICARE

## 2023-09-29 DIAGNOSIS — Z95.5 S/P DRUG ELUTING CORONARY STENT PLACEMENT: Primary | ICD-10-CM

## 2023-09-29 PROCEDURE — 93798 PHYS/QHP OP CAR RHAB W/ECG: CPT

## 2023-10-02 ENCOUNTER — TREATMENT (OUTPATIENT)
Dept: CARDIAC REHAB | Facility: HOSPITAL | Age: 88
End: 2023-10-02
Payer: MEDICARE

## 2023-10-02 DIAGNOSIS — Z95.5 S/P DRUG ELUTING CORONARY STENT PLACEMENT: Primary | ICD-10-CM

## 2023-10-02 PROCEDURE — 93798 PHYS/QHP OP CAR RHAB W/ECG: CPT

## 2023-10-04 ENCOUNTER — TREATMENT (OUTPATIENT)
Dept: CARDIAC REHAB | Facility: HOSPITAL | Age: 88
End: 2023-10-04
Payer: MEDICARE

## 2023-10-04 DIAGNOSIS — Z95.5 S/P DRUG ELUTING CORONARY STENT PLACEMENT: Primary | ICD-10-CM

## 2023-10-04 PROCEDURE — 93798 PHYS/QHP OP CAR RHAB W/ECG: CPT

## 2023-10-06 ENCOUNTER — TREATMENT (OUTPATIENT)
Dept: CARDIAC REHAB | Facility: HOSPITAL | Age: 88
End: 2023-10-06
Payer: MEDICARE

## 2023-10-06 DIAGNOSIS — Z95.5 S/P DRUG ELUTING CORONARY STENT PLACEMENT: Primary | ICD-10-CM

## 2023-10-06 PROCEDURE — 93798 PHYS/QHP OP CAR RHAB W/ECG: CPT

## 2023-10-09 ENCOUNTER — TREATMENT (OUTPATIENT)
Dept: CARDIAC REHAB | Facility: HOSPITAL | Age: 88
End: 2023-10-09
Payer: MEDICARE

## 2023-10-09 DIAGNOSIS — Z95.5 S/P DRUG ELUTING CORONARY STENT PLACEMENT: Primary | ICD-10-CM

## 2023-10-09 PROCEDURE — 93798 PHYS/QHP OP CAR RHAB W/ECG: CPT

## 2023-10-11 ENCOUNTER — TREATMENT (OUTPATIENT)
Dept: CARDIAC REHAB | Facility: HOSPITAL | Age: 88
End: 2023-10-11
Payer: MEDICARE

## 2023-10-11 DIAGNOSIS — Z95.5 S/P DRUG ELUTING CORONARY STENT PLACEMENT: Primary | ICD-10-CM

## 2023-10-11 PROCEDURE — 93798 PHYS/QHP OP CAR RHAB W/ECG: CPT

## 2023-10-13 ENCOUNTER — TREATMENT (OUTPATIENT)
Dept: CARDIAC REHAB | Facility: HOSPITAL | Age: 88
End: 2023-10-13
Payer: MEDICARE

## 2023-10-13 DIAGNOSIS — Z95.5 S/P DRUG ELUTING CORONARY STENT PLACEMENT: Primary | ICD-10-CM

## 2023-10-13 PROCEDURE — 93798 PHYS/QHP OP CAR RHAB W/ECG: CPT

## 2023-10-16 ENCOUNTER — TREATMENT (OUTPATIENT)
Dept: CARDIAC REHAB | Facility: HOSPITAL | Age: 88
End: 2023-10-16
Payer: MEDICARE

## 2023-10-16 DIAGNOSIS — Z95.5 S/P DRUG ELUTING CORONARY STENT PLACEMENT: Primary | ICD-10-CM

## 2023-10-16 PROCEDURE — 93798 PHYS/QHP OP CAR RHAB W/ECG: CPT

## 2023-10-16 RX ORDER — PANTOPRAZOLE SODIUM 40 MG/1
40 TABLET, DELAYED RELEASE ORAL EVERY MORNING
Qty: 90 TABLET | Refills: 3 | Status: SHIPPED | OUTPATIENT
Start: 2023-10-16 | End: 2023-10-19 | Stop reason: SDUPTHER

## 2023-10-16 RX ORDER — POTASSIUM CHLORIDE 750 MG/1
10 CAPSULE, EXTENDED RELEASE ORAL 2 TIMES DAILY
Qty: 180 CAPSULE | Refills: 3 | Status: SHIPPED | OUTPATIENT
Start: 2023-10-16 | End: 2023-10-19 | Stop reason: SDUPTHER

## 2023-10-18 ENCOUNTER — TREATMENT (OUTPATIENT)
Dept: CARDIAC REHAB | Facility: HOSPITAL | Age: 88
End: 2023-10-18
Payer: MEDICARE

## 2023-10-18 DIAGNOSIS — Z95.5 S/P DRUG ELUTING CORONARY STENT PLACEMENT: Primary | ICD-10-CM

## 2023-10-18 PROCEDURE — 93798 PHYS/QHP OP CAR RHAB W/ECG: CPT

## 2023-10-19 RX ORDER — ROSUVASTATIN CALCIUM 20 MG/1
20 TABLET, COATED ORAL DAILY
Qty: 30 TABLET | Refills: 0 | Status: SHIPPED | OUTPATIENT
Start: 2023-10-19

## 2023-10-19 RX ORDER — PANTOPRAZOLE SODIUM 40 MG/1
40 TABLET, DELAYED RELEASE ORAL EVERY MORNING
Qty: 90 TABLET | Refills: 1 | Status: SHIPPED | OUTPATIENT
Start: 2023-10-19

## 2023-10-19 RX ORDER — POTASSIUM CHLORIDE 750 MG/1
10 CAPSULE, EXTENDED RELEASE ORAL 2 TIMES DAILY
Qty: 180 CAPSULE | Refills: 1 | Status: SHIPPED | OUTPATIENT
Start: 2023-10-19

## 2023-10-19 NOTE — TELEPHONE ENCOUNTER
Caller: Carlos Yuliana SIMÓN    Relationship: Self    Best call back number: [phone]    Requested Prescriptions:   Requested Prescriptions     Pending Prescriptions Disp Refills    rosuvastatin (Crestor) 20 MG tablet 90 tablet 3     Sig: Take 1 tablet by mouth Daily.        Pharmacy where request should be sent: [facility]/pharmacy #[id number] [place], [place] - [address] AT [facility]     Last office visit with prescribing clinician: 2/6/2023   Last telemedicine visit with prescribing clinician: Visit date not found   Next office visit with prescribing clinician: Visit date not found     Additional details provided by patient: PATIENT ISN'T SURE IF DR WILL REFILL THIS BECAUSE SHE GOT THIS FROM THE HOSPITAL IN AUG. BUT IF SHE NEEDS TO CONTINUE SHE WILL NEED MORE SOON?    Does the patient have less than a 3 day supply:  [] Yes  [x] No    Would you like a call back once the refill request has been completed: [] Yes [] No    If the office needs to give you a call back, can they leave a voicemail: [] Yes [] No    Liliana Norris Rep   10/19/23 13:30 EDT

## 2023-10-19 NOTE — TELEPHONE ENCOUNTER
Caller: Yuliana Gonzalez    Relationship: Self    Best call back number: [phone]     Requested Prescriptions:   Requested Prescriptions     Pending Prescriptions Disp Refills    pantoprazole (PROTONIX) 40 MG EC tablet 90 tablet 3     Sig: Take 1 tablet by mouth Every Morning.    potassium chloride (MICRO-K) 10 MEQ CR capsule 180 capsule 3     Sig: Take 1 capsule by mouth 2 (Two) Times a Day.        Pharmacy where request should be sent: [facility]/PHARMACY #[id number] [facility] [address] [facility]. AT [facility] [phone] [facility] [phone]      Last office visit with prescribing clinician: 9/28/2023   Last telemedicine visit with prescribing clinician: Visit date not found   Next office visit with prescribing clinician: 12/28/2023     Additional details provided by patient: PATIENT STATED SHE HAS ABOUT A WEEK LEFT OF THE POTASIUM, AND 3 TO 4 DAYS REMAINING OF PANTOPRAZOLE.    PLEASE CALL TO INFORM PATIENT IG THIS MAY BE RENEWED OR NOT.    Does the patient have less than a 3 day supply:  [x] Yes  [] No    Would you like a call back once the refill request has been completed: [x] Yes [] No    If the office needs to give you a call back, can they leave a voicemail: [x] Yes [] No    Liliana Lowe Rep   10/19/23 14:43 EDT

## 2023-10-20 ENCOUNTER — TREATMENT (OUTPATIENT)
Dept: CARDIAC REHAB | Facility: HOSPITAL | Age: 88
End: 2023-10-20
Payer: MEDICARE

## 2023-10-20 DIAGNOSIS — Z95.5 S/P DRUG ELUTING CORONARY STENT PLACEMENT: Primary | ICD-10-CM

## 2023-10-20 PROCEDURE — 93798 PHYS/QHP OP CAR RHAB W/ECG: CPT

## 2023-10-23 ENCOUNTER — TREATMENT (OUTPATIENT)
Dept: CARDIAC REHAB | Facility: HOSPITAL | Age: 88
End: 2023-10-23
Payer: MEDICARE

## 2023-10-23 DIAGNOSIS — Z95.5 S/P DRUG ELUTING CORONARY STENT PLACEMENT: Primary | ICD-10-CM

## 2023-10-23 PROCEDURE — 93798 PHYS/QHP OP CAR RHAB W/ECG: CPT

## 2023-10-24 ENCOUNTER — TELEPHONE (OUTPATIENT)
Dept: CARDIOLOGY | Facility: CLINIC | Age: 88
End: 2023-10-24
Payer: MEDICARE

## 2023-10-24 NOTE — TELEPHONE ENCOUNTER
Thanks Paula,  If we need to schedule her with me and then Chucky that works, just want to make sure we don't lose close follow-up

## 2023-10-24 NOTE — TELEPHONE ENCOUNTER
Can you call and let her know she does not have to worry about an echo.  It does not look like this is scheduled any longer, however additionally she already had a repeat echo which looks good    Q

## 2023-10-24 NOTE — TELEPHONE ENCOUNTER
Caller: RAVEN    Relationship: SELF    Best call back number: [phone]    What is the best time to reach you: ANY, OKAY TO LEAVE A MESSAGE        What was the call regarding: PT CALLED IN AS SHE HAD AN APPOINTMENT WRITTEN DOWN FOR AN ECHO ON 12/5/23 @ 2:00.  UNABLE TO FIND ONE IN THE CHART AT THIS MOMENT. SHE WOULD LIKE TO KNOW IF SHE STILL SHOULD GET THAT DONE.

## 2023-10-24 NOTE — TELEPHONE ENCOUNTER
I called Yuliana about her getting on with Dr. Locke.    I left a message advising about her echo appt and asked for her to call me back personally about getting scheduled for her appt.    Paula

## 2023-10-25 ENCOUNTER — APPOINTMENT (OUTPATIENT)
Dept: CARDIAC REHAB | Facility: HOSPITAL | Age: 88
End: 2023-10-25
Payer: MEDICARE

## 2023-10-27 ENCOUNTER — APPOINTMENT (OUTPATIENT)
Dept: CARDIAC REHAB | Facility: HOSPITAL | Age: 88
End: 2023-10-27
Payer: MEDICARE

## 2023-10-30 ENCOUNTER — APPOINTMENT (OUTPATIENT)
Dept: CARDIAC REHAB | Facility: HOSPITAL | Age: 88
End: 2023-10-30
Payer: MEDICARE

## 2023-11-08 ENCOUNTER — OFFICE VISIT (OUTPATIENT)
Age: 88
End: 2023-11-08
Payer: MEDICARE

## 2023-11-08 VITALS
HEIGHT: 61 IN | WEIGHT: 157 LBS | BODY MASS INDEX: 29.64 KG/M2 | SYSTOLIC BLOOD PRESSURE: 142 MMHG | HEART RATE: 72 BPM | DIASTOLIC BLOOD PRESSURE: 60 MMHG

## 2023-11-08 DIAGNOSIS — Z95.0 PRESENCE OF CARDIAC PACEMAKER: ICD-10-CM

## 2023-11-08 DIAGNOSIS — I25.10 CORONARY ARTERY DISEASE INVOLVING NATIVE CORONARY ARTERY OF NATIVE HEART WITHOUT ANGINA PECTORIS: Primary | ICD-10-CM

## 2023-11-08 DIAGNOSIS — I10 PRIMARY HYPERTENSION: ICD-10-CM

## 2023-11-08 DIAGNOSIS — Z95.2 S/P TAVR (TRANSCATHETER AORTIC VALVE REPLACEMENT): ICD-10-CM

## 2023-11-08 DIAGNOSIS — E78.5 HYPERLIPIDEMIA, UNSPECIFIED HYPERLIPIDEMIA TYPE: ICD-10-CM

## 2023-11-08 RX ORDER — METOPROLOL SUCCINATE 25 MG/1
25 TABLET, EXTENDED RELEASE ORAL 2 TIMES DAILY
Qty: 180 TABLET | Refills: 2 | Status: SHIPPED | OUTPATIENT
Start: 2023-11-08 | End: 2024-11-02

## 2023-11-08 NOTE — PROGRESS NOTES
Date of Office Visit: 23    Encounter Provider: Clint Locke MD  Place of Service: [facility] CARDIOLOGY  Patient Name: Yuliana Gonzalez  :1930      Chief complaint:  Coronary artery disease with prior PCI to the mid LAD  Severe degenerative aortic valve stenosis with history of transcatheter aortic valve replacement  Permanent pacemaker      HPI:  89 y.o. 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  She underwent transcatheter aortic valve replacement with a 26 mm Medtronic evolut pro transcatheter aortic valve that was postdilated.  She was noted to have a mild paravalvular leak in case completion.  She subsequently presented back with dyspnea along with inferior ST elevation, however this subsequently has resolved.  Catheterization at that time showed nonobstructive CAD. She states she has been feeling great and has been walking out the form doing physical therapy.     She continues to follow-up with the electrophysiology team.  She is RV pacing 20% of the time.  Blood pressure and heart rate are well controlled.    She went to [facility] in Aug 2023 initially complaining of chest pain, cough, shortness of breath.  She was found to have an NSTEMI and a parainfluenza virus URI and was admitted to the hospital for further evaluation and treatment.  We did an echocardiogram which showed a newly depressed EF at 37%. She then underwent cardiac catheterization which showed 99% mid vessel stenosis in the LAD. A 2.5 x 38 mm Xience lida point drug-eluting stent, postdilated to high-pressure in the proximal to mid stent segment with a 3.0 mm NC trek balloon was placed in the mid-LAD.  She is  "done very well since that time.  Ejection fraction normalized.  She does occasionally note palpitations at night and states that they have been a little bit worse over the last 2 to 3 weeks.      Past Medical History:   Diagnosis Date    Aortic stenosis 07/2009    diagnosed, 6/14 moderate with 0.9cm area, 42mm max gradient. 0.91/34 6/15.    Central loss of vision     RIGHT EYE    Cervical arthritis     Colitis     HX    Coronary artery disease     History of constipation     History of esophageal reflux     History of skin cancer     Hyperlipidemia     Hypertension     IGT (impaired glucose tolerance)     Irregular heart beat     HX    Macular degeneration     Medication management     On continuous oral anticoagulation     Redness of skin     NOSE/DERMATOLOGY TREATING WITH FLAGYL CREAM/ADVISED PT TO NOTIFY CARDIOLOGY    Sciatica     HX    Short-term memory loss     \"MILD\"    SOB (shortness of breath) on exertion     Vitamin D deficiency        Past Surgical History:   Procedure Laterality Date    AORTIC VALVE REPAIR/REPLACEMENT Left 3/15/2022    Procedure: TTE TRANSFEMORAL TRANSCATHETER AORTIC VALVE REPLACEMENT PERCUTANEOUS APPROACH;  Surgeon: Vesna Chris MD;  Location: [facility];  Service: Cardiothoracic;  Laterality: Left;    AORTIC VALVE REPAIR/REPLACEMENT Left 3/15/2022    Procedure: Transfemoral Transcatheter Aortic Valve Replacement with intra operative TTE and possible open surgical rescue;  Surgeon: Clint Locke MD;  Location: [facility];  Service: Cardiovascular;  Laterality: Left;    CARDIAC CATHETERIZATION N/A 1/21/2022    Procedure: Coronary angiography;  Surgeon: Clint Locke MD;  Location: [facility] CATH INVASIVE LOCATION;  Service: Cardiovascular;  Laterality: N/A;    CARDIAC CATHETERIZATION N/A 1/21/2022    Procedure: Percutaneous Coronary Intervention;  Surgeon: Clint Locke MD;  Location: [facility] CATH INVASIVE LOCATION;  Service: Cardiovascular;  Laterality: " N/A;    CARDIAC CATHETERIZATION N/A 1/21/2022    Procedure: Stent ISMAEL coronary;  Surgeon: Clint Locke MD;  Location:  [facility] CATH INVASIVE LOCATION;  Service: Cardiovascular;  Laterality: N/A;    CARDIAC CATHETERIZATION Left 7/31/2022    Procedure: CORONARY ANGIOGRAPHY;  Surgeon: Jeannie Echevarria MD;  Location:  [facility] CATH INVASIVE LOCATION;  Service: Cardiology;  Laterality: Left;    CARDIAC CATHETERIZATION N/A 7/31/2022    Procedure: Left ventriculography;  Surgeon: Jeannie Echevarria MD;  Location:  [facility] CATH INVASIVE LOCATION;  Service: Cardiology;  Laterality: N/A;    CARDIAC CATHETERIZATION N/A 7/31/2022    Procedure: Left Heart Cath;  Surgeon: Jeannie Echevarria MD;  Location:  [facility] CATH INVASIVE LOCATION;  Service: Cardiology;  Laterality: N/A;    CARDIAC CATHETERIZATION N/A 8/4/2023    Procedure: Left Heart Cath;  Surgeon: Clint Locke MD;  Location:  [facility] CATH INVASIVE LOCATION;  Service: Cardiovascular;  Laterality: N/A;    CARDIAC CATHETERIZATION N/A 8/4/2023    Procedure: Percutaneous Coronary Intervention;  Surgeon: Clint Locke MD;  Location:  [facility] CATH INVASIVE LOCATION;  Service: Cardiovascular;  Laterality: N/A;    CARDIAC CATHETERIZATION N/A 8/4/2023    Procedure: Stent ISMAEL coronary;  Surgeon: Clint Locke MD;  Location:  [facility] CATH INVASIVE LOCATION;  Service: Cardiovascular;  Laterality: N/A;    CARDIAC CATHETERIZATION N/A 8/4/2023    Procedure: Coronary angiography;  Surgeon: Clint Locke MD;  [facility];  Service: Cardiovascular;  Laterality: N/A;    CARDIAC ELECTROPHYSIOLOGY PROCEDURE N/A 3/16/2022    Procedure: Pacemaker DC new [place];  Surgeon: Yann Ceballos MD;  Location:  [facility] CATH INVASIVE LOCATION;  Service: Cardiology;  Laterality: N/A;    CATARACT EXTRACTION, BILATERAL      COLONOSCOPY      EYE SURGERY      SKIN CANCER EXCISION         Social History     Socioeconomic History    Marital status:       Spouse name: Stephan    Number of children: 4   Tobacco Use    Smoking status: Former     Packs/day: 0.00     Years: 0.00     Additional pack years: 0.00     Total pack years: 0.00     Types: Cigarettes     Start date:      Quit date:      Years since quittin.9    Smokeless tobacco: Never    Tobacco comments:     1 pack every 2 weeks   Vaping Use    Vaping Use: Never used   Substance and Sexual Activity    Alcohol use: Yes     Comment: wine - rare / no caffeine    Drug use: No    Sexual activity: Defer       Family History   Problem Relation Age of Onset    Aortic stenosis Mother     Hypertension Mother     Heart failure Father     Diabetes Father     Hypertension Father     No Known Problems Sister     No Known Problems Daughter     No Known Problems Son     Aneurysm Sister     Other Sister         Polio in her 20's    Malig Hyperthermia Neg Hx        Review of Systems   Constitutional: Negative. Negative for fever and malaise/fatigue.   HENT:  Negative for nosebleeds and sore throat.    Eyes:  Negative for blurred vision and double vision.   Cardiovascular: Negative.  Negative for chest pain, claudication, dyspnea on exertion, leg swelling, orthopnea, palpitations, paroxysmal nocturnal dyspnea and syncope.   Respiratory: Negative.  Negative for cough, shortness of breath and snoring.    Endocrine: Negative for cold intolerance, heat intolerance, polydipsia and polyphagia.   Hematologic/Lymphatic: Negative for bleeding problem.   Skin:  Negative for itching, poor wound healing and rash.   Musculoskeletal:  Negative for falls, joint pain, joint swelling, muscle weakness and myalgias.   Gastrointestinal:  Negative for abdominal pain, melena, nausea and vomiting.   Neurological:  Positive for light-headedness. Negative for dizziness, loss of balance, seizures, vertigo and weakness.   Psychiatric/Behavioral:  Negative for altered mental status and depression.        Allergies   Allergen Reactions     "Hydrochlorothiazide Other (See Comments)     hyponatremia    Hydrocodone Shortness Of Breath     Also reports \"lethargy\"    Lipitor [Atorvastatin] Nausea Only     \"FELT AWFUL\"    Erythromycin Diarrhea    Lisinopril Cough         Current Outpatient Medications:     acetaminophen (TYLENOL) 325 MG tablet, Take 2 tablets by mouth Every 4 (Four) Hours As Needed for Mild Pain  or Fever (temperature greater than 101F)., Disp: 120 tablet, Rfl: 0    albuterol sulfate  (90 Base) MCG/ACT inhaler, Inhale 2 puffs Every 4 (Four) Hours As Needed for Wheezing or Shortness of Air., Disp: 8.5 g, Rfl: 0    aspirin (aspirin) 81 MG EC tablet, Take 1 tablet by mouth Daily., Disp: 30 tablet, Rfl: 6    AZO-CRANBERRY PO, Take 1 tablet by mouth Daily., Disp: , Rfl:     clopidogrel (PLAVIX) 75 MG tablet, Take 1 tablet by mouth Daily., Disp: 90 tablet, Rfl: 3    metoprolol succinate XL (Toprol XL) 25 MG 24 hr tablet, Take 1 tablet by mouth Daily for 360 days., Disp: 90 tablet, Rfl: 3    metroNIDAZOLE (METROCREAM) 0.75 % cream, Apply 1 application  topically to the appropriate area as directed 2 (Two) Times a Day., Disp: , Rfl:     Multiple Vitamins-Minerals (VITEYES AREDS ADVANCED PO), Take 2 capsules by mouth Daily., Disp: , Rfl:     mupirocin (BACTROBAN) 2 % cream, Apply 1 application  topically to the appropriate area as directed 2 (Two) Times a Day., Disp: , Rfl:     mupirocin (BACTROBAN) 2 % ointment, APPLY TWO TIMES PER DAY TO THE INFECTION/BIOPSY SITES., Disp: , Rfl:     nitroglycerin (NITROSTAT) 0.4 MG SL tablet, Place 1 tablet under the tongue Every 5 (Five) Minutes As Needed for Chest Pain. Take no more than 3 doses in 15 minutes., Disp: 30 tablet, Rfl: 3    pantoprazole (PROTONIX) 40 MG EC tablet, Take 1 tablet by mouth Every Morning., Disp: 90 tablet, Rfl: 1    potassium chloride (MICRO-K) 10 MEQ CR capsule, Take 1 capsule by mouth 2 (Two) Times a Day., Disp: 180 capsule, Rfl: 1    rosuvastatin (Crestor) 20 MG tablet, Take 1 " "tablet by mouth Daily., Disp: 30 tablet, Rfl: 0    sennosides-docusate (PERICOLACE) 8.6-50 MG per tablet, Take 1 tablet by mouth Daily., Disp: , Rfl:     Spacer/Aero-Holding Chambers (AeroChamber Plus Benja-Vu) misc, 1 Device As Needed (Use as needed with inhaler device)., Disp: 1 each, Rfl: 0    torsemide (DEMADEX) 20 MG tablet, Take 1 tablet by mouth Daily., Disp: 90 tablet, Rfl: 1    Vitamin D, Cholecalciferol, 50 MCG (2000 UT) capsule, Take 1 capsule by mouth Daily., Disp: , Rfl:       Objective:     Vitals:    11/08/23 1005   BP: 142/60   Pulse: 72   Weight: 71.2 kg (157 lb)   Height: 154.9 cm (61\")     Body mass index is 29.66 kg/m².    PHYSICAL EXAM:    Constitutional:       Appearance: Well-developed.   Eyes:      General: No scleral icterus.     Conjunctiva/sclera: Conjunctivae normal.   HENT:      Head: Normocephalic and atraumatic.   Neck:      Thyroid: No thyromegaly.      Vascular: Normal carotid pulses. No carotid bruit, hepatojugular reflux or JVD.      Trachea: No tracheal deviation.   Pulmonary:      Effort: Pulmonary effort is normal. No respiratory distress.      Breath sounds: Normal breath sounds.   Chest:      Chest wall: Not tender to palpatation.   Cardiovascular:      Normal rate. Regular rhythm.      Murmurs: There is a grade 2/6 early systolic murmur.      No gallop.  No click. No rub.   Abdominal:      General: Bowel sounds are normal. There is no distension.      Palpations: Abdomen is soft.      Tenderness: There is no abdominal tenderness.   Musculoskeletal:         General: No deformity.      Cervical back: Normal range of motion and neck supple. Skin:     Findings: No erythema or rash.   Neurological:      Mental Status: Alert and oriented to person, place, and time.      Sensory: No sensory deficit.   Psychiatric:         Behavior: Behavior normal.           ECG 12 Lead    Date/Time: 11/8/2023 10:30 AM  Performed by: Clint Locke MD    Authorized by: Clint Locke, " MD  Comparison: compared with previous ECG from 8/29/2023  Similar to previous ECG  Rhythm: sinus rhythm  Ectopy: unifocal PVCs  Rate: normal  Conduction: right bundle branch block           9/18/23      Left ventricular systolic function is normal. Calculated left ventricular EF = 56% Normal left ventricular cavity size noted. Left ventricular wall thickness is consistent with moderate to severe concentric hypertrophy. Sigmoid-shaped ventricular septum is present. All left ventricular wall segments contract normally. Left ventricular diastolic function is consistent with (grade I) impaired relaxation.    Mildly reduced right ventricular systolic function noted. Electronic lead present in the ventricle.    There is a TAVR valve present. The aortic valve peak and mean gradients are within defined limits. There is mild paravalvular regurgitation without stenosis.    Severe mitral annular calcification is present. There is moderate, bileaflet mitral valve thickening present. Mild mitral valve regurgitation is present. No significant mitral valve stenosis is present.    Mild tricuspid valve regurgitation is present. Estimated right ventricular systolic pressure from tricuspid regurgitation is normal (<35 mmHg). Calculated right ventricular systolic pressure from tricuspid regurgitation is 27 mmHg.       8/4/23  Conclusions:   1. Left main: Normal.  2. LAD: 40% proximal stenosis.  Tubular 99% mid vessel stenosis with STEPHANIA II flow distally  3. LCX: Luminal irregularities  4.  Successful PCI of the mid LAD with a 2.5 x 38 mm Xience lida point drug-eluting stent, postdilated to high-pressure in the proximal to mid stent segment with a 3.0 mm NC trek balloon    8/1/22  Estimated left ventricular EF = 61% Left ventricular systolic function is normal.  Left ventricular diastolic function is consistent with (grade Ia w/high LAP) impaired relaxation.  The right ventricular cavity is moderately dilated.  Moderately reduced right  ventricular systolic function noted.  There is a TAVR valve present. There is a mild perivalvular leak.  Peak velocity of the flow distal to the aortic valve is 141.2 cm/s.  Aortic valve area is 1.58 cm2.  Estimated right ventricular systolic pressure from tricuspid regurgitation is normal (<35 mmHg).  The following left ventricular wall segments are hypokinetic: apical septal, basal inferoseptal, mid inferoseptal, mid anteroseptal and basal inferoseptal.      7/31/22  Widely patent mid LAD stent. Otherwise nonobstructive coronary artery disease  Normal LV function. Mildly elevated filling pressures.  Recommendations: Dual antiplatelet therapy given elevated troponin for 1 year. Echocardiogram tomorrow to assess TAVR valve. Unclear etiology of ST elevations.        3/16/22  CONCLUSIONS:  Successful deployment of a 26mm Medtronic Evolut Pro transcatheter aortic heart valve.  Post dilation with a 20 mm true balloon      Assessment:     Plan:       1.  Coronary artery disease.  Status post PCI of the mid LAD.  Patient had parainfluenza virus and a type II non-ST elevation MI in August 2023 and had repeat stenting to a 99% mid LAD stenosis  -Recommend continuing aspirin and Plavix  - Continue Crestor at current dose.  Tolerating well.  -Increase Toprol to twice daily dosing secondary to palpitations at night.    2.  Severe degenerative aortic valve stenosis.  Status post TAVR in March 2022.    -Doing well.  Last echo from September 2023 reviewed.  Mild paravalvular regurgitation but otherwise looks great.  3.  Hypertension.  Her blood pressure is stable.  Continue metoprolol at current dose.  Tolerating well.    4.  Permanent pacemaker: Continue to follow with device clinic.

## 2023-11-09 ENCOUNTER — HOSPITAL ENCOUNTER (EMERGENCY)
Facility: HOSPITAL | Age: 88
Discharge: HOME | End: 2023-11-09
Attending: EMERGENCY MEDICINE
Payer: MEDICARE

## 2023-11-09 VITALS
WEIGHT: 156 LBS | TEMPERATURE: 97.8 F | DIASTOLIC BLOOD PRESSURE: 70 MMHG | OXYGEN SATURATION: 96 % | SYSTOLIC BLOOD PRESSURE: 122 MMHG | HEIGHT: 61 IN | BODY MASS INDEX: 29.45 KG/M2 | RESPIRATION RATE: 18 BRPM | HEART RATE: 66 BPM

## 2023-11-09 DIAGNOSIS — R51.9 ACUTE NONINTRACTABLE HEADACHE, UNSPECIFIED HEADACHE TYPE: Primary | ICD-10-CM

## 2023-11-09 DIAGNOSIS — R09.89 LABILE BLOOD PRESSURE: ICD-10-CM

## 2023-11-09 LAB
ALBUMIN SERPL-MCNC: 4.1 G/DL (ref 3.5–5.2)
ALBUMIN/GLOB SERPL: 1.9 G/DL
ALP SERPL-CCNC: 86 U/L (ref 39–117)
ALT SERPL W P-5'-P-CCNC: 12 U/L (ref 1–33)
ANION GAP SERPL CALCULATED.3IONS-SCNC: 10.2 MMOL/L (ref 5–15)
AST SERPL-CCNC: 20 U/L (ref 1–32)
BASOPHILS # BLD AUTO: 0.05 10*3/MM3 (ref 0–0.2)
BASOPHILS NFR BLD AUTO: 0.6 % (ref 0–1.5)
BILIRUB SERPL-MCNC: 0.4 MG/DL (ref 0–1.2)
BUN SERPL-MCNC: 13 MG/DL (ref 8–23)
BUN/CREAT SERPL: 21.7 (ref 7–25)
CALCIUM SPEC-SCNC: 8.9 MG/DL (ref 8.2–9.6)
CHLORIDE SERPL-SCNC: 107 MMOL/L (ref 98–107)
CO2 SERPL-SCNC: 22.8 MMOL/L (ref 22–29)
CREAT SERPL-MCNC: 0.6 MG/DL (ref 0.57–1)
DEPRECATED RDW RBC AUTO: 40.6 FL (ref 37–54)
EGFRCR SERPLBLD CKD-EPI 2021: 83.8 ML/MIN/1.73
EOSINOPHIL # BLD AUTO: 0.24 10*3/MM3 (ref 0–0.4)
EOSINOPHIL NFR BLD AUTO: 3 % (ref 0.3–6.2)
ERYTHROCYTE [DISTWIDTH] IN BLOOD BY AUTOMATED COUNT: 13 % (ref 12.3–15.4)
GLOBULIN UR ELPH-MCNC: 2.2 GM/DL
GLUCOSE SERPL-MCNC: 108 MG/DL (ref 65–99)
HCT VFR BLD AUTO: 39.1 % (ref 34–46.6)
HGB BLD-MCNC: 13 G/DL (ref 12–15.9)
IMM GRANULOCYTES # BLD AUTO: 0.02 10*3/MM3 (ref 0–0.05)
IMM GRANULOCYTES NFR BLD AUTO: 0.2 % (ref 0–0.5)
LYMPHOCYTES # BLD AUTO: 2.55 10*3/MM3 (ref 0.7–3.1)
LYMPHOCYTES NFR BLD AUTO: 31.8 % (ref 19.6–45.3)
MCH RBC QN AUTO: 28.4 PG (ref 26.6–33)
MCHC RBC AUTO-ENTMCNC: 33.2 G/DL (ref 31.5–35.7)
MCV RBC AUTO: 85.6 FL (ref 79–97)
MONOCYTES # BLD AUTO: 0.48 10*3/MM3 (ref 0.1–0.9)
MONOCYTES NFR BLD AUTO: 6 % (ref 5–12)
NEUTROPHILS NFR BLD AUTO: 4.68 10*3/MM3 (ref 1.7–7)
NEUTROPHILS NFR BLD AUTO: 58.4 % (ref 42.7–76)
NRBC BLD AUTO-RTO: 0 /100 WBC (ref 0–0.2)
NT-PROBNP SERPL-MCNC: 1258 PG/ML (ref 0–1800)
PLATELET # BLD AUTO: 165 10*3/MM3 (ref 140–450)
PMV BLD AUTO: 11.6 FL (ref 6–12)
POTASSIUM SERPL-SCNC: 4.2 MMOL/L (ref 3.5–5.2)
PROT SERPL-MCNC: 6.3 G/DL (ref 6–8.5)
RBC # BLD AUTO: 4.57 10*6/MM3 (ref 3.77–5.28)
SODIUM SERPL-SCNC: 140 MMOL/L (ref 136–145)
TROPONIN T SERPL HS-MCNC: 19 NG/L
WBC NRBC COR # BLD: 8.02 10*3/MM3 (ref 3.4–10.8)

## 2023-11-09 PROCEDURE — 36415 COLL VENOUS BLD VENIPUNCTURE: CPT

## 2023-11-09 PROCEDURE — 80053 COMPREHEN METABOLIC PANEL: CPT | Performed by: EMERGENCY MEDICINE

## 2023-11-09 PROCEDURE — 83880 ASSAY OF NATRIURETIC PEPTIDE: CPT | Performed by: EMERGENCY MEDICINE

## 2023-11-09 PROCEDURE — 85025 COMPLETE CBC W/AUTO DIFF WBC: CPT | Performed by: EMERGENCY MEDICINE

## 2023-11-09 PROCEDURE — 93005 ELECTROCARDIOGRAM TRACING: CPT

## 2023-11-09 PROCEDURE — 99284 EMERGENCY DEPT VISIT MOD MDM: CPT

## 2023-11-09 PROCEDURE — 84484 ASSAY OF TROPONIN QUANT: CPT | Performed by: EMERGENCY MEDICINE

## 2023-11-09 PROCEDURE — 93010 ELECTROCARDIOGRAM REPORT: CPT | Performed by: INTERNAL MEDICINE

## 2023-11-09 RX ORDER — CLOPIDOGREL BISULFATE 75 MG/1
75 TABLET ORAL ONCE
Status: COMPLETED | OUTPATIENT
Start: 2023-11-09 | End: 2023-11-09

## 2023-11-09 RX ORDER — ASPIRIN 81 MG/1
81 TABLET ORAL ONCE
Status: COMPLETED | OUTPATIENT
Start: 2023-11-09 | End: 2023-11-09

## 2023-11-09 RX ORDER — TORSEMIDE 20 MG/1
20 TABLET ORAL ONCE
Status: COMPLETED | OUTPATIENT
Start: 2023-11-09 | End: 2023-11-09

## 2023-11-09 RX ORDER — SODIUM CHLORIDE 0.9 % (FLUSH) 0.9 %
10 SYRINGE (ML) INJECTION AS NEEDED
Status: DISCONTINUED | OUTPATIENT
Start: 2023-11-09 | End: 2023-11-09 | Stop reason: HOSPADM

## 2023-11-09 RX ORDER — METOPROLOL SUCCINATE 25 MG/1
25 TABLET, EXTENDED RELEASE ORAL ONCE
Status: COMPLETED | OUTPATIENT
Start: 2023-11-09 | End: 2023-11-09

## 2023-11-09 RX ADMIN — METOPROLOL SUCCINATE 25 MG: 25 TABLET, EXTENDED RELEASE ORAL at 07:28

## 2023-11-09 RX ADMIN — TORSEMIDE 20 MG: 20 TABLET ORAL at 08:15

## 2023-11-09 RX ADMIN — CLOPIDOGREL BISULFATE 75 MG: 75 TABLET, FILM COATED ORAL at 07:27

## 2023-11-09 RX ADMIN — ASPIRIN 81 MG: 81 TABLET, COATED ORAL at 07:29

## 2023-11-09 NOTE — ED PROVIDER NOTES
EMERGENCY DEPARTMENT ENCOUNTER    Room Number:  14/14  PCP: Trent Davis MD  Historian: Patient      HPI:  Chief Complaint: Headache, high blood pressure  A complete HPI/ROS/PMH/PSH/SH/FH are unobtainable due to: Nothing  Context: Yuliana Gonzalez is a 89 y.o. female who presents to the ED c/o headache.  She reports that she woke up around 2 AM with a headache.  She reports it was moderate.  She denies photophobia.  No neck stiffness.  No speech or vision disturbance.  No focal weakness or sensation changes.  She states that she took a Tylenol around 4:00 this morning and her headache is pretty much resolved.  She was little bit alarmed because her blood pressure was as high as 190 systolic at home.  She had just increased her Toprol XL to 25 mg twice a day, beginning last night.  She had been on 25 mg once a day in the morning.  Her cardiologist recommended to increase because she had been waking at night with some fluttering in her chest.  She does have a pacemaker.  She has a history of coronary artery disease status post recent PCI due to in-stent stenosis.  She is on Plavix as well as aspirin.  She denies chest pain or shortness of breath.  No nausea or vomiting.              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    Presence of cardiac pacemaker 03/24/2022    ST elevation myocardial infarction (STEMI) 07/31/2022    ST elevation myocardial infarction (STEMI),  unspecified artery 07/31/2022    Elevated LFTs 08/03/2022    UTI (urinary tract infection) 08/06/2022    NSTEMI (non-ST elevated myocardial infarction) 07/31/2023    Hyperglycemia 07/31/2023    Prediabetes 08/01/2023     Resolved Ambulatory Problems     Diagnosis Date Noted    De Quervain's disease (tenosynovitis) 09/15/2016    Cough 01/10/2019    Chronic diastolic congestive heart failure 01/28/2022    Aortic stenosis, severe 03/15/2022    Hyponatremia 08/03/2022    Right distal ureteral calculus 08/03/2022    Leukocytosis 08/03/2022    Hydronephrosis of right kidney 08/03/2022    Elevated troponin 08/01/2023    Chest pain 08/01/2023     Past Medical History:   Diagnosis Date    Central loss of vision     Colitis     Coronary artery disease     History of constipation     History of esophageal reflux     History of skin cancer     Irregular heart beat     Macular degeneration     Medication management     On continuous oral anticoagulation     Redness of skin     Short-term memory loss     SOB (shortness of breath) on exertion          PAST SURGICAL HISTORY  Past Surgical History:   Procedure Laterality Date    AORTIC VALVE REPAIR/REPLACEMENT Left 3/15/2022    Procedure: TTE TRANSFEMORAL TRANSCATHETER AORTIC VALVE REPLACEMENT PERCUTANEOUS APPROACH;  Surgeon: Vesna Chris MD;  Location: [facility];  Service: Cardiothoracic;  Laterality: Left;    AORTIC VALVE REPAIR/REPLACEMENT Left 3/15/2022    Procedure: Transfemoral Transcatheter Aortic Valve Replacement with intra operative TTE and possible open surgical rescue;  Surgeon: Clint Locke MD;  Location: [facility];  Service: Cardiovascular;  Laterality: Left;    CARDIAC CATHETERIZATION N/A 1/21/2022    Procedure: Coronary angiography;  Surgeon: Clint Locke MD;  Location: [facility] CATH INVASIVE LOCATION;  Service: Cardiovascular;  Laterality: N/A;    CARDIAC CATHETERIZATION N/A 1/21/2022    Procedure: Percutaneous Coronary Intervention;   Surgeon: Clint Locke MD;  Location:  [facility] CATH INVASIVE LOCATION;  Service: Cardiovascular;  Laterality: N/A;    CARDIAC CATHETERIZATION N/A 1/21/2022    Procedure: Stent ISMAEL coronary;  Surgeon: Clint Locke MD;  Location:  [facility] CATH INVASIVE LOCATION;  Service: Cardiovascular;  Laterality: N/A;    CARDIAC CATHETERIZATION Left 7/31/2022    Procedure: CORONARY ANGIOGRAPHY;  Surgeon: Jeannie Echevarria MD;  Location:  [facility] CATH INVASIVE LOCATION;  Service: Cardiology;  Laterality: Left;    CARDIAC CATHETERIZATION N/A 7/31/2022    Procedure: Left ventriculography;  Surgeon: Jeannie Echevarria MD;  Location:  [facility] CATH INVASIVE LOCATION;  Service: Cardiology;  Laterality: N/A;    CARDIAC CATHETERIZATION N/A 7/31/2022    Procedure: Left Heart Cath;  Surgeon: Jeannie Echevarria MD;  Location:  [facility] CATH INVASIVE LOCATION;  Service: Cardiology;  Laterality: N/A;    CARDIAC CATHETERIZATION N/A 8/4/2023    Procedure: Left Heart Cath;  Surgeon: Clint Locke MD;  Location:  [facility] CATH INVASIVE LOCATION;  Service: Cardiovascular;  Laterality: N/A;    CARDIAC CATHETERIZATION N/A 8/4/2023    Procedure: Percutaneous Coronary Intervention;  Surgeon: Clint Locke MD;  Location:  [facility] CATH INVASIVE LOCATION;  Service: Cardiovascular;  Laterality: N/A;    CARDIAC CATHETERIZATION N/A 8/4/2023    Procedure: Stent ISMAEL coronary;  Surgeon: Clint Locke MD;  Location: [facility] CATH INVASIVE LOCATION;  Service: Cardiovascular;  Laterality: N/A;    CARDIAC CATHETERIZATION N/A 8/4/2023    Procedure: Coronary angiography;  Surgeon: Clint Locke MD;  Location:  [facility] CATH INVASIVE LOCATION;  Service: Cardiovascular;  Laterality: N/A;    CARDIAC ELECTROPHYSIOLOGY PROCEDURE N/A 3/16/2022    Procedure: Pacemaker DC new [place];  Surgeon: Yann Ceballos MD;  Location:  [facility] CATH INVASIVE LOCATION;  Service: Cardiology;  Laterality: N/A;    CATARACT EXTRACTION, BILATERAL      COLONOSCOPY       EYE SURGERY      SKIN CANCER EXCISION           FAMILY HISTORY  Family History   Problem Relation Age of Onset    Aortic stenosis Mother     Hypertension Mother     Heart failure Father     Diabetes Father     Hypertension Father     No Known Problems Sister     No Known Problems Daughter     No Known Problems Son     Aneurysm Sister     Other Sister         Polio in her 20's    Malig Hyperthermia Neg Hx          SOCIAL HISTORY  Social History     Socioeconomic History    Marital status:      Spouse name: Stephan    Number of children: 4   Tobacco Use    Smoking status: Former     Packs/day: 0.00     Years: 0.00     Additional pack years: 0.00     Total pack years: 0.00     Types: Cigarettes     Start date:      Quit date:      Years since quittin.9    Smokeless tobacco: Never    Tobacco comments:     1 pack every 2 weeks   Vaping Use    Vaping Use: Never used   Substance and Sexual Activity    Alcohol use: Yes     Comment: wine - rare / no caffeine    Drug use: No    Sexual activity: Defer         ALLERGIES  Hydrochlorothiazide, Hydrocodone, Lipitor [atorvastatin], Erythromycin, and Lisinopril        REVIEW OF SYSTEMS  Review of Systems   Review of all 14 systems is negative other than stated in the HPI above.      PHYSICAL EXAM  ED Triage Vitals [23 0647]   Temp Heart Rate Resp BP SpO2   97.8 °F (36.6 °C) 70 18 160/80 95 %      Temp src Heart Rate Source Patient Position BP Location FiO2 (%)   Oral Monitor -- -- --       Physical Exam      GENERAL: Awake and alert, well-appearing, appears younger than stated age, no acute distress  HENT: nares patent  EYES: no scleral icterus  CV: regular rhythm, normal rate  RESPIRATORY: normal effort  ABDOMEN: soft, nondistended  MUSCULOSKELETAL: no deformity  NEURO: alert, moves all extremities, follows commands, cranial nerves II through XII grossly intact with speech fluent and clear  PSYCH:  calm, cooperative  SKIN: warm, dry    Vital signs and  nursing notes reviewed.          LAB RESULTS  Recent Results (from the past 24 hour(s))   ECG 12 Lead Other; hypertension    Collection Time: 11/09/23  7:11 AM   Result Value Ref Range    QT Interval 424 ms    QTC Interval 427 ms   Comprehensive Metabolic Panel    Collection Time: 11/09/23  7:19 AM    Specimen: Blood   Result Value Ref Range    Glucose 108 (H) 65 - 99 mg/dL    BUN 13 8 - 23 mg/dL    Creatinine 0.60 0.57 - 1.00 mg/dL    Sodium 140 136 - 145 mmol/L    Potassium 4.2 3.5 - 5.2 mmol/L    Chloride 107 98 - 107 mmol/L    CO2 22.8 22.0 - 29.0 mmol/L    Calcium 8.9 8.2 - 9.6 mg/dL    Total Protein 6.3 6.0 - 8.5 g/dL    Albumin 4.1 3.5 - 5.2 g/dL    ALT (SGPT) 12 1 - 33 U/L    AST (SGOT) 20 1 - 32 U/L    Alkaline Phosphatase 86 39 - 117 U/L    Total Bilirubin 0.4 0.0 - 1.2 mg/dL    Globulin 2.2 gm/dL    A/G Ratio 1.9 g/dL    BUN/Creatinine Ratio 21.7 7.0 - 25.0    Anion Gap 10.2 5.0 - 15.0 mmol/L    eGFR 83.8 >60.0 mL/min/1.73   BNP    Collection Time: 11/09/23  7:19 AM    Specimen: Blood   Result Value Ref Range    proBNP 1,258.0 0.0 - 1,800.0 pg/mL   High Sensitivity Troponin T    Collection Time: 11/09/23  7:19 AM    Specimen: Blood   Result Value Ref Range    HS Troponin T 19 (H) <14 ng/L   CBC Auto Differential    Collection Time: 11/09/23  7:19 AM    Specimen: Blood   Result Value Ref Range    WBC 8.02 3.40 - 10.80 10*3/mm3    RBC 4.57 3.77 - 5.28 10*6/mm3    Hemoglobin 13.0 12.0 - 15.9 g/dL    Hematocrit 39.1 34.0 - 46.6 %    MCV 85.6 79.0 - 97.0 fL    MCH 28.4 26.6 - 33.0 pg    MCHC 33.2 31.5 - 35.7 g/dL    RDW 13.0 12.3 - 15.4 %    RDW-SD 40.6 37.0 - 54.0 fl    MPV 11.6 6.0 - 12.0 fL    Platelets 165 140 - 450 10*3/mm3    Neutrophil % 58.4 42.7 - 76.0 %    Lymphocyte % 31.8 19.6 - 45.3 %    Monocyte % 6.0 5.0 - 12.0 %    Eosinophil % 3.0 0.3 - 6.2 %    Basophil % 0.6 0.0 - 1.5 %    Immature Grans % 0.2 0.0 - 0.5 %    Neutrophils, Absolute 4.68 1.70 - 7.00 10*3/mm3    Lymphocytes, Absolute 2.55 0.70 -  3.10 10*3/mm3    Monocytes, Absolute 0.48 0.10 - 0.90 10*3/mm3    Eosinophils, Absolute 0.24 0.00 - 0.40 10*3/mm3    Basophils, Absolute 0.05 0.00 - 0.20 10*3/mm3    Immature Grans, Absolute 0.02 0.00 - 0.05 10*3/mm3    nRBC 0.0 0.0 - 0.2 /100 WBC       Ordered the above labs and reviewed the results.        RADIOLOGY  No Radiology Exams Resulted Within Past 24 Hours    Ordered the above noted radiological studies. Reviewed by me in PACS.            PROCEDURES  Procedures            MEDICATIONS GIVEN IN ER  Medications   clopidogrel (PLAVIX) tablet 75 mg (75 mg Oral Given 11/9/23 0727)   torsemide (DEMADEX) tablet 20 mg (20 mg Oral Given 11/9/23 0815)   metoprolol succinate XL (TOPROL-XL) 24 hr tablet 25 mg (25 mg Oral Given 11/9/23 0728)   aspirin EC tablet 81 mg (81 mg Oral Given 11/9/23 0729)                   MEDICAL DECISION MAKING, PROGRESS, and CONSULTS    All labs have been independently reviewed by me.  All radiology studies have been reviewed by me and I have also reviewed the radiology report.   EKG's independently viewed and interpreted by me.  Discussion below represents my analysis of pertinent findings related to patient's condition, differential diagnosis, treatment plan and final disposition.      Additional sources:  - Discussed/ obtained information from independent historians: N/A      Orders placed during this visit:  Orders Placed This Encounter   Procedures    Comprehensive Metabolic Panel    BNP    High Sensitivity Troponin T    CBC Auto Differential    Monitor Blood Pressure    Pulse Oximetry, Continuous    ECG 12 Lead Other; hypertension    CBC & Differential           Differential diagnosis includes but is not limited to:    Hypertensive urgency  Tension headache  Meningitis  Subarachnoid hemorrhage        Independent interpretation of labs, radiology studies, and discussions with consultants:  ED Course as of 11/09/23 1527   Thu Nov 09, 2023 0720 EKG          EKG time: 7:11  AM  Rhythm/Rate: Paced rhythm, rate 61  P waves and OH: Normal  QRS, axis: V paced complexes  ST and T waves: No acute ischemic changes    Interpreted Contemporaneously by me, independently viewed  Similar compared to prior 8-2-23       [JR]   0857 Patient reassessed.  Headache has resolved completely.  Blood pressure has come down.  I advised her to record her blood pressures twice daily and follow-up with Dr. Locke by phone in 1 week to see if any further titration of her medication is necessary.  Return precautions were discussed. [JR]      ED Course User Index  [JR] Karan Loza MD     Patient presents with complaint of headache, elevated blood pressure.  EKG without ischemic changes, no neurologic deficits or vision disturbance.  Headache resolved after taking Tylenol prior to arrival.  She received her home medications here with further reduction of her blood pressure.  Advised to follow-up closely with her cardiologist for titration of blood pressure medications.  Return precautions were discussed.        DIAGNOSIS  Final diagnoses:   Acute nonintractable headache, unspecified headache type   Labile blood pressure         DISPOSITION  DISCHARGE    Patient discharged in stable condition.    Reviewed implications of results, diagnosis, meds, responsibility to follow up, warning signs and symptoms of possible worsening, potential complications and reasons to return to ER.    Patient/Family voiced understanding of above instructions.    Discussed plan for discharge, as there is no emergent indication for admission. Patient referred to primary care provider for BP management due to today's BP. Pt/family is agreeable and understands need for follow up and repeat testing.  Pt is aware that discharge does not mean that nothing is wrong but it indicates no emergency is present that requires admission and they must continue care with follow-up as given below or physician of their choice.     FOLLOW-UP  Susan  Trent CORCORAN MD  [address] [facility] [address]  [place] [address]  [phone]    Schedule an appointment as soon as possible for a visit       Clint Locke MD  [address] [facility] [address]  [place] [address]  [phone]    Call in 1 week           Medication List      No changes were made to your prescriptions during this visit.                   Latest Documented Vital Signs:  As of 15:27 EST  BP- 122/70 HR- 66 Temp- 97.8 °F (36.6 °C) (Oral) O2 sat- 96%              --    Please note that portions of this were completed with a voice recognition program.       Note Disclaimer: At [facility], we believe that sharing information builds trust and better relationships. You are receiving this note because you are receiving care at [facility] or recently visited. It is possible you will see health information before a provider has talked with you about it. This kind of information can be easy to misunderstand. To help you fully understand what it means for your health, we urge you to discuss this note with your provider.             Karan Loza MD  11/09/23 1529

## 2023-11-09 NOTE — ED NOTES
Patient arrived to ED via EMS stretcher from home with complaints of a headache and high blood pressure.  Patient reports that she awoke around 2am with a head ache and checked her BP and reports that SBP was 190.  Patient took tylenol 250mg.  Patient AA&Ox4 at triage

## 2023-11-10 ENCOUNTER — PATIENT OUTREACH (OUTPATIENT)
Dept: CASE MANAGEMENT | Facility: OTHER | Age: 88
End: 2023-11-10
Payer: MEDICARE

## 2023-11-10 LAB
QT INTERVAL: 424 MS
QTC INTERVAL: 427 MS

## 2023-11-10 NOTE — OUTREACH NOTE
Patient Outreach    AMBULATORY CASE MANAGEMENT NOTE    Name and Relationship of Patient/Support Person: Yuliana Gonzalez - Self    Call placed to patient to follow up recent ED visit. Introduced self and role of ACM. She states she does feel better although her blood pressure is still elevated. It is not nearly as high as when she went to the ED. She has outreached to Dr Davis and has an appointment next week. She has not contacted Dr Locke's office but will do so today. She is keeping a blood pressure log as instructed but is taking it 4-5 times a day. Suggested only twice a day at different times a day as more frequent monitoring can actually elevate her blood pressure due to anxiety. She will do this. She lives at [place] and stays busy involved in many activities. She has cancelled all of this for this week to allow herself some rest. Very enjoyable conversation with Mrs Gonzalez. She denies any questions or concerns at this time. Suggested keeping ACM number for future use.         Danya CORTEZ  Ambulatory Case Management    11/10/2023, 13:09 EST

## 2023-11-15 ENCOUNTER — OFFICE VISIT (OUTPATIENT)
Dept: INTERNAL MEDICINE | Facility: CLINIC | Age: 88
End: 2023-11-15
Payer: MEDICARE

## 2023-11-15 VITALS
DIASTOLIC BLOOD PRESSURE: 75 MMHG | HEART RATE: 67 BPM | TEMPERATURE: 96.3 F | HEIGHT: 61 IN | OXYGEN SATURATION: 98 % | RESPIRATION RATE: 16 BRPM | SYSTOLIC BLOOD PRESSURE: 120 MMHG | BODY MASS INDEX: 28.62 KG/M2 | WEIGHT: 151.6 LBS

## 2023-11-15 DIAGNOSIS — I10 PRIMARY HYPERTENSION: Primary | Chronic | ICD-10-CM

## 2023-11-15 PROBLEM — N39.0 UTI (URINARY TRACT INFECTION): Status: RESOLVED | Noted: 2022-08-06 | Resolved: 2023-11-15

## 2023-11-15 PROBLEM — N30.00 ACUTE CYSTITIS WITHOUT HEMATURIA: Status: ACTIVE | Noted: 2023-11-15

## 2023-11-15 PROBLEM — R79.89 ELEVATED LFTS: Status: RESOLVED | Noted: 2022-08-03 | Resolved: 2023-11-15

## 2023-11-15 PROCEDURE — 99214 OFFICE O/P EST MOD 30 MIN: CPT | Performed by: INTERNAL MEDICINE

## 2023-11-15 NOTE — PROGRESS NOTES
Subjective   Yuliana Gonzalez is a 89 y.o. female.     Chief Complaint   Patient presents with    Hospital Follow Up Visit         History of Present Illness  6 days ago she developed a headache.  Blood pressure was high on her home monitor and she went to the emergency room with blood pressure in the 190 systolic range.  In the emergency room blood pressure was up and down.  She had just doubled her Toprol-XL to 25 mg twice daily that day after a call with Dr. Locke.  Lab work in the emergency room was unremarkable and she was ultimately sent home to simply continue on 25 mg twice daily of Toprol-XL.  Since then she is done fine.  Blood pressures come down.  Headache resolved.       The following portions of the patient's history were reviewed and updated as appropriate: allergies, current medications, past social history and problem list.    Outpatient Medications Marked as Taking for the 11/15/23 encounter (Office Visit) with Trent Davis MD   Medication Sig Dispense Refill    acetaminophen (TYLENOL) 325 MG tablet Take 2 tablets by mouth Every 4 (Four) Hours As Needed for Mild Pain  or Fever (temperature greater than 101F). 120 tablet 0    albuterol sulfate  (90 Base) MCG/ACT inhaler Inhale 2 puffs Every 4 (Four) Hours As Needed for Wheezing or Shortness of Air. 8.5 g 0    aspirin (aspirin) 81 MG EC tablet Take 1 tablet by mouth Daily. 30 tablet 6    AZO-CRANBERRY PO Take 1 tablet by mouth Daily.      clopidogrel (PLAVIX) 75 MG tablet Take 1 tablet by mouth Daily. 90 tablet 3    metoprolol succinate XL (Toprol XL) 25 MG 24 hr tablet Take 1 tablet by mouth 2 (Two) Times a Day for 360 days. 180 tablet 2    metroNIDAZOLE (METROCREAM) 0.75 % cream Apply 1 application  topically to the appropriate area as directed 2 (Two) Times a Day.      Multiple Vitamins-Minerals (VITEYES AREDS ADVANCED PO) Take 2 capsules by mouth Daily.      mupirocin (BACTROBAN) 2 % cream Apply 1 application  topically to the  appropriate area as directed 2 (Two) Times a Day.      mupirocin (BACTROBAN) 2 % ointment APPLY TWO TIMES PER DAY TO THE INFECTION/BIOPSY SITES.      nitroglycerin (NITROSTAT) 0.4 MG SL tablet Place 1 tablet under the tongue Every 5 (Five) Minutes As Needed for Chest Pain. Take no more than 3 doses in 15 minutes. 30 tablet 3    pantoprazole (PROTONIX) 40 MG EC tablet Take 1 tablet by mouth Every Morning. 90 tablet 1    potassium chloride (MICRO-K) 10 MEQ CR capsule Take 1 capsule by mouth 2 (Two) Times a Day. 180 capsule 1    rosuvastatin (Crestor) 20 MG tablet Take 1 tablet by mouth Daily. 30 tablet 0    sennosides-docusate (PERICOLACE) 8.6-50 MG per tablet Take 1 tablet by mouth Daily.      Spacer/Aero-Holding Chambers (AeroChamber Plus Benja-Vu) misc 1 Device As Needed (Use as needed with inhaler device). 1 each 0    torsemide (DEMADEX) 20 MG tablet Take 1 tablet by mouth Daily. 90 tablet 1    Vitamin D, Cholecalciferol, 50 MCG (2000 UT) capsule Take 1 capsule by mouth Daily.         Review of Systems   Respiratory:  Negative for shortness of breath and wheezing.    Cardiovascular:  Positive for palpitations. Negative for chest pain and leg swelling.   Neurological:  Negative for light-headedness and headaches.       Objective   Vitals:    11/15/23 0859   BP: 120/75   Pulse: 67   Resp: 16   Temp: 96.3 °F (35.7 °C)   SpO2: 98%      Wt Readings from Last 3 Encounters:   11/15/23 68.8 kg (151 lb 9.6 oz)   11/09/23 70.8 kg (156 lb)   11/08/23 71.2 kg (157 lb)    Body mass index is 28.66 kg/m².      Physical Exam  Constitutional:       Appearance: Normal appearance.   Cardiovascular:      Rate and Rhythm: Rhythm irregular.      Heart sounds:      Crescendo decrescendo systolic murmur is present with a grade of 2/6.      Comments: Grade 2/6 BENIGNO throughout  Neurological:      Mental Status: She is alert.           Problems Addressed this Visit          Cardiac and Vasculature    Hypertension - Primary (Chronic)      Diagnoses         Codes Comments    Primary hypertension    -  Primary ICD-10-CM: I10  ICD-9-CM: 401.9           Assessment & Plan   In today with a surge of blood pressure that occurred 6 days ago.  That ultimately led to an emergency room visit.  She just increased her Toprol-XL from 25 mg daily to twice daily.  Advised patient that metoprolol does not cause the problem.  Nonetheless over the course of the week everything is smoothed out.  Blood pressure and pulse are running normal now.  Dr. Locke advised her not to take nitroglycerin when she gets these spells which was good advised.  She wonders about getting off of potassium.  Advised that is not likely with unopposed Demadex.  She could consider adding spironolactone at some point for additional blood pressure control if push comes to shove and that actually could allow her to get away from her potassium replacement.  She has a bunch of additional questions including reflecting antibiotics for dental work advised to run that by Dr. Locke.  I am not sure what the role is for TAVR. she has had a prior history of UTI and also has kidney stones.  She is on chronic prophylaxis with Macrodantin to know whether that needs to be continued or not.  She would have to run that by her urologist who has previously dismissed her.  The big issue would be an infected stone and also chronic UTI in the setting of a TAVR.    The above information was reviewed again today 11/15/23.  It continues to be accurate as reflected above and is unchanged.  History, physical and review of systems all reviewed and are unchanged.  Medications were reviewed today and continue the current dosing.               Dragon disclaimer:   Part of this note may be an electronic transcription/translation of spoken language to printed text using the Dragon Dictation System.

## 2023-11-16 ENCOUNTER — OFFICE VISIT (OUTPATIENT)
Dept: CARDIOLOGY | Facility: CLINIC | Age: 88
End: 2023-11-16
Payer: MEDICARE

## 2023-11-16 VITALS
BODY MASS INDEX: 29.34 KG/M2 | WEIGHT: 155.4 LBS | SYSTOLIC BLOOD PRESSURE: 108 MMHG | HEART RATE: 64 BPM | HEIGHT: 61 IN | DIASTOLIC BLOOD PRESSURE: 72 MMHG

## 2023-11-16 DIAGNOSIS — Z95.5 STATUS POST CORONARY ARTERY STENT PLACEMENT: ICD-10-CM

## 2023-11-16 DIAGNOSIS — I44.2 AV BLOCK, COMPLETE: ICD-10-CM

## 2023-11-16 DIAGNOSIS — I35.0 NONRHEUMATIC AORTIC VALVE STENOSIS: Chronic | ICD-10-CM

## 2023-11-16 DIAGNOSIS — I10 PRIMARY HYPERTENSION: Chronic | ICD-10-CM

## 2023-11-16 DIAGNOSIS — Z95.0 PRESENCE OF CARDIAC PACEMAKER: ICD-10-CM

## 2023-11-16 DIAGNOSIS — Z95.2 S/P TAVR (TRANSCATHETER AORTIC VALVE REPLACEMENT): ICD-10-CM

## 2023-11-16 DIAGNOSIS — I25.10 CORONARY ARTERY DISEASE INVOLVING NATIVE CORONARY ARTERY OF NATIVE HEART WITHOUT ANGINA PECTORIS: Primary | ICD-10-CM

## 2023-11-16 NOTE — PROGRESS NOTES
Date of Office Visit: 2023  Encounter Provider: CLEMENTINE Palacios  Place of Service: [facility] CARDIOLOGY  Patient Name: Yuliana Gonzalez  :1930    No chief complaint on file.  : ED follow up    HPI: Yuliana Gonzalez is a 89 y.o. female who is a patient of  Dr. Locke.  She presents today for an ER follow-up.  She has a history of severe degenerative aortic valve stenosis status post TAVR (2022), coronary artery disease with prior PCI to LAD, permanent pacemaker secondary to had grade AV block post TAVR, chronic heart failure with preserved ejection fraction.   In 2023 patient was admitted to the hospital with NSTEMI type II and parainfluenza virus.  An echocardiogram showed newly depressed EF at 37%.  Cardiac cath showed 99% mid LAD stenosis.  She underwent repeat stenting to mid LAD.  Ejection fraction normalized.  Continued on DAPT with aspirin and Plavix.    She was last seen on 2023 in the office by Dr. Locke's time she had started noticing occasional palpitations at night over the last 2 to 3 weeks.  Toprol was increased to twice daily secondary to palpitations at night.  Her blood pressure was noted to be well controlled.  Next day ,she reported to the emergency department with a headache.  Headache resolved with Tylenol.  However, she was concerned because the pressure had been as high as 190 systolic at home.  It is noted that her blood pressure in the emergency room was 122/70 and she was in a paced rhythm.  She denied chest pain or shortness of breath.     Ms. Rivera presents today with no complaints.  Her palpitations have gotten much better and is increasing Toprol.  Blood pressure is well controlled.  She checks her blood pressure about 3 times a day.  She does not have any complaints of chest pain or shortness of air.  She has a route that she walks each day and does exercises that she learned in physical therapy previously.  She  "is compliant on all of her medications.  She has had no bleeding issues with her Plavix and aspirin.    Previous testing and notes have been reviewed by me.   Past Medical History:   Diagnosis Date    Aortic stenosis 07/2009    diagnosed, 6/14 moderate with 0.9cm area, 42mm max gradient. 0.91/34 6/15.    Central loss of vision     RIGHT EYE    Cervical arthritis     Colitis     HX    Coronary artery disease     History of constipation     History of esophageal reflux     History of skin cancer     Hyperlipidemia     Hypertension     IGT (impaired glucose tolerance)     Irregular heart beat     HX    Macular degeneration     Medication management     On continuous oral anticoagulation     Redness of skin     NOSE/DERMATOLOGY TREATING WITH FLAGYL CREAM/ADVISED PT TO NOTIFY CARDIOLOGY    Sciatica     HX    Short-term memory loss     \"MILD\"    SOB (shortness of breath) on exertion     Vitamin D deficiency        Past Surgical History:   Procedure Laterality Date    AORTIC VALVE REPAIR/REPLACEMENT Left 3/15/2022    Procedure: TTE TRANSFEMORAL TRANSCATHETER AORTIC VALVE REPLACEMENT PERCUTANEOUS APPROACH;  Surgeon: Vesna Chris MD;  Location: [facility];  Service: Cardiothoracic;  Laterality: Left;    AORTIC VALVE REPAIR/REPLACEMENT Left 3/15/2022    Procedure: Transfemoral Transcatheter Aortic Valve Replacement with intra operative TTE and possible open surgical rescue;  Surgeon: Clint Locke MD;  Location: [facility];  Service: Cardiovascular;  Laterality: Left;    CARDIAC CATHETERIZATION N/A 1/21/2022    Procedure: Coronary angiography;  Surgeon: Clint Locke MD;  Location: [facility] CATH INVASIVE LOCATION;  Service: Cardiovascular;  Laterality: N/A;    CARDIAC CATHETERIZATION N/A 1/21/2022    Procedure: Percutaneous Coronary Intervention;  Surgeon: Clint Locke MD;  Location: [facility] CATH INVASIVE LOCATION;  Service: Cardiovascular;  Laterality: N/A;    CARDIAC CATHETERIZATION N/A " 1/21/2022    Procedure: Stent ISMAEL coronary;  Surgeon: Clint Locke MD;  Location:  [facility] CATH INVASIVE LOCATION;  Service: Cardiovascular;  Laterality: N/A;    CARDIAC CATHETERIZATION Left 7/31/2022    Procedure: CORONARY ANGIOGRAPHY;  Surgeon: Jeannie Echevarria MD;  Location:  [facility] CATH INVASIVE LOCATION;  Service: Cardiology;  Laterality: Left;    CARDIAC CATHETERIZATION N/A 7/31/2022    Procedure: Left ventriculography;  Surgeon: Jeannie Echevarria MD;  Location:  [facility] CATH INVASIVE LOCATION;  Service: Cardiology;  Laterality: N/A;    CARDIAC CATHETERIZATION N/A 7/31/2022    Procedure: Left Heart Cath;  Surgeon: Jeannie Echevarria MD;  Location:  [facility] CATH INVASIVE LOCATION;  Service: Cardiology;  Laterality: N/A;    CARDIAC CATHETERIZATION N/A 8/4/2023    Procedure: Left Heart Cath;  Surgeon: Clint Locke MD;  Location:  [facility] CATH INVASIVE LOCATION;  Service: Cardiovascular;  Laterality: N/A;    CARDIAC CATHETERIZATION N/A 8/4/2023    Procedure: Percutaneous Coronary Intervention;  Surgeon: Clint Locke MD;  Location:  [facility] CATH INVASIVE LOCATION;  Service: Cardiovascular;  Laterality: N/A;    CARDIAC CATHETERIZATION N/A 8/4/2023    Procedure: Stent ISMAEL coronary;  Surgeon: Clint Locke MD;  Location:  [facility] CATH INVASIVE LOCATION;  Service: Cardiovascular;  Laterality: N/A;    CARDIAC CATHETERIZATION N/A 8/4/2023    Procedure: Coronary angiography;  Surgeon: Clint Locke MD;  Location: [facility] CATH INVASIVE LOCATION;  Service: Cardiovascular;  Laterality: N/A;    CARDIAC ELECTROPHYSIOLOGY PROCEDURE N/A 3/16/2022    Procedure: Pacemaker DC new Adel;  Surgeon: Yann Ceballos MD;  Location:  [facility] CATH INVASIVE LOCATION;  Service: Cardiology;  Laterality: N/A;    CATARACT EXTRACTION, BILATERAL      COLONOSCOPY      EYE SURGERY      SKIN CANCER EXCISION         Social History     Socioeconomic History    Marital status:      Spouse name: Stephan    Number of  "children: 4   Tobacco Use    Smoking status: Former     Packs/day: 0.00     Years: 0.00     Additional pack years: 0.00     Total pack years: 0.00     Types: Cigarettes     Start date:      Quit date:      Years since quittin.9    Smokeless tobacco: Never    Tobacco comments:     1 pack every 2 weeks   Vaping Use    Vaping Use: Never used   Substance and Sexual Activity    Alcohol use: Yes     Comment: wine - rare / no caffeine    Drug use: No    Sexual activity: Defer       Family History   Problem Relation Age of Onset    Aortic stenosis Mother     Hypertension Mother     Heart failure Father     Diabetes Father     Hypertension Father     No Known Problems Sister     No Known Problems Daughter     No Known Problems Son     Aneurysm Sister     Other Sister         Polio in her 20's    Malig Hyperthermia Neg Hx        Review of Systems   Constitutional: Negative.   HENT: Negative.     Eyes: Negative.    Cardiovascular: Negative.    Respiratory: Negative.     Endocrine: Negative.    Hematologic/Lymphatic:        Asa/ plavix   Skin: Negative.    Musculoskeletal: Negative.    Gastrointestinal: Negative.    Genitourinary: Negative.    Neurological: Negative.    Psychiatric/Behavioral: Negative.     Allergic/Immunologic: Negative.        Allergies   Allergen Reactions    Hydrochlorothiazide Other (See Comments)     hyponatremia    Hydrocodone Shortness Of Breath     Also reports \"lethargy\"    Lipitor [Atorvastatin] Nausea Only     \"FELT AWFUL\"    Erythromycin Diarrhea    Lisinopril Cough         Current Outpatient Medications:     acetaminophen (TYLENOL) 325 MG tablet, Take 2 tablets by mouth Every 4 (Four) Hours As Needed for Mild Pain  or Fever (temperature greater than 101F)., Disp: 120 tablet, Rfl: 0    albuterol sulfate  (90 Base) MCG/ACT inhaler, Inhale 2 puffs Every 4 (Four) Hours As Needed for Wheezing or Shortness of Air., Disp: 8.5 g, Rfl: 0    aspirin (aspirin) 81 MG EC tablet, Take 1 " "tablet by mouth Daily., Disp: 30 tablet, Rfl: 6    AZO-CRANBERRY PO, Take 1 tablet by mouth Daily., Disp: , Rfl:     clopidogrel (PLAVIX) 75 MG tablet, Take 1 tablet by mouth Daily., Disp: 90 tablet, Rfl: 3    metoprolol succinate XL (Toprol XL) 25 MG 24 hr tablet, Take 1 tablet by mouth 2 (Two) Times a Day for 360 days., Disp: 180 tablet, Rfl: 2    metroNIDAZOLE (METROCREAM) 0.75 % cream, Apply 1 application  topically to the appropriate area as directed 2 (Two) Times a Day., Disp: , Rfl:     Multiple Vitamins-Minerals (VITEYES AREDS ADVANCED PO), Take 2 capsules by mouth Daily., Disp: , Rfl:     mupirocin (BACTROBAN) 2 % ointment, APPLY TWO TIMES PER DAY TO THE INFECTION/BIOPSY SITES., Disp: , Rfl:     nitroglycerin (NITROSTAT) 0.4 MG SL tablet, Place 1 tablet under the tongue Every 5 (Five) Minutes As Needed for Chest Pain. Take no more than 3 doses in 15 minutes., Disp: 30 tablet, Rfl: 3    pantoprazole (PROTONIX) 40 MG EC tablet, Take 1 tablet by mouth Every Morning., Disp: 90 tablet, Rfl: 1    potassium chloride (MICRO-K) 10 MEQ CR capsule, Take 1 capsule by mouth 2 (Two) Times a Day., Disp: 180 capsule, Rfl: 1    rosuvastatin (Crestor) 20 MG tablet, Take 1 tablet by mouth Daily., Disp: 30 tablet, Rfl: 0    sennosides-docusate (PERICOLACE) 8.6-50 MG per tablet, Take 1 tablet by mouth Daily., Disp: , Rfl:     Spacer/Aero-Holding Chambers (AeroChamber Plus Benja-Vu) misc, 1 Device As Needed (Use as needed with inhaler device)., Disp: 1 each, Rfl: 0    torsemide (DEMADEX) 20 MG tablet, Take 1 tablet by mouth Daily., Disp: 90 tablet, Rfl: 1    Vitamin D, Cholecalciferol, 50 MCG (2000 UT) capsule, Take 1 capsule by mouth Daily., Disp: , Rfl:       Objective:     Vitals:    11/16/23 1034   BP: 108/72   Pulse: 64   Weight: 70.5 kg (155 lb 6.4 oz)   Height: 154.9 cm (60.98\")     Body mass index is 29.38 kg/m².    2D Echocardiogram 09/18/2023:    Left ventricular systolic function is normal. Calculated left ventricular " EF = 56% Normal left ventricular cavity size noted. Left ventricular wall thickness is consistent with moderate to severe concentric hypertrophy. Sigmoid-shaped ventricular septum is present. All left ventricular wall segments contract normally. Left ventricular diastolic function is consistent with (grade I) impaired relaxation.    Mildly reduced right ventricular systolic function noted. Electronic lead present in the ventricle.    There is a TAVR valve present. The aortic valve peak and mean gradients are within defined limits. There is mild paravalvular regurgitation without stenosis.    Severe mitral annular calcification is present. There is moderate, bileaflet mitral valve thickening present. Mild mitral valve regurgitation is present. No significant mitral valve stenosis is present.    Mild tricuspid valve regurgitation is present. Estimated right ventricular systolic pressure from tricuspid regurgitation is normal (<35 mmHg). Calculated right ventricular systolic pressure from tricuspid regurgitation is 27 mmHg.    Left Heart Cath 08/04/2023:  1. Left main: Normal.  2. LAD: 40% proximal stenosis.  Tubular 99% mid vessel stenosis with STEPHANIA II flow distally  3. LCX: Luminal irregularities  4.  Successful PCI of the mid LAD with a 2.5 x 38 mm Xience lida point drug-eluting stent, postdilated to high-pressure in the proximal to mid stent segment with a 3.0 mm NC trek balloon    2D Echocardiogram 08/01/2023:    Left ventricular systolic function is moderately decreased. Calculated left ventricular EF = 37%    The following left ventricular wall segments are hypokinetic: apical septal, basal inferoseptal, mid inferoseptal, apex hypokinetic, mid anteroseptal and basal inferoseptal.    Peak velocity of the flow distal to the aortic valve is 165 cm/s. Aortic valve maximum pressure gradient is 11 mmHg. Aortic valve mean pressure gradient is 6 mmHg. Aortic valve dimensionless index is 0.7 .    There is a TAVR valve  present.    Estimated right ventricular systolic pressure from tricuspid regurgitation is normal (<35 mmHg). Calculated right ventricular systolic pressure from tricuspid regurgitation is 29 mmHg.    Limited echocardiogram to assess LV function.    2D Echocardiogram 08/01/2022:  Estimated left ventricular EF = 61% Left ventricular systolic function is normal.  Left ventricular diastolic function is consistent with (grade Ia w/high LAP) impaired relaxation.  The right ventricular cavity is moderately dilated.  Moderately reduced right ventricular systolic function noted.  There is a TAVR valve present. There is a mild perivalvular leak.  Peak velocity of the flow distal to the aortic valve is 141.2 cm/s.  Aortic valve area is 1.58 cm2.  Estimated right ventricular systolic pressure from tricuspid regurgitation is normal (<35 mmHg).  The following left ventricular wall segments are hypokinetic: apical septal, basal inferoseptal, mid inferoseptal, mid anteroseptal and basal inferoseptal.     Left Heart Cath 07/31/2022:  LV:95/10, LVEDP 16, aortic: 95/50  EF: 50 to 55%  WALL MOTION: Normal     CORONARY ANGIOGRAPHY:  LEFT MAIN: Medium caliber, normal left main coronary artery.  Bifurcates give the LAD and circumflex arteries.  LAD: LAD is a medium caliber vessel.  There is a previously placed LAD stent in the mid vessel which is widely patent.  Distal to that there is moderate 40% disease.  The distal vessel tapers to small caliber and wraps the apex with mild diffuse luminal irregularities.  There is a medium caliber first diagonal which is normal.  RAMUS: Absent  CIRCUMFLEX: The circumflex is a medium caliber vessel.  The proximal portion has mild to moderate 30 to 40% stenosis.  There is a high branching first OM which is small caliber normal.  There is a low branching second OM which is small caliber with mild diffuse irregularities.  RCA: The right coronary artery is a large-caliber, dominant vessel.  There are mild  proximal irregularities.  The remainder of the vessel is essentially normal.  The RPDA is medium caliber normal.  The PL is medium caliber normal.    2D Echocardiogram 02/07/2023:    Left ventricular systolic function is normal. Calculated left ventricular EF = 58.2%    Left ventricular wall thickness is consistent with hypertrophy. Sigmoid-shaped ventricular septum is present.    Left ventricular diastolic function is consistent with (grade Ia w/high LAP) impaired relaxation.    Left atrial volume is mildly increased.    Estimated right ventricular systolic pressure from tricuspid regurgitation is normal (<35 mmHg).    There is a trivial pericardial effusion.    Mild to moderate aortic valve regurgitation is present. There is a 26 mm TAVR valve present. The aortic valve peak and mean gradients are within defined limits. The prosthetic aortic valve is normal. 26mm Medtronic Evolut Pro    2D Echocardiogram 04/25/2022:  Left ventricular wall thickness is consistent with moderate septal asymmetric hypertrophy.  Estimated right ventricular systolic pressure from tricuspid regurgitation is normal (<35 mmHg). Calculated right ventricular systolic pressure from tricuspid regurgitation is 28 mmHg.  Left ventricular diastolic function is consistent with (grade I) impaired relaxation.  Calculated left ventricular EF = 65% Estimated left ventricular EF was in agreement with the calculated left ventricular EF. Left ventricular systolic function is normal.  Trace aortic valve regurgitation is present. Aortic valve area is 1.25 cm2. The peak aortic velocity was measured in the apical view. Peak velocity of the flow distal to the aortic valve is 211 cm/s. Aortic valve maximum pressure gradient is 17.8 mmHg. Aortic valve mean pressure gradient is 9.8 mmHg. Aortic valve dimensionless index is 0.60 . There is a 26 mm TAVR valve present. The aortic valve peak and mean gradients are within defined limits. The prosthetic aortic valve is  normal.     Left Heart Cath 01/21/2022:  1. Left main: Normal  2. LAD: Discrete 90% mid vessel stenosis  3. LCX: Luminal irregularities proximal segment  4. RCA: Discrete 40% ostial proximal stenosis.  5. Successful PCI to the mid LAD 2.75 x 28 mm Xience lida point drug-eluting stent.  Postdilated with a 3 mm NC trek balloon in the proximal to mid stent segment.     PHYSICAL EXAM:    Constitutional:       Appearance: Healthy appearance. Not in distress.   Neck:      Vascular: No JVR. JVD normal.   Pulmonary:      Effort: Pulmonary effort is normal.      Breath sounds: Normal breath sounds. No wheezing. No rhonchi. No rales.   Chest:      Chest wall: Not tender to palpatation.   Cardiovascular:      PMI at left midclavicular line. Normal rate. Regular rhythm. Normal S1. Normal S2.       Murmurs: There is no murmur.      No gallop.  No click. No rub.   Pulses:     Intact distal pulses.   Edema:     Peripheral edema absent.   Abdominal:      General: Bowel sounds are normal.      Palpations: Abdomen is soft.      Tenderness: There is no abdominal tenderness.   Musculoskeletal: Normal range of motion.         General: No tenderness. Skin:     General: Skin is warm and dry.   Neurological:      General: No focal deficit present.      Mental Status: Alert and oriented to person, place and time.           ECG 12 Lead    Date/Time: 11/16/2023 11:22 AM  Performed by: Paz Yan APRN    Authorized by: Paz Yan APRN  Comparison: compared with previous ECG from 11/9/2023  Similar to previous ECG  Rhythm: paced  BPM: 64  Pacing: atrial sensed rhythm and ventricular pacing          Assessment:       Diagnosis Plan   1. Coronary artery disease involving native coronary artery of native heart without angina pectoris  ECG 12 Lead      2. S/P TAVR (transcatheter aortic valve replacement)  ECG 12 Lead      3. Presence of cardiac pacemaker  ECG 12 Lead      4. Primary hypertension  ECG 12 Lead      5. Nonrheumatic  aortic valve stenosis  ECG 12 Lead      6. AV block, complete  ECG 12 Lead      7. Status post coronary artery stent placement  ECG 12 Lead        Orders Placed This Encounter   Procedures    ECG 12 Lead     This order was created via procedure documentation     Order Specific Question:   Release to patient     Answer:   Routine Release [[id number]]          Plan:       1.  Coronary artery disease: Status post PCI of mid LAD.  Repeat stenting to 99% mid LAD stenosis (08/2023).  Normal LV function.  No angina.  Stable EKG  2.  Severe degenerative aortic valve stenosis: Status post TAVR with 26 mm Medtronic evolute pro transcatheter aortic heart valve in March 2022.  Echo in September 2023 shows mild paravalvular regurg but otherwise looks good.  Euvolemic.  3.  Hypertension: Well-controlled.  Monitors blood pressure three times daily. She notes that sometimes she has a little bit of lightheadedness when her blood pressure is systolically in the low 100s.  If her blood pressure starts to run soft on a normal basis, we will make adjustments.  4.  Permanent pacemaker: Continues to follow with device clinic and Dr. Ceballos.    Ms. Carroll has a scheduled follow-up appointment with me in May.  She will call sooner for any questions or concerns.       Your medication list            Accurate as of November 16, 2023 11:23 AM. If you have any questions, ask your nurse or doctor.                CONTINUE taking these medications        Instructions Last Dose Given Next Dose Due   acetaminophen 325 MG tablet  Commonly known as: TYLENOL      Take 2 tablets by mouth Every 4 (Four) Hours As Needed for Mild Pain  or Fever (temperature greater than 101F).       AeroChamber Plus Benja-Vu misc      1 Device As Needed (Use as needed with inhaler device).       albuterol sulfate  (90 Base) MCG/ACT inhaler  Commonly known as: PROVENTIL HFA;VENTOLIN HFA;PROAIR HFA      Inhale 2 puffs Every 4 (Four) Hours As Needed for Wheezing or  Shortness of Air.       aspirin 81 MG EC tablet      Take 1 tablet by mouth Daily.       AZO-CRANBERRY PO      Take 1 tablet by mouth Daily.       clopidogrel 75 MG tablet  Commonly known as: PLAVIX      Take 1 tablet by mouth Daily.       metoprolol succinate XL 25 MG 24 hr tablet  Commonly known as: Toprol XL      Take 1 tablet by mouth 2 (Two) Times a Day for 360 days.       metroNIDAZOLE 0.75 % cream  Commonly known as: METROCREAM      Apply 1 application  topically to the appropriate area as directed 2 (Two) Times a Day.       mupirocin 2 % ointment  Commonly known as: BACTROBAN      APPLY TWO TIMES PER DAY TO THE INFECTION/BIOPSY SITES.       nitroglycerin 0.4 MG SL tablet  Commonly known as: NITROSTAT      Place 1 tablet under the tongue Every 5 (Five) Minutes As Needed for Chest Pain. Take no more than 3 doses in 15 minutes.       pantoprazole 40 MG EC tablet  Commonly known as: PROTONIX      Take 1 tablet by mouth Every Morning.       potassium chloride 10 MEQ CR capsule  Commonly known as: MICRO-K      Take 1 capsule by mouth 2 (Two) Times a Day.       rosuvastatin 20 MG tablet  Commonly known as: Crestor      Take 1 tablet by mouth Daily.       sennosides-docusate 8.6-50 MG per tablet  Commonly known as: PERICOLACE      Take 1 tablet by mouth Daily.       torsemide 20 MG tablet  Commonly known as: DEMADEX      Take 1 tablet by mouth Daily.       Vitamin D (Cholecalciferol) 50 MCG (2000 UT) capsule      Take 1 capsule by mouth Daily.       VITEYES AREDS ADVANCED PO      Take 2 capsules by mouth Daily.                  As always, it has been a pleasure to participate in your patient's care.      Sincerely,       CLEMENTINE Ron

## 2023-12-28 ENCOUNTER — OFFICE VISIT (OUTPATIENT)
Dept: INTERNAL MEDICINE | Facility: CLINIC | Age: 88
End: 2023-12-28
Payer: MEDICARE

## 2023-12-28 ENCOUNTER — LAB (OUTPATIENT)
Dept: LAB | Facility: HOSPITAL | Age: 88
End: 2023-12-28
Payer: MEDICARE

## 2023-12-28 VITALS
OXYGEN SATURATION: 95 % | SYSTOLIC BLOOD PRESSURE: 124 MMHG | RESPIRATION RATE: 16 BRPM | BODY MASS INDEX: 30.21 KG/M2 | TEMPERATURE: 98 F | WEIGHT: 160 LBS | HEART RATE: 69 BPM | DIASTOLIC BLOOD PRESSURE: 50 MMHG | HEIGHT: 61 IN

## 2023-12-28 DIAGNOSIS — Z00.00 ENCOUNTER FOR ANNUAL WELLNESS EXAM IN MEDICARE PATIENT: ICD-10-CM

## 2023-12-28 DIAGNOSIS — E78.5 HYPERLIPIDEMIA, UNSPECIFIED HYPERLIPIDEMIA TYPE: Chronic | ICD-10-CM

## 2023-12-28 DIAGNOSIS — I10 PRIMARY HYPERTENSION: Primary | Chronic | ICD-10-CM

## 2023-12-28 DIAGNOSIS — I25.10 CORONARY ARTERY DISEASE INVOLVING NATIVE CORONARY ARTERY OF NATIVE HEART WITHOUT ANGINA PECTORIS: ICD-10-CM

## 2023-12-28 DIAGNOSIS — R73.02 IGT (IMPAIRED GLUCOSE TOLERANCE): Chronic | ICD-10-CM

## 2023-12-28 LAB
GLUCOSE SERPL-MCNC: 123 MG/DL (ref 65–99)
HBA1C MFR BLD: 5.9 % (ref 4.8–5.6)

## 2023-12-28 PROCEDURE — 36415 COLL VENOUS BLD VENIPUNCTURE: CPT | Performed by: INTERNAL MEDICINE

## 2023-12-28 PROCEDURE — 83036 HEMOGLOBIN GLYCOSYLATED A1C: CPT | Performed by: INTERNAL MEDICINE

## 2023-12-28 PROCEDURE — 82947 ASSAY GLUCOSE BLOOD QUANT: CPT | Performed by: INTERNAL MEDICINE

## 2023-12-28 NOTE — PROGRESS NOTES
The ABCs of the Annual Wellness Visit  Subsequent Medicare Wellness Visit    Subjective      Yuliana Gonzalez is a 89 y.o. female who presents for a Subsequent Medicare Wellness Visit.    The following portions of the patient's history were reviewed and   updated as appropriate: allergies, current medications, past family history, past medical history, past social history, past surgical history, and problem list.    Compared to one year ago, the patient feels her physical   health is worse.    Compared to one year ago, the patient feels her mental   health is worse.    Recent Hospitalizations:  This patient has had a [facility] admission record on file within the last 365 days.    Current Medical Providers:  Patient Care Team:  Trent Davis MD as PCP - General (Internal Medicine)  Trent Davis MD as PCP - Internal Medicine (Internal Medicine)  Thang Bruce MD as Consulting Physician (Ophthalmology)  Curtis Marshall MD as Consulting Physician (Cardiology)    Outpatient Medications Prior to Visit   Medication Sig Dispense Refill    acetaminophen (TYLENOL) 325 MG tablet Take 2 tablets by mouth Every 4 (Four) Hours As Needed for Mild Pain  or Fever (temperature greater than 101F). 120 tablet 0    albuterol sulfate  (90 Base) MCG/ACT inhaler Inhale 2 puffs Every 4 (Four) Hours As Needed for Wheezing or Shortness of Air. 8.5 g 0    aspirin (aspirin) 81 MG EC tablet Take 1 tablet by mouth Daily. 30 tablet 6    AZO-CRANBERRY PO Take 1 tablet by mouth Daily.      clopidogrel (PLAVIX) 75 MG tablet Take 1 tablet by mouth Daily. 90 tablet 3    metoprolol succinate XL (Toprol XL) 25 MG 24 hr tablet Take 1 tablet by mouth 2 (Two) Times a Day for 360 days. 180 tablet 2    metroNIDAZOLE (METROCREAM) 0.75 % cream Apply 1 application  topically to the appropriate area as directed 2 (Two) Times a Day.      Multiple Vitamins-Minerals (VITEYES AREDS ADVANCED PO) Take 2 capsules by mouth Daily.       mupirocin (BACTROBAN) 2 % ointment APPLY TWO TIMES PER DAY TO THE INFECTION/BIOPSY SITES.      nitroglycerin (NITROSTAT) 0.4 MG SL tablet Place 1 tablet under the tongue Every 5 (Five) Minutes As Needed for Chest Pain. Take no more than 3 doses in 15 minutes. 30 tablet 3    pantoprazole (PROTONIX) 40 MG EC tablet Take 1 tablet by mouth Every Morning. 90 tablet 1    potassium chloride (MICRO-K) 10 MEQ CR capsule Take 1 capsule by mouth 2 (Two) Times a Day. 180 capsule 1    rosuvastatin (Crestor) 20 MG tablet Take 1 tablet by mouth Daily. 30 tablet 0    sennosides-docusate (PERICOLACE) 8.6-50 MG per tablet Take 1 tablet by mouth Daily.      Spacer/Aero-Holding Chambers (AeroChamber Plus Benja-Vu) misc 1 Device As Needed (Use as needed with inhaler device). 1 each 0    torsemide (DEMADEX) 20 MG tablet Take 1 tablet by mouth Daily. 90 tablet 1    Vitamin D, Cholecalciferol, 50 MCG (2000 UT) capsule Take 1 capsule by mouth Daily.       No facility-administered medications prior to visit.       No opioid medication identified on active medication list. I have reviewed chart for other potential  high risk medication/s and harmful drug interactions in the elderly.        Aspirin is on active medication list. Aspirin use is indicated based on review of current medical condition/s. Pros and cons of this therapy have been discussed today. Benefits of this medication outweigh potential harm.  Patient has been encouraged to continue taking this medication.  .      Patient Active Problem List   Diagnosis    Aortic stenosis    IGT (impaired glucose tolerance)    Hyperlipidemia    Hypertension    Cervical arthritis    Sciatica    Vitamin D deficiency    Exudative age-related macular degeneration, bilateral, with inactive choroidal neovascularization    Coronary artery disease involving native coronary artery of native heart    AV block, complete    S/P TAVR (transcatheter aortic valve replacement)    Presence of cardiac pacemaker     "ST elevation myocardial infarction (STEMI)    NSTEMI (non-ST elevated myocardial infarction)    Hyperglycemia    Prediabetes    Acute cystitis without hematuria    Encounter for annual wellness exam in Medicare patient     Advance Care Planning   Advance Care Planning     Advance Directive is on file.  ACP discussion was held with the patient during this visit. Patient has an advance directive in EMR which is still valid.      Objective    Vitals:    23 0951   BP: 124/50   Pulse: 69   Resp: 16   Temp: 98 °F (36.7 °C)   TempSrc: Infrared   SpO2: 95%   Weight: 72.6 kg (160 lb)   Height: 154.9 cm (60.98\")   PainSc: 0-No pain     Estimated body mass index is 30.25 kg/m² as calculated from the following:    Height as of this encounter: 154.9 cm (60.98\").    Weight as of this encounter: 72.6 kg (160 lb).           Does the patient have evidence of cognitive impairment?   No            HEALTH RISK ASSESSMENT    Smoking Status:  Social History     Tobacco Use   Smoking Status Former    Packs/day: 0.00    Years: 0.00    Additional pack years: 0.00    Total pack years: 0.00    Types: Cigarettes    Start date:     Quit date:     Years since quittin.0    Passive exposure: Past   Smokeless Tobacco Never   Tobacco Comments    1 pack every 2 weeks     Alcohol Consumption:  Social History     Substance and Sexual Activity   Alcohol Use Yes    Comment: wine - rare / no caffeine     Fall Risk Screen:    ANGEILCA Fall Risk Assessment was completed, and patient is at LOW risk for falls.Assessment completed on:2023    Depression Screenin/28/2023     9:56 AM   PHQ-2/PHQ-9 Depression Screening   Little Interest or Pleasure in Doing Things 0-->not at all   Feeling Down, Depressed or Hopeless 0-->not at all   PHQ-9: Brief Depression Severity Measure Score 0       Health Habits and Functional and Cognitive Screenin/28/2023     9:56 AM   Functional & Cognitive Status   Do you have difficulty " preparing food and eating? No   Do you have difficulty bathing yourself, getting dressed or grooming yourself? No   Do you have difficulty using the toilet? No   Do you have difficulty moving around from place to place? No   Do you have trouble with steps or getting out of a bed or a chair? No   Current Diet Well Balanced Diet   Dental Exam Up to date   Eye Exam Up to date   Exercise (times per week) 7 times per week   Current Exercises Include Walking   Do you need help using the phone?  No   Are you deaf or do you have serious difficulty hearing?  No   Do you need help to go to places out of walking distance? No   Do you need help shopping? No   Do you need help preparing meals?  No   Do you need help with housework?  No   Do you need help with laundry? No   Do you need help taking your medications? No   Do you need help managing money? No   Do you ever drive or ride in a car without wearing a seat belt? No   Have you felt unusual stress, anger or loneliness in the last month? No   Who do you live with? Alone   If you need help, do you have trouble finding someone available to you? No   Have you been bothered in the last four weeks by sexual problems? No   Do you have difficulty concentrating, remembering or making decisions? No       Age-appropriate Screening Schedule:  Refer to the list below for future screening recommendations based on patient's age, sex and/or medical conditions. Orders for these recommended tests are listed in the plan section. The patient has been provided with a written plan.    Health Maintenance   Topic Date Due    DXA SCAN  Never done    ANNUAL WELLNESS VISIT  09/01/2023    ZOSTER VACCINE (2 of 3) 01/01/2035 (Originally 1/26/2008)    BMI FOLLOWUP  12/28/2024    TDAP/TD VACCINES (3 - Td or Tdap) 03/01/2027    COVID-19 Vaccine  Completed    INFLUENZA VACCINE  Completed    Pneumococcal Vaccine 65+  Completed    LIPID PANEL  Discontinued    COLORECTAL CANCER SCREENING  Discontinued                   CMS Preventative Services Quick Reference  Risk Factors Identified During Encounter:    None Identified    The above risks/problems have been discussed with the patient.  Pertinent information has been shared with the patient in the After Visit Summary.    Diagnoses and all orders for this visit:    1. Primary hypertension (Primary)    2. Hyperlipidemia, unspecified hyperlipidemia type    3. IGT (impaired glucose tolerance)    4. Coronary artery disease involving native coronary artery of native heart without angina pectoris    5. Encounter for annual wellness exam in Medicare patient        Follow Up:   Next Medicare Wellness visit to be scheduled in 1 year.      An After Visit Summary and PPPS were made available to the patient.

## 2023-12-28 NOTE — PROGRESS NOTES
Subjective   Yuliana Gonzalez is a 89 y.o. female.     Chief Complaint   Patient presents with    Medicare Wellness-subsequent    Hypertension    Hyperlipidemia    Hyperglycemia    Coronary Artery Disease         History of Present Illness  In for recheck of chronic IGT.  Has been stable.  Also aortic stenosis.  Has dyspnea on exertion which has resolved.  Recent TTE showed an ejection fraction of 51% which was improved.  History of macular degeneration.  That is stable.  Hypertension  This is a chronic problem. The current episode started more than 1 year ago. The problem is unchanged. The problem is controlled. Pertinent negatives include no chest pain, headaches, palpitations or shortness of breath.   Hyperlipidemia  This is a chronic problem. The current episode started more than 1 year ago. The problem is controlled. Recent lipid tests were reviewed and are normal. Factors aggravating her hyperlipidemia include beta blockers. Pertinent negatives include no chest pain or shortness of breath.   Hyperglycemia  This is a chronic problem. The current episode started more than 1 year ago. The problem occurs constantly. Pertinent negatives include no abdominal pain, chest pain, coughing or headaches.   Coronary Artery Disease  Presents for follow-up visit. Pertinent negatives include no chest pain, leg swelling, palpitations or shortness of breath. Risk factors include hyperlipidemia.   Asthma  There is no cough, shortness of breath or wheezing. Pertinent negatives include no chest pain or headaches. Her past medical history is significant for asthma.        The following portions of the patient's history were reviewed and updated as appropriate: allergies, current medications, past social history and problem list.    Outpatient Medications Marked as Taking for the 12/28/23 encounter (Office Visit) with Trent Davis MD   Medication Sig Dispense Refill    acetaminophen (TYLENOL) 325 MG tablet Take 2 tablets by mouth  Every 4 (Four) Hours As Needed for Mild Pain  or Fever (temperature greater than 101F). 120 tablet 0    albuterol sulfate  (90 Base) MCG/ACT inhaler Inhale 2 puffs Every 4 (Four) Hours As Needed for Wheezing or Shortness of Air. 8.5 g 0    aspirin (aspirin) 81 MG EC tablet Take 1 tablet by mouth Daily. 30 tablet 6    AZO-CRANBERRY PO Take 1 tablet by mouth Daily.      clopidogrel (PLAVIX) 75 MG tablet Take 1 tablet by mouth Daily. 90 tablet 3    metoprolol succinate XL (Toprol XL) 25 MG 24 hr tablet Take 1 tablet by mouth 2 (Two) Times a Day for 360 days. 180 tablet 2    metroNIDAZOLE (METROCREAM) 0.75 % cream Apply 1 application  topically to the appropriate area as directed 2 (Two) Times a Day.      Multiple Vitamins-Minerals (VITEYES AREDS ADVANCED PO) Take 2 capsules by mouth Daily.      mupirocin (BACTROBAN) 2 % ointment APPLY TWO TIMES PER DAY TO THE INFECTION/BIOPSY SITES.      nitroglycerin (NITROSTAT) 0.4 MG SL tablet Place 1 tablet under the tongue Every 5 (Five) Minutes As Needed for Chest Pain. Take no more than 3 doses in 15 minutes. 30 tablet 3    pantoprazole (PROTONIX) 40 MG EC tablet Take 1 tablet by mouth Every Morning. 90 tablet 1    potassium chloride (MICRO-K) 10 MEQ CR capsule Take 1 capsule by mouth 2 (Two) Times a Day. 180 capsule 1    rosuvastatin (Crestor) 20 MG tablet Take 1 tablet by mouth Daily. 30 tablet 0    sennosides-docusate (PERICOLACE) 8.6-50 MG per tablet Take 1 tablet by mouth Daily.      Spacer/Aero-Holding Chambers (AeroChamber Plus Benja-Vu) misc 1 Device As Needed (Use as needed with inhaler device). 1 each 0    torsemide (DEMADEX) 20 MG tablet Take 1 tablet by mouth Daily. 90 tablet 1    Vitamin D, Cholecalciferol, 50 MCG (2000 UT) capsule Take 1 capsule by mouth Daily.         Review of Systems   Respiratory:  Negative for cough, shortness of breath and wheezing.    Cardiovascular:  Negative for chest pain, palpitations and leg swelling.   Gastrointestinal:  Negative  for abdominal pain, constipation (controlled on senna) and diarrhea.   Neurological:  Negative for headaches.       Objective   Vitals:    12/28/23 0951   BP: 124/50   Pulse: 69   Resp: 16   Temp: 98 °F (36.7 °C)   SpO2: 95%          12/28/23 0951   Weight: 72.6 kg (160 lb)    [unfilled]  Body mass index is 30.25 kg/m².      Physical Exam   Constitutional: She appears well-developed.   Neck: No thyromegaly present.   Cardiovascular: Normal rate and regular rhythm. Exam reveals no gallop.   Murmur heard.  Crescendo decrescendo systolic murmur is present with a grade of 1/6.  Soft grade 1-2 systolic murmur in the aortic area   Pulmonary/Chest: Effort normal and breath sounds normal. No respiratory distress. She has no wheezes. She has no rales.   Abdominal: Soft. Normal appearance and bowel sounds are normal. She exhibits no mass. There is no abdominal tenderness. There is no guarding.   Neurological: She is alert.         Problems Addressed this Visit          Cardiac and Vasculature    Hypertension - Primary (Chronic)    Hyperlipidemia (Chronic)    Coronary artery disease involving native coronary artery of native heart       Endocrine and Metabolic    IGT (impaired glucose tolerance) (Chronic)       Other    Encounter for annual wellness exam in Medicare patient     Diagnoses         Codes Comments    Primary hypertension    -  Primary ICD-10-CM: I10  ICD-9-CM: 401.9     Hyperlipidemia, unspecified hyperlipidemia type     ICD-10-CM: E78.5  ICD-9-CM: 272.4     IGT (impaired glucose tolerance)     ICD-10-CM: R73.02  ICD-9-CM: 790.22     Coronary artery disease involving native coronary artery of native heart without angina pectoris     ICD-10-CM: I25.10  ICD-9-CM: 414.01     Encounter for annual wellness exam in Medicare patient     ICD-10-CM: Z00.00  ICD-9-CM: V70.0           Assessment/Plan   In for recheck of hypertension, hyperlipidemia and CAD today December 2023.  She had a TAVR in February 2022 and is  made an excellent recovery.  Virtually all of her cardiac symptoms have now resolved.  Her energy was back to normal.  Blood pressure control is good.  Glycemic control is been good.  Gets glucose and A1c today and every 3 months.  Annual lab work was done August 2023 in the hospital including CBC, CMP.  Annual wellness visit due today December 2023.  She failed Lipitor and Dr. Locke felt that was somewhat irrelevant at her age and is good is her lipids run.  I agree.  She remains on Lopressor 25 mg twice daily and torsemide 20 mg daily for hypertension those are reviewed today.  History of macular degeneration being treated with eye vitamins.  She is 2.5 HPP today.    The above information was reviewed again today 12/28/23.  It continues to be accurate as reflected above and is unchanged.  History, physical and review of systems all reviewed and are unchanged.  Medications were reviewed today and continue the current dosing.         Dragon disclaimer:   Much of this encounter note is an electronic transcription/translation of spoken language to printed text. The electronic translation of spoken language may permit erroneous, or at times, nonsensical words or phrases to be inadvertently transcribed; Although I have reviewed the note for such errors, some may still exist.

## 2024-01-04 ENCOUNTER — CLINICAL SUPPORT NO REQUIREMENTS (OUTPATIENT)
Age: 89
End: 2024-01-04
Payer: MEDICARE

## 2024-01-04 ENCOUNTER — OFFICE VISIT (OUTPATIENT)
Age: 89
End: 2024-01-04
Payer: MEDICARE

## 2024-01-04 VITALS
SYSTOLIC BLOOD PRESSURE: 130 MMHG | DIASTOLIC BLOOD PRESSURE: 72 MMHG | HEART RATE: 73 BPM | BODY MASS INDEX: 29.83 KG/M2 | HEIGHT: 61 IN | WEIGHT: 158 LBS

## 2024-01-04 DIAGNOSIS — I44.2 AV BLOCK, COMPLETE: Primary | ICD-10-CM

## 2024-01-04 PROCEDURE — 99213 OFFICE O/P EST LOW 20 MIN: CPT | Performed by: INTERNAL MEDICINE

## 2024-01-04 PROCEDURE — 93000 ELECTROCARDIOGRAM COMPLETE: CPT | Performed by: INTERNAL MEDICINE

## 2024-01-04 NOTE — PROGRESS NOTES
"Date of Office Visit: 2024  Encounter Provider: Yann Ceballos MD  Place of Service: [facility]  Patient Name: Yuliana Gonzalez  :1930    Chief Complaint   Patient presents with    AV block, complete    Pacemaker Check   :     HPI: Yuliana Gonzalez is a 89 y.o. female who presents today for pacemaker follow-up.     She had a pacemaker placed post TAVR for complete heart block, 3/22.  [place] [facility].  Deep Septal.     She has done well.     She is moved into the forum, staying active on several activities.  Walks daily.           Past Medical History:   Diagnosis Date    Aortic stenosis 2009    diagnosed,  moderate with 0.9cm area, 42mm max gradient. 0.91/34 6/15.    Central loss of vision     RIGHT EYE    Cervical arthritis     Colitis     HX    Coronary artery disease     History of constipation     History of esophageal reflux     History of skin cancer     Hyperlipidemia     Hypertension     IGT (impaired glucose tolerance)     Irregular heart beat     HX    Macular degeneration     Medication management     On continuous oral anticoagulation     Redness of skin     NOSE/DERMATOLOGY TREATING WITH FLAGYL CREAM/ADVISED PT TO NOTIFY CARDIOLOGY    Sciatica     HX    Short-term memory loss     \"MILD\"    SOB (shortness of breath) on exertion     Vitamin D deficiency        Past Surgical History:   Procedure Laterality Date    AORTIC VALVE REPAIR/REPLACEMENT Left 3/15/2022    Procedure: TTE TRANSFEMORAL TRANSCATHETER AORTIC VALVE REPLACEMENT PERCUTANEOUS APPROACH;  Surgeon: Vesna Chris MD;  Location: [facility];  Service: Cardiothoracic;  Laterality: Left;    AORTIC VALVE REPAIR/REPLACEMENT Left 3/15/2022    Procedure: Transfemoral Transcatheter Aortic Valve Replacement with intra operative TTE and possible open surgical rescue;  Surgeon: Clint Locke MD;  Location: [facility];  Service: Cardiovascular;  Laterality: Left;    CARDIAC " CATHETERIZATION N/A 1/21/2022    Procedure: Coronary angiography;  Surgeon: Clint Locke MD;  Location:  [facility]VAS[facility];  Service: Cardiovascular;  Laterality: N/A;    CARDIAC CATHETERIZATION N/A 1/21/2022    Procedure: Percutaneous Coronary Intervention;  Surgeon: Clint Locke MD;  Location:  [facility];  Service: Cardiovascular;  Laterality: N/A;    CARDIAC CATHETERIZATION N/A 1/21/2022    Procedure: Stent ISMAEL coronary;  Surgeon: Clint Locke MD;  Location:  [facility];  Service: Cardiovascular;  Laterality: N/A;    CARDIAC CATHETERIZATION Left 7/31/2022    Procedure: CORONARY ANGIOGRAPHY;  Surgeon: Jeannie Echevarria MD;  Location:  [facility];  Service: Cardiology;  Laterality: Left;    CARDIAC CATHETERIZATION N/A 7/31/2022    Procedure: Left ventriculography;  Surgeon: Jeannie Echevarria MD;  Location:  [facility];  Service: Cardiology;  Laterality: N/A;    CARDIAC CATHETERIZATION N/A 7/31/2022    Procedure: Left Heart Cath;  Surgeon: Jeannie Echevarria MD;  Location:  [facility];  Service: Cardiology;  Laterality: N/A;    CARDIAC CATHETERIZATION N/A 8/4/2023    Procedure: Left Heart Cath;  Surgeon: Clint Locke MD;  Location:  [facility];  Service: Cardiovascular;  Laterality: N/A;    CARDIAC CATHETERIZATION N/A 8/4/2023    Procedure: Percutaneous Coronary Intervention;  Surgeon: Clint Locke MD;  Location:  [facility];  Service: Cardiovascular;  Laterality: N/A;    CARDIAC CATHETERIZATION N/A 8/4/2023    Procedure: Stent ISMAEL coronary;  Surgeon: Clint Locke MD;  Location:  [facility];  Service: Cardiovascular;  Laterality: N/A;    CARDIAC CATHETERIZATION N/A 8/4/2023    Procedure: Coronary angiography;  Surgeon: Clint Locke MD;  Location:  [facility];  Service: Cardiovascular;   "Laterality: N/A;    CARDIAC ELECTROPHYSIOLOGY PROCEDURE N/A 3/16/2022    Procedure: Pacemaker DC new [place];  Surgeon: Yann Ceballos MD;  Location: [facility] INVASIVE LOC[facility];  Service: Cardiology;  Laterality: N/A;    CATARACT EXTRACTION, BILATERAL      COLONOSCOPY      EYE SURGERY      SKIN CANCER EXCISION         Social History     Socioeconomic History    Marital status:      Spouse name: Stephan    Number of children: 4   Tobacco Use    Smoking status: Former     Packs/day: 0.00     Years: 0.00     Additional pack years: 0.00     Total pack years: 0.00     Types: Cigarettes     Start date:      Quit date:      Years since quittin.0     Passive exposure: Past    Smokeless tobacco: Never    Tobacco comments:     1 pack every 2 weeks   Vaping Use    Vaping Use: Never used   Substance and Sexual Activity    Alcohol use: Yes     Comment: wine - rare / no caffeine    Drug use: No    Sexual activity: Defer       Family History   Problem Relation Age of Onset    Aortic stenosis Mother     Hypertension Mother     Heart failure Father     Diabetes Father     Hypertension Father     No Known Problems Sister     No Known Problems Daughter     No Known Problems Son     Aneurysm Sister     Other Sister         Polio in her 20's    Malig Hyperthermia Neg Hx        Review of Systems   Constitutional: Negative.   Cardiovascular: Negative.    Respiratory: Negative.     Gastrointestinal: Negative.        Allergies   Allergen Reactions    Hydrochlorothiazide Other (See Comments)     hyponatremia    Hydrocodone Shortness Of Breath     Also reports \"lethargy\"    Lipitor [Atorvastatin] Nausea Only     \"FELT AWFUL\"    Erythromycin Diarrhea    Lisinopril Cough         Current Outpatient Medications:     acetaminophen (TYLENOL) 325 MG tablet, Take 2 tablets by mouth Every 4 (Four) Hours As Needed for Mild Pain  or Fever (temperature greater than 101F)., Disp: 120 tablet, Rfl: 0    albuterol sulfate  " (90 Base) MCG/ACT inhaler, Inhale 2 puffs Every 4 (Four) Hours As Needed for Wheezing or Shortness of Air., Disp: 8.5 g, Rfl: 0    aspirin (aspirin) 81 MG EC tablet, Take 1 tablet by mouth Daily., Disp: 30 tablet, Rfl: 6    AZO-CRANBERRY PO, Take 1 tablet by mouth Daily., Disp: , Rfl:     clopidogrel (PLAVIX) 75 MG tablet, Take 1 tablet by mouth Daily., Disp: 90 tablet, Rfl: 3    metoprolol succinate XL (Toprol XL) 25 MG 24 hr tablet, Take 1 tablet by mouth 2 (Two) Times a Day for 360 days., Disp: 180 tablet, Rfl: 2    metroNIDAZOLE (METROCREAM) 0.75 % cream, Apply 1 application  topically to the appropriate area as directed 2 (Two) Times a Day., Disp: , Rfl:     Multiple Vitamins-Minerals (VITEYES AREDS ADVANCED PO), Take 2 capsules by mouth Daily., Disp: , Rfl:     mupirocin (BACTROBAN) 2 % ointment, APPLY TWO TIMES PER DAY TO THE INFECTION/BIOPSY SITES., Disp: , Rfl:     nitroglycerin (NITROSTAT) 0.4 MG SL tablet, Place 1 tablet under the tongue Every 5 (Five) Minutes As Needed for Chest Pain. Take no more than 3 doses in 15 minutes., Disp: 30 tablet, Rfl: 3    pantoprazole (PROTONIX) 40 MG EC tablet, Take 1 tablet by mouth Every Morning., Disp: 90 tablet, Rfl: 1    potassium chloride (MICRO-K) 10 MEQ CR capsule, Take 1 capsule by mouth 2 (Two) Times a Day., Disp: 180 capsule, Rfl: 1    rosuvastatin (Crestor) 20 MG tablet, Take 1 tablet by mouth Daily., Disp: 30 tablet, Rfl: 0    sennosides-docusate (PERICOLACE) 8.6-50 MG per tablet, Take 1 tablet by mouth Daily., Disp: , Rfl:     Spacer/Aero-Holding Chambers (AeroChamber Plus Benja-Vu) misc, 1 Device As Needed (Use as needed with inhaler device)., Disp: 1 each, Rfl: 0    torsemide (DEMADEX) 20 MG tablet, Take 1 tablet by mouth Daily., Disp: 90 tablet, Rfl: 1    Vitamin D, Cholecalciferol, 50 MCG (2000 UT) capsule, Take 1 capsule by mouth Daily., Disp: , Rfl:       Objective:     Vitals:    01/04/24 0935   BP: 130/72   Pulse: 73   Weight: 71.7 kg (158 lb)   Height:  "154.9 cm (60.98\")     Body mass index is 29.87 kg/m².    PHYSICAL EXAM:    Vitals and nursing note reviewed.   Constitutional:       General: Not in acute distress.  Pulmonary:      Effort: Pulmonary effort is normal. No respiratory distress.   Chest:      Comments: Pacemaker pocket is normal  Cardiovascular:      Normal rate. Regular rhythm.   Skin:     General: Skin is warm and dry.   Neurological:      Mental Status: Alert and oriented to person, place, and time.   Psychiatric:         Behavior: Behavior normal.         Thought Content: Thought content normal.         Judgment: Judgment normal.             ECG 12 Lead    Date/Time: 1/4/2024 9:59 AM  Performed by: Yann Ceballos MD    Authorized by: Yann Ceballos MD  Comparison: compared with previous ECG from 11/16/2023  Similar to previous ECG  Rhythm: sinus rhythm and paced            Assessment:       Diagnosis Plan   1. AV block, complete               Plan:     Normal function of pacemaker, deep septal pacing site.     Follow-up in one year.     As always, it has been a pleasure to participate in your patient's care.      Sincerely,         Yann Ceballos MD  "

## 2024-01-31 RX ORDER — TORSEMIDE 20 MG/1
TABLET ORAL
Qty: 180 TABLET | Refills: 3 | Status: SHIPPED | OUTPATIENT
Start: 2024-01-31

## 2024-02-13 RX ORDER — CLOPIDOGREL BISULFATE 75 MG/1
75 TABLET ORAL EVERY MORNING
Qty: 90 TABLET | Refills: 2 | Status: SHIPPED | OUTPATIENT
Start: 2024-02-13

## 2024-04-04 ENCOUNTER — LAB (OUTPATIENT)
Dept: LAB | Facility: HOSPITAL | Age: 89
End: 2024-04-04
Payer: MEDICARE

## 2024-04-04 ENCOUNTER — OFFICE VISIT (OUTPATIENT)
Dept: INTERNAL MEDICINE | Facility: CLINIC | Age: 89
End: 2024-04-04
Payer: MEDICARE

## 2024-04-04 VITALS
OXYGEN SATURATION: 98 % | TEMPERATURE: 98 F | SYSTOLIC BLOOD PRESSURE: 132 MMHG | WEIGHT: 155 LBS | RESPIRATION RATE: 18 BRPM | HEIGHT: 60 IN | DIASTOLIC BLOOD PRESSURE: 78 MMHG | BODY MASS INDEX: 30.43 KG/M2 | HEART RATE: 70 BPM

## 2024-04-04 DIAGNOSIS — I10 PRIMARY HYPERTENSION: Primary | Chronic | ICD-10-CM

## 2024-04-04 DIAGNOSIS — R73.02 IGT (IMPAIRED GLUCOSE TOLERANCE): Chronic | ICD-10-CM

## 2024-04-04 DIAGNOSIS — I25.10 CORONARY ARTERY DISEASE INVOLVING NATIVE CORONARY ARTERY OF NATIVE HEART WITHOUT ANGINA PECTORIS: ICD-10-CM

## 2024-04-04 DIAGNOSIS — E78.5 HYPERLIPIDEMIA, UNSPECIFIED HYPERLIPIDEMIA TYPE: Chronic | ICD-10-CM

## 2024-04-04 LAB
GLUCOSE SERPL-MCNC: 98 MG/DL (ref 65–99)
HBA1C MFR BLD: 5.8 % (ref 4.8–5.6)

## 2024-04-04 PROCEDURE — 1160F RVW MEDS BY RX/DR IN RCRD: CPT | Performed by: INTERNAL MEDICINE

## 2024-04-04 PROCEDURE — 1159F MED LIST DOCD IN RCRD: CPT | Performed by: INTERNAL MEDICINE

## 2024-04-04 PROCEDURE — 36415 COLL VENOUS BLD VENIPUNCTURE: CPT | Performed by: INTERNAL MEDICINE

## 2024-04-04 PROCEDURE — 82947 ASSAY GLUCOSE BLOOD QUANT: CPT | Performed by: INTERNAL MEDICINE

## 2024-04-04 PROCEDURE — 83036 HEMOGLOBIN GLYCOSYLATED A1C: CPT | Performed by: INTERNAL MEDICINE

## 2024-04-04 PROCEDURE — 99214 OFFICE O/P EST MOD 30 MIN: CPT | Performed by: INTERNAL MEDICINE

## 2024-04-04 RX ORDER — MULTIVITAMIN WITH IRON
200 TABLET ORAL DAILY
Qty: 180 TABLET | Refills: 3 | Status: SHIPPED | OUTPATIENT
Start: 2024-04-04

## 2024-04-04 NOTE — PROGRESS NOTES
Subjective   Yuliana Gonzalez is a 89 y.o. female.     Chief Complaint   Patient presents with    Hyperlipidemia    Hypertension    Coronary Artery Disease         History of Present Illness  In for recheck of chronic IGT.  Has been stable.  Also aortic stenosis.  Has dyspnea on exertion which has resolved.  Recent TTE showed an ejection fraction of 51% which was improved.  History of macular degeneration.  That is stable.  Hyperlipidemia  This is a chronic problem. The current episode started more than 1 year ago. The problem is controlled. Recent lipid tests were reviewed and are normal. Factors aggravating her hyperlipidemia include beta blockers. Pertinent negatives include no chest pain or shortness of breath.   Hypertension  This is a chronic problem. The current episode started more than 1 year ago. The problem is unchanged. The problem is controlled. Pertinent negatives include no chest pain, headaches, palpitations or shortness of breath.   Coronary Artery Disease  Presents for follow-up visit. Symptoms include dizziness. Pertinent negatives include no chest pain, leg swelling, palpitations or shortness of breath. Risk factors include hyperlipidemia.   Hyperglycemia  This is a chronic problem. The current episode started more than 1 year ago. The problem occurs constantly. Pertinent negatives include no abdominal pain, chest pain, coughing or headaches.   Asthma  There is no cough, shortness of breath or wheezing. Pertinent negatives include no chest pain or headaches. Her past medical history is significant for asthma.        The following portions of the patient's history were reviewed and updated as appropriate: allergies, current medications, past social history and problem list.    Outpatient Medications Marked as Taking for the 4/4/24 encounter (Office Visit) with Trent Davis MD   Medication Sig Dispense Refill    acetaminophen (TYLENOL) 325 MG tablet Take 2 tablets by mouth Every 4 (Four) Hours  As Needed for Mild Pain  or Fever (temperature greater than 101F). 120 tablet 0    albuterol sulfate  (90 Base) MCG/ACT inhaler Inhale 2 puffs Every 4 (Four) Hours As Needed for Wheezing or Shortness of Air. 8.5 g 0    aspirin (aspirin) 81 MG EC tablet Take 1 tablet by mouth Daily. 30 tablet 6    AZO-CRANBERRY PO Take 1 tablet by mouth Daily.      clopidogrel (PLAVIX) 75 MG tablet TAKE 1 TABLET BY MOUTH EVERY DAY IN THE MORNING 90 tablet 2    metoprolol succinate XL (Toprol XL) 25 MG 24 hr tablet Take 1 tablet by mouth 2 (Two) Times a Day for 360 days. 180 tablet 2    metroNIDAZOLE (METROCREAM) 0.75 % cream Apply 1 Application topically to the appropriate area as directed 2 (Two) Times a Day.      Multiple Vitamins-Minerals (VITEYES AREDS ADVANCED PO) Take 2 capsules by mouth Daily.      mupirocin (BACTROBAN) 2 % ointment APPLY TWO TIMES PER DAY TO THE INFECTION/BIOPSY SITES.      nitroglycerin (NITROSTAT) 0.4 MG SL tablet Place 1 tablet under the tongue Every 5 (Five) Minutes As Needed for Chest Pain. Take no more than 3 doses in 15 minutes. 30 tablet 3    pantoprazole (PROTONIX) 40 MG EC tablet Take 1 tablet by mouth Every Morning. 90 tablet 1    potassium chloride (MICRO-K) 10 MEQ CR capsule Take 1 capsule by mouth 2 (Two) Times a Day. 180 capsule 1    rosuvastatin (Crestor) 20 MG tablet Take 1 tablet by mouth Daily. 30 tablet 0    sennosides-docusate (PERICOLACE) 8.6-50 MG per tablet Take 1 tablet by mouth Daily.      Spacer/Aero-Holding Chambers (AeroChamber Plus Benja-Vu) misc 1 Device As Needed (Use as needed with inhaler device). 1 each 0    torsemide (DEMADEX) 20 MG tablet TAKE 2 TAB (40 MG) BY MOUTH DAILY. 180 tablet 3    Vitamin D, Cholecalciferol, 50 MCG (2000 UT) capsule Take 1 capsule by mouth Daily.         Review of Systems   Respiratory:  Negative for cough, shortness of breath and wheezing.    Cardiovascular:  Negative for chest pain, palpitations and leg swelling.   Gastrointestinal:   Positive for constipation (controlled on senna). Negative for abdominal pain and diarrhea.   Neurological:  Positive for dizziness. Negative for headaches.       Objective   Vitals:    04/04/24 1004   BP: 132/78   Pulse: 70   Resp: 18   Temp: 98 °F (36.7 °C)   SpO2: 98%          04/04/24  1004   Weight: 70.3 kg (155 lb)    [unfilled]  Body mass index is 30.27 kg/m².      Physical Exam   Constitutional: She appears well-developed.   Neck: No thyromegaly present.   Cardiovascular: Normal rate and regular rhythm. Exam reveals no gallop.   Murmur heard.  Crescendo decrescendo systolic murmur is present with a grade of 1/6.  Soft grade 1-2 systolic murmur in the aortic area   Pulmonary/Chest: Effort normal and breath sounds normal. No respiratory distress. She has no wheezes. She has no rales.   Abdominal: Soft. Normal appearance and bowel sounds are normal. She exhibits no mass. There is no abdominal tenderness. There is no guarding.   Neurological: She is alert.         Problems Addressed this Visit          Cardiac and Vasculature    Hypertension - Primary (Chronic)    Hyperlipidemia (Chronic)    Coronary artery disease involving native coronary artery of native heart       Endocrine and Metabolic    IGT (impaired glucose tolerance) (Chronic)     Diagnoses         Codes Comments    Primary hypertension    -  Primary ICD-10-CM: I10  ICD-9-CM: 401.9     Hyperlipidemia, unspecified hyperlipidemia type     ICD-10-CM: E78.5  ICD-9-CM: 272.4     Coronary artery disease involving native coronary artery of native heart without angina pectoris     ICD-10-CM: I25.10  ICD-9-CM: 414.01     IGT (impaired glucose tolerance)     ICD-10-CM: R73.02  ICD-9-CM: 790.22           Assessment/Plan   In for recheck of hypertension, hyperlipidemia, IGT and CAD today April 2024.  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  Virtually all of her cardiac symptoms have now resolved.  Her energy was back to normal.   Blood pressure control is good.  Glycemic control is been good.  Gets glucose and A1c today and every 3 months.  Annual lab work was done August 2023 in the hospital including CBC, CMP.  Annual wellness visit done December 2023.  She failed Lipitor and Dr. Locke felt that was somewhat irrelevant at her age and is good is her lipids run.  I agree.  She remains on Lopressor 25 mg twice daily and torsemide 20 mg daily for hypertension those are reviewed today.  History of macular degeneration being treated with eye vitamins.  She has numbness in the fingers of both hands.  Will add some vitamin B6 200 mg daily and consider nocturnal wrist splints.  She is 2.5 HPP today.    The above information was reviewed again today 04/04/24.  It continues to be accurate as reflected above and is unchanged.  History, physical and review of systems all reviewed and are unchanged.  Medications were reviewed today and continue the current dosing.           Jono disclaimer:   Much of this encounter note is an electronic transcription/translation of spoken language to printed text. The electronic translation of spoken language may permit erroneous, or at times, nonsensical words or phrases to be inadvertently transcribed; Although I have reviewed the note for such errors, some may still exist.

## 2024-05-24 ENCOUNTER — OFFICE VISIT (OUTPATIENT)
Dept: CARDIOLOGY | Facility: CLINIC | Age: 89
End: 2024-05-24
Payer: MEDICARE

## 2024-05-24 VITALS
BODY MASS INDEX: 31.45 KG/M2 | DIASTOLIC BLOOD PRESSURE: 64 MMHG | HEART RATE: 66 BPM | SYSTOLIC BLOOD PRESSURE: 126 MMHG | HEIGHT: 60 IN | WEIGHT: 160.2 LBS

## 2024-05-24 DIAGNOSIS — Z95.5 S/P DRUG ELUTING CORONARY STENT PLACEMENT: ICD-10-CM

## 2024-05-24 DIAGNOSIS — I35.0 NONRHEUMATIC AORTIC VALVE STENOSIS: Primary | Chronic | ICD-10-CM

## 2024-05-24 DIAGNOSIS — I44.2 AV BLOCK, COMPLETE: ICD-10-CM

## 2024-05-24 DIAGNOSIS — Z95.2 S/P TAVR (TRANSCATHETER AORTIC VALVE REPLACEMENT): ICD-10-CM

## 2024-05-24 DIAGNOSIS — E78.5 HYPERLIPIDEMIA, UNSPECIFIED HYPERLIPIDEMIA TYPE: Chronic | ICD-10-CM

## 2024-05-24 DIAGNOSIS — I10 PRIMARY HYPERTENSION: Chronic | ICD-10-CM

## 2024-05-24 DIAGNOSIS — Z95.0 PRESENCE OF CARDIAC PACEMAKER: ICD-10-CM

## 2024-05-24 DIAGNOSIS — I25.10 CORONARY ARTERY DISEASE INVOLVING NATIVE CORONARY ARTERY OF NATIVE HEART WITHOUT ANGINA PECTORIS: ICD-10-CM

## 2024-05-24 PROCEDURE — 93000 ELECTROCARDIOGRAM COMPLETE: CPT | Performed by: NURSE PRACTITIONER

## 2024-05-24 PROCEDURE — 99214 OFFICE O/P EST MOD 30 MIN: CPT | Performed by: NURSE PRACTITIONER

## 2024-05-24 RX ORDER — ROSUVASTATIN CALCIUM 20 MG/1
1 TABLET, COATED ORAL DAILY
COMMUNITY
Start: 2024-04-06 | End: 2024-05-24 | Stop reason: SDUPTHER

## 2024-05-24 RX ORDER — ROSUVASTATIN CALCIUM 20 MG/1
20 TABLET, COATED ORAL DAILY
Qty: 90 TABLET | Refills: 3 | Status: SHIPPED | OUTPATIENT
Start: 2024-05-24

## 2024-05-24 RX ORDER — CLOPIDOGREL BISULFATE 75 MG/1
75 TABLET ORAL EVERY MORNING
Qty: 90 TABLET | Refills: 3 | Status: SHIPPED | OUTPATIENT
Start: 2024-05-24

## 2024-05-24 RX ORDER — TORSEMIDE 20 MG/1
20 TABLET ORAL DAILY
Qty: 90 TABLET | Refills: 3 | Status: SHIPPED | OUTPATIENT
Start: 2024-05-24

## 2024-05-24 RX ORDER — METOPROLOL SUCCINATE 25 MG/1
25 TABLET, EXTENDED RELEASE ORAL 2 TIMES DAILY
Qty: 180 TABLET | Refills: 3 | Status: SHIPPED | OUTPATIENT
Start: 2024-05-24 | End: 2025-05-19

## 2024-05-24 RX ORDER — POTASSIUM CHLORIDE 750 MG/1
10 CAPSULE, EXTENDED RELEASE ORAL 2 TIMES DAILY
Qty: 180 CAPSULE | Refills: 3 | Status: SHIPPED | OUTPATIENT
Start: 2024-05-24

## 2024-05-24 NOTE — PROGRESS NOTES
Date of Office Visit: 2024  Encounter Provider: CLEMENTINE Palacios  Place of Service: [facility]ARD[facility]  Patient Name: Yuliana Gonzalez  :1930    No chief complaint on file.  : aortic stenosis  CAD    HPI: Yuliana Gonzalez is a 89 y.o. female who is a patient of  Dr. Locke.  She presents today for a 6 month follow up.  She has a history of severe degenerative aortic valve stenosis status post TAVR (2022), coronary artery disease with prior PCI to LAD, permanent pacemaker secondary to high grade AV block post TAVR, chronic heart failure with preserved ejection fraction.   In 2023, patient was admitted to the hospital with NSTEMI type II and parainfluenza virus.  An echocardiogram showed newly depressed EF at 37%.  Cardiac cath showed 99% mid LAD stenosis.  She underwent repeat stenting to mid LAD.  Ejection fraction normalized.  Continued on DAPT with aspirin and Plavix.    She was last seen on 2023 in the office by Dr. Locke.  At that time, she had started noticing occasional palpitations at night over the last 2 to 3 weeks.  Toprol was increased to twice daily.  Her blood pressure was noted to be well controlled.     Most recently seen in the office by Dr. Ceballos with EP on 2024.  She recently had moved to senior living at [facility] and was staying active.  Walks daily.  Pacemaker interrogated and showed normal function.    Ms. Gonzalez presents today with no complaints of pain, shortness of air, lightheadedness or lower extremity edema.  She is the  of the book club at [facility] and is also the  of another group [facility].  She walks on a normal basis and continues to be very active.  Pressure is well-controlled and she does monitor it on a normal basis about 3 times a day.  EKG is stable as well.    Previous testing and notes have been reviewed by me.   Past Medical History:   Diagnosis Date    Aortic stenosis  "07/2009    diagnosed, 6/14 moderate with 0.9cm area, 42mm max gradient. 0.91/34 6/15.    Central loss of vision     RIGHT EYE    Cervical arthritis     Colitis     HX    Coronary artery disease     History of constipation     History of esophageal reflux     History of skin cancer     Hyperlipidemia     Hypertension     IGT (impaired glucose tolerance)     Irregular heart beat     HX    Macular degeneration     Medication management     On continuous oral anticoagulation     Redness of skin     NOSE/DERMATOLOGY TREATING WITH FLAGYL CREAM/ADVISED PT TO NOTIFY CARDIOLOGY    Sciatica     HX    Short-term memory loss     \"MILD\"    SOB (shortness of breath) on exertion     Vitamin D deficiency        Past Surgical History:   Procedure Laterality Date    AORTIC VALVE REPAIR/REPLACEMENT Left 3/15/2022    Procedure: TTE TRANSFEMORAL TRANSCATHETER AORTIC VALVE REPLACEMENT PERCUTANEOUS APPROACH;  Surgeon: Vesna Chris MD;  Location: [facility];  Service: Cardiothoracic;  Laterality: Left;    AORTIC VALVE REPAIR/REPLACEMENT Left 3/15/2022    Procedure: Transfemoral Transcatheter Aortic Valve Replacement with intra operative TTE and possible open surgical rescue;  Surgeon: Clint Locke MD;  Location: [facility];  Service: Cardiovascular;  Laterality: Left;    CARDIAC CATHETERIZATION N/A 1/21/2022    Procedure: Coronary angiography;  Surgeon: Clint Locke MD;  Location: [facility] CATH INVASIVE LOCATION;  Service: Cardiovascular;  Laterality: N/A;    CARDIAC CATHETERIZATION N/A 1/21/2022    Procedure: Percutaneous Coronary Intervention;  Surgeon: Clint Locke MD;  Location: [facility] CATH INVASIVE LOCATION;  Service: Cardiovascular;  Laterality: N/A;    CARDIAC CATHETERIZATION N/A 1/21/2022    Procedure: Stent ISMAEL coronary;  Surgeon: Clint Locke MD;  Location: [facility] CATH INVASIVE LOCATION;  Service: Cardiovascular;  Laterality: N/A;    CARDIAC CATHETERIZATION Left 7/31/2022    " Procedure: CORONARY ANGIOGRAPHY;  Surgeon: Jeannie Echevarria MD;  Location:  [facility] CATH INVASIVE LOCATION;  Service: Cardiology;  Laterality: Left;    CARDIAC CATHETERIZATION N/A 2022    Procedure: Left ventriculography;  Surgeon: Jeannie Echevarria MD;  Location:  [facility] CATH INVASIVE LOCATION;  Service: Cardiology;  Laterality: N/A;    CARDIAC CATHETERIZATION N/A 2022    Procedure: Left Heart Cath;  Surgeon: Jeannie Echevarria MD;  Location:  [facility] CATH INVASIVE LOCATION;  Service: Cardiology;  Laterality: N/A;    CARDIAC CATHETERIZATION N/A 2023    Procedure: Left Heart Cath;  Surgeon: Clint Locke MD;  [facility] C[facility];  Service: Cardiovascular;  Laterality: N/A;    CARDIAC CATHETERIZATION N/A 2023    Procedure: Percutaneous Coronary Intervention;  Surgeon: Clint Locke MD;  [facility];  Service: Cardiovascular;  Laterality: N/A;    CARDIAC CATHETERIZATION N/A 2023    Procedure: Stent ISMAEL coronary;  Surgeon: Clint Locke MD;  [facility];  Service: Cardiovascular;  Laterality: N/A;    CARDIAC CATHETERIZATION N/A 2023    Procedure: Coronary angiography;  Surgeon: Clint Locke MD;  [facility];  Service: Cardiovascular;  Laterality: N/A;    CARDIAC ELECTROPHYSIOLOGY PROCEDURE N/A 3/16/2022    Procedure: Pacemaker DC new Deposit;  Surgeon: Yann Ceballos MD;  Location: [facility] CATH INVASIVE LOCATION;  Service: Cardiology;  Laterality: N/A;    CATARACT EXTRACTION, BILATERAL      COLONOSCOPY      EYE SURGERY      SKIN CANCER EXCISION         Social History     Socioeconomic History    Marital status:      Spouse name: Stephan    Number of children: 4   Tobacco Use    Smoking status: Former     Current packs/day: 0.00     Types: Cigarettes     Start date:      Quit date:      Years since quittin.4     Passive exposure: Past     "Smokeless tobacco: Never    Tobacco comments:     1 pack every 2 weeks   Vaping Use    Vaping status: Never Used   Substance and Sexual Activity    Alcohol use: Yes     Comment: wine - rare / no caffeine    Drug use: No    Sexual activity: Defer       Family History   Problem Relation Age of Onset    Aortic stenosis Mother     Hypertension Mother     Heart failure Father     Diabetes Father     Hypertension Father     No Known Problems Sister     No Known Problems Daughter     No Known Problems Son     Aneurysm Sister     Other Sister         Polio in her 20's    Malig Hyperthermia Neg Hx        Review of Systems   Constitutional: Negative.   HENT: Negative.     Eyes: Negative.    Cardiovascular: Negative.    Respiratory: Negative.     Endocrine: Negative.    Hematologic/Lymphatic:        Asa/ plavix   Skin: Negative.    Musculoskeletal: Negative.    Gastrointestinal: Negative.    Genitourinary: Negative.    Neurological: Negative.    Psychiatric/Behavioral: Negative.     Allergic/Immunologic: Negative.        Allergies   Allergen Reactions    Hydrochlorothiazide Other (See Comments)     hyponatremia    Hydrocodone Shortness Of Breath     Also reports \"lethargy\"    Lipitor [Atorvastatin] Nausea Only     \"FELT AWFUL\"    Erythromycin Diarrhea    Lisinopril Cough         Current Outpatient Medications:     acetaminophen (TYLENOL) 325 MG tablet, Take 2 tablets by mouth Every 4 (Four) Hours As Needed for Mild Pain  or Fever (temperature greater than 101F)., Disp: 120 tablet, Rfl: 0    albuterol sulfate  (90 Base) MCG/ACT inhaler, Inhale 2 puffs Every 4 (Four) Hours As Needed for Wheezing or Shortness of Air., Disp: 8.5 g, Rfl: 0    aspirin (aspirin) 81 MG EC tablet, Take 1 tablet by mouth Daily., Disp: 30 tablet, Rfl: 6    AZO-CRANBERRY PO, Take 1 tablet by mouth Daily., Disp: , Rfl:     clopidogrel (PLAVIX) 75 MG tablet, Take 1 tablet by mouth Every Morning., Disp: 90 tablet, Rfl: 3    metoprolol succinate XL " "(Toprol XL) 25 MG 24 hr tablet, Take 1 tablet by mouth 2 (Two) Times a Day for 360 days., Disp: 180 tablet, Rfl: 3    metroNIDAZOLE (METROCREAM) 0.75 % cream, Apply 1 Application topically to the appropriate area as directed 2 (Two) Times a Day., Disp: , Rfl:     Multiple Vitamins-Minerals (VITEYES AREDS ADVANCED PO), Take 2 capsules by mouth Daily., Disp: , Rfl:     mupirocin (BACTROBAN) 2 % ointment, APPLY TWO TIMES PER DAY TO THE INFECTION/BIOPSY SITES., Disp: , Rfl:     nitroglycerin (NITROSTAT) 0.4 MG SL tablet, Place 1 tablet under the tongue Every 5 (Five) Minutes As Needed for Chest Pain. Take no more than 3 doses in 15 minutes., Disp: 30 tablet, Rfl: 3    pantoprazole (PROTONIX) 40 MG EC tablet, Take 1 tablet by mouth Every Morning., Disp: 90 tablet, Rfl: 1    potassium chloride (MICRO-K) 10 MEQ CR capsule, Take 1 capsule by mouth 2 (Two) Times a Day., Disp: 180 capsule, Rfl: 3    rosuvastatin (CRESTOR) 20 MG tablet, Take 1 tablet by mouth Daily., Disp: 90 tablet, Rfl: 3    sennosides-docusate (PERICOLACE) 8.6-50 MG per tablet, Take 1 tablet by mouth Daily., Disp: , Rfl:     Spacer/Aero-Holding Chambers (AeroChamber Plus Benja-Vu) misc, 1 Device As Needed (Use as needed with inhaler device)., Disp: 1 each, Rfl: 0    torsemide (DEMADEX) 20 MG tablet, Take 1 tablet by mouth Daily., Disp: 90 tablet, Rfl: 3    vitamin B-6 (PYRIDOXINE) 100 MG tablet, Take 2 tablets by mouth Daily., Disp: 180 tablet, Rfl: 3    Vitamin D, Cholecalciferol, 50 MCG (2000 UT) capsule, Take 1 capsule by mouth Daily., Disp: , Rfl:       Objective:     Vitals:    05/24/24 1103   BP: 126/64   BP Location: Left arm   Patient Position: Sitting   Cuff Size: Adult   Pulse: 66   Weight: 72.7 kg (160 lb 3.2 oz)   Height: 152.4 cm (60\")       Body mass index is 31.29 kg/m².    2D Echocardiogram 09/18/2023:    Left ventricular systolic function is normal. Calculated left ventricular EF = 56% Normal left ventricular cavity size noted. Left " ventricular wall thickness is consistent with moderate to severe concentric hypertrophy. Sigmoid-shaped ventricular septum is present. All left ventricular wall segments contract normally. Left ventricular diastolic function is consistent with (grade I) impaired relaxation.    Mildly reduced right ventricular systolic function noted. Electronic lead present in the ventricle.    There is a TAVR valve present. The aortic valve peak and mean gradients are within defined limits. There is mild paravalvular regurgitation without stenosis.    Severe mitral annular calcification is present. There is moderate, bileaflet mitral valve thickening present. Mild mitral valve regurgitation is present. No significant mitral valve stenosis is present.    Mild tricuspid valve regurgitation is present. Estimated right ventricular systolic pressure from tricuspid regurgitation is normal (<35 mmHg). Calculated right ventricular systolic pressure from tricuspid regurgitation is 27 mmHg.    Left Heart Cath 08/04/2023:  1. Left main: Normal.  2. LAD: 40% proximal stenosis.  Tubular 99% mid vessel stenosis with STEPHANIA II flow distally  3. LCX: Luminal irregularities  4.  Successful PCI of the mid LAD with a 2.5 x 38 mm Xience lida point drug-eluting stent, postdilated to high-pressure in the proximal to mid stent segment with a 3.0 mm NC trek balloon    2D Echocardiogram 08/01/2023:    Left ventricular systolic function is moderately decreased. Calculated left ventricular EF = 37%    The following left ventricular wall segments are hypokinetic: apical septal, basal inferoseptal, mid inferoseptal, apex hypokinetic, mid anteroseptal and basal inferoseptal.    Peak velocity of the flow distal to the aortic valve is 165 cm/s. Aortic valve maximum pressure gradient is 11 mmHg. Aortic valve mean pressure gradient is 6 mmHg. Aortic valve dimensionless index is 0.7 .    There is a TAVR valve present.    Estimated right ventricular systolic pressure  from tricuspid regurgitation is normal (<35 mmHg). Calculated right ventricular systolic pressure from tricuspid regurgitation is 29 mmHg.    Limited echocardiogram to assess LV function.    2D Echocardiogram 08/01/2022:  Estimated left ventricular EF = 61% Left ventricular systolic function is normal.  Left ventricular diastolic function is consistent with (grade Ia w/high LAP) impaired relaxation.  The right ventricular cavity is moderately dilated.  Moderately reduced right ventricular systolic function noted.  There is a TAVR valve present. There is a mild perivalvular leak.  Peak velocity of the flow distal to the aortic valve is 141.2 cm/s.  Aortic valve area is 1.58 cm2.  Estimated right ventricular systolic pressure from tricuspid regurgitation is normal (<35 mmHg).  The following left ventricular wall segments are hypokinetic: apical septal, basal inferoseptal, mid inferoseptal, mid anteroseptal and basal inferoseptal.     Left Heart Cath 07/31/2022:  LV:95/10, LVEDP 16, aortic: 95/50  EF: 50 to 55%  WALL MOTION: Normal     CORONARY ANGIOGRAPHY:  LEFT MAIN: Medium caliber, normal left main coronary artery.  Bifurcates give the LAD and circumflex arteries.  LAD: LAD is a medium caliber vessel.  There is a previously placed LAD stent in the mid vessel which is widely patent.  Distal to that there is moderate 40% disease.  The distal vessel tapers to small caliber and wraps the apex with mild diffuse luminal irregularities.  There is a medium caliber first diagonal which is normal.  RAMUS: Absent  CIRCUMFLEX: The circumflex is a medium caliber vessel.  The proximal portion has mild to moderate 30 to 40% stenosis.  There is a high branching first OM which is small caliber normal.  There is a low branching second OM which is small caliber with mild diffuse irregularities.  RCA: The right coronary artery is a large-caliber, dominant vessel.  There are mild proximal irregularities.  The remainder of the vessel is  essentially normal.  The RPDA is medium caliber normal.  The PL is medium caliber normal.    2D Echocardiogram 02/07/2023:    Left ventricular systolic function is normal. Calculated left ventricular EF = 58.2%    Left ventricular wall thickness is consistent with hypertrophy. Sigmoid-shaped ventricular septum is present.    Left ventricular diastolic function is consistent with (grade Ia w/high LAP) impaired relaxation.    Left atrial volume is mildly increased.    Estimated right ventricular systolic pressure from tricuspid regurgitation is normal (<35 mmHg).    There is a trivial pericardial effusion.    Mild to moderate aortic valve regurgitation is present. There is a 26 mm TAVR valve present. The aortic valve peak and mean gradients are within defined limits. The prosthetic aortic valve is normal. 26mm Medtronic Evolut Pro    2D Echocardiogram 04/25/2022:  Left ventricular wall thickness is consistent with moderate septal asymmetric hypertrophy.  Estimated right ventricular systolic pressure from tricuspid regurgitation is normal (<35 mmHg). Calculated right ventricular systolic pressure from tricuspid regurgitation is 28 mmHg.  Left ventricular diastolic function is consistent with (grade I) impaired relaxation.  Calculated left ventricular EF = 65% Estimated left ventricular EF was in agreement with the calculated left ventricular EF. Left ventricular systolic function is normal.  Trace aortic valve regurgitation is present. Aortic valve area is 1.25 cm2. The peak aortic velocity was measured in the apical view. Peak velocity of the flow distal to the aortic valve is 211 cm/s. Aortic valve maximum pressure gradient is 17.8 mmHg. Aortic valve mean pressure gradient is 9.8 mmHg. Aortic valve dimensionless index is 0.60 . There is a 26 mm TAVR valve present. The aortic valve peak and mean gradients are within defined limits. The prosthetic aortic valve is normal.     Left Heart Cath 01/21/2022:  1. Left main:  Normal  2. LAD: Discrete 90% mid vessel stenosis  3. LCX: Luminal irregularities proximal segment  4. RCA: Discrete 40% ostial proximal stenosis.  5. Successful PCI to the mid LAD 2.75 x 28 mm Xience lida point drug-eluting stent.  Postdilated with a 3 mm NC trek balloon in the proximal to mid stent segment.     PHYSICAL EXAM:    Constitutional:       Appearance: Healthy appearance. Not in distress.   Neck:      Vascular: No JVR. JVD normal.   Pulmonary:      Effort: Pulmonary effort is normal.      Breath sounds: Normal breath sounds. No wheezing. No rhonchi. No rales.   Chest:      Chest wall: Not tender to palpatation.   Cardiovascular:      PMI at left midclavicular line. Normal rate. Regular rhythm. Normal S1. Normal S2.       Murmurs: There is no murmur.      No gallop.  No click. No rub.   Pulses:     Intact distal pulses.   Edema:     Peripheral edema absent.   Abdominal:      General: Bowel sounds are normal.      Palpations: Abdomen is soft.      Tenderness: There is no abdominal tenderness.   Musculoskeletal: Normal range of motion.         General: No tenderness. Skin:     General: Skin is warm and dry.   Neurological:      General: No focal deficit present.      Mental Status: Alert and oriented to person, place and time.           ECG 12 Lead    Date/Time: 5/24/2024 12:46 PM  Performed by: Paz Yan APRN    Authorized by: Paz Yan APRN  Comparison: compared with previous ECG from 1/4/2024  Similar to previous ECG  Rhythm: sinus rhythm  Ectopy: atrial premature contractions  Rate: normal  BPM: 66            Assessment:       Diagnosis Plan   1. Nonrheumatic aortic valve stenosis        2. Hyperlipidemia, unspecified hyperlipidemia type        3. Primary hypertension        4. Coronary artery disease involving native coronary artery of native heart without angina pectoris        5. AV block, complete        6. S/P TAVR (transcatheter aortic valve replacement)        7. Presence of  cardiac pacemaker        8. S/P drug eluting coronary stent placement            No orders of the defined types were placed in this encounter.         Plan:       1.  Coronary artery disease: Status post PCI of mid LAD.  Repeat stenting to 99% mid LAD stenosis (08/2023).  Normal LV function.  No angina.  Stable EKG  2.  Severe degenerative aortic valve stenosis: Status post TAVR with 26 mm Medtronic evolute pro transcatheter aortic heart valve in March 2022.  Echo in September 2023 shows mild paravalvular regurg but otherwise looks good.  Euvolemic.  3.  Hypertension: Well-controlled.  Monitors blood pressure three times daily.   4.  Permanent pacemaker: Continues to follow with device clinic and Dr. Ceballos.  Normal device function on latest interrogation.    Ms. Gonzalez will follow-up with Dr. Locke in 6 months.  She will call sooner for any questions or concerns.       Your medication list            Accurate as of May 24, 2024 12:46 PM. If you have any questions, ask your nurse or doctor.                CHANGE how you take these medications        Instructions Last Dose Given Next Dose Due   torsemide 20 MG tablet  Commonly known as: DEMADEX  What changed: See the new instructions.  Changed by: CLEMENTINE Palacios      Take 1 tablet by mouth Daily.              CONTINUE taking these medications        Instructions Last Dose Given Next Dose Due   acetaminophen 325 MG tablet  Commonly known as: TYLENOL      Take 2 tablets by mouth Every 4 (Four) Hours As Needed for Mild Pain  or Fever (temperature greater than 101F).       AeroChamber Plus Benja-Vu misc      1 Device As Needed (Use as needed with inhaler device).       albuterol sulfate  (90 Base) MCG/ACT inhaler  Commonly known as: PROVENTIL HFA;VENTOLIN HFA;PROAIR HFA      Inhale 2 puffs Every 4 (Four) Hours As Needed for Wheezing or Shortness of Air.       aspirin 81 MG EC tablet      Take 1 tablet by mouth Daily.       AZO-CRANBERRY PO      Take 1  tablet by mouth Daily.       clopidogrel 75 MG tablet  Commonly known as: PLAVIX      Take 1 tablet by mouth Every Morning.       metoprolol succinate XL 25 MG 24 hr tablet  Commonly known as: Toprol XL      Take 1 tablet by mouth 2 (Two) Times a Day for 360 days.       metroNIDAZOLE 0.75 % cream  Commonly known as: METROCREAM      Apply 1 Application topically to the appropriate area as directed 2 (Two) Times a Day.       mupirocin 2 % ointment  Commonly known as: BACTROBAN      APPLY TWO TIMES PER DAY TO THE INFECTION/BIOPSY SITES.       nitroglycerin 0.4 MG SL tablet  Commonly known as: NITROSTAT      Place 1 tablet under the tongue Every 5 (Five) Minutes As Needed for Chest Pain. Take no more than 3 doses in 15 minutes.       pantoprazole 40 MG EC tablet  Commonly known as: PROTONIX      Take 1 tablet by mouth Every Morning.       potassium chloride 10 MEQ CR capsule  Commonly known as: MICRO-K      Take 1 capsule by mouth 2 (Two) Times a Day.       rosuvastatin 20 MG tablet  Commonly known as: CRESTOR      Take 1 tablet by mouth Daily.       sennosides-docusate 8.6-50 MG per tablet  Commonly known as: PERICOLACE      Take 1 tablet by mouth Daily.       vitamin B-6 100 MG tablet  Commonly known as: PYRIDOXINE      Take 2 tablets by mouth Daily.       Vitamin D (Cholecalciferol) 50 MCG (2000 UT) capsule      Take 1 capsule by mouth Daily.       VITEYES AREDS ADVANCED PO      Take 2 capsules by mouth Daily.                 Where to Get Your Medications        These medications were sent to [facility]/pharmacy #[id number] [place], [place] - [address] AT [facility] [phone] [facility] [phone]   [address], [facility] [address]      Phone: [phone]   clopidogrel 75 MG tablet  metoprolol succinate XL 25 MG 24 hr tablet  potassium chloride 10 MEQ CR capsule  rosuvastatin 20 MG tablet  torsemide 20 MG tablet           As always, it has been a pleasure to participate in your patient's  care.      Sincerely,       CLEMENTINE Ron

## 2024-05-26 NOTE — OUTREACH NOTE
Call Center TCM Note      Flowsheet Row Responses   [facility] patient discharged from? [place]   Does the patient have one of the following disease processes/diagnoses(primary or secondary)? Acute MI (STEMI,NSTEMI)   TCM attempt successful? Yes   Call start time 1158   Call end time 1204   Discharge diagnosis NSTEMI,  Parainfluenza   Meds reviewed with patient/caregiver? Yes   Is the patient having any side effects they believe may be caused by any medication additions or changes? No   Does the patient have all prescriptions related to this admission filled (includes statins,anticoagulants,HTN meds,anti-arrhythmia meds) Yes   Is the patient taking all medications as directed (includes completed medication regime)? Yes   Comments Pt dtr will be calling to sched TCM APPT. The only appt I could access for this is 08/14 at 815am, and pt cannot get fratnsprotation that early at [facility].   Does the patient have an appointment with their PCP within 7-14 days of discharge? No   Nursing Interventions Routed TCM call to PCP office   Has home health visited the patient within 72 hours of discharge? N/A   Psychosocial issues? No   Did the patient receive a copy of their discharge instructions? Yes   Nursing interventions Reviewed instructions with patient   What is the patient's perception of their health status since discharge? Improving   Nursing interventions Nurse provided patient education   Is the patient/caregiver able to teach back signs and symptoms of when to call for help immediately: Sudden chest discomfort, Nausea or vomiting, Sudden discomfort in arms, back, neck or jaw, Dizziness or lightheadedness, Irregular or rapid heart rate, Shortness of breath at any time, Sudden sweating or clammy skin   Is the patient/caregiver able to teach back lifestyle changes to help prevent MIs Managing diabetes, Reducing stress, Regular exercise as approved by provider, Heart healthy diet, Limiting alcohol intake,  Maintaining a healthy weight   Is the patient/caregiver able to teach back ways to prevent a second heart attack: Take medications, Manage risk factors, Get support (AHA website:supportnetwork.heart.org), Follow up with MD, Participate in Cardiac Rehab   If the patient is a current smoker, are they able to teach back resources for cessation? Not a smoker   Is the patient/caregiver able to teach back the hierarchy of who to call/visit for symptoms/problems? PCP, Specialist, Home health nurse, Urgent Care, ED, 911 Yes   TCM call completed? Yes   Wrap up additional comments D/C DX: NSTEMI,  Parainfluenza - Pt feeing a little better. New rx's albuterol sulfate HFA, Benzonatate, Clopidogrel, Metoprolol succinate XL, Rosuvastatin in place. Pt is not taking Benzonatate, they make her cough too much and it wears her out. No other questions. Pt dtr will be calling to sched TCM APPT. The only appt I could access for this is 08/14 at 815am, and pt cannot get fr[facility]protation that early at [facility].   Call end time 1204            Macey Monahan MA    8/7/2023, 12:11 EDT         no

## 2024-07-11 ENCOUNTER — LAB (OUTPATIENT)
Dept: LAB | Facility: HOSPITAL | Age: 89
End: 2024-07-11
Payer: MEDICARE

## 2024-07-11 ENCOUNTER — OFFICE VISIT (OUTPATIENT)
Dept: INTERNAL MEDICINE | Facility: CLINIC | Age: 89
End: 2024-07-11
Payer: MEDICARE

## 2024-07-11 VITALS
HEIGHT: 60 IN | SYSTOLIC BLOOD PRESSURE: 114 MMHG | OXYGEN SATURATION: 96 % | TEMPERATURE: 98.2 F | DIASTOLIC BLOOD PRESSURE: 62 MMHG | RESPIRATION RATE: 16 BRPM | WEIGHT: 162.6 LBS | BODY MASS INDEX: 31.92 KG/M2 | HEART RATE: 76 BPM

## 2024-07-11 DIAGNOSIS — Z79.899 MEDICATION MANAGEMENT: ICD-10-CM

## 2024-07-11 DIAGNOSIS — R73.02 IGT (IMPAIRED GLUCOSE TOLERANCE): Chronic | ICD-10-CM

## 2024-07-11 DIAGNOSIS — I10 PRIMARY HYPERTENSION: Primary | Chronic | ICD-10-CM

## 2024-07-11 DIAGNOSIS — E78.5 HYPERLIPIDEMIA, UNSPECIFIED HYPERLIPIDEMIA TYPE: Chronic | ICD-10-CM

## 2024-07-11 LAB
ALBUMIN SERPL-MCNC: 4.2 G/DL (ref 3.5–5.2)
ALBUMIN/GLOB SERPL: 1.7 G/DL
ALP SERPL-CCNC: 80 U/L (ref 39–117)
ALT SERPL W P-5'-P-CCNC: 16 U/L (ref 1–33)
ANION GAP SERPL CALCULATED.3IONS-SCNC: 9 MMOL/L (ref 5–15)
AST SERPL-CCNC: 39 U/L (ref 1–32)
BASOPHILS # BLD AUTO: 0.06 10*3/MM3 (ref 0–0.2)
BASOPHILS NFR BLD AUTO: 0.6 % (ref 0–1.5)
BILIRUB SERPL-MCNC: 0.4 MG/DL (ref 0–1.2)
BUN SERPL-MCNC: 16 MG/DL (ref 8–23)
BUN/CREAT SERPL: 23.2 (ref 7–25)
CALCIUM SPEC-SCNC: 9.2 MG/DL (ref 8.2–9.6)
CHLORIDE SERPL-SCNC: 106 MMOL/L (ref 98–107)
CO2 SERPL-SCNC: 25 MMOL/L (ref 22–29)
CREAT SERPL-MCNC: 0.69 MG/DL (ref 0.57–1)
DEPRECATED RDW RBC AUTO: 41.6 FL (ref 37–54)
EGFRCR SERPLBLD CKD-EPI 2021: 80.5 ML/MIN/1.73
EOSINOPHIL # BLD AUTO: 0.2 10*3/MM3 (ref 0–0.4)
EOSINOPHIL NFR BLD AUTO: 2.1 % (ref 0.3–6.2)
ERYTHROCYTE [DISTWIDTH] IN BLOOD BY AUTOMATED COUNT: 12.7 % (ref 12.3–15.4)
GLOBULIN UR ELPH-MCNC: 2.5 GM/DL
GLUCOSE SERPL-MCNC: 87 MG/DL (ref 65–99)
HBA1C MFR BLD: 5.7 % (ref 4.8–5.6)
HCT VFR BLD AUTO: 41.1 % (ref 34–46.6)
HGB BLD-MCNC: 13.3 G/DL (ref 12–15.9)
IMM GRANULOCYTES # BLD AUTO: 0.03 10*3/MM3 (ref 0–0.05)
IMM GRANULOCYTES NFR BLD AUTO: 0.3 % (ref 0–0.5)
LYMPHOCYTES # BLD AUTO: 2.73 10*3/MM3 (ref 0.7–3.1)
LYMPHOCYTES NFR BLD AUTO: 28.9 % (ref 19.6–45.3)
MCH RBC QN AUTO: 28.8 PG (ref 26.6–33)
MCHC RBC AUTO-ENTMCNC: 32.4 G/DL (ref 31.5–35.7)
MCV RBC AUTO: 89 FL (ref 79–97)
MONOCYTES # BLD AUTO: 0.72 10*3/MM3 (ref 0.1–0.9)
MONOCYTES NFR BLD AUTO: 7.6 % (ref 5–12)
NEUTROPHILS NFR BLD AUTO: 5.71 10*3/MM3 (ref 1.7–7)
NEUTROPHILS NFR BLD AUTO: 60.5 % (ref 42.7–76)
NRBC BLD AUTO-RTO: 0 /100 WBC (ref 0–0.2)
PLATELET # BLD AUTO: 189 10*3/MM3 (ref 140–450)
PMV BLD AUTO: 11.4 FL (ref 6–12)
POTASSIUM SERPL-SCNC: 4.7 MMOL/L (ref 3.5–5.2)
PROT SERPL-MCNC: 6.7 G/DL (ref 6–8.5)
RBC # BLD AUTO: 4.62 10*6/MM3 (ref 3.77–5.28)
SODIUM SERPL-SCNC: 140 MMOL/L (ref 136–145)
WBC NRBC COR # BLD AUTO: 9.45 10*3/MM3 (ref 3.4–10.8)

## 2024-07-11 PROCEDURE — G2211 COMPLEX E/M VISIT ADD ON: HCPCS | Performed by: INTERNAL MEDICINE

## 2024-07-11 PROCEDURE — 99214 OFFICE O/P EST MOD 30 MIN: CPT | Performed by: INTERNAL MEDICINE

## 2024-07-11 PROCEDURE — 1159F MED LIST DOCD IN RCRD: CPT | Performed by: INTERNAL MEDICINE

## 2024-07-11 PROCEDURE — 1126F AMNT PAIN NOTED NONE PRSNT: CPT | Performed by: INTERNAL MEDICINE

## 2024-07-11 PROCEDURE — 1160F RVW MEDS BY RX/DR IN RCRD: CPT | Performed by: INTERNAL MEDICINE

## 2024-07-11 PROCEDURE — 36415 COLL VENOUS BLD VENIPUNCTURE: CPT | Performed by: INTERNAL MEDICINE

## 2024-07-11 PROCEDURE — 83036 HEMOGLOBIN GLYCOSYLATED A1C: CPT | Performed by: INTERNAL MEDICINE

## 2024-07-11 PROCEDURE — 85025 COMPLETE CBC W/AUTO DIFF WBC: CPT | Performed by: INTERNAL MEDICINE

## 2024-07-11 PROCEDURE — 80053 COMPREHEN METABOLIC PANEL: CPT | Performed by: INTERNAL MEDICINE

## 2024-07-11 NOTE — PROGRESS NOTES
Subjective   Yuliana Gonzalez is a 89 y.o. female.     Chief Complaint   Patient presents with    Coronary Artery Disease    Hypertension    Hyperlipidemia    Hyperglycemia         History of Present Illness  In for recheck of chronic IGT.  Has been stable.  Also aortic stenosis.  Has dyspnea on exertion which has resolved.  Recent TTE showed an ejection fraction of 51% which was improved.  History of macular degeneration.  That is stable.  Hypertension  This is a chronic problem. The current episode started more than 1 year ago. The problem is unchanged. The problem is controlled. Pertinent negatives include no chest pain, palpitations or shortness of breath.   Hyperlipidemia  This is a chronic problem. The current episode started more than 1 year ago. The problem is controlled. Recent lipid tests were reviewed and are normal. Factors aggravating her hyperlipidemia include beta blockers. Pertinent negatives include no chest pain or shortness of breath.   Hyperglycemia  This is a chronic problem. The current episode started more than 1 year ago. The problem occurs constantly. Pertinent negatives include no abdominal pain, chest pain or coughing.        The following portions of the patient's history were reviewed and updated as appropriate: allergies, current medications, past social history and problem list.    Outpatient Medications Marked as Taking for the 7/11/24 encounter (Office Visit) with Trent Davis MD   Medication Sig Dispense Refill    acetaminophen (TYLENOL) 325 MG tablet Take 2 tablets by mouth Every 4 (Four) Hours As Needed for Mild Pain  or Fever (temperature greater than 101F). 120 tablet 0    aspirin (aspirin) 81 MG EC tablet Take 1 tablet by mouth Daily. 30 tablet 6    AZO-CRANBERRY PO Take 1 tablet by mouth Daily.      clopidogrel (PLAVIX) 75 MG tablet Take 1 tablet by mouth Every Morning. 90 tablet 3    metoprolol succinate XL (Toprol XL) 25 MG 24 hr tablet Take 1 tablet by mouth 2 (Two)  Times a Day for 360 days. 180 tablet 3    metroNIDAZOLE (METROCREAM) 0.75 % cream Apply 1 Application topically to the appropriate area as directed 2 (Two) Times a Day.      Multiple Vitamins-Minerals (VITEYES AREDS ADVANCED PO) Take 2 capsules by mouth Daily.      mupirocin (BACTROBAN) 2 % ointment APPLY TWO TIMES PER DAY TO THE INFECTION/BIOPSY SITES.      nitroglycerin (NITROSTAT) 0.4 MG SL tablet Place 1 tablet under the tongue Every 5 (Five) Minutes As Needed for Chest Pain. Take no more than 3 doses in 15 minutes. 30 tablet 3    pantoprazole (PROTONIX) 40 MG EC tablet Take 1 tablet by mouth Every Morning. 90 tablet 1    potassium chloride (MICRO-K) 10 MEQ CR capsule Take 1 capsule by mouth 2 (Two) Times a Day. 180 capsule 3    rosuvastatin (CRESTOR) 20 MG tablet Take 1 tablet by mouth Daily. 90 tablet 3    sennosides-docusate (PERICOLACE) 8.6-50 MG per tablet Take 1 tablet by mouth Daily.      torsemide (DEMADEX) 20 MG tablet Take 1 tablet by mouth Daily. 90 tablet 3    vitamin B-6 (PYRIDOXINE) 100 MG tablet Take 2 tablets by mouth Daily. 180 tablet 3    Vitamin D, Cholecalciferol, 50 MCG (2000 UT) capsule Take 1 capsule by mouth Daily.         Review of Systems   Respiratory:  Negative for cough, shortness of breath and wheezing.    Cardiovascular:  Negative for chest pain, palpitations and leg swelling.   Gastrointestinal:  Negative for abdominal pain, constipation and diarrhea.       Objective   Vitals:    07/11/24 1042   BP: 114/62   Pulse: 76   Resp: 16   Temp: 98.2 °F (36.8 °C)   SpO2: 96%          07/11/24  1042   Weight: 73.8 kg (162 lb 9.6 oz)    [unfilled]  Body mass index is 31.76 kg/m².      Physical Exam   Constitutional: She appears well-developed.   Neck: No thyromegaly present.   Cardiovascular: Regular rhythm. Exam reveals no gallop.   No murmur heard.  No systolic murmur is present.  Pulmonary/Chest: Effort normal. No respiratory distress.   Abdominal: Soft. Normal appearance and bowel  sounds are normal. She exhibits no mass. There is no abdominal tenderness. There is no guarding.   Neurological: She is alert.         Problems Addressed this Visit          Cardiac and Vasculature    Hypertension - Primary (Chronic)    Hyperlipidemia (Chronic)       Endocrine and Metabolic    IGT (impaired glucose tolerance) (Chronic)     Diagnoses         Codes Comments    Primary hypertension    -  Primary ICD-10-CM: I10  ICD-9-CM: 401.9     Hyperlipidemia, unspecified hyperlipidemia type     ICD-10-CM: E78.5  ICD-9-CM: 272.4     IGT (impaired glucose tolerance)     ICD-10-CM: R73.02  ICD-9-CM: 790.22           Assessment/Plan   In for recheck of hypertension, hyperlipidemia, IGT and CAD today July 2024.  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  Virtually all of her cardiac symptoms have now resolved.  Her energy was back to normal.  Blood pressure control is good.  Glycemic control is been good.  Gets glucose and A1c today and every 3 months.  Annual lab work was done August 2023 in the hospital including CBC, CMP.  Will get that today July 2024.  Annual wellness visit done December 2023.  She failed Lipitor and Dr. Locke felt that was somewhat irrelevant at her age and is good is her lipids run.  I agree.  She remains on Lopressor 25 mg twice daily and torsemide 20 mg daily for hypertension those are reviewed today.  History of macular degeneration being treated with eye vitamins.  She has numbness in the fingers of both hands.  Will add some vitamin B6 200 mg daily and consider nocturnal wrist splints.  She is 2.5 HPP today.    The above information was reviewed again today 07/11/24.  It continues to be accurate as reflected above and is unchanged.  History, physical and review of systems all reviewed and are unchanged.  Medications were reviewed today and continue the current dosing.           Dragon disclaimer:   Much of this encounter note is an electronic  transcription/translation of spoken language to printed text. The electronic translation of spoken language may permit erroneous, or at times, nonsensical words or phrases to be inadvertently transcribed; Although I have reviewed the note for such errors, some may still exist.

## 2024-08-29 ENCOUNTER — TELEPHONE (OUTPATIENT)
Dept: INTERNAL MEDICINE | Facility: CLINIC | Age: 89
End: 2024-08-29
Payer: MEDICARE

## 2024-08-29 NOTE — TELEPHONE ENCOUNTER
Caller: Yuliana Gonzalez    Relationship: Self    Best call back number:   [phone]    What is the best time to reach you: ANY    Who are you requesting to speak with (clinical staff, provider,  specific staff member): CLINICAL    Do you know the name of the person who called: PATIENT    What was the call regarding: PATIENT HAS BEEN DIAGNOSED WITH BASAL CELL CARCINOMA ON HER NOSE. MOHS SURGERY    PATIENT STATED THAT SHE IS NEEDING SURGERY AND PATIENT WAS ADVISED TO DISCUSS WITH HER CARDIOLOGIST AS WELL AS HER PRIMARY CARE DOCTOR.     PATIENTS SON IS PATIENTS MEDICAL ADVOCATE AND HE IS THE ONE WHO ADVISED HER TO DISCUSS HER MOHS SURGERY.    PLEASE ADVISE    Is it okay if the provider responds through MyChart: NO

## 2024-08-29 NOTE — TELEPHONE ENCOUNTER
Pt advised to schedule visit for medical clearance. Pt does not have procedure scheduled yet, so she will call back to set up once she has a date. Pt's next OV 10/17, so she can discuss more at that time, if not scheduled yet. Pt will call back.

## 2024-09-13 ENCOUNTER — TELEPHONE (OUTPATIENT)
Dept: INTERNAL MEDICINE | Facility: CLINIC | Age: 89
End: 2024-09-13
Payer: MEDICARE

## 2024-09-13 NOTE — TELEPHONE ENCOUNTER
Really it should not be a problem seeing her on the scheduled visit date.  However if she would like to put that visit out about a few weeks to make sure she is feeling up to the visit that would be fine.  She will likely need to check with her cardiologist about how to handle her Plavix and aspirin regarding Mohs surgery if her surgeon wants them stopped.

## 2024-09-13 NOTE — TELEPHONE ENCOUNTER
Caller: Yuliana Gonzalez     Relationship: SELF    Best call back number: [phone]     What is your medical concern?     PATIENT IS HAVING [facility] REMOVAL SURGERY ON 10-1-2024 AND 10-9-2024.    SHE IS WANTING TO KNOW IF YOU THINK SHE SHOULD MOVE HER APPOINTMENT ON 10- FOR ANY REASON.  DO YOU NEED TO SEE HER BEFORE SHE HAS THE PROCEDURES OR DOES SHE NEED TO KEEP HER APPOINTMENT AS IS.    SHE IS ALSO SET UP TO GET HER FLU SHOT [facility].      PLEASE ADVISE

## 2024-09-20 ENCOUNTER — TELEPHONE (OUTPATIENT)
Age: 89
End: 2024-09-20
Payer: MEDICARE

## 2024-09-25 RX ORDER — METOPROLOL SUCCINATE 25 MG/1
25 TABLET, EXTENDED RELEASE ORAL 2 TIMES DAILY
Qty: 180 TABLET | Refills: 3 | Status: SHIPPED | OUTPATIENT
Start: 2024-09-25 | End: 2025-09-20

## 2024-10-16 RX ORDER — PANTOPRAZOLE SODIUM 40 MG/1
40 TABLET, DELAYED RELEASE ORAL EVERY MORNING
Qty: 90 TABLET | Refills: 1 | Status: SHIPPED | OUTPATIENT
Start: 2024-10-16

## 2024-11-01 ENCOUNTER — OFFICE VISIT (OUTPATIENT)
Dept: INTERNAL MEDICINE | Facility: CLINIC | Age: 89
End: 2024-11-01
Payer: MEDICARE

## 2024-11-01 ENCOUNTER — LAB (OUTPATIENT)
Dept: LAB | Facility: HOSPITAL | Age: 89
End: 2024-11-01
Payer: MEDICARE

## 2024-11-01 VITALS
OXYGEN SATURATION: 98 % | RESPIRATION RATE: 18 BRPM | DIASTOLIC BLOOD PRESSURE: 60 MMHG | BODY MASS INDEX: 31.41 KG/M2 | SYSTOLIC BLOOD PRESSURE: 112 MMHG | WEIGHT: 160 LBS | HEIGHT: 60 IN | HEART RATE: 73 BPM | TEMPERATURE: 98.2 F

## 2024-11-01 DIAGNOSIS — E78.5 HYPERLIPIDEMIA, UNSPECIFIED HYPERLIPIDEMIA TYPE: Chronic | ICD-10-CM

## 2024-11-01 DIAGNOSIS — I10 PRIMARY HYPERTENSION: Primary | Chronic | ICD-10-CM

## 2024-11-01 DIAGNOSIS — R73.02 IGT (IMPAIRED GLUCOSE TOLERANCE): Chronic | ICD-10-CM

## 2024-11-01 LAB
GLUCOSE SERPL-MCNC: 109 MG/DL (ref 65–99)
HBA1C MFR BLD: 5.4 % (ref 4.8–5.6)

## 2024-11-01 PROCEDURE — 83036 HEMOGLOBIN GLYCOSYLATED A1C: CPT | Performed by: INTERNAL MEDICINE

## 2024-11-01 PROCEDURE — 82947 ASSAY GLUCOSE BLOOD QUANT: CPT | Performed by: INTERNAL MEDICINE

## 2024-11-01 PROCEDURE — 36415 COLL VENOUS BLD VENIPUNCTURE: CPT | Performed by: INTERNAL MEDICINE

## 2024-11-01 NOTE — PROGRESS NOTES
Subjective   Yuliana Gonzalez is a 89 y.o. female.     Chief Complaint   Patient presents with    Hyperlipidemia    Hypertension    Hyperglycemia         History of Present Illness  In for recheck of chronic IGT.  Has been stable.  Also aortic stenosis.  Has dyspnea on exertion which has resolved.  Recent TTE showed an ejection fraction of 51% which was improved.  History of macular degeneration.  That is stable.  Hyperlipidemia  This is a chronic problem. The current episode started more than 1 year ago. The problem is controlled. Recent lipid tests were reviewed and are normal. Factors aggravating her hyperlipidemia include beta blockers. Pertinent negatives include no chest pain or shortness of breath.   Hypertension  This is a chronic problem. The current episode started more than 1 year ago. The problem is unchanged. The problem is controlled. Pertinent negatives include no chest pain, palpitations or shortness of breath.   Hyperglycemia  This is a chronic problem. The current episode started more than 1 year ago. The problem occurs constantly. Pertinent negatives include no abdominal pain, chest pain or coughing.        The following portions of the patient's history were reviewed and updated as appropriate: allergies, current medications, past social history and problem list.    Outpatient Medications Marked as Taking for the 11/1/24 encounter (Office Visit) with Trent Davis MD   Medication Sig Dispense Refill    acetaminophen (TYLENOL) 325 MG tablet Take 2 tablets by mouth Every 4 (Four) Hours As Needed for Mild Pain  or Fever (temperature greater than 101F). 120 tablet 0    aspirin (aspirin) 81 MG EC tablet Take 1 tablet by mouth Daily. 30 tablet 6    AZO-CRANBERRY PO Take 1 tablet by mouth Daily.      clopidogrel (PLAVIX) 75 MG tablet Take 1 tablet by mouth Every Morning. 90 tablet 3    metoprolol succinate XL (Toprol XL) 25 MG 24 hr tablet Take 1 tablet by mouth 2 (Two) Times a Day for 360 days. 180  tablet 3    metroNIDAZOLE (METROCREAM) 0.75 % cream Apply 1 Application topically to the appropriate area as directed 2 (Two) Times a Day.      Multiple Vitamins-Minerals (VITEYES AREDS ADVANCED PO) Take 2 capsules by mouth Daily.      mupirocin (BACTROBAN) 2 % ointment APPLY TWO TIMES PER DAY TO THE INFECTION/BIOPSY SITES.      nitroglycerin (NITROSTAT) 0.4 MG SL tablet Place 1 tablet under the tongue Every 5 (Five) Minutes As Needed for Chest Pain. Take no more than 3 doses in 15 minutes. 30 tablet 3    pantoprazole (PROTONIX) 40 MG EC tablet TAKE 1 TABLET BY MOUTH EVERY DAY IN THE MORNING 90 tablet 1    potassium chloride (MICRO-K) 10 MEQ CR capsule Take 1 capsule by mouth 2 (Two) Times a Day. 180 capsule 3    rosuvastatin (CRESTOR) 20 MG tablet Take 1 tablet by mouth Daily. 90 tablet 3    sennosides-docusate (PERICOLACE) 8.6-50 MG per tablet Take 1 tablet by mouth Daily.      torsemide (DEMADEX) 20 MG tablet Take 1 tablet by mouth Daily. 90 tablet 3    vitamin B-6 (PYRIDOXINE) 100 MG tablet Take 2 tablets by mouth Daily. 180 tablet 3    Vitamin D, Cholecalciferol, 50 MCG (2000 UT) capsule Take 1 capsule by mouth Daily.         Review of Systems   Respiratory:  Negative for cough, shortness of breath and wheezing.    Cardiovascular:  Negative for chest pain, palpitations and leg swelling.   Gastrointestinal:  Negative for abdominal pain, constipation and diarrhea.       Objective   Vitals:    11/01/24 0955   BP: 112/60   Pulse: 73   Resp: 18   Temp: 98.2 °F (36.8 °C)   SpO2: 98%          11/01/24  0955   Weight: 72.6 kg (160 lb)    [unfilled]  Body mass index is 31.25 kg/m².      Physical Exam   Constitutional: She appears well-developed.   Neck: No thyromegaly present.   Cardiovascular: Regular rhythm. Exam reveals no gallop.   No murmur heard.  No systolic murmur is present.  Pulmonary/Chest: Effort normal. No respiratory distress.   Abdominal: Soft. Normal appearance and bowel sounds are normal. She  exhibits no mass. There is no abdominal tenderness. There is no guarding.   Neurological: She is alert.         Problems Addressed this Visit          Cardiac and Vasculature    Hypertension - Primary (Chronic)    Hyperlipidemia (Chronic)       Endocrine and Metabolic    IGT (impaired glucose tolerance) (Chronic)     Diagnoses         Codes Comments    Primary hypertension    -  Primary ICD-10-CM: I10  ICD-9-CM: 401.9     Hyperlipidemia, unspecified hyperlipidemia type     ICD-10-CM: E78.5  ICD-9-CM: 272.4     IGT (impaired glucose tolerance)     ICD-10-CM: R73.02  ICD-9-CM: 790.22           Assessment/Plan   In for recheck of hypertension, hyperlipidemia, IGT and CAD today November 2024.  She had a Mohs surgery of her forehead and nares with a skin flap since last seen and is making a good recovery.  That was a really large incision yes going all the way to the hairline.  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  Virtually all of her cardiac symptoms have now resolved.  Her energy was back to normal.  Blood pressure control is good.  Glycemic control is been good.  Gets glucose and A1c today and every 3 months.  Annual lab work was done July 2024.  Annual wellness visit done December 2023.  She failed Lipitor and Dr. Locke felt that was somewhat irrelevant at her age and is good is her lipids run.  I agree.  She remains on Lopressor 25 mg twice daily and torsemide 20 mg daily for hypertension those are reviewed today.  History of macular degeneration being treated with eye vitamins.  She has numbness in the fingers of both hands.  Now on vitamin B6 200 mg daily which has not really helped.  And consider nocturnal wrist splints.  3 HPP today.    The above information was reviewed again today 11/01/24.  It continues to be accurate as reflected above and is unchanged.  History, physical and review of systems all reviewed and are unchanged.  Medications were reviewed today and continue the  current dosing.           Jono disclaimer:   Much of this encounter note is an electronic transcription/translation of spoken language to printed text. The electronic translation of spoken language may permit erroneous, or at times, nonsensical words or phrases to be inadvertently transcribed; Although I have reviewed the note for such errors, some may still exist.

## 2024-11-13 ENCOUNTER — OFFICE VISIT (OUTPATIENT)
Age: 89
End: 2024-11-13
Payer: MEDICARE

## 2024-11-13 ENCOUNTER — TELEPHONE (OUTPATIENT)
Dept: CARDIOLOGY | Facility: CLINIC | Age: 89
End: 2024-11-13

## 2024-11-13 VITALS
BODY MASS INDEX: 31.41 KG/M2 | HEART RATE: 70 BPM | WEIGHT: 160 LBS | DIASTOLIC BLOOD PRESSURE: 62 MMHG | SYSTOLIC BLOOD PRESSURE: 124 MMHG | HEIGHT: 60 IN

## 2024-11-13 DIAGNOSIS — I44.2 AV BLOCK, COMPLETE: ICD-10-CM

## 2024-11-13 DIAGNOSIS — I10 PRIMARY HYPERTENSION: ICD-10-CM

## 2024-11-13 DIAGNOSIS — I35.0 NONRHEUMATIC AORTIC VALVE STENOSIS: Primary | ICD-10-CM

## 2024-11-13 DIAGNOSIS — I25.10 CORONARY ARTERY DISEASE INVOLVING NATIVE CORONARY ARTERY OF NATIVE HEART WITHOUT ANGINA PECTORIS: ICD-10-CM

## 2024-11-13 DIAGNOSIS — E78.5 HYPERLIPIDEMIA, UNSPECIFIED HYPERLIPIDEMIA TYPE: ICD-10-CM

## 2024-11-13 PROCEDURE — 99214 OFFICE O/P EST MOD 30 MIN: CPT | Performed by: INTERNAL MEDICINE

## 2024-11-13 PROCEDURE — 93000 ELECTROCARDIOGRAM COMPLETE: CPT | Performed by: INTERNAL MEDICINE

## 2024-11-13 NOTE — PROGRESS NOTES
Date of Office Visit: 24    Encounter Provider: Clint Locke MD  Place of Service: [facility] CARDIOLOGY  Patient Name: Yuliana Gonzalez  :1930      Chief complaint:  Coronary artery disease with prior PCI to the mid LAD  Severe degenerative aortic valve stenosis with history of transcatheter aortic valve replacement  Permanent pacemaker      HPI:  89 y.o. 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  She underwent transcatheter aortic valve replacement with a 26 mm Medtronic evolut pro transcatheter aortic valve that was postdilated.  She was noted to have a mild paravalvular leak in case completion.  She subsequently presented back with dyspnea along with inferior ST elevation, however this subsequently has resolved.  Catheterization at that time showed nonobstructive CAD. She states she has been feeling great and has been walking out the form doing physical therapy.     She continues to follow-up with the electrophysiology team.  She is RV pacing 20% of the time.  Blood pressure and heart rate are well controlled.    She went to [facility] in Aug 2023 initially complaining of chest pain, cough, shortness of breath.  She was found to have an NSTEMI and a parainfluenza virus URI and was admitted to the hospital for further evaluation and treatment.  We did an echocardiogram which showed a newly depressed EF at 37%. She then underwent cardiac catheterization which showed 99% mid vessel stenosis in the LAD. A 2.5 x 38 mm Xience lida point drug-eluting stent, postdilated to high-pressure in the proximal to mid stent segment with a 3.0 mm NC trek balloon was placed in the mid-LAD.  She is  "done very well since that time.  Ejection fraction normalized.     She has since followed up with Shae Yan who last saw her in May of this year.  She was doing well at that time and really was having no significant issues.  She had no change in her medical therapy and is here today for follow-up.  She continues to feel well.  She occasionally will have lightheadedness upon standing when she wakes up in the morning but has had no syncopal episodes    Past Medical History:   Diagnosis Date    Aortic stenosis 07/2009    diagnosed, 6/14 moderate with 0.9cm area, 42mm max gradient. 0.91/34 6/15.    Basal cell carcinoma (BCC) of nasal tip     Central loss of vision     RIGHT EYE    Cervical arthritis     Colitis     HX    Coronary artery disease     History of constipation     History of esophageal reflux     History of skin cancer     Hyperlipidemia     Hypertension     IGT (impaired glucose tolerance)     Irregular heart beat     HX    Macular degeneration     Medication management     On continuous oral anticoagulation     Redness of skin     NOSE/DERMATOLOGY TREATING WITH FLAGYL CREAM/ADVISED PT TO NOTIFY CARDIOLOGY    Sciatica     HX    Short-term memory loss     \"MILD\"    SOB (shortness of breath) on exertion     Vitamin D deficiency        Past Surgical History:   Procedure Laterality Date    AORTIC VALVE REPAIR/REPLACEMENT Left 3/15/2022    Procedure: TTE TRANSFEMORAL TRANSCATHETER AORTIC VALVE REPLACEMENT PERCUTANEOUS APPROACH;  Surgeon: Vesna Chris MD;  Location: [facility];  Service: Cardiothoracic;  Laterality: Left;    AORTIC VALVE REPAIR/REPLACEMENT Left 3/15/2022    Procedure: Transfemoral Transcatheter Aortic Valve Replacement with intra operative TTE and possible open surgical rescue;  Surgeon: Clint Locke MD;  Location: [facility];  Service: Cardiovascular;  Laterality: Left;    CARDIAC CATHETERIZATION N/A 1/21/2022    Procedure: Coronary angiography;  Surgeon: Clint Locke " MD XIANG;  Location:  [facility] CATH INVASIVE LOCATION;  Service: Cardiovascular;  Laterality: N/A;    CARDIAC CATHETERIZATION N/A 1/21/2022    Procedure: Percutaneous Coronary Intervention;  Surgeon: Clint Locke MD;  Location:  [facility] CATH INVASIVE LOCATION;  Service: Cardiovascular;  Laterality: N/A;    CARDIAC CATHETERIZATION N/A 1/21/2022    Procedure: Stent ISMAEL coronary;  Surgeon: Clint Locke MD;  Location: [facility] CATH INVASIVE LOC[facility];  Service: Cardiovascular;  Laterality: N/A;    CARDIAC CATHETERIZATION Left 7/31/2022    Procedure: CORONARY ANGIOGRAPHY;  Surgeon: Jeannie Echevarria MD;  [facility]: [facility] C[facility] LOC[facility];  Service: Cardiology;  Laterality: Left;    CARDIAC CATHETERIZATION N/A 7/31/2022    Procedure: Left ventriculography;  Surgeon: Jeannie Echevarria MD;  [facility]: [facility] C[facility];  Service: Cardiology;  Laterality: N/A;    CARDIAC CATHETERIZATION N/A 7/31/2022    Procedure: Left Heart Cath;  Surgeon: Jeannie Echevarria MD;  Location: [facility] C[facility] LOC[facility];  Service: Cardiology;  Laterality: N/A;    CARDIAC CATHETERIZATION N/A 8/4/2023    Procedure: Left Heart Cath;  Surgeon: Clint Locke MD;  [facility]: [facility] C[facility];  Service: Cardiovascular;  Laterality: N/A;    CARDIAC CATHETERIZATION N/A 8/4/2023    Procedure: Percutaneous Coronary Intervention;  Surgeon: Clint Locke MD;  [facility] C[facility];  Service: Cardiovascular;  Laterality: N/A;    CARDIAC CATHETERIZATION N/A 8/4/2023    Procedure: Stent ISMAEL coronary;  Surgeon: Clint Locke MD;  [facility]: [facility] C[facility] LOC[facility];  Service: Cardiovascular;  Laterality: N/A;    CARDIAC CATHETERIZATION N/A 8/4/2023    Procedure: Coronary angiography;  Surgeon: Clint Locke MD;  [facility]: [facility] C[facility];  Service: Cardiovascular;  Laterality: N/A;    CARDIAC ELECTROPHYSIOLOGY PROCEDURE N/A 3/16/2022    Procedure: Pacemaker DC  [place];  Surgeon: Yann Ceballos MD;  Location: [facility] INVASIVE LOCATION;  Service: Cardiology;  Laterality: N/A;    CATARACT EXTRACTION, BILATERAL      COLONOSCOPY      EYE SURGERY      SKIN CANCER EXCISION         Social History     Socioeconomic History    Marital status:      Spouse name: Stephan    Number of children: 4   Tobacco Use    Smoking status: Former     Current packs/day: 0.00     Types: Cigarettes     Start date:      Quit date:      Years since quittin.9     Passive exposure: Past    Smokeless tobacco: Never    Tobacco comments:     1 pack every 2 weeks   Vaping Use    Vaping status: Never Used   Substance and Sexual Activity    Alcohol use: Yes     Comment: wine - rare / no caffeine    Drug use: No    Sexual activity: Defer       Family History   Problem Relation Age of Onset    Aortic stenosis Mother     Hypertension Mother     Heart failure Father     Diabetes Father     Hypertension Father     No Known Problems Sister     No Known Problems Daughter     No Known Problems Son     Aneurysm Sister     Other Sister         Polio in her 20's    Malig Hyperthermia Neg Hx        Review of Systems   Constitutional: Negative. Negative for fever and malaise/fatigue.   HENT:  Negative for nosebleeds and sore throat.    Eyes:  Negative for blurred vision and double vision.   Cardiovascular: Negative.  Negative for chest pain, claudication, dyspnea on exertion, leg swelling, orthopnea, palpitations, paroxysmal nocturnal dyspnea and syncope.   Respiratory: Negative.  Negative for cough, shortness of breath and snoring.    Endocrine: Negative for cold intolerance, heat intolerance, polydipsia and polyphagia.   Hematologic/Lymphatic: Negative for bleeding problem.   Skin:  Negative for itching, poor wound healing and rash.   Musculoskeletal:  Negative for falls, joint pain, joint swelling, muscle weakness and myalgias.   Gastrointestinal:  Negative for abdominal pain, melena,  "nausea and vomiting.   Neurological:  Positive for light-headedness. Negative for dizziness, loss of balance, seizures, vertigo and weakness.   Psychiatric/Behavioral:  Negative for altered mental status and depression.        Allergies   Allergen Reactions    Hydrochlorothiazide Other (See Comments)     hyponatremia    Hydrocodone Shortness Of Breath     Also reports \"lethargy\"    Lipitor [Atorvastatin] Nausea Only     \"FELT AWFUL\"    Erythromycin Diarrhea    Lisinopril Cough         Current Outpatient Medications:     acetaminophen (TYLENOL) 325 MG tablet, Take 2 tablets by mouth Every 4 (Four) Hours As Needed for Mild Pain  or Fever (temperature greater than 101F)., Disp: 120 tablet, Rfl: 0    aspirin (aspirin) 81 MG EC tablet, Take 1 tablet by mouth Daily., Disp: 30 tablet, Rfl: 6    AZO-CRANBERRY PO, Take 1 tablet by mouth Daily., Disp: , Rfl:     clopidogrel (PLAVIX) 75 MG tablet, Take 1 tablet by mouth Every Morning., Disp: 90 tablet, Rfl: 3    metoprolol succinate XL (Toprol XL) 25 MG 24 hr tablet, Take 1 tablet by mouth 2 (Two) Times a Day for 360 days., Disp: 180 tablet, Rfl: 3    metroNIDAZOLE (METROCREAM) 0.75 % cream, Apply 1 Application topically to the appropriate area as directed 2 (Two) Times a Day., Disp: , Rfl:     Multiple Vitamins-Minerals (VITEYES AREDS ADVANCED PO), Take 2 capsules by mouth Daily., Disp: , Rfl:     mupirocin (BACTROBAN) 2 % ointment, APPLY TWO TIMES PER DAY TO THE INFECTION/BIOPSY SITES., Disp: , Rfl:     nitroglycerin (NITROSTAT) 0.4 MG SL tablet, Place 1 tablet under the tongue Every 5 (Five) Minutes As Needed for Chest Pain. Take no more than 3 doses in 15 minutes., Disp: 30 tablet, Rfl: 3    pantoprazole (PROTONIX) 40 MG EC tablet, TAKE 1 TABLET BY MOUTH EVERY DAY IN THE MORNING, Disp: 90 tablet, Rfl: 1    potassium chloride (MICRO-K) 10 MEQ CR capsule, Take 1 capsule by mouth 2 (Two) Times a Day., Disp: 180 capsule, Rfl: 3    rosuvastatin (CRESTOR) 20 MG tablet, Take 1 " "tablet by mouth Daily., Disp: 90 tablet, Rfl: 3    sennosides-docusate (PERICOLACE) 8.6-50 MG per tablet, Take 1 tablet by mouth Daily., Disp: , Rfl:     torsemide (DEMADEX) 20 MG tablet, Take 1 tablet by mouth Daily., Disp: 90 tablet, Rfl: 3    vitamin B-6 (PYRIDOXINE) 100 MG tablet, Take 2 tablets by mouth Daily., Disp: 180 tablet, Rfl: 3    Vitamin D, Cholecalciferol, 50 MCG (2000 UT) capsule, Take 1 capsule by mouth Daily., Disp: , Rfl:       Objective:     Vitals:    11/13/24 1134   BP: 124/62   Pulse: 70   Weight: 72.6 kg (160 lb)   Height: 152.4 cm (60\")     Body mass index is 31.25 kg/m².    PHYSICAL EXAM:    Constitutional:       Appearance: Well-developed.   Eyes:      General: No scleral icterus.     Conjunctiva/sclera: Conjunctivae normal.   HENT:      Head: Normocephalic and atraumatic.   Neck:      Thyroid: No thyromegaly.      Vascular: Normal carotid pulses. No carotid bruit, hepatojugular reflux or JVD.      Trachea: No tracheal deviation.   Pulmonary:      Effort: Pulmonary effort is normal. No respiratory distress.      Breath sounds: Normal breath sounds.   Chest:      Chest wall: Not tender to palpatation.   Cardiovascular:      Normal rate. Regular rhythm.      Murmurs: There is a grade 2/6 early systolic murmur.      No gallop.  No click. No rub.   Abdominal:      General: Bowel sounds are normal. There is no distension.      Palpations: Abdomen is soft.      Tenderness: There is no abdominal tenderness.   Musculoskeletal:         General: No deformity.      Cervical back: Normal range of motion and neck supple. Skin:     Findings: No erythema or rash.   Neurological:      Mental Status: Alert and oriented to person, place, and time.      Sensory: No sensory deficit.   Psychiatric:         Behavior: Behavior normal.           ECG 12 Lead    Date/Time: 11/13/2024 11:56 AM  Performed by: Clint Locke MD    Authorized by: Clint Locke MD  Comparison: compared with previous ECG " from 5/24/2024  Similar to previous ECG  Rhythm: sinus rhythm  Rate: normal  Conduction: right bundle branch block and left anterior fascicular block  QRS axis: left           9/18/23      Left ventricular systolic function is normal. Calculated left ventricular EF = 56% Normal left ventricular cavity size noted. Left ventricular wall thickness is consistent with moderate to severe concentric hypertrophy. Sigmoid-shaped ventricular septum is present. All left ventricular wall segments contract normally. Left ventricular diastolic function is consistent with (grade I) impaired relaxation.    Mildly reduced right ventricular systolic function noted. Electronic lead present in the ventricle.    There is a TAVR valve present. The aortic valve peak and mean gradients are within defined limits. There is mild paravalvular regurgitation without stenosis.    Severe mitral annular calcification is present. There is moderate, bileaflet mitral valve thickening present. Mild mitral valve regurgitation is present. No significant mitral valve stenosis is present.    Mild tricuspid valve regurgitation is present. Estimated right ventricular systolic pressure from tricuspid regurgitation is normal (<35 mmHg). Calculated right ventricular systolic pressure from tricuspid regurgitation is 27 mmHg.       8/4/23  Conclusions:   1. Left main: Normal.  2. LAD: 40% proximal stenosis.  Tubular 99% mid vessel stenosis with STEPHANIA II flow distally  3. LCX: Luminal irregularities  4.  Successful PCI of the mid LAD with a 2.5 x 38 mm Xience lida point drug-eluting stent, postdilated to high-pressure in the proximal to mid stent segment with a 3.0 mm NC trek balloon    8/1/22  Estimated left ventricular EF = 61% Left ventricular systolic function is normal.  Left ventricular diastolic function is consistent with (grade Ia w/high LAP) impaired relaxation.  The right ventricular cavity is moderately dilated.  Moderately reduced right ventricular  systolic function noted.  There is a TAVR valve present. There is a mild perivalvular leak.  Peak velocity of the flow distal to the aortic valve is 141.2 cm/s.  Aortic valve area is 1.58 cm2.  Estimated right ventricular systolic pressure from tricuspid regurgitation is normal (<35 mmHg).  The following left ventricular wall segments are hypokinetic: apical septal, basal inferoseptal, mid inferoseptal, mid anteroseptal and basal inferoseptal.      7/31/22  Widely patent mid LAD stent. Otherwise nonobstructive coronary artery disease  Normal LV function. Mildly elevated filling pressures.  Recommendations: Dual antiplatelet therapy given elevated troponin for 1 year. Echocardiogram tomorrow to assess TAVR valve. Unclear etiology of ST elevations.        3/16/22  CONCLUSIONS:  Successful deployment of a 26mm Medtronic Evolut Pro transcatheter aortic heart valve.  Post dilation with a 20 mm true balloon      Assessment:     Plan:       1.  Coronary artery disease.  Status post PCI of the mid LAD.  Patient had parainfluenza virus and a type II non-ST elevation MI in August 2023 and had repeat stenting to a 99% mid LAD stenosis  -Recommend continuing aspirin.  Recommend stopping Plavix.  - Continue Crestor at current dose.  Tolerating well.  -No change in Toprol therapy.  She is tolerating this well.    2.  Severe degenerative aortic valve stenosis.  Status post TAVR in March 2022.    -Doing well.  Last echo from September 2023 reviewed.  Mild paravalvular regurgitation but otherwise looks great.    3.  Hypertension.  Her blood pressure is stable.  Continue metoprolol at current dose.  Tolerating well.    4.  Permanent pacemaker: Continue to follow with device clinic.

## 2024-11-13 NOTE — TELEPHONE ENCOUNTER
Caller: Yuliana Gonzalez    Relationship to patient: Self    Best call back number:  [phone]    Patient is needing:    PATIENT FORGOT TO TELL DR VERMA THAT HE DERMATOLOGIST  IS STARTING HER ON cephalexin 500mg   4 TIMES A DAY FOR 7 DAY. PATIENT MAKING SURE THAT IS OK FOR HER TO TAKE.

## 2025-01-02 ENCOUNTER — HOSPITAL ENCOUNTER (INPATIENT)
Facility: HOSPITAL | Age: OVER 89
LOS: 1 days | Discharge: HOME | DRG: 194 | End: 2025-01-04
Attending: EMERGENCY MEDICINE | Admitting: HOSPITALIST
Payer: MEDICARE

## 2025-01-02 ENCOUNTER — APPOINTMENT (OUTPATIENT)
Dept: GENERAL RADIOLOGY | Facility: HOSPITAL | Age: OVER 89
DRG: 194 | End: 2025-01-02
Payer: MEDICARE

## 2025-01-02 DIAGNOSIS — Z86.79 HISTORY OF CAD (CORONARY ARTERY DISEASE): ICD-10-CM

## 2025-01-02 DIAGNOSIS — R79.89 ELEVATED TROPONIN: ICD-10-CM

## 2025-01-02 DIAGNOSIS — J10.1 INFLUENZA A: Primary | ICD-10-CM

## 2025-01-02 LAB
ALBUMIN SERPL-MCNC: 4.3 G/DL (ref 3.5–5.2)
ALBUMIN/GLOB SERPL: 1.7 G/DL
ALP SERPL-CCNC: 76 U/L (ref 39–117)
ALT SERPL W P-5'-P-CCNC: 27 U/L (ref 1–33)
ANION GAP SERPL CALCULATED.3IONS-SCNC: 12 MMOL/L (ref 5–15)
AST SERPL-CCNC: 45 U/L (ref 1–32)
B PARAPERT DNA SPEC QL NAA+PROBE: NOT DETECTED
B PERT DNA SPEC QL NAA+PROBE: NOT DETECTED
BASOPHILS # BLD AUTO: 0.01 10*3/MM3 (ref 0–0.2)
BASOPHILS NFR BLD AUTO: 0.1 % (ref 0–1.5)
BILIRUB SERPL-MCNC: 0.5 MG/DL (ref 0–1.2)
BUN SERPL-MCNC: 11 MG/DL (ref 8–23)
BUN/CREAT SERPL: 13.9 (ref 7–25)
C PNEUM DNA NPH QL NAA+NON-PROBE: NOT DETECTED
CALCIUM SPEC-SCNC: 8.7 MG/DL (ref 8.2–9.6)
CHLORIDE SERPL-SCNC: 100 MMOL/L (ref 98–107)
CO2 SERPL-SCNC: 24 MMOL/L (ref 22–29)
CREAT SERPL-MCNC: 0.79 MG/DL (ref 0.57–1)
DEPRECATED RDW RBC AUTO: 38.9 FL (ref 37–54)
EGFRCR SERPLBLD CKD-EPI 2021: 69.4 ML/MIN/1.73
EOSINOPHIL # BLD AUTO: 0 10*3/MM3 (ref 0–0.4)
EOSINOPHIL NFR BLD AUTO: 0 % (ref 0.3–6.2)
ERYTHROCYTE [DISTWIDTH] IN BLOOD BY AUTOMATED COUNT: 12.4 % (ref 12.3–15.4)
FLUAV H3 RNA NPH QL NAA+PROBE: DETECTED
FLUBV RNA ISLT QL NAA+PROBE: NOT DETECTED
GEN 5 1HR TROPONIN T REFLEX: 126 NG/L
GLOBULIN UR ELPH-MCNC: 2.5 GM/DL
GLUCOSE SERPL-MCNC: 163 MG/DL (ref 65–99)
HADV DNA SPEC NAA+PROBE: NOT DETECTED
HCOV 229E RNA SPEC QL NAA+PROBE: NOT DETECTED
HCOV HKU1 RNA SPEC QL NAA+PROBE: NOT DETECTED
HCOV NL63 RNA SPEC QL NAA+PROBE: NOT DETECTED
HCOV OC43 RNA SPEC QL NAA+PROBE: NOT DETECTED
HCT VFR BLD AUTO: 39.5 % (ref 34–46.6)
HGB BLD-MCNC: 13.6 G/DL (ref 12–15.9)
HMPV RNA NPH QL NAA+NON-PROBE: NOT DETECTED
HOLD SPECIMEN: NORMAL
HOLD SPECIMEN: NORMAL
HPIV1 RNA ISLT QL NAA+PROBE: NOT DETECTED
HPIV2 RNA SPEC QL NAA+PROBE: NOT DETECTED
HPIV3 RNA NPH QL NAA+PROBE: NOT DETECTED
HPIV4 P GENE NPH QL NAA+PROBE: NOT DETECTED
IMM GRANULOCYTES # BLD AUTO: 0.01 10*3/MM3 (ref 0–0.05)
IMM GRANULOCYTES NFR BLD AUTO: 0.1 % (ref 0–0.5)
LYMPHOCYTES # BLD AUTO: 0.54 10*3/MM3 (ref 0.7–3.1)
LYMPHOCYTES NFR BLD AUTO: 7.6 % (ref 19.6–45.3)
M PNEUMO IGG SER IA-ACNC: NOT DETECTED
MCH RBC QN AUTO: 29.7 PG (ref 26.6–33)
MCHC RBC AUTO-ENTMCNC: 34.4 G/DL (ref 31.5–35.7)
MCV RBC AUTO: 86.2 FL (ref 79–97)
MONOCYTES # BLD AUTO: 0.76 10*3/MM3 (ref 0.1–0.9)
MONOCYTES NFR BLD AUTO: 10.6 % (ref 5–12)
NEUTROPHILS NFR BLD AUTO: 5.83 10*3/MM3 (ref 1.7–7)
NEUTROPHILS NFR BLD AUTO: 81.6 % (ref 42.7–76)
PLATELET # BLD AUTO: 135 10*3/MM3 (ref 140–450)
PMV BLD AUTO: 10.7 FL (ref 6–12)
POTASSIUM SERPL-SCNC: 3.8 MMOL/L (ref 3.5–5.2)
PROT SERPL-MCNC: 6.8 G/DL (ref 6–8.5)
RBC # BLD AUTO: 4.58 10*6/MM3 (ref 3.77–5.28)
RHINOVIRUS RNA SPEC NAA+PROBE: NOT DETECTED
RSV RNA NPH QL NAA+NON-PROBE: NOT DETECTED
SARS-COV-2 RNA NPH QL NAA+NON-PROBE: NOT DETECTED
SODIUM SERPL-SCNC: 136 MMOL/L (ref 136–145)
TROPONIN T % DELTA: 7 %
TROPONIN T NUMERIC DELTA: 8 NG/L
TROPONIN T SERPL HS-MCNC: 118 NG/L
WBC NRBC COR # BLD AUTO: 7.15 10*3/MM3 (ref 3.4–10.8)
WHOLE BLOOD HOLD COAG: NORMAL
WHOLE BLOOD HOLD SPECIMEN: NORMAL

## 2025-01-02 PROCEDURE — 36415 COLL VENOUS BLD VENIPUNCTURE: CPT

## 2025-01-02 PROCEDURE — 93005 ELECTROCARDIOGRAM TRACING: CPT

## 2025-01-02 PROCEDURE — 84484 ASSAY OF TROPONIN QUANT: CPT | Performed by: EMERGENCY MEDICINE

## 2025-01-02 PROCEDURE — 99285 EMERGENCY DEPT VISIT HI MDM: CPT

## 2025-01-02 PROCEDURE — 93010 ELECTROCARDIOGRAM REPORT: CPT | Performed by: INTERNAL MEDICINE

## 2025-01-02 PROCEDURE — G0378 HOSPITAL OBSERVATION PER HR: HCPCS

## 2025-01-02 PROCEDURE — 71045 X-RAY EXAM CHEST 1 VIEW: CPT

## 2025-01-02 PROCEDURE — 0202U NFCT DS 22 TRGT SARS-COV-2: CPT | Performed by: EMERGENCY MEDICINE

## 2025-01-02 PROCEDURE — 84484 ASSAY OF TROPONIN QUANT: CPT

## 2025-01-02 PROCEDURE — 85025 COMPLETE CBC W/AUTO DIFF WBC: CPT

## 2025-01-02 PROCEDURE — 80053 COMPREHEN METABOLIC PANEL: CPT

## 2025-01-02 PROCEDURE — 93005 ELECTROCARDIOGRAM TRACING: CPT | Performed by: EMERGENCY MEDICINE

## 2025-01-02 RX ORDER — POTASSIUM CHLORIDE 750 MG/1
10 CAPSULE, EXTENDED RELEASE ORAL 2 TIMES DAILY
Status: DISCONTINUED | OUTPATIENT
Start: 2025-01-02 | End: 2025-01-02

## 2025-01-02 RX ORDER — SODIUM CHLORIDE 9 MG/ML
40 INJECTION, SOLUTION INTRAVENOUS AS NEEDED
Status: DISCONTINUED | OUTPATIENT
Start: 2025-01-02 | End: 2025-01-04 | Stop reason: HOSPADM

## 2025-01-02 RX ORDER — POLYETHYLENE GLYCOL 3350 17 G/17G
17 POWDER, FOR SOLUTION ORAL DAILY PRN
Status: DISCONTINUED | OUTPATIENT
Start: 2025-01-02 | End: 2025-01-04 | Stop reason: HOSPADM

## 2025-01-02 RX ORDER — CLOPIDOGREL BISULFATE 75 MG/1
75 TABLET ORAL EVERY MORNING
Status: DISCONTINUED | OUTPATIENT
Start: 2025-01-03 | End: 2025-01-04 | Stop reason: HOSPADM

## 2025-01-02 RX ORDER — ROSUVASTATIN CALCIUM 20 MG/1
20 TABLET, COATED ORAL DAILY
Status: DISCONTINUED | OUTPATIENT
Start: 2025-01-02 | End: 2025-01-04 | Stop reason: HOSPADM

## 2025-01-02 RX ORDER — ASPIRIN 81 MG/1
81 TABLET ORAL DAILY
Status: DISCONTINUED | OUTPATIENT
Start: 2025-01-03 | End: 2025-01-04 | Stop reason: HOSPADM

## 2025-01-02 RX ORDER — POTASSIUM CHLORIDE 750 MG/1
10 TABLET, EXTENDED RELEASE ORAL 2 TIMES DAILY
Status: DISCONTINUED | OUTPATIENT
Start: 2025-01-02 | End: 2025-01-04 | Stop reason: HOSPADM

## 2025-01-02 RX ORDER — METOPROLOL SUCCINATE 25 MG/1
25 TABLET, EXTENDED RELEASE ORAL 2 TIMES DAILY
Status: DISCONTINUED | OUTPATIENT
Start: 2025-01-02 | End: 2025-01-04 | Stop reason: HOSPADM

## 2025-01-02 RX ORDER — BENZONATATE 100 MG/1
100 CAPSULE ORAL 3 TIMES DAILY PRN
Status: DISCONTINUED | OUTPATIENT
Start: 2025-01-02 | End: 2025-01-04 | Stop reason: HOSPADM

## 2025-01-02 RX ORDER — LANOLIN ALCOHOL/MO/W.PET/CERES
200 CREAM (GRAM) TOPICAL DAILY
Status: DISCONTINUED | OUTPATIENT
Start: 2025-01-02 | End: 2025-01-04 | Stop reason: HOSPADM

## 2025-01-02 RX ORDER — OSELTAMIVIR PHOSPHATE 30 MG/1
30 CAPSULE ORAL ONCE
Status: COMPLETED | OUTPATIENT
Start: 2025-01-02 | End: 2025-01-02

## 2025-01-02 RX ORDER — SODIUM CHLORIDE 0.9 % (FLUSH) 0.9 %
10 SYRINGE (ML) INJECTION AS NEEDED
Status: DISCONTINUED | OUTPATIENT
Start: 2025-01-02 | End: 2025-01-04 | Stop reason: HOSPADM

## 2025-01-02 RX ORDER — BISACODYL 5 MG/1
5 TABLET, DELAYED RELEASE ORAL DAILY PRN
Status: DISCONTINUED | OUTPATIENT
Start: 2025-01-02 | End: 2025-01-04 | Stop reason: HOSPADM

## 2025-01-02 RX ORDER — SODIUM CHLORIDE 0.9 % (FLUSH) 0.9 %
10 SYRINGE (ML) INJECTION EVERY 12 HOURS SCHEDULED
Status: DISCONTINUED | OUTPATIENT
Start: 2025-01-02 | End: 2025-01-04 | Stop reason: HOSPADM

## 2025-01-02 RX ORDER — NITROGLYCERIN 0.4 MG/1
0.4 TABLET SUBLINGUAL
Status: DISCONTINUED | OUTPATIENT
Start: 2025-01-02 | End: 2025-01-04 | Stop reason: HOSPADM

## 2025-01-02 RX ORDER — MULTIVIT-MIN/IRON/FOLIC ACID/K 18-600-40
2000 CAPSULE ORAL DAILY
Status: DISCONTINUED | OUTPATIENT
Start: 2025-01-02 | End: 2025-01-02

## 2025-01-02 RX ORDER — ACETAMINOPHEN 325 MG/1
650 TABLET ORAL EVERY 4 HOURS PRN
Status: DISCONTINUED | OUTPATIENT
Start: 2025-01-02 | End: 2025-01-04 | Stop reason: HOSPADM

## 2025-01-02 RX ORDER — AMOXICILLIN 250 MG
2 CAPSULE ORAL 2 TIMES DAILY PRN
Status: DISCONTINUED | OUTPATIENT
Start: 2025-01-02 | End: 2025-01-04 | Stop reason: HOSPADM

## 2025-01-02 RX ORDER — CHOLECALCIFEROL (VITAMIN D3) 25 MCG
2000 TABLET ORAL DAILY
Status: DISCONTINUED | OUTPATIENT
Start: 2025-01-02 | End: 2025-01-04 | Stop reason: HOSPADM

## 2025-01-02 RX ORDER — ACETAMINOPHEN 650 MG/1
650 SUPPOSITORY RECTAL EVERY 4 HOURS PRN
Status: DISCONTINUED | OUTPATIENT
Start: 2025-01-02 | End: 2025-01-04 | Stop reason: HOSPADM

## 2025-01-02 RX ORDER — PANTOPRAZOLE SODIUM 40 MG/1
40 TABLET, DELAYED RELEASE ORAL EVERY MORNING
Status: DISCONTINUED | OUTPATIENT
Start: 2025-01-03 | End: 2025-01-04 | Stop reason: HOSPADM

## 2025-01-02 RX ORDER — TORSEMIDE 20 MG/1
20 TABLET ORAL DAILY
Status: DISCONTINUED | OUTPATIENT
Start: 2025-01-02 | End: 2025-01-04 | Stop reason: HOSPADM

## 2025-01-02 RX ORDER — ACETAMINOPHEN 160 MG/5ML
650 SOLUTION ORAL EVERY 4 HOURS PRN
Status: DISCONTINUED | OUTPATIENT
Start: 2025-01-02 | End: 2025-01-04 | Stop reason: HOSPADM

## 2025-01-02 RX ORDER — BISACODYL 10 MG
10 SUPPOSITORY, RECTAL RECTAL DAILY PRN
Status: DISCONTINUED | OUTPATIENT
Start: 2025-01-02 | End: 2025-01-04 | Stop reason: HOSPADM

## 2025-01-02 RX ORDER — ASPIRIN 325 MG
325 TABLET ORAL ONCE
Status: COMPLETED | OUTPATIENT
Start: 2025-01-02 | End: 2025-01-02

## 2025-01-02 RX ORDER — AMOXICILLIN 250 MG
1 CAPSULE ORAL DAILY
Status: DISCONTINUED | OUTPATIENT
Start: 2025-01-02 | End: 2025-01-04 | Stop reason: HOSPADM

## 2025-01-02 RX ADMIN — POTASSIUM CHLORIDE 10 MEQ: 750 TABLET, EXTENDED RELEASE ORAL at 20:24

## 2025-01-02 RX ADMIN — Medication 200 MG: at 18:07

## 2025-01-02 RX ADMIN — SENNOSIDES AND DOCUSATE SODIUM 1 TABLET: 50; 8.6 TABLET ORAL at 18:07

## 2025-01-02 RX ADMIN — ROSUVASTATIN 20 MG: 20 TABLET, FILM COATED ORAL at 18:07

## 2025-01-02 RX ADMIN — OSELTAMIVIR PHOSPHATE 30 MG: 30 CAPSULE ORAL at 13:19

## 2025-01-02 RX ADMIN — ASPIRIN 325 MG ORAL TABLET 325 MG: 325 PILL ORAL at 11:57

## 2025-01-02 RX ADMIN — METOPROLOL SUCCINATE 25 MG: 25 TABLET, EXTENDED RELEASE ORAL at 20:24

## 2025-01-02 NOTE — ED NOTES
Pt to ed from [facility] via EMS    Pt c/o cough since Wednesday. Pt c/o CP when she coughs. Symptoms started after she was at gathering at [place] and multiple pts have Covid.

## 2025-01-02 NOTE — ED PROVIDER NOTES
MD ATTESTATION NOTE    SHARED VISIT: This visit was performed by BOTH a physician and an APC. The substantive portion of the medical decision making was performed by this attesting physician who made or approved the management plan and takes responsibility for patient management. All studies documented in the APC note (if performed) were independently interpreted by me.    The MAUREEN and I have discussed this patient's history, physical exam, MDM, and treatment plan.  I have reviewed the documentation and personally had a face to face interaction with the patient. The attached note describes my personal findings.      Yuliana Gonzalez is a 89 y.o. female who presents to the ED c/o acute cough for the past 2 days.  Reports having shortness of breath and associated chest discomfort.    On exam:  GENERAL: not distressed  HENT: nares patent  EYES: no scleral icterus  CV: regular rhythm, regular rate  RESPIRATORY: normal effort, clear to auscultation bilaterally  ABDOMEN: soft, nontender  MUSCULOSKELETAL: no deformity  NEURO: alert, moves all extremities, follows commands  SKIN: warm, dry    Labs  Recent Results (from the past 24 hours)   ECG 12 Lead ED Triage Standing Order; Chest Pain    Collection Time: 01/02/25 10:30 AM   Result Value Ref Range    QT Interval 392 ms    QTC Interval 464 ms   Comprehensive Metabolic Panel    Collection Time: 01/02/25 10:34 AM    Specimen: Blood   Result Value Ref Range    Glucose 163 (H) 65 - 99 mg/dL    BUN 11 8 - 23 mg/dL    Creatinine 0.79 0.57 - 1.00 mg/dL    Sodium 136 136 - 145 mmol/L    Potassium 3.8 3.5 - 5.2 mmol/L    Chloride 100 98 - 107 mmol/L    CO2 24.0 22.0 - 29.0 mmol/L    Calcium 8.7 8.2 - 9.6 mg/dL    Total Protein 6.8 6.0 - 8.5 g/dL    Albumin 4.3 3.5 - 5.2 g/dL    ALT (SGPT) 27 1 - 33 U/L    AST (SGOT) 45 (H) 1 - 32 U/L    Alkaline Phosphatase 76 39 - 117 U/L    Total Bilirubin 0.5 0.0 - 1.2 mg/dL    Globulin 2.5 gm/dL    A/G Ratio 1.7 g/dL    BUN/Creatinine Ratio 13.9  7.0 - 25.0    Anion Gap 12.0 5.0 - 15.0 mmol/L    eGFR 69.4 >60.0 mL/min/1.73   High Sensitivity Troponin T    Collection Time: 01/02/25 10:34 AM    Specimen: Blood   Result Value Ref Range    HS Troponin T 118 (C) <14 ng/L   Green Top (Gel)    Collection Time: 01/02/25 10:34 AM   Result Value Ref Range    Extra Tube Hold for add-ons.    Lavender Top    Collection Time: 01/02/25 10:34 AM   Result Value Ref Range    Extra Tube hold for add-on    Gold Top - SST    Collection Time: 01/02/25 10:34 AM   Result Value Ref Range    Extra Tube Hold for add-ons.    Light Blue Top    Collection Time: 01/02/25 10:34 AM   Result Value Ref Range    Extra Tube Hold for add-ons.    CBC Auto Differential    Collection Time: 01/02/25 10:34 AM    Specimen: Blood   Result Value Ref Range    WBC 7.15 3.40 - 10.80 10*3/mm3    RBC 4.58 3.77 - 5.28 10*6/mm3    Hemoglobin 13.6 12.0 - 15.9 g/dL    Hematocrit 39.5 34.0 - 46.6 %    MCV 86.2 79.0 - 97.0 fL    MCH 29.7 26.6 - 33.0 pg    MCHC 34.4 31.5 - 35.7 g/dL    RDW 12.4 12.3 - 15.4 %    RDW-SD 38.9 37.0 - 54.0 fl    MPV 10.7 6.0 - 12.0 fL    Platelets 135 (L) 140 - 450 10*3/mm3    Neutrophil % 81.6 (H) 42.7 - 76.0 %    Lymphocyte % 7.6 (L) 19.6 - 45.3 %    Monocyte % 10.6 5.0 - 12.0 %    Eosinophil % 0.0 (L) 0.3 - 6.2 %    Basophil % 0.1 0.0 - 1.5 %    Immature Grans % 0.1 0.0 - 0.5 %    Neutrophils, Absolute 5.83 1.70 - 7.00 10*3/mm3    Lymphocytes, Absolute 0.54 (L) 0.70 - 3.10 10*3/mm3    Monocytes, Absolute 0.76 0.10 - 0.90 10*3/mm3    Eosinophils, Absolute 0.00 0.00 - 0.40 10*3/mm3    Basophils, Absolute 0.01 0.00 - 0.20 10*3/mm3    Immature Grans, Absolute 0.01 0.00 - 0.05 10*3/mm3   Respiratory Panel PCR w/COVID-19(SARS-CoV-2) [facility]UNDO/DAVONTE/[facility] In-House, NP Swab in UTM/VTM, 2 HR TAT - Swab, Nasopharynx    Collection Time: 01/02/25 10:34 AM    Specimen: Nasopharynx; Swab   Result Value Ref Range    ADENOVIRUS, PCR Not Detected Not Detected    Coronavirus 229E Not Detected Not  Detected    Coronavirus HKU1 Not Detected Not Detected    Coronavirus NL63 Not Detected Not Detected    Coronavirus OC43 Not Detected Not Detected    COVID19 Not Detected Not Detected - Ref. Range    Human Metapneumovirus Not Detected Not Detected    Human Rhinovirus/Enterovirus Not Detected Not Detected    Influenza A H3 Detected (A) Not Detected    Influenza B PCR Not Detected Not Detected    Parainfluenza Virus 1 Not Detected Not Detected    Parainfluenza Virus 2 Not Detected Not Detected    Parainfluenza Virus 3 Not Detected Not Detected    Parainfluenza Virus 4 Not Detected Not Detected    RSV, PCR Not Detected Not Detected    Bordetella pertussis pcr Not Detected Not Detected    Bordetella parapertussis PCR Not Detected Not Detected    Chlamydophila pneumoniae PCR Not Detected Not Detected    Mycoplasma pneumo by PCR Not Detected Not Detected   High Sensitivity Troponin T 1Hr    Collection Time: 01/02/25 11:54 AM    Specimen: Blood   Result Value Ref Range    HS Troponin T 126 (C) <14 ng/L    Troponin T Numeric Delta 8 ng/L    Troponin T % Delta 7 Abnormal if >/= 20% %       Radiology  XR Chest 1 View    Result Date: 1/2/2025  Emergency portable view of the chest on 1/2/2025  CLINICAL HISTORY: Chest pain  This is correlated to a prior portable chest x-ray on 8/3/2022.  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 The cardiomediastinal silhouette and the pulmonary vasculature are within normal limits. The lungs are clear. The costophrenic angles are sharp.      1. No active disease is seen in the chest with no significant change when compared to this patient's prior chest x-ray on 8/3/2022. The etiology of this patient's acute chest pain is not established on this exam  2. 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  This report was  finalized on 1/2/2025 11:01 AM by Dr. Rolando Hernandez M.D on Workstation: [id number]       Medications given in the ED:  Medications   sodium chloride 0.9 % flush 10 mL (has no administration in time range)   aspirin tablet 325 mg (325 mg Oral Given 1/2/25 1157)   oseltamivir (TAMIFLU) capsule 30 mg (30 mg Oral Given 1/2/25 1319)       Orders placed during this visit:  Orders Placed This Encounter   Procedures    Respiratory Panel PCR w/COVID-19(SARS-CoV-2) [facility]DO/[facility] In-House, NP Swab in UTM/VTM, 2 HR TAT - Swab, Nasopharynx    XR Chest 1 View    [place] Draw    Comprehensive Metabolic Panel    High Sensitivity Troponin T    CBC Auto Differential    High Sensitivity Troponin T 1Hr    NPO Diet NPO Type: Strict NPO    Undress & Gown    Continuous Pulse Oximetry    Code Status and Medical Interventions: CPR (Attempt to Resuscitate); Full Support    Cardiology (on-call MD unless specified)    Family Medicine Consult    [facility] (on-call MD unless specified) Details    Inpatient Cardiology Consult    Oxygen Therapy- Nasal Cannula; Titrate 1-6 LPM Per SpO2; 90 - 95%    ECG 12 Lead ED Triage Standing Order; Chest Pain    ECG 12 Lead ED Triage Standing Order; Chest Pain    Insert Peripheral IV    Initiate Observation Status    CBC & Differential    Green Top (Gel)    Lavender Top    Gold Top - SST    Light Blue Top       Medical Decision Making:  ED Course as of 01/02/25 1508   Thu Jan 02, 2025   1135 Patient presents with a 2-day history of cough and intermittent shortness of breath and pain with coughing spells.  No pain at rest.  Low-grade fever.  Multiple ill contacts.  Plan for basic labs, EKG. [EE]   1137 Chest x-ray independently interpreted myself shows no acute infiltrate. [EE]   1143 HS Troponin T(!!): 118 [EE]   1143 EKG independently interpreted myself.  Time 1030.  Atrial paced rhythm, rate 84.  Similar to previous EKG from 11/9/2023. [EE]   1150 The patient does have an elevated troponin.  She denies  any active chest pain at this time.  She did have a similar presentation 2 years ago and ultimately received a stent.  I will discuss with cardiology. [EE]   1155 Influenza A H3(!): Detected [EE]   1226 Troponin T % Delta: 7 [EE]   1252 I discussed the case with Dr. Locke.  Given patient's elevated troponin he would prefer to admit the patient.  I will admit to medicine. [EE]   1324 I discussed the case with Dr. Dunn, [facility].  He agrees to admit. [EE]      ED Course User Index  [EE] Dennis Hernandez, PA         Diagnosis  Final diagnoses:   Influenza A   Elevated troponin   History of CAD (coronary artery disease)          Kiran Vega II, MD  01/02/25 [id number]

## 2025-01-02 NOTE — ED NOTES
Nursing report ED to floor  Yuliana Gonzalez  89 y.o.  female    HPI :  HPI  Stated Reason for Visit: cough CP  History Obtained From: EMS, patient    Chief Complaint  Chief Complaint   Patient presents with    Chest Pain    Cough       Admitting doctor:   Benjamin Dunn MD    Admitting diagnosis:   The primary encounter diagnosis was Influenza A. Diagnoses of Elevated troponin and History of CAD (coronary artery disease) were also pertinent to this visit.    Code status:   Current Code Status       Date Active Code Status Order ID Comments User Context       1/2/2025 1446 CPR (Attempt to Resuscitate) [id number]  Benjamin Dunn MD ED        Question Answer    Code Status (Patient has no pulse and is not breathing) CPR (Attempt to Resuscitate)    Medical Interventions (Patient has pulse or is breathing) Full Support                    Allergies:   Hydrochlorothiazide, Hydrocodone, Lipitor [atorvastatin], Erythromycin, and Lisinopril    Isolation:   Droplet    Intake and Output  No intake or output data in the 24 hours ending 01/02/25 1456    Weight:   There were no vitals filed for this visit.    Most recent vitals:   Vitals:    01/02/25 1030 01/02/25 1119 01/02/25 1131 01/02/25 1320   BP: 154/67 138/63 143/73 123/68   BP Location:       Patient Position:       Pulse:  83 81    Resp:       Temp:       SpO2:  95% 95%        Active LDAs/IV Access:   Lines, Drains & Airways       Active LDAs       Name Placement date Placement time Site Days    Peripheral IV 01/02/25 1153 Anterior;Right Forearm 01/02/25  1153  Forearm  less than 1                    Labs (abnormal labs have a star):   Labs Reviewed   RESPIRATORY PANEL PCR W/ COVID-19 (SARS-COV-2), NP SWAB IN UTM/VTP, 2 HR TAT - Abnormal; Notable for the following components:       Result Value    Influenza A H3 Detected (*)     All other components within normal limits    Narrative:     In the setting of a positive respiratory panel with a viral infection PLUS  a negative procalcitonin without other underlying concern for bacterial infection, consider observing off antibiotics or discontinuation of antibiotics and continue supportive care. If the respiratory panel is positive for atypical bacterial infection (Bordetella pertussis, Chlamydophila pneumoniae, or Mycoplasma pneumoniae), consider antibiotic de-escalation to target atypical bacterial infection.   COMPREHENSIVE METABOLIC PANEL - Abnormal; Notable for the following components:    Glucose 163 (*)     AST (SGOT) 45 (*)     All other components within normal limits    Narrative:     GFR Categories in Chronic Kidney Disease (CKD)      GFR Category          GFR (mL/min/1.73)    Interpretation  G1                     90 or greater         Normal or high (1)  G2                      60-89                Mild decrease (1)  G3a                   45-59                Mild to moderate decrease  G3b                   30-44                Moderate to severe decrease  G4                    15-29                Severe decrease  G5                    14 or less           Kidney failure          (1)In the absence of evidence of kidney disease, neither GFR category G1 or G2 fulfill the criteria for CKD.    eGFR calculation 2021 CKD-EPI creatinine equation, which does not include race as a factor   TROPONIN - Abnormal; Notable for the following components:    HS Troponin T 118 (*)     All other components within normal limits    Narrative:     High Sensitive Troponin T Reference Range:  <14.0 ng/L- Negative Female for AMI  <22.0 ng/L- Negative Male for AMI  >=14 - Abnormal Female indicating possible myocardial injury.  >=22 - Abnormal Male indicating possible myocardial injury.   Clinicians would have to utilize clinical acumen, EKG, Troponin, and serial changes to determine if it is an Acute Myocardial Infarction or myocardial injury due to an underlying chronic condition.        CBC WITH AUTO DIFFERENTIAL - Abnormal; Notable for  the following components:    Platelets 135 (*)     Neutrophil % 81.6 (*)     Lymphocyte % 7.6 (*)     Eosinophil % 0.0 (*)     Lymphocytes, Absolute 0.54 (*)     All other components within normal limits   HIGH SENSITIVITIY TROPONIN T 1HR - Abnormal; Notable for the following components:    HS Troponin T 126 (*)     All other components within normal limits    Narrative:     High Sensitive Troponin T Reference Range:  <14.0 ng/L- Negative Female for AMI  <22.0 ng/L- Negative Male for AMI  >=14 - Abnormal Female indicating possible myocardial injury.  >=22 - Abnormal Male indicating possible myocardial injury.   Clinicians would have to utilize clinical acumen, EKG, Troponin, and serial changes to determine if it is an Acute Myocardial Infarction or myocardial injury due to an underlying chronic condition.        RAINBOW DRAW    Narrative:     The following orders were created for panel order [place] Draw.  Procedure                               Abnormality         Status                     ---------                               -----------         ------                     Green Top (Gel)[[id number]]                                  Final result               Lavender Top[[id number]]                                     Final result               Gold Top - SST[[id number]]                                   Final result               Light Blue Top[[id number]]                                   Final result                 Please view results for these tests on the individual orders.   CBC AND DIFFERENTIAL    Narrative:     The following orders were created for panel order CBC & Differential.  Procedure                               Abnormality         Status                     ---------                               -----------         ------                     CBC Auto Differential[[id number]]        Abnormal            Final result                 Please view results for these tests on the individual orders.    GREEN TOP   LAVENDER TOP   GOLD TOP - SST   LIGHT BLUE TOP       EKG:   ECG 12 Lead ED Triage Standing Order; Chest Pain   Preliminary Result   HEART RATE=84  bpm   RR Xuljaquq=474  ms   HI Wehkjggb=035  ms   P Horizontal Axis=-15  deg   P Front Axis=86  deg   QRSD Dlhhxhiu=289  ms   QT Bshicxfi=695  ms   YAxU=155  ms   QRS Axis=164  deg   T Wave Axis=4  deg   - ABNORMAL ECG -   Atrial-sensed ventricular-paced complexes   No further analysis attempted due to paced rhythm   Date and Time of Study:2025 10:30:40          Meds given in ED:   Medications   sodium chloride 0.9 % flush 10 mL (has no administration in time range)   aspirin tablet 325 mg (325 mg Oral Given 25 1157)   oseltamivir (TAMIFLU) capsule 30 mg (30 mg Oral Given 25 1319)       Imaging results:  XR Chest 1 View    Result Date: 2025  1. No active disease is seen in the chest with no significant change when compared to this patient's prior chest x-ray on 8/3/2022. The etiology of this patient's acute chest pain is not established on this exam  2. 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  This report was finalized on 2025 11:01 AM by Dr. Rolando Hernandez M.D on Workstation: [id number]       Ambulatory status:   - up at [facility]    Social issues:   Social History     Socioeconomic History    Marital status:      Spouse name: Stephan    Number of children: 4   Tobacco Use    Smoking status: Former     Current packs/day: 0.00     Types: Cigarettes     Start date:      Quit date:      Years since quittin.0     Passive exposure: Past    Smokeless tobacco: Never    Tobacco comments:     1 pack every 2 weeks   Vaping Use    Vaping status: Never Used   Substance and Sexual Activity    Alcohol use: Yes     Comment: wine - rare / no caffeine    Drug use: No    Sexual activity: Defer       Peripheral Neurovascular  Peripheral Neurovascular (Adult)  Peripheral Neurovascular WDL:  WDL    Neuro Cognitive  Neuro Cognitive (Adult)  Cognitive/Neuro/Behavioral WDL: WDL    Learning  Learning Assessment  Learning Readiness and Ability: no barriers identified    Respiratory  Respiratory WDL  Respiratory WDL: .WDL except, all  Rhythm/Pattern, Respiratory: shortness of breath  Cough Frequency: frequent  Cough Type: dry    Abdominal Pain       Pain Assessments  Pain (Adult)  (0-10) Pain Rating: Rest: 0  (0-10) Pain Rating: Activity: 0  Response to Pain Interventions: interventions effective per patient    NIH Stroke Scale       Franny Wood RN  01/02/25 14:56 EST

## 2025-01-02 NOTE — H&P
"HISTORY AND PHYSICAL   [facility]        Patient Identification:  Name: Yuliana Gonzalez  Age: 89 y.o.  Sex: female  :  1930  MRN: [id number]                     Primary Care Physician: Trent Davis MD    Chief Complaint: Cough, chest pain.    History of Present Illness:   Pleasant 89-year-old female states she began with a persistent cough in the wee hours of the more  New Year's Day.  Cough was productive of only clear sputum.  No shortness of breath.  No fever sweats or chills.  She did notice chest discomfort with the cough which she describes as a muscle soreness around the chest.  No chest pain when she is not coughing.  No shortness of air.  She is up-to-date on her flu vaccine.  At present she has no acute complaints.  She notes however that she presented with influenza a year or 2 ago and was noted to have an elevated troponin and was diagnosed with non-ST elevated MI during that hospitalization.  She has a history of coronary disease with previous stent placement.  Also history of aortic stenosis with aortic valve replacement.  She also has a history of AV block with a atrial sensed ventricular paced patient.      Past Medical History:  Past Medical History:   Diagnosis Date    Aortic stenosis 2009    diagnosed,  moderate with 0.9cm area, 42mm max gradient. 0.91/34 6/15.    Basal cell carcinoma (BCC) of nasal tip     Central loss of vision     RIGHT EYE    Cervical arthritis     Colitis     HX    Coronary artery disease     History of constipation     History of esophageal reflux     History of skin cancer     Hyperlipidemia     Hypertension     IGT (impaired glucose tolerance)     Irregular heart beat     HX    Macular degeneration     Medication management     On continuous oral anticoagulation     Redness of skin     NOSE/DERMATOLOGY TREATING WITH FLAGYL CREAM/ADVISED PT TO NOTIFY CARDIOLOGY    Sciatica     HX    Short-term memory loss     \"MILD\"    SOB (shortness of " breath) on exertion     Vitamin D deficiency      Past Surgical History:  Past Surgical History:   Procedure Laterality Date    AORTIC VALVE REPAIR/REPLACEMENT Left 3/15/2022    Procedure: TTE TRANSFEMORAL TRANSCATHETER AORTIC VALVE REPLACEMENT PERCUTANEOUS APPROACH;  Surgeon: Vesna Chris MD;  Location: [facility];  Service: Cardiothoracic;  Laterality: Left;    AORTIC VALVE REPAIR/REPLACEMENT Left 3/15/2022    Procedure: Transfemoral Transcatheter Aortic Valve Replacement with intra operative TTE and possible open surgical rescue;  Surgeon: Clint Locke MD;  Location: [facility];  Service: Cardiovascular;  Laterality: Left;    CARDIAC CATHETERIZATION N/A 1/21/2022    Procedure: Coronary angiography;  Surgeon: Clint Locke MD;  Location: [facility] INVASIVE LOCATION;  Service: Cardiovascular;  Laterality: N/A;    CARDIAC CATHETERIZATION N/A 1/21/2022    Procedure: Percutaneous Coronary Intervention;  Surgeon: Clint Locke MD;  Location: [facility] CATH INVASIVE LOCATION;  Service: Cardiovascular;  Laterality: N/A;    CARDIAC CATHETERIZATION N/A 1/21/2022    Procedure: Stent ISMAEL coronary;  Surgeon: Clint Locke MD;  Location: [facility] INVASIVE LOCATION;  Service: Cardiovascular;  Laterality: N/A;    CARDIAC CATHETERIZATION Left 7/31/2022    Procedure: CORONARY ANGIOGRAPHY;  Surgeon: Jeannie Echevarria MD;  Location: [facility] INVASIVE LOCATION;  Service: Cardiology;  Laterality: Left;    CARDIAC CATHETERIZATION N/A 7/31/2022    Procedure: Left ventriculography;  Surgeon: Jeannie Echevarria MD;  Location: [facility] INVASIVE LOCATION;  Service: Cardiology;  Laterality: N/A;    CARDIAC CATHETERIZATION N/A 7/31/2022    Procedure: Left Heart Cath;  Surgeon: Jeannie Echevarria MD;  Location: [facility] CATH INVASIVE LOCATION;  Service: Cardiology;  Laterality: N/A;    CARDIAC CATHETERIZATION N/A 8/4/2023    Procedure: Left Heart Cath;  Surgeon: Clint Locke MD;  Location:   "[facility] CATH INVASIVE LOCATION;  Service: Cardiovascular;  Laterality: N/A;    CARDIAC CATHETERIZATION N/A 8/4/2023    Procedure: Percutaneous Coronary Intervention;  Surgeon: Clint Locke MD;  Location:  [facility] CATH INVASIVE LOCATION;  Service: Cardiovascular;  Laterality: N/A;    CARDIAC CATHETERIZATION N/A 8/4/2023    Procedure: Stent ISMAEL coronary;  Surgeon: Clint Locke MD;  Location:  [facility] CATH INVASIVE LOCATION;  Service: Cardiovascular;  Laterality: N/A;    CARDIAC CATHETERIZATION N/A 8/4/2023    Procedure: Coronary angiography;  Surgeon: Clint Locke MD;  Location:  [facility] CATH INVASIVE LOCATION;  Service: Cardiovascular;  Laterality: N/A;    CARDIAC ELECTROPHYSIOLOGY PROCEDURE N/A 3/16/2022    Procedure: Pacemaker DC new Rippey;  Surgeon: Yann Ceballos MD;  Location:  [facility] CATH INVASIVE LOCATION;  Service: Cardiology;  Laterality: N/A;    CATARACT EXTRACTION, BILATERAL      COLONOSCOPY      EYE SURGERY      SKIN CANCER EXCISION        Home Meds:  (Not in a hospital admission)      Allergies:  Allergies   Allergen Reactions    Hydrochlorothiazide Other (See Comments)     hyponatremia    Hydrocodone Shortness Of Breath     Also reports \"lethargy\"    Lipitor [Atorvastatin] Nausea Only     \"FELT AWFUL\"    Erythromycin Diarrhea    Lisinopril Cough     Immunizations:  Immunization History   Administered Date(s) Administered    Arexvy (RSV, Adults 60+ yrs) 01/04/2024    COVID-19 (PFIZER) 12YRS+ (COMIRNATY) 10/24/2023    COVID-19 (PFIZER) Purple Cap Monovalent 02/09/2021, 03/02/2021, 11/02/2021    COVID-19 (UNSPECIFIED) 09/25/2024    Fluad Quad 65+ 09/25/2020    Fluzone High-Dose 65+YRS 10/03/2016, 09/28/2018, 10/04/2019    Fluzone High-Dose 65+yrs 09/20/2021, 09/28/2022, 09/25/2024    Influenza TIV (IM) 10/03/2016    Pneumococcal Conjugate 13-Valent (PCV13) 11/30/2016    Pneumococcal Polysaccharide (PPSV23) 11/01/2000    Td (TDVAX) 07/01/2005    Tdap 03/01/2017    Zostavax " 2007     Social History:   Social History     Social History Narrative    Not on file     Social History     Tobacco Use    Smoking status: Former     Current packs/day: 0.00     Types: Cigarettes     Start date:      Quit date:      Years since quittin.0     Passive exposure: Past    Smokeless tobacco: Never    Tobacco comments:     1 pack every 2 weeks   Substance Use Topics    Alcohol use: Yes     Comment: wine - rare / no caffeine     Family History:  Family History   Problem Relation Age of Onset    Aortic stenosis Mother     Hypertension Mother     Heart failure Father     Diabetes Father     Hypertension Father     No Known Problems Sister     No Known Problems Daughter     No Known Problems Son     Aneurysm Sister     Other Sister         Polio in her 20's    Malig Hyperthermia Neg Hx         Review of Systems  Review of Systems   Constitutional: Negative.    HENT: Negative.     Eyes: Negative.    Respiratory:          As per history of present illness.   Cardiovascular:         As per history of present illness.   Gastrointestinal: Negative.    Endocrine: Negative.    Genitourinary: Negative.    Musculoskeletal: Negative.    Skin: Negative.    Allergic/Immunologic: Negative.    Neurological: Negative.    Hematological: Negative.    Psychiatric/Behavioral: Negative.         Objective:  T Max 24 hrs: Temp (24hrs), Av.3 °F (37.4 °C), Min:98.4 °F (36.9 °C), Max:100.1 °F (37.8 °C)    Vitals Ranges:   Temp:  [98.4 °F (36.9 °C)-100.1 °F (37.8 °C)] 100.1 °F (37.8 °C)  Heart Rate:  [] 81  Resp:  [18-26] 18  BP: (123-165)/(61-75) 123/68      Exam:  Physical Exam  Constitutional:       General: She is not in acute distress.     Appearance: Normal appearance. She is not ill-appearing, toxic-appearing or diaphoretic.   HENT:      Head: Normocephalic and atraumatic.      Right Ear: External ear normal.      Left Ear: External ear normal.      Nose: Nose normal.      Mouth/Throat:      Mouth:  Mucous membranes are moist.   Eyes:      General: No scleral icterus.        Right eye: No discharge.         Left eye: No discharge.      Extraocular Movements: Extraocular movements intact.      Conjunctiva/sclera: Conjunctivae normal.   Cardiovascular:      Rate and Rhythm: Normal rate and regular rhythm.      Heart sounds:      No friction rub. No gallop.   Pulmonary:      Effort: Pulmonary effort is normal.      Breath sounds: Normal breath sounds.   Abdominal:      General: Abdomen is flat. There is no distension.      Palpations: Abdomen is soft. There is no mass.      Tenderness: There is no abdominal tenderness. There is no guarding or rebound.   Musculoskeletal:      Cervical back: Neck supple.      Right lower leg: No edema.      Left lower leg: No edema.   Skin:     General: Skin is warm and dry.   Neurological:      General: No focal deficit present.      Mental Status: She is alert and oriented to person, place, and time.   Psychiatric:         Mood and Affect: Mood normal.         Behavior: Behavior normal.         Thought Content: Thought content normal.         Judgment: Judgment normal.         Data Review:  All labs and radiology reviewed.    Assessment:  Influenza A H3: Initiate Tamiflu.  Elevated troponin: EKG unremarkable.  Cardiology consultation.  Chest pain: By history appears to musculoskeletal in origin.  Impaired glucose tolerance: Monitor.  SSI if, when needed.  Hypertension: Continue home meds.  Monitor.      Plan:  Please see above.  Discussed with patient and daughter at bedside.  Discussed with ER provider.    Benjamin [facility] MD  1/2/2025  14:36 EST    EMR Dragon/Transcription disclaimer:   Much of this encounter note is an electronic transcription/translation of spoken language to printed text. The electronic translation of spoken language may permit erroneous, or at times, nonsensical words or phrases to be inadvertently transcribed; Although I have reviewed the note for such  errors, some may still exist.

## 2025-01-02 NOTE — ED PROVIDER NOTES
EMERGENCY DEPARTMENT ENCOUNTER    Room Number:  06/06  Date of encounter:  1/2/2025  PCP: Trent Davis MD  Historian: Patient, daughter  Chronic or social conditions impacting care (social determinants of health): Full code from home    HPI:  Chief Complaint: Cough  A complete HPI/ROS/PMH/PSH/SH/FH are unobtainable due to: Nothing    Context: Yuliana Gonzalez is a 89 y.o. female with a history of coronary artery disease aortic valve replacement, who presents to the ED c/o acute cough x 2 days.  Patient lives at an assisted living facility.  She reports several ill contacts.  She complains of intermittent cough with subsequent chest pain and shortness of breath with her coughing spells.  She reports that in between her spells she does not have any chest pain.  She denies any overt fever.  She did have a similar presentation 1.5 years ago when she was in the hospital for viral URI and ultimately received a stent.    Review of prior external notes (non-ED):   I reviewed admission from 7/31/2023.  Patient admitted for NSTEMI.    Review of prior external test results outside of this encounter:  I reviewed a heart cath dated 8/4/2023.  This showed a mid LAD lesion.  This was treated with a stent.    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    Presence of cardiac pacemaker 03/24/2022    ST elevation myocardial infarction (STEMI) 07/31/2022    NSTEMI (non-ST elevated myocardial  infarction) 07/31/2023    Hyperglycemia 07/31/2023    Acute cystitis without hematuria 11/15/2023    S/P drug eluting coronary stent placement 05/24/2024     Resolved Ambulatory Problems     Diagnosis Date Noted    De Quervain's disease (tenosynovitis) 09/15/2016    Cough 01/10/2019    Chronic diastolic congestive heart failure 01/28/2022    Aortic stenosis, severe 03/15/2022    Hyponatremia 08/03/2022    Elevated LFTs 08/03/2022    Right distal ureteral calculus 08/03/2022    Leukocytosis 08/03/2022    Hydronephrosis of right kidney 08/03/2022    UTI (urinary tract infection) 08/06/2022    Elevated troponin 08/01/2023    Chest pain 08/01/2023     Past Medical History:   Diagnosis Date    Basal cell carcinoma (BCC) of nasal tip     Central loss of vision     Colitis     Coronary artery disease     History of constipation     History of esophageal reflux     History of skin cancer     Irregular heart beat     Macular degeneration     Medication management     On continuous oral anticoagulation     Redness of skin     Short-term memory loss     SOB (shortness of breath) on exertion          PAST SURGICAL HISTORY  Past Surgical History:   Procedure Laterality Date    AORTIC VALVE REPAIR/REPLACEMENT Left 3/15/2022    Procedure: TTE TRANSFEMORAL TRANSCATHETER AORTIC VALVE REPLACEMENT PERCUTANEOUS APPROACH;  Surgeon: Vesna Chris MD;  Location: [facility];  Service: Cardiothoracic;  Laterality: Left;    AORTIC VALVE REPAIR/REPLACEMENT Left 3/15/2022    Procedure: Transfemoral Transcatheter Aortic Valve Replacement with intra operative TTE and possible open surgical rescue;  Surgeon: Clint Locke MD;  Location: [facility];  Service: Cardiovascular;  Laterality: Left;    CARDIAC CATHETERIZATION N/A 1/21/2022    Procedure: Coronary angiography;  Surgeon: Clint Locke MD;  Location: [facility] CATH INVASIVE LOCATION;  Service: Cardiovascular;  Laterality: N/A;    CARDIAC CATHETERIZATION N/A 1/21/2022     Procedure: Percutaneous Coronary Intervention;  Surgeon: Clint Locke MD;  Location:  [facility] CATH INVASIVE LOCATION;  Service: Cardiovascular;  Laterality: N/A;    CARDIAC CATHETERIZATION N/A 1/21/2022    Procedure: Stent ISMAEL coronary;  Surgeon: Clint Locke MD;  Location:  [facility] CATH INVASIVE LOCATION;  Service: Cardiovascular;  Laterality: N/A;    CARDIAC CATHETERIZATION Left 7/31/2022    Procedure: CORONARY ANGIOGRAPHY;  Surgeon: Jeannie Echevarria MD;  Location:  [facility] CATH INVASIVE LOCATION;  Service: Cardiology;  Laterality: Left;    CARDIAC CATHETERIZATION N/A 7/31/2022    Procedure: Left ventriculography;  Surgeon: Jeannie Echevarria MD;  Location:  [facility] CATH INVASIVE LOCATION;  Service: Cardiology;  Laterality: N/A;    CARDIAC CATHETERIZATION N/A 7/31/2022    Procedure: Left Heart Cath;  Surgeon: Jeannie Echevarria MD;  Location:  [facility] CATH INVASIVE LOCATION;  Service: Cardiology;  Laterality: N/A;    CARDIAC CATHETERIZATION N/A 8/4/2023    Procedure: Left Heart Cath;  Surgeon: Clint Locke MD;  Location: [facility] CATH INVASIVE LOCATION;  Service: Cardiovascular;  Laterality: N/A;    CARDIAC CATHETERIZATION N/A 8/4/2023    Procedure: Percutaneous Coronary Intervention;  Surgeon: Clint Locke MD;  Location:  [facility] CATH INVASIVE LOCATION;  Service: Cardiovascular;  Laterality: N/A;    CARDIAC CATHETERIZATION N/A 8/4/2023    Procedure: Stent ISMAEL coronary;  Surgeon: Clint Locke MD;  Location: [facility] CATH INVASIVE LOCATION;  Service: Cardiovascular;  Laterality: N/A;    CARDIAC CATHETERIZATION N/A 8/4/2023    Procedure: Coronary angiography;  Surgeon: Clint Locke MD;  Location:  [facility] CATH INVASIVE LOCATION;  Service: Cardiovascular;  Laterality: N/A;    CARDIAC ELECTROPHYSIOLOGY PROCEDURE N/A 3/16/2022    Procedure: Pacemaker DC new [place];  Surgeon: Yann Ceballos MD;  Location:  [facility] CATH INVASIVE LOCATION;  Service: Cardiology;  Laterality: N/A;     CATARACT EXTRACTION, BILATERAL      COLONOSCOPY      EYE SURGERY      SKIN CANCER EXCISION           FAMILY HISTORY  Family History   Problem Relation Age of Onset    Aortic stenosis Mother     Hypertension Mother     Heart failure Father     Diabetes Father     Hypertension Father     No Known Problems Sister     No Known Problems Daughter     No Known Problems Son     Aneurysm Sister     Other Sister         Polio in her 20's    Malig Hyperthermia Neg Hx          SOCIAL HISTORY  Social History     Socioeconomic History    Marital status:      Spouse name: Stephan    Number of children: 4   Tobacco Use    Smoking status: Former     Current packs/day: 0.00     Types: Cigarettes     Start date:      Quit date:      Years since quittin.0     Passive exposure: Past    Smokeless tobacco: Never    Tobacco comments:     1 pack every 2 weeks   Vaping Use    Vaping status: Never Used   Substance and Sexual Activity    Alcohol use: Yes     Comment: wine - rare / no caffeine    Drug use: No    Sexual activity: Defer         ALLERGIES  Hydrochlorothiazide, Hydrocodone, Lipitor [atorvastatin], Erythromycin, and Lisinopril        REVIEW OF SYSTEMS  All systems reviewed and negative except for those discussed in HPI.       PHYSICAL EXAM    I have reviewed the triage vital signs and nursing notes.    ED Triage Vitals [25 1024]   Temp Heart Rate Resp BP SpO2   100.1 °F (37.8 °C) 87 18 146/61 95 %      Temp src Heart Rate Source Patient Position BP Location FiO2 (%)   -- -- Lying Right arm --       Physical Exam  GENERAL: Alert, oriented, well-appearing, not distressed  HENT: head atraumatic, no nuchal rigidity  EYES: no scleral icterus, EOMI  CV: regular rhythm, regular rate  RESPIRATORY: normal effort, CTA  ABDOMEN: soft, nontender  MUSCULOSKELETAL: no deformity, FROM, no calf swelling or tenderness  NEURO: alert, moves all extremities, follows commands  SKIN: warm, dry        LAB RESULTS  Recent Results  (from the past 24 hours)   ECG 12 Lead ED Triage Standing Order; Chest Pain    Collection Time: 01/02/25 10:30 AM   Result Value Ref Range    QT Interval 392 ms    QTC Interval 464 ms   Comprehensive Metabolic Panel    Collection Time: 01/02/25 10:34 AM    Specimen: Blood   Result Value Ref Range    Glucose 163 (H) 65 - 99 mg/dL    BUN 11 8 - 23 mg/dL    Creatinine 0.79 0.57 - 1.00 mg/dL    Sodium 136 136 - 145 mmol/L    Potassium 3.8 3.5 - 5.2 mmol/L    Chloride 100 98 - 107 mmol/L    CO2 24.0 22.0 - 29.0 mmol/L    Calcium 8.7 8.2 - 9.6 mg/dL    Total Protein 6.8 6.0 - 8.5 g/dL    Albumin 4.3 3.5 - 5.2 g/dL    ALT (SGPT) 27 1 - 33 U/L    AST (SGOT) 45 (H) 1 - 32 U/L    Alkaline Phosphatase 76 39 - 117 U/L    Total Bilirubin 0.5 0.0 - 1.2 mg/dL    Globulin 2.5 gm/dL    A/G Ratio 1.7 g/dL    BUN/Creatinine Ratio 13.9 7.0 - 25.0    Anion Gap 12.0 5.0 - 15.0 mmol/L    eGFR 69.4 >60.0 mL/min/1.73   High Sensitivity Troponin T    Collection Time: 01/02/25 10:34 AM    Specimen: Blood   Result Value Ref Range    HS Troponin T 118 (C) <14 ng/L   Green Top (Gel)    Collection Time: 01/02/25 10:34 AM   Result Value Ref Range    Extra Tube Hold for add-ons.    Lavender Top    Collection Time: 01/02/25 10:34 AM   Result Value Ref Range    Extra Tube hold for add-on    Gold Top - SST    Collection Time: 01/02/25 10:34 AM   Result Value Ref Range    Extra Tube Hold for add-ons.    Light Blue Top    Collection Time: 01/02/25 10:34 AM   Result Value Ref Range    Extra Tube Hold for add-ons.    CBC Auto Differential    Collection Time: 01/02/25 10:34 AM    Specimen: Blood   Result Value Ref Range    WBC 7.15 3.40 - 10.80 10*3/mm3    RBC 4.58 3.77 - 5.28 10*6/mm3    Hemoglobin 13.6 12.0 - 15.9 g/dL    Hematocrit 39.5 34.0 - 46.6 %    MCV 86.2 79.0 - 97.0 fL    MCH 29.7 26.6 - 33.0 pg    MCHC 34.4 31.5 - 35.7 g/dL    RDW 12.4 12.3 - 15.4 %    RDW-SD 38.9 37.0 - 54.0 fl    MPV 10.7 6.0 - 12.0 fL    Platelets 135 (L) 140 - 450 10*3/mm3     Neutrophil % 81.6 (H) 42.7 - 76.0 %    Lymphocyte % 7.6 (L) 19.6 - 45.3 %    Monocyte % 10.6 5.0 - 12.0 %    Eosinophil % 0.0 (L) 0.3 - 6.2 %    Basophil % 0.1 0.0 - 1.5 %    Immature Grans % 0.1 0.0 - 0.5 %    Neutrophils, Absolute 5.83 1.70 - 7.00 10*3/mm3    Lymphocytes, Absolute 0.54 (L) 0.70 - 3.10 10*3/mm3    Monocytes, Absolute 0.76 0.10 - 0.90 10*3/mm3    Eosinophils, Absolute 0.00 0.00 - 0.40 10*3/mm3    Basophils, Absolute 0.01 0.00 - 0.20 10*3/mm3    Immature Grans, Absolute 0.01 0.00 - 0.05 10*3/mm3   Respiratory Panel PCR w/COVID-19(SARS-CoV-2) [facility] In-House, NP Swab in [facility], 2 HR TAT - Swab, Nasopharynx    Collection Time: 01/02/25 10:34 AM    Specimen: Nasopharynx; Swab   Result Value Ref Range    ADENOVIRUS, PCR Not Detected Not Detected    Coronavirus 229E Not Detected Not Detected    Coronavirus HKU1 Not Detected Not Detected    Coronavirus NL63 Not Detected Not Detected    Coronavirus OC43 Not Detected Not Detected    COVID19 Not Detected Not Detected - Ref. Range    Human Metapneumovirus Not Detected Not Detected    Human Rhinovirus/Enterovirus Not Detected Not Detected    Influenza A H3 Detected (A) Not Detected    Influenza B PCR Not Detected Not Detected    Parainfluenza Virus 1 Not Detected Not Detected    Parainfluenza Virus 2 Not Detected Not Detected    Parainfluenza Virus 3 Not Detected Not Detected    Parainfluenza Virus 4 Not Detected Not Detected    RSV, PCR Not Detected Not Detected    Bordetella pertussis pcr Not Detected Not Detected    Bordetella parapertussis PCR Not Detected Not Detected    Chlamydophila pneumoniae PCR Not Detected Not Detected    Mycoplasma pneumo by PCR Not Detected Not Detected   High Sensitivity Troponin T 1Hr    Collection Time: 01/02/25 11:54 AM    Specimen: Blood   Result Value Ref Range    HS Troponin T 126 (C) <14 ng/L    Troponin T Numeric Delta 8 ng/L    Troponin T % Delta 7 Abnormal if >/= 20% %       Ordered the above labs  and independently reviewed the results.        RADIOLOGY  XR Chest 1 View    Result Date: 1/2/2025  Emergency portable view of the chest on 1/2/2025  CLINICAL HISTORY: Chest pain  This is correlated to a prior portable chest x-ray on 8/3/2022.  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 The cardiomediastinal silhouette and the pulmonary vasculature are within normal limits. The lungs are clear. The costophrenic angles are sharp.      1. No active disease is seen in the chest with no significant change when compared to this patient's prior chest x-ray on 8/3/2022. The etiology of this patient's acute chest pain is not established on this exam  2. 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  This report was finalized on 1/2/2025 11:01 AM by Dr. Rolando Hernandez M.D on Workstation: [id number]       I ordered the above noted radiological studies. Reviewed by me and discussed with radiologist.  See dictation for official radiology interpretation.      MEDICATIONS GIVEN IN ER    Medications   sodium chloride 0.9 % flush 10 mL (has no administration in time range)   aspirin tablet 325 mg (325 mg Oral Given 1/2/25 1157)   oseltamivir (TAMIFLU) capsule 30 mg (30 mg Oral Given 1/2/25 1319)         ADDITIONAL ORDERS CONSIDERED BUT NOT ORDERED:  Nothing    PROGRESS, DATA ANALYSIS, CONSULTS, AND MEDICAL DECISION MAKING    All labs have been independently interpreted by myself.  All radiology studies have been independently interpreted by myself and discussed with radiologist dictating the report.   EKGs independently interpreted by myself.  Discussion below represents my analysis of pertinent findings related to patient's condition, differential diagnosis, treatment plan and final disposition.    I have discussed case with Dr. Gary Vega, emergency  room physician.  He has performed his own bedside examination and agrees with treatment plan.    ED Course as of 01/02/25 1553   Thu Jan 02, 2025   1135 Patient presents with a 2-day history of cough and intermittent shortness of breath and pain with coughing spells.  No pain at rest.  Low-grade fever.  Multiple ill contacts.  Plan for basic labs, EKG. [EE]   1137 Chest x-ray independently interpreted myself shows no acute infiltrate. [EE]   1143 HS Troponin T(!!): 118 [EE]   1143 EKG independently interpreted myself.  Time 1030.  Atrial paced rhythm, rate 84.  Similar to previous EKG from 11/9/2023. [EE]   1150 The patient does have an elevated troponin.  She denies any active chest pain at this time.  She did have a similar presentation 2 years ago and ultimately received a stent.  I will discuss with cardiology. [EE]   1155 Influenza A H3(!): Detected [EE]   1226 Troponin T % Delta: 7 [EE]   1252 I discussed the case with Dr. Locke.  Given patient's elevated troponin he would prefer to admit the patient.  I will admit to medicine. [EE]   1324 I discussed the case with Dr. Dunn, [facility].  He agrees to admit. [EE]      ED Course User Index  [EE] Dennis Hernandez PA       AS OF 15:53 EST VITALS:    BP - 123/68  HR - 81  TEMP - 100.1 °F (37.8 °C)  O2 SATS - 95%        DIAGNOSIS  Final diagnoses:   Influenza A   Elevated troponin   History of CAD (coronary artery disease)         DISPOSITION  Admitted      Dictated utilizing Dragon dictation     Dennis Hernandez PA  01/02/25 [id number]

## 2025-01-03 ENCOUNTER — APPOINTMENT (OUTPATIENT)
Dept: CARDIOLOGY | Facility: HOSPITAL | Age: OVER 89
DRG: 194 | End: 2025-01-03
Payer: MEDICARE

## 2025-01-03 PROBLEM — D69.6 THROMBOCYTOPENIA: Status: ACTIVE | Noted: 2025-01-03

## 2025-01-03 PROBLEM — I50.32 CHRONIC HEART FAILURE WITH PRESERVED EJECTION FRACTION (HFPEF): Status: ACTIVE | Noted: 2025-01-03

## 2025-01-03 LAB
ANION GAP SERPL CALCULATED.3IONS-SCNC: 9.1 MMOL/L (ref 5–15)
AORTIC DIMENSIONLESS INDEX: 0.79 (DI)
AV MEAN PRESS GRAD SYS DOP V1V2: 5.7 MMHG
AV VMAX SYS DOP: 158.3 CM/SEC
BASOPHILS # BLD AUTO: 0.02 10*3/MM3 (ref 0–0.2)
BASOPHILS NFR BLD AUTO: 0.3 % (ref 0–1.5)
BH CV ECHO AV AORTIC VALVE AT ACCEL TIME CALCULATED: 58 MSEC
BH CV ECHO MEAS - AI P1/2T: 410.7 MSEC
BH CV ECHO MEAS - AO MAX PG: 10 MMHG
BH CV ECHO MEAS - AO V2 VTI: 29.7 CM
BH CV ECHO MEAS - AT: 0.06 SEC
BH CV ECHO MEAS - AVA(I,D): 1.88 CM2
BH CV ECHO MEAS - EDV(CUBED): 103.3 ML
BH CV ECHO MEAS - EDV(MOD-SP2): 82 ML
BH CV ECHO MEAS - EDV(MOD-SP4): 79 ML
BH CV ECHO MEAS - EF(MOD-SP2): 53.7 %
BH CV ECHO MEAS - EF(MOD-SP4): 55.7 %
BH CV ECHO MEAS - ESV(CUBED): 17.7 ML
BH CV ECHO MEAS - ESV(MOD-SP2): 38 ML
BH CV ECHO MEAS - ESV(MOD-SP4): 35 ML
BH CV ECHO MEAS - FS: 44.5 %
BH CV ECHO MEAS - IVS/LVPW: 1.16 CM
BH CV ECHO MEAS - IVSD: 1.29 CM
BH CV ECHO MEAS - LAT PEAK E' VEL: 4.7 CM/SEC
BH CV ECHO MEAS - LV DIASTOLIC VOL/BSA (35-75): 46 CM2
BH CV ECHO MEAS - LV MASS(C)D: 211.4 GRAMS
BH CV ECHO MEAS - LV MAX PG: 6.5 MMHG
BH CV ECHO MEAS - LV MEAN PG: 3.8 MMHG
BH CV ECHO MEAS - LV SYSTOLIC VOL/BSA (12-30): 20.4 CM2
BH CV ECHO MEAS - LV V1 MAX: 127.7 CM/SEC
BH CV ECHO MEAS - LV V1 VTI: 23.8 CM
BH CV ECHO MEAS - LVIDD: 4.7 CM
BH CV ECHO MEAS - LVIDS: 2.6 CM
BH CV ECHO MEAS - LVOT AREA: 2.35 CM2
BH CV ECHO MEAS - LVOT DIAM: 1.73 CM
BH CV ECHO MEAS - LVPWD: 1.11 CM
BH CV ECHO MEAS - MED PEAK E' VEL: 4.9 CM/SEC
BH CV ECHO MEAS - MV A DUR: 0.13 SEC
BH CV ECHO MEAS - MV A MAX VEL: 138.7 CM/SEC
BH CV ECHO MEAS - MV DEC SLOPE: 319.1 CM/SEC2
BH CV ECHO MEAS - MV DEC TIME: 0.37 SEC
BH CV ECHO MEAS - MV E MAX VEL: 96.6 CM/SEC
BH CV ECHO MEAS - MV E/A: 0.7
BH CV ECHO MEAS - MV MAX PG: 8.3 MMHG
BH CV ECHO MEAS - MV MEAN PG: 3.3 MMHG
BH CV ECHO MEAS - MV P1/2T: 107.3 MSEC
BH CV ECHO MEAS - MV V2 VTI: 35.3 CM
BH CV ECHO MEAS - MVA(P1/2T): 2.05 CM2
BH CV ECHO MEAS - MVA(VTI): 1.58 CM2
BH CV ECHO MEAS - PA ACC TIME: 0.09 SEC
BH CV ECHO MEAS - PA V2 MAX: 126.9 CM/SEC
BH CV ECHO MEAS - PULM A REVS DUR: 0.12 SEC
BH CV ECHO MEAS - PULM A REVS VEL: 31.3 CM/SEC
BH CV ECHO MEAS - PULM DIAS VEL: 27.9 CM/SEC
BH CV ECHO MEAS - PULM S/D: 1.66
BH CV ECHO MEAS - PULM SYS VEL: 46.4 CM/SEC
BH CV ECHO MEAS - RAP SYSTOLE: 3 MMHG
BH CV ECHO MEAS - RV MAX PG: 7.1 MMHG
BH CV ECHO MEAS - RV V1 MAX: 132.9 CM/SEC
BH CV ECHO MEAS - RV V1 VTI: 23.9 CM
BH CV ECHO MEAS - RVSP: 29 MMHG
BH CV ECHO MEAS - SV(LVOT): 55.8 ML
BH CV ECHO MEAS - SV(MOD-SP2): 44 ML
BH CV ECHO MEAS - SV(MOD-SP4): 44 ML
BH CV ECHO MEAS - SVI(LVOT): 32.5 ML/M2
BH CV ECHO MEAS - SVI(MOD-SP2): 25.6 ML/M2
BH CV ECHO MEAS - SVI(MOD-SP4): 25.6 ML/M2
BH CV ECHO MEAS - TAPSE (>1.6): 2.4 CM
BH CV ECHO MEAS - TR MAX PG: 26.4 MMHG
BH CV ECHO MEAS - TR MAX VEL: 256.7 CM/SEC
BH CV ECHO MEASUREMENTS AVERAGE E/E' RATIO: 20.13
BH CV XLRA - RV BASE: 2.9 CM
BH CV XLRA - RV LENGTH: 6.2 CM
BH CV XLRA - RV MID: 2.37 CM
BH CV XLRA - TDI S': 13.3 CM/SEC
BUN SERPL-MCNC: 12 MG/DL (ref 8–23)
BUN/CREAT SERPL: 18.5 (ref 7–25)
CALCIUM SPEC-SCNC: 8.3 MG/DL (ref 8.2–9.6)
CHLORIDE SERPL-SCNC: 101 MMOL/L (ref 98–107)
CO2 SERPL-SCNC: 22.9 MMOL/L (ref 22–29)
CREAT SERPL-MCNC: 0.65 MG/DL (ref 0.57–1)
DEPRECATED RDW RBC AUTO: 40.5 FL (ref 37–54)
EGFRCR SERPLBLD CKD-EPI 2021: 81.7 ML/MIN/1.73
EOSINOPHIL # BLD AUTO: 0.02 10*3/MM3 (ref 0–0.4)
EOSINOPHIL NFR BLD AUTO: 0.3 % (ref 0.3–6.2)
ERYTHROCYTE [DISTWIDTH] IN BLOOD BY AUTOMATED COUNT: 12.9 % (ref 12.3–15.4)
GLUCOSE SERPL-MCNC: 99 MG/DL (ref 65–99)
HCT VFR BLD AUTO: 37.6 % (ref 34–46.6)
HGB BLD-MCNC: 12.7 G/DL (ref 12–15.9)
IMM GRANULOCYTES # BLD AUTO: 0.02 10*3/MM3 (ref 0–0.05)
IMM GRANULOCYTES NFR BLD AUTO: 0.3 % (ref 0–0.5)
LV EF BIPLANE MOD: 54.8 %
LYMPHOCYTES # BLD AUTO: 1.33 10*3/MM3 (ref 0.7–3.1)
LYMPHOCYTES NFR BLD AUTO: 23.2 % (ref 19.6–45.3)
MCH RBC QN AUTO: 29.4 PG (ref 26.6–33)
MCHC RBC AUTO-ENTMCNC: 33.8 G/DL (ref 31.5–35.7)
MCV RBC AUTO: 87 FL (ref 79–97)
MONOCYTES # BLD AUTO: 0.82 10*3/MM3 (ref 0.1–0.9)
MONOCYTES NFR BLD AUTO: 14.3 % (ref 5–12)
NEUTROPHILS NFR BLD AUTO: 3.53 10*3/MM3 (ref 1.7–7)
NEUTROPHILS NFR BLD AUTO: 61.6 % (ref 42.7–76)
PLATELET # BLD AUTO: 118 10*3/MM3 (ref 140–450)
PMV BLD AUTO: 11.4 FL (ref 6–12)
POTASSIUM SERPL-SCNC: 3.5 MMOL/L (ref 3.5–5.2)
POTASSIUM SERPL-SCNC: 4.2 MMOL/L (ref 3.5–5.2)
QT INTERVAL: 392 MS
QTC INTERVAL: 464 MS
RBC # BLD AUTO: 4.32 10*6/MM3 (ref 3.77–5.28)
SODIUM SERPL-SCNC: 133 MMOL/L (ref 136–145)
TROPONIN T SERPL HS-MCNC: 173 NG/L
WBC NRBC COR # BLD AUTO: 5.74 10*3/MM3 (ref 3.4–10.8)

## 2025-01-03 PROCEDURE — 25510000001 PERFLUTREN 6.52 MG/ML SUSPENSION 2 ML VIAL: Performed by: INTERNAL MEDICINE

## 2025-01-03 PROCEDURE — 84132 ASSAY OF SERUM POTASSIUM: CPT | Performed by: STUDENT IN AN ORGANIZED HEALTH CARE EDUCATION/TRAINING PROGRAM

## 2025-01-03 PROCEDURE — 93306 TTE W/DOPPLER COMPLETE: CPT | Performed by: INTERNAL MEDICINE

## 2025-01-03 PROCEDURE — 85025 COMPLETE CBC W/AUTO DIFF WBC: CPT | Performed by: HOSPITALIST

## 2025-01-03 PROCEDURE — 93306 TTE W/DOPPLER COMPLETE: CPT

## 2025-01-03 PROCEDURE — 99222 1ST HOSP IP/OBS MODERATE 55: CPT | Performed by: INTERNAL MEDICINE

## 2025-01-03 PROCEDURE — 84484 ASSAY OF TROPONIN QUANT: CPT | Performed by: STUDENT IN AN ORGANIZED HEALTH CARE EDUCATION/TRAINING PROGRAM

## 2025-01-03 PROCEDURE — 80048 BASIC METABOLIC PNL TOTAL CA: CPT | Performed by: HOSPITALIST

## 2025-01-03 RX ORDER — POTASSIUM CHLORIDE 750 MG/1
40 TABLET, FILM COATED, EXTENDED RELEASE ORAL EVERY 4 HOURS
Status: COMPLETED | OUTPATIENT
Start: 2025-01-03 | End: 2025-01-03

## 2025-01-03 RX ORDER — OSELTAMIVIR PHOSPHATE 30 MG/1
30 CAPSULE ORAL EVERY 12 HOURS SCHEDULED
Status: DISCONTINUED | OUTPATIENT
Start: 2025-01-03 | End: 2025-01-04 | Stop reason: HOSPADM

## 2025-01-03 RX ADMIN — METOPROLOL SUCCINATE 25 MG: 25 TABLET, EXTENDED RELEASE ORAL at 08:23

## 2025-01-03 RX ADMIN — Medication 2000 UNITS: at 08:23

## 2025-01-03 RX ADMIN — METOPROLOL SUCCINATE 25 MG: 25 TABLET, EXTENDED RELEASE ORAL at 20:24

## 2025-01-03 RX ADMIN — POTASSIUM CHLORIDE 10 MEQ: 750 TABLET, EXTENDED RELEASE ORAL at 08:23

## 2025-01-03 RX ADMIN — POTASSIUM CHLORIDE 40 MEQ: 750 TABLET, EXTENDED RELEASE ORAL at 16:06

## 2025-01-03 RX ADMIN — ASPIRIN 81 MG: 81 TABLET, COATED ORAL at 08:23

## 2025-01-03 RX ADMIN — BENZONATATE 100 MG: 100 CAPSULE ORAL at 20:27

## 2025-01-03 RX ADMIN — BENZONATATE 100 MG: 100 CAPSULE ORAL at 05:58

## 2025-01-03 RX ADMIN — POTASSIUM CHLORIDE 40 MEQ: 750 TABLET, EXTENDED RELEASE ORAL at 12:02

## 2025-01-03 RX ADMIN — Medication 10 ML: at 08:23

## 2025-01-03 RX ADMIN — PANTOPRAZOLE SODIUM 40 MG: 40 TABLET, DELAYED RELEASE ORAL at 05:55

## 2025-01-03 RX ADMIN — Medication 200 MG: at 08:22

## 2025-01-03 RX ADMIN — PERFLUTREN 2 ML: 6.52 INJECTION, SUSPENSION INTRAVENOUS at 11:03

## 2025-01-03 RX ADMIN — ROSUVASTATIN 20 MG: 20 TABLET, FILM COATED ORAL at 08:23

## 2025-01-03 RX ADMIN — CLOPIDOGREL BISULFATE 75 MG: 75 TABLET ORAL at 05:55

## 2025-01-03 RX ADMIN — OSELTAMIVIR PHOSPHATE 30 MG: 30 CAPSULE ORAL at 16:06

## 2025-01-03 RX ADMIN — POTASSIUM CHLORIDE 10 MEQ: 750 TABLET, EXTENDED RELEASE ORAL at 20:24

## 2025-01-03 RX ADMIN — SENNOSIDES AND DOCUSATE SODIUM 1 TABLET: 50; 8.6 TABLET ORAL at 08:22

## 2025-01-03 RX ADMIN — TORSEMIDE 20 MG: 20 TABLET ORAL at 08:22

## 2025-01-03 NOTE — PROGRESS NOTES
Name: Yuliana Gonzalez ADMIT: 2025   : 1930  PCP: Trent Davis MD    MRN: [id number] LOS: 0 days   AGE/SEX: 89 y.o. female  ROOM: [facility]     Subjective   Subjective   Patient seen and examined this morning.  Hospital day 1.  She is awake and alert, resting in bed, she is doing okay at present.  She states her chest pain noted previously has slightly improved, it is worse with coughing.       Objective   Objective   Vital Signs  Temp:  [98.3 °F (36.8 °C)-100.1 °F (37.8 °C)] 98.3 °F (36.8 °C)  Heart Rate:  [] 65  Resp:  [17-26] 18  BP: (123-165)/(59-75) 142/59  SpO2:  [91 %-95 %] 91 %  on   ;   Device (Oxygen Therapy): room air  There is no height or weight on file to calculate BMI.  Physical Exam  Vitals and nursing note reviewed.   Constitutional:       General: She is not in acute distress.     Comments: Elderly, chronically ill appearing   Cardiovascular:      Rate and Rhythm: Normal rate.      Pulses: Normal pulses.      Heart sounds: Normal heart sounds.   Pulmonary:      Effort: Pulmonary effort is normal. No respiratory distress.      Breath sounds: Decreased breath sounds present. No wheezing.   Abdominal:      General: Bowel sounds are normal.      Palpations: Abdomen is soft.      Tenderness: There is no abdominal tenderness.   Skin:     General: Skin is warm and dry.   Neurological:      General: No focal deficit present.      Mental Status: She is alert and oriented to person, place, and time.       Results Review     I reviewed the patient's new clinical results.  Results from last 7 days   Lab Units 25  0327 25  1034   WBC 10*3/mm3 5.74 7.15   HEMOGLOBIN g/dL 12.7 13.6   PLATELETS 10*3/mm3 118* 135*     Results from last 7 days   Lab Units 25  0327 25  1034   SODIUM mmol/L 133* 136   POTASSIUM mmol/L 3.5 3.8   CHLORIDE mmol/L 101 100   CO2 mmol/L 22.9 24.0   BUN mg/dL 12 11   CREATININE mg/dL 0.65 0.79   GLUCOSE mg/dL 99 163*   EGFR mL/min/1.73 81.7 69.4  "    Results from last 7 days   Lab Units 01/02/25  1034   ALBUMIN g/dL 4.3   BILIRUBIN mg/dL 0.5   ALK PHOS U/L 76   AST (SGOT) U/L 45*   ALT (SGPT) U/L 27     Results from last 7 days   Lab Units 01/03/25  0327 01/02/25  1034   CALCIUM mg/dL 8.3 8.7   ALBUMIN g/dL  --  4.3       No results found for: \"HGBA1C\", \"POCGLU\"    XR Chest 1 View    Result Date: 1/2/2025  1. No active disease is seen in the chest with no significant change when compared to this patient's prior chest x-ray on 8/3/2022. The etiology of this patient's acute chest pain is not established on this exam  2. 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  This report was finalized on 1/2/2025 11:01 AM by Dr. Rolando Hernandez M.D on Workstation: [id number]       I have personally reviewed all medications:  Scheduled Medications  aspirin, 81 mg, Oral, Daily  cholecalciferol, 2,000 Units, Oral, Daily  clopidogrel, 75 mg, Oral, QAM  metoprolol succinate XL, 25 mg, Oral, BID  pantoprazole, 40 mg, Oral, QAM  potassium chloride, 10 mEq, Oral, BID  rosuvastatin, 20 mg, Oral, Daily  sennosides-docusate, 1 tablet, Oral, Daily  sodium chloride, 10 mL, Intravenous, Q12H  torsemide, 20 mg, Oral, Daily  vitamin B-6, 200 mg, Oral, Daily    Infusions   Diet  Diet: Regular/House, Cardiac, Diabetic; Healthy Heart (2-3 Na+); Consistent Carbohydrate; Fluid Consistency: Thin (IDDSI 0)    I have personally reviewed:  [x]  Laboratory   [x]  Microbiology   [x]  Radiology   [x]  EKG/Telemetry  []  Cardiology/Vascular   []  Pathology    []  Records       Assessment/Plan     Active Hospital Problems    Diagnosis  POA    **Influenza A [J10.1]  Yes    Thrombocytopenia [D69.6]  Yes    Chronic heart failure with preserved ejection fraction (HFpEF) [I50.32]  Yes    Elevated troponin [R79.89]  Yes    Chest pain [R07.9]  Yes    S/P TAVR (transcatheter aortic valve replacement) [Z95.2]  Not Applicable    Coronary artery disease involving " native coronary artery of native heart [I25.10]  Yes    Aortic stenosis [I35.0]  Yes    Hypertension [I10]  Yes    IGT (impaired glucose tolerance) [R73.02]  Yes      Resolved Hospital Problems   No resolved problems to display.       89 y.o. female admitted with Influenza A.    Influenza A H3 virus infection  - RVP + for Influenza A H3. CXR negative for focal consolidation.  - Continue Tamiflu, dosed based on creatinine clearance. Continue supportive medications. Monitor respiratory status.    Chest pain  Elevated troponin  Coronary artery disease  History of high grade AV block s/p PPM  Chronic HFpEF  - Troponin elevated and trending upward, EKG without ischemic changes. 2D Echo (09/18/23) showing EF 56% with G1DD. Cardiology consult has been placed. Will continue to follow their plans/recommendations. Greatly appreciate their help.  - Continue medications as prescribed for now.    Hypertension  - Blood pressure appears stable and acceptable acutely at this time. No indications to warrant acute changes/intervention at this time.  - Continue current medications as prescribed. Trend BP to guide ongoing management decisions.    Hyperglycemia/impaired glucose tolerance with history of prediabetes  - A1c 5.40% (11/01/24), improved from previous value of 5.70% (07/11/24), SSI ordered, monitor this.    Thrombocytopenia  - Platelets found to be low on most recent labs. No indications for urgent intervention at present. Will monitor for now.  - Order repeat CBC in AM for reassessment.      SCDs for DVT prophylaxis.  Full code.  Discussed with patient and nursing staff.  Anticipate discharge home in 1-2 days.  Expected Discharge Date: 1/6/2025; Expected Discharge Time:       Kavon Light DO  [facility]  01/03/25

## 2025-01-03 NOTE — CASE MANAGEMENT/SOCIAL WORK
Discharge Planning Assessment  [facility]     Patient Name: Yuliana Gonzalez  MRN: [id number]  Today's Date: 1/3/2025    Admit Date: 1/2/2025    Plan: Return to Independent living at [facility]   Discharge Needs Assessment       Row Name 01/03/25 0857       Living Environment    People in Home alone    Current Living Arrangements assisted living facility    Potentially Unsafe Housing Conditions none    Primary Care Provided by self    Provides Primary Care For no one    Family Caregiver if Needed child(michael), adult    Quality of Family Relationships involved;helpful    Able to Return to Prior Arrangements yes       Transition Planning    Patient/Family Anticipates Transition to home    Patient/Family Anticipated Services at Transition none    Transportation Anticipated family or friend will provide       Discharge Needs Assessment    Readmission Within the Last 30 Days no previous admission in last 30 days    Current Outpatient/Agency/Support Group assisted living facility    Equipment Currently Used at Home walker, rolling;cane, straight    Concerns to be Addressed no discharge needs identified;denies needs/concerns at this time    Anticipated Changes Related to Illness none    Equipment Needed After Discharge none    Discharge Facility/Level of Care Needs assisted living facility    Provided Post Acute Provider List? Refused                   Discharge Plan       Row Name 01/03/25 0859       Plan    Plan Return to Independent living at [facility]    Patient/Family in Agreement with Plan yes    Plan Comments Spoke with pt bedside. Confirmed facesheet correct. Pt reports she lives in the [place]/independent living at the [facility]. She reports she no longer drives and her daughter assist with getting medications from the pharmacy and any shopping she needs. The [facility] provides [facility] meals. pt reports she uses a cane and a walker when she goes for long walks. She has been to [facility] in the past and  completed cardiac rehab in past. She reports she has used HH but is not sure of the agency and has used private pay caregivers. Pt plans to return back to [place] facility at d/c. PT eval pending. Denies needs.                  Continued Care and Services - Admitted Since 1/2/2025    No active coordination exists for this encounter.       Expected Discharge Date and Time       Expected Discharge Date Expected Discharge Time    Jan 6, 2025            Demographic Summary    No documentation.                  Functional Status    No documentation.                  Psychosocial    No documentation.                  Abuse/Neglect    No documentation.                  Legal    No documentation.                  Substance Abuse    No documentation.                  Patient Forms    No documentation.                     BREA Hoskins

## 2025-01-03 NOTE — CONSULTS
Patient Name: Yuliana Gonzalez  Age/Sex: 89 y.o. female  : 1930  MRN: [id number]    Date of Admission: 2025  Date of Encounter Visit: 25  Encounter Provider: Clint Locke MD  Place of Service: [facility]      Referring Provider: Benjamin Dunn MD  Patient Care Team:  Trent Davis MD as PCP - General (Internal Medicine)  Trent Davis MD as PCP - Internal Medicine (Internal Medicine)  Thang Bruce MD as Consulting Physician (Ophthalmology)  Curtis Marshall MD as Consulting Physician (Cardiology)    Subjective:     Admitted/Consulted for: Elevated Trop     Chief Complaint: Shortness of breath with cough     History of Present Illness:  Yuliana Gonzalez is a 89 y.o. 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  She underwent transcatheter aortic valve replacement with a 26 mm Medtronic evolut pro transcatheter aortic valve that was postdilated.  She was noted to have a mild paravalvular leak in case completion.  She presented back with dyspnea along with inferior ST elevation, Catheterization at that time showed nonobstructive CAD.    She continues to follow-up with the electrophysiology team.  She is RV pacing 20% of the time.  Blood pressure and heart rate are well controlled.  In Aug 2023 she presented to the ER complaining of chest pain, cough, shortness of breath.  She was found to have an NSTEMI, parainfluenza virus, and URI and was admitted to the hospital for further  treatment.  An echocardiogram showed a newly depressed EF at 37%. She again underwent cardiac catheterization which showed 99% mid vessel stenosis in the LAD. A 2.5 x 38 mm  Xience lida point drug-eluting stent, postdilated to high-pressure in the proximal to mid stent segment with a 3.0 mm NC trek balloon was placed in the mid-LAD.  She is done very well since that time.  Ejection fraction normalized.     She presented to the urgent care complaining of cough and shortness of breath x 3 days.   Noted to have influenza A.  Troponin elevated 118, 126, 173.  Patient does have intermittent chest discomfort, however made worse by coughing.  Patient underwent transthoracic echocardiogram with normal left ventricular size and systolic function.  Inferior wall hypokinesis.  Previously documented normal RCA.    Previous testing:   Echocardiogram 9/18/23    Left ventricular systolic function is normal. Calculated left ventricular EF = 56% Normal left ventricular cavity size noted. Left ventricular wall thickness is consistent with moderate to severe concentric hypertrophy. Sigmoid-shaped ventricular septum is present. All left ventricular wall segments contract normally. Left ventricular diastolic function is consistent with (grade I) impaired relaxation.    Mildly reduced right ventricular systolic function noted. Electronic lead present in the ventricle.    There is a TAVR valve present. The aortic valve peak and mean gradients are within defined limits. There is mild paravalvular regurgitation without stenosis.    Severe mitral annular calcification is present. There is moderate, bileaflet mitral valve thickening present. Mild mitral valve regurgitation is present. No significant mitral valve stenosis is present.    Mild tricuspid valve regurgitation is present. Estimated right ventricular systolic pressure from tricuspid regurgitation is normal (<35 mmHg). Calculated right ventricular systolic pressure from tricuspid regurgitation is 27 mmHg.        Cardiac Cath 8/4/23  Conclusions:   1. Left main: Normal.  2. LAD: 40% proximal stenosis.  Tubular 99% mid vessel stenosis with STEPHANIA II flow  distally  3. LCX: Luminal irregularities  4.  Successful PCI of the mid LAD with a 2.5 x 38 mm Xience lida point drug-eluting stent, postdilated to high-pressure in the proximal to mid stent segment with a 3.0 mm NC trek balloon       Echocardiogram 8/1/23  Estimated left ventricular EF = 61% Left ventricular systolic function is normal.  Left ventricular diastolic function is consistent with (grade Ia w/high LAP) impaired relaxation.  The right ventricular cavity is moderately dilated.  Moderately reduced right ventricular systolic function noted.  There is a TAVR valve present. There is a mild perivalvular leak.  Peak velocity of the flow distal to the aortic valve is 141.2 cm/s.  Aortic valve area is 1.58 cm2.  Estimated right ventricular systolic pressure from tricuspid regurgitation is normal (<35 mmHg).  The following left ventricular wall segments are hypokinetic: apical septal, basal inferoseptal, mid inferoseptal, mid anteroseptal and basal inferoseptal.        Cardiac Cath 7/31/22  Widely patent mid LAD stent. Otherwise nonobstructive coronary artery disease  Normal LV function. Mildly elevated filling pressures.  Recommendations: Dual antiplatelet therapy given elevated troponin for 1 year. Echocardiogram tomorrow to assess TAVR valve. Unclear etiology of ST elevations.        TAVR 3/16/22  CONCLUSIONS:  Successful deployment of a 26mm Medtronic Evolut Pro transcatheter aortic heart valve.  Post dilation with a 20 mm true balloon    Past Medical History:  Past Medical History:   Diagnosis Date    Aortic stenosis 07/2009    diagnosed, 6/14 moderate with 0.9cm area, 42mm max gradient. 0.91/34 6/15.    Basal cell carcinoma (BCC) of nasal tip     Central loss of vision     RIGHT EYE    Cervical arthritis     Colitis     HX    Coronary artery disease     History of constipation     History of esophageal reflux     History of skin cancer     Hyperlipidemia     Hypertension     IGT (impaired glucose tolerance)   "   Irregular heart beat     HX    Macular degeneration     Medication management     On continuous oral anticoagulation     Redness of skin     NOSE/DERMATOLOGY TREATING WITH FLAGYL CREAM/ADVISED PT TO NOTIFY CARDIOLOGY    Sciatica     HX    Short-term memory loss     \"MILD\"    SOB (shortness of breath) on exertion     Vitamin D deficiency        Past Surgical History:   Procedure Laterality Date    AORTIC VALVE REPAIR/REPLACEMENT Left 3/15/2022    Procedure: TTE TRANSFEMORAL TRANSCATHETER AORTIC VALVE REPLACEMENT PERCUTANEOUS APPROACH;  Surgeon: Vesna Chris MD;  Location: [facility];  Service: Cardiothoracic;  Laterality: Left;    AORTIC VALVE REPAIR/REPLACEMENT Left 3/15/2022    Procedure: Transfemoral Transcatheter Aortic Valve Replacement with intra operative TTE and possible open surgical rescue;  Surgeon: Clint Locke MD;  Location: [facility];  Service: Cardiovascular;  Laterality: Left;    CARDIAC CATHETERIZATION N/A 1/21/2022    Procedure: Coronary angiography;  Surgeon: Clint Locke MD;  Location: [facility] CATH INVASIVE LOCATION;  Service: Cardiovascular;  Laterality: N/A;    CARDIAC CATHETERIZATION N/A 1/21/2022    Procedure: Percutaneous Coronary Intervention;  Surgeon: Clint Locke MD;  Location: [facility] CATH INVASIVE LOCATION;  Service: Cardiovascular;  Laterality: N/A;    CARDIAC CATHETERIZATION N/A 1/21/2022    Procedure: Stent ISMAEL coronary;  Surgeon: Clint Locke MD;  Location: [facility] CATH INVASIVE LOCATION;  Service: Cardiovascular;  Laterality: N/A;    CARDIAC CATHETERIZATION Left 7/31/2022    Procedure: CORONARY ANGIOGRAPHY;  Surgeon: Jeannie Echevarria MD;  Location: [facility] CATH INVASIVE LOCATION;  Service: Cardiology;  Laterality: Left;    CARDIAC CATHETERIZATION N/A 7/31/2022    Procedure: Left ventriculography;  Surgeon: Jeannie Echevarria MD;  Location: [facility] CATH INVASIVE LOCATION;  Service: Cardiology;  Laterality: N/A;    CARDIAC CATHETERIZATION N/A " 7/31/2022    Procedure: Left Heart Cath;  Surgeon: Jeannie Echevarria MD;  Location:  [facility] CATH INVASIVE LOCATION;  Service: Cardiology;  Laterality: N/A;    CARDIAC CATHETERIZATION N/A 8/4/2023    Procedure: Left Heart Cath;  Surgeon: Clint Locke MD;  Location:  [facility] CATH INVASIVE LOCATION;  Service: Cardiovascular;  Laterality: N/A;    CARDIAC CATHETERIZATION N/A 8/4/2023    Procedure: Percutaneous Coronary Intervention;  Surgeon: Clint Locke MD;  Location:  [facility] CATH INVASIVE LOCATION;  Service: Cardiovascular;  Laterality: N/A;    CARDIAC CATHETERIZATION N/A 8/4/2023    Procedure: Stent ISMAEL coronary;  Surgeon: Clint Locke MD;  Location:  [facility] CATH INVASIVE LOCATION;  Service: Cardiovascular;  Laterality: N/A;    CARDIAC CATHETERIZATION N/A 8/4/2023    Procedure: Coronary angiography;  Surgeon: Clint Locke MD;  Location:  [facility] CATH INVASIVE LOCATION;  Service: Cardiovascular;  Laterality: N/A;    CARDIAC ELECTROPHYSIOLOGY PROCEDURE N/A 3/16/2022    Procedure: Pacemaker DC new [place];  Surgeon: Yann Ceballos MD;  Location:  [facility] CATH INVASIVE LOCATION;  Service: Cardiology;  Laterality: N/A;    CATARACT EXTRACTION, BILATERAL      COLONOSCOPY      EYE SURGERY      SKIN CANCER EXCISION         Home Medications:   Medications Prior to Admission   Medication Sig Dispense Refill Last Dose/Taking    acetaminophen (TYLENOL) 325 MG tablet Take 2 tablets by mouth Every 4 (Four) Hours As Needed for Mild Pain  or Fever (temperature greater than 101F). 120 tablet 0 Taking As Needed    aspirin (aspirin) 81 MG EC tablet Take 1 tablet by mouth Daily. 30 tablet 6 Taking    AZO-CRANBERRY PO Take 1 tablet by mouth Daily.   Taking    clopidogrel (PLAVIX) 75 MG tablet Take 1 tablet by mouth Every Morning. 90 tablet 3 Taking    metoprolol succinate XL (Toprol XL) 25 MG 24 hr tablet Take 1 tablet by mouth 2 (Two) Times a Day for 360 days. 180 tablet 3 Taking    metroNIDAZOLE  "(METROCREAM) 0.75 % cream Apply 1 Application topically to the appropriate area as directed 2 (Two) Times a Day As Needed (as needed).   Taking As Needed    Multiple Vitamins-Minerals (VITEYES AREDS ADVANCED PO) Take 2 capsules by mouth Daily.   Taking    nitroglycerin (NITROSTAT) 0.4 MG SL tablet Place 1 tablet under the tongue Every 5 (Five) Minutes As Needed for Chest Pain. Take no more than 3 doses in 15 minutes. 30 tablet 3 Taking As Needed    pantoprazole (PROTONIX) 40 MG EC tablet TAKE 1 TABLET BY MOUTH EVERY DAY IN THE MORNING 90 tablet 1 Taking    potassium chloride (MICRO-K) 10 MEQ CR capsule Take 1 capsule by mouth 2 (Two) Times a Day. 180 capsule 3 Taking    rosuvastatin (CRESTOR) 20 MG tablet Take 1 tablet by mouth Daily. 90 tablet 3 Taking    sennosides-docusate (PERICOLACE) 8.6-50 MG per tablet Take 1 tablet by mouth Daily.   Taking    torsemide (DEMADEX) 20 MG tablet Take 1 tablet by mouth Daily. 90 tablet 3 Taking    vitamin B-6 (PYRIDOXINE) 100 MG tablet Take 2 tablets by mouth Daily. 180 tablet 3 Taking    Vitamin D, Cholecalciferol, 50 MCG (2000 UT) capsule Take 1 capsule by mouth Daily.   Taking    mupirocin (BACTROBAN) 2 % ointment APPLY TWO TIMES PER DAY TO THE INFECTION/BIOPSY SITES.          Allergies:  Allergies   Allergen Reactions    Hydrochlorothiazide Other (See Comments)     hyponatremia    Hydrocodone Shortness Of Breath     Also reports \"lethargy\"    Lipitor [Atorvastatin] Nausea Only     \"FELT AWFUL\"    Erythromycin Diarrhea    Lisinopril Cough       Past Social History:  Social History     Socioeconomic History    Marital status:      Spouse name: Stephan    Number of children: 4   Tobacco Use    Smoking status: Former     Current packs/day: 0.00     Types: Cigarettes     Start date:      Quit date:      Years since quittin.0     Passive exposure: Past    Smokeless tobacco: Never    Tobacco comments:     1 pack every 2 weeks   Vaping Use    Vaping status: Never " "Used   Substance and Sexual Activity    Alcohol use: Yes     Comment: wine - rare / no caffeine    Drug use: No    Sexual activity: Defer        Past Family History:  Family History   Problem Relation Age of Onset    Aortic stenosis Mother     Hypertension Mother     Heart failure Father     Diabetes Father     Hypertension Father     No Known Problems Sister     No Known Problems Daughter     No Known Problems Son     Aneurysm Sister     Other Sister         Polio in her 20's    Malig Hyperthermia Neg Hx          Review of Systems:  All systems reviewed. Pertinent positives identified in HPI. All other systems are negative.       Objective:     Objective:  Temp:  [98.3 °F (36.8 °C)-99.3 °F (37.4 °C)] 99.3 °F (37.4 °C)  Heart Rate:  [64-65] 64  Resp:  [17-18] 17  BP: (120-144)/(53-69) 125/69    Intake/Output Summary (Last 24 hours) at 1/3/2025 1535  Last data filed at 1/3/2025 1300  Gross per 24 hour   Intake 480 ml   Output 100 ml   Net 380 ml     Body mass index is 30.23 kg/m².      01/03/25  1021   Weight: 72.6 kg (160 lb)           Physical Exam:   Temp:  [98.3 °F (36.8 °C)-99.3 °F (37.4 °C)] 99.3 °F (37.4 °C)  Heart Rate:  [64-65] 64  Resp:  [17-18] 17  BP: (120-144)/(53-69) 125/69    Intake/Output Summary (Last 24 hours) at 1/3/2025 1535  Last data filed at 1/3/2025 1300  Gross per 24 hour   Intake 480 ml   Output 100 ml   Net 380 ml     Flowsheet Rows      Flowsheet Row First Filed Value   Admission Height 154.9 cm (61\") Documented at 01/03/2025 1021   Admission Weight 72.6 kg (160 lb) Documented at 01/03/2025 1021            General Appearance:  Elderly.  Chronically ill in appearance.   Head:    Normocephalic, without obvious abnormality, atraumatic       Neck/Lymph   No adenopathy, supple, no thyromegaly, no carotid bruit, no    JVD   Lungs:   Rhonchi decreased breath sounds bilaterally.    Cardiac:    Normal rate, regular rhythm, no murmur, no rub, no gallop   Chest Wall:    No abnormalities observed " No   GI:     Normal bowel sounds, soft, nontender, nondistended,            no rebound tenderness   Extremities:   No cyanosis, clubbing, or edema   Circulatory/Peripheral Vascular :   Pulses palpable and equal bilaterally   Integumentary:   No bleeding or rash. Normal temperature                  Lab Review:     Results from last 7 days   Lab Units 01/03/25  0327 01/02/25  1034   SODIUM mmol/L 133* 136   POTASSIUM mmol/L 3.5 3.8   CHLORIDE mmol/L 101 100   CO2 mmol/L 22.9 24.0   BUN mg/dL 12 11   CREATININE mg/dL 0.65 0.79   GLUCOSE mg/dL 99 163*   CALCIUM mg/dL 8.3 8.7       Results from last 7 days   Lab Units 01/03/25  0327 01/02/25  1154 01/02/25  1034   HSTROP T ng/L 173* 126* 118*     Results from last 7 days   Lab Units 01/03/25  0327   WBC 10*3/mm3 5.74   HEMOGLOBIN g/dL 12.7   HEMATOCRIT % 37.6   PLATELETS 10*3/mm3 118*                                   Imaging:    Imaging Results (Most Recent)       Procedure Component Value Units Date/Time    XR Chest 1 View [[id number]] Collected: 01/02/25 1058     Updated: 01/02/25 1104    Narrative:      Emergency portable view of the chest on 1/2/2025     CLINICAL HISTORY: Chest pain     This is correlated to a prior portable chest x-ray on 8/3/2022.     FINDINGS: There is a left subclavian transvenous pacemaker in place with  its distal leads projecting over the right atrium and right ventricle.  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 The cardiomediastinal silhouette  and the pulmonary vasculature are within normal limits. The lungs are  clear. The costophrenic angles are sharp.        Impression:      1. No active disease is seen in the chest with no significant change  when compared to this patient's prior chest x-ray on 8/3/2022. The  etiology of this patient's acute chest pain is not established on this  exam     2. There is a left subclavian transvenous pacemaker in place and a TAVR  prosthesis in place.     This report was finalized on 1/2/2025 11:01 AM by   Rolando Hernandez M.D  on Workstation: [id number]               Results for orders placed during the hospital encounter of 01/02/25    Adult Transthoracic Echo Complete W/ Cont if Necessary Per Protocol    Interpretation Summary    Left ventricular systolic function is normal. Calculated left ventricular EF = 54.8%    Left ventricular wall thickness is consistent with mild concentric hypertrophy.    The following left ventricular wall segments are hypokinetic: apical inferior, mid inferior and basal inferior.    Left ventricular diastolic function is consistent with (grade II w/high LAP) pseudonormalization.    There is a TAVR valve present.  There is mild paravalvular regurgitation present.  Gradients are within limits.    Severe mitral annular calcification present.  Mild mitral valve stenosis is present. The mitral valve mean gradient is 3 mmHg.      EKG:         BASELINE:       I personally viewed and interpreted the patient's EKG/Telemetry data.    Assessment:   Assessment & Plan   1.  Influenza A virus infection  2.  Non-ST elevation MI: Type II  3.  History of coronary artery disease and prior PCI to the LAD.  4.  History of high-grade AV block and permanent pacemaker  5.  Chronic heart failure preserved ejection fraction  6.  Essential hypertension  7.  Thrombocytopenia: Platelet count 118.    -Continue aspirin and Plavix at this time.  - No change in current dose of Toprol.  - Not appear to be volume overloaded currently.  Continue current medical regimen.  - I would not recommend ischemic workup.  I do think she has a type II non-ST elevation MI.  Chest pain is not consistent with angina.      Thank you for allowing me to participate in the care of Yuliana SIMÓN CrowleyCarlos. Feel free to contact me directly with any further questions or concerns.    Clint Locke MD  [facility]  01/03/25  15:35 EST

## 2025-01-04 ENCOUNTER — READMISSION MANAGEMENT (OUTPATIENT)
Dept: CALL CENTER | Facility: HOSPITAL | Age: OVER 89
End: 2025-01-04
Payer: MEDICARE

## 2025-01-04 VITALS
WEIGHT: 160 LBS | HEART RATE: 69 BPM | SYSTOLIC BLOOD PRESSURE: 126 MMHG | HEIGHT: 61 IN | OXYGEN SATURATION: 96 % | DIASTOLIC BLOOD PRESSURE: 63 MMHG | TEMPERATURE: 98.7 F | RESPIRATION RATE: 18 BRPM | BODY MASS INDEX: 30.21 KG/M2

## 2025-01-04 LAB
ANION GAP SERPL CALCULATED.3IONS-SCNC: 9 MMOL/L (ref 5–15)
BUN SERPL-MCNC: 13 MG/DL (ref 8–23)
BUN/CREAT SERPL: 21.7 (ref 7–25)
CALCIUM SPEC-SCNC: 8.2 MG/DL (ref 8.2–9.6)
CHLORIDE SERPL-SCNC: 107 MMOL/L (ref 98–107)
CO2 SERPL-SCNC: 23 MMOL/L (ref 22–29)
CREAT SERPL-MCNC: 0.6 MG/DL (ref 0.57–1)
DEPRECATED RDW RBC AUTO: 40.8 FL (ref 37–54)
EGFRCR SERPLBLD CKD-EPI 2021: 83.3 ML/MIN/1.73
ERYTHROCYTE [DISTWIDTH] IN BLOOD BY AUTOMATED COUNT: 12.7 % (ref 12.3–15.4)
GLUCOSE SERPL-MCNC: 93 MG/DL (ref 65–99)
HCT VFR BLD AUTO: 38.1 % (ref 34–46.6)
HGB BLD-MCNC: 12.9 G/DL (ref 12–15.9)
MCH RBC QN AUTO: 29.7 PG (ref 26.6–33)
MCHC RBC AUTO-ENTMCNC: 33.9 G/DL (ref 31.5–35.7)
MCV RBC AUTO: 87.8 FL (ref 79–97)
PLATELET # BLD AUTO: 135 10*3/MM3 (ref 140–450)
PMV BLD AUTO: 11.4 FL (ref 6–12)
POTASSIUM SERPL-SCNC: 4.5 MMOL/L (ref 3.5–5.2)
RBC # BLD AUTO: 4.34 10*6/MM3 (ref 3.77–5.28)
SODIUM SERPL-SCNC: 139 MMOL/L (ref 136–145)
WBC NRBC COR # BLD AUTO: 5.29 10*3/MM3 (ref 3.4–10.8)

## 2025-01-04 PROCEDURE — 97165 OT EVAL LOW COMPLEX 30 MIN: CPT

## 2025-01-04 PROCEDURE — 80048 BASIC METABOLIC PNL TOTAL CA: CPT | Performed by: STUDENT IN AN ORGANIZED HEALTH CARE EDUCATION/TRAINING PROGRAM

## 2025-01-04 PROCEDURE — 97530 THERAPEUTIC ACTIVITIES: CPT

## 2025-01-04 PROCEDURE — 85027 COMPLETE CBC AUTOMATED: CPT | Performed by: STUDENT IN AN ORGANIZED HEALTH CARE EDUCATION/TRAINING PROGRAM

## 2025-01-04 PROCEDURE — 99232 SBSQ HOSP IP/OBS MODERATE 35: CPT | Performed by: NURSE PRACTITIONER

## 2025-01-04 PROCEDURE — 97162 PT EVAL MOD COMPLEX 30 MIN: CPT

## 2025-01-04 RX ORDER — OSELTAMIVIR PHOSPHATE 30 MG/1
30 CAPSULE ORAL EVERY 12 HOURS SCHEDULED
Qty: 8 CAPSULE | Refills: 0 | Status: SHIPPED | OUTPATIENT
Start: 2025-01-04 | End: 2025-01-04

## 2025-01-04 RX ORDER — BENZONATATE 100 MG/1
100 CAPSULE ORAL 3 TIMES DAILY PRN
Qty: 15 CAPSULE | Refills: 0 | Status: SHIPPED | OUTPATIENT
Start: 2025-01-04 | End: 2025-01-04

## 2025-01-04 RX ORDER — BENZONATATE 100 MG/1
100 CAPSULE ORAL 3 TIMES DAILY PRN
Qty: 15 CAPSULE | Refills: 0 | Status: SHIPPED | OUTPATIENT
Start: 2025-01-04

## 2025-01-04 RX ORDER — OSELTAMIVIR PHOSPHATE 30 MG/1
30 CAPSULE ORAL EVERY 12 HOURS SCHEDULED
Qty: 8 CAPSULE | Refills: 0 | Status: SHIPPED | OUTPATIENT
Start: 2025-01-04 | End: 2025-01-08

## 2025-01-04 RX ADMIN — METOPROLOL SUCCINATE 25 MG: 25 TABLET, EXTENDED RELEASE ORAL at 08:12

## 2025-01-04 RX ADMIN — Medication 10 ML: at 08:13

## 2025-01-04 RX ADMIN — Medication 2000 UNITS: at 08:12

## 2025-01-04 RX ADMIN — SENNOSIDES AND DOCUSATE SODIUM 1 TABLET: 50; 8.6 TABLET ORAL at 08:14

## 2025-01-04 RX ADMIN — PANTOPRAZOLE SODIUM 40 MG: 40 TABLET, DELAYED RELEASE ORAL at 06:40

## 2025-01-04 RX ADMIN — ROSUVASTATIN 20 MG: 20 TABLET, FILM COATED ORAL at 08:12

## 2025-01-04 RX ADMIN — OSELTAMIVIR PHOSPHATE 30 MG: 30 CAPSULE ORAL at 08:12

## 2025-01-04 RX ADMIN — CLOPIDOGREL BISULFATE 75 MG: 75 TABLET ORAL at 06:40

## 2025-01-04 RX ADMIN — ASPIRIN 81 MG: 81 TABLET, COATED ORAL at 08:12

## 2025-01-04 RX ADMIN — Medication 200 MG: at 08:12

## 2025-01-04 NOTE — PLAN OF CARE
Goal Outcome Evaluation:  Plan of Care Reviewed With: patient        Progress: improving  Outcome Evaluation: Pt alert and oriented X4. Vitals stable, pt remains on RA overnight. Expitory wheezes clear with cough. No acute overnight events.

## 2025-01-04 NOTE — THERAPY EVALUATION
"Patient Name: Yuliana Gonzalez  : 1930    MRN: [id number]                              Today's Date: 2025       Admit Date: 2025    Visit Dx:     ICD-10-CM ICD-9-CM   1. Influenza A  J10.1 487.1   2. Elevated troponin  R79.89 790.6   3. History of CAD (coronary artery disease)  Z86.79 V12.59     Patient Active Problem List   Diagnosis    Aortic stenosis    IGT (impaired glucose tolerance)    Hyperlipidemia    Hypertension    Cervical arthritis    Sciatica    Vitamin D deficiency    Exudative age-related macular degeneration, bilateral, with inactive choroidal neovascularization    Coronary artery disease involving native coronary artery of native heart    AV block, complete    S/P TAVR (transcatheter aortic valve replacement)    Presence of cardiac pacemaker    ST elevation myocardial infarction (STEMI)    NSTEMI (non-ST elevated myocardial infarction)    Hyperglycemia    Elevated troponin    Chest pain    Acute cystitis without hematuria    S/P drug eluting coronary stent placement    Influenza A    Thrombocytopenia    Chronic heart failure with preserved ejection fraction (HFpEF)     Past Medical History:   Diagnosis Date    Aortic stenosis 2009    diagnosed,  moderate with 0.9cm area, 42mm max gradient. 0.91/34 6/15.    Basal cell carcinoma (BCC) of nasal tip     Central loss of vision     RIGHT EYE    Cervical arthritis     Colitis     HX    Coronary artery disease     History of constipation     History of esophageal reflux     History of skin cancer     Hyperlipidemia     Hypertension     IGT (impaired glucose tolerance)     Irregular heart beat     HX    Macular degeneration     Medication management     On continuous oral anticoagulation     Redness of skin     NOSE/DERMATOLOGY TREATING WITH FLAGYL CREAM/ADVISED PT TO NOTIFY CARDIOLOGY    Sciatica     HX    Short-term memory loss     \"MILD\"    SOB (shortness of breath) on exertion     Vitamin D deficiency      Past Surgical History: "   Procedure Laterality Date    AORTIC VALVE REPAIR/REPLACEMENT Left 3/15/2022    Procedure: TTE TRANSFEMORAL TRANSCATHETER AORTIC VALVE REPLACEMENT PERCUTANEOUS APPROACH;  Surgeon: Vesna Chris MD;  Location: [facility];  Service: Cardiothoracic;  Laterality: Left;    AORTIC VALVE REPAIR/REPLACEMENT Left 3/15/2022    Procedure: Transfemoral Transcatheter Aortic Valve Replacement with intra operative TTE and possible open surgical rescue;  Surgeon: Clint Locke MD;  Location: [facility] CVOR;  Service: Cardiovascular;  Laterality: Left;    CARDIAC CATHETERIZATION N/A 1/21/2022    Procedure: Coronary angiography;  Surgeon: Clint Locke MD;  Location: [facility] C[facility] INVASIVE LOCATION;  Service: Cardiovascular;  Laterality: N/A;    CARDIAC CATHETERIZATION N/A 1/21/2022    Procedure: Percutaneous Coronary Intervention;  Surgeon: Clint Locke MD;  Location: [facility] CATH INVASIVE LOCATION;  Service: Cardiovascular;  Laterality: N/A;    CARDIAC CATHETERIZATION N/A 1/21/2022    Procedure: Stent ISMAEL coronary;  Surgeon: Clint Locke MD;  Location: [facility] CATH INVASIVE LOCATION;  Service: Cardiovascular;  Laterality: N/A;    CARDIAC CATHETERIZATION Left 7/31/2022    Procedure: CORONARY ANGIOGRAPHY;  Surgeon: Jeannie Echevarria MD;  Location: [facility] CATH INVASIVE LOCATION;  Service: Cardiology;  Laterality: Left;    CARDIAC CATHETERIZATION N/A 7/31/2022    Procedure: Left ventriculography;  Surgeon: Jeannie Echevarria MD;  Location: [facility] CATH INVASIVE LOCATION;  Service: Cardiology;  Laterality: N/A;    CARDIAC CATHETERIZATION N/A 7/31/2022    Procedure: Left Heart Cath;  Surgeon: Jeannie Echevarria MD;  Location: [facility] CATH INVASIVE LOCATION;  Service: Cardiology;  Laterality: N/A;    CARDIAC CATHETERIZATION N/A 8/4/2023    Procedure: Left Heart Cath;  Surgeon: Clint Locke MD;  Location: [facility] CATH INVASIVE LOCATION;  Service: Cardiovascular;  Laterality: N/A;    CARDIAC CATHETERIZATION  N/A 8/4/2023    Procedure: Percutaneous Coronary Intervention;  Surgeon: Clint Locke MD;  Location:  [facility] CATH INVASIVE LOCATION;  Service: Cardiovascular;  Laterality: N/A;    CARDIAC CATHETERIZATION N/A 8/4/2023    Procedure: Stent ISMAEL coronary;  Surgeon: Clint Locke MD;  Location:  [facility] CATH INVASIVE LOCATION;  Service: Cardiovascular;  Laterality: N/A;    CARDIAC CATHETERIZATION N/A 8/4/2023    Procedure: Coronary angiography;  Surgeon: Clint Locke MD;  Location:  [facility] CATH INVASIVE LOCATION;  Service: Cardiovascular;  Laterality: N/A;    CARDIAC ELECTROPHYSIOLOGY PROCEDURE N/A 3/16/2022    Procedure: Pacemaker DC new [place];  Surgeon: Yann Ceballos MD;  Location:  [facility] CATH INVASIVE LOCATION;  Service: Cardiology;  Laterality: N/A;    CATARACT EXTRACTION, BILATERAL      COLONOSCOPY      EYE SURGERY      SKIN CANCER EXCISION        General Information       Row Name 01/04/25 1015          OT Time and Intention    Subjective Information no complaints  -CW     Document Type evaluation  -CW     Mode of Treatment occupational therapy  -CW     Patient Effort excellent  -CW     Symptoms Noted During/After Treatment none  -CW       Row Name 01/04/25 1015          General Information    Patient Profile Reviewed yes  -CW     Prior Level of Function independent:;ADL's  -CW     Existing Precautions/Restrictions fall  -CW       Row Name 01/04/25 1015          Cognition    Orientation Status (Cognition) oriented x 4  -CW               User Key  (r) = Recorded By, (t) = Taken By, (c) = Cosigned By      Initials Name Provider Type    CW Lisa Nix OTR Occupational Therapist                     Mobility/ADL's       Row Name 01/04/25 1016          Transfers    Transfers stand-sit transfer;sit-stand transfer  -CW       Row Name 01/04/25 1016          Sit-Stand Transfer    Sit-Stand Jack (Transfers) supervision  -CW     Assistive Device (Sit-Stand Transfers) walker,  front-wheeled  -CW       Row Name 01/04/25 1016          Stand-Sit Transfer    Stand-Sit [place] (Transfers) supervision  -CW     Assistive Device (Stand-Sit Transfers) walker, front-wheeled  -CW       Row Name 01/04/25 1016          Functional Mobility    Functional Mobility- Comment Ambulated to BR with R wx- SBA/supervision  -CW       Row Name 01/04/25 1016          Activities of Daily Living    BADL Assessment/Intervention lower body dressing;grooming  -CW       Row Name 01/04/25 1016          Lower Body Dressing Assessment/Training    [place] Level (Lower Body Dressing) doff;don;socks;supervision  -CW     Position (Lower Body Dressing) edge of bed sitting  -CW       Row Name 01/04/25 1016          Grooming Assessment/Training    [place] Level (Grooming) grooming skills;set up;hair care, combing/brushing  -CW     Position (Grooming) edge of bed sitting  -CW               User Key  (r) = Recorded By, (t) = Taken By, (c) = Cosigned By      Initials Name Provider Type    Lisa Cali OTR Occupational Therapist                   Obj/Interventions       Row Name 01/04/25 1019          Range of Motion Comprehensive    General Range of Motion bilateral upper extremity ROM WNL  -CW       Row Name 01/04/25 1019          Strength Comprehensive (MMT)    Comment, General Manual Muscle Testing (MMT) Assessment 4/5 general BUE's.  -CW               User Key  (r) = Recorded By, (t) = Taken By, (c) = Cosigned By      Initials Name Provider Type    Lisa Cali OTR Occupational Therapist                   Goals/Plan       Row Name 01/04/25 1028          Dressing Goal 1 (OT)    Activity/Device (Dressing Goal 1, OT) dressing skills, all  -CW     [place]/Cues Needed (Dressing Goal 1, OT) modified independence  -CW     Progress/Outcome (Dressing Goal 1, OT) goal met  -CW               User Key  (r) = Recorded By, (t) = Taken By, (c) = Cosigned By      Initials Name Provider Type    Lisa Cali OTR  Occupational Therapist                   Clinical Impression       Row Name 01/04/25 1019          Pain Assessment    Pretreatment Pain Rating 0/10 - no pain  -CW     Posttreatment Pain Rating 0/10 - no pain  -CW       Row Name 01/04/25 1019          Plan of Care Review    Plan of Care Reviewed With patient  -CW     Outcome Evaluation Pt. admitted to [facility] with influenza, Hx elevated troponin. PLOF pt. lives alone at [place]. Was indep. with bathing/dressing and used a cane/Rwx at home. Overall pt. is at Mod I /supervision for self care/LE dressing. Ambulated to BR and door to hallway with supervision/SBA using Rwx. Appears to be at baseline for self care. Grooming indep. to comb hair after set up. Pt. does not need OT at this time. She has needed DME at home. Anticipate discharge to home/senior care. Per pt. her facility has a therapy dept. there if needed. OT to sign off.  -CW       Row Name 01/04/25 1019          Therapy Assessment/Plan (OT)    Therapy Frequency (OT) evaluation only  -CW       Row Name 01/04/25 1019          Positioning and Restraints    Pre-Treatment Position in bed  -CW     Post Treatment Position chair  -CW     In Chair encouraged to call for assist;call light within reach;sitting  -CW               User Key  (r) = Recorded By, (t) = Taken By, (c) = Cosigned By      Initials Name Provider Type    CW Lisa Nix OTR Occupational Therapist                   Outcome Measures       Row Name 01/04/25 1029          How much help from another is currently needed...    Putting on and taking off regular lower body clothing? 3  -CW     Bathing (including washing, rinsing, and drying) 4  -CW     Toileting (which includes using toilet bed pan or urinal) 4  -CW     Putting on and taking off regular upper body clothing 4  -CW     Taking care of personal grooming (such as brushing teeth) 4  -CW     Eating meals 4  -CW     AM-PAC 6 Clicks Score (OT) 23  -CW       Row Name 01/04/25 0812          How much help from  another person do you currently need...    Turning from your back to your side while in flat bed without using bedrails? 4  -AH     Moving from lying on back to sitting on the side of a flat bed without bedrails? 4  -AH     Moving to and from a bed to a chair (including a wheelchair)? 4  -AH     Standing up from a chair using your arms (e.g., wheelchair, bedside chair)? 4  -AH     Climbing 3-5 steps with a railing? 3  -AH     To walk in hospital room? 4  -AH     AM-PAC 6 Clicks Score (PT) 23  -AH       Row Name 01/04/25 1029          Functional Assessment    Outcome Measure Options AM-PAC 6 Clicks Daily Activity (OT)  -CW               User Key  (r) = Recorded By, (t) = Taken By, (c) = Cosigned By      Initials Name Provider Type    CW Lisa Nix OTR Occupational Therapist    Magaly Luna, RN Registered Nurse                    Occupational Therapy Education       Title: PT OT SLP Therapies (Done)       Topic: Occupational Therapy (Done)       Point: ADL training (Done)       Description:   Instruct learner(s) on proper safety adaptation and remediation techniques during self care or transfers.   Instruct in proper use of assistive devices.                  Learning Progress Summary            Patient Acceptance, E, VU by CW at 1/4/2025 1029                                      User Key       Initials Effective Dates Name Provider Type Discipline     06/16/21 -  Lisa Nix OTR Occupational Therapist OT                  OT Recommendation and Plan  Therapy Frequency (OT): evaluation only  Plan of Care Review  Plan of Care Reviewed With: patient  Outcome Evaluation: Pt. admitted to [facility] with influenza, Hx elevated troponin. PLOF pt. lives alone at [facility]. Was indep. with bathing/dressing and used a cane/Rwx at home. Overall pt. is at Mod I /supervision for self care/LE dressing. Ambulated to BR and door to hallway with supervision/SBA using Rwx. Appears to be at baseline for self care. Grooming indep. to  comb hair after set up. Pt. does not need OT at this time. She has needed DME at home. Anticipate discharge to home/halfway. Per pt. her facility has a therapy dept. there if needed. OT to sign off.     Time Calculation:         Time Calculation- OT       Row Name 01/04/25 1029             Time Calculation- OT    OT Start Time 0901  -CW      OT Stop Time 0911  -CW      OT Time Calculation (min) 10 min  -CW      OT Received On 01/04/25  -                User Key  (r) = Recorded By, (t) = Taken By, (c) = Cosigned By      Initials Name Provider Type    Lisa Cali OTR Occupational Therapist                  Therapy Charges for Today       Code Description Service Date Service Provider Modifiers Qty    [id number] HC OT EVAL LOW COMPLEXITY 2 1/4/2025 Lisa Nix OTR GO 1                 ANDREW Meléndez  1/4/2025

## 2025-01-04 NOTE — THERAPY EVALUATION
"Patient Name: Yuliana Gonzalez  : 1930    MRN: [id number]                              Today's Date: 2025       Admit Date: 2025    Visit Dx:     ICD-10-CM ICD-9-CM   1. Influenza A  J10.1 487.1   2. Elevated troponin  R79.89 790.6   3. History of CAD (coronary artery disease)  Z86.79 V12.59     Patient Active Problem List   Diagnosis    Aortic stenosis    IGT (impaired glucose tolerance)    Hyperlipidemia    Hypertension    Cervical arthritis    Sciatica    Vitamin D deficiency    Exudative age-related macular degeneration, bilateral, with inactive choroidal neovascularization    Coronary artery disease involving native coronary artery of native heart    AV block, complete    S/P TAVR (transcatheter aortic valve replacement)    Presence of cardiac pacemaker    ST elevation myocardial infarction (STEMI)    NSTEMI (non-ST elevated myocardial infarction)    Hyperglycemia    Elevated troponin    Chest pain    Acute cystitis without hematuria    S/P drug eluting coronary stent placement    Influenza A    Thrombocytopenia    Chronic heart failure with preserved ejection fraction (HFpEF)     Past Medical History:   Diagnosis Date    Aortic stenosis 2009    diagnosed,  moderate with 0.9cm area, 42mm max gradient. 0.91/34 6/15.    Basal cell carcinoma (BCC) of nasal tip     Central loss of vision     RIGHT EYE    Cervical arthritis     Colitis     HX    Coronary artery disease     History of constipation     History of esophageal reflux     History of skin cancer     Hyperlipidemia     Hypertension     IGT (impaired glucose tolerance)     Irregular heart beat     HX    Macular degeneration     Medication management     On continuous oral anticoagulation     Redness of skin     NOSE/DERMATOLOGY TREATING WITH FLAGYL CREAM/ADVISED PT TO NOTIFY CARDIOLOGY    Sciatica     HX    Short-term memory loss     \"MILD\"    SOB (shortness of breath) on exertion     Vitamin D deficiency      Past Surgical History: "   Procedure Laterality Date    AORTIC VALVE REPAIR/REPLACEMENT Left 3/15/2022    Procedure: TTE TRANSFEMORAL TRANSCATHETER AORTIC VALVE REPLACEMENT PERCUTANEOUS APPROACH;  Surgeon: Vesna Chris MD;  Location: [facility];  Service: Cardiothoracic;  Laterality: Left;    AORTIC VALVE REPAIR/REPLACEMENT Left 3/15/2022    Procedure: Transfemoral Transcatheter Aortic Valve Replacement with intra operative TTE and possible open surgical rescue;  Surgeon: Clint Locke MD;  Location: [facility] CVOR;  Service: Cardiovascular;  Laterality: Left;    CARDIAC CATHETERIZATION N/A 1/21/2022    Procedure: Coronary angiography;  Surgeon: Clint Locke MD;  [facility];  Service: Cardiovascular;  Laterality: N/A;    CARDIAC CATHETERIZATION N/A 1/21/2022    Procedure: Percutaneous Coronary Intervention;  Surgeon: Clint Locke MD;  [facility];  Service: Cardiovascular;  Laterality: N/A;    CARDIAC CATHETERIZATION N/A 1/21/2022    Procedure: Stent ISMAEL coronary;  Surgeon: Clint Locke MD;  Location: [facility] CATH INVASIVE LOCATION;  Service: Cardiovascular;  Laterality: N/A;    CARDIAC CATHETERIZATION Left 7/31/2022    Procedure: CORONARY ANGIOGRAPHY;  Surgeon: Jeannie Echevarria MD;  Location: [facility] CATH INVASIVE LOCATION;  Service: Cardiology;  Laterality: Left;    CARDIAC CATHETERIZATION N/A 7/31/2022    Procedure: Left ventriculography;  Surgeon: Jeannie Echevarria MD;  [facility];  Service: Cardiology;  Laterality: N/A;    CARDIAC CATHETERIZATION N/A 7/31/2022    Procedure: Left Heart Cath;  Surgeon: Jeannie Echevarria MD;  [facility];  Service: Cardiology;  Laterality: N/A;    CARDIAC CATHETERIZATION N/A 8/4/2023    Procedure: Left Heart Cath;  Surgeon: Clint Locke MD;  Location: [facility] CATH INVASIVE LOCATION;  Service: Cardiovascular;  Laterality: N/A;    CARDIAC CATHETERIZATION  N/A 8/4/2023    Procedure: Percutaneous Coronary Intervention;  Surgeon: Clint Locke MD;  Location:  [facility] CATH INVASIVE LOCATION;  Service: Cardiovascular;  Laterality: N/A;    CARDIAC CATHETERIZATION N/A 8/4/2023    Procedure: Stent ISMAEL coronary;  Surgeon: Clint Locke MD;  Location:  [facility] CATH INVASIVE LOCATION;  Service: Cardiovascular;  Laterality: N/A;    CARDIAC CATHETERIZATION N/A 8/4/2023    Procedure: Coronary angiography;  Surgeon: Clint Locke MD;  Location:  [facility] CATH INVASIVE LOCATION;  Service: Cardiovascular;  Laterality: N/A;    CARDIAC ELECTROPHYSIOLOGY PROCEDURE N/A 3/16/2022    Procedure: Pacemaker DC [place];  Surgeon: Yann Ceballos MD;  Location:  [facility] CATH INVASIVE LOCATION;  Service: Cardiology;  Laterality: N/A;    CATARACT EXTRACTION, BILATERAL      COLONOSCOPY      EYE SURGERY      SKIN CANCER EXCISION        General Information       Row Name 01/04/25 1304          Physical Therapy Time and Intention    Document Type evaluation  -RS     Mode of Treatment physical therapy;individual therapy  -RS       Row Name 01/04/25 1304          General Information    Patient Profile Reviewed yes  -RS     Existing Precautions/Restrictions fall  -RS               User Key  (r) = Recorded By, (t) = Taken By, (c) = Cosigned By      Initials Name Provider Type    RS Matilda Gardner PT Physical Therapist                   Mobility       Row Name 01/04/25 1304          Bed Mobility    Bed Mobility supine-sit  -RS     Supine-Sit [place] (Bed Mobility) standby assist  -RS       Row Name 01/04/25 1304          Sit-Stand Transfer    Sit-Stand [place] (Transfers) standby assist  -RS     Assistive Device (Sit-Stand Transfers) walker, front-wheeled  -RS       Row Name 01/04/25 1304          Gait/Stairs (Locomotion)    [place] Level (Gait) standby assist  -RS     Assistive Device (Gait) walker, front-wheeled  -RS     Distance in Feet (Gait) 40  -RS      Deviations/Abnormal Patterns (Gait) jack decreased;stride length decreased  -RS     Bilateral Gait Deviations forward flexed posture  -RS               User Key  (r) = Recorded By, (t) = Taken By, (c) = Cosigned By      Initials Name Provider Type    RS Matilda Gardner PT Physical Therapist                   Obj/Interventions       Row Name 01/04/25 1304          Range of Motion Comprehensive    General Range of Motion no range of motion deficits identified  -RS       Row Name 01/04/25 1304          Strength Comprehensive (MMT)    General Manual Muscle Testing (MMT) Assessment no strength deficits identified  -RS       Row Name 01/04/25 1304          Balance    Balance Assessment sitting static balance;standing static balance;standing dynamic balance  -RS     Static Sitting Balance independent  -RS     Static Standing Balance standby assist  -RS     Dynamic Standing Balance standby assist  -RS       Row Name 01/04/25 1304          Sensory Assessment (Somatosensory)    Sensory Assessment (Somatosensory) sensation intact  -RS               User Key  (r) = Recorded By, (t) = Taken By, (c) = Cosigned By      Initials Name Provider Type    RS Matilda Gardner PT Physical Therapist                   Goals/Plan    No documentation.                  Clinical Impression       Row Name 01/04/25 1304          Pain    Pretreatment Pain Rating 0/10 - no pain  -RS     Posttreatment Pain Rating 0/10 - no pain  -RS       Row Name 01/04/25 1304          Plan of Care Review    Plan of Care Reviewed With patient  -RS     Progress improving  -RS     Outcome Evaluation Pt is a 89 y.o. female admitted to [facility] with c/o SOA on 1/2/2025. Work up reveals influenzaA. Pt required SBA for bed mobility, SBA for STS transfers, and SBA for ambulation with RW for 40 ft.   Anticipate pt will D/C back to [facility] living. No acute PT needs this date.  -RS       Row Name 01/04/25 1304          Therapy Assessment/Plan (PT)    Rehab Potential (PT) good   -RS     Criteria for Skilled Interventions Met (PT) no;no problems identified which require skilled intervention  -RS       Row Name 01/04/25 1304          Positioning and Restraints    Pre-Treatment Position in bed  -RS     Post Treatment Position chair  -RS     In Chair sitting;call light within reach;encouraged to call for assist;exit alarm on  -RS               User Key  (r) = Recorded By, (t) = Taken By, (c) = Cosigned By      Initials Name Provider Type    Matilda Nguyen PT Physical Therapist                   Outcome Measures       Row Name 01/04/25 1305 01/04/25 0812       How much help from another person do you currently need...    Turning from your back to your side while in flat bed without using bedrails? 4  -RS 4  -AH    Moving from lying on back to sitting on the side of a flat bed without bedrails? 4  -RS 4  -AH    Moving to and from a bed to a chair (including a wheelchair)? 4  -RS 4  -AH    Standing up from a chair using your arms (e.g., wheelchair, bedside chair)? 4  -RS 4  -AH    Climbing 3-5 steps with a railing? 3  -RS 3  -AH    To walk in hospital room? 4  -RS 4  -AH    AM-PAC 6 Clicks Score (PT) 23  -RS 23  -AH    Highest Level of Mobility Goal 7 --> Walk 25 feet or more  -RS 7 --> Walk 25 feet or more  -      Row Name 01/04/25 1305 01/04/25 1029       Functional Assessment    Outcome Measure Options AM-PAC 6 Clicks Basic Mobility (PT)  -RS AM-PAC 6 Clicks Daily Activity (OT)  -CW              User Key  (r) = Recorded By, (t) = Taken By, (c) = Cosigned By      Initials Name Provider Type    Lisa Cali OTR Occupational Therapist    Magaly Luna, RN Registered Nurse    Matilda Nguyen PT Physical Therapist                                 Physical Therapy Education       Title: PT OT SLP Therapies (Done)       Topic: Physical Therapy (Done)       Point: Mobility training (Done)       Learning Progress Summary            Patient BLAYNE Cates D, DU by  at 1/4/2025 1305                       Point: Home exercise program (Done)       Learning Progress Summary            Patient Eager, E,D, DU by  at 1/4/2025 1305                      Point: Body mechanics (Done)       Learning Progress Summary            Patient Eager, E,D, DU by  at 1/4/2025 1305                      Point: Precautions (Done)       Learning Progress Summary            Patient Eager, E,D, DU by  at 1/4/2025 1305                                      User Key       Initials Effective Dates Name Provider Type Discipline     06/16/21 -  Matilda Gardner, PT Physical Therapist PT                  PT Recommendation and Plan     Progress: improving  Outcome Evaluation: Pt is a 89 y.o. female admitted to [facility] with c/o SOA on 1/2/2025. Work up reveals influenzaA. Pt required SBA for bed mobility, SBA for STS transfers, and SBA for ambulation with RW for 40 ft.   Anticipate pt will D/C back to [facility] living. No acute PT needs this date.     Time Calculation:   PT Evaluation Complexity  History, PT Evaluation Complexity: 1-2 personal factors and/or comorbidities  Examination of Body Systems (PT Eval Complexity): total of 3 or more elements  Clinical Presentation (PT Evaluation Complexity): evolving  Clinical Decision Making (PT Evaluation Complexity): moderate complexity  Overall Complexity (PT Evaluation Complexity): moderate complexity     PT Charges       Row Name 01/04/25 1306             Time Calculation    Start Time 0856  -RS      Stop Time 0912  -RS      Time Calculation (min) 16 min  -RS      PT Received On 01/04/25  -RS         Time Calculation- PT    Total Timed Code Minutes- PT 8 minute(s)  -RS         Timed Charges    52718 - PT Therapeutic Activity Minutes 8  -RS         Untimed Charges    PT Eval/Re-eval Minutes 7  -RS         Total Minutes    Timed Charges Total Minutes 8  -RS      Untimed Charges Total Minutes 7  -RS       Total Minutes 15  -RS                User Key  (r) = Recorded By, (t) = Taken By,  (c) = Cosigned By      Initials Name Provider Type    Matilda Nguyen, PT Physical Therapist                      PT G-Codes  Outcome Measure Options: AM-PAC 6 Clicks Basic Mobility (PT)  AM-PAC 6 Clicks Score (PT): 23  AM-PAC 6 Clicks Score (OT): 23  PT Discharge Summary  Anticipated Discharge Disposition (PT): assisted living    Matilda Gardner, GERALDINE  1/4/2025

## 2025-01-04 NOTE — PROGRESS NOTES
HOSPITAL PROGRESS NOTE    Date of Service: 25  LOS:  LOS: 1 day   Patient Name: Yuliana Gonzalez  Age/Sex: 89 y.o. female  : 1930  MRN: [id number]  Primary Cardiologist: Dr. Wilbert Locke    Subjective:     Chief Complaint/Follow up:   Elevated troponin, shortness of breath with cough, influenza A    Interval History:   Sitting up in chair.  Feeling better.  Cough has improved.  When she coughed she would have chest pain across her whole chest area.  Shortness of breath resolved.  Denies chest pain, palpitations, dizziness, or edema.    Objective:     Objective:  Temp:  [98.4 °F (36.9 °C)-99.3 °F (37.4 °C)] 98.5 °F (36.9 °C)  Heart Rate:  [71-76] 74  Resp:  [17-18] 18  BP: (123-156)/(63-72) 123/63  Body mass index is 30.23 kg/m².    Intake/Output Summary (Last 24 hours) at 2025 1042  Last data filed at 1/3/2025 1700  Gross per 24 hour   Intake 480 ml   Output --   Net 480 ml         25  1021   Weight: 72.6 kg (160 lb)       Physical Exam:   General Appearance: Alert, cooperative, in no acute distress. AAOx4.  Wears glasses.  Neck: No JVD.  Lungs: Clear. Normal respiratory effort and rate.  Heart: Regular rate and rhythm, normal S1 and S2, no murmurs, gallops or rubs.  Extremities: No edema.     Lab Review:   Results from last 7 days   Lab Units 25  0516 25  1911 25  0327 25  1034   SODIUM mmol/L 139  --  133* 136   POTASSIUM mmol/L 4.5 4.2 3.5 3.8   CHLORIDE mmol/L 107  --  101 100   CO2 mmol/L 23.0  --  22.9 24.0   BUN mg/dL 13  --  12 11   CREATININE mg/dL 0.60  --  0.65 0.79   GLUCOSE mg/dL 93  --  99 163*   CALCIUM mg/dL 8.2  --  8.3 8.7   AST (SGOT) U/L  --   --   --  45*   ALT (SGPT) U/L  --   --   --  27     Results from last 7 days   Lab Units 25  0327 25  1154 25  1034   HSTROP T ng/L 173* 126* 118*     Results from last 7 days   Lab Units 25  0516 25  0327   WBC 10*3/mm3 5.29 5.74   HEMOGLOBIN g/dL 12.9 12.7  "  HEMATOCRIT % 38.1 37.6   PLATELETS 10*3/mm3 135* 118*                           Results for orders placed during the hospital encounter of 01/02/25    Adult Transthoracic Echo Complete W/ Cont if Necessary Per Protocol    Interpretation Summary    Left ventricular systolic function is normal. Calculated left ventricular EF = 54.8%    Left ventricular wall thickness is consistent with mild concentric hypertrophy.    The following left ventricular wall segments are hypokinetic: apical inferior, mid inferior and basal inferior.    Left ventricular diastolic function is consistent with (grade II w/high LAP) pseudonormalization.    There is a TAVR valve present.  There is mild paravalvular regurgitation present.  Gradients are within limits.    Severe mitral annular calcification present.  Mild mitral valve stenosis is present. The mitral valve mean gradient is 3 mmHg.    I reviewed the patient's new clinical results.  I personally viewed and interpreted the patient's EKG/Telemetry data/Labs/Test Results.     Current Medications:   Scheduled Meds:  aspirin, 81 mg, Oral, Daily  cholecalciferol, 2,000 Units, Oral, Daily  clopidogrel, 75 mg, Oral, QAM  metoprolol succinate XL, 25 mg, Oral, BID  oseltamivir, 30 mg, Oral, Q12H  pantoprazole, 40 mg, Oral, QAM  potassium chloride, 10 mEq, Oral, BID  rosuvastatin, 20 mg, Oral, Daily  sennosides-docusate, 1 tablet, Oral, Daily  sodium chloride, 10 mL, Intravenous, Q12H  torsemide, 20 mg, Oral, Daily  vitamin B-6, 200 mg, Oral, Daily         Allergies:  Allergies   Allergen Reactions    Hydrochlorothiazide Other (See Comments)     hyponatremia    Hydrocodone Shortness Of Breath     Also reports \"lethargy\"    Lipitor [Atorvastatin] Nausea Only     \"FELT AWFUL\"    Erythromycin Diarrhea    Lisinopril Cough       Assessment:       Influenza A    Aortic stenosis    IGT (impaired glucose tolerance)    Hypertension    Coronary artery disease involving native coronary artery of native " heart    S/P TAVR (transcatheter aortic valve replacement)    Elevated troponin    Chest pain    Thrombocytopenia    Chronic heart failure with preserved ejection fraction (HFpEF)        Plan:   Assessment & Plan       1.  Influenza A.-Feeling better  2.  Shortness of breath with cough.  Improved  3.  Elevated troponin, type II non-STEMI due to dyspnea.  Reports intermittent chest discomfort worsens with coughing.  Echocardiogram showed normal LVEF and inferior wall hypokinesis.  She has a previously documented normal RCA per cath.  4.  History of CAD and prior PCI to the LAD   5.  Status post TAVR with mild perivalvular regurgitation noted on echo  6.  Severe mitral annular calcification mild mitral stenosis-stable  7/8.  Chronic heart failure with preserved LVEF and grade 2 diastolic dysfunction.  Euvolemic  9.  History of high grade AV block and permanent pacemaker  10.  Hypertension  11.  Thrombocytopenia-improved    Plan-  -Feeling better today.  -Chest pain reported not consistent with angina  -Doing well from a cardiac standpoint.  Will sign off today.  -Follow-up in our office with a nurse practitioner in 2 weeks.  Our office will arrange.    CLEMENTINE Waller  [facility]   01/04/25  10:42 EST

## 2025-01-04 NOTE — PLAN OF CARE
Goal Outcome Evaluation:  Plan of Care Reviewed With: patient, child        Progress: improving  Outcome Evaluation: Pt improving overall during shift, denying shortness of breath & cough improved. Hopeful discharge tomorrow & returning to [facility] living. Confirmed with daughter that she is able to transport.

## 2025-01-04 NOTE — DISCHARGE SUMMARY
"       NAME: Yuliana Gonzalez ADMIT: 2025   : 1930  PCP: Trent Davis MD    MRN: [id number] LOS: 1 days   AGE/SEX: 89 y.o. female  ROOM: P890/1     Date of Admission:  2025  Date of Discharge:  2025    PCP: Trent Davis MD    CHIEF COMPLAINT  Chest Pain and Cough      DISCHARGE DIAGNOSIS  Active Hospital Problems    Diagnosis  POA    **Influenza A [J10.1]  Yes    Thrombocytopenia [D69.6]  Yes    Chronic heart failure with preserved ejection fraction (HFpEF) [I50.32]  Yes    Elevated troponin [R79.89]  Yes    Chest pain [R07.9]  Yes    S/P TAVR (transcatheter aortic valve replacement) [Z95.2]  Not Applicable    Coronary artery disease involving native coronary artery of native heart [I25.10]  Yes    Aortic stenosis [I35.0]  Yes    Hypertension [I10]  Yes    IGT (impaired glucose tolerance) [R73.02]  Yes      Resolved Hospital Problems   No resolved problems to display.       SECONDARY DIAGNOSES  Past Medical History:   Diagnosis Date    Aortic stenosis 2009    diagnosed,  moderate with 0.9cm area, 42mm max gradient. 0.91/34 6/15.    Basal cell carcinoma (BCC) of nasal tip     Central loss of vision     RIGHT EYE    Cervical arthritis     Colitis     HX    Coronary artery disease     History of constipation     History of esophageal reflux     History of skin cancer     Hyperlipidemia     Hypertension     IGT (impaired glucose tolerance)     Irregular heart beat     HX    Macular degeneration     Medication management     On continuous oral anticoagulation     Redness of skin     NOSE/DERMATOLOGY TREATING WITH FLAGYL CREAM/ADVISED PT TO NOTIFY CARDIOLOGY    Sciatica     HX    Short-term memory loss     \"MILD\"    SOB (shortness of breath) on exertion     Vitamin D deficiency        CONSULTS   Cardiology    HOSPITAL COURSE    89 year old female with history of severe degenerative aortic valve stenosis, coronary artery disease with prior PCI to the LAD, permanent pacemaker secondary to " "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 She has Influenza A H3, she was treated with tamiflu. She feels better for discharge today with outpatient follow up with Trent Davis MD and Cardiology.     DIAGNOSTICS           Collected Updated Procedure Result Status    01/04/2025 0516 01/04/2025 0558 Basic Metabolic Panel [[id number]]    Blood    Final result Component Value Units   Glucose 93 mg/dL   BUN 13 mg/dL   Creatinine 0.60 mg/dL   Sodium 139 mmol/L   Potassium 4.5 mmol/L   Chloride 107 mmol/L   CO2 23.0 mmol/L   Calcium 8.2 mg/dL   BUN/Creatinine Ratio 21.7    Anion Gap 9.0 mmol/L   eGFR 83.3 mL/min/1.73          01/04/2025 0516 01/04/2025 0550 CBC (No Diff) [[id number]]   (Abnormal)   Blood    Final result Component Value Units   WBC 5.29 10*3/mm3   RBC 4.34 10*6/mm3   Hemoglobin 12.9 g/dL   Hematocrit 38.1 %   MCV 87.8 fL   MCH 29.7 pg   MCHC 33.9 g/dL   RDW 12.7 %   RDW-SD 40.8 fl   MPV 11.4 fL   Platelets 135 Low  10*3/mm3                   PHYSICAL EXAM  Objective:  Vital signs: (most recent): Blood pressure 126/63, pulse 69, temperature 98.7 °F (37.1 °C), temperature source Oral, resp. rate 18, height 154.9 cm (61\"), weight 72.6 kg (160 lb), SpO2 96%, not currently breastfeeding.                Alert  nad  No resp distress  Elderly  Wishes for dc today     CONDITION ON DISCHARGE  Stable.      DISCHARGE DISPOSITION   Home or Self Care      DISCHARGE MEDICATIONS       Your medication list        START taking these medications        Instructions Last Dose Given Next Dose Due   benzonatate 100 MG capsule  Commonly known as: TESSALON      Take 1 capsule by mouth 3 (Three) Times a Day As Needed for Cough.       oseltamivir 30 MG capsule  Commonly known as: TAMIFLU      Take 1 capsule by mouth Every 12 (Twelve) Hours for 8 doses. Indications: Influenza A Virus Infection     "          CONTINUE taking these medications        Instructions Last Dose Given Next Dose Due   acetaminophen 325 MG tablet  Commonly known as: TYLENOL      Take 2 tablets by mouth Every 4 (Four) Hours As Needed for Mild Pain  or Fever (temperature greater than 101F).       aspirin 81 MG EC tablet      Take 1 tablet by mouth Daily.       AZO-CRANBERRY PO      Take 1 tablet by mouth Daily.       clopidogrel 75 MG tablet  Commonly known as: PLAVIX      Take 1 tablet by mouth Every Morning.       metoprolol succinate XL 25 MG 24 hr tablet  Commonly known as: Toprol XL      Take 1 tablet by mouth 2 (Two) Times a Day for 360 days.       metroNIDAZOLE 0.75 % cream  Commonly known as: METROCREAM      Apply 1 Application topically to the appropriate area as directed 2 (Two) Times a Day As Needed (as needed).       mupirocin 2 % ointment  Commonly known as: BACTROBAN      APPLY TWO TIMES PER DAY TO THE INFECTION/BIOPSY SITES.       nitroglycerin 0.4 MG SL tablet  Commonly known as: NITROSTAT      Place 1 tablet under the tongue Every 5 (Five) Minutes As Needed for Chest Pain. Take no more than 3 doses in 15 minutes.       pantoprazole 40 MG EC tablet  Commonly known as: PROTONIX      TAKE 1 TABLET BY MOUTH EVERY DAY IN THE MORNING       potassium chloride 10 MEQ CR capsule  Commonly known as: MICRO-K      Take 1 capsule by mouth 2 (Two) Times a Day.       rosuvastatin 20 MG tablet  Commonly known as: CRESTOR      Take 1 tablet by mouth Daily.       sennosides-docusate 8.6-50 MG per tablet  Commonly known as: PERICOLACE      Take 1 tablet by mouth Daily.       torsemide 20 MG tablet  Commonly known as: DEMADEX      Take 1 tablet by mouth Daily.       vitamin B-6 100 MG tablet  Commonly known as: PYRIDOXINE      Take 2 tablets by mouth Daily.       Vitamin D (Cholecalciferol) 50 MCG (2000 UT) capsule      Take 1 capsule by mouth Daily.       VITEYES AREDS ADVANCED PO      Take 2 capsules by mouth Daily.                 Where to  Get Your Medications        These medications were sent to [id number] - [place], [place] - [address] AT [facility] - [phone]  - [phone]   [address] [facility] [address]      Phone: [phone]   benzonatate 100 MG capsule  oseltamivir 30 MG capsule       Diet Instructions    Low sodium, low fat, consistent carbohydrate diet          Activity Instructions    Activity as tolerated         Future Appointments   Date Time Provider Department Center   1/9/2025 10:00 AM [facility] [place] DEVICE CHECK [facility] None   1/9/2025 10:30 AM Eric Flores, CLEMENTINE [facility] None   2/5/2025  9:15 AM Trent Davis MD [facility]   11/14/2025 10:00 AM Paz Yan APRN [facility]     Additional Instructions for the Follow-ups that You Need to Schedule       Discharge Follow-up with Specialty: PCP in 1-2 weeks, Cardiology in 1-2 weeks   As directed      Specialty: PCP in 1-2 weeks, Cardiology in 1-2 weeks               Follow-up Information       Trent Davis MD .    Specialty: Internal Medicine  Contact information:  [address] [place], [place] [id number] [phone]                             TEST  RESULTS PENDING AT DISCHARGE         Kelvin Cordero MD  [facility]  01/04/25  16:42 EST      Time: greater than 32 minutes on discharge  It was a pleasure taking care of this patient while in the hospital.

## 2025-01-05 NOTE — CASE MANAGEMENT/SOCIAL WORK
Case Management Discharge Note      Final Note: Return to [place] at [facility]    Provided Post Acute Provider List?: Refused    Selected Continued Care - Discharged on 1/4/2025 Admission date: 1/2/2025 - Discharge disposition: Home or Self Care      Destination    No services have been selected for the patient.                Durable Medical Equipment    No services have been selected for the patient.                Dialysis/Infusion    No services have been selected for the patient.                Home Medical Care    No services have been selected for the patient.                Therapy    No services have been selected for the patient.                Community Resources    No services have been selected for the patient.                Community & DME    No services have been selected for the patient.                         Final Discharge Disposition Code: 01 - home or self-care

## 2025-01-05 NOTE — OUTREACH NOTE
Prep Survey      Flowsheet Row Responses   [facility] patient discharged from? [place]   Is LACE score < 7 ? No   Eligibility [facility]   Date of Admission 01/02/25   Date of Discharge 01/04/25   Discharge Disposition Home or Self Care   Discharge diagnosis Influenza A   Does the patient have one of the following disease processes/diagnoses(primary or secondary)? Other   Does the patient have Home health ordered? No   Is there a DME ordered? No   Prep survey completed? Yes            Sadie MUSA - Registered Nurse

## 2025-01-06 ENCOUNTER — TRANSITIONAL CARE MANAGEMENT TELEPHONE ENCOUNTER (OUTPATIENT)
Dept: CALL CENTER | Facility: HOSPITAL | Age: OVER 89
End: 2025-01-06
Payer: MEDICARE

## 2025-01-06 NOTE — OUTREACH NOTE
Call Center TCM Note      Flowsheet Row Responses   [facility] patient discharged from? [place]   Does the patient have one of the following disease processes/diagnoses(primary or secondary)? Other   TCM attempt successful? Yes   Call start time 1122   Call end time 1130   Discharge diagnosis Influenza A   Person spoke with today (if not patient) and relationship pt   Meds reviewed with patient/caregiver? Yes   Is the patient having any side effects they believe may be caused by any medication additions or changes? No   Does the patient have all medications ordered at discharge? No   Nursing Interventions Nurse provided patient education   Prescription comments Tamiflu not in stock   Is the patient taking all medications as directed (includes completed medication regime)? N/A   Comments Cardiology 1/9/25   Does the patient have an appointment with their PCP within 7-14 days of discharge? Yes  [1/15/2025 at 11:15 AM]   Psychosocial issues? No   Did the patient receive a copy of their discharge instructions? Yes   Nursing interventions Reviewed instructions with patient   What is the patient's perception of their health status since discharge? Improving   Is the patient/caregiver able to teach back signs and symptoms related to disease process for when to call PCP? Yes   Is the patient/caregiver able to teach back signs and symptoms related to disease process for when to call 911? Yes   Is the patient/caregiver able to teach back the hierarchy of who to call/visit for symptoms/problems? PCP, Specialist, Home health nurse, Urgent Care, ED, 911 Yes   If the patient is a current smoker, are they able to teach back resources for cessation? Not a smoker   TCM call completed? Yes   Wrap up additional comments Pt reports she is having a productive cough that has got better. Pt did not get Tamiflu as several pharmacies are out of stock. Pt verified cardiology/PCP fu appts.   Call end time 1130   Would this patient  benefit from a Referral to [facility]? No   Is the patient interested in additional calls from an ambulatory ? No            Marika Saba RN    1/6/2025, 11:32 EST

## 2025-01-07 ENCOUNTER — TELEPHONE (OUTPATIENT)
Dept: CARDIOLOGY | Facility: CLINIC | Age: OVER 89
End: 2025-01-07

## 2025-01-07 NOTE — TELEPHONE ENCOUNTER
Hub staff attempted to follow warm transfer process and was unsuccessful     Caller: HARRY WEAVER    Relationship to patient: Self    Best call back number: [phone]    Patient is needing: PT WAS RECENTLY IN HOSPITAL AND HAS PACEMAKER CHECK ON 1/9/25. DAUGHTER CALLING IN AND WANTING TO KNOW IF APPT CAN BE COMBINED WITH HOSPITAL FU. PLEASE CALL BACK TO ADVISE, THANK YOU.

## 2025-01-09 ENCOUNTER — OFFICE VISIT (OUTPATIENT)
Age: OVER 89
End: 2025-01-09
Payer: MEDICARE

## 2025-01-09 ENCOUNTER — CLINICAL SUPPORT NO REQUIREMENTS (OUTPATIENT)
Age: OVER 89
End: 2025-01-09
Payer: MEDICARE

## 2025-01-09 VITALS
DIASTOLIC BLOOD PRESSURE: 52 MMHG | SYSTOLIC BLOOD PRESSURE: 120 MMHG | WEIGHT: 156.4 LBS | HEIGHT: 61 IN | BODY MASS INDEX: 29.53 KG/M2 | HEART RATE: 71 BPM

## 2025-01-09 DIAGNOSIS — I20.2 REFRACTORY ANGINA PECTORIS: ICD-10-CM

## 2025-01-09 DIAGNOSIS — Z95.2 S/P TAVR (TRANSCATHETER AORTIC VALVE REPLACEMENT): ICD-10-CM

## 2025-01-09 DIAGNOSIS — Z95.0 PRESENCE OF CARDIAC PACEMAKER: ICD-10-CM

## 2025-01-09 DIAGNOSIS — I44.2 AV BLOCK, COMPLETE: Primary | ICD-10-CM

## 2025-01-09 DIAGNOSIS — Z95.0 PRESENCE OF CARDIAC PACEMAKER: Primary | ICD-10-CM

## 2025-01-09 PROCEDURE — 93000 ELECTROCARDIOGRAM COMPLETE: CPT

## 2025-01-09 PROCEDURE — 99214 OFFICE O/P EST MOD 30 MIN: CPT

## 2025-01-09 RX ORDER — CLOPIDOGREL BISULFATE 75 MG/1
75 TABLET ORAL EVERY MORNING
Qty: 90 TABLET | Refills: 3 | Status: SHIPPED | OUTPATIENT
Start: 2025-01-09

## 2025-01-09 NOTE — PROGRESS NOTES
Date of Office Visit: 2025  Encounter Provider: CLEMENTINE Fan  Place of Service: [facility] CARDIOLOGY  Patient Name: Yuliana Gonzalez  :1930    Chief Complaint   Patient presents with    Follow-up     1 yr.    Complete AV Block   :     HPI: Yuliana Gonzalez is a 89 y.o. female who follows with Dr. Locke and Dr. Ceballos. She has CAD---s/p PCI, TAVR, AV block---s/p DC PPM.      In 2022 she had cath showing 90% LAD occlusion and underwent PCI.     In 2022 she underwent TAVR.     Post op she developed AV block and underwent implant of DC PPM. [place]. High septal placement.     She last saw Dr. Ceballos in 2024. She was doing well. Device function normal.    She was in the hospital last week with chest pain. Had type II NSTEMI. Dr. Locke saw. She was admitted with Flu. Pain not consistent with angina, ischemic workup was deferred.               She presents today for follow-up appointment.    Since admission to the hospital she has been doing better.    Shortness of breath and chest pain that she was having have pretty much subsided.    She is no longer having any chest pain.  This was type II NSTEMI and not consistent with angina.    EKG shows normal sinus rhythm.    Normal device testing and function in office today.  AP: 10%, : 80%.  She is not dependent in clinic today.    She has been pacemaker dependent in the past.  She had AV block post TAVR.    Past Medical History:   Diagnosis Date    Aortic stenosis 2009    diagnosed,  moderate with 0.9cm area, 42mm max gradient. 0.91/34 6/15.    Basal cell carcinoma (BCC) of nasal tip     Central loss of vision     RIGHT EYE    Cervical arthritis     Colitis     HX    Coronary artery disease     History of constipation     History of esophageal reflux     History of skin cancer     Hyperlipidemia     Hypertension     IGT (impaired glucose tolerance)     Irregular heart beat     HX     "Macular degeneration     Medication management     On continuous oral anticoagulation     Redness of skin     NOSE/DERMATOLOGY TREATING WITH FLAGYL CREAM/ADVISED PT TO NOTIFY CARDIOLOGY    Sciatica     HX    Short-term memory loss     \"MILD\"    SOB (shortness of breath) on exertion     Vitamin D deficiency        Past Surgical History:   Procedure Laterality Date    AORTIC VALVE REPAIR/REPLACEMENT Left 3/15/2022    Procedure: TTE TRANSFEMORAL TRANSCATHETER AORTIC VALVE REPLACEMENT PERCUTANEOUS APPROACH;  Surgeon: Vesna Chris MD;  [place] [facility]  Service: Cardiothoracic;  Laterality: Left;    AORTIC VALVE REPAIR/REPLACEMENT Left 3/15/2022    Procedure: Transfemoral Transcatheter Aortic Valve Replacement with intra operative TTE and possible open surgical rescue;  Surgeon: Clint Locke MD;  [place] [facility]  Service: Cardiovascular;  Laterality: Left;    CARDIAC CATHETERIZATION N/A 1/21/2022    Procedure: Coronary angiography;  Surgeon: Clint Locke MD;  Location: [facility] CATH INVASIVE LOCATION;  Service: Cardiovascular;  Laterality: N/A;    CARDIAC CATHETERIZATION N/A 1/21/2022    Procedure: Percutaneous Coronary Intervention;  Surgeon: Clint Locke MD;  Location: [facility] CATH INVASIVE LOCATION;  Service: Cardiovascular;  Laterality: N/A;    CARDIAC CATHETERIZATION N/A 1/21/2022    Procedure: Stent ISMAEL coronary;  Surgeon: Clint Locke MD;  Location: [facility] CATH INVASIVE LOCATION;  Service: Cardiovascular;  Laterality: N/A;    CARDIAC CATHETERIZATION Left 7/31/2022    Procedure: CORONARY ANGIOGRAPHY;  Surgeon: Jeannie Echevarria MD;  Location: [facility] CATH INVASIVE LOCATION;  Service: Cardiology;  Laterality: Left;    CARDIAC CATHETERIZATION N/A 7/31/2022    Procedure: Left ventriculography;  Surgeon: Jeannie Echevarria MD;  Location: [facility] CATH INVASIVE LOCATION;  Service: Cardiology;  Laterality: N/A;    CARDIAC CATHETERIZATION N/A 7/31/2022    Procedure: Left Heart " Cath;  Surgeon: Jeannie Echevarria MD;  Location:  [facility] CATH INVASIVE LOCATION;  Service: Cardiology;  Laterality: N/A;    CARDIAC CATHETERIZATION N/A 2023    Procedure: Left Heart Cath;  Surgeon: Clint Locke MD;  Location:  [facility] CATH INVASIVE LOCATION;  Service: Cardiovascular;  Laterality: N/A;    CARDIAC CATHETERIZATION N/A 2023    Procedure: Percutaneous Coronary Intervention;  Surgeon: Clint Locke MD;  Location:  [facility] CATH INVASIVE LOCATION;  Service: Cardiovascular;  Laterality: N/A;    CARDIAC CATHETERIZATION N/A 2023    Procedure: Stent ISMAEL coronary;  Surgeon: Clint Locke MD;  Location:  [facility] CATH INVASIVE LOCATION;  Service: Cardiovascular;  Laterality: N/A;    CARDIAC CATHETERIZATION N/A 2023    Procedure: Coronary angiography;  Surgeon: Clint Locke MD;  Location:  [facility] CATH INVASIVE LOCATION;  Service: Cardiovascular;  Laterality: N/A;    CARDIAC ELECTROPHYSIOLOGY PROCEDURE N/A 3/16/2022    Procedure: Pacemaker DC new [place];  Surgeon: Yann Ceballos MD;  Location:  [facility] CATH INVASIVE LOCATION;  Service: Cardiology;  Laterality: N/A;    CATARACT EXTRACTION, BILATERAL      COLONOSCOPY      EYE SURGERY      SKIN CANCER EXCISION         Social History     Socioeconomic History    Marital status:      Spouse name: Stephan    Number of children: 4   Tobacco Use    Smoking status: Former     Current packs/day: 0.00     Types: Cigarettes     Start date:      Quit date:      Years since quittin.0     Passive exposure: Past    Smokeless tobacco: Never    Tobacco comments:     1 pack every 2 weeks   Vaping Use    Vaping status: Never Used   Substance and Sexual Activity    Alcohol use: Yes     Comment: wine - rare / no caffeine    Drug use: No    Sexual activity: Defer       Family History   Problem Relation Age of Onset    Aortic stenosis Mother     Hypertension Mother     Heart failure Father     Diabetes Father      "Hypertension Father     No Known Problems Sister     No Known Problems Daughter     No Known Problems Son     Aneurysm Sister     Other Sister         Polio in her 20's    Malig Hyperthermia Neg Hx        Review of Systems   Constitutional: Negative for chills, fever and malaise/fatigue.   Cardiovascular:  Negative for chest pain, dyspnea on exertion, leg swelling, near-syncope, orthopnea, palpitations, paroxysmal nocturnal dyspnea and syncope.   Respiratory:  Negative for cough and shortness of breath.    Hematologic/Lymphatic: Negative.    Musculoskeletal:  Negative for joint pain, joint swelling and myalgias.   Gastrointestinal:  Negative for abdominal pain, diarrhea, melena, nausea and vomiting.   Genitourinary:  Negative for frequency and hematuria.   Neurological:  Negative for light-headedness, numbness, paresthesias and seizures.   Allergic/Immunologic: Negative.    All other systems reviewed and are negative.      Allergies   Allergen Reactions    Hydrochlorothiazide Other (See Comments)     hyponatremia    Hydrocodone Shortness Of Breath     Also reports \"lethargy\"    Lipitor [Atorvastatin] Nausea Only     \"FELT AWFUL\"    Erythromycin Diarrhea    Lisinopril Cough         Current Outpatient Medications:     acetaminophen (TYLENOL) 325 MG tablet, Take 2 tablets by mouth Every 4 (Four) Hours As Needed for Mild Pain  or Fever (temperature greater than 101F)., Disp: 120 tablet, Rfl: 0    aspirin (aspirin) 81 MG EC tablet, Take 1 tablet by mouth Daily., Disp: 30 tablet, Rfl: 6    AZO-CRANBERRY PO, Take 1 tablet by mouth Daily., Disp: , Rfl:     benzonatate (TESSALON) 100 MG capsule, Take 1 capsule by mouth 3 (Three) Times a Day As Needed for Cough., Disp: 15 capsule, Rfl: 0    clopidogrel (PLAVIX) 75 MG tablet, Take 1 tablet by mouth Every Morning., Disp: 90 tablet, Rfl: 3    metoprolol succinate XL (Toprol XL) 25 MG 24 hr tablet, Take 1 tablet by mouth 2 (Two) Times a Day for 360 days., Disp: 180 tablet, Rfl: " "3    metroNIDAZOLE (METROCREAM) 0.75 % cream, Apply 1 Application topically to the appropriate area as directed 2 (Two) Times a Day As Needed (as needed)., Disp: , Rfl:     Multiple Vitamins-Minerals (VITEYES AREDS ADVANCED PO), Take 2 capsules by mouth Daily., Disp: , Rfl:     mupirocin (BACTROBAN) 2 % ointment, APPLY TWO TIMES PER DAY TO THE INFECTION/BIOPSY SITES., Disp: , Rfl:     nitroglycerin (NITROSTAT) 0.4 MG SL tablet, Place 1 tablet under the tongue Every 5 (Five) Minutes As Needed for Chest Pain. Take no more than 3 doses in 15 minutes., Disp: 30 tablet, Rfl: 3    pantoprazole (PROTONIX) 40 MG EC tablet, TAKE 1 TABLET BY MOUTH EVERY DAY IN THE MORNING, Disp: 90 tablet, Rfl: 1    potassium chloride (MICRO-K) 10 MEQ CR capsule, Take 1 capsule by mouth 2 (Two) Times a Day., Disp: 180 capsule, Rfl: 3    rosuvastatin (CRESTOR) 20 MG tablet, Take 1 tablet by mouth Daily., Disp: 90 tablet, Rfl: 3    sennosides-docusate (PERICOLACE) 8.6-50 MG per tablet, Take 1 tablet by mouth Daily., Disp: , Rfl:     torsemide (DEMADEX) 20 MG tablet, Take 1 tablet by mouth Daily., Disp: 90 tablet, Rfl: 3    Vitamin D, Cholecalciferol, 50 MCG (2000 UT) capsule, Take 1 capsule by mouth Daily., Disp: , Rfl:       Objective:     Vitals:    01/09/25 1002   BP: 120/52   BP Location: Left arm   Patient Position: Sitting   Cuff Size: Adult   Pulse: 71   Weight: 70.9 kg (156 lb 6.4 oz)   Height: 154.9 cm (61\")     Body mass index is 29.55 kg/m².    PHYSICAL EXAM:    Vitals Reviewed.   General Appearance: No acute distress, well developed and well nourished.   Eyes: Conjunctiva and lids: No erythema, swelling, or discharge. Sclera non-icteric.   HENT: Atraumatic, normocephalic. External eyes, ears, and nose normal.   Respiratory: No signs of respiratory distress. Respiration rhythm and depth normal.   Clear to auscultation. No rales, crackles, rhonchi, or wheezing auscultated.   Cardiovascular:  Heart Rate and Rhythm: Normal, Heart " Sounds: Normal S1 and S2. No S3 or S4 noted.  Gastrointestinal:  Abdomen soft, non-distended, non-tender.   Musculoskeletal: Normal movement of extremities  Skin: Warm and dry.   Psychiatric: Patient alert and oriented to person, place, and time. Speech and behavior appropriate. Normal mood and affect.       ECG 12 Lead    Date/Time: 1/9/2025 11:40 AM  Performed by: Eric Flores APRN    Authorized by: Eric Flores APRN  Comparison: compared with previous ECG   Similar to previous ECG  Rhythm: sinus rhythm            Assessment:       Diagnosis Plan   1. AV block, complete        2. S/P TAVR (transcatheter aortic valve replacement)        3. Presence of cardiac pacemaker  clopidogrel (PLAVIX) 75 MG tablet      4. Refractory angina pectoris               Plan:   1-4. AV block---s/p DC PPM ([place]-2022)----he is doing well from pacemaker standpoint.  Normal device testing and function in office today.  AP: 10%, : 80%.  She has been dependent in the past but is not in office today.      5-6. CAD, chest pain----he has history of PCI and follows with Dr. Locke.  She was in the hospital recently with type II NSTEMI felt unrelated to ischemia.  Recommended against workup at that time.  Her symptoms are improved.  She has no complaints in office today.            Will follow-up in 1 year with a device check          As always, it has been a pleasure to participate in your patient's care.      Sincerely,         CLEMENTINE Fan

## 2025-01-09 NOTE — PROGRESS NOTES
"Enter Query Response Below      Query Response:   Unable to determine   Electronically signed by Kelvin Cordero MD, 25, 4:34 PM EST.             If applicable, please update the problem list.     Patient: Yuliana Gonzalez        : 1930  Account: [id number]           Admit Date: 2025        How to Respond to this query:       a. Click New Note     b. Answer query within the yellow box.                c. Update the Problem List, if applicable.      If you have any questions about this query contact me at: [phone]     Dr. 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  Labs include HS Troponin T  118, 126, 173.  Cardiology consulted with note stating \"would not recommend ischemic workup.  I do think she has a type II non-ST elevation MI.  Chest pain is not consistent with angina.\"  Patient was given tamiflu po.  Discharge summary includes \"elevated troponin.\"      Please clarify the following:    NSTEMI type 2 ruled in  NSTEMI type 2 ruled out   Other- specify______  Unable to determine     By submitting this query, we are merely seeking further clarification of documentation to accurately reflect all conditions that you are monitoring, evaluating, treating or that extend the hospitalization or utilize additional resources of care. Please utilize your independent clinical judgment when addressing the question(s) above.     This query and your response, once completed, will be entered into the legal medical record.    Sincerely,  Lina Denise, MSN, APRN-BC, CCDS   Clinical Documentation Integrity Program   [email or web address]   "

## 2025-01-13 ENCOUNTER — TELEPHONE (OUTPATIENT)
Dept: CARDIOLOGY | Facility: CLINIC | Age: OVER 89
End: 2025-01-13
Payer: MEDICARE

## 2025-01-13 NOTE — TELEPHONE ENCOUNTER
Patient was just seen in the office by CLEMENTINE Fan.  She was doing okay.  Please schedule a follow-up appointment with Dr. Locke in February for hospital follow-up visit.  Thank you

## 2025-01-15 ENCOUNTER — READMISSION MANAGEMENT (OUTPATIENT)
Dept: CALL CENTER | Facility: HOSPITAL | Age: OVER 89
End: 2025-01-15
Payer: MEDICARE

## 2025-01-15 NOTE — OUTREACH NOTE
Medical Week 2 Survey      Flowsheet Row Responses   [facility] patient discharged from? [place]   Does the patient have one of the following disease processes/diagnoses(primary or secondary)? Other   Week 2 attempt successful? Yes   Call start time 1525   Discharge diagnosis Influenza A   Call end time 1526   Person spoke with today (if not patient) and relationship pt   Meds reviewed with patient/caregiver? Yes   Does the patient have a primary care provider?  Yes   Has home health visited the patient within 72 hours of discharge? N/A   Psychosocial issues? No   Did the patient receive a copy of their discharge instructions? Yes   Nursing interventions Reviewed instructions with patient   What is the patient's perception of their health status since discharge? Improving   Is the patient/caregiver able to teach back signs and symptoms related to disease process for when to call PCP? Yes   Is the patient/caregiver able to teach back signs and symptoms related to disease process for when to call 911? Yes   Is the patient/caregiver able to teach back the hierarchy of who to call/visit for symptoms/problems? PCP, Specialist, Home health nurse, Urgent Care, ED, 911 Yes   Week 2 Call Completed? Yes   Graduated Yes   Wrap up additional comments Patient reports doing well. No concerns or questions noted. FLU symptoms improved.   Call end time 1526            Jade DRAKE - Registered Nurse

## 2025-02-05 ENCOUNTER — OFFICE VISIT (OUTPATIENT)
Dept: INTERNAL MEDICINE | Facility: CLINIC | Age: OVER 89
End: 2025-02-05
Payer: MEDICARE

## 2025-02-05 VITALS
WEIGHT: 155 LBS | HEART RATE: 56 BPM | OXYGEN SATURATION: 94 % | DIASTOLIC BLOOD PRESSURE: 70 MMHG | SYSTOLIC BLOOD PRESSURE: 112 MMHG | HEIGHT: 61 IN | RESPIRATION RATE: 18 BRPM | TEMPERATURE: 98.1 F | BODY MASS INDEX: 29.27 KG/M2

## 2025-02-05 DIAGNOSIS — Z00.00 ENCOUNTER FOR ANNUAL WELLNESS EXAM IN MEDICARE PATIENT: ICD-10-CM

## 2025-02-05 DIAGNOSIS — R73.02 IGT (IMPAIRED GLUCOSE TOLERANCE): Chronic | ICD-10-CM

## 2025-02-05 DIAGNOSIS — I10 PRIMARY HYPERTENSION: Primary | Chronic | ICD-10-CM

## 2025-02-05 DIAGNOSIS — E78.5 HYPERLIPIDEMIA, UNSPECIFIED HYPERLIPIDEMIA TYPE: Chronic | ICD-10-CM

## 2025-02-05 PROBLEM — R07.9 CHEST PAIN: Status: RESOLVED | Noted: 2023-08-01 | Resolved: 2025-02-05

## 2025-02-05 PROBLEM — N30.00 ACUTE CYSTITIS WITHOUT HEMATURIA: Status: RESOLVED | Noted: 2023-11-15 | Resolved: 2025-02-05

## 2025-02-05 PROBLEM — J10.1 INFLUENZA A: Status: RESOLVED | Noted: 2025-01-02 | Resolved: 2025-02-05

## 2025-02-05 PROCEDURE — 1170F FXNL STATUS ASSESSED: CPT | Performed by: INTERNAL MEDICINE

## 2025-02-05 PROCEDURE — G0439 PPPS, SUBSEQ VISIT: HCPCS | Performed by: INTERNAL MEDICINE

## 2025-02-05 PROCEDURE — G2211 COMPLEX E/M VISIT ADD ON: HCPCS | Performed by: INTERNAL MEDICINE

## 2025-02-05 PROCEDURE — 1126F AMNT PAIN NOTED NONE PRSNT: CPT | Performed by: INTERNAL MEDICINE

## 2025-02-05 PROCEDURE — 99214 OFFICE O/P EST MOD 30 MIN: CPT | Performed by: INTERNAL MEDICINE

## 2025-02-05 NOTE — PROGRESS NOTES
Subjective   Yuliana Gonzalez is a 89 y.o. female.     Chief Complaint   Patient presents with    Medicare Wellness-subsequent    Hyperlipidemia    Hypertension    Hyperglycemia         History of Present Illness  In for recheck of chronic IGT.  Has been stable.  Also aortic stenosis.  Has dyspnea on exertion which has resolved.  Recent TTE showed an ejection fraction of 51% which was improved.  History of macular degeneration.  That is stable.  Hyperlipidemia  This is a chronic problem. The current episode started more than 1 year ago. The problem is controlled. Recent lipid tests were reviewed and are normal. Factors aggravating her hyperlipidemia include beta blockers. Pertinent negatives include no chest pain or shortness of breath.   Hypertension  This is a chronic problem. The current episode started more than 1 year ago. The problem is unchanged. The problem is controlled. Pertinent negatives include no chest pain, palpitations or shortness of breath.   Hyperglycemia  Symptoms are chronic.   Onset was more than 1 year ago.   Symptoms occur constantly.   Pertinent negative symptoms include no abdominal pain, no chest pain and no cough.        The following portions of the patient's history were reviewed and updated as appropriate: allergies, current medications, past social history and problem list.    Outpatient Medications Marked as Taking for the 2/5/25 encounter (Office Visit) with Trent Davis MD   Medication Sig Dispense Refill    acetaminophen (TYLENOL) 325 MG tablet Take 2 tablets by mouth Every 4 (Four) Hours As Needed for Mild Pain  or Fever (temperature greater than 101F). 120 tablet 0    aspirin (aspirin) 81 MG EC tablet Take 1 tablet by mouth Daily. 30 tablet 6    AZO-CRANBERRY PO Take 1 tablet by mouth Daily.      clopidogrel (PLAVIX) 75 MG tablet Take 1 tablet by mouth Every Morning. 90 tablet 3    metoprolol succinate XL (Toprol XL) 25 MG 24 hr tablet Take 1 tablet by mouth 2 (Two) Times a  Day for 360 days. 180 tablet 3    metroNIDAZOLE (METROCREAM) 0.75 % cream Apply 1 Application topically to the appropriate area as directed 2 (Two) Times a Day As Needed (as needed).      Multiple Vitamins-Minerals (VITEYES AREDS ADVANCED PO) Take 2 capsules by mouth Daily.      mupirocin (BACTROBAN) 2 % ointment APPLY TWO TIMES PER DAY TO THE INFECTION/BIOPSY SITES.      nitroglycerin (NITROSTAT) 0.4 MG SL tablet Place 1 tablet under the tongue Every 5 (Five) Minutes As Needed for Chest Pain. Take no more than 3 doses in 15 minutes. 30 tablet 3    pantoprazole (PROTONIX) 40 MG EC tablet TAKE 1 TABLET BY MOUTH EVERY DAY IN THE MORNING 90 tablet 1    potassium chloride (MICRO-K) 10 MEQ CR capsule Take 1 capsule by mouth 2 (Two) Times a Day. 180 capsule 3    rosuvastatin (CRESTOR) 20 MG tablet Take 1 tablet by mouth Daily. 90 tablet 3    sennosides-docusate (PERICOLACE) 8.6-50 MG per tablet Take 1 tablet by mouth Daily.      torsemide (DEMADEX) 20 MG tablet Take 1 tablet by mouth Daily. 90 tablet 3    Vitamin D, Cholecalciferol, 50 MCG (2000 UT) capsule Take 1 capsule by mouth Daily.         Review of Systems   Respiratory:  Negative for cough, shortness of breath and wheezing.    Cardiovascular:  Negative for chest pain, palpitations and leg swelling.   Gastrointestinal:  Negative for abdominal pain, constipation and diarrhea.       Objective   Vitals:    02/05/25 0855   BP: 112/70   Pulse: 56   Resp: 18   Temp: 98.1 °F (36.7 °C)   SpO2: 94%          02/05/25  0855   Weight: 70.3 kg (155 lb)    [unfilled]  Body mass index is 29.29 kg/m².      Physical Exam   Constitutional: She appears well-developed.   Neck: No thyromegaly present.   Cardiovascular: Regular rhythm. Exam reveals no gallop.   No murmur heard.  No systolic murmur is present.  Pulmonary/Chest: Effort normal. No respiratory distress.   Abdominal: Soft. Normal appearance and bowel sounds are normal. She exhibits no mass. There is no abdominal  tenderness. There is no guarding.   Neurological: She is alert.         Problems Addressed this Visit          Cardiac and Vasculature    Hypertension - Primary (Chronic)                     Hyperlipidemia (Chronic)                         Endocrine and Metabolic    IGT (impaired glucose tolerance) (Chronic)                    Other Visit Diagnoses       Encounter for annual wellness exam in Medicare patient              Diagnoses         Codes Comments    Primary hypertension    -  Primary ICD-10-CM: I10  ICD-9-CM: 401.9     Hyperlipidemia, unspecified hyperlipidemia type     ICD-10-CM: E78.5  ICD-9-CM: 272.4     IGT (impaired glucose tolerance)     ICD-10-CM: R73.02  ICD-9-CM: 790.22     Encounter for annual wellness exam in Medicare patient     ICD-10-CM: Z00.00  ICD-9-CM: V70.0           Assessment/Plan   In for recheck of hypertension, hyperlipidemia, IGT and CAD today February 2025.  She had influenza in January and was hospitalized with a troponin bump.  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  Virtually all of her cardiac symptoms have now resolved.  Her energy was back to normal.  Blood pressure control is good.  Glycemic control is been good.  Gets glucose and A1c today and every 3 months.  We will skip that the day since she was in the hospital recently.  Blood sugars are running normal right now.  Annual lab work was done July 2024.  Annual wellness visit done today February 2025..  She failed Lipitor and Dr. Locke felt that was somewhat irrelevant at her age and is good is her lipids run.  I agree.  She remains on Lopressor 25 mg twice daily and torsemide 20 mg daily for hypertension those are reviewed today.  History of macular degeneration being treated with eye vitamins.  She has numbness in the fingers of both hands.  Now on vitamin B6 200 mg daily which has not really helped.  And consider nocturnal wrist splints.  She has questions about dual anticoagulation with Plavix  and aspirin.  She had a troponin bump with her flu and Plavix was added.  Will discuss this with Dr. Locke.  I suspect she could drop off the Plavix at this point.  Crestor was also added and she will discuss that with Dr. Locke as well.  2.5 HPP today.    The above information was reviewed again today 02/05/25.  It continues to be accurate as reflected above and is unchanged.  History, physical and review of systems all reviewed and are unchanged.  Medications were reviewed today and continue the current dosing.         Dragon disclaimer:   Much of this encounter note is an electronic transcription/translation of spoken language to printed text. The electronic translation of spoken language may permit erroneous, or at times, nonsensical words or phrases to be inadvertently transcribed; Although I have reviewed the note for such errors, some may still exist.

## 2025-02-05 NOTE — PROGRESS NOTES
Subjective   The ABCs of the Annual Wellness Visit  Medicare Wellness Visit      Yuliana Gonzalez is a 89 y.o. patient who presents for a Medicare Wellness Visit.    The following portions of the patient's history were reviewed and   updated as appropriate: allergies, current medications, past family history, past medical history, past social history, past surgical history, and problem list.    Compared to one year ago, the patient's physical   health is the same.  Compared to one year ago, the patient's mental   health is the same.    Recent Hospitalizations:  This patient has had a [facility] admission record on file within the last 365 days.  Current Medical Providers:  Patient Care Team:  Trent Davis MD as PCP - General (Internal Medicine)  Trent Davis MD as PCP - Internal Medicine (Internal Medicine)  Thang Bruce MD as Consulting Physician (Ophthalmology)  Curtis Marshall MD as Consulting Physician (Cardiology)    Outpatient Medications Prior to Visit   Medication Sig Dispense Refill    acetaminophen (TYLENOL) 325 MG tablet Take 2 tablets by mouth Every 4 (Four) Hours As Needed for Mild Pain  or Fever (temperature greater than 101F). 120 tablet 0    aspirin (aspirin) 81 MG EC tablet Take 1 tablet by mouth Daily. 30 tablet 6    AZO-CRANBERRY PO Take 1 tablet by mouth Daily.      clopidogrel (PLAVIX) 75 MG tablet Take 1 tablet by mouth Every Morning. 90 tablet 3    metoprolol succinate XL (Toprol XL) 25 MG 24 hr tablet Take 1 tablet by mouth 2 (Two) Times a Day for 360 days. 180 tablet 3    metroNIDAZOLE (METROCREAM) 0.75 % cream Apply 1 Application topically to the appropriate area as directed 2 (Two) Times a Day As Needed (as needed).      Multiple Vitamins-Minerals (VITEYES AREDS ADVANCED PO) Take 2 capsules by mouth Daily.      mupirocin (BACTROBAN) 2 % ointment APPLY TWO TIMES PER DAY TO THE INFECTION/BIOPSY SITES.      nitroglycerin (NITROSTAT) 0.4 MG SL tablet Place 1 tablet  under the tongue Every 5 (Five) Minutes As Needed for Chest Pain. Take no more than 3 doses in 15 minutes. 30 tablet 3    pantoprazole (PROTONIX) 40 MG EC tablet TAKE 1 TABLET BY MOUTH EVERY DAY IN THE MORNING 90 tablet 1    potassium chloride (MICRO-K) 10 MEQ CR capsule Take 1 capsule by mouth 2 (Two) Times a Day. 180 capsule 3    rosuvastatin (CRESTOR) 20 MG tablet Take 1 tablet by mouth Daily. 90 tablet 3    sennosides-docusate (PERICOLACE) 8.6-50 MG per tablet Take 1 tablet by mouth Daily.      torsemide (DEMADEX) 20 MG tablet Take 1 tablet by mouth Daily. 90 tablet 3    Vitamin D, Cholecalciferol, 50 MCG (2000 UT) capsule Take 1 capsule by mouth Daily.      benzonatate (TESSALON) 100 MG capsule Take 1 capsule by mouth 3 (Three) Times a Day As Needed for Cough. (Patient not taking: Reported on 2/5/2025) 15 capsule 0     No facility-administered medications prior to visit.     No opioid medication identified on active medication list. I have reviewed chart for other potential  high risk medication/s and harmful drug interactions in the elderly.      Aspirin is on active medication list. Aspirin use is indicated based on review of current medical condition/s. Pros and cons of this therapy have been discussed today. Benefits of this medication outweigh potential harm.  Patient has been encouraged to continue taking this medication.  .      Patient Active Problem List   Diagnosis    Aortic stenosis    IGT (impaired glucose tolerance)    Hyperlipidemia    Hypertension    Cervical arthritis    Sciatica    Vitamin D deficiency    Exudative age-related macular degeneration, bilateral, with inactive choroidal neovascularization    Coronary artery disease involving native coronary artery of native heart    AV block, complete    S/P TAVR (transcatheter aortic valve replacement)    Presence of cardiac pacemaker    ST elevation myocardial infarction (STEMI)    NSTEMI (non-ST elevated myocardial infarction)    Elevated  "troponin    S/P drug eluting coronary stent placement    Thrombocytopenia    Chronic heart failure with preserved ejection fraction (HFpEF)     Advance Care Planning Advance Directive is on file.  ACP discussion was held with the patient during this visit. Patient has an advance directive in EMR which is still valid.             Objective   Vitals:    25 0855   BP: 112/70   Pulse: 56   Resp: 18   Temp: 98.1 °F (36.7 °C)   TempSrc: Temporal   SpO2: 94%   Weight: 70.3 kg (155 lb)   Height: 154.9 cm (61\")   PainSc: 0-No pain       Estimated body mass index is 29.29 kg/m² as calculated from the following:    Height as of this encounter: 154.9 cm (61\").    Weight as of this encounter: 70.3 kg (155 lb).    BMI is >= 25 and <30. (Overweight) The following options were offered after discussion;: exercise counseling/recommendations            Does the patient have evidence of cognitive impairment? No                                                                                               Health  Risk Assessment    Smoking Status:  Social History     Tobacco Use   Smoking Status Former    Current packs/day: 0.00    Types: Cigarettes    Start date:     Quit date:     Years since quittin.1    Passive exposure: Past   Smokeless Tobacco Never   Tobacco Comments    1 pack every 2 weeks     Alcohol Consumption:  Social History     Substance and Sexual Activity   Alcohol Use Yes    Comment: wine - rare / no caffeine       Fall Risk Screen  STEADI Fall Risk Assessment was completed, and patient is at LOW risk for falls.Assessment completed on:2025    Depression Screening   Little interest or pleasure in doing things? Not at all   Feeling down, depressed, or hopeless? Not at all   PHQ-2 Total Score 0      Health Habits and Functional and Cognitive Screenin/5/2025     8:58 AM   Functional & Cognitive Status   Do you have difficulty preparing food and eating? Yes   Do you have difficulty bathing " yourself, getting dressed or grooming yourself? No   Do you have difficulty using the toilet? No   Do you have difficulty moving around from place to place? No   Do you have trouble with steps or getting out of a bed or a chair? Yes   Current Diet Well Balanced Diet   Dental Exam Up to date   Eye Exam Up to date   Exercise (times per week) 7 times per week   Current Exercises Include Home Exercise Program (TV, Computer, Etc.);Walking   Do you need help using the phone?  No   Are you deaf or do you have serious difficulty hearing?  No   Do you need help to go to places out of walking distance? No   Do you need help shopping? Yes   Do you need help preparing meals?  No   Do you need help with housework?  Yes   Do you need help with laundry? No   Do you need help taking your medications? No   Do you need help managing money? No   Do you ever drive or ride in a car without wearing a seat belt? No   Have you felt unusual stress, anger or loneliness in the last month? No   Who do you live with? Community   If you need help, do you have trouble finding someone available to you? No   Have you been bothered in the last four weeks by sexual problems? No   Do you have difficulty concentrating, remembering or making decisions? No           Age-appropriate Screening Schedule:  Refer to the list below for future screening recommendations based on patient's age, sex and/or medical conditions. Orders for these recommended tests are listed in the plan section. The patient has been provided with a written plan.    Health Maintenance List  Health Maintenance   Topic Date Due    ANNUAL WELLNESS VISIT  12/28/2024    BMI FOLLOWUP  12/28/2024    DXA SCAN  01/01/2034 (Originally 1/27/1930)    ZOSTER VACCINE (2 of 3) 01/01/2035 (Originally 1/26/2008)    TDAP/TD VACCINES (3 - Td or Tdap) 03/01/2027    COVID-19 Vaccine  Completed    RSV Vaccine - Adults  Completed    INFLUENZA VACCINE  Completed    Pneumococcal Vaccine 65+  Completed     MAMMOGRAM  Discontinued    LIPID PANEL  Discontinued    COLORECTAL CANCER SCREENING  Discontinued                                                                                                                                                CMS Preventative Services Quick Reference  Risk Factors Identified During Encounter  Fall Risk-High or Moderate: Information on Fall Prevention Shared in After Visit Summary  Normal Balance     The above risks/problems have been discussed with the patient.  Pertinent information has been shared with the patient in the After Visit Summary.  An After Visit Summary and PPPS were made available to the patient.    Follow Up:   Next Medicare Wellness visit to be scheduled in 1 year.     Assessment & Plan  Primary hypertension           Hyperlipidemia, unspecified hyperlipidemia type            IGT (impaired glucose tolerance)         Encounter for annual wellness exam in Medicare patient              Follow Up:   No follow-ups on file.

## 2025-03-05 ENCOUNTER — OFFICE VISIT (OUTPATIENT)
Age: OVER 89
End: 2025-03-05
Payer: MEDICARE

## 2025-03-05 VITALS
OXYGEN SATURATION: 96 % | HEART RATE: 81 BPM | WEIGHT: 159.6 LBS | DIASTOLIC BLOOD PRESSURE: 71 MMHG | HEIGHT: 61 IN | SYSTOLIC BLOOD PRESSURE: 125 MMHG | BODY MASS INDEX: 30.13 KG/M2

## 2025-03-05 DIAGNOSIS — I10 PRIMARY HYPERTENSION: ICD-10-CM

## 2025-03-05 DIAGNOSIS — Z95.0 PRESENCE OF CARDIAC PACEMAKER: ICD-10-CM

## 2025-03-05 DIAGNOSIS — Z95.2 S/P TAVR (TRANSCATHETER AORTIC VALVE REPLACEMENT): Primary | ICD-10-CM

## 2025-03-05 DIAGNOSIS — I25.10 CORONARY ARTERY DISEASE INVOLVING NATIVE CORONARY ARTERY OF NATIVE HEART WITHOUT ANGINA PECTORIS: ICD-10-CM

## 2025-03-05 NOTE — PROGRESS NOTES
Date of Office Visit: 25    Encounter Provider: Clint Locke MD  Place of Service: [facility] CARDIOLOGY  Patient Name: Yuliana Gonzalez  :1930      Chief complaint:  Coronary artery disease with prior PCI to the mid LAD  Severe degenerative aortic valve stenosis with history of transcatheter aortic valve replacement  Permanent pacemaker      HPI:  89 y.o. 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  She underwent transcatheter aortic valve replacement with a 26 mm Medtronic evolut pro transcatheter aortic valve that was postdilated.  She was noted to have a mild paravalvular leak in case completion.  She subsequently presented back with dyspnea along with inferior ST elevation, however this subsequently has resolved.  Catheterization at that time showed nonobstructive CAD. She states she has been feeling great and has been walking out the form doing physical therapy.     She continues to follow-up with the electrophysiology team.  She is RV pacing 20% of the time.  Blood pressure and heart rate are well controlled.    She went to [facility] in Aug 2023 initially complaining of chest pain, cough, shortness of breath.  She was found to have an NSTEMI and a parainfluenza virus URI and was admitted to the hospital for further evaluation and treatment.  We did an echocardiogram which showed a newly depressed EF at 37%. She then underwent cardiac catheterization which showed 99% mid vessel stenosis in the LAD. A 2.5 x 38 mm Xience lida point drug-eluting stent, postdilated to high-pressure in the proximal to mid stent segment with a 3.0 mm NC trek balloon was placed in the mid-LAD.  She is  "done very well since that time.  Ejection fraction normalized.     She was recently in the hospital in January 2025 with cough and shortness of breath and noted to have influenza A.  Her troponin was elevated at 118, 126, 173.  She did have a transthoracic echocardiogram in January showed normal valve functioning and preserved ejection fraction.  She did have apical hypokinesis along with mid inferior wall hypokinesis also documented.  She has done well since that time and denies any chest pain.    Past Medical History:   Diagnosis Date    Aortic stenosis 07/2009    diagnosed, 6/14 moderate with 0.9cm area, 42mm max gradient. 0.91/34 6/15.    Basal cell carcinoma (BCC) of nasal tip     Central loss of vision     RIGHT EYE    Cervical arthritis     Colitis     HX    Coronary artery disease     History of constipation     History of esophageal reflux     History of skin cancer     Hyperlipidemia     Hypertension     IGT (impaired glucose tolerance)     Irregular heart beat     HX    Macular degeneration     Medication management     On continuous oral anticoagulation     Redness of skin     NOSE/DERMATOLOGY TREATING WITH FLAGYL CREAM/ADVISED PT TO NOTIFY CARDIOLOGY    Sciatica     HX    Short-term memory loss     \"MILD\"    SOB (shortness of breath) on exertion     Vitamin D deficiency        Past Surgical History:   Procedure Laterality Date    AORTIC VALVE REPAIR/REPLACEMENT Left 3/15/2022    Procedure: TTE TRANSFEMORAL TRANSCATHETER AORTIC VALVE REPLACEMENT PERCUTANEOUS APPROACH;  Surgeon: Vesna Chris MD;  Location: [facility];  Service: Cardiothoracic;  Laterality: Left;    AORTIC VALVE REPAIR/REPLACEMENT Left 3/15/2022    Procedure: Transfemoral Transcatheter Aortic Valve Replacement with intra operative TTE and possible open surgical rescue;  Surgeon: Clint Locke MD;  Location: [facility];  Service: Cardiovascular;  Laterality: Left;    CARDIAC CATHETERIZATION N/A 1/21/2022    Procedure: " Coronary angiography;  Surgeon: Clint Locke MD;  Location:  [facility] CATH INVASIVE LOCATION;  Service: Cardiovascular;  Laterality: N/A;    CARDIAC CATHETERIZATION N/A 1/21/2022    Procedure: Percutaneous Coronary Intervention;  Surgeon: Clint Locke MD;  Location:  [facility] CATH INVASIVE LOCATION;  Service: Cardiovascular;  Laterality: N/A;    CARDIAC CATHETERIZATION N/A 1/21/2022    Procedure: Stent ISMAEL coronary;  Surgeon: Clint Locke MD;  Location:  [facility] CATH INVASIVE LOCATION;  Service: Cardiovascular;  Laterality: N/A;    CARDIAC CATHETERIZATION Left 7/31/2022    Procedure: CORONARY ANGIOGRAPHY;  Surgeon: Jeannie Echevarria MD;  Location:  [facility] CATH INVASIVE LOCATION;  Service: Cardiology;  Laterality: Left;    CARDIAC CATHETERIZATION N/A 7/31/2022    Procedure: Left ventriculography;  Surgeon: Jeannie Echevarria MD;  Location:  [facility] CATH INVASIVE LOCATION;  Service: Cardiology;  Laterality: N/A;    CARDIAC CATHETERIZATION N/A 7/31/2022    Procedure: Left Heart Cath;  Surgeon: Jeannie Echevarria MD;  Location:  [facility] CATH INVASIVE LOC[facility];  Service: Cardiology;  Laterality: N/A;    CARDIAC CATHETERIZATION N/A 8/4/2023    Procedure: Left Heart Cath;  Surgeon: Clint Locke MD;  Location:  [facility] CATH INVASIVE LOCATION;  Service: Cardiovascular;  Laterality: N/A;    CARDIAC CATHETERIZATION N/A 8/4/2023    Procedure: Percutaneous Coronary Intervention;  Surgeon: Clint Locke MD;  [facility];  Service: Cardiovascular;  Laterality: N/A;    CARDIAC CATHETERIZATION N/A 8/4/2023    Procedure: Stent ISMAEL coronary;  Surgeon: Clint Locke MD;  Location:  [facility] CATH INVASIVE LOCATION;  Service: Cardiovascular;  Laterality: N/A;    CARDIAC CATHETERIZATION N/A 8/4/2023    Procedure: Coronary angiography;  Surgeon: Clint Locke MD;  Location:  [facility] CATH INVASIVE LOCATION;  Service: Cardiovascular;  Laterality: N/A;    CARDIAC  ELECTROPHYSIOLOGY PROCEDURE N/A 3/16/2022    Procedure: Pacemaker DC new [place];  Surgeon: Yann Ceballos MD;  Location: [facility] INVASIVE LOC[facility];  Service: Cardiology;  Laterality: N/A;    CATARACT EXTRACTION, BILATERAL      COLONOSCOPY      EYE SURGERY      SKIN CANCER EXCISION         Social History     Socioeconomic History    Marital status:      Spouse name: Stephan    Number of children: 4   Tobacco Use    Smoking status: Former     Current packs/day: 0.00     Types: Cigarettes     Start date:      Quit date:      Years since quittin.2     Passive exposure: Past    Smokeless tobacco: Never    Tobacco comments:     1 pack every 2 weeks   Vaping Use    Vaping status: Never Used   Substance and Sexual Activity    Alcohol use: Yes     Comment: wine - rare / no caffeine    Drug use: No    Sexual activity: Defer       Family History   Problem Relation Age of Onset    Aortic stenosis Mother     Hypertension Mother     Heart failure Father     Diabetes Father     Hypertension Father     No Known Problems Sister     No Known Problems Daughter     No Known Problems Son     Aneurysm Sister     Other Sister         Polio in her 20's    Malig Hyperthermia Neg Hx        Review of Systems   Constitutional: Negative. Negative for fever and malaise/fatigue.   HENT:  Negative for nosebleeds and sore throat.    Eyes:  Negative for blurred vision and double vision.   Cardiovascular: Negative.  Negative for chest pain, claudication, dyspnea on exertion, leg swelling, orthopnea, palpitations, paroxysmal nocturnal dyspnea and syncope.   Respiratory: Negative.  Negative for cough, shortness of breath and snoring.    Endocrine: Negative for cold intolerance, heat intolerance, polydipsia and polyphagia.   Hematologic/Lymphatic: Negative for bleeding problem.   Skin:  Negative for itching, poor wound healing and rash.   Musculoskeletal:  Negative for falls, joint pain, joint swelling, muscle weakness and  "myalgias.   Gastrointestinal:  Negative for abdominal pain, melena, nausea and vomiting.   Neurological:  Positive for light-headedness. Negative for dizziness, loss of balance, seizures, vertigo and weakness.   Psychiatric/Behavioral:  Negative for altered mental status and depression.        Allergies   Allergen Reactions    Hydrochlorothiazide Other (See Comments)     hyponatremia    Hydrocodone Shortness Of Breath     Also reports \"lethargy\"    Lipitor [Atorvastatin] Nausea Only     \"FELT AWFUL\"    Erythromycin Diarrhea    Lisinopril Cough         Current Outpatient Medications:     acetaminophen (TYLENOL) 325 MG tablet, Take 2 tablets by mouth Every 4 (Four) Hours As Needed for Mild Pain  or Fever (temperature greater than 101F)., Disp: 120 tablet, Rfl: 0    aspirin (aspirin) 81 MG EC tablet, Take 1 tablet by mouth Daily., Disp: 30 tablet, Rfl: 6    AZO-CRANBERRY PO, Take 1 tablet by mouth Daily., Disp: , Rfl:     clopidogrel (PLAVIX) 75 MG tablet, Take 1 tablet by mouth Every Morning., Disp: 90 tablet, Rfl: 3    metoprolol succinate XL (Toprol XL) 25 MG 24 hr tablet, Take 1 tablet by mouth 2 (Two) Times a Day for 360 days., Disp: 180 tablet, Rfl: 3    metroNIDAZOLE (METROCREAM) 0.75 % cream, Apply 1 Application topically to the appropriate area as directed 2 (Two) Times a Day As Needed (as needed)., Disp: , Rfl:     Multiple Vitamins-Minerals (VITEYES AREDS ADVANCED PO), Take 2 capsules by mouth Daily., Disp: , Rfl:     mupirocin (BACTROBAN) 2 % ointment, APPLY TWO TIMES PER DAY TO THE INFECTION/BIOPSY SITES., Disp: , Rfl:     nitroglycerin (NITROSTAT) 0.4 MG SL tablet, Place 1 tablet under the tongue Every 5 (Five) Minutes As Needed for Chest Pain. Take no more than 3 doses in 15 minutes., Disp: 30 tablet, Rfl: 3    pantoprazole (PROTONIX) 40 MG EC tablet, TAKE 1 TABLET BY MOUTH EVERY DAY IN THE MORNING, Disp: 90 tablet, Rfl: 1    potassium chloride (MICRO-K) 10 MEQ CR capsule, Take 1 capsule by mouth 2 (Two) " "Times a Day., Disp: 180 capsule, Rfl: 3    rosuvastatin (CRESTOR) 20 MG tablet, Take 1 tablet by mouth Daily., Disp: 90 tablet, Rfl: 3    sennosides-docusate (PERICOLACE) 8.6-50 MG per tablet, Take 1 tablet by mouth Daily., Disp: , Rfl:     torsemide (DEMADEX) 20 MG tablet, Take 1 tablet by mouth Daily., Disp: 90 tablet, Rfl: 3    Vitamin D, Cholecalciferol, 50 MCG (2000 UT) capsule, Take 1 capsule by mouth Daily., Disp: , Rfl:       Objective:     Vitals:    03/05/25 1239   BP: 125/71   BP Location: Left arm   Patient Position: Sitting   Cuff Size: Adult   Pulse: 81   SpO2: 96%   Weight: 72.4 kg (159 lb 9.6 oz)   Height: 154.9 cm (61\")       Body mass index is 30.16 kg/m².    PHYSICAL EXAM:    Constitutional:       Appearance: Well-developed.   Eyes:      General: No scleral icterus.     Conjunctiva/sclera: Conjunctivae normal.   HENT:      Head: Normocephalic and atraumatic.   Neck:      Thyroid: No thyromegaly.      Vascular: Normal carotid pulses. No carotid bruit, hepatojugular reflux or JVD.      Trachea: No tracheal deviation.   Pulmonary:      Effort: Pulmonary effort is normal. No respiratory distress.      Breath sounds: Normal breath sounds.   Chest:      Chest wall: Not tender to palpatation.   Cardiovascular:      Normal rate. Regular rhythm.      Murmurs: There is a grade 2/6 early systolic murmur.      No gallop.  No click. No rub.   Abdominal:      General: Bowel sounds are normal. There is no distension.      Palpations: Abdomen is soft.      Tenderness: There is no abdominal tenderness.   Musculoskeletal:         General: No deformity.      Cervical back: Normal range of motion and neck supple. Skin:     Findings: No erythema or rash.   Neurological:      Mental Status: Alert and oriented to person, place, and time.      Sensory: No sensory deficit.   Psychiatric:         Behavior: Behavior normal.           ECG 12 Lead    Date/Time: 3/5/2025 12:48 PM  Performed by: Clint Locke" MD    Authorized by: Clint Locke MD  Comparison: compared with previous ECG from 1/9/2025  Similar to previous ECG  Rhythm: sinus rhythm  Conduction: right bundle branch block           1/9/25    Left ventricular systolic function is normal. Calculated left ventricular EF = 54.8%    Left ventricular wall thickness is consistent with mild concentric hypertrophy.    The following left ventricular wall segments are hypokinetic: apical inferior, mid inferior and basal inferior.    Left ventricular diastolic function is consistent with (grade II w/high LAP) pseudonormalization.    There is a TAVR valve present.  There is mild paravalvular regurgitation present.  Gradients are within limits.    Severe mitral annular calcification present.  Mild mitral valve stenosis is present. The mitral valve mean gradient is 3 mmHg.      8/4/23  Conclusions:   1. Left main: Normal.  2. LAD: 40% proximal stenosis.  Tubular 99% mid vessel stenosis with STEPHANIA II flow distally  3. LCX: Luminal irregularities  4.  Successful PCI of the mid LAD with a 2.5 x 38 mm Xience lida point drug-eluting stent, postdilated to high-pressure in the proximal to mid stent segment with a 3.0 mm NC trek balloon    7/31/22  Widely patent mid LAD stent. Otherwise nonobstructive coronary artery disease  Normal LV function. Mildly elevated filling pressures.  Recommendations: Dual antiplatelet therapy given elevated troponin for 1 year. Echocardiogram tomorrow to assess TAVR valve. Unclear etiology of ST elevations.        3/16/22  CONCLUSIONS:  Successful deployment of a 26mm Medtronic Evolut Pro transcatheter aortic heart valve.  Post dilation with a 20 mm true balloon      Assessment:     Plan:       1.  Coronary artery disease.  Status post PCI of the mid LAD.  Patient had parainfluenza virus and a type II non-ST elevation MI in August 2023 and had repeat stenting to a 99% mid LAD stenosis.  He was recently in the hospital with influenza A and did  have an elevated troponin but no chest pain concerning at that time.  This has continued to improve  -Recommend continuing aspirin and Plavix.  - Continue Crestor at current dose.  Tolerating well.  -No change in Toprol therapy.  She is tolerating this well.    2.  Severe degenerative aortic valve stenosis.  Status post TAVR in March 2022.    -Doing well.  Last echo January 2025.  Valve is functioning very well with mild paravalvular regurgitation.    3.  Hypertension.  Her blood pressure is stable.  Continue metoprolol at current dose.  Tolerating well.    4.  Permanent pacemaker: Continue to follow with device clinic.    No additional cardiac workup currently.  I will see her back in 1 year.

## 2025-04-01 RX ORDER — PANTOPRAZOLE SODIUM 40 MG/1
40 TABLET, DELAYED RELEASE ORAL EVERY MORNING
Qty: 90 TABLET | Refills: 3 | Status: SHIPPED | OUTPATIENT
Start: 2025-04-01

## 2025-04-01 RX ORDER — POTASSIUM CHLORIDE 750 MG/1
10 CAPSULE, EXTENDED RELEASE ORAL 2 TIMES DAILY
Qty: 180 CAPSULE | Refills: 3 | Status: SHIPPED | OUTPATIENT
Start: 2025-04-01

## 2025-05-06 RX ORDER — ROSUVASTATIN CALCIUM 20 MG/1
20 TABLET, COATED ORAL DAILY
Qty: 90 TABLET | Refills: 3 | Status: SHIPPED | OUTPATIENT
Start: 2025-05-06

## 2025-05-06 NOTE — TELEPHONE ENCOUNTER
Protocol Failed.     No recent lipid panel.     NOV - 11/17/2025 (CS)    LOV - 3/5/2025 (CS)    Plan:       1.  Coronary artery disease.  Status post PCI of the mid LAD.  Patient had parainfluenza virus and a type II non-ST elevation MI in August 2023 and had repeat stenting to a 99% mid LAD stenosis.  He was recently in the hospital with influenza A and did have an elevated troponin but no chest pain concerning at that time.  This has continued to improve  -Recommend continuing aspirin and Plavix.  - Continue Crestor at current dose.  Tolerating well.  -No change in Toprol therapy.  She is tolerating this well.     2.  Severe degenerative aortic valve stenosis.  Status post TAVR in March 2022.    -Doing well.  Last echo January 2025.  Valve is functioning very well with mild paravalvular regurgitation.     3.  Hypertension.  Her blood pressure is stable.  Continue metoprolol at current dose.  Tolerating well.     4.  Permanent pacemaker: Continue to follow with device clinic.     No additional cardiac workup currently.  I will see her back in 1 year.

## 2025-05-08 ENCOUNTER — LAB (OUTPATIENT)
Dept: LAB | Facility: HOSPITAL | Age: OVER 89
End: 2025-05-08
Payer: MEDICARE

## 2025-05-08 ENCOUNTER — OFFICE VISIT (OUTPATIENT)
Dept: INTERNAL MEDICINE | Facility: CLINIC | Age: OVER 89
End: 2025-05-08
Payer: MEDICARE

## 2025-05-08 VITALS
OXYGEN SATURATION: 97 % | DIASTOLIC BLOOD PRESSURE: 68 MMHG | TEMPERATURE: 97.7 F | RESPIRATION RATE: 18 BRPM | HEIGHT: 61 IN | SYSTOLIC BLOOD PRESSURE: 114 MMHG | BODY MASS INDEX: 29.07 KG/M2 | HEART RATE: 67 BPM | WEIGHT: 154 LBS

## 2025-05-08 DIAGNOSIS — I10 PRIMARY HYPERTENSION: Primary | Chronic | ICD-10-CM

## 2025-05-08 DIAGNOSIS — E78.5 HYPERLIPIDEMIA, UNSPECIFIED HYPERLIPIDEMIA TYPE: Chronic | ICD-10-CM

## 2025-05-08 DIAGNOSIS — R73.02 IGT (IMPAIRED GLUCOSE TOLERANCE): Chronic | ICD-10-CM

## 2025-05-08 LAB
CHOLEST SERPL-MCNC: 141 MG/DL (ref 0–200)
GLUCOSE SERPL-MCNC: 114 MG/DL (ref 65–99)
HBA1C MFR BLD: 5.6 % (ref 4.8–5.6)
HDLC SERPL QL: 1.72
HDLC SERPL-MCNC: 82 MG/DL (ref 40–60)
LDLC SERPL CALC-MCNC: 38 MG/DL (ref 0–100)
TRIGL SERPL-MCNC: 127 MG/DL (ref 0–150)
VLDLC SERPL-MCNC: 21 MG/DL (ref 5–40)

## 2025-05-08 PROCEDURE — 82947 ASSAY GLUCOSE BLOOD QUANT: CPT | Performed by: INTERNAL MEDICINE

## 2025-05-08 PROCEDURE — 80061 LIPID PANEL: CPT | Performed by: INTERNAL MEDICINE

## 2025-05-08 PROCEDURE — 83036 HEMOGLOBIN GLYCOSYLATED A1C: CPT | Performed by: INTERNAL MEDICINE

## 2025-05-08 PROCEDURE — 36415 COLL VENOUS BLD VENIPUNCTURE: CPT | Performed by: INTERNAL MEDICINE

## 2025-05-08 NOTE — PROGRESS NOTES
Subjective   Yuliana Gonzalez is a 89 y.o. female.     Chief Complaint   Patient presents with    Hyperlipidemia    Hypertension    Hyperglycemia         History of Present Illness  In for recheck of chronic IGT.  Has been stable.  Also aortic stenosis.  Has dyspnea on exertion which has resolved.  Recent TTE showed an ejection fraction of 51% which was improved.  History of macular degeneration.  That is stable.  Hyperlipidemia  This is a chronic problem. The current episode started more than 1 year ago. The problem is controlled. Recent lipid tests were reviewed and are normal. Factors aggravating her hyperlipidemia include beta blockers. Associated symptoms include shortness of breath (rare). Pertinent negatives include no chest pain.   Hypertension  Chronicity:  Chronic  Onset:  More than 1 year ago  Progression since onset:  Unchanged  Condition status:  Controlled  Associated symptoms: shortness of breath (rare)    Associated symptoms: no chest pain and no palpitations    Hyperglycemia  Symptoms are chronic.   Onset was more than 1 year ago.   Symptoms occur constantly.   Pertinent negative symptoms include no abdominal pain, no chest pain and no cough.        The following portions of the patient's history were reviewed and updated as appropriate: allergies, current medications, past social history and problem list.    Outpatient Medications Marked as Taking for the 5/8/25 encounter (Office Visit) with Trent Davis MD   Medication Sig Dispense Refill    acetaminophen (TYLENOL) 325 MG tablet Take 2 tablets by mouth Every 4 (Four) Hours As Needed for Mild Pain  or Fever (temperature greater than 101F). 120 tablet 0    aspirin (aspirin) 81 MG EC tablet Take 1 tablet by mouth Daily. 30 tablet 6    AZO-CRANBERRY PO Take 1 tablet by mouth Daily.      clopidogrel (PLAVIX) 75 MG tablet Take 1 tablet by mouth Every Morning. 90 tablet 3    metoprolol succinate XL (Toprol XL) 25 MG 24 hr tablet Take 1 tablet by mouth  2 (Two) Times a Day for 360 days. 180 tablet 3    metroNIDAZOLE (METROCREAM) 0.75 % cream Apply 1 Application topically to the appropriate area as directed 2 (Two) Times a Day As Needed (as needed).      Multiple Vitamins-Minerals (VITEYES AREDS ADVANCED PO) Take 2 capsules by mouth Daily.      mupirocin (BACTROBAN) 2 % ointment APPLY TWO TIMES PER DAY TO THE INFECTION/BIOPSY SITES.      nitroglycerin (NITROSTAT) 0.4 MG SL tablet Place 1 tablet under the tongue Every 5 (Five) Minutes As Needed for Chest Pain. Take no more than 3 doses in 15 minutes. 30 tablet 3    pantoprazole (PROTONIX) 40 MG EC tablet TAKE 1 TABLET BY MOUTH EVERY DAY IN THE MORNING 90 tablet 3    potassium chloride (MICRO-K) 10 MEQ CR capsule TAKE 1 CAPSULE BY MOUTH TWICE A  capsule 3    rosuvastatin (CRESTOR) 20 MG tablet TAKE 1 TABLET BY MOUTH EVERY DAY 90 tablet 3    sennosides-docusate (PERICOLACE) 8.6-50 MG per tablet Take 1 tablet by mouth Daily.      torsemide (DEMADEX) 20 MG tablet Take 1 tablet by mouth Daily. 90 tablet 3    Vitamin D, Cholecalciferol, 50 MCG (2000 UT) capsule Take 1 capsule by mouth Daily.         Review of Systems   Respiratory:  Positive for shortness of breath (rare). Negative for cough and wheezing.    Cardiovascular:  Negative for chest pain, palpitations and leg swelling.   Gastrointestinal:  Negative for abdominal pain, constipation and diarrhea.       Objective   Vitals:    05/08/25 0947   BP: 114/68   Pulse: 67   Resp: 18   Temp: 97.7 °F (36.5 °C)   SpO2: 97%          05/08/25  0947   Weight: 69.9 kg (154 lb)    [unfilled]  Body mass index is 29.1 kg/m².    Physical Exam  Constitutional:       Appearance: Normal appearance. She is well-developed.   Neck:      Thyroid: No thyromegaly.   Cardiovascular:      Rate and Rhythm: Normal rate and regular rhythm.      Heart sounds: Normal heart sounds. No murmur heard.     No gallop.   Pulmonary:      Effort: Pulmonary effort is normal. No respiratory  distress.      Breath sounds: Normal breath sounds. No wheezing or rales.   Abdominal:      General: Bowel sounds are normal.      Palpations: Abdomen is soft. There is no mass.      Tenderness: There is no abdominal tenderness. There is no guarding.   Neurological:      Mental Status: She is alert.          Problems Addressed this Visit          Cardiac and Vasculature    Hypertension - Primary (Chronic)    Hyperlipidemia (Chronic)       Endocrine and Metabolic    IGT (impaired glucose tolerance) (Chronic)     Diagnoses         Codes Comments      Primary hypertension    -  Primary ICD-10-CM: I10  ICD-9-CM: 401.9       Hyperlipidemia, unspecified hyperlipidemia type     ICD-10-CM: E78.5  ICD-9-CM: 272.4       IGT (impaired glucose tolerance)     ICD-10-CM: R73.02  ICD-9-CM: 790.22           Assessment/Plan   In for recheck of hypertension, hyperlipidemia, IGT and CAD today May 2025.  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  Virtually all of her cardiac symptoms have now resolved.  Her energy was back to normal.  Blood pressure control is good.  Glycemic control has been good.  Gets glucose and A1c today and every 3 months.  Blood sugars are running normal right now.  Annual lab work was done July 2024.  Annual wellness visit done February 2025..  She failed Lipitor and Dr. Locke felt that was somewhat irrelevant at her age and is good is her lipids run.  I agree.  She remains on Lopressor 25 mg twice daily and torsemide 20 mg daily for hypertension those are reviewed today.  History of macular degeneration being treated with eye vitamins.  She has numbness in the fingers of both hands.  Now on vitamin B6 200 mg daily which has not really helped.  And consider nocturnal wrist splints.  She has questions about dual anticoagulation with Plavix and aspirin.  She had a troponin bump with her flu and Plavix was added.  She discussed this with Dr. Locke.  I suspect she could drop off the  Plavix at this point.  Crestor was also added and she will discuss that with Dr. Locke as well.  These were added when she had a troponin bump with her flu episode.    The above information was reviewed again today 05/08/25.  It continues to be accurate as reflected above and is unchanged.  History, physical and review of systems all reviewed and are unchanged.  Medications were reviewed today and continue the current dosing.           Jono disclaimer:   Much of this encounter note is an electronic transcription/translation of spoken language to printed text. The electronic translation of spoken language may permit erroneous, or at times, nonsensical words or phrases to be inadvertently transcribed; Although I have reviewed the note for such errors, some may still exist.

## 2025-05-17 ENCOUNTER — APPOINTMENT (OUTPATIENT)
Dept: GENERAL RADIOLOGY | Facility: HOSPITAL | Age: OVER 89
End: 2025-05-17
Payer: MEDICARE

## 2025-05-17 ENCOUNTER — HOSPITAL ENCOUNTER (EMERGENCY)
Facility: HOSPITAL | Age: OVER 89
Discharge: HOME | End: 2025-05-17
Attending: EMERGENCY MEDICINE
Payer: MEDICARE

## 2025-05-17 VITALS
WEIGHT: 152 LBS | HEIGHT: 61 IN | TEMPERATURE: 98.8 F | OXYGEN SATURATION: 93 % | HEART RATE: 74 BPM | RESPIRATION RATE: 16 BRPM | BODY MASS INDEX: 28.7 KG/M2 | SYSTOLIC BLOOD PRESSURE: 126 MMHG | DIASTOLIC BLOOD PRESSURE: 55 MMHG

## 2025-05-17 DIAGNOSIS — U07.1 COVID-19: Primary | ICD-10-CM

## 2025-05-17 LAB
ALBUMIN SERPL-MCNC: 4.2 G/DL (ref 3.5–5.2)
ALBUMIN/GLOB SERPL: 1.7 G/DL
ALP SERPL-CCNC: 83 U/L (ref 39–117)
ALT SERPL W P-5'-P-CCNC: 20 U/L (ref 1–33)
ANION GAP SERPL CALCULATED.3IONS-SCNC: 13 MMOL/L (ref 5–15)
AST SERPL-CCNC: 29 U/L (ref 1–32)
B PARAPERT DNA SPEC QL NAA+PROBE: NOT DETECTED
B PERT DNA SPEC QL NAA+PROBE: NOT DETECTED
BASOPHILS # BLD AUTO: 0.03 10*3/MM3 (ref 0–0.2)
BASOPHILS NFR BLD AUTO: 0.4 % (ref 0–1.5)
BILIRUB SERPL-MCNC: 0.4 MG/DL (ref 0–1.2)
BILIRUB UR QL STRIP: NEGATIVE
BUN SERPL-MCNC: 11 MG/DL (ref 8–23)
BUN/CREAT SERPL: 15.5 (ref 7–25)
C PNEUM DNA NPH QL NAA+NON-PROBE: NOT DETECTED
CALCIUM SPEC-SCNC: 8.7 MG/DL (ref 8.2–9.6)
CHLORIDE SERPL-SCNC: 102 MMOL/L (ref 98–107)
CLARITY UR: CLEAR
CO2 SERPL-SCNC: 23 MMOL/L (ref 22–29)
COLOR UR: YELLOW
CREAT SERPL-MCNC: 0.71 MG/DL (ref 0.57–1)
D-LACTATE SERPL-SCNC: 1.4 MMOL/L (ref 0.5–2)
DEPRECATED RDW RBC AUTO: 43 FL (ref 37–54)
EGFRCR SERPLBLD CKD-EPI 2021: 78.4 ML/MIN/1.73
EOSINOPHIL # BLD AUTO: 0.02 10*3/MM3 (ref 0–0.4)
EOSINOPHIL NFR BLD AUTO: 0.3 % (ref 0.3–6.2)
ERYTHROCYTE [DISTWIDTH] IN BLOOD BY AUTOMATED COUNT: 12.8 % (ref 12.3–15.4)
FLUAV SUBTYP SPEC NAA+PROBE: NOT DETECTED
FLUBV RNA ISLT QL NAA+PROBE: NOT DETECTED
GEN 5 1HR TROPONIN T REFLEX: 15 NG/L
GLOBULIN UR ELPH-MCNC: 2.5 GM/DL
GLUCOSE SERPL-MCNC: 101 MG/DL (ref 65–99)
GLUCOSE UR STRIP-MCNC: NEGATIVE MG/DL
HADV DNA SPEC NAA+PROBE: NOT DETECTED
HCOV 229E RNA SPEC QL NAA+PROBE: NOT DETECTED
HCOV HKU1 RNA SPEC QL NAA+PROBE: NOT DETECTED
HCOV NL63 RNA SPEC QL NAA+PROBE: NOT DETECTED
HCOV OC43 RNA SPEC QL NAA+PROBE: DETECTED
HCT VFR BLD AUTO: 41.3 % (ref 34–46.6)
HGB BLD-MCNC: 13.2 G/DL (ref 12–15.9)
HGB UR QL STRIP.AUTO: NEGATIVE
HMPV RNA NPH QL NAA+NON-PROBE: NOT DETECTED
HPIV1 RNA ISLT QL NAA+PROBE: NOT DETECTED
HPIV2 RNA SPEC QL NAA+PROBE: NOT DETECTED
HPIV3 RNA NPH QL NAA+PROBE: NOT DETECTED
HPIV4 P GENE NPH QL NAA+PROBE: NOT DETECTED
IMM GRANULOCYTES # BLD AUTO: 0.02 10*3/MM3 (ref 0–0.05)
IMM GRANULOCYTES NFR BLD AUTO: 0.3 % (ref 0–0.5)
KETONES UR QL STRIP: NEGATIVE
LEUKOCYTE ESTERASE UR QL STRIP.AUTO: NEGATIVE
LYMPHOCYTES # BLD AUTO: 0.74 10*3/MM3 (ref 0.7–3.1)
LYMPHOCYTES NFR BLD AUTO: 10.8 % (ref 19.6–45.3)
M PNEUMO IGG SER IA-ACNC: NOT DETECTED
MCH RBC QN AUTO: 29.2 PG (ref 26.6–33)
MCHC RBC AUTO-ENTMCNC: 32 G/DL (ref 31.5–35.7)
MCV RBC AUTO: 91.4 FL (ref 79–97)
MONOCYTES # BLD AUTO: 0.92 10*3/MM3 (ref 0.1–0.9)
MONOCYTES NFR BLD AUTO: 13.4 % (ref 5–12)
NEUTROPHILS NFR BLD AUTO: 5.14 10*3/MM3 (ref 1.7–7)
NEUTROPHILS NFR BLD AUTO: 74.8 % (ref 42.7–76)
NITRITE UR QL STRIP: NEGATIVE
NT-PROBNP SERPL-MCNC: 2122 PG/ML (ref 0–1800)
PH UR STRIP.AUTO: 5.5 [PH] (ref 5–8)
PLATELET # BLD AUTO: 145 10*3/MM3 (ref 140–450)
PMV BLD AUTO: 11.4 FL (ref 6–12)
POTASSIUM SERPL-SCNC: 3.8 MMOL/L (ref 3.5–5.2)
PROCALCITONIN SERPL-MCNC: 0.05 NG/ML (ref 0–0.25)
PROT SERPL-MCNC: 6.7 G/DL (ref 6–8.5)
PROT UR QL STRIP: NEGATIVE
QT INTERVAL: 423 MS
QTC INTERVAL: 463 MS
RBC # BLD AUTO: 4.52 10*6/MM3 (ref 3.77–5.28)
RHINOVIRUS RNA SPEC NAA+PROBE: NOT DETECTED
RSV RNA NPH QL NAA+NON-PROBE: NOT DETECTED
SARS-COV-2 RNA NPH QL NAA+NON-PROBE: DETECTED
SODIUM SERPL-SCNC: 138 MMOL/L (ref 136–145)
SP GR UR STRIP: 1.01 (ref 1–1.03)
TROPONIN T % DELTA: 0
TROPONIN T NUMERIC DELTA: 0 NG/L
TROPONIN T SERPL HS-MCNC: 15 NG/L
UROBILINOGEN UR QL STRIP: NORMAL
WBC NRBC COR # BLD AUTO: 6.87 10*3/MM3 (ref 3.4–10.8)

## 2025-05-17 PROCEDURE — 0202U NFCT DS 22 TRGT SARS-COV-2: CPT | Performed by: EMERGENCY MEDICINE

## 2025-05-17 PROCEDURE — 84484 ASSAY OF TROPONIN QUANT: CPT | Performed by: EMERGENCY MEDICINE

## 2025-05-17 PROCEDURE — 87040 BLOOD CULTURE FOR BACTERIA: CPT | Performed by: EMERGENCY MEDICINE

## 2025-05-17 PROCEDURE — 80053 COMPREHEN METABOLIC PANEL: CPT | Performed by: EMERGENCY MEDICINE

## 2025-05-17 PROCEDURE — 99284 EMERGENCY DEPT VISIT MOD MDM: CPT

## 2025-05-17 PROCEDURE — 93005 ELECTROCARDIOGRAM TRACING: CPT | Performed by: EMERGENCY MEDICINE

## 2025-05-17 PROCEDURE — 71045 X-RAY EXAM CHEST 1 VIEW: CPT

## 2025-05-17 PROCEDURE — 83880 ASSAY OF NATRIURETIC PEPTIDE: CPT | Performed by: EMERGENCY MEDICINE

## 2025-05-17 PROCEDURE — 84145 PROCALCITONIN (PCT): CPT | Performed by: EMERGENCY MEDICINE

## 2025-05-17 PROCEDURE — 85025 COMPLETE CBC W/AUTO DIFF WBC: CPT | Performed by: EMERGENCY MEDICINE

## 2025-05-17 PROCEDURE — 36415 COLL VENOUS BLD VENIPUNCTURE: CPT

## 2025-05-17 PROCEDURE — 81003 URINALYSIS AUTO W/O SCOPE: CPT | Performed by: EMERGENCY MEDICINE

## 2025-05-17 PROCEDURE — 83605 ASSAY OF LACTIC ACID: CPT | Performed by: EMERGENCY MEDICINE

## 2025-05-17 RX ORDER — ACETAMINOPHEN 500 MG
1000 TABLET ORAL ONCE
Status: COMPLETED | OUTPATIENT
Start: 2025-05-17 | End: 2025-05-17

## 2025-05-17 RX ADMIN — ACETAMINOPHEN 1000 MG: 500 TABLET, FILM COATED ORAL at 12:09

## 2025-05-17 NOTE — ED TRIAGE NOTES
Pt was brought in by ems from assisted living for fever, cough, dizziness that started yesterday. Pt states that she is in choir and everyone has similar symptoms

## 2025-05-17 NOTE — ED PROVIDER NOTES
EMERGENCY DEPARTMENT ENCOUNTER    Room Number:  39/39  PCP: Trent Davis MD  Independent Historians: Patient    HPI:  Chief Complaint: had concerns including Fever and Cough.      A complete HPI/ROS/PMH/PSH/SH/FH are unobtainable due to: None    Chronic or social conditions impacting patient care (Social Determinants of Health): None      Context: Yuliana Gonzalez is a 89 y.o. female with a medical history of HTN, HLD, Valvular heart disease, cad, uti who presents to the ED c/o acute fever, cough, and dizziness that started last night.  Patient was well yesterday and day before any been performed in a [employer] Thursday evening.  There is intercourse are sick with URI.  Patient denies any chest pain, shortness of breath, syncope, vomiting or diarrhea.        Review of prior external notes (non-ED) -and- Review of prior external test results outside of this encounter: Pt seen by Dr. Davis in office 5/8 for eval of chronic impaired glucose tolerance.  Pt with aortic stenosis with EF 51% on echo.    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    Presence of cardiac pacemaker 03/24/2022    ST elevation myocardial infarction (STEMI) 07/31/2022    NSTEMI (non-ST elevated myocardial infarction) 07/31/2023    Elevated troponin 08/01/2023    S/P drug eluting coronary stent placement 05/24/2024    Thrombocytopenia 01/03/2025     Chronic heart failure with preserved ejection fraction (HFpEF) 01/03/2025     Resolved Ambulatory Problems     Diagnosis Date Noted    De Quervain's disease (tenosynovitis) 09/15/2016    Cough 01/10/2019    Chronic diastolic congestive heart failure 01/28/2022    Aortic stenosis, severe 03/15/2022    Hyponatremia 08/03/2022    Elevated LFTs 08/03/2022    Right distal ureteral calculus 08/03/2022    Leukocytosis 08/03/2022    Hydronephrosis of right kidney 08/03/2022    UTI (urinary tract infection) 08/06/2022    Chest pain 08/01/2023    Acute cystitis without hematuria 11/15/2023    Influenza A 01/02/2025     Past Medical History:   Diagnosis Date    Basal cell carcinoma (BCC) of nasal tip     Central loss of vision     Colitis     Coronary artery disease     History of constipation     History of esophageal reflux     History of skin cancer     Irregular heart beat     Macular degeneration     Medication management     On continuous oral anticoagulation     Redness of skin     Short-term memory loss     SOB (shortness of breath) on exertion          PAST SURGICAL HISTORY  Past Surgical History:   Procedure Laterality Date    AORTIC VALVE REPAIR/REPLACEMENT Left 3/15/2022    Procedure: TTE TRANSFEMORAL TRANSCATHETER AORTIC VALVE REPLACEMENT PERCUTANEOUS APPROACH;  Surgeon: Vesna Chris MD;  Location: [facility];  Service: Cardiothoracic;  Laterality: Left;    AORTIC VALVE REPAIR/REPLACEMENT Left 3/15/2022    Procedure: Transfemoral Transcatheter Aortic Valve Replacement with intra operative TTE and possible open surgical rescue;  Surgeon: Clint Locke MD;  Location: [facility];  Service: Cardiovascular;  Laterality: Left;    CARDIAC CATHETERIZATION N/A 1/21/2022    Procedure: Coronary angiography;  Surgeon: Clint Locke MD;  Location: [facility] CATH INVASIVE LOCATION;  Service: Cardiovascular;  Laterality: N/A;    CARDIAC CATHETERIZATION N/A 1/21/2022    Procedure: Percutaneous Coronary  Intervention;  Surgeon: Clint Locke MD;  Location:  [facility] CATH INVASIVE LOCATION;  Service: Cardiovascular;  Laterality: N/A;    CARDIAC CATHETERIZATION N/A 1/21/2022    Procedure: Stent ISMAEL coronary;  Surgeon: Clint Locke MD;  Location:  [facility] CATH INVASIVE LOCATION;  Service: Cardiovascular;  Laterality: N/A;    CARDIAC CATHETERIZATION Left 7/31/2022    Procedure: CORONARY ANGIOGRAPHY;  Surgeon: Jeannie Echevarria MD;  Location:  [facility] CATH INVASIVE LOCATION;  Service: Cardiology;  Laterality: Left;    CARDIAC CATHETERIZATION N/A 7/31/2022    Procedure: Left ventriculography;  Surgeon: Jeannie Echevarria MD;  Location:  [facility] CATH INVASIVE LOCATION;  Service: Cardiology;  Laterality: N/A;    CARDIAC CATHETERIZATION N/A 7/31/2022    Procedure: Left Heart Cath;  Surgeon: Jeannie Echevarria MD;  Location:  [facility] CATH INVASIVE LOCATION;  Service: Cardiology;  Laterality: N/A;    CARDIAC CATHETERIZATION N/A 8/4/2023    Procedure: Left Heart Cath;  Surgeon: Clint Locke MD;  Location:  [facility] CATH INVASIVE LOCATION;  Service: Cardiovascular;  Laterality: N/A;    CARDIAC CATHETERIZATION N/A 8/4/2023    Procedure: Percutaneous Coronary Intervention;  Surgeon: Clint Locke MD;  Location:  [facility] CATH INVASIVE LOCATION;  Service: Cardiovascular;  Laterality: N/A;    CARDIAC CATHETERIZATION N/A 8/4/2023    Procedure: Stent ISMAEL coronary;  Surgeon: Clint Locke MD;  Location: [facility] CATH INVASIVE LOCATION;  Service: Cardiovascular;  Laterality: N/A;    CARDIAC CATHETERIZATION N/A 8/4/2023    Procedure: Coronary angiography;  Surgeon: Clint Locke MD;  Location:  [facility] CATH INVASIVE LOCATION;  Service: Cardiovascular;  Laterality: N/A;    CARDIAC ELECTROPHYSIOLOGY PROCEDURE N/A 3/16/2022    Procedure: Pacemaker DC new [place];  Surgeon: Yann Ceballos MD;  Location:  [facility] CATH INVASIVE LOCATION;  Service: Cardiology;  Laterality: N/A;    CATARACT EXTRACTION, BILATERAL       COLONOSCOPY      EYE SURGERY      SKIN CANCER EXCISION           FAMILY HISTORY  Family History   Problem Relation Age of Onset    Aortic stenosis Mother     Hypertension Mother     Heart failure Father     Diabetes Father     Hypertension Father     No Known Problems Sister     No Known Problems Daughter     No Known Problems Son     Aneurysm Sister     Other Sister         Polio in her 20's    Malig Hyperthermia Neg Hx          SOCIAL HISTORY  Social History     Socioeconomic History    Marital status:      Spouse name: Stephan    Number of children: 4   Tobacco Use    Smoking status: Former     Current packs/day: 0.00     Types: Cigarettes     Start date:      Quit date:      Years since quittin.4     Passive exposure: Past    Smokeless tobacco: Never    Tobacco comments:     1 pack every 2 weeks   Vaping Use    Vaping status: Never Used   Substance and Sexual Activity    Alcohol use: Yes     Comment: wine - rare / no caffeine    Drug use: No    Sexual activity: Defer         ALLERGIES  Hydrochlorothiazide, Hydrocodone, Lipitor [atorvastatin], Erythromycin, and Lisinopril        REVIEW OF SYSTEMS  Review of Systems  Included in HPI  All systems reviewed and negative except for those discussed in HPI.      PHYSICAL EXAM    I have reviewed the triage vital signs and nursing notes.    ED Triage Vitals   Temp Heart Rate Resp BP SpO2   25 1123 25 1123 25 1123 25 1123 25 1123   (!) 102.7 °F (39.3 °C) 84 16 132/92 94 %      Temp src Heart Rate Source Patient Position BP Location FiO2 (%)   25 1123 25 1123 25 1201 25 1201 --   Oral Monitor Lying Right arm        Physical Exam  GENERAL: pleasant smiling well appearing f of advanced age, alert, no acute distress  SKIN: Warm, dry  HENT: Normocephalic, atraumatic  EYES: no scleral icterus  CV: regular rhythm, regular rate  RESPIRATORY: normal effort, lungs clear, no wheezing, no rhonchi  ABDOMEN: soft,  nontender, nondistended  MUSCULOSKELETAL: no deformity, no pitting edema, no calf ttp  NEURO: alert, moves all extremities, follows commands                                                                   LAB RESULTS  Recent Results (from the past 24 hours)   Urinalysis With Microscopic If Indicated (No Culture) - Urine, Clean Catch    Collection Time: 05/17/25 12:05 PM    Specimen: Urine, Clean Catch   Result Value Ref Range    Color, UA Yellow Yellow, Straw    Appearance, UA Clear Clear    pH, UA 5.5 5.0 - 8.0    Specific Gravity, UA 1.007 1.005 - 1.030    Glucose, UA Negative Negative    Ketones, UA Negative Negative    Bilirubin, UA Negative Negative    Blood, UA Negative Negative    Protein, UA Negative Negative    Leuk Esterase, UA Negative Negative    Nitrite, UA Negative Negative    Urobilinogen, UA 0.2 E.U./dL 0.2 - 1.0 E.U./dL   Respiratory Panel PCR w/COVID-19(SARS-CoV-2) [facility] In-House, NP Swab in UTM/VTM, 2 HR TAT - Swab, Nasopharynx    Collection Time: 05/17/25 12:14 PM    Specimen: Nasopharynx; Swab   Result Value Ref Range    ADENOVIRUS, PCR Not Detected Not Detected    Coronavirus 229E Not Detected Not Detected    Coronavirus HKU1 Not Detected Not Detected    Coronavirus NL63 Not Detected Not Detected    Coronavirus OC43 Detected (A) Not Detected    COVID19 Detected (C) Not Detected - Ref. Range    Human Metapneumovirus Not Detected Not Detected    Human Rhinovirus/Enterovirus Not Detected Not Detected    Influenza A PCR Not Detected Not Detected    Influenza B PCR Not Detected Not Detected    Parainfluenza Virus 1 Not Detected Not Detected    Parainfluenza Virus 2 Not Detected Not Detected    Parainfluenza Virus 3 Not Detected Not Detected    Parainfluenza Virus 4 Not Detected Not Detected    RSV, PCR Not Detected Not Detected    Bordetella pertussis pcr Not Detected Not Detected    Bordetella parapertussis PCR Not Detected Not Detected    Chlamydophila pneumoniae PCR Not  Detected Not Detected    Mycoplasma pneumo by PCR Not Detected Not Detected   ECG 12 Lead Syncope    Collection Time: 05/17/25 12:57 PM   Result Value Ref Range    QT Interval 423 ms    QTC Interval 463 ms   Comprehensive Metabolic Panel    Collection Time: 05/17/25  1:21 PM    Specimen: Arm, Right; Blood   Result Value Ref Range    Glucose 101 (H) 65 - 99 mg/dL    BUN 11 8 - 23 mg/dL    Creatinine 0.71 0.57 - 1.00 mg/dL    Sodium 138 136 - 145 mmol/L    Potassium 3.8 3.5 - 5.2 mmol/L    Chloride 102 98 - 107 mmol/L    CO2 23.0 22.0 - 29.0 mmol/L    Calcium 8.7 8.2 - 9.6 mg/dL    Total Protein 6.7 6.0 - 8.5 g/dL    Albumin 4.2 3.5 - 5.2 g/dL    ALT (SGPT) 20 1 - 33 U/L    AST (SGOT) 29 1 - 32 U/L    Alkaline Phosphatase 83 39 - 117 U/L    Total Bilirubin 0.4 0.0 - 1.2 mg/dL    Globulin 2.5 gm/dL    A/G Ratio 1.7 g/dL    BUN/Creatinine Ratio 15.5 7.0 - 25.0    Anion Gap 13.0 5.0 - 15.0 mmol/L    eGFR 78.4 >60.0 mL/min/1.73   Procalcitonin    Collection Time: 05/17/25  1:21 PM    Specimen: Arm, Right; Blood   Result Value Ref Range    Procalcitonin 0.05 0.00 - 0.25 ng/mL   Lactic Acid, Plasma    Collection Time: 05/17/25  1:21 PM    Specimen: Arm, Right; Blood   Result Value Ref Range    Lactate 1.4 0.5 - 2.0 mmol/L   CBC Auto Differential    Collection Time: 05/17/25  1:21 PM    Specimen: Arm, Right; Blood   Result Value Ref Range    WBC 6.87 3.40 - 10.80 10*3/mm3    RBC 4.52 3.77 - 5.28 10*6/mm3    Hemoglobin 13.2 12.0 - 15.9 g/dL    Hematocrit 41.3 34.0 - 46.6 %    MCV 91.4 79.0 - 97.0 fL    MCH 29.2 26.6 - 33.0 pg    MCHC 32.0 31.5 - 35.7 g/dL    RDW 12.8 12.3 - 15.4 %    RDW-SD 43.0 37.0 - 54.0 fl    MPV 11.4 6.0 - 12.0 fL    Platelets 145 140 - 450 10*3/mm3    Neutrophil % 74.8 42.7 - 76.0 %    Lymphocyte % 10.8 (L) 19.6 - 45.3 %    Monocyte % 13.4 (H) 5.0 - 12.0 %    Eosinophil % 0.3 0.3 - 6.2 %    Basophil % 0.4 0.0 - 1.5 %    Immature Grans % 0.3 0.0 - 0.5 %    Neutrophils, Absolute 5.14 1.70 - 7.00 10*3/mm3     Lymphocytes, Absolute 0.74 0.70 - 3.10 10*3/mm3    Monocytes, Absolute 0.92 (H) 0.10 - 0.90 10*3/mm3    Eosinophils, Absolute 0.02 0.00 - 0.40 10*3/mm3    Basophils, Absolute 0.03 0.00 - 0.20 10*3/mm3    Immature Grans, Absolute 0.02 0.00 - 0.05 10*3/mm3   BNP    Collection Time: 05/17/25  1:21 PM    Specimen: Arm, Right; Blood   Result Value Ref Range    proBNP 2,122.0 (H) 0.0 - 1,800.0 pg/mL   High Sensitivity Troponin T    Collection Time: 05/17/25  1:21 PM    Specimen: Arm, Right; Blood   Result Value Ref Range    HS Troponin T 15 (H) <14 ng/L   High Sensitivity Troponin T 1Hr    Collection Time: 05/17/25  2:57 PM    Specimen: Arm, Right; Blood   Result Value Ref Range    HS Troponin T 15 (H) <14 ng/L    Troponin T Numeric Delta 0 ng/L    Troponin T % Delta 0 Abnormal if >/= 20%         RADIOLOGY  XR Chest 1 View  Result Date: 5/17/2025  XR CHEST 1 VW-  HISTORY: Female who is 89 years-old, fever  TECHNIQUE: Frontal view of the chest  COMPARISON: 1/2/2025  FINDINGS: The heart size is borderline. Left-sided pacemaker and cardiac leads are seen. Aorta is calcified. Pulmonary vasculature is unremarkable. No focal pulmonary consolidation, pleural effusion, or pneumothorax. No acute osseous process.      No focal pulmonary consolidation. Borderline heart size. Follow-up as clinical indications persist.  This report was finalized on 5/17/2025 12:57 PM by Dr. Gerardo Lawler M.D on Workstation: [id number]          MEDICATIONS GIVEN IN ER  Medications   acetaminophen (TYLENOL) tablet 1,000 mg (1,000 mg Oral Given 5/17/25 1209)         ORDERS PLACED DURING THIS VISIT:  Orders Placed This Encounter   Procedures    Respiratory Panel PCR w/COVID-19(SARS-CoV-2) [facility], NP Swab in UTM/VTM, 2 HR TAT - Swab, Nasopharynx    Blood Culture - Blood,    Blood Culture - Blood,    XR Chest 1 View    Comprehensive Metabolic Panel    Urinalysis With Microscopic If Indicated (No Culture) - Urine, Clean Catch     Procalcitonin    Lactic Acid, Plasma    CBC Auto Differential    BNP    High Sensitivity Troponin T    High Sensitivity Troponin T 1Hr    Continuous Pulse Oximetry    ECG 12 Lead Syncope    CBC & Differential         OUTPATIENT MEDICATION MANAGEMENT:  No current Epic-ordered facility-administered medications on file.     Current Outpatient Medications Ordered in Epic   Medication Sig Dispense Refill    acetaminophen (TYLENOL) 325 MG tablet Take 2 tablets by mouth Every 4 (Four) Hours As Needed for Mild Pain  or Fever (temperature greater than 101F). 120 tablet 0    aspirin (aspirin) 81 MG EC tablet Take 1 tablet by mouth Daily. 30 tablet 6    AZO-CRANBERRY PO Take 1 tablet by mouth Daily.      clopidogrel (PLAVIX) 75 MG tablet Take 1 tablet by mouth Every Morning. 90 tablet 3    metoprolol succinate XL (Toprol XL) 25 MG 24 hr tablet Take 1 tablet by mouth 2 (Two) Times a Day for 360 days. 180 tablet 3    metroNIDAZOLE (METROCREAM) 0.75 % cream Apply 1 Application topically to the appropriate area as directed 2 (Two) Times a Day As Needed (as needed).      Multiple Vitamins-Minerals (VITEYES AREDS ADVANCED PO) Take 2 capsules by mouth Daily.      mupirocin (BACTROBAN) 2 % ointment APPLY TWO TIMES PER DAY TO THE INFECTION/BIOPSY SITES.      Nirmatrelvir&Ritonavir 150/100 (PAXLOVID) 10 x 150 MG & 10 x 100MG tablet therapy pack tablet (for renal adjustment) Take 2 tablets by mouth 2 (Two) Times a Day for 5 days. 20 tablet 0    nitroglycerin (NITROSTAT) 0.4 MG SL tablet Place 1 tablet under the tongue Every 5 (Five) Minutes As Needed for Chest Pain. Take no more than 3 doses in 15 minutes. 30 tablet 3    pantoprazole (PROTONIX) 40 MG EC tablet TAKE 1 TABLET BY MOUTH EVERY DAY IN THE MORNING 90 tablet 3    potassium chloride (MICRO-K) 10 MEQ CR capsule TAKE 1 CAPSULE BY MOUTH TWICE A  capsule 3    [Paused] rosuvastatin (CRESTOR) 20 MG tablet TAKE 1 TABLET BY MOUTH EVERY DAY 90 tablet 3    sennosides-docusate  (PERICOLACE) 8.6-50 MG per tablet Take 1 tablet by mouth Daily.      torsemide (DEMADEX) 20 MG tablet Take 1 tablet by mouth Daily. 90 tablet 3    Vitamin D, Cholecalciferol, 50 MCG (2000 UT) capsule Take 1 capsule by mouth Daily.           PROCEDURES  Procedures            PROGRESS, DATA ANALYSIS, CONSULTS, AND MEDICAL DECISION MAKING  All labs have been independently interpreted by me.  All radiology studies have been reviewed by me. All EKG's have been independently viewed and interpreted by me.  Discussion below represents my analysis of pertinent findings related to patient's condition, differential diagnosis, treatment plan and final disposition.    This patient presents with a constellation of symptoms likely representing viral upper respiratory infection or bronchitis as characterized by: cough, fever, nasal drainage. No chest pain nor shortness of breath. The patient's presentation is not consistent with other acute cardiopulmonary emergencies like ACS, CHF, or pericardial effusion.   Possible COVID-19 (coronavirus), flu, or any number ofviral illnesses currently at a community peak--but no respiratory compromise, nontoxic appearance.   Other diagnoses were considered in the differential diagnosis for this patient. Considered:  CNS infection, bacterial sinusitis, and pneumonia but low likelihood given documented exam and history.      ED Course as of 05/17/25 2149   Sat May 17, 2025   1320 I viewed patient's chest x-ray and on my interpretation patient has no large pulmonary consolidation and no cardiomegaly [AR]   1336 EKG ER MD interpretation   Time: 12: 57  Rhythm and rate: Atrial sensed ventricularly paced rhythm at a rate of 72  Comparison EKG from January 2, 2025 with similar appearance [AR]   1441 Walking sat reassuring, pt tolerating po, we discussed plan for home. Awaiting second troponin. [AR]      ED Course User Index  [AR] Kim Finch MD             AS OF 21:49 EDT VITALS:    BP -  126/55  HR - 74  TEMP - 98.8 °F (37.1 °C) (Oral)  O2 SATS - 93%      COMPLEXITY OF CARE  Admission was considered but after careful review of the patient's presentation, physical examination, diagnostic results, and response to treatment the patient may be safely discharged with outpatient follow-up.    DIAGNOSIS  Final diagnoses:   COVID-19         DISPOSITION  ED Disposition       ED Disposition   Discharge    Condition   Stable    Comment   --                Please note that portions of this document were completed with a voice recognition program.    Note Disclaimer: At [facility], we believe that sharing information builds trust and better relationships. You are receiving this note because you recently visited [facility]. It is possible you will see health information before a provider has talked with you about it. This kind of information can be easy to misunderstand. To help you fully understand what it means for your health, we urge you to discuss this note with your provider.         Kim Finch MD  05/17/25 9400

## 2025-05-17 NOTE — DISCHARGE INSTRUCTIONS
"You will need to stop taking your cholesterol medication for the next 5 days while you are on the antiviral med.  You can resume it after the antiviral is complete.      Most people with respiratory infections like colds, the flu, and Coronavirus Disease (COVID-19) will have mild illness and can get better with appropriate home care and without the need to see a provider. People who are elderly, pregnant, or have a weak immune system, or other medical problems are at higher risk of more serious illness or complications. It is recommended that they carefully monitor their symptoms closely and seek medical care early if their symptoms get worse.    There is no specific treatment for most viruses including those that that cause the common cold and those that cause COVID-19 although there are a few approved anti-virals for Covid-19 for certain patient populations--these are still investigational medications. Antibiotics treat infections caused by bacteria, but they do not work against viruses.    Most people recover on their own from these viruses, including COVID-19. Here are steps that you can take to help you get better:  -Rest  -Drink plenty of fluids  -Take over-the-counter cold and flu medications to reduce fever and pain. Follow the instructions on the package, unless your doctor gave you instructions. Note that these medicines do not \"cure\" the illness and therefore do not stop you from spreading germs.  -Children should not be given medication that contains aspirin (acetylsalicylic acid) because it can cause a rare but serious illness called Reye's syndrome. Medicines without aspirin include acetaminophen (Tylenol®) and ibuprofen (Advil®, Motrin®). Children younger than age 2 should not be given any over-the-counter cold medications without first speaking with a doctor.          Seeking Medical Care  You should seek medical care if you are not getting better within a week, or if your symptoms get worse. If you " are elderly, pregnant, have a weak immune system, or other medical problems, call your doctor right away.  It is best to call ahead of time to discuss your symptoms, if possible. This may allow you to receive the advice you need by phone. By avoiding a visit to a healthcare facility, you protect yourself from getting a new infection and protect others from catching an infection from you. If you do visit a healthcare facility, put on a mask to protect other patients and staff.  It is recommended that you seek medical care for serious symptoms, such as:  People with potentially life-threatening symptoms should call 911. If possible, put on a facemask before emergency medical services arrive.      PROTECTING OTHERS  Follow the steps below to help prevent the disease from spreading to people in your home and community.  Stay home when you are sick  -Stay home - do not go to work, school, or public areas.  -Stay home for at least 24 hours after your symptoms have gone away without the use of fever-reducing medicines.  -If you must leave home while you are sick, try to avoid using public transportation, ride-shares, and taxis. Wear a mask if possible.  Separate yourself from other people and animals in your home  -Stay in a specific room and away from other people in your home as much as possible.  -Use a separate bathroom, if available.  -Try to stay at least 6 feet from others.  -Do not handle pets or other animals while you are sick.  -Cover your coughs and sneezes  -Cover your mouth and nose with a tissue when you cough or sneeze. Throw used tissues in a lined trash can; immediately wash your hands.  -Avoid sharing personal household items  -Do not share dishes, drinking glasses, cups, eating utensils, towels, or bedding with other people or pets in your home. Wash them thoroughly with soap and water after use.  -Clean your hands often  -Wash your hands often with soap and water for at least 20 seconds. If soap and  "water are not available, clean your hands with an alcohol-based hand  that contains at least 60% alcohol, covering all surfaces of your hands and rubbing them together until they feel dry. Use soap and water if your hands are visibly dirty.  Clean all \"high-touch\" surfaces every day  -High touch surfaces include counters, tabletops, doorknobs, bathroom fixtures, toilets, phones, keyboards, tablets, and bedside tables. Also, clean any surfaces that may have body fluids on them. Use a household cleaning spray or wipe, according to the product label instructions.    "

## 2025-05-19 ENCOUNTER — PATIENT OUTREACH (OUTPATIENT)
Dept: CASE MANAGEMENT | Facility: OTHER | Age: OVER 89
End: 2025-05-19
Payer: MEDICARE

## 2025-05-19 NOTE — OUTREACH NOTE
AMBULATORY CASE MANAGEMENT NOTE    Names and Relationships of Patient/Support Persons: Contact: Yuliana Gonzalez; Relationship: Self -     Patient Outreach      REASON FOR FOLLOW UP:  Recent ED visit and COVID +.    INTRODUCTION: Introduced self and role of ACM.        Patient Report:  She is doing very well. She has little cough which is productive. Denies fever. No shortness of air, wheezing heard over phone. Long enjoyable conversation. She does have follow up visit with her PCP.     Review of Instructions:  Verbalized understanding of ED instructions.     Next Steps Follow ups:        Danya CORTEZ  Ambulatory Case Management    5/19/2025, 12:55 EDT

## 2025-05-22 LAB
BACTERIA SPEC AEROBE CULT: NORMAL
BACTERIA SPEC AEROBE CULT: NORMAL

## 2025-05-29 ENCOUNTER — OFFICE VISIT (OUTPATIENT)
Dept: INTERNAL MEDICINE | Facility: CLINIC | Age: OVER 89
End: 2025-05-29
Payer: MEDICARE

## 2025-05-29 VITALS
SYSTOLIC BLOOD PRESSURE: 130 MMHG | HEART RATE: 68 BPM | WEIGHT: 155 LBS | BODY MASS INDEX: 29.27 KG/M2 | RESPIRATION RATE: 18 BRPM | HEIGHT: 61 IN | DIASTOLIC BLOOD PRESSURE: 82 MMHG | OXYGEN SATURATION: 97 % | TEMPERATURE: 97.9 F

## 2025-05-29 DIAGNOSIS — U07.1 COVID-19 VIRUS INFECTION: Primary | ICD-10-CM

## 2025-05-29 PROCEDURE — 99213 OFFICE O/P EST LOW 20 MIN: CPT | Performed by: INTERNAL MEDICINE

## 2025-05-29 PROCEDURE — 1159F MED LIST DOCD IN RCRD: CPT | Performed by: INTERNAL MEDICINE

## 2025-05-29 PROCEDURE — 1160F RVW MEDS BY RX/DR IN RCRD: CPT | Performed by: INTERNAL MEDICINE

## 2025-05-29 PROCEDURE — 1126F AMNT PAIN NOTED NONE PRSNT: CPT | Performed by: INTERNAL MEDICINE

## 2025-05-29 NOTE — PROGRESS NOTES
Subjective   Yuliana Gonzalez is a 89 y.o. female.     Chief Complaint   Patient presents with    Hospital Follow Up Visit    Covid          History of Present Illness  In today for follow-up from a COVID illness.  This began about 13 days ago.  She had a fever to 101.  Cough . seen in emergency room where she tested positive for COVID.  The symptoms are pretty much resolved at this point.  She quarantined for about 4 days.  She is feeling better.  She was treated with a round of Paxlovid.  Stopped her statin during that time.       The following portions of the patient's history were reviewed and updated as appropriate: allergies, current medications, past social history and problem list.    Outpatient Medications Marked as Taking for the 5/29/25 encounter (Office Visit) with Trent Davis MD   Medication Sig Dispense Refill    acetaminophen (TYLENOL) 325 MG tablet Take 2 tablets by mouth Every 4 (Four) Hours As Needed for Mild Pain  or Fever (temperature greater than 101F). 120 tablet 0    aspirin (aspirin) 81 MG EC tablet Take 1 tablet by mouth Daily. 30 tablet 6    AZO-CRANBERRY PO Take 1 tablet by mouth Daily.      clopidogrel (PLAVIX) 75 MG tablet Take 1 tablet by mouth Every Morning. 90 tablet 3    metoprolol succinate XL (Toprol XL) 25 MG 24 hr tablet Take 1 tablet by mouth 2 (Two) Times a Day for 360 days. 180 tablet 3    metroNIDAZOLE (METROCREAM) 0.75 % cream Apply 1 Application topically to the appropriate area as directed 2 (Two) Times a Day As Needed (as needed).      Multiple Vitamins-Minerals (VITEYES AREDS ADVANCED PO) Take 2 capsules by mouth Daily.      mupirocin (BACTROBAN) 2 % ointment APPLY TWO TIMES PER DAY TO THE INFECTION/BIOPSY SITES.      nitroglycerin (NITROSTAT) 0.4 MG SL tablet Place 1 tablet under the tongue Every 5 (Five) Minutes As Needed for Chest Pain. Take no more than 3 doses in 15 minutes. 30 tablet 3    pantoprazole (PROTONIX) 40 MG EC tablet TAKE 1 TABLET BY MOUTH  EVERY DAY IN THE MORNING 90 tablet 3    potassium chloride (MICRO-K) 10 MEQ CR capsule TAKE 1 CAPSULE BY MOUTH TWICE A  capsule 3    rosuvastatin (CRESTOR) 20 MG tablet TAKE 1 TABLET BY MOUTH EVERY DAY 90 tablet 3    sennosides-docusate (PERICOLACE) 8.6-50 MG per tablet Take 1 tablet by mouth Daily.      torsemide (DEMADEX) 20 MG tablet Take 1 tablet by mouth Daily. 90 tablet 3    Vitamin D, Cholecalciferol, 50 MCG (2000 UT) capsule Take 1 capsule by mouth Daily.         Review of Systems   Constitutional:  Positive for fever.   HENT:  Negative for sore throat.    Respiratory:  Positive for cough.    Musculoskeletal:  Negative for myalgias.   Neurological:  Negative for headaches.       Objective   Vitals:    05/29/25 1250   BP: 130/82   Pulse: 68   Resp: 18   Temp: 97.9 °F (36.6 °C)   SpO2: 97%      Wt Readings from Last 3 Encounters:   05/29/25 70.3 kg (155 lb)   05/17/25 68.9 kg (152 lb)   05/08/25 69.9 kg (154 lb)    Body mass index is 29.29 kg/m².      Physical Exam  Constitutional:       Appearance: Normal appearance.   Pulmonary:      Effort: Pulmonary effort is normal. No respiratory distress.      Breath sounds: No wheezing or rales.   Neurological:      Mental Status: She is alert.   Psychiatric:         Mood and Affect: Mood normal.         Behavior: Behavior normal.         Thought Content: Thought content normal.         Judgment: Judgment normal.           Problems Addressed this Visit    None  Visit Diagnoses         COVID-19 virus infection    -  Primary          Diagnoses         Codes Comments      COVID-19 virus infection    -  Primary ICD-10-CM: U07.1  ICD-9-CM: 079.89           Assessment & Plan   In today for follow-up of COVID.  This illness was about 13 days ago.  She took a round of Paxlovid.  She is pretty much recovered at this point.  She did stop her statin for 2 days while on the Paxlovid.  She thinks she felt remarkably better off of the Crestor but at this point she does not  really feel any difference 1 way or the other.  She wonders what medicine she can stop that are on her medication list.  She would like to simplify is much as possible.  Advise she could stop her vitamin B6, her vitamin D, and her Azo cranberry all of which are of nominal benefit for her.  She used to use cinnamon child's Benadryl with good success but was told to stop that by her cardiologist.  Advise she can take Zyrtec or Claritin as needed.  Family is concerned that she is having increased anxiety and rushes to the emergency room too much.  I really do not see that is an issue but she would like a referral to the mental health practitioner at her facility and I think that would be fine.  I filled out the paperwork for that.    The above information was reviewed again today 05/29/25.  It continues to be accurate as reflected above and is unchanged.  History, physical and review of systems all reviewed and are unchanged.  Medications were reviewed today and continue the current dosing.               Dragon disclaimer:   Part of this note may be an electronic transcription/translation of spoken language to printed text using the Dragon Dictation System.

## 2025-06-12 ENCOUNTER — TELEPHONE (OUTPATIENT)
Dept: CARDIOLOGY | Age: OVER 89
End: 2025-06-12

## 2025-06-12 NOTE — TELEPHONE ENCOUNTER
Caller: Yuliana Gonzalez    Relationship: Self    Best call back number: [phone]      What was the call regarding: PATIENT LOST A CROWN AND PART OF A TOOTH AND ONE OF THE OPTIONS IS EXTRACTION AND PATIENT WOULD LIKE DR VERMA TO WEIGH IN ON IF SHE'S SAFE TO HAVE A TOOTH PULLED PLEASE CALL AND ADVISE.

## 2025-06-13 ENCOUNTER — TELEPHONE (OUTPATIENT)
Dept: INTERNAL MEDICINE | Facility: CLINIC | Age: OVER 89
End: 2025-06-13
Payer: MEDICARE

## 2025-06-13 NOTE — TELEPHONE ENCOUNTER
Caller: Yuliana Gonzalez    Relationship to patient: Self    Best call back number:      Patient is needing: PATIENT IS WANTING A CALLBACK WITH THE NAME OF A PODIATRIST THAT DR. SALEH RECOMMENDS SHE GO SEE.

## 2025-06-25 LAB
MC_CV_MDC_IDC_RATE_1: 160
MC_CV_MDC_IDC_ZONE_ID: 1
MDC_IDC_MSMT_BATTERY_REMAINING_LONGEVITY: 60 MO
MDC_IDC_MSMT_BATTERY_REMAINING_PERCENTAGE: 94 %
MDC_IDC_MSMT_BATTERY_STATUS: NORMAL
MDC_IDC_MSMT_LEADCHNL_RA_DTM: NORMAL
MDC_IDC_MSMT_LEADCHNL_RA_IMPEDANCE_VALUE: 736
MDC_IDC_MSMT_LEADCHNL_RA_PACING_THRESHOLD_AMPLITUDE: 0.7
MDC_IDC_MSMT_LEADCHNL_RA_PACING_THRESHOLD_POLARITY: NORMAL
MDC_IDC_MSMT_LEADCHNL_RA_PACING_THRESHOLD_PULSEWIDTH: 0.4
MDC_IDC_MSMT_LEADCHNL_RA_SENSING_INTR_AMPL: 4
MDC_IDC_MSMT_LEADCHNL_RV_DTM: NORMAL
MDC_IDC_MSMT_LEADCHNL_RV_IMPEDANCE_VALUE: 360
MDC_IDC_MSMT_LEADCHNL_RV_PACING_THRESHOLD_AMPLITUDE: 1
MDC_IDC_MSMT_LEADCHNL_RV_PACING_THRESHOLD_POLARITY: NORMAL
MDC_IDC_MSMT_LEADCHNL_RV_PACING_THRESHOLD_PULSEWIDTH: 0.4
MDC_IDC_PG_IMPLANT_DTM: NORMAL
MDC_IDC_PG_MFG: NORMAL
MDC_IDC_PG_MODEL: NORMAL
MDC_IDC_PG_SERIAL: NORMAL
MDC_IDC_PG_TYPE: NORMAL
MDC_IDC_SESS_DTM: NORMAL
MDC_IDC_SESS_TYPE: NORMAL
MDC_IDC_SET_BRADY_AT_MODE_SWITCH_RATE: 170
MDC_IDC_SET_BRADY_LOWRATE: 60
MDC_IDC_SET_BRADY_MAX_TRACKING_RATE: 130
MDC_IDC_SET_BRADY_MODE: NORMAL
MDC_IDC_SET_BRADY_PAV_DELAY: 200
MDC_IDC_SET_BRADY_SAV_DELAY: 180
MDC_IDC_SET_LEADCHNL_RA_PACING_AMPLITUDE: 2
MDC_IDC_SET_LEADCHNL_RA_PACING_POLARITY: NORMAL
MDC_IDC_SET_LEADCHNL_RA_PACING_PULSEWIDTH: 0.4
MDC_IDC_SET_LEADCHNL_RA_SENSING_POLARITY: NORMAL
MDC_IDC_SET_LEADCHNL_RA_SENSING_SENSITIVITY: 0.5
MDC_IDC_SET_LEADCHNL_RV_PACING_AMPLITUDE: 1.5
MDC_IDC_SET_LEADCHNL_RV_PACING_POLARITY: NORMAL
MDC_IDC_SET_LEADCHNL_RV_PACING_PULSEWIDTH: 0.4
MDC_IDC_SET_LEADCHNL_RV_SENSING_POLARITY: NORMAL
MDC_IDC_SET_LEADCHNL_RV_SENSING_SENSITIVITY: 2.5
MDC_IDC_SET_ZONE_STATUS: NORMAL
MDC_IDC_SET_ZONE_TYPE: NORMAL
MDC_IDC_STAT_AT_BURDEN_PERCENT: 0
MDC_IDC_STAT_BRADY_RA_PERCENT_PACED: 21
MDC_IDC_STAT_BRADY_RV_PERCENT_PACED: 82

## 2025-07-21 ENCOUNTER — TELEPHONE (OUTPATIENT)
Dept: CARDIOLOGY | Age: OVER 89
End: 2025-07-21
Payer: MEDICARE

## 2025-07-21 ENCOUNTER — TELEPHONE (OUTPATIENT)
Dept: INTERNAL MEDICINE | Facility: CLINIC | Age: OVER 89
End: 2025-07-21

## 2025-07-21 NOTE — TELEPHONE ENCOUNTER
Caller: Yuliana Gonzalez    Relationship to patient: Self    Best call back number: [phone]    Patient is needing: PT WANTS TO DISCUSS MEDICATIONS. BP IS ELEVATED. WANTS TO CHECK TO SEE IF ITS SOME OF HER MEDICATIONS CAUSING IT.

## 2025-08-14 ENCOUNTER — LAB (OUTPATIENT)
Dept: LAB | Facility: HOSPITAL | Age: OVER 89
End: 2025-08-14
Payer: MEDICARE

## 2025-08-14 ENCOUNTER — OFFICE VISIT (OUTPATIENT)
Dept: INTERNAL MEDICINE | Facility: CLINIC | Age: OVER 89
End: 2025-08-14
Payer: MEDICARE

## 2025-08-14 VITALS
HEIGHT: 61 IN | SYSTOLIC BLOOD PRESSURE: 112 MMHG | HEART RATE: 68 BPM | BODY MASS INDEX: 29.27 KG/M2 | RESPIRATION RATE: 18 BRPM | TEMPERATURE: 97.9 F | WEIGHT: 155 LBS | DIASTOLIC BLOOD PRESSURE: 68 MMHG | OXYGEN SATURATION: 97 %

## 2025-08-14 DIAGNOSIS — I10 PRIMARY HYPERTENSION: Primary | Chronic | ICD-10-CM

## 2025-08-14 DIAGNOSIS — E78.5 HYPERLIPIDEMIA, UNSPECIFIED HYPERLIPIDEMIA TYPE: Chronic | ICD-10-CM

## 2025-08-14 DIAGNOSIS — R73.02 IGT (IMPAIRED GLUCOSE TOLERANCE): Chronic | ICD-10-CM

## 2025-08-14 LAB
GLUCOSE SERPL-MCNC: 92 MG/DL (ref 65–99)
HBA1C MFR BLD: 5.7 % (ref 4.8–5.6)

## 2025-08-14 PROCEDURE — 83036 HEMOGLOBIN GLYCOSYLATED A1C: CPT | Performed by: INTERNAL MEDICINE

## 2025-08-14 PROCEDURE — 36415 COLL VENOUS BLD VENIPUNCTURE: CPT | Performed by: INTERNAL MEDICINE

## 2025-08-14 PROCEDURE — 82947 ASSAY GLUCOSE BLOOD QUANT: CPT | Performed by: INTERNAL MEDICINE

## 2025-08-15 ENCOUNTER — TELEPHONE (OUTPATIENT)
Dept: CARDIOLOGY | Age: OVER 89
End: 2025-08-15
Payer: MEDICARE

## (undated) DEVICE — BIOPATCH™ ANTIMICROBIAL DRESSING WITH CHLORHEXIDINE GLUCONATE IS A HYDROPHILLIC POLYURETHANE ABSORPTIVE FOAM WITH CHLORHEXIDINE GLUCONATE (CHG) WHICH INHIBITS BACTERIAL GROWTH UNDER THE DRESSING. THE DRESSING IS INTENDED TO BE USED TO ABSORB EXUDATE, COVER A WOUND CAUSED BY VASCULAR AND NONVASCULAR PERCUTANEOUS MEDICAL DEVICES DURING SURGERY, AS WELL AS REDUCE LOCAL INFECTION AND COLONIZATION OF MICROORGANISMS.: Brand: BIOPATCH

## (undated) DEVICE — HI-TORQUE SUPRA CORE .035 PERIPHERAL GUIDE WIRE .035 X 190 CM: Brand: HI-TORQUE SUPRA CORE

## (undated) DEVICE — SUT SILK 2 SUTUPAK TIE 60IN SA8H 2STRAND

## (undated) DEVICE — STPCK 3WY INTRALOCK TRNSP

## (undated) DEVICE — MINI TREK CORONARY DILATATION CATHETER 2.0 MM X 15 MM / RAPID-EXCHANGE: Brand: MINI TREK

## (undated) DEVICE — HI-TORQUE SUPRA CORE .035 PERIPHERAL GUIDE WIRE .035 X 300 CM: Brand: HI-TORQUE SUPRA CORE

## (undated) DEVICE — GLV SURG BIOGEL LTX PF 8

## (undated) DEVICE — PK CATH CARD 40

## (undated) DEVICE — 3M™ BAIR HUGGER® UNDERBODY BLANKET, FULL ACCESS, 10 PER CASE 63500: Brand: BAIR HUGGER™

## (undated) DEVICE — INTRO SHEATH ART/FEM ENGAGE .035 6F12CM

## (undated) DEVICE — CATH DIAG IMPULSE PIG 5F 100CM

## (undated) DEVICE — CVR PROB 96IN LF STRL

## (undated) DEVICE — SPK CONTRST MISER

## (undated) DEVICE — RADIFOCUS OPTITORQUE ANGIOGRAPHIC CATHETER: Brand: OPTITORQUE

## (undated) DEVICE — Device

## (undated) DEVICE — NC TREK CORONARY DILATATION CATHETER 3.0 MM X 20 MM / RAPID-EXCHANGE: Brand: NC TREK

## (undated) DEVICE — INTRO SHEATH PRELUDE SNAP .038 6F 13CM W/SDPRT

## (undated) DEVICE — GLIDESHEATH SLENDER STAINLESS STEEL KIT: Brand: GLIDESHEATH SLENDER

## (undated) DEVICE — SUT SILK 0 CT1 CR8 18IN C021D

## (undated) DEVICE — KT MANIFLD CARDIAC

## (undated) DEVICE — SENSR CERBRL O2 PK/2

## (undated) DEVICE — UNIVERSAL CUTTER: Brand: ACUITY™

## (undated) DEVICE — ZINC CALCIUM ALGINATE DRESSING: Brand: KENDALL

## (undated) DEVICE — RADIFOCUS GLIDEWIRE: Brand: GLIDEWIRE

## (undated) DEVICE — DELIV SYS D-EVPROP2329US: Brand: EVOLUT™ PRO+

## (undated) DEVICE — STPCK 1WY STD/PRESS SWIVELNUT

## (undated) DEVICE — CATH DIAG IMPULSE FL3.5 5F 100CM

## (undated) DEVICE — CATH DIAG IMPULSE FR5 5F 100CM

## (undated) DEVICE — PERCLOSE™ PROSTYLE™ SUTURE-MEDIATED CLOSURE AND REPAIR SYSTEM: Brand: PERCLOSE™ PROSTYLE™

## (undated) DEVICE — GW EMR FIX EXCHG J STD .035 3MM 260CM

## (undated) DEVICE — TBG ART PRESS 24 IN

## (undated) DEVICE — CATH VENT MIV RADL PIG ST TIP 5F 110CM

## (undated) DEVICE — CATH PACE CARD SITE/SPEC RT/ATRIUM RT/VENT SEPTL DIL EXT/HK

## (undated) DEVICE — TR BAND RADIAL ARTERY COMPRESSION DEVICE: Brand: TR BAND

## (undated) DEVICE — 6F .070 XB 3.5 100CM: Brand: VISTA BRITE TIP

## (undated) DEVICE — PENCL E/S HNDSWTCH SMOKEEVAC HOLSTR 10FT

## (undated) DEVICE — SOL ISO/ALC RUB 70PCT 4OZ

## (undated) DEVICE — CATH DIAG IMPULSE MPA2 SH 5F 100CM

## (undated) DEVICE — DECANT BG O JET

## (undated) DEVICE — FR5 INFINITI MULTIPAC: Brand: INFINITI

## (undated) DEVICE — BND PRESS RADL COMFRT 14IN STRL

## (undated) DEVICE — RUNTHROUGH NS EXTRA FLOPPY PTCA GUIDEWIRE: Brand: RUNTHROUGH

## (undated) DEVICE — TREK CORONARY DILATATION CATHETER 3.0 MM X 12 MM / RAPID-EXCHANGE: Brand: TREK

## (undated) DEVICE — TOWEL,OR,DSP,ST,BLUE,STD,4/PK,20PK/CS: Brand: MEDLINE

## (undated) DEVICE — TRY INTRO PERC 6F

## (undated) DEVICE — SOL NACL 0.9PCT 1000ML

## (undated) DEVICE — SHEATH SENTRANT 14F 28CM

## (undated) DEVICE — CVR TABL BACK STND

## (undated) DEVICE — GW XCHG AMPLTZ XSTIF PTFE CRV .035 3X260

## (undated) DEVICE — 6F .070 JL3.5 100CM: Brand: CORDIS

## (undated) DEVICE — INTRO SHEATH ART/FEM ENGAGE .035 5F12CM

## (undated) DEVICE — SOL IRR H2O BTL 1000ML STRL

## (undated) DEVICE — TREK CORONARY DILATATION CATHETER 2.50 MM X 15 MM / RAPID-EXCHANGE: Brand: TREK

## (undated) DEVICE — CATH DIAG IMPULSE FL4 5F 100CM

## (undated) DEVICE — DRSNG SURESITE WNDW 2.38X2.75

## (undated) DEVICE — DEV INDEFLATOR P/N 580289

## (undated) DEVICE — GLIDESHEATH BASIC HYDROPHILIC COATED INTRODUCER SHEATH: Brand: GLIDESHEATH

## (undated) DEVICE — CATH DIAG IMPULSE FR4 6F 100CM

## (undated) DEVICE — TRANSD PRESS STOPCOCK1WAY CABL48IN

## (undated) DEVICE — TAVR: Brand: MEDLINE INDUSTRIES, INC.

## (undated) DEVICE — CATH DIAG IMPULSE AR2 5F 100CM

## (undated) DEVICE — INTRO SHEATH ART/FEM ENGAGE .035 8F12CM

## (undated) DEVICE — CATH DIAG IMPULSE AL1 6F 100CM

## (undated) DEVICE — TRUE™ DILATATION BALLOON VALVULOPLASTY CATHETER, 20 MM X 4.5 CM, 110 CM CATHETER: Brand: TRUE DILATATION

## (undated) DEVICE — CATH ELECTRD PACE TEMP BI NONHEP 5F110CM

## (undated) DEVICE — CATH DIAG SITESEER 4FAL I

## (undated) DEVICE — INTRO SHEATH PRELUDE SNAP .038 9F 13CM W/SDPRT BLK

## (undated) DEVICE — TBG PRESS EXCITE INJ HPF1200 CLR 100IN

## (undated) DEVICE — TBG INJ CONTRL PVCCLR RIGD RA 1200PSI 10

## (undated) DEVICE — LOADING SYS L-EVPROP2329US: Brand: EVOLUT™ PRO+

## (undated) DEVICE — GW INQWIRE FC PTFE STR .035IN 150

## (undated) DEVICE — SUTURE REMOVAL TRAY: Brand: MEDLINE INDUSTRIES, INC.

## (undated) DEVICE — NC TREK NEO™ CORONARY DILATATION CATHETER 3.00 MM X 20 MM / RAPID-EXCHANGE: Brand: NC TREK NEO™

## (undated) DEVICE — GUIDELINER CATHETERS ARE INTENDED TO BE USED IN CONJUNCTION WITH GUIDE CATHETERS TO ACCESS DISCRETE REGIONS OF THE CORONARY AND/OR PERIPHERAL VASCULATURE, AND TO FACILITATE PLACEMENT OF INTERVENTIONAL DEVICES.: Brand: GUIDELINER® V3 CATHETER

## (undated) DEVICE — LOU PACE DEFIB: Brand: MEDLINE INDUSTRIES, INC.

## (undated) DEVICE — CATH DIAG IMPULSE FR4 5F 100CM